# Patient Record
Sex: MALE | Race: WHITE | NOT HISPANIC OR LATINO | Employment: OTHER | URBAN - METROPOLITAN AREA
[De-identification: names, ages, dates, MRNs, and addresses within clinical notes are randomized per-mention and may not be internally consistent; named-entity substitution may affect disease eponyms.]

---

## 2017-01-03 RX ORDER — GEMFIBROZIL 600 MG/1
600 TABLET, FILM COATED ORAL
Qty: 60 TABLET | Refills: 11 | Status: SHIPPED | OUTPATIENT
Start: 2017-01-03 | End: 2017-01-25

## 2017-01-06 ENCOUNTER — OFFICE VISIT (OUTPATIENT)
Dept: FAMILY MEDICINE | Facility: CLINIC | Age: 62
End: 2017-01-06
Payer: COMMERCIAL

## 2017-01-06 VITALS
RESPIRATION RATE: 16 BRPM | HEIGHT: 74 IN | OXYGEN SATURATION: 96 % | BODY MASS INDEX: 32.77 KG/M2 | WEIGHT: 255.31 LBS | HEART RATE: 94 BPM | SYSTOLIC BLOOD PRESSURE: 150 MMHG | DIASTOLIC BLOOD PRESSURE: 90 MMHG

## 2017-01-06 DIAGNOSIS — E11.69 HYPERLIPIDEMIA ASSOCIATED WITH TYPE 2 DIABETES MELLITUS: ICD-10-CM

## 2017-01-06 DIAGNOSIS — Z86.010 HISTORY OF COLON POLYPS: ICD-10-CM

## 2017-01-06 DIAGNOSIS — F17.210 CIGARETTE NICOTINE DEPENDENCE WITHOUT COMPLICATION: ICD-10-CM

## 2017-01-06 DIAGNOSIS — I10 ESSENTIAL HYPERTENSION: ICD-10-CM

## 2017-01-06 DIAGNOSIS — E78.5 HYPERLIPIDEMIA ASSOCIATED WITH TYPE 2 DIABETES MELLITUS: ICD-10-CM

## 2017-01-06 PROCEDURE — 2022F DILAT RTA XM EVC RTNOPTHY: CPT | Mod: S$GLB,,, | Performed by: PHYSICIAN ASSISTANT

## 2017-01-06 PROCEDURE — 4010F ACE/ARB THERAPY RXD/TAKEN: CPT | Mod: S$GLB,,, | Performed by: PHYSICIAN ASSISTANT

## 2017-01-06 PROCEDURE — 99214 OFFICE O/P EST MOD 30 MIN: CPT | Mod: S$GLB,,, | Performed by: PHYSICIAN ASSISTANT

## 2017-01-06 PROCEDURE — 3077F SYST BP >= 140 MM HG: CPT | Mod: S$GLB,,, | Performed by: PHYSICIAN ASSISTANT

## 2017-01-06 PROCEDURE — 3080F DIAST BP >= 90 MM HG: CPT | Mod: S$GLB,,, | Performed by: PHYSICIAN ASSISTANT

## 2017-01-06 PROCEDURE — 99999 PR PBB SHADOW E&M-EST. PATIENT-LVL IV: CPT | Mod: PBBFAC,,, | Performed by: PHYSICIAN ASSISTANT

## 2017-01-06 PROCEDURE — 3046F HEMOGLOBIN A1C LEVEL >9.0%: CPT | Mod: S$GLB,,, | Performed by: PHYSICIAN ASSISTANT

## 2017-01-06 PROCEDURE — 1159F MED LIST DOCD IN RCRD: CPT | Mod: S$GLB,,, | Performed by: PHYSICIAN ASSISTANT

## 2017-01-06 RX ORDER — ROSUVASTATIN CALCIUM 20 MG/1
20 TABLET, COATED ORAL DAILY
Qty: 90 TABLET | Refills: 1 | Status: SHIPPED | OUTPATIENT
Start: 2017-01-06 | End: 2017-08-19 | Stop reason: SDUPTHER

## 2017-01-06 RX ORDER — ATENOLOL AND CHLORTHALIDONE TABLET 50; 25 MG/1; MG/1
1 TABLET ORAL DAILY
Qty: 90 TABLET | Refills: 1 | Status: SHIPPED | OUTPATIENT
Start: 2017-01-06 | End: 2017-01-25

## 2017-01-06 RX ORDER — METFORMIN HYDROCHLORIDE 500 MG/1
TABLET, EXTENDED RELEASE ORAL
Qty: 120 TABLET | Refills: 3 | Status: SHIPPED | OUTPATIENT
Start: 2017-01-06 | End: 2017-04-25 | Stop reason: SDUPTHER

## 2017-01-06 RX ORDER — AMLODIPINE AND BENAZEPRIL HYDROCHLORIDE 10; 40 MG/1; MG/1
1 CAPSULE ORAL DAILY
Qty: 90 CAPSULE | Refills: 1 | Status: ON HOLD | OUTPATIENT
Start: 2017-01-06 | End: 2017-01-17 | Stop reason: HOSPADM

## 2017-01-06 NOTE — PATIENT INSTRUCTIONS
Established High Blood Pressure    High blood pressure (hypertension) is a chronic disease. Often health care providers dont know what causes it. But it can be caused by certain health conditions and medicines.  If you have high blood pressure, you may not have any symptoms. If you do have symptoms, they may include headache, dizziness, changes in your vision, chest pain, and shortness of breath. But even without symptoms, high blood pressure thats not treated raises your risk for heart attack and stroke. High blood pressure is a serious health risk and shouldnt be ignored.  A blood pressure reading is made up of two numbers: a higher number over a lower number. The top number is the systolic pressure. The bottom number is the diastolic pressure. A normal blood pressure is less than 120 over less than 80.  High blood pressure is when either the top number is 140 or higher, or the bottom number is 90 or higher. This must be the result when taking your blood pressure a number of times. The blood pressures between normal and high are called prehypertension.  Home care  If you have high blood pressure, you should do what is listed below to lower your blood pressure. If you are taking medicines for high blood pressure, these methods may reduce or end your need for medicines in the future.  · Begin a weight-loss program if you are overweight.  · Cut back on how much salt you get in your diet. Heres how to do this:  ¨ Dont eat foods that have a lot of salt. These include olives, pickles, smoked meats, and salted potato chips.  ¨ Dont add salt to your food at the table.  ¨ Use only small amounts of salt when cooking.  · Begin an exercise program. Talk with your health care provider about the type of exercise program that would be best for you. It doesn't have to be hard. Even brisk walking for 20 minutes 3 times a week is a good form of exercise.  · Dont take medicines that have heart stimulants. This includes many  cold and sinus decongestant pills and sprays, as well as diet pills. Check the warnings about hypertension on the label. Stimulants such as amphetamine or cocaine could be lethal for someone with high blood pressure. Never take these.  · Limit how much caffeine you get in your diet. Switch to caffeine-free products.  · Stop smoking. If you are a long-time smoker, this can be hard. Enroll in a stop-smoking program to make it more likely that you will quit for good.  · Learn how to handle stress. This is an important part of any program to lower blood pressure. Learn about relaxation methods like meditation, yoga, or biofeedback.  · If your provider prescribed medicines, take them exactly as directed. Missing doses may cause your blood pressure get out of control.  · Consider buying an automatic blood pressure machine. You can get one of these at most pharmacies. Use this to watch your blood pressure at home. Give the results to your provider.  Follow-up care  You will need to make regular visits to your health care provider. This is to check your blood pressure and to make changes to your medicines. Make a follow-up appointment as directed.  When to seek medical advice  Call your health care provider right away if any of these occur:  · Chest pain or shortness of breath  · Severe headache  · Throbbing or rushing sound in the ears  · Nosebleed  · Sudden severe pain in your belly (abdomen)  · Extreme drowsiness, confusion, or fainting  · Dizziness or dizziness with a spinning sensation (vertigo)  · Weakness of an arm or leg or one side of the face  · You have problems speaking or seeing   © 3829-3922 [employer]. [address] [place] [address], [place] [address]. All rights reserved. This information is not intended as a substitute for professional medical care. Always follow your healthcare professional's instructions.            Diabetes (General Information)  Diabetes is a long-term health problem. It means  your body does not make enough insulin. Or it may mean that your body cannot use the insulin it makes. Insulin is a hormone in your body. It lets blood sugar (glucose) reach the cells in your body. All of your cells need glucose for fuel.  When you have diabetes, the glucose in your blood builds up because it cannot get into the cells. This buildup is called high blood sugar (hyperglycemia).  Your blood sugar level depends on several things. It depends on what kind of food you eat and how much of it you eat. It also depends on how much exercise you get, and how much insulin you have in your body. Eating too much of the wrong kinds of food or not taking diabetes medicine on time can cause high blood sugar. Infections can cause high blood sugar even if you are taking medicines correctly.  These things can also cause low blood sugar:  · Missing meals  · Not eating enough food  · Taking too much diabetes medicine  Diabetes can cause serious problems over time if you do not get treated. These problems include heart disease, stroke, kidney failure, and blindness. They also include nerve pain or loss of feeling in your legs and feet, and gangrene of the feet. By keeping your blood sugar under control you can prevent or delay these problems.  Normal blood sugar levels are 80 to 100 before a meal and less than 180 in the 1 to 2 hours after a meal.  Home care  Follow these guidelines when caring for yourself at home:  · Follow the diet your healthcare provider gives you. Take insulin or other diabetes medicine exactly as told to.  · Watch your blood sugar as you are told to. Keep a log of your results. This will help your provider change your medicines to keep your blood sugar under control.  · Try to reach your ideal weight. You may be able to cut back on or not have to take diabetes medicine if you eat the right foods and get exercise.  · Do not smoke. Smoking worsens the effects of diabetes on your circulation. You are  much more likely to have a heart attack if you have diabetes and you smoke.  · Take good care of your feet. If you have lost feeling in your feet, you may not see an injury or infection. Check your feet and between your toes at least once a week.  · Wear a medical alert bracelet or necklace, or carry a card in your wallet that says you have diabetes. This will help healthcare providers give you the right care if you get very ill and cannot tell them that you have diabetes.  Sick day plan  If you get a cold, the flu, or a bacterial or viral infection, take these steps:  · Look at your diabetes sick plan and call your healthcare provider as you were told to. You may need to call your provider right away if:  ¨ Your blood sugar is above 240 while taking your diabetes medicine  ¨ Your urine ketone levels are above normal or high  ¨ You have been vomiting more than 6 hours  ¨ You have trouble breathing or your breath ha s a fruity smell  ¨ You have a high fever  ¨ You have a fever for several days and you are not getting better  ¨ You get light-headed and are sleepier than usual  · Keep taking your diabetes pills (oral medicine) even if you have been vomiting and are feeling sick. Call your provider right away because you may need insulin to lower your blood sugar until you recover from your illness.  · Keep taking your insulin even if you have been vomiting and are feeling sick. Call your provider right away to ask if you need to change your insulin dose. This will depend on your blood sugar results.  · Check your blood sugar every 2 to 4 hours, or at least 4 times a day.  · Check your ketones often. If you are vomiting and having diarrhea, watch them more often.  · Do not skip meals. Try to eat small meals on a regular schedule. Do this even if you do not feel like eating.  · Drink water or other liquids that do not have caffeine or calories. This will keep you from getting dehydrated. If you are nauseated or vomiting,  takes small sips every 5 minutes. To prevent dehydration try to drink a cup (8 ounces) of fluids every hour while you are awake.  General care  Always bring a source of fast-acting sugar with you in case you have symptoms of low blood sugar (below 70). At the first sign of low blood sugar, eat or drink 15 to 20 grams of fast-acting sugar to raise your blood sugar. Examples are:  · 3 to 4 glucose tablets. You can buy these at most drugstores.  · 4 ounces (1/2 cup) of regular (not diet) soft drinks  · 4 ounces (1/2 cup) of any fruit juice  · 8 ounces (1 cup) of milk  · 5 to 6 pieces of hard candy  · 1 tablespoon of honey  Check your blood sugar 15 minutes after treating yourself. If it is still below 70, take 15 to 20 more grams of fast-acting sugar. Test again in 15 minutes. If it returns to normal (70 or above), eat a snack or meal to keep your blood sugar in a safe range. If it stays low, call your doctor or go to an emergency room.  Follow-up care  Follow-up with your healthcare provider, or as advised. For more information about diabetes, visit the American Diabetes Association website at www.diabetes.org or call 693-679-8386.  When to seek medical advice  Call your healthcare provider right away if you have any of these symptoms of high blood sugar:  · Frequent urination  · Dizziness  · Drowsiness  · Thirst  · Headache  · Nausea or vomiting  · Abdominal pain  · Eyesight changes  · Fast breathing  · Confusion or loss of consciousness  Also call your provider right away if you have any of these signs of low blood sugar:  · Fatigue  · Headache  · Shakes  · Excess sweating  · Hunger  · Feeling anxious or restless  · Eyesight changes  · Drowsiness  · Weakness  · Confusion or loss of consciousness  Call 911  Call for emergency help right away if any of these occur:  · Chest pain or shortness of breath  · Dizziness or fainting  · Weakness of an arm or leg or one side of the face  · Trouble speaking or seeing   ©  0365-5886 [employer]. [address] [place] [address], [place] [address]. All rights reserved. This information is not intended as a substitute for professional medical care. Always follow your healthcare professional's instructions.            Walking for Fitness  Fitness walking has something for everyone, even people who are already fit. Walking is one of the safest ways to condition your body aerobically. It can boost energy, help you lose weight, and reduce stress.    Physical benefits  · Walking strengthens your heart and lungs, and tones your muscles.  · When walking, your feet land with less impact than in other sports. This reduces chances of muscle, bone, and joint injury.  · Regular walking improves your cholesterol levels and lowers your risk of heart disease. And it helps you control your blood sugar if you have diabetes.  · Walking is a weight-bearing activity, which helps maintain bone density. This can help prevent osteoporosis.  Personal rewards  · Taking walks can help you relax and manage stress. And fitness walking may make you feel better about yourself.  · Walking can help you sleep better at night and make you less likely to be depressed.  · Regular walking may help maintain your memory as you get older.  · Walking is a great way to spend extra time with friends and family members. Be sure to invite your dog along!  Q&A about fitness walking  Q: Will walking keep me fit?  A: Yes. Regular walking at the right pace gives you all the benefits of other aerobic activities, such as jogging and swimming.  Q: Will walking help me lose weight and keep it off?  A: Yes. Per mile, walking can burn as many calories as jogging. Your health care provider can help work walking into your weight-loss plan.  Q: Is walking safe for my health?  A: Yes. Walking is safe if you have high blood pressure, diabetes, heart disease, or other conditions. Talk to your health care provider before you start.  ©  5263-2674 [employer]. [address] [place] [address], [place] [address]. All rights reserved. This information is not intended as a substitute for professional medical care. Always follow your healthcare professional's instructions.            Weight Management: Getting Started  Healthy bodies come in all shapes and sizes. Not all bodies are made to be thin. For some people, a healthy weight is higher than the average weight listed on weight charts. Your healthcare provider can help you decide on a healthy weight for you.    Reasons to lose weight  Losing weight can help with some health problems, such as high blood pressure, heart disease, diabetes, sleep apnea, and arthritis. You may also feel more energy.  Set your long-term goal  Your goal doesn't even have to be a specific weight. You may decide on a fitness goal (such as being able to walk 10 miles a week), or a health goal (such as lowering your blood pressure). Choose a goal that is measurable and reasonable, so you know when you've reached it. A goal of reaching a BMI of less than 25 is not always reasonable (or possible).   Make an action plan  Habits dont change overnight. Setting your goals too high can leave you feeling discouraged if you cant reach them. Be realistic. Choose one or two small changes you can make now. Set an action plan for how you are going to make these changes. When you can stick to this plan, keep making a few more small changes. Taking small steps will help you stay on the path to success.  Track your progress  Write down your goals. Then, keep a daily record of your progress. Write down what you eat and how active you are. This record lets you look back on how much youve done. It may also help when youre feeling frustrated. Reward yourself for success. Even if you dont reach every goal, give yourself credit for what you do get done.  Get support  Encouragement from others can help make losing weight easier. Ask your  family members and friends for support. They may even want to join you. Also look to your healthcare provider, registered dietitian, and  for help. Your local hospital can give you more information about nutrition, exercise, and weight loss.  © 9134-9119 [employer]. [address] [place] [address], [place] [address]. All rights reserved. This information is not intended as a substitute for professional medical care. Always follow your healthcare professional's instructions.

## 2017-01-06 NOTE — PROGRESS NOTES
Subjective:       Patient ID: Jhonny Almazan is a 61 y.o. male.    Chief Complaint: Follow-up (3mth f/u)    HPI Comments: Mr. Almazan comes to clinic today for 3 month follow up. He recently had blood work that revealed uncontrolled blood sugars and uncontrolled cholesterol. The patient reports he is not always compliant with his medications. He reports that he will often stop taking them when the medication runs out. 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 The patient is due for immunizations; he refuses pneumonia vaccine. He has had his flu shot through his employer. The patient is up to date on his eye exam. The patient is overdue for colonoscopy; he has not scheduled this yet due to difficulty scheduling around his work schedule. The patient does admit that he has not made his health and medication a priority. He reports he will be more committed to taking his medication as directed and following his diet. He is not monitoring his blood sugars at this time; he reports he will start doing this.     Review of Systems   Constitutional: Negative for activity change, appetite change and fever.   HENT: Negative for postnasal drip, rhinorrhea and sinus pressure.    Eyes: Negative for visual disturbance.   Respiratory: Negative for cough and shortness of breath.    Cardiovascular: Negative for chest pain.   Gastrointestinal: Negative for abdominal distention and abdominal pain.   Genitourinary: Negative for difficulty urinating and dysuria.   Musculoskeletal: Negative for arthralgias and myalgias.   Neurological: Negative for headaches.   Hematological: Negative for adenopathy.   Psychiatric/Behavioral: The patient is not nervous/anxious.        Objective:      Physical Exam   Constitutional: He is oriented to person, place, and time.   HENT:   Mouth/Throat: Oropharynx is clear and moist. No oropharyngeal exudate.    Eyes: Conjunctivae are normal. Pupils are equal, round, and reactive to light.   Cardiovascular: Normal rate and regular rhythm.    Pulmonary/Chest: Effort normal and breath sounds normal. He has no wheezes.   Abdominal: Soft. Bowel sounds are normal. There is no tenderness.   Musculoskeletal: He exhibits no edema.   Lymphadenopathy:     He has no cervical adenopathy.   Neurological: He is alert and oriented to person, place, and time.   Skin: No erythema.   Psychiatric: His behavior is normal.       Assessment:       1. Uncontrolled type 2 diabetes mellitus with complication, without long-term current use of insulin    2. Hyperlipidemia associated with type 2 diabetes mellitus    3. Essential hypertension    4. Cigarette nicotine dependence without complication    5. History of colon polyps        Plan:     Jhonny was seen today for follow-up.    Diagnoses and all orders for this visit:    Uncontrolled type 2 diabetes mellitus with complication, without long-term current use of insulin  -     metformin (GLUCOPHAGE-XR) 500 MG 24 hr tablet; Take 1 tab twice daily x 1 week then increase to 2 tabs twice dialy  Patient encouraged to be compliant with medication and diet  Increase exercise as able  Hyperlipidemia associated with type 2 diabetes mellitus  -     rosuvastatin (CRESTOR) 20 MG tablet; Take 1 tablet (20 mg total) by mouth once daily.  High fiber diet  Continue current medication  Essential hypertension  -     amlodipine-benazepril (LOTREL) 10-40 mg per capsule; Take 1 capsule by mouth once daily.  -     atenolol-chlorthalidone (TENORETIC) 50-25 mg Tab; Take 1 tablet by mouth once daily.  Uncontrolled  Patient to take lotrel before going to sleep and tenoretic before going to work  Low salt diet  Patient encouraged to stop smoking  Increase exercise as able  Cigarette nicotine dependence without complication  Patient continues to smoke cigarettes. He refuses to stop at this time  History of colon polyps  -      Ambulatory Referral to Gastroenterology    Follow up in 4 weeks. Patient to bring blood sugar and blood pressure log at that time to review readings  Patient encouraged to be compliant with medications. Again explained to patient that he is putting his health and life in danger by not complying with diet and prescribed medication. Patient verbalized understanding.

## 2017-01-06 NOTE — MR AVS SNAPSHOT
[facility]  [address]  [place] [address]  Phone: [phone]  Fax: [phone]                  Jhonny Almazan   2017 11:00 AM   Office Visit    Description:  Male : 1955   Provider:  Sita Zimmer PA-C   Department:  [facility]           Reason for Visit     Follow-up           Diagnoses this Visit        Comments    Uncontrolled type 2 diabetes mellitus with complication, without long-term current use of insulin    -  Primary     Hyperlipidemia associated with type 2 diabetes mellitus         Essential hypertension         Cigarette nicotine dependence without complication         History of colon polyps                To Do List           Future Appointments        Provider Department Dept Phone    2/10/2017 1:00 PM [facility] ENDOCRINE EDUCATOR [facility] [phone]    2/10/2017 2:40 PM Sita Zimmer PA-C [facility] [phone]    2017 1:30 PM Joey Whitehead MD [facility] [phone]    2017 1:00 PM Joey Whitehead MD [place] [phone]      Goals (5 Years of Data)     None       These Medications        Disp Refills Start End    metformin (GLUCOPHAGE-XR) 500 MG 24 hr tablet 120 tablet 3 2017     Take 1 tab twice daily x 1 week then increase to 2 tabs twice dialy    Pharmacy: [facility]/pharmacy #[id number] - [place] [address] #: [phone]       amlodipine-benazepril (LOTREL) 10-40 mg per capsule 90 capsule 1 2017     Take 1 capsule by mouth once daily. - Oral    Pharmacy: [facility]/pharmacy #[id number] - [place] [address] [facility] [address] Ph #: [phone]       rosuvastatin (CRESTOR) 20 MG tablet 90 tablet 1 2017     Take 1 tablet (20 mg total) by mouth once daily. - Oral    Pharmacy: [facility]/pharmacy #[id number] - [place] [address] [facility] [address] Ph #: [phone]       atenolol-chlorthalidone (TENORETIC) 50-25 mg Tab 90 tablet 1 2017    Take 1  tablet by mouth once daily. - Oral    Pharmacy: [facility]/pharmacy #[id number] - [place], [place] - [address] [facility] #: [phone]         [facility] On Call     [facility] On Call Nurse Care Line - 24/7 Assistance  Registered nurses in the [facility] On Call Center provide clinical advisement, health education, appointment booking, and other advisory services.  Call for this free service at [phone].             Medications           Message regarding Medications     Verify the changes and/or additions to your medication regime listed below are the same as discussed with your clinician today.  If any of these changes or additions are incorrect, please notify your healthcare provider.        START taking these NEW medications        Refills    metformin (GLUCOPHAGE-XR) 500 MG 24 hr tablet 3    Sig: Take 1 tab twice daily x 1 week then increase to 2 tabs twice dialy    Class: Normal    atenolol-chlorthalidone (TENORETIC) 50-25 mg Tab 1    Sig: Take 1 tablet by mouth once daily.    Class: Normal    Route: Oral      STOP taking these medications     nicotine polacrilex (NICORETTE) 4 MG Gum Take 1 each (4 mg total) by mouth as needed (use every 1 - 2 hours as needed for the first 6 weeks. 2-4 hours as needed for the next 3 weeks.).           Verify that the below list of medications is an accurate representation of the medications you are currently taking.  If none reported, the list may be blank. If incorrect, please contact your healthcare provider. Carry this list with you in case of emergency.           Current Medications     amlodipine-benazepril (LOTREL) 10-40 mg per capsule Take 1 capsule by mouth once daily.    aspirin (ECOTRIN) 81 MG EC tablet Take 81 mg by mouth once daily.    gemfibrozil (LOPID) 600 MG tablet Take 1 tablet (600 mg total) by mouth 2 (two) times daily before meals.    ibuprofen (ADVIL,MOTRIN) 200 MG tablet Take 200 mg by mouth every 6 (six) hours as needed for Pain.    rosuvastatin (CRESTOR) 20 MG tablet  "Take 1 tablet (20 mg total) by mouth once daily.    atenolol-chlorthalidone (TENORETIC) 50-25 mg Tab Take 1 tablet by mouth once daily.    metformin (GLUCOPHAGE-XR) 500 MG 24 hr tablet Take 1 tab twice daily x 1 week then increase to 2 tabs twice dialy           Clinical Reference Information           Vital Signs - Last Recorded  Most recent update: 1/6/2017 11:28 AM by Sita Zimmer PA-C    BP Pulse Resp Ht Wt SpO2    (!) 150/90 94 16 6' 2" (1.88 m) 115.8 kg (255 lb 4.7 oz) 96%    BMI                32.78 kg/m2          Blood Pressure          Most Recent Value    BP  (!)  150/90      Allergies as of 1/6/2017     No Known Allergies      Immunizations Administered on Date of Encounter - 1/6/2017     None      Orders Placed During Today's Visit      Normal Orders This Visit    Ambulatory Referral to Gastroenterology       Instructions      Established High Blood Pressure    High blood pressure (hypertension) is a chronic disease. Often health care providers dont know what causes it. But it can be caused by certain health conditions and medicines.  If you have high blood pressure, you may not have any symptoms. If you do have symptoms, they may include headache, dizziness, changes in your vision, chest pain, and shortness of breath. But even without symptoms, high blood pressure thats not treated raises your risk for heart attack and stroke. High blood pressure is a serious health risk and shouldnt be ignored.  A blood pressure reading is made up of two numbers: a higher number over a lower number. The top number is the systolic pressure. The bottom number is the diastolic pressure. A normal blood pressure is less than 120 over less than 80.  High blood pressure is when either the top number is 140 or higher, or the bottom number is 90 or higher. This must be the result when taking your blood pressure a number of times. The blood pressures between normal and high are called prehypertension.  Home care  If you " have high blood pressure, you should do what is listed below to lower your blood pressure. If you are taking medicines for high blood pressure, these methods may reduce or end your need for medicines in the future.  · Begin a weight-loss program if you are overweight.  · Cut back on how much salt you get in your diet. Heres how to do this:  ¨ Dont eat foods that have a lot of salt. These include olives, pickles, smoked meats, and salted potato chips.  ¨ Dont add salt to your food at the table.  ¨ Use only small amounts of salt when cooking.  · Begin an exercise program. Talk with your health care provider about the type of exercise program that would be best for you. It doesn't have to be hard. Even brisk walking for 20 minutes 3 times a week is a good form of exercise.  · Dont take medicines that have heart stimulants. This includes many cold and sinus decongestant pills and sprays, as well as diet pills. Check the warnings about hypertension on the label. Stimulants such as amphetamine or cocaine could be lethal for someone with high blood pressure. Never take these.  · Limit how much caffeine you get in your diet. Switch to caffeine-free products.  · Stop smoking. If you are a long-time smoker, this can be hard. Enroll in a stop-smoking program to make it more likely that you will quit for good.  · Learn how to handle stress. This is an important part of any program to lower blood pressure. Learn about relaxation methods like meditation, yoga, or biofeedback.  · If your provider prescribed medicines, take them exactly as directed. Missing doses may cause your blood pressure get out of control.  · Consider buying an automatic blood pressure machine. You can get one of these at most pharmacies. Use this to watch your blood pressure at home. Give the results to your provider.  Follow-up care  You will need to make regular visits to your health care provider. This is to check your blood pressure and to make  changes to your medicines. Make a follow-up appointment as directed.  When to seek medical advice  Call your health care provider right away if any of these occur:  · Chest pain or shortness of breath  · Severe headache  · Throbbing or rushing sound in the ears  · Nosebleed  · Sudden severe pain in your belly (abdomen)  · Extreme drowsiness, confusion, or fainting  · Dizziness or dizziness with a spinning sensation (vertigo)  · Weakness of an arm or leg or one side of the face  · You have problems speaking or seeing   © 2000-2016 [employer]. [address] [place] [address]. All rights reserved. This information is not intended as a substitute for professional medical care. Always follow your healthcare professional's instructions.            Diabetes (General Information)  Diabetes is a long-term health problem. It means your body does not make enough insulin. Or it may mean that your body cannot use the insulin it makes. Insulin is a hormone in your body. It lets blood sugar (glucose) reach the cells in your body. All of your cells need glucose for fuel.  When you have diabetes, the glucose in your blood builds up because it cannot get into the cells. This buildup is called high blood sugar (hyperglycemia).  Your blood sugar level depends on several things. It depends on what kind of food you eat and how much of it you eat. It also depends on how much exercise you get, and how much insulin you have in your body. Eating too much of the wrong kinds of food or not taking diabetes medicine on time can cause high blood sugar. Infections can cause high blood sugar even if you are taking medicines correctly.  These things can also cause low blood sugar:  · Missing meals  · Not eating enough food  · Taking too much diabetes medicine  Diabetes can cause serious problems over time if you do not get treated. These problems include heart disease, stroke, kidney failure, and blindness. They also include  nerve pain or loss of feeling in your legs and feet, and gangrene of the feet. By keeping your blood sugar under control you can prevent or delay these problems.  Normal blood sugar levels are 80 to 100 before a meal and less than 180 in the 1 to 2 hours after a meal.  Home care  Follow these guidelines when caring for yourself at home:  · Follow the diet your healthcare provider gives you. Take insulin or other diabetes medicine exactly as told to.  · Watch your blood sugar as you are told to. Keep a log of your results. This will help your provider change your medicines to keep your blood sugar under control.  · Try to reach your ideal weight. You may be able to cut back on or not have to take diabetes medicine if you eat the right foods and get exercise.  · Do not smoke. Smoking worsens the effects of diabetes on your circulation. You are much more likely to have a heart attack if you have diabetes and you smoke.  · Take good care of your feet. If you have lost feeling in your feet, you may not see an injury or infection. Check your feet and between your toes at least once a week.  · Wear a medical alert bracelet or necklace, or carry a card in your wallet that says you have diabetes. This will help healthcare providers give you the right care if you get very ill and cannot tell them that you have diabetes.  Sick day plan  If you get a cold, the flu, or a bacterial or viral infection, take these steps:  · Look at your diabetes sick plan and call your healthcare provider as you were told to. You may need to call your provider right away if:  ¨ Your blood sugar is above 240 while taking your diabetes medicine  ¨ Your urine ketone levels are above normal or high  ¨ You have been vomiting more than 6 hours  ¨ You have trouble breathing or your breath ha s a fruity smell  ¨ You have a high fever  ¨ You have a fever for several days and you are not getting better  ¨ You get light-headed and are sleepier than  usual  · Keep taking your diabetes pills (oral medicine) even if you have been vomiting and are feeling sick. Call your provider right away because you may need insulin to lower your blood sugar until you recover from your illness.  · Keep taking your insulin even if you have been vomiting and are feeling sick. Call your provider right away to ask if you need to change your insulin dose. This will depend on your blood sugar results.  · Check your blood sugar every 2 to 4 hours, or at least 4 times a day.  · Check your ketones often. If you are vomiting and having diarrhea, watch them more often.  · Do not skip meals. Try to eat small meals on a regular schedule. Do this even if you do not feel like eating.  · Drink water or other liquids that do not have caffeine or calories. This will keep you from getting dehydrated. If you are nauseated or vomiting, takes small sips every 5 minutes. To prevent dehydration try to drink a cup (8 ounces) of fluids every hour while you are awake.  General care  Always bring a source of fast-acting sugar with you in case you have symptoms of low blood sugar (below 70). At the first sign of low blood sugar, eat or drink 15 to 20 grams of fast-acting sugar to raise your blood sugar. Examples are:  · 3 to 4 glucose tablets. You can buy these at most drugstores.  · 4 ounces (1/2 cup) of regular (not diet) soft drinks  · 4 ounces (1/2 cup) of any fruit juice  · 8 ounces (1 cup) of milk  · 5 to 6 pieces of hard candy  · 1 tablespoon of honey  Check your blood sugar 15 minutes after treating yourself. If it is still below 70, take 15 to 20 more grams of fast-acting sugar. Test again in 15 minutes. If it returns to normal (70 or above), eat a snack or meal to keep your blood sugar in a safe range. If it stays low, call your doctor or go to an emergency room.  Follow-up care  Follow-up with your healthcare provider, or as advised. For more information about diabetes, visit the American  Diabetes Association website at www.diabetes.org or call 232-721-6294.  When to seek medical advice  Call your healthcare provider right away if you have any of these symptoms of high blood sugar:  · Frequent urination  · Dizziness  · Drowsiness  · Thirst  · Headache  · Nausea or vomiting  · Abdominal pain  · Eyesight changes  · Fast breathing  · Confusion or loss of consciousness  Also call your provider right away if you have any of these signs of low blood sugar:  · Fatigue  · Headache  · Shakes  · Excess sweating  · Hunger  · Feeling anxious or restless  · Eyesight changes  · Drowsiness  · Weakness  · Confusion or loss of consciousness  Call 911  Call for emergency help right away if any of these occur:  · Chest pain or shortness of breath  · Dizziness or fainting  · Weakness of an arm or leg or one side of the face  · Trouble speaking or seeing   © 4300-5002 [employer]. [address] [place] [address]. All rights reserved. This information is not intended as a substitute for professional medical care. Always follow your healthcare professional's instructions.            Walking for Fitness  Fitness walking has something for everyone, even people who are already fit. Walking is one of the safest ways to condition your body aerobically. It can boost energy, help you lose weight, and reduce stress.    Physical benefits  · Walking strengthens your heart and lungs, and tones your muscles.  · When walking, your feet land with less impact than in other sports. This reduces chances of muscle, bone, and joint injury.  · Regular walking improves your cholesterol levels and lowers your risk of heart disease. And it helps you control your blood sugar if you have diabetes.  · Walking is a weight-bearing activity, which helps maintain bone density. This can help prevent osteoporosis.  Personal rewards  · Taking walks can help you relax and manage stress. And fitness walking may make you feel better  about yourself.  · Walking can help you sleep better at night and make you less likely to be depressed.  · Regular walking may help maintain your memory as you get older.  · Walking is a great way to spend extra time with friends and family members. Be sure to invite your dog along!  Q&A about fitness walking  Q: Will walking keep me fit?  A: Yes. Regular walking at the right pace gives you all the benefits of other aerobic activities, such as jogging and swimming.  Q: Will walking help me lose weight and keep it off?  A: Yes. Per mile, walking can burn as many calories as jogging. Your health care provider can help work walking into your weight-loss plan.  Q: Is walking safe for my health?  A: Yes. Walking is safe if you have high blood pressure, diabetes, heart disease, or other conditions. Talk to your health care provider before you start.  © 5817-3454 [employer]. [address] [place] [address]. All rights reserved. This information is not intended as a substitute for professional medical care. Always follow your healthcare professional's instructions.            Weight Management: Getting Started  Healthy bodies come in all shapes and sizes. Not all bodies are made to be thin. For some people, a healthy weight is higher than the average weight listed on weight charts. Your healthcare provider can help you decide on a healthy weight for you.    Reasons to lose weight  Losing weight can help with some health problems, such as high blood pressure, heart disease, diabetes, sleep apnea, and arthritis. You may also feel more energy.  Set your long-term goal  Your goal doesn't even have to be a specific weight. You may decide on a fitness goal (such as being able to walk 10 miles a week), or a health goal (such as lowering your blood pressure). Choose a goal that is measurable and reasonable, so you know when you've reached it. A goal of reaching a BMI of less than 25 is not always reasonable  (or possible).   Make an action plan  Habits dont change overnight. Setting your goals too high can leave you feeling discouraged if you cant reach them. Be realistic. Choose one or two small changes you can make now. Set an action plan for how you are going to make these changes. When you can stick to this plan, keep making a few more small changes. Taking small steps will help you stay on the path to success.  Track your progress  Write down your goals. Then, keep a daily record of your progress. Write down what you eat and how active you are. This record lets you look back on how much youve done. It may also help when youre feeling frustrated. Reward yourself for success. Even if you dont reach every goal, give yourself credit for what you do get done.  Get support  Encouragement from others can help make losing weight easier. Ask your family members and friends for support. They may even want to join you. Also look to your healthcare provider, registered dietitian, and  for help. Your local hospital can give you more information about nutrition, exercise, and weight loss.  © 2163-1426 [employer]. [address] [place] [address], [place] [address]. All rights reserved. This information is not intended as a substitute for professional medical care. Always follow your healthcare professional's instructions.                 Smoking Cessation     If you would like to quit smoking:   You may be eligible for free services if you are a [place] resident and started smoking cigarettes before September 1, 1988.  Call the Smoking Cessation Trust (SCT) toll free at (867) 822-4929 or (053) 862-6695.   Call 1-800-QUIT-NOW if you do not meet the above criteria.

## 2017-01-16 ENCOUNTER — HOSPITAL ENCOUNTER (OUTPATIENT)
Facility: HOSPITAL | Age: 62
Discharge: HOME | DRG: 684 | End: 2017-01-17
Attending: EMERGENCY MEDICINE | Admitting: HOSPITALIST
Payer: COMMERCIAL

## 2017-01-16 DIAGNOSIS — R42 DIZZINESS: Primary | ICD-10-CM

## 2017-01-16 DIAGNOSIS — R73.9 HYPERGLYCEMIA: ICD-10-CM

## 2017-01-16 DIAGNOSIS — N39.0 URINARY TRACT INFECTION WITHOUT HEMATURIA, SITE UNSPECIFIED: ICD-10-CM

## 2017-01-16 DIAGNOSIS — I95.1 ORTHOSTATIC HYPOTENSION: ICD-10-CM

## 2017-01-16 DIAGNOSIS — I15.2 HYPERTENSION ASSOCIATED WITH DIABETES: ICD-10-CM

## 2017-01-16 DIAGNOSIS — N28.9 ACUTE RENAL INSUFFICIENCY: ICD-10-CM

## 2017-01-16 DIAGNOSIS — N17.9 AKI (ACUTE KIDNEY INJURY): ICD-10-CM

## 2017-01-16 DIAGNOSIS — E11.59 HYPERTENSION ASSOCIATED WITH DIABETES: ICD-10-CM

## 2017-01-16 LAB
ALBUMIN SERPL BCP-MCNC: 4.2 G/DL
ALP SERPL-CCNC: 72 U/L
ALT SERPL W/O P-5'-P-CCNC: 23 U/L
ANION GAP SERPL CALC-SCNC: 16 MMOL/L
AST SERPL-CCNC: 17 U/L
BASOPHILS # BLD AUTO: 0.1 K/UL
BASOPHILS NFR BLD: 0.8 %
BILIRUB SERPL-MCNC: 0.7 MG/DL
BNP SERPL-MCNC: 27 PG/ML
BUN SERPL-MCNC: 26 MG/DL
CALCIUM SERPL-MCNC: 10.3 MG/DL
CHLORIDE SERPL-SCNC: 100 MMOL/L
CO2 SERPL-SCNC: 21 MMOL/L
CREAT SERPL-MCNC: 1.9 MG/DL
DIFFERENTIAL METHOD: ABNORMAL
EOSINOPHIL # BLD AUTO: 0.1 K/UL
EOSINOPHIL NFR BLD: 1 %
ERYTHROCYTE [DISTWIDTH] IN BLOOD BY AUTOMATED COUNT: 12.8 %
EST. GFR  (AFRICAN AMERICAN): 42 ML/MIN/1.73 M^2
EST. GFR  (NON AFRICAN AMERICAN): 37 ML/MIN/1.73 M^2
GLUCOSE SERPL-MCNC: 275 MG/DL
HCT VFR BLD AUTO: 44.3 %
HGB BLD-MCNC: 15.3 G/DL
LIPASE SERPL-CCNC: 34 U/L
LYMPHOCYTES # BLD AUTO: 2.5 K/UL
LYMPHOCYTES NFR BLD: 20.4 %
MCH RBC QN AUTO: 31.5 PG
MCHC RBC AUTO-ENTMCNC: 34.5 %
MCV RBC AUTO: 91 FL
MONOCYTES # BLD AUTO: 0.7 K/UL
MONOCYTES NFR BLD: 5.8 %
NEUTROPHILS # BLD AUTO: 8.8 K/UL
NEUTROPHILS NFR BLD: 72 %
PLATELET # BLD AUTO: 228 K/UL
PMV BLD AUTO: 8 FL
POTASSIUM SERPL-SCNC: 3.9 MMOL/L
PROT SERPL-MCNC: 7.3 G/DL
RBC # BLD AUTO: 4.86 M/UL
SODIUM SERPL-SCNC: 137 MMOL/L
TROPONIN I SERPL DL<=0.01 NG/ML-MCNC: 0.01 NG/ML
WBC # BLD AUTO: 12.2 K/UL

## 2017-01-16 PROCEDURE — 12000002 HC ACUTE/MED SURGE SEMI-PRIVATE ROOM

## 2017-01-16 PROCEDURE — 83880 ASSAY OF NATRIURETIC PEPTIDE: CPT

## 2017-01-16 PROCEDURE — 96360 HYDRATION IV INFUSION INIT: CPT

## 2017-01-16 PROCEDURE — 93005 ELECTROCARDIOGRAM TRACING: CPT

## 2017-01-16 PROCEDURE — 84443 ASSAY THYROID STIM HORMONE: CPT

## 2017-01-16 PROCEDURE — 85025 COMPLETE CBC W/AUTO DIFF WBC: CPT

## 2017-01-16 PROCEDURE — G0378 HOSPITAL OBSERVATION PER HR: HCPCS

## 2017-01-16 PROCEDURE — 99285 EMERGENCY DEPT VISIT HI MDM: CPT | Mod: 25

## 2017-01-16 PROCEDURE — 81000 URINALYSIS NONAUTO W/SCOPE: CPT

## 2017-01-16 PROCEDURE — 25000003 PHARM REV CODE 250: Performed by: EMERGENCY MEDICINE

## 2017-01-16 PROCEDURE — 36415 COLL VENOUS BLD VENIPUNCTURE: CPT

## 2017-01-16 PROCEDURE — 83690 ASSAY OF LIPASE: CPT

## 2017-01-16 PROCEDURE — 80053 COMPREHEN METABOLIC PANEL: CPT

## 2017-01-16 PROCEDURE — 84484 ASSAY OF TROPONIN QUANT: CPT

## 2017-01-16 RX ADMIN — SODIUM CHLORIDE 1000 ML: 0.9 INJECTION, SOLUTION INTRAVENOUS at 11:01

## 2017-01-16 NOTE — IP AVS SNAPSHOT
[address] [place] [address]  Phone: [phone]           Patient Discharge Instructions     Our goal is to set you up for success. This packet includes information on your condition, medications, and your home care. It will help you to care for yourself so you don't get sicker and need to go back to the hospital.     Please ask your nurse if you have any questions.        There are many details to remember when preparing to leave the hospital. Here is what you will need to do:    1. Take your medicine. If you are prescribed medications, review your Medication List in the following pages. You may have new medications to  at the pharmacy and others that you'll need to stop taking. Review the instructions for how and when to take your medications. Talk with your doctor or nurses if you are unsure of what to do.     2. Go to your follow-up appointments. Specific follow-up information is listed in the following pages. Your may be contacted by a transition nurse or clinical provider about future appointments. Be sure we have all of the phone numbers to reach you, if needed. Please contact your provider's office if you are unable to make an appointment.     3. Watch for warning signs. Your doctor or nurse will give you detailed warning signs to watch for and when to call for assistance. These instructions may also include educational information about your condition. If you experience any of warning signs to your health, call your doctor.               [facility]  Unless otherwise directed by your provider, please contact [facility], our nurse care line that is available for 24/7 assistance.     [phone] (toll-free)    Registered nurses in the [facility] provide clinical advisement, health education, appointment booking, and other advisory services.                    ** Verify the list of medication(s) below is accurate and up to date.  Carry this with you in case of emergency. If your medications have changed, please notify your healthcare provider.             Medication List      START taking these medications        Additional Info                      lisinopril 5 MG tablet   Commonly known as:  PRINIVIL,ZESTRIL   Quantity:  90 tablet   Refills:  3   Dose:  5 mg    Instructions:  Take 1 tablet (5 mg total) by mouth once daily. Take in pm     Begin Date    AM    Noon    PM    Bedtime         CONTINUE taking these medications        Additional Info                      aspirin 81 MG EC tablet   Commonly known as:  ECOTRIN   Refills:  0   Dose:  81 mg    Last time this was given:  81 mg on 1/17/2017  8:27 AM   Instructions:  Take 81 mg by mouth once daily.     Begin Date    AM    Noon    PM    Bedtime       atenolol-chlorthalidone 50-25 mg Tab   Commonly known as:  TENORETIC   Quantity:  90 tablet   Refills:  1   Dose:  1 tablet    Instructions:  Take 1 tablet by mouth once daily.     Begin Date    AM    Noon    PM    Bedtime       gemfibrozil 600 MG tablet   Commonly known as:  LOPID   Quantity:  60 tablet   Refills:  11   Dose:  600 mg    Last time this was given:  600 mg on 1/17/2017  6:31 AM   Instructions:  Take 1 tablet (600 mg total) by mouth 2 (two) times daily before meals.     Begin Date    AM    Noon    PM    Bedtime       metformin 500 MG 24 hr tablet   Commonly known as:  GLUCOPHAGE-XR   Quantity:  120 tablet   Refills:  3    Instructions:  Take 1 tab twice daily x 1 week then increase to 2 tabs twice dialy     Begin Date    AM    Noon    PM    Bedtime       rosuvastatin 20 MG tablet   Commonly known as:  CRESTOR   Quantity:  90 tablet   Refills:  1   Dose:  20 mg    Last time this was given:  20 mg on 1/17/2017  8:27 AM   Instructions:  Take 1 tablet (20 mg total) by mouth once daily.     Begin Date    AM    Noon    PM    Bedtime         STOP taking these medications     amlodipine-benazepril 10-40 mg per capsule   Commonly known  as:  LOTREL       ibuprofen 200 MG tablet   Commonly known as:  ADVILMOTRIN            Where to Get Your Medications      These medications were sent to [id number] - [place] - [address]  [address] [place] [address]    Hours:  24-hours Phone:  [phone]     lisinopril 5 MG tablet                  Please bring to all follow up appointments:    1. A copy of your discharge instructions.  2. All medicines you are currently taking in their original bottles.  3. Identification and insurance card.    Please arrive 15 minutes ahead of scheduled appointment time.    Please call 24 hours in advance if you must reschedule your appointment and/or time.        Your Scheduled Appointments     Feb 10, 2017  1:00 PM CST   Diabetes Education with [place] [facility]   [place] [facility]    [address]  [place] [address]   [phone]            Feb 10, 2017  2:40 PM CST   Established Patient Visit with [facility] - [facility] ([place])    [address]  [place] [address]   [phone]            May 12, 2017  1:30 PM CDT   Established Patient Visit with MD [place] [facility] ([place])    [address]  [place] [address]   [phone]            Aug 18, 2017  1:00 PM CDT   Established Patient Visit with MD [place]  [facility] ([place])    [address]  [place] [address]   [phone]              Follow-up Information     Follow up with Joey Whitehead MD In 1 week.    Specialty:  Family Medicine    Contact information:    [address] [place] [address]  [phone]          Discharge Instructions     Future Orders    Activity as tolerated     Call MD for:  increased confusion or weakness     Call MD for:  persistent dizziness, light-headedness, or visual disturbances     Diet Cardiac     Diet Diabetic 1800 Calories       Discharge References/Attachments     ACE  "INHIBITORS, TAKING (ENGLISH)    BLOOD PRESSURE MEDICINES, DISCHARGE INSTRUCTIONS (ENGLISH)    DIABETES AND ALCOHOL CONSUMPTION (ENGLISH)        Primary Diagnosis     Your primary diagnosis was:  Acute Nontraumatic Kidney Injury      Admission Information     Date & Time Provider Department CSN    1/16/2017 10:11 PM Farzaneh Shankar MD [facility] [id number]      Care Providers     Provider Role Specialty Primary office phone    Farzaneh Shankar MD Attending Provider Hospitalist [phone]      Your Vitals Were     BP Pulse Temp Resp Height Weight    135/78 81 98.1 °F (36.7 °C) 20 6' 5" (1.956 m) 113.4 kg (250 lb)    SpO2 BMI             95% 29.65 kg/m2         Recent Lab Values        12/28/2004 6/10/2011 6/9/2014 9/12/2014 12/19/2014 12/29/2016            8:17 AM 10:18 AM  4:00 PM 10:48 AM  9:53 AM  9:25 AM      A1C 5.4 6.4 (H) 7.2 (H) 7.7 (H) 7.2 (H) 9.3 (H)      Comment for A1C at  9:25 AM on 12/29/2016:  According to ADA guidelines, hemoglobin A1C <7.0% represents  optimal control in non-pregnant diabetic patients.  Different  metrics may apply to specific populations.   Standards of Medical Care in Diabetes - 2016.  For the purpose of screening for the presence of diabetes:  <5.7%     Consistent with the absence of diabetes  5.7-6.4%  Consistent with increasing risk for diabetes   (prediabetes)  >or=6.5%  Consistent with diabetes  Currently no consensus exists for use of hemoglobin A1C  for diagnosis of diabetes for children.        Pending Labs     Order Current Status    Urine culture In process      Allergies as of 1/17/2017     No Known Allergies      Advance Directives     An advance directive is a document which, in the event you are no longer able to make decisions for yourself, tells your healthcare team what kind of treatment you do or do not want to receive, or who you would like to make those decisions for you.  If you do not currently have an advance directive, [facility] encourages " you to create one.  For more information call:  [phone]), [phone] ([phone]),  or log on to [email or web address].        Smoking Cessation     If you would like to quit smoking:   You may be eligible for free services if you are a [place] resident and started smoking cigarettes before September 1, 1988.  Call the Smoking Cessation Trust (SCT) toll free at (281) 255-4242 or (718) 115-1581.   Call 2-101-QUIT-NOW if you do not meet the above criteria.            Language Assistance Services     ATTENTION: Language assistance services are available, free of charge. Please call [phone].      ATENCIÓN: Si fidel bertin, tiene a elizondo disposición servicios gratuitos de asistencia lingüística. Llame al [phone].     CHÚ Ý: N?u b?n nói Ti?ng Vi?t, có các d?ch v? h? tr? ngôn ng? mi?n phí dành cho b?n. G?i s? [phone].        Diabetes Discharge Instructions                                    [facility] complies with applicable Federal civil rights laws and does not discriminate on the basis of race, color, national origin, age, disability, or sex.

## 2017-01-16 NOTE — IP AVS SNAPSHOT
[address] [place] [address]  Phone: [phone]           I have received a copy of my After Visit Summary and discharge instructions from [facility].    INSTRUCTIONS RECEIVED AND UNDERSTOOD BY:                     Patient/Patient Representative: ________________________________________________________________     Date/Time: ________________________________________________________________                     Instructions Given By: ________________________________________________________________     Date/Time: ________________________________________________________________

## 2017-01-17 VITALS
BODY MASS INDEX: 29.52 KG/M2 | RESPIRATION RATE: 20 BRPM | DIASTOLIC BLOOD PRESSURE: 86 MMHG | TEMPERATURE: 98 F | SYSTOLIC BLOOD PRESSURE: 133 MMHG | HEIGHT: 77 IN | HEART RATE: 76 BPM | WEIGHT: 250 LBS | OXYGEN SATURATION: 95 %

## 2017-01-17 PROBLEM — I95.1 ORTHOSTATIC HYPOTENSION: Status: ACTIVE | Noted: 2017-01-17

## 2017-01-17 LAB
ALBUMIN SERPL BCP-MCNC: 3.5 G/DL
ALP SERPL-CCNC: 62 U/L
ALT SERPL W/O P-5'-P-CCNC: 16 U/L
ANION GAP SERPL CALC-SCNC: 12 MMOL/L
AST SERPL-CCNC: 16 U/L
BACTERIA #/AREA URNS HPF: ABNORMAL /HPF
BASOPHILS # BLD AUTO: 0.1 K/UL
BASOPHILS NFR BLD: 0.6 %
BILIRUB SERPL-MCNC: 0.5 MG/DL
BILIRUB UR QL STRIP: ABNORMAL
BUN SERPL-MCNC: 24 MG/DL
CALCIUM SERPL-MCNC: 9.5 MG/DL
CHLORIDE SERPL-SCNC: 103 MMOL/L
CLARITY UR: ABNORMAL
CO2 SERPL-SCNC: 22 MMOL/L
COLOR UR: YELLOW
CREAT SERPL-MCNC: 1.2 MG/DL
CREAT UR-MCNC: 102.4 MG/DL
DIFFERENTIAL METHOD: ABNORMAL
EOSINOPHIL # BLD AUTO: 0.2 K/UL
EOSINOPHIL NFR BLD: 2.2 %
ERYTHROCYTE [DISTWIDTH] IN BLOOD BY AUTOMATED COUNT: 12.5 %
EST. GFR  (AFRICAN AMERICAN): >60 ML/MIN/1.73 M^2
EST. GFR  (NON AFRICAN AMERICAN): >60 ML/MIN/1.73 M^2
GLUCOSE SERPL-MCNC: 204 MG/DL
GLUCOSE UR QL STRIP: ABNORMAL
HCT VFR BLD AUTO: 40.1 %
HGB BLD-MCNC: 13.6 G/DL
HGB UR QL STRIP: NEGATIVE
HYALINE CASTS #/AREA URNS LPF: 2 /LPF
KETONES UR QL STRIP: ABNORMAL
LEUKOCYTE ESTERASE UR QL STRIP: NEGATIVE
LYMPHOCYTES # BLD AUTO: 2.8 K/UL
LYMPHOCYTES NFR BLD: 31.2 %
MAGNESIUM SERPL-MCNC: 1.7 MG/DL
MCH RBC QN AUTO: 31.2 PG
MCHC RBC AUTO-ENTMCNC: 34 %
MCV RBC AUTO: 92 FL
MICROSCOPIC COMMENT: ABNORMAL
MONOCYTES # BLD AUTO: 0.7 K/UL
MONOCYTES NFR BLD: 8.2 %
NEUTROPHILS # BLD AUTO: 5.2 K/UL
NEUTROPHILS NFR BLD: 57.8 %
NITRITE UR QL STRIP: NEGATIVE
PH UR STRIP: 6 [PH] (ref 5–8)
PHOSPHATE SERPL-MCNC: 3.3 MG/DL
PLATELET # BLD AUTO: 173 K/UL
PMV BLD AUTO: 8 FL
POCT GLUCOSE: 157 MG/DL (ref 70–110)
POCT GLUCOSE: 219 MG/DL (ref 70–110)
POCT GLUCOSE: 289 MG/DL (ref 70–110)
POTASSIUM SERPL-SCNC: 3.6 MMOL/L
PROT SERPL-MCNC: 6.2 G/DL
PROT UR QL STRIP: ABNORMAL
RBC # BLD AUTO: 4.38 M/UL
RBC #/AREA URNS HPF: 0 /HPF (ref 0–4)
SODIUM SERPL-SCNC: 137 MMOL/L
SODIUM UR-SCNC: 147 MMOL/L
SP GR UR STRIP: >=1.03 (ref 1–1.03)
TSH SERPL DL<=0.005 MIU/L-ACNC: 2.73 UIU/ML
URN SPEC COLLECT METH UR: ABNORMAL
UROBILINOGEN UR STRIP-ACNC: 1 EU/DL
WBC # BLD AUTO: 9.1 K/UL
WBC #/AREA URNS HPF: 30 /HPF (ref 0–5)

## 2017-01-17 PROCEDURE — 82570 ASSAY OF URINE CREATININE: CPT

## 2017-01-17 PROCEDURE — 80053 COMPREHEN METABOLIC PANEL: CPT

## 2017-01-17 PROCEDURE — 83735 ASSAY OF MAGNESIUM: CPT

## 2017-01-17 PROCEDURE — 84100 ASSAY OF PHOSPHORUS: CPT

## 2017-01-17 PROCEDURE — 63600175 PHARM REV CODE 636 W HCPCS: Performed by: NURSE PRACTITIONER

## 2017-01-17 PROCEDURE — 94761 N-INVAS EAR/PLS OXIMETRY MLT: CPT

## 2017-01-17 PROCEDURE — 36415 COLL VENOUS BLD VENIPUNCTURE: CPT

## 2017-01-17 PROCEDURE — 87086 URINE CULTURE/COLONY COUNT: CPT

## 2017-01-17 PROCEDURE — G0378 HOSPITAL OBSERVATION PER HR: HCPCS

## 2017-01-17 PROCEDURE — 25000003 PHARM REV CODE 250: Performed by: NURSE PRACTITIONER

## 2017-01-17 PROCEDURE — 85025 COMPLETE CBC W/AUTO DIFF WBC: CPT

## 2017-01-17 PROCEDURE — 84300 ASSAY OF URINE SODIUM: CPT

## 2017-01-17 RX ORDER — GEMFIBROZIL 600 MG/1
600 TABLET, FILM COATED ORAL
Status: DISCONTINUED | OUTPATIENT
Start: 2017-01-17 | End: 2017-01-17 | Stop reason: HOSPADM

## 2017-01-17 RX ORDER — GLUCAGON 1 MG
1 KIT INJECTION
Status: DISCONTINUED | OUTPATIENT
Start: 2017-01-18 | End: 2017-01-17 | Stop reason: HOSPADM

## 2017-01-17 RX ORDER — ROSUVASTATIN CALCIUM 20 MG/1
20 TABLET, COATED ORAL DAILY
Status: DISCONTINUED | OUTPATIENT
Start: 2017-01-17 | End: 2017-01-17 | Stop reason: HOSPADM

## 2017-01-17 RX ORDER — ENOXAPARIN SODIUM 100 MG/ML
30 INJECTION SUBCUTANEOUS EVERY 24 HOURS
Status: DISCONTINUED | OUTPATIENT
Start: 2017-01-17 | End: 2017-01-17

## 2017-01-17 RX ORDER — IBUPROFEN 200 MG
16 TABLET ORAL
Status: DISCONTINUED | OUTPATIENT
Start: 2017-01-18 | End: 2017-01-17 | Stop reason: HOSPADM

## 2017-01-17 RX ORDER — AMOXICILLIN 250 MG
1 CAPSULE ORAL 2 TIMES DAILY
Status: DISCONTINUED | OUTPATIENT
Start: 2017-01-17 | End: 2017-01-17 | Stop reason: HOSPADM

## 2017-01-17 RX ORDER — INSULIN ASPART 100 [IU]/ML
0-5 INJECTION, SOLUTION INTRAVENOUS; SUBCUTANEOUS
Status: DISCONTINUED | OUTPATIENT
Start: 2017-01-17 | End: 2017-01-17 | Stop reason: HOSPADM

## 2017-01-17 RX ORDER — ASPIRIN 81 MG/1
81 TABLET ORAL DAILY
Status: DISCONTINUED | OUTPATIENT
Start: 2017-01-17 | End: 2017-01-17 | Stop reason: HOSPADM

## 2017-01-17 RX ORDER — ACETAMINOPHEN 500 MG
1000 TABLET ORAL EVERY 6 HOURS PRN
Status: DISCONTINUED | OUTPATIENT
Start: 2017-01-18 | End: 2017-01-17 | Stop reason: HOSPADM

## 2017-01-17 RX ORDER — SODIUM CHLORIDE 9 MG/ML
INJECTION, SOLUTION INTRAVENOUS CONTINUOUS
Status: DISCONTINUED | OUTPATIENT
Start: 2017-01-17 | End: 2017-01-17

## 2017-01-17 RX ORDER — ONDANSETRON 2 MG/ML
4 INJECTION INTRAMUSCULAR; INTRAVENOUS EVERY 6 HOURS PRN
Status: DISCONTINUED | OUTPATIENT
Start: 2017-01-18 | End: 2017-01-17

## 2017-01-17 RX ORDER — IBUPROFEN 200 MG
24 TABLET ORAL
Status: DISCONTINUED | OUTPATIENT
Start: 2017-01-18 | End: 2017-01-17 | Stop reason: HOSPADM

## 2017-01-17 RX ORDER — LISINOPRIL 5 MG/1
5 TABLET ORAL DAILY
Qty: 90 TABLET | Refills: 3 | Status: SHIPPED | OUTPATIENT
Start: 2017-01-17 | End: 2017-12-15 | Stop reason: DRUGHIGH

## 2017-01-17 RX ADMIN — ENOXAPARIN SODIUM 30 MG: 100 INJECTION SUBCUTANEOUS at 12:01

## 2017-01-17 RX ADMIN — GEMFIBROZIL 600 MG: 600 TABLET, FILM COATED ORAL at 06:01

## 2017-01-17 RX ADMIN — STANDARDIZED SENNA CONCENTRATE AND DOCUSATE SODIUM 1 TABLET: 8.6; 5 TABLET, FILM COATED ORAL at 12:01

## 2017-01-17 RX ADMIN — INSULIN ASPART 3 UNITS: 100 INJECTION, SOLUTION INTRAVENOUS; SUBCUTANEOUS at 12:01

## 2017-01-17 RX ADMIN — ROSUVASTATIN CALCIUM 20 MG: 20 TABLET, FILM COATED ORAL at 08:01

## 2017-01-17 RX ADMIN — SODIUM CHLORIDE: 0.9 INJECTION, SOLUTION INTRAVENOUS at 10:01

## 2017-01-17 RX ADMIN — ASPIRIN 81 MG: 81 TABLET, COATED ORAL at 08:01

## 2017-01-17 RX ADMIN — SODIUM CHLORIDE: 0.9 INJECTION, SOLUTION INTRAVENOUS at 02:01

## 2017-01-17 RX ADMIN — STANDARDIZED SENNA CONCENTRATE AND DOCUSATE SODIUM 1 TABLET: 8.6; 5 TABLET, FILM COATED ORAL at 08:01

## 2017-01-17 NOTE — ASSESSMENT & PLAN NOTE
Chronic, historically uncontrolled though presently HYPOTENSIVE.  Will hold BP meds, monitor closely.

## 2017-01-17 NOTE — PROGRESS NOTES
01/17/17 0039   Vital Signs   SpO2 98 %   Pulse 87   Resp 16   O2 Device (Oxygen Therapy) room air

## 2017-01-17 NOTE — ED NOTES
"Patient identifiers for Jhonny Almazan checked and correct.  LOC: Patient is awake, alert, and aware of environment with an appropriate affect. Patient is oriented x 3 and speaking appropriately.  APPEARANCE: Patient resting comfortably and in no acute distress. Patient is clean and well groomed, patient's clothing is properly fastened.  SKIN: The skin is warm and dry. Patient has normal skin turgor and moist mucus membrances. Skin is intact; no bruising or breakdown noted.  MUSCULOSKELETAL: Patient is moving all extremities well, no obvious deformities noted. Pulses intact.   RESPIRATORY: Airway is open and patent. Respirations are spontaneous and non-labored with normal effort and rate.  CARDIAC: Patient has a normal rate and rhythm. No peripheral edema noted. Capillary refill < 3 seconds.  ABDOMEN: No distention noted. Bowel sounds active in all 4 quadrants. Soft and non-tender upon palpation.  NEUROLOGICAL: PERRL. Facial expression is symmetrical. Hand grasps are equal bilaterally. Normal sensation in all extremities when touched with finger.  Allergies reported: Review of patient's allergies indicates:  No Known Allergies  Reports "stoooping over" multiple times today and having sudden onset of dizziness. Checked BP at work and noted hypotension and also at home prior to arrival, both times using a blood pressure monitor that measured at the wrist.   "

## 2017-01-17 NOTE — PLAN OF CARE
PCP is Dr Whitehead.  Verified insurance as [employer].  Pharmacy is [facility] [address].       01/17/17 1500   Discharge Assessment   Assessment Type Discharge Planning Assessment   Confirmed/corrected address and phone number on facesheet? Yes   Assessment information obtained from? Patient   Prior to hospitilization cognitive status: Alert/Oriented   Prior to hospitalization functional status: Independent   Current cognitive status: Alert/Oriented   Current Functional Status: Independent   Arrived From home or self-care   Lives With spouse   Able to Return to Prior Arrangements yes   Is patient able to care for self after discharge? Yes   How many people do you have in your home that can help with your care after discharge? 1   Patient's perception of discharge disposition home or selfcare   Readmission Within The Last 30 Days no previous admission in last 30 days   Patient currently being followed by outpatient case management? No   Patient currently receives home health services? No   Does the patient currently use HME? Yes   Patient currently receives private duty nursing? No   Patient currently receives any other outside agency services? No   Equipment Currently Used at Home glucometer;other (see comments)  (BP monitor)   Do you have any problems affording any of your prescribed medications? No   Is the patient taking medications as prescribed? yes   Do you have any financial concerns preventing you from receiving the healthcare you need? No   Does the patient have transportation to healthcare appointments? Yes   Transportation Available car   On Dialysis? No   Does the patient receive services at the Coumadin Clinic? No   Discharge Plan A Home   Patient/Family In Agreement With Plan yes

## 2017-01-17 NOTE — PLAN OF CARE
Sees Dr Whitehead for diabetes. States has a glucometer, but ran out of strips. Will need prescription for strips and lancets.  Written information given on diabetes self management and resources available, including support group.  Requests dietician.

## 2017-01-17 NOTE — H&P
"[facility] Hospital Medicine  History & Physical    Patient Name: Jhonny Almzaan  MRN: [id number]  Admission Date: 1/16/2017  Attending Physician: Farzaneh Shankar MD   Primary Care Provider: Joey Whitehead MD         Patient information was obtained from patient, relative(s) and ER records.     Subjective:     Principal Problem:ELMA (acute kidney injury)    Chief Complaint:  dizziness     HPI: Jhonny Almazan is a 61 y.o.male with PMHx significant for DM, HTN, and hyperlipidemia.  He was admitted to the service of hospital medicine with ELMA.  He reported to the ED with complaint of dizziness and low BP.  He was at work and reports dizziness with bending and stacking boxes at work.  He rested several times but continued to feel as if he was "about to pass out".  He had his BP checked and was noted to be "77/66".  He came to the ED for evaluation at that point.  He reports recent issues with his BP remaining uncontrolled.  His PCP recently added  amlodipine-benazepril (LOTREL) to his medication regimen last week.  He also reports taking 2 ibuprofen daily for "aches and pains".  He reports non-compliance with glucose monitoring and diabetic diet.  He reports polyuria over the last several months stating he urinates nearly hourly.  He denies chest pain, SOB, diarrhea, nausea, vomiting, hematuria, or melena.  Other pertinent medical history as below:    Past Medical History   Diagnosis Date    Anticoagulant long-term use     Arthritis     Diabetes mellitus     Hypertension        Past Surgical History   Procedure Laterality Date    Back surgery         Review of patient's allergies indicates:  No Known Allergies    No current facility-administered medications on file prior to encounter.      Current Outpatient Prescriptions on File Prior to Encounter   Medication Sig    amlodipine-benazepril (LOTREL) 10-40 mg per capsule Take 1 capsule by mouth once daily.    aspirin (ECOTRIN) 81 MG EC tablet " Take 81 mg by mouth once daily.    atenolol-chlorthalidone (TENORETIC) 50-25 mg Tab Take 1 tablet by mouth once daily.    gemfibrozil (LOPID) 600 MG tablet Take 1 tablet (600 mg total) by mouth 2 (two) times daily before meals.    ibuprofen (ADVIL,MOTRIN) 200 MG tablet Take 200 mg by mouth every 6 (six) hours as needed for Pain.    metformin (GLUCOPHAGE-XR) 500 MG 24 hr tablet Take 1 tab twice daily x 1 week then increase to 2 tabs twice dialy    rosuvastatin (CRESTOR) 20 MG tablet Take 1 tablet (20 mg total) by mouth once daily.     Family History     Problem Relation (Age of Onset)    Brain cancer Brother    Diabetes Brother    Heart disease Mother, Father    Suicide Brother        Social History Main Topics    Smoking status: Current Every Day Smoker     Packs/day: 1.00     Years: 50.00     Types: Cigarettes    Smokeless tobacco: Not on file    Alcohol use Yes      Comment: 2-3 beers daily    Drug use: No    Sexual activity: Yes     Partners: Female     Review of Systems   Constitutional: Negative for activity change, appetite change, chills, diaphoresis, fatigue and fever.   HENT: Negative for congestion, postnasal drip, sinus pressure, sore throat and trouble swallowing.    Eyes: Negative for discharge, redness and visual disturbance.   Respiratory: Negative for cough, choking, chest tightness, shortness of breath and wheezing.    Cardiovascular: Negative for chest pain, palpitations and leg swelling.   Gastrointestinal: Positive for abdominal pain (mild epigastric pain). Negative for abdominal distention, blood in stool, constipation, diarrhea, nausea and vomiting.   Endocrine: Positive for polydipsia and polyuria. Negative for polyphagia.   Genitourinary: Positive for frequency. Negative for difficulty urinating, dysuria, flank pain and hematuria.   Musculoskeletal: Negative for arthralgias, back pain, joint swelling and myalgias.   Skin: Negative for color change.   Neurological: Positive for  weakness. Negative for dizziness, syncope, light-headedness, numbness and headaches.   Psychiatric/Behavioral: Negative for confusion. The patient is not nervous/anxious.      Objective:     Vital Signs (Most Recent):  Temp: 97 °F (36.1 °C) (01/17/17 0039)  Pulse: 87 (01/17/17 0043)  Resp: 16 (01/17/17 0039)  BP: 119/76 (01/17/17 0043)  SpO2: 98 % (01/17/17 0043) Vital Signs (24h Range):  Temp:  [96.6 °F (35.9 °C)-97 °F (36.1 °C)] 97 °F (36.1 °C)  Pulse:  [] 87  Resp:  [16-20] 16  SpO2:  [95 %-98 %] 98 %  BP: ()/(62-76) 119/76     Weight: 113.4 kg (250 lb)  Body mass index is 29.65 kg/(m^2).    Physical Exam   Constitutional: He is oriented to person, place, and time. He appears well-developed and well-nourished. No distress.   HENT:   Head: Normocephalic and atraumatic.   Eyes: Conjunctivae and EOM are normal. Pupils are equal, round, and reactive to light. Right eye exhibits no discharge. Left eye exhibits no discharge.   Neck: Normal range of motion. Neck supple. No thyromegaly present.   Cardiovascular: Normal rate, regular rhythm, normal heart sounds and intact distal pulses.    Pulmonary/Chest: Effort normal and breath sounds normal.   Abdominal: Soft. Bowel sounds are normal. He exhibits no distension. There is no tenderness. There is no guarding.   Musculoskeletal: Normal range of motion. He exhibits no edema.   Neurological: He is alert and oriented to person, place, and time. No cranial nerve deficit.   Skin: Skin is warm and dry. No rash noted. No erythema.   Psychiatric: He has a normal mood and affect. His behavior is normal. Judgment and thought content normal.        Significant Labs:   CBC:   Recent Labs  Lab 01/16/17  2255   WBC 12.20   HGB 15.3   HCT 44.3        CMP:   Recent Labs  Lab 01/16/17  2255      K 3.9      CO2 21*   *   BUN 26*   CREATININE 1.9*   CALCIUM 10.3   PROT 7.3   ALBUMIN 4.2   BILITOT 0.7   ALKPHOS 72   AST 17   ALT 23   ANIONGAP 16    EGFRNONAA 37*     Lactic Acid: No results for input(s): LACTATE in the last 48 hours.  Urine Studies:   Recent Labs  Lab 01/16/17  2347   COLORU Yellow   APPEARANCEUA Cloudy*   PHUR 6.0   SPECGRAV >=1.030*   PROTEINUA 2+*   GLUCUA 2+*   KETONESU Trace*   BILIRUBINUA 1+*   OCCULTUA Negative   NITRITE Negative   UROBILINOGEN 1.0   LEUKOCYTESUR Negative   RBCUA 0   WBCUA 30*   BACTERIA Occasional   HYALINECASTS 2*        Ref. Range 1/16/2017 22:55   Lipase Latest Ref Range: 4 - 60 U/L 34     Significant Imaging: CXR: I have reviewed all pertinent results/findings within the past 24 hours and my personal findings are:  No infiltrate. No effusion.    Assessment/Plan:     * ELMA (acute kidney injury)  Multi-factorial; associated with NSAID use, recent ACEi addition to regimen, as well as polyuria with uncontrolled diabetes on top of home regimen consisting of metformin and chlorthalidone.  Will hold antihypertensives, avoid nephrotoxins, and renal dose medications as required.  Check urine Na and Cr.  Check renal U/S.  IV hydration, follow renal function panel and monitor I&O very closely.       Orthostatic hypotension  +orthostatics in ED. Received 1L NS in ED, will continue IV hydration.  Fall precautions, monitor closely.       Hypertension associated with diabetes  Chronic, historically uncontrolled though presently HYPOTENSIVE.  Will hold BP meds, monitor closely.        Diabetes mellitus type 2, uncontrolled  Last month--HgbA1c 9.3!  Will hold metformin.  Monitor glucose closely and treat with sliding scale insulin as required.  Consult to diabetic educator. Resume diabetic diet following renal U/S.  Discussed the many health hazards of uncontrolled diabetes.      Hyperlipidemia associated with type 2 diabetes mellitus  Chronic, uncontrolled.  Reviewed recent lipid panel. Continue statin therapy.      Nicotine dependence  Health hazards associated with cigarette smoking were reviewed with patient and cessation was  encouraged. Nicotine replacement and counseling options were discussed--patient does not wish to have nicotine patch at this time.        VTE Risk Mitigation         Ordered     enoxaparin injection 30 mg  Daily     Route:  Subcutaneous        01/17/17 0036     Low Risk of VTE  Once      01/17/17 0135        hCantel Humphrey NP  Department of Hospital Medicine   [facility]

## 2017-01-17 NOTE — ASSESSMENT & PLAN NOTE
Health hazards associated with cigarette smoking were reviewed with patient and cessation was encouraged. Nicotine replacement and counseling options were discussed--patient does not wish to have nicotine patch at this time.

## 2017-01-17 NOTE — CONSULTS
Educated patient on CHO counting with diabetes. See education tab. Provided nutrition packet with email should questions arise.

## 2017-01-17 NOTE — ASSESSMENT & PLAN NOTE
+orthostatics in ED. Received 1L NS in ED, will continue IV hydration.  Fall precautions, monitor closely.

## 2017-01-17 NOTE — ED PROVIDER NOTES
Chief complaint:  Dizziness (c/o lightheadedness and low blood pressure started today )      HPI:  Jhonny Almazan is a 61 y.o. male presenting with acute onset of feeling lightheaded that started today at around 7:00 while he was at work.  He went to his office's  and they took his blood pressure and they noted he was hypotensive in the 70s.  He states he feels lightheaded and like he might pass out.  No chest pain or shortness of breath.  His symptoms is worse when he stands up.  No black or red stools.  He has very minimal epigastric abdominal discomfort.  No fevers or chills.  No dysuria or frequency.  No diarrhea.  No vomiting.  No history of ACS or GI bleed.    ROS: As per HPI and below:  Constitutional:  No fevers, no chills, low blood pressure  Eyes: no visual changes  Cardiac: no chest pain  Respiratory: no shortness of breath  Abdominal: epigastric abdominal pain, no nausea, no vomiting  Genitourinary: No dysuria, no frequency  Skin: no rash  Heme: no bleeding  Musculoskeletal: no joint pain  Neuro: no focal numbness, no focal , dizziness  Pyschological: no depression      Review of patient's allergies indicates:  No Known Allergies    No current facility-administered medications on file prior to encounter.      Current Outpatient Prescriptions on File Prior to Encounter   Medication Sig Dispense Refill    amlodipine-benazepril (LOTREL) 10-40 mg per capsule Take 1 capsule by mouth once daily. 90 capsule 1    aspirin (ECOTRIN) 81 MG EC tablet Take 81 mg by mouth once daily.      atenolol-chlorthalidone (TENORETIC) 50-25 mg Tab Take 1 tablet by mouth once daily. 90 tablet 1    gemfibrozil (LOPID) 600 MG tablet Take 1 tablet (600 mg total) by mouth 2 (two) times daily before meals. 60 tablet 11    ibuprofen (ADVIL,MOTRIN) 200 MG tablet Take 200 mg by mouth every 6 (six) hours as needed for Pain.      metformin (GLUCOPHAGE-XR) 500 MG 24 hr tablet Take 1 tab twice daily x 1 week then  increase to 2 tabs twice dialy 120 tablet 3    rosuvastatin (CRESTOR) 20 MG tablet Take 1 tablet (20 mg total) by mouth once daily. 90 tablet 1       PMH:  As per HPI and below:  Past Medical History   Diagnosis Date    Anticoagulant long-term use     Arthritis     Diabetes mellitus     Hypertension      Past Surgical History   Procedure Laterality Date    Back surgery         Social History     Social History    Marital status:      Spouse name: N/A    Number of children: N/A    Years of education: N/A     Social History Main Topics    Smoking status: Current Every Day Smoker     Packs/day: 1.00     Years: 50.00     Types: Cigarettes    Smokeless tobacco: None    Alcohol use Yes      Comment: 2-3 beers daily    Drug use: No    Sexual activity: Not Asked     Other Topics Concern    None     Social History Narrative       History reviewed. No pertinent family history.    Physical Exam:    Vitals:    01/17/17 0015   BP: 110/74   Pulse: 83   Resp: 16   Temp:      Constitutional: Well-nourished, well-developed, in no acute distress, not cachectic  Eyes: PERRLA, EOMI, normal conjunctiva, normal sclera  ENT: Moist Mucous membranes  Respiratory: Clear to auscultation bilaterally, no wheezes, no crackles, no rhonchi  Cardiovascular: Regular  rhythm, no murmurs, no rubs, no gallops, tachycardic  Abdominal: Soft, mild epigastric abd tender, nondistended, no guarding, no rebound  Musculoskeletal: Normal range of motion, no obvious deformity, normal capillary refill, head atraumatic, neck supple, no meningismus  Skin: no rash, no ecchymosis, no errythema, no discharge  Neurologic: Cranial nerves II through XII intact, no motor deficits, no sensory deficits, no cerebellar deficits  Psychological: Alert, oriented x3, normal affect, normal mood    Orders Placed This Encounter   Procedures    X-Ray Chest PA And Lateral    Brain natriuretic peptide    Complete Blood Count (CBC)    Comprehensive Metabolic  Panel (CMP)    Troponin I    Urinalysis    Lipase    Comprehensive Metabolic Panel (CMP)    Magnesium    Phosphorus    CBC with Automated Differential    TSH    Urinalysis Microscopic    Diet Adult Regular    Orthostatic blood pressure    Vital Signs, Per Unit Routine    Smoking Cessation Counseling    Notify Physician    Ambulate with assistance    Neuro checks    Recheck Blood Glucose:    Pulse Oximetry Q4H    POCT glucose    EKG 12-lead    Insert Saline lock IV    Insert saline lock    Admit to Inpatient       Medications   acetaminophen tablet 1,000 mg (not administered)   senna-docusate 8.6-50 mg per tablet 1 tablet (not administered)   ondansetron injection 4 mg (not administered)   enoxaparin injection 30 mg (not administered)   insulin aspart pen 0-5 Units (not administered)   glucose chewable tablet 16 g (not administered)   glucose chewable tablet 24 g (not administered)   dextrose 50% injection 12.5 g (not administered)   dextrose 50% injection 25 g (not administered)   glucagon (human recombinant) injection 1 mg (not administered)   sodium chloride 0.9% bolus 1,000 mL (0 mLs Intravenous Stopped 1/17/17 0020)         Labs Reviewed   CBC W/ AUTO DIFFERENTIAL - Abnormal; Notable for the following:        Result Value    MCH 31.5 (*)     MPV 8.0 (*)     Gran # 8.8 (*)     All other components within normal limits   COMPREHENSIVE METABOLIC PANEL - Abnormal; Notable for the following:     CO2 21 (*)     Glucose 275 (*)     BUN, Bld 26 (*)     Creatinine 1.9 (*)     eGFR if  42 (*)     eGFR if non  37 (*)     All other components within normal limits   URINALYSIS - Abnormal; Notable for the following:     Appearance, UA Cloudy (*)     Specific Gravity, UA >=1.030 (*)     Protein, UA 2+ (*)     Glucose, UA 2+ (*)     Ketones, UA Trace (*)     Bilirubin (UA) 1+ (*)     All other components within normal limits   URINALYSIS MICROSCOPIC - Abnormal; Notable for  the following:     WBC, UA 30 (*)     Hyaline Casts, UA 2 (*)     All other components within normal limits   B-TYPE NATRIURETIC PEPTIDE   TROPONIN I   LIPASE                   ASSESSMENT  1. Dizziness    2. Acute renal insufficiency    3. Hyperglycemia    4. ELMA (acute kidney injury)    5. Urinary tract infection without hematuria, site unspecified          Disposition:   Admit    Current Discharge Medication List        Current Discharge Medication List        Current Discharge Medication List          MDM  Number of Diagnoses or Management Options  Acute renal insufficiency:   ELMA (acute kidney injury):   Dizziness:   Hyperglycemia:   Urinary tract infection without hematuria, site unspecified:   Diagnosis management comments: Differential diagnosis includes dehydration, orthostasis, vertigo, GI bleed, ACS, infection    Patient presents with symptoms that are consistent with orthostasis.  Unclear etiology for his cause of the symptoms.  I am slightly concerned for possible GI bleed given the mild epigastric abdominal pain and tachycardia.  We'll perform cardiac and infectious workup and reevaluate.    Pt found to be in ARF and orthostatic.  Will admit for IVF and further management of DAVID.  Also after patient went upstairs, he was found to have uti       Amount and/or Complexity of Data Reviewed  Clinical lab tests: ordered and reviewed  Tests in the medicine section of CPT®: ordered  Decide to obtain previous medical records or to obtain history from someone other than the patient: yes  Discuss the patient with other providers: yes (IM)  Independent visualization of images, tracings, or specimens: yes (EKG: Normal sinus rhythm at 98, normal axis, normal intervals, no ST elevation or depression    Chest x-ray: nad)         Darrell Ayala III, MD  01/17/17 0041

## 2017-01-17 NOTE — ASSESSMENT & PLAN NOTE
Multi-factorial; associated with NSAID use, recent ACEi addition to regimen, as well as polyuria with uncontrolled diabetes on top of home regimen consisting of metformin and chlorthalidone.  Will hold antihypertensives, avoid nephrotoxins, and renal dose medications as required.  Check urine Na and Cr.  Check renal U/S.  IV hydration, follow renal function panel and monitor I&O very closely.

## 2017-01-17 NOTE — SUBJECTIVE & OBJECTIVE
Past Medical History   Diagnosis Date    Anticoagulant long-term use     Arthritis     Diabetes mellitus     Hypertension        Past Surgical History   Procedure Laterality Date    Back surgery         Review of patient's allergies indicates:  No Known Allergies    No current facility-administered medications on file prior to encounter.      Current Outpatient Prescriptions on File Prior to Encounter   Medication Sig    amlodipine-benazepril (LOTREL) 10-40 mg per capsule Take 1 capsule by mouth once daily.    aspirin (ECOTRIN) 81 MG EC tablet Take 81 mg by mouth once daily.    atenolol-chlorthalidone (TENORETIC) 50-25 mg Tab Take 1 tablet by mouth once daily.    gemfibrozil (LOPID) 600 MG tablet Take 1 tablet (600 mg total) by mouth 2 (two) times daily before meals.    ibuprofen (ADVIL,MOTRIN) 200 MG tablet Take 200 mg by mouth every 6 (six) hours as needed for Pain.    metformin (GLUCOPHAGE-XR) 500 MG 24 hr tablet Take 1 tab twice daily x 1 week then increase to 2 tabs twice dialy    rosuvastatin (CRESTOR) 20 MG tablet Take 1 tablet (20 mg total) by mouth once daily.     Family History     Problem Relation (Age of Onset)    Brain cancer Brother    Diabetes Brother    Heart disease Mother, Father    Suicide Brother        Social History Main Topics    Smoking status: Current Every Day Smoker     Packs/day: 1.00     Years: 50.00     Types: Cigarettes    Smokeless tobacco: Not on file    Alcohol use Yes      Comment: 2-3 beers daily    Drug use: No    Sexual activity: Yes     Partners: Female     Review of Systems   Constitutional: Negative for activity change, appetite change, chills, diaphoresis, fatigue and fever.   HENT: Negative for congestion, postnasal drip, sinus pressure, sore throat and trouble swallowing.    Eyes: Negative for discharge, redness and visual disturbance.   Respiratory: Negative for cough, choking, chest tightness, shortness of breath and wheezing.    Cardiovascular: Negative  for chest pain, palpitations and leg swelling.   Gastrointestinal: Positive for abdominal pain (mild epigastric pain). Negative for abdominal distention, blood in stool, constipation, diarrhea, nausea and vomiting.   Endocrine: Positive for polydipsia and polyuria. Negative for polyphagia.   Genitourinary: Positive for frequency. Negative for difficulty urinating, dysuria, flank pain and hematuria.   Musculoskeletal: Negative for arthralgias, back pain, joint swelling and myalgias.   Skin: Negative for color change.   Neurological: Positive for weakness. Negative for dizziness, syncope, light-headedness, numbness and headaches.   Psychiatric/Behavioral: Negative for confusion. The patient is not nervous/anxious.      Objective:     Vital Signs (Most Recent):  Temp: 97 °F (36.1 °C) (01/17/17 0039)  Pulse: 87 (01/17/17 0043)  Resp: 16 (01/17/17 0039)  BP: 119/76 (01/17/17 0043)  SpO2: 98 % (01/17/17 0043) Vital Signs (24h Range):  Temp:  [96.6 °F (35.9 °C)-97 °F (36.1 °C)] 97 °F (36.1 °C)  Pulse:  [] 87  Resp:  [16-20] 16  SpO2:  [95 %-98 %] 98 %  BP: ()/(62-76) 119/76     Weight: 113.4 kg (250 lb)  Body mass index is 29.65 kg/(m^2).    Physical Exam   Constitutional: He is oriented to person, place, and time. He appears well-developed and well-nourished. No distress.   HENT:   Head: Normocephalic and atraumatic.   Eyes: Conjunctivae and EOM are normal. Pupils are equal, round, and reactive to light. Right eye exhibits no discharge. Left eye exhibits no discharge.   Neck: Normal range of motion. Neck supple. No thyromegaly present.   Cardiovascular: Normal rate, regular rhythm, normal heart sounds and intact distal pulses.    Pulmonary/Chest: Effort normal and breath sounds normal.   Abdominal: Soft. Bowel sounds are normal. He exhibits no distension. There is no tenderness. There is no guarding.   Musculoskeletal: Normal range of motion. He exhibits no edema.   Neurological: He is alert and oriented to  person, place, and time. No cranial nerve deficit.   Skin: Skin is warm and dry. No rash noted. No erythema.   Psychiatric: He has a normal mood and affect. His behavior is normal. Judgment and thought content normal.        Significant Labs:   CBC:   Recent Labs  Lab 01/16/17  2255   WBC 12.20   HGB 15.3   HCT 44.3        CMP:   Recent Labs  Lab 01/16/17  2255      K 3.9      CO2 21*   *   BUN 26*   CREATININE 1.9*   CALCIUM 10.3   PROT 7.3   ALBUMIN 4.2   BILITOT 0.7   ALKPHOS 72   AST 17   ALT 23   ANIONGAP 16   EGFRNONAA 37*     Lactic Acid: No results for input(s): LACTATE in the last 48 hours.  Urine Studies:   Recent Labs  Lab 01/16/17  2347   COLORU Yellow   APPEARANCEUA Cloudy*   PHUR 6.0   SPECGRAV >=1.030*   PROTEINUA 2+*   GLUCUA 2+*   KETONESU Trace*   BILIRUBINUA 1+*   OCCULTUA Negative   NITRITE Negative   UROBILINOGEN 1.0   LEUKOCYTESUR Negative   RBCUA 0   WBCUA 30*   BACTERIA Occasional   HYALINECASTS 2*       Significant Imaging: CXR: I have reviewed all pertinent results/findings within the past 24 hours and my personal findings are:  No infiltrate. No effusion.

## 2017-01-17 NOTE — ASSESSMENT & PLAN NOTE
Last month--HgbA1c 9.3!  Will hold metformin.  Monitor glucose closely and treat with sliding scale insulin as required.  Consult to diabetic educator. Resume diabetic diet following renal U/S.  Discussed the many health hazards of uncontrolled diabetes.

## 2017-01-17 NOTE — PROGRESS NOTES
01/17/17 0000   Vital Signs   SpO2 (!) 94 %   Pulse 83   O2 Device (Oxygen Therapy) room air

## 2017-01-17 NOTE — PLAN OF CARE
01/17/17 0849   Oxygen Therapy   SpO2 (!) 94 %   Pulse Oximetry Type Intermittent   O2 Device (Oxygen Therapy) room air   Pt assessed, no distress noted.

## 2017-01-17 NOTE — PLAN OF CARE
Problem: Patient Care Overview  Goal: Plan of Care Review  Outcome: Ongoing (interventions implemented as appropriate)  Awake, alert and oriented. Denies any complaints of pain or discomfort.   Voiding without difficulty. Urine for sodium and creatinine sent to lab.  POC reviewed with patient. Understanding voiced. Fall and injury free this shift.  Bed in low position and locked. Side rails up x 2. Call bell in reach.  Telemetry

## 2017-01-18 LAB — BACTERIA UR CULT: NO GROWTH

## 2017-01-19 NOTE — PLAN OF CARE
01/19/17 0844   Final Note   Assessment Type Final Discharge Note   Discharge Disposition Home   Discharge planning education complete? Yes

## 2017-01-20 ENCOUNTER — TELEPHONE (OUTPATIENT)
Dept: FAMILY MEDICINE | Facility: CLINIC | Age: 62
End: 2017-01-20

## 2017-01-20 NOTE — TELEPHONE ENCOUNTER
Patient called office and left message he was admitted to Hospital on 1/16/17 and discharged the following day. Spoke with patient who stated he was Admitted/Discharged from [facility] and needed to schedule f/u appointment. Patient scheduled for 1/25/17 with Mrs Nicolette Perez. Patient verbalized understanding and agreed to appointment date and time.

## 2017-01-20 NOTE — TELEPHONE ENCOUNTER
----- Message from Erlinda Antonoi sent at 1/20/2017 12:45 PM CST -----  Pt was admitted Monday for dehydration / blood sugar was elevated / Blood pressure low ... Pt was released Tuesday ... Call pt at [phone] asking to discuss

## 2017-01-23 ENCOUNTER — DOCUMENTATION ONLY (OUTPATIENT)
Dept: FAMILY MEDICINE | Facility: CLINIC | Age: 62
End: 2017-01-23

## 2017-01-23 NOTE — PROGRESS NOTES
Pre-Visit Chart Review  For Appointment Scheduled on 1/25/17    Health Maintenance Due   Topic Date Due    TETANUS VACCINE  12/02/1971    Zoster Vaccine  12/02/2013    Colonoscopy  07/18/2015

## 2017-01-23 NOTE — DISCHARGE SUMMARY
"[facility] Medicine  Discharge Summary      Patient Name: Jhonny Almazan  MRN: [id number]  Admission Date: 1/16/2017  Hospital Length of Stay: 1 days  Discharge Date and Time: 1/17/17  Attending Physician: No att. providers found   Discharging Provider: Farzaneh Shankar MD  Primary Care Provider: Joey Whitehead MD      HPI:   Jhonny Almazan is a 61 y.o.male with PMHx significant for DM, HTN, and hyperlipidemia.  He was admitted to the service of hospital medicine with ELMA.  He reported to the ED with complaint of dizziness and low BP.  He was at work and reports dizziness with bending and stacking boxes at work.  He rested several times but continued to feel as if he was "about to pass out".  He had his BP checked and was noted to be "77/66".  He came to the ED for evaluation at that point.  He reports recent issues with his BP remaining uncontrolled.  His PCP recently added  amlodipine-benazepril (LOTREL) to his medication regimen last week.  He also reports taking 2 ibuprofen daily for "aches and pains".  He reports non-compliance with glucose monitoring and diabetic diet.  He reports polyuria over the last several months stating he urinates nearly hourly.  He denies chest pain, SOB, diarrhea, nausea, vomiting, hematuria, or melena.  Other pertinent medical history as below:    * No surgery found *      Indwelling Lines/Drains at time of discharge:   Lines/Drains/Airways          No matching active lines, drains, or airways        Hospital Course:   Dizzyness/elma-admitted and treated with ivf  Replacement, medication adjustment.   Bp remained stable and elma resolved so dc home.    pe-alert, nad. Ox3, sensation intact. No focal neuro deficits.   Lungs clear. Cor rrr. abd-soft. Ext no edema     lotrel was dc and changed to low dose lisinopril. Pt will monitor bp at home.  Consults:   Consults         Status Ordering Provider     Inpatient consult to Diabetes educator  Once   "   Provider:  (Not yet assigned)    Completed ALEJANDRO TILLEY consult to dietary  Once     Provider:  (Not yet assigned)    Completed MELONIE FRANKLIN Diagnostic Studies:   Results for JULIENNE TERRY (MRN [id number]) as of 1/23/2017 12:37   Ref. Range 1/16/2017 22:55 1/17/2017 05:51   WBC Latest Ref Range: 3.90 - 12.70 K/uL 12.20 9.10   RBC Latest Ref Range: 4.60 - 6.20 M/uL 4.86 4.38 (L)   Hemoglobin Latest Ref Range: 14.0 - 18.0 g/dL 15.3 13.6 (L)   Hematocrit Latest Ref Range: 40.0 - 54.0 % 44.3 40.1   MCV Latest Ref Range: 82 - 98 fL 91 92   MCH Latest Ref Range: 27.0 - 31.0 pg 31.5 (H) 31.2 (H)   MCHC Latest Ref Range: 32.0 - 36.0 % 34.5 34.0   RDW Latest Ref Range: 11.5 - 14.5 % 12.8 12.5   Platelets Latest Ref Range: 150 - 350 K/uL 228 173     Results for JULIENNE TERRY (MRN [id number]) as of 1/23/2017 12:37   Ref. Range 1/16/2017 22:55 1/17/2017 05:51   Sodium Latest Ref Range: 136 - 145 mmol/L 137 137   Potassium Latest Ref Range: 3.5 - 5.1 mmol/L 3.9 3.6   Chloride Latest Ref Range: 95 - 110 mmol/L 100 103   CO2 Latest Ref Range: 23 - 29 mmol/L 21 (L) 22 (L)   Anion Gap Latest Ref Range: 8 - 16 mmol/L 16 12   BUN, Bld Latest Ref Range: 8 - 23 mg/dL 26 (H) 24 (H)   Creatinine Latest Ref Range: 0.5 - 1.4 mg/dL 1.9 (H) 1.2   eGFR if non African American Latest Ref Range: >60 mL/min/1.73 m^2 37 (A) >60   eGFR if  Latest Ref Range: >60 mL/min/1.73 m^2 42 (A) >60   Glucose Latest Ref Range: 70 - 110 mg/dL 275 (H) 204 (H)   Calcium Latest Ref Range: 8.7 - 10.5 mg/dL 10.3 9.5   Phosphorus Latest Ref Range: 2.7 - 4.5 mg/dL  3.3   Magnesium Latest Ref Range: 1.6 - 2.6 mg/dL  1.7   Alkaline Phosphatase Latest Ref Range: 55 - 135 U/L 72 62   Total Protein Latest Ref Range: 6.0 - 8.4 g/dL 7.3 6.2   Albumin Latest Ref Range: 3.5 - 5.2 g/dL 4.2 3.5   Total Bilirubin Latest Ref Range: 0.1 - 1.0 mg/dL 0.7 0.5   AST Latest Ref Range: 10 - 40 U/L 17 16   ALT Latest Ref Range: 10 -  44 U/L 23 16   Lipase Latest Ref Range: 4 - 60 U/L 34          Pending Diagnostic Studies:     None        Final Active Diagnoses:    Diagnosis Date Noted POA    PRINCIPAL PROBLEM:  ELMA (acute kidney injury) [N17.9] 01/16/2017 Yes    Orthostatic hypotension [I95.1] 01/17/2017 Yes    Hyperlipidemia associated with type 2 diabetes mellitus [E11.69, E78.5] 09/28/2016 Yes    Diabetes mellitus type 2, uncontrolled [E11.65] 09/19/2014 Yes    Hypertension associated with diabetes [E11.59, I10] 09/19/2014 Yes    BMI 34.0-34.9,adult [Z68.34] 09/19/2014 Not Applicable    Nicotine dependence [F17.200] 07/10/2014 Yes      Problems Resolved During this Admission:    Diagnosis Date Noted Date Resolved POA      No new Assessment & Plan notes have been filed under this hospital service since the last note was generated.  Service: Hospital Medicine      Discharged Condition: good    Disposition: Home or Self Care    Follow Up:  Follow-up Information     Follow up with Joey Whitehead MD In 1 week.    Specialty:  Family Medicine    Contact information:    [address] [facility] [address]  [phone]          Patient Instructions:     Diet Diabetic 1800 Calories     Diet Cardiac     Activity as tolerated     Call MD for:  persistent dizziness, light-headedness, or visual disturbances     Call MD for:  increased confusion or weakness       Medications:  Reconciled Home Medications:   Discharge Medication List as of 1/17/2017  3:33 PM      START taking these medications    Details   lisinopril (PRINIVIL,ZESTRIL) 5 MG tablet Take 1 tablet (5 mg total) by mouth once daily. Take in pm, Starting 1/17/2017, Until Wed 1/17/18, Normal         CONTINUE these medications which have NOT CHANGED    Details   aspirin (ECOTRIN) 81 MG EC tablet Take 81 mg by mouth once daily., Until Discontinued, Historical Med      atenolol-chlorthalidone (TENORETIC) 50-25 mg Tab Take 1 tablet by mouth once daily., Starting 1/6/2017, Until Sat 1/6/18, Normal       gemfibrozil (LOPID) 600 MG tablet Take 1 tablet (600 mg total) by mouth 2 (two) times daily before meals., Starting 1/3/2017, Until Wed 1/3/18, Normal      metformin (GLUCOPHAGE-XR) 500 MG 24 hr tablet Take 1 tab twice daily x 1 week then increase to 2 tabs twice dialy, Normal      rosuvastatin (CRESTOR) 20 MG tablet Take 1 tablet (20 mg total) by mouth once daily., Starting 1/6/2017, Until Discontinued, Normal         STOP taking these medications       amlodipine-benazepril (LOTREL) 10-40 mg per capsule Comments:   Reason for Stopping:         ibuprofen (ADVIL,MOTRIN) 200 MG tablet Comments:   Reason for Stopping:             Time spent on the discharge of patient: 22 minutes    Farzaneh Shankar MD  Department of Hospital Medicine  [facility]

## 2017-01-25 ENCOUNTER — OFFICE VISIT (OUTPATIENT)
Dept: FAMILY MEDICINE | Facility: CLINIC | Age: 62
End: 2017-01-25
Payer: COMMERCIAL

## 2017-01-25 VITALS
SYSTOLIC BLOOD PRESSURE: 153 MMHG | HEART RATE: 75 BPM | DIASTOLIC BLOOD PRESSURE: 94 MMHG | TEMPERATURE: 98 F | WEIGHT: 251.13 LBS | HEIGHT: 77 IN | BODY MASS INDEX: 29.65 KG/M2

## 2017-01-25 DIAGNOSIS — E11.59 HYPERTENSION ASSOCIATED WITH DIABETES: ICD-10-CM

## 2017-01-25 DIAGNOSIS — I15.2 HYPERTENSION ASSOCIATED WITH DIABETES: ICD-10-CM

## 2017-01-25 DIAGNOSIS — Z09 HOSPITAL DISCHARGE FOLLOW-UP: Primary | ICD-10-CM

## 2017-01-25 DIAGNOSIS — Z23 NEED FOR TDAP VACCINATION: ICD-10-CM

## 2017-01-25 DIAGNOSIS — E86.0 DEHYDRATION: ICD-10-CM

## 2017-01-25 PROBLEM — R42 DIZZINESS: Status: RESOLVED | Noted: 2017-01-16 | Resolved: 2017-01-25

## 2017-01-25 PROBLEM — N17.9 AKI (ACUTE KIDNEY INJURY): Status: RESOLVED | Noted: 2017-01-16 | Resolved: 2017-01-25

## 2017-01-25 PROCEDURE — 99214 OFFICE O/P EST MOD 30 MIN: CPT | Mod: S$GLB,,, | Performed by: NURSE PRACTITIONER

## 2017-01-25 PROCEDURE — 99999 PR PBB SHADOW E&M-EST. PATIENT-LVL III: CPT | Mod: PBBFAC,,, | Performed by: NURSE PRACTITIONER

## 2017-01-25 RX ORDER — BLOOD SUGAR DIAGNOSTIC
STRIP MISCELLANEOUS
COMMUNITY
Start: 2017-01-17 | End: 2019-05-13 | Stop reason: CLARIF

## 2017-01-25 RX ORDER — LANCETS
EACH MISCELLANEOUS
COMMUNITY
Start: 2017-01-17 | End: 2019-05-13 | Stop reason: CLARIF

## 2017-01-25 NOTE — MR AVS SNAPSHOT
[facility]  [address]  [place] [address]  Phone: [phone]  Fax: [phone]                  Jhonny Almazan   2017 3:20 PM   Office Visit    Description:  Male : 1955   Provider:  KOURTNEY Montes   Department:  [facility]           Reason for Visit     Transitional Care           Diagnoses this Visit        Comments    Dehydration    -  Primary     Need for Tdap vaccination                To Do List           Future Appointments        Provider Department Dept Phone    2/10/2017 1:00 PM [facility] ENDOCRINE EDUCATOR [facility] [phone]    2/10/2017 2:40 PM iSta Zimmer PA-C [facility] [phone]    2017 1:30 PM Joey Whitehead MD [facility] [phone]    2017 1:00 PM Joey Whitehead MD [facility] [phone]      Goals (5 Years of Data)     None      Follow-Up and Disposition     Return if symptoms worsen or fail to improve, for labs fasting before next visit.       These Medications        Disp Refills Start End    diphth,pertus,acell,,tetanus (BOOSTRIX) 2.5-8-5 Lf-mcg-Lf/0.5mL Syrg injection 0.5 mL 0 2017    Inject 0.5 mLs into the muscle once. - Intramuscular    Pharmacy: [facility]/pharmacy #[id number] - [place] - [address]  #: [phone]         [facility]     [facility] On [facility] Nurse Care Line -  Assistance  Registered nurses in the [facility] On Call Center provide clinical advisement, health education, appointment booking, and other advisory services.  Call for this free service at [phone].             Medications           Message regarding Medications     Verify the changes and/or additions to your medication regime listed below are the same as discussed with your clinician today.  If any of these changes or additions are incorrect, please notify your healthcare provider.        START taking these NEW medications         "Refills    diphth,pertus,acell,,tetanus (BOOSTRIX) 2.5-8-5 Lf-mcg-Lf/0.5mL Syrg injection 0    Sig: Inject 0.5 mLs into the muscle once.    Class: Normal    Route: Intramuscular      STOP taking these medications     gemfibrozil (LOPID) 600 MG tablet Take 1 tablet (600 mg total) by mouth 2 (two) times daily before meals.    atenolol-chlorthalidone (TENORETIC) 50-25 mg Tab Take 1 tablet by mouth once daily.           Verify that the below list of medications is an accurate representation of the medications you are currently taking.  If none reported, the list may be blank. If incorrect, please contact your healthcare provider. Carry this list with you in case of emergency.           Current Medications     aspirin (ECOTRIN) 81 MG EC tablet Take 81 mg by mouth once daily.    lisinopril (PRINIVIL,ZESTRIL) 5 MG tablet Take 1 tablet (5 mg total) by mouth once daily. Take in pm    metformin (GLUCOPHAGE-XR) 500 MG 24 hr tablet Take 1 tab twice daily x 1 week then increase to 2 tabs twice dialy    ONETOUCH ULTRA TEST Strp     ONETOUCH ULTRASOFT LANCETS lancets     rosuvastatin (CRESTOR) 20 MG tablet Take 1 tablet (20 mg total) by mouth once daily.    diphth,pertus,acell,,tetanus (BOOSTRIX) 2.5-8-5 Lf-mcg-Lf/0.5mL Syrg injection Inject 0.5 mLs into the muscle once.           Clinical Reference Information           Vital Signs - Last Recorded  Most recent update: 1/25/2017  3:35 PM by Anne-Marie Valencia MA    BP Pulse Temp Ht Wt BMI    (!) 153/94 (BP Location: Left arm, Patient Position: Sitting, BP Method: Automatic) 75 98.1 °F (36.7 °C) (Oral) 6' 5" (1.956 m) 113.9 kg (251 lb 1.7 oz) 29.78 kg/m2      Blood Pressure          Most Recent Value    BP  (!)  153/94      Allergies as of 1/25/2017     No Known Allergies      Immunizations Administered on Date of Encounter - 1/25/2017     None      Orders Placed During Today's Visit     Future Labs/Procedures Expected by Expires    Comprehensive metabolic panel  1/25/2017 3/26/2018 "      Instructions      Low-Salt Diet  This diet removes foods that are high in salt. It also limits the amount of salt you use when cooking. It is most often used for people with high blood pressure, edema (fluid retention), and kidney, liver, or heart disease.  Table salt contains the mineral sodium. Your body needs sodium to work normally. But too much sodium can make your health problems worse. Your healthcare provider is recommending a low-salt (also called low-sodium) diet for you. Your total daily allowance of salt is 1,500 to 2,300 milligrams (mg). It is less than 1 teaspoon of table salt. This means you can have only about 500 to 700 mg of sodium at each meal. People with certain health problems should limit salt intake to the lower end of the recommended range.    When you cook, dont add much salt. If you can cook without using salt, even better. Dont add salt to your food at the table.  When shopping, read food labels. Salt is often called sodium on the label. Choose foods that are salt-free, low salt, or very low salt. Note that foods with reduced salt may not lower your salt intake enough.    Beans, potatoes, and pasta  Ok: Dry beans, split peas, lentils, potatoes, rice, macaroni, pasta, spaghetti without added salt  Avoid: Potato chips, tortilla chips, and similar products  Breads and cereals  Ok: Low-sodium breads, rolls, cereals, and cakes; low-salt crackers, matzo crackers  Avoid: Salted crackers, pretzels, popcorn, Serbian toast, pancakes, muffins  Dairy  Ok: Milk, chocolate milk, hot chocolate mix, low-salt cheeses, and yogurt  Avoid: Processed cheese and cheese spreads; Roquefort, Camembert, and cottage cheese; buttermilk, instant breakfast drink  Desserts  Ok: Ice cream, frozen yogurt, juice bars, gelatin, cookies and pies, sugar, honey, jelly, hard candy  Avoid: Most pies, cakes and cookies prepared or processed with salt; instant pudding  Drinks  Ok: Tea, coffee, fizzy (carbonated) drinks,  juices  Avoid: Flavored coffees, electrolyte replacement drinks, sports drinks  Meats  Ok: All fresh meat, fish, poultry, low-salt tuna, eggs, egg substitute  Avoid: Smoked, pickled, brine-cured, or salted meats and fish. This includes norris, chipped beef, corned beef, hot dogs, deli meats, ham, kosher meats, salt pork, sausage, canned tuna, salted codfish, smoked salmon, herring, sardines, or anchovies.  Seasonings and spices  Ok: Most seasonings are okay. Good substitutes for salt include: fresh herb blends, hot sauce, lemon, garlic, palomares, vinegar, dry mustard, parsley, cilantro, horseradish, tomato paste, regular margarine, mayonnaise, unsalted butter, cream cheese, vegetable oil, cream, low-salt salad dressing and gravy.  Avoid: Regular ketchup, relishes, pickles, soy sauce, teriyaki sauce, Worcestershire sauce, BBQ sauce, tartar sauce, meat tenderizer, chili sauce, regular gravy, regular salad dressing, salted butter  Soups  Ok: Low-salt soups and broths made with allowed foods  Avoid: Bouillon cubes, soups with smoked or salted meats, regular soup and broth  Vegetables  Ok: Most vegetables are okay; also low-salt tomato and vegetable juices  Avoid: Sauerkraut and other brine-soaked vegetables; pickles and other pickled vegetables; tomato juice, olives  © 1171-1833 [employer]. [address] [place] [address]. All rights reserved. This information is not intended as a substitute for professional medical care. Always follow your healthcare professional's instructions.             Smoking Cessation     If you would like to quit smoking:   You may be eligible for free services if you are a [place] resident and started smoking cigarettes before September 1, 1988.  Call the [employer]oking [employer]essation [employer] ([employer]) toll free at (957) 548-1882 or (348) 364-9117.   Call 1-800-QUIT-NOW if you do not meet the above criteria.

## 2017-01-25 NOTE — PATIENT INSTRUCTIONS

## 2017-01-25 NOTE — PROGRESS NOTES
"Subjective:       Patient ID: Jhonny Almazan is a 61 y.o. male.    Chief Complaint: Transitional Care (dizziness, acute renal insuff.)    HPI Comments: Mr. Almazan presents to the clinic today for transitional care for acute renal insufficiency, dizziness. He was hospitalized at [facility] from 1/16-1/17.  He has a history of diabetes and hypertension.  He was at work and felt very dizzy.  He states his blood pressure was "77/60" and he went to the ED.  He was given intravenous fluids and renal function improved so he was discharged.  He just got a glucometer and began checking home blood sugars.  These have been running in the low 200's.  He has not yet seen diabetic education, but he is trying to eat a low carbohydrate diet.  His blood pressure today, manually is 144/80 and he has not yet taken his lisinopril.  He has a blood pressure cuff at home but forgot his logs.  He denies dizziness, chest pain, shortness of breath.  He states he eats a ham sandwich every day for lunch and he was unaware this was high in salt.  He also drinks tomato juice daily.        Family and/or Caretaker present at visit?  No.  Diagnostic tests reviewed/disposition: No diagnosic tests pending after this hospitalization.  Disease/illness education: diabetes, hypertension  Home health/community services discussion/referrals: Patient does not have home health established from hospital visit.  They do not need home health.  If needed, we will set up home health for the patient.   Establishment or re-establishment of referral orders for community resources: No other necessary community resources.   Discussion with other health care providers: No discussion with other health care providers necessary.         Review of Systems   Constitutional: Negative for chills, fatigue and fever.   HENT: Negative for congestion, ear pain and sinus pressure.    Eyes: Negative for visual disturbance.   Respiratory: Negative for cough, shortness " of breath and wheezing.    Cardiovascular: Negative for chest pain, palpitations and leg swelling.   Gastrointestinal: Negative for abdominal pain, constipation and diarrhea.   Neurological: Negative for dizziness, light-headedness and headaches.       Objective:      Physical Exam   Constitutional: He is oriented to person, place, and time. He appears well-developed and well-nourished. No distress.   HENT:   Head: Normocephalic and atraumatic.   Right Ear: External ear normal.   Left Ear: External ear normal.   Mouth/Throat: Oropharynx is clear and moist. No oropharyngeal exudate.   Eyes: Pupils are equal, round, and reactive to light. Right eye exhibits no discharge. Left eye exhibits no discharge.   Neck: Neck supple. No thyromegaly present.   Cardiovascular: Normal rate and regular rhythm.  Exam reveals no gallop and no friction rub.    No murmur heard.  No lower extremity edema   Pulmonary/Chest: Effort normal and breath sounds normal. No respiratory distress. He has no wheezes. He has no rales.   Abdominal: Soft. He exhibits no distension. There is no tenderness.   Lymphadenopathy:     He has no cervical adenopathy.   Neurological: He is alert and oriented to person, place, and time. Coordination normal.   Skin: Skin is warm and dry.   Psychiatric: He has a normal mood and affect. His behavior is normal. Thought content normal.   Vitals reviewed.          Current Outpatient Prescriptions:     aspirin (ECOTRIN) 81 MG EC tablet, Take 81 mg by mouth once daily., Disp: , Rfl:     lisinopril (PRINIVIL,ZESTRIL) 5 MG tablet, Take 1 tablet (5 mg total) by mouth once daily. Take in pm, Disp: 90 tablet, Rfl: 3    metformin (GLUCOPHAGE-XR) 500 MG 24 hr tablet, Take 1 tab twice daily x 1 week then increase to 2 tabs twice dialy, Disp: 120 tablet, Rfl: 3    ONETOUCH ULTRA TEST Strp, , Disp: , Rfl:     ONETOUCH ULTRASOFT LANCETS lancets, , Disp: , Rfl:     rosuvastatin (CRESTOR) 20 MG tablet, Take 1 tablet (20 mg  total) by mouth once daily., Disp: 90 tablet, Rfl: 1    diphth,pertus,acell,,tetanus (BOOSTRIX) 2.5-8-5 Lf-mcg-Lf/0.5mL Syrg injection, Inject 0.5 mLs into the muscle once., Disp: 0.5 mL, Rfl: 0  Assessment:       1. Hospital discharge follow-up    2. Dehydration    3. Need for Tdap vaccination    4. Hypertension associated with diabetes    5. Uncontrolled type 2 diabetes mellitus with complication, without long-term current use of insulin        Plan:     Hospital discharge follow-up  Doing well post discharge.  Reiterated need for diabetic diet, weight loss to control diabetes and hypertension.  DASH diet handout given.  No processed meats or tomato juice.    Dehydration  -     Comprehensive metabolic panel; Future; Expected date: 1/25/17    Need for Tdap vaccination  -     diphth,pertus,acell,,tetanus (BOOSTRIX) 2.5-8-5 Lf-mcg-Lf/0.5mL Syrg injection; Inject 0.5 mLs into the muscle once.  Dispense: 0.5 mL; Refill: 0    Hypertension associated with diabetes  Patient is unwilling to make changes to blood pressure medication today since he has not taken his lisinopril for the evening.  He is to keep daily log, take all medication before next visit.  Strict low salt diet.  DASH diet handout given.    Uncontrolled type 2 diabetes mellitus with complication, without long-term current use of insulin  Patient readiness: acceptance and barriers:none    During the course of the visit the patient was educated and counseled about the following:     Diabetes:  Addressed ADA diet.  Encouraged aerobic exercise.  Hypertension:   Dietary sodium restriction.  Regular aerobic exercise.  Check blood pressures daily and record.    Goals: Diabetes: Maintain Hemoglobin A1C below 7 and Hypertension: Reduce Blood Pressure    Did patient meet goals/outcomes: No    The following self management tools provided: blood pressure log  blood glucose log    Patient Instructions (the written plan) was given to the patient/family.     Time spent  with patient: 45 minutes

## 2017-02-06 ENCOUNTER — LAB VISIT (OUTPATIENT)
Dept: LAB | Facility: HOSPITAL | Age: 62
End: 2017-02-06
Attending: NURSE PRACTITIONER
Payer: COMMERCIAL

## 2017-02-06 DIAGNOSIS — E86.0 DEHYDRATION: ICD-10-CM

## 2017-02-06 LAB
ALBUMIN SERPL BCP-MCNC: 3.9 G/DL
ALP SERPL-CCNC: 66 U/L
ALT SERPL W/O P-5'-P-CCNC: 25 U/L
ANION GAP SERPL CALC-SCNC: 11 MMOL/L
AST SERPL-CCNC: 27 U/L
BILIRUB SERPL-MCNC: 0.4 MG/DL
BUN SERPL-MCNC: 10 MG/DL
CALCIUM SERPL-MCNC: 9.7 MG/DL
CHLORIDE SERPL-SCNC: 106 MMOL/L
CO2 SERPL-SCNC: 24 MMOL/L
CREAT SERPL-MCNC: 0.8 MG/DL
EST. GFR  (AFRICAN AMERICAN): >60 ML/MIN/1.73 M^2
EST. GFR  (NON AFRICAN AMERICAN): >60 ML/MIN/1.73 M^2
GLUCOSE SERPL-MCNC: 154 MG/DL
POTASSIUM SERPL-SCNC: 4.1 MMOL/L
PROT SERPL-MCNC: 7.2 G/DL
SODIUM SERPL-SCNC: 141 MMOL/L

## 2017-02-06 PROCEDURE — 80053 COMPREHEN METABOLIC PANEL: CPT

## 2017-02-06 PROCEDURE — 36415 COLL VENOUS BLD VENIPUNCTURE: CPT | Mod: PO

## 2017-02-07 ENCOUNTER — DOCUMENTATION ONLY (OUTPATIENT)
Dept: FAMILY MEDICINE | Facility: CLINIC | Age: 62
End: 2017-02-07

## 2017-02-07 NOTE — PROGRESS NOTES
Pre-Visit Chart Review  For Appointment Scheduled on 2/10/17    Health Maintenance Due   Topic Date Due    TETANUS VACCINE  12/02/1971    Pneumococcal PPSV23 (Medium Risk) (1) 12/02/1971    Zoster Vaccine  12/02/2013    Colonoscopy  07/18/2015

## 2017-02-10 ENCOUNTER — CLINICAL SUPPORT (OUTPATIENT)
Dept: DIABETES | Facility: CLINIC | Age: 62
End: 2017-02-10
Payer: COMMERCIAL

## 2017-02-10 ENCOUNTER — OFFICE VISIT (OUTPATIENT)
Dept: FAMILY MEDICINE | Facility: CLINIC | Age: 62
End: 2017-02-10
Payer: COMMERCIAL

## 2017-02-10 VITALS — BODY MASS INDEX: 29.99 KG/M2 | HEIGHT: 77 IN | WEIGHT: 254 LBS

## 2017-02-10 VITALS
DIASTOLIC BLOOD PRESSURE: 80 MMHG | HEART RATE: 85 BPM | WEIGHT: 254.44 LBS | SYSTOLIC BLOOD PRESSURE: 150 MMHG | RESPIRATION RATE: 16 BRPM | BODY MASS INDEX: 30.04 KG/M2 | OXYGEN SATURATION: 96 % | HEIGHT: 77 IN

## 2017-02-10 DIAGNOSIS — E78.5 HYPERLIPIDEMIA ASSOCIATED WITH TYPE 2 DIABETES MELLITUS: ICD-10-CM

## 2017-02-10 DIAGNOSIS — E66.9 OBESITY (BMI 30.0-34.9): ICD-10-CM

## 2017-02-10 DIAGNOSIS — E11.69 HYPERLIPIDEMIA ASSOCIATED WITH TYPE 2 DIABETES MELLITUS: ICD-10-CM

## 2017-02-10 DIAGNOSIS — I15.2 HYPERTENSION ASSOCIATED WITH DIABETES: Primary | ICD-10-CM

## 2017-02-10 DIAGNOSIS — E11.59 HYPERTENSION ASSOCIATED WITH DIABETES: Primary | ICD-10-CM

## 2017-02-10 PROCEDURE — 99999 PR PBB SHADOW E&M-EST. PATIENT-LVL II: CPT | Mod: PBBFAC,,,

## 2017-02-10 PROCEDURE — G0108 DIAB MANAGE TRN  PER INDIV: HCPCS | Mod: S$GLB,,, | Performed by: DIETITIAN, REGISTERED

## 2017-02-10 PROCEDURE — 2022F DILAT RTA XM EVC RTNOPTHY: CPT | Mod: S$GLB,,, | Performed by: PHYSICIAN ASSISTANT

## 2017-02-10 PROCEDURE — 99999 PR PBB SHADOW E&M-EST. PATIENT-LVL IV: CPT | Mod: PBBFAC,,, | Performed by: PHYSICIAN ASSISTANT

## 2017-02-10 PROCEDURE — 4010F ACE/ARB THERAPY RXD/TAKEN: CPT | Mod: S$GLB,,, | Performed by: PHYSICIAN ASSISTANT

## 2017-02-10 PROCEDURE — 99214 OFFICE O/P EST MOD 30 MIN: CPT | Mod: S$GLB,,, | Performed by: PHYSICIAN ASSISTANT

## 2017-02-10 PROCEDURE — 3079F DIAST BP 80-89 MM HG: CPT | Mod: S$GLB,,, | Performed by: PHYSICIAN ASSISTANT

## 2017-02-10 PROCEDURE — 3046F HEMOGLOBIN A1C LEVEL >9.0%: CPT | Mod: S$GLB,,, | Performed by: PHYSICIAN ASSISTANT

## 2017-02-10 PROCEDURE — 3077F SYST BP >= 140 MM HG: CPT | Mod: S$GLB,,, | Performed by: PHYSICIAN ASSISTANT

## 2017-02-10 RX ORDER — GLIPIZIDE 10 MG/1
10 TABLET, FILM COATED, EXTENDED RELEASE ORAL
Qty: 90 TABLET | Refills: 2 | Status: SHIPPED | OUTPATIENT
Start: 2017-02-10 | End: 2017-11-20 | Stop reason: SDUPTHER

## 2017-02-10 NOTE — PATIENT INSTRUCTIONS
Established High Blood Pressure    High blood pressure (hypertension) is a chronic disease. Often health care providers dont know what causes it. But it can be caused by certain health conditions and medicines.  If you have high blood pressure, you may not have any symptoms. If you do have symptoms, they may include headache, dizziness, changes in your vision, chest pain, and shortness of breath. But even without symptoms, high blood pressure thats not treated raises your risk for heart attack and stroke. High blood pressure is a serious health risk and shouldnt be ignored.  A blood pressure reading is made up of two numbers: a higher number over a lower number. The top number is the systolic pressure. The bottom number is the diastolic pressure. A normal blood pressure is less than 120 over less than 80.  High blood pressure is when either the top number is 140 or higher, or the bottom number is 90 or higher. This must be the result when taking your blood pressure a number of times. The blood pressures between normal and high are called prehypertension.  Home care  If you have high blood pressure, you should do what is listed below to lower your blood pressure. If you are taking medicines for high blood pressure, these methods may reduce or end your need for medicines in the future.  · Begin a weight-loss program if you are overweight.  · Cut back on how much salt you get in your diet. Heres how to do this:  ¨ Dont eat foods that have a lot of salt. These include olives, pickles, smoked meats, and salted potato chips.  ¨ Dont add salt to your food at the table.  ¨ Use only small amounts of salt when cooking.  · Begin an exercise program. Talk with your health care provider about the type of exercise program that would be best for you. It doesn't have to be hard. Even brisk walking for 20 minutes 3 times a week is a good form of exercise.  · Dont take medicines that have heart stimulants. This includes many  cold and sinus decongestant pills and sprays, as well as diet pills. Check the warnings about hypertension on the label. Stimulants such as amphetamine or cocaine could be lethal for someone with high blood pressure. Never take these.  · Limit how much caffeine you get in your diet. Switch to caffeine-free products.  · Stop smoking. If you are a long-time smoker, this can be hard. Enroll in a stop-smoking program to make it more likely that you will quit for good.  · Learn how to handle stress. This is an important part of any program to lower blood pressure. Learn about relaxation methods like meditation, yoga, or biofeedback.  · If your provider prescribed medicines, take them exactly as directed. Missing doses may cause your blood pressure get out of control.  · Consider buying an automatic blood pressure machine. You can get one of these at most pharmacies. Use this to watch your blood pressure at home. Give the results to your provider.  Follow-up care  You will need to make regular visits to your health care provider. This is to check your blood pressure and to make changes to your medicines. Make a follow-up appointment as directed.  When to seek medical advice  Call your health care provider right away if any of these occur:  · Chest pain or shortness of breath  · Severe headache  · Throbbing or rushing sound in the ears  · Nosebleed  · Sudden severe pain in your belly (abdomen)  · Extreme drowsiness, confusion, or fainting  · Dizziness or dizziness with a spinning sensation (vertigo)  · Weakness of an arm or leg or one side of the face  · You have problems speaking or seeing   Date Last Reviewed: 11/25/2014  © 7677-5667 [employer]. [address] [place] [address], [place] [address]. All rights reserved. This information is not intended as a substitute for professional medical care. Always follow your healthcare professional's instructions.            Diabetes (General Information)  Diabetes is a  long-term health problem. It means your body does not make enough insulin. Or it may mean that your body cannot use the insulin it makes. Insulin is a hormone in your body. It lets blood sugar (glucose) reach the cells in your body. All of your cells need glucose for fuel.  When you have diabetes, the glucose in your blood builds up because it cannot get into the cells. This buildup is called high blood sugar (hyperglycemia).  Your blood sugar level depends on several things. It depends on what kind of food you eat and how much of it you eat. It also depends on how much exercise you get, and how much insulin you have in your body. Eating too much of the wrong kinds of food or not taking diabetes medicine on time can cause high blood sugar. Infections can cause high blood sugar even if you are taking medicines correctly.  These things can also cause low blood sugar:  · Missing meals  · Not eating enough food  · Taking too much diabetes medicine  Diabetes can cause serious problems over time if you do not get treated. These problems include heart disease, stroke, kidney failure, and blindness. They also include nerve pain or loss of feeling in your legs and feet, and gangrene of the feet. By keeping your blood sugar under control you can prevent or delay these problems.  Normal blood sugar levels are 80 to 100 before a meal and less than 180 in the 1 to 2 hours after a meal.  Home care  Follow these guidelines when caring for yourself at home:  · Follow the diet your healthcare provider gives you. Take insulin or other diabetes medicine exactly as told to.  · Watch your blood sugar as you are told to. Keep a log of your results. This will help your provider change your medicines to keep your blood sugar under control.  · Try to reach your ideal weight. You may be able to cut back on or not have to take diabetes medicine if you eat the right foods and get exercise.  · Do not smoke. Smoking worsens the effects of  diabetes on your circulation. You are much more likely to have a heart attack if you have diabetes and you smoke.  · Take good care of your feet. If you have lost feeling in your feet, you may not see an injury or infection. Check your feet and between your toes at least once a week.  · Wear a medical alert bracelet or necklace, or carry a card in your wallet that says you have diabetes. This will help healthcare providers give you the right care if you get very ill and cannot tell them that you have diabetes.  Sick day plan  If you get a cold, the flu, or a bacterial or viral infection, take these steps:  · Look at your diabetes sick plan and call your healthcare provider as you were told to. You may need to call your provider right away if:  ¨ Your blood sugar is above 240 while taking your diabetes medicine  ¨ Your urine ketone levels are above normal or high  ¨ You have been vomiting more than 6 hours  ¨ You have trouble breathing or your breath ha s a fruity smell  ¨ You have a high fever  ¨ You have a fever for several days and you are not getting better  ¨ You get light-headed and are sleepier than usual  · Keep taking your diabetes pills (oral medicine) even if you have been vomiting and are feeling sick. Call your provider right away because you may need insulin to lower your blood sugar until you recover from your illness.  · Keep taking your insulin even if you have been vomiting and are feeling sick. Call your provider right away to ask if you need to change your insulin dose. This will depend on your blood sugar results.  · Check your blood sugar every 2 to 4 hours, or at least 4 times a day.  · Check your ketones often. If you are vomiting and having diarrhea, watch them more often.  · Do not skip meals. Try to eat small meals on a regular schedule. Do this even if you do not feel like eating.  · Drink water or other liquids that do not have caffeine or calories. This will keep you from getting  dehydrated. If you are nauseated or vomiting, takes small sips every 5 minutes. To prevent dehydration try to drink a cup (8 ounces) of fluids every hour while you are awake.  General care  Always bring a source of fast-acting sugar with you in case you have symptoms of low blood sugar (below 70). At the first sign of low blood sugar, eat or drink 15 to 20 grams of fast-acting sugar to raise your blood sugar. Examples are:  · 3 to 4 glucose tablets. You can buy these at most [employer]  · 4 ounces (1/2 cup) of regular (not diet) soft drinks  · 4 ounces (1/2 cup) of any fruit juice  · 8 ounces (1 cup) of milk  · 5 to 6 pieces of hard candy  · 1 tablespoon of honey  Check your blood sugar 15 minutes after treating yourself. If it is still below 70, take 15 to 20 more grams of fast-acting sugar. Test again in 15 minutes. If it returns to normal (70 or above), eat a snack or meal to keep your blood sugar in a safe range. If it stays low, call your doctor or go to an emergency room.  Follow-up care  Follow-up with your healthcare provider, or as advised. For more information about diabetes, visit the American Diabetes Association website at www.diabetes.org or call 448-166-0804.  When to seek medical advice  Call your healthcare provider right away if you have any of these symptoms of high blood sugar:  · Frequent urination  · Dizziness  · Drowsiness  · Thirst  · Headache  · Nausea or vomiting  · Abdominal pain  · Eyesight changes  · Fast breathing  · Confusion or loss of consciousness  Also call your provider right away if you have any of these signs of low blood sugar:  · Fatigue  · Headache  · Shakes  · Excess sweating  · Hunger  · Feeling anxious or restless  · Eyesight changes  · Drowsiness  · Weakness  · Confusion or loss of consciousness  Call 911  Call for emergency help right away if any of these occur:  · Chest pain or shortness of breath  · Dizziness or fainting  · Weakness of an arm or leg or one side of the  face  · Trouble speaking or seeing   Date Last Reviewed: 6/1/2016  © 4728-7042 [employer]. [address] [place] [address], [place] [address]. All rights reserved. This information is not intended as a substitute for professional medical care. Always follow your healthcare professional's instructions.

## 2017-02-10 NOTE — MR AVS SNAPSHOT
[facility]  [address] [place] [address]  Phone: [phone]  Fax: [phone]                  Jhonny Almazan   2/10/2017 2:40 PM   Office Visit    Description:  Male : 1955   Provider:  Sita Zimmer PA-C   Department:  [facility]           Reason for Visit     Follow-up           Diagnoses this Visit        Comments    Hypertension associated with diabetes    -  Primary     Hyperlipidemia associated with type 2 diabetes mellitus         Uncontrolled type 2 diabetes mellitus with complication, without long-term current use of insulin                To Do List           Future Appointments        Provider Department Dept Phone    2/15/2017 3:00 PM Sita Zimmer PA-C [facility] [phone]    3/24/2017 8:45 AM [facility] [phone]    2017 1:30 PM Joey Whitehead MD [facility] [phone]    2017 1:00 PM Joey Whitehead MD [facility] [phone]      Goals (5 Years of Data)     None       These Medications        Disp Refills Start End    glipiZIDE (GLUCOTROL) 10 MG TR24 90 tablet 2 2/10/2017 2/10/2018    Take 1 tablet (10 mg total) by mouth daily with breakfast. - Oral    Pharmacy: [facility]/pharmacy #[id number] - [place] - [address] Ph #: [phone]         [facility]     [facility] Care [facility] -  Assistance  Registered nurses in the [facility] provide clinical advisement, health education, appointment booking, and other advisory services.  Call for this free service at [phone].             Medications           Message regarding Medications     Verify the changes and/or additions to your medication regime listed below are the same as discussed with your clinician today.  If any of these changes or additions are incorrect, please notify your healthcare provider.        START taking these NEW medications        Refills     "glipiZIDE (GLUCOTROL) 10 MG TR24 2    Sig: Take 1 tablet (10 mg total) by mouth daily with breakfast.    Class: Normal    Route: Oral           Verify that the below list of medications is an accurate representation of the medications you are currently taking.  If none reported, the list may be blank. If incorrect, please contact your healthcare provider. Carry this list with you in case of emergency.           Current Medications     aspirin (ECOTRIN) 81 MG EC tablet Take 81 mg by mouth once daily.    glipiZIDE (GLUCOTROL) 10 MG TR24 Take 1 tablet (10 mg total) by mouth daily with breakfast.    lisinopril (PRINIVIL,ZESTRIL) 5 MG tablet Take 1 tablet (5 mg total) by mouth once daily. Take in pm    metformin (GLUCOPHAGE-XR) 500 MG 24 hr tablet Take 1 tab twice daily x 1 week then increase to 2 tabs twice dialy    ONETOUCH ULTRA TEST Strp     ONETOUCH ULTRASOFT LANCETS lancets     rosuvastatin (CRESTOR) 20 MG tablet Take 1 tablet (20 mg total) by mouth once daily.           Clinical Reference Information           Your Vitals Were     BP Pulse Resp Height Weight SpO2    150/80 (BP Location: Right arm, Patient Position: Sitting, BP Method: Manual) 85 16 6' 5" (1.956 m) 115.4 kg (254 lb 6.6 oz) 96%    BMI                30.17 kg/m2          Blood Pressure          Most Recent Value    BP  (!)  150/80      Allergies as of 2/10/2017     No Known Allergies      Immunizations Administered on Date of Encounter - 2/10/2017     None      Orders Placed During Today's Visit     Future Labs/Procedures Expected by Expires    Basic metabolic panel  2/10/2017 2/11/2018    Hemoglobin A1c  2/10/2017 2/10/2018    Lipid panel  2/10/2017 4/11/2018      Instructions      Established High Blood Pressure    High blood pressure (hypertension) is a chronic disease. Often health care providers dont know what causes it. But it can be caused by certain health conditions and medicines.  If you have high blood pressure, you may not have any " symptoms. If you do have symptoms, they may include headache, dizziness, changes in your vision, chest pain, and shortness of breath. But even without symptoms, high blood pressure thats not treated raises your risk for heart attack and stroke. High blood pressure is a serious health risk and shouldnt be ignored.  A blood pressure reading is made up of two numbers: a higher number over a lower number. The top number is the systolic pressure. The bottom number is the diastolic pressure. A normal blood pressure is less than 120 over less than 80.  High blood pressure is when either the top number is 140 or higher, or the bottom number is 90 or higher. This must be the result when taking your blood pressure a number of times. The blood pressures between normal and high are called prehypertension.  Home care  If you have high blood pressure, you should do what is listed below to lower your blood pressure. If you are taking medicines for high blood pressure, these methods may reduce or end your need for medicines in the future.  · Begin a weight-loss program if you are overweight.  · Cut back on how much salt you get in your diet. Heres how to do this:  ¨ Dont eat foods that have a lot of salt. These include olives, pickles, smoked meats, and salted potato chips.  ¨ Dont add salt to your food at the table.  ¨ Use only small amounts of salt when cooking.  · Begin an exercise program. Talk with your health care provider about the type of exercise program that would be best for you. It doesn't have to be hard. Even brisk walking for 20 minutes 3 times a week is a good form of exercise.  · Dont take medicines that have heart stimulants. This includes many cold and sinus decongestant pills and sprays, as well as diet pills. Check the warnings about hypertension on the label. Stimulants such as amphetamine or cocaine could be lethal for someone with high blood pressure. Never take these.  · Limit how much caffeine you  get in your diet. Switch to caffeine-free products.  · Stop smoking. If you are a long-time smoker, this can be hard. Enroll in a stop-smoking program to make it more likely that you will quit for good.  · Learn how to handle stress. This is an important part of any program to lower blood pressure. Learn about relaxation methods like meditation, yoga, or biofeedback.  · If your provider prescribed medicines, take them exactly as directed. Missing doses may cause your blood pressure get out of control.  · Consider buying an automatic blood pressure machine. You can get one of these at most pharmacies. Use this to watch your blood pressure at home. Give the results to your provider.  Follow-up care  You will need to make regular visits to your health care provider. This is to check your blood pressure and to make changes to your medicines. Make a follow-up appointment as directed.  When to seek medical advice  Call your health care provider right away if any of these occur:  · Chest pain or shortness of breath  · Severe headache  · Throbbing or rushing sound in the ears  · Nosebleed  · Sudden severe pain in your belly (abdomen)  · Extreme drowsiness, confusion, or fainting  · Dizziness or dizziness with a spinning sensation (vertigo)  · Weakness of an arm or leg or one side of the face  · You have problems speaking or seeing   Date Last Reviewed: 11/25/2014 © 2000-2016 [employer]. [address] [place] [address]. All rights reserved. This information is not intended as a substitute for professional medical care. Always follow your healthcare professional's instructions.            Diabetes (General Information)  Diabetes is a long-term health problem. It means your body does not make enough insulin. Or it may mean that your body cannot use the insulin it makes. Insulin is a hormone in your body. It lets blood sugar (glucose) reach the cells in your body. All of your cells need glucose for  fuel.  When you have diabetes, the glucose in your blood builds up because it cannot get into the cells. This buildup is called high blood sugar (hyperglycemia).  Your blood sugar level depends on several things. It depends on what kind of food you eat and how much of it you eat. It also depends on how much exercise you get, and how much insulin you have in your body. Eating too much of the wrong kinds of food or not taking diabetes medicine on time can cause high blood sugar. Infections can cause high blood sugar even if you are taking medicines correctly.  These things can also cause low blood sugar:  · Missing meals  · Not eating enough food  · Taking too much diabetes medicine  Diabetes can cause serious problems over time if you do not get treated. These problems include heart disease, stroke, kidney failure, and blindness. They also include nerve pain or loss of feeling in your legs and feet, and gangrene of the feet. By keeping your blood sugar under control you can prevent or delay these problems.  Normal blood sugar levels are 80 to 100 before a meal and less than 180 in the 1 to 2 hours after a meal.  Home care  Follow these guidelines when caring for yourself at home:  · Follow the diet your healthcare provider gives you. Take insulin or other diabetes medicine exactly as told to.  · Watch your blood sugar as you are told to. Keep a log of your results. This will help your provider change your medicines to keep your blood sugar under control.  · Try to reach your ideal weight. You may be able to cut back on or not have to take diabetes medicine if you eat the right foods and get exercise.  · Do not smoke. Smoking worsens the effects of diabetes on your circulation. You are much more likely to have a heart attack if you have diabetes and you smoke.  · Take good care of your feet. If you have lost feeling in your feet, you may not see an injury or infection. Check your feet and between your toes at least  once a week.  · Wear a medical alert bracelet or necklace, or carry a card in your wallet that says you have diabetes. This will help healthcare providers give you the right care if you get very ill and cannot tell them that you have diabetes.  Sick day plan  If you get a cold, the flu, or a bacterial or viral infection, take these steps:  · Look at your diabetes sick plan and call your healthcare provider as you were told to. You may need to call your provider right away if:  ¨ Your blood sugar is above 240 while taking your diabetes medicine  ¨ Your urine ketone levels are above normal or high  ¨ You have been vomiting more than 6 hours  ¨ You have trouble breathing or your breath ha s a fruity smell  ¨ You have a high fever  ¨ You have a fever for several days and you are not getting better  ¨ You get light-headed and are sleepier than usual  · Keep taking your diabetes pills (oral medicine) even if you have been vomiting and are feeling sick. Call your provider right away because you may need insulin to lower your blood sugar until you recover from your illness.  · Keep taking your insulin even if you have been vomiting and are feeling sick. Call your provider right away to ask if you need to change your insulin dose. This will depend on your blood sugar results.  · Check your blood sugar every 2 to 4 hours, or at least 4 times a day.  · Check your ketones often. If you are vomiting and having diarrhea, watch them more often.  · Do not skip meals. Try to eat small meals on a regular schedule. Do this even if you do not feel like eating.  · Drink water or other liquids that do not have caffeine or calories. This will keep you from getting dehydrated. If you are nauseated or vomiting, takes small sips every 5 minutes. To prevent dehydration try to drink a cup (8 ounces) of fluids every hour while you are awake.  General care  Always bring a source of fast-acting sugar with you in case you have symptoms of low  blood sugar (below 70). At the first sign of low blood sugar, eat or drink 15 to 20 grams of fast-acting sugar to raise your blood sugar. Examples are:  · 3 to 4 glucose tablets. You can buy these at most drugstores.  · 4 ounces (1/2 cup) of regular (not diet) soft drinks  · 4 ounces (1/2 cup) of any fruit juice  · 8 ounces (1 cup) of milk  · 5 to 6 pieces of hard candy  · 1 tablespoon of honey  Check your blood sugar 15 minutes after treating yourself. If it is still below 70, take 15 to 20 more grams of fast-acting sugar. Test again in 15 minutes. If it returns to normal (70 or above), eat a snack or meal to keep your blood sugar in a safe range. If it stays low, call your doctor or go to an emergency room.  Follow-up care  Follow-up with your healthcare provider, or as advised. For more information about diabetes, visit the American Diabetes Association website at www.diabetes.org or call 459-336-8286.  When to seek medical advice  Call your healthcare provider right away if you have any of these symptoms of high blood sugar:  · Frequent urination  · Dizziness  · Drowsiness  · Thirst  · Headache  · Nausea or vomiting  · Abdominal pain  · Eyesight changes  · Fast breathing  · Confusion or loss of consciousness  Also call your provider right away if you have any of these signs of low blood sugar:  · Fatigue  · Headache  · Shakes  · Excess sweating  · Hunger  · Feeling anxious or restless  · Eyesight changes  · Drowsiness  · Weakness  · Confusion or loss of consciousness  Call [phone]  Call for emergency help right away if any of these occur:  · Chest pain or shortness of breath  · Dizziness or fainting  · Weakness of an arm or leg or one side of the face  · Trouble speaking or seeing   Date Last Reviewed: 6/1/2016  © 3643-5994 [employer]. [address] [place] [address], [place] [address]. All rights reserved. This information is not intended as a substitute for professional medical care. Always follow your  healthcare professional's instructions.                 Smoking Cessation     If you would like to quit smoking:   You may be eligible for free services if you are a [place] resident and started smoking cigarettes before September 1, 1988.  Call the Smoking Cessation Trust (SCT) toll free at (940) 914-0192 or (914) 683-6257.   Call 1-800-QUIT-NOW if you do not meet the above criteria.            Language Assistance Services     ATTENTION: Language assistance services are available, free of charge. Please call [phone].      ATENCIÓN: Si habla español, tiene a elizondo disposición servicios gratuitos de asistencia lingüística. Llame al [phone].     CHÚ Ý: N?u b?n nói Ti?ng Vi?t, có các d?ch v? h? tr? ngôn ng? mi?n phí dành cho b?n. G?i s? [phone].         [facility] complies with applicable Federal civil rights laws and does not discriminate on the basis of race, color, national origin, age, disability, or sex.

## 2017-02-10 NOTE — PROGRESS NOTES
Subjective:       Patient ID: Jhonny Almazan is a 61 y.o. male.    Chief Complaint: Follow-up (4wk f/u)    HPI Comments: Mr. Almazan comes to clinic today for one month follow up. The patient was admitted to the hospital due to orthostatic hypotension last month. The patient is currently taking lisinopril 5mg; the patient has been monitoring his blood pressure at home. He has had well controlled readings with systolic readings of 118-130 and diastolic readings of 70-88. The patient does report elevated blood sugars in the mid 200s. The patient has truly tried to make efforts to improve his det. He has met with the dietician.     Review of Systems   Constitutional: Negative for activity change, appetite change and fever.   HENT: Negative for postnasal drip, rhinorrhea and sinus pressure.    Eyes: Negative for visual disturbance.   Respiratory: Negative for cough and shortness of breath.    Cardiovascular: Negative for chest pain.   Gastrointestinal: Negative for abdominal distention and abdominal pain.   Genitourinary: Negative for difficulty urinating and dysuria.   Musculoskeletal: Negative for arthralgias and myalgias.   Neurological: Negative for headaches.   Hematological: Negative for adenopathy.   Psychiatric/Behavioral: The patient is not nervous/anxious.        Objective:      Physical Exam   Constitutional: He is oriented to person, place, and time.   HENT:   Mouth/Throat: Oropharynx is clear and moist. No oropharyngeal exudate.   Eyes: Conjunctivae are normal. Pupils are equal, round, and reactive to light.   Cardiovascular: Normal rate and regular rhythm.    Pulmonary/Chest: Effort normal and breath sounds normal. He has no wheezes.   Abdominal: Soft. Bowel sounds are normal. There is no tenderness.   Musculoskeletal: He exhibits no edema.   Lymphadenopathy:     He has no cervical adenopathy.   Neurological: He is alert and oriented to person, place, and time.   Skin: No erythema.   Psychiatric: His  behavior is normal.       Assessment:       1. Hypertension associated with diabetes    2. Hyperlipidemia associated with type 2 diabetes mellitus    3. Uncontrolled type 2 diabetes mellitus with complication, without long-term current use of insulin    4. Obesity (BMI 30.0-34.9)        Plan:   Jhonny was seen today for follow-up.    Diagnoses and all orders for this visit:    Hypertension associated with diabetes  -     Hemoglobin A1c; Future  -     Lipid panel; Future  -     Basic metabolic panel; Future  Although readings in clinic are elevated; these are compatible with his home monitor. His home readings have been well controlled  Low salt diet  Continue current medication  Follow up in 3-4 week for blood pressure check  Bring log when coming for blood pressure check  Hyperlipidemia associated with type 2 diabetes mellitus  -     Hemoglobin A1c; Future  -     Lipid panel; Future  -     Basic metabolic panel; Future  High fiber diet  Continue current medication  Uncontrolled type 2 diabetes mellitus with complication, without long-term current use of insulin  -     glipiZIDE (GLUCOTROL) 10 MG TR24; Take 1 tablet (10 mg total) by mouth daily with breakfast.  -     Hemoglobin A1c; Future  -     Lipid panel; Future  -     Basic metabolic panel; Future  Continue metformin  Diabetic diet   Patient to monitor blood sugar and record. Return log to clinic.   Jhonny was seen today for follow-up.    Obesity (BMI 30.0-34.9)  Low carbohydrate, high fiber diet  Increase exercise as able    Patient readiness: acceptance and barriers:none    During the course of the visit the patient was educated and counseled about the following:     Diabetes:  Discussed general issues about diabetes pathophysiology and management.  Addressed ADA diet.  Suggested low cholesterol diet.  Encouraged aerobic exercise.  Discussed foot care.  Labs: fasting blood sugar, fasting lipid panel and hemoglobin A1C.  Hypertension:   Medication: no  change.  Dietary sodium restriction.  Regular aerobic exercise.  Check blood pressures daily and record.  Follow up: 2 weeks and as needed.  Obesity:   General weight loss/lifestyle modification strategies discussed (elicit support from others; identify saboteurs; non-food rewards, etc).  Informal exercise measures discussed, e.g. taking stairs instead of elevator.  Regular aerobic exercise program discussed.    Goals: Diabetes: Maintain Hemoglobin A1C below 7, Hypertension: Reduce Blood Pressure and Obesity: Reduce calorie intake and BMI    Did patient meet goals/outcomes: No    The following self management tools provided: blood pressure log  blood glucose log    Patient Instructions (the written plan) was given to the patient/family.     Time spent with patient: 30 minutes

## 2017-02-10 NOTE — PROGRESS NOTES
02/10/17 0000   Diabetes Type   Diabetes Type  Type II   Diabetes History   Diabetes Diagnosis 1-3 years   Nutrition   Meal Planning 3 meals per day;water   Meal Plan 24 Hour Recall - Breakfast (Fresh fruit with or cheerios with coffe with 1 Tbsp sugar)   Meal Plan 24 Hour Recall - Lunch Cheese and crackers with fruit and tomato juice   Meal Plan 24 Hour Recall - Dinner (TV dinner with regular powerade)   Monitoring    Monitoring [id number]   Self Monitoring  (Reprots blood sugars range 150-230)   Blood Glucose Logs No   Exercise    Exercise Type (Very active at work)   Intensity Moderate   Frequency Daily   Duration > 1 hour   Current Diabetes Treatment    Current Treatment Oral Medicaiton  (1000mg Metoformign BID)   Social History   Preferred Learning Method Face to Face   Primary Support Self;Spouse   Educational Level [employer]   Smoking Status Current Smoker   Alcohol Use Rarely   Barriers to Change   Barriers to Change None   Learning Challenges  None   Readiness to Learn    Readiness to Learn  Eager   Diabetes Education Visit   Diabetes Education Record Assessment/Progress Initial/Comprehensive   Diabetes Education Assessment/Progress   Acute Complications (preventing, detecting, and treating acute complications) DC   Chronic Complications (preventing, detecting, and treating chronic complications) DC;W Educated patient on the importance of improving A1C to prevent complications.   Diabetes Disease Process (diabetes disease process and treatment options) DC;W   Nutrition (Incorporating nutritional management into one's lifestyle) DC;W Educated patient on plate method with carb limit set to 45 grams per meal and 15 grams or less per snack.  Educated patient on how to read nutrition labels for carb, protein, fiber and fat content.  Provided Carb counting and meal planning book for reference. Instructed patient on the importance of eating three times per day.   Physical Activity (incorporating physical  activity into one's lifestyle) DC   Medications (states correct name, dose, onset, peak, duration, side effects & timing of meds) DC would consider addition of sulfonylureas with breakfast to assit with blood sugar controll   Monitoring (monitoring blood glucose/other parameters &using results) DC;W  (Instructed patient to test at varying times of day for better assessment of glucose ranges)   Goal Setting and Problem Solving (verbalizes behavior change strategies & sets realistic goals) DC   Behavior Change (developing personal strategies to health & behavior change) DC   Psychosocial Issues (deveopling personal srategies to address psychosocial concerns) DC   Goals   Healthy Eating In Progress   Physical Activity In Progress   Monitoring In Progress   Medications In Progress   Problem Solving In Progress   Healthy Coping In Progress   Reducing Risks In Progress   Diabetes Self-Management Support Plan   Diabetes Learning DM magazines;DM websites   Exercise/Nutrition apps;websites;DM cooking magazine/recipes   Stress Management family   Review Status Patient reviewed and agreed to support plan.   Diabetes Care Plan/Intervention   Education Plan/Intervention In F/U DSMT   Diabetes Meal Plan   Restrictions Restricted Carbohydrate   Calories 1500   Carbohydrate Per Meal 30-45g;45-60g   Carbohydrate Per Snack  7-15g   Fat (Reduce intake of saturated fats)   Protein (Lean protein with meals)   Education Units of Time    Time Spent 60 min

## 2017-02-10 NOTE — PROGRESS NOTES
Patient's blood pressure was assessed in right arm while sitting straight up in chair, feet flat on the floor, arm resting on desk using patient's blood pressure monitor with result of 157/92. After 5 minutes blood pressure assessed again in right arm manually while patient was sitting up straight, feet flat on the floor, arm resting on desk with result of 154/100. Denies HA, dizziness, or pain. Above blood pressure verbally reported to Sita Zimmer PA-C.

## 2017-02-13 ENCOUNTER — DOCUMENTATION ONLY (OUTPATIENT)
Dept: FAMILY MEDICINE | Facility: CLINIC | Age: 62
End: 2017-02-13

## 2017-02-13 NOTE — PROGRESS NOTES
Pre-Visit Chart Review  For Appointment Scheduled on 2/15/17    Health Maintenance Due   Topic Date Due    TETANUS VACCINE  12/02/1971    Pneumococcal PPSV23 (Medium Risk) (1) 12/02/1971    Zoster Vaccine  12/02/2013    Colonoscopy  07/18/2015

## 2017-02-15 ENCOUNTER — TELEPHONE (OUTPATIENT)
Dept: FAMILY MEDICINE | Facility: CLINIC | Age: 62
End: 2017-02-15

## 2017-02-15 ENCOUNTER — CLINICAL SUPPORT (OUTPATIENT)
Dept: FAMILY MEDICINE | Facility: CLINIC | Age: 62
End: 2017-02-15
Payer: COMMERCIAL

## 2017-02-15 VITALS — DIASTOLIC BLOOD PRESSURE: 87 MMHG | SYSTOLIC BLOOD PRESSURE: 127 MMHG | HEART RATE: 96 BPM

## 2017-02-15 DIAGNOSIS — E11.59 HYPERTENSION ASSOCIATED WITH DIABETES: Primary | ICD-10-CM

## 2017-02-15 DIAGNOSIS — I15.2 HYPERTENSION ASSOCIATED WITH DIABETES: Primary | ICD-10-CM

## 2017-02-15 PROCEDURE — 99999 PR PBB SHADOW E&M-EST. PATIENT-LVL III: CPT | Mod: PBBFAC,,, | Performed by: PHYSICIAN ASSISTANT

## 2017-02-15 NOTE — TELEPHONE ENCOUNTER
Patient comes to clinic today for a blood pressure nurse visit. Patient's automatic BP is 173/99 with a pulse of 96. Patient's machine registers BP of 173/101. Manual BP taken is 170/98.   List of recorded Bp given to Sita Zimmer PA-C.   Patient declines turning this into an appointment with provider at this time as he has to get to work.   Reports no symptoms of high BP.

## 2017-02-15 NOTE — PROGRESS NOTES
Subjective:       Patient ID: Jhonny Almazan is a 61 y.o. male.    Chief Complaint: No chief complaint on file.    HPI  Review of Systems    Objective:      Physical Exam    Assessment:       1. Hypertension associated with diabetes        Plan:   Diagnoses and all orders for this visit:    Hypertension associated with diabetes    No change in medication at this time. Clinic reading is elevated (173/99). These readings are consistent with reading on home blood pressure meter while here in clinic (173/101). Manual blood pressure reading is 170/98. Home meter appears to be accurate. Most recent home blood pressure readings is 127/87. This was discussed with Dr. Jaimes. Home reading of 127/87 put as official reading in the chart.

## 2017-02-15 NOTE — PATIENT INSTRUCTIONS
Established High Blood Pressure    High blood pressure (hypertension) is a chronic disease. Often health care providers dont know what causes it. But it can be caused by certain health conditions and medicines.  If you have high blood pressure, you may not have any symptoms. If you do have symptoms, they may include headache, dizziness, changes in your vision, chest pain, and shortness of breath. But even without symptoms, high blood pressure thats not treated raises your risk for heart attack and stroke. High blood pressure is a serious health risk and shouldnt be ignored.  A blood pressure reading is made up of two numbers: a higher number over a lower number. The top number is the systolic pressure. The bottom number is the diastolic pressure. A normal blood pressure is less than 120 over less than 80.  High blood pressure is when either the top number is 140 or higher, or the bottom number is 90 or higher. This must be the result when taking your blood pressure a number of times. The blood pressures between normal and high are called prehypertension.  Home care  If you have high blood pressure, you should do what is listed below to lower your blood pressure. If you are taking medicines for high blood pressure, these methods may reduce or end your need for medicines in the future.  · Begin a weight-loss program if you are overweight.  · Cut back on how much salt you get in your diet. Heres how to do this:  ¨ Dont eat foods that have a lot of salt. These include olives, pickles, smoked meats, and salted potato chips.  ¨ Dont add salt to your food at the table.  ¨ Use only small amounts of salt when cooking.  · Begin an exercise program. Talk with your health care provider about the type of exercise program that would be best for you. It doesn't have to be hard. Even brisk walking for 20 minutes 3 times a week is a good form of exercise.  · Dont take medicines that have heart stimulants. This includes many  cold and sinus decongestant pills and sprays, as well as diet pills. Check the warnings about hypertension on the label. Stimulants such as amphetamine or cocaine could be lethal for someone with high blood pressure. Never take these.  · Limit how much caffeine you get in your diet. Switch to caffeine-free products.  · Stop smoking. If you are a long-time smoker, this can be hard. Enroll in a stop-smoking program to make it more likely that you will quit for good.  · Learn how to handle stress. This is an important part of any program to lower blood pressure. Learn about relaxation methods like meditation, yoga, or biofeedback.  · If your provider prescribed medicines, take them exactly as directed. Missing doses may cause your blood pressure get out of control.  · Consider buying an automatic blood pressure machine. You can get one of these at most pharmacies. Use this to watch your blood pressure at home. Give the results to your provider.  Follow-up care  You will need to make regular visits to your health care provider. This is to check your blood pressure and to make changes to your medicines. Make a follow-up appointment as directed.  When to seek medical advice  Call your health care provider right away if any of these occur:  · Chest pain or shortness of breath  · Severe headache  · Throbbing or rushing sound in the ears  · Nosebleed  · Sudden severe pain in your belly (abdomen)  · Extreme drowsiness, confusion, or fainting  · Dizziness or dizziness with a spinning sensation (vertigo)  · Weakness of an arm or leg or one side of the face  · You have problems speaking or seeing   Date Last Reviewed: 11/25/2014  © 1734-9846 [employer]. [address] [place] [address], [place] [address]. All rights reserved. This information is not intended as a substitute for professional medical care. Always follow your healthcare professional's instructions.          Diabetes (General Information)  Diabetes is a  long-term health problem. It means your body does not make enough insulin. Or it may mean that your body cannot use the insulin it makes. Insulin is a hormone in your body. It lets blood sugar (glucose) reach the cells in your body. All of your cells need glucose for fuel.  When you have diabetes, the glucose in your blood builds up because it cannot get into the cells. This buildup is called high blood sugar (hyperglycemia).  Your blood sugar level depends on several things. It depends on what kind of food you eat and how much of it you eat. It also depends on how much exercise you get, and how much insulin you have in your body. Eating too much of the wrong kinds of food or not taking diabetes medicine on time can cause high blood sugar. Infections can cause high blood sugar even if you are taking medicines correctly.  These things can also cause low blood sugar:  · Missing meals  · Not eating enough food  · Taking too much diabetes medicine  Diabetes can cause serious problems over time if you do not get treated. These problems include heart disease, stroke, kidney failure, and blindness. They also include nerve pain or loss of feeling in your legs and feet, and gangrene of the feet. By keeping your blood sugar under control you can prevent or delay these problems.  Normal blood sugar levels are 80 to 100 before a meal and less than 180 in the 1 to 2 hours after a meal.  Home care  Follow these guidelines when caring for yourself at home:  · Follow the diet your healthcare provider gives you. Take insulin or other diabetes medicine exactly as told to.  · Watch your blood sugar as you are told to. Keep a log of your results. This will help your provider change your medicines to keep your blood sugar under control.  · Try to reach your ideal weight. You may be able to cut back on or not have to take diabetes medicine if you eat the right foods and get exercise.  · Do not smoke. Smoking worsens the effects of  diabetes on your circulation. You are much more likely to have a heart attack if you have diabetes and you smoke.  · Take good care of your feet. If you have lost feeling in your feet, you may not see an injury or infection. Check your feet and between your toes at least once a week.  · Wear a medical alert bracelet or necklace, or carry a card in your wallet that says you have diabetes. This will help healthcare providers give you the right care if you get very ill and cannot tell them that you have diabetes.  Sick day plan  If you get a cold, the flu, or a bacterial or viral infection, take these steps:  · Look at your diabetes sick plan and call your healthcare provider as you were told to. You may need to call your provider right away if:  ¨ Your blood sugar is above 240 while taking your diabetes medicine  ¨ Your urine ketone levels are above normal or high  ¨ You have been vomiting more than 6 hours  ¨ You have trouble breathing or your breath ha s a fruity smell  ¨ You have a high fever  ¨ You have a fever for several days and you are not getting better  ¨ You get light-headed and are sleepier than usual  · Keep taking your diabetes pills (oral medicine) even if you have been vomiting and are feeling sick. Call your provider right away because you may need insulin to lower your blood sugar until you recover from your illness.  · Keep taking your insulin even if you have been vomiting and are feeling sick. Call your provider right away to ask if you need to change your insulin dose. This will depend on your blood sugar results.  · Check your blood sugar every 2 to 4 hours, or at least 4 times a day.  · Check your ketones often. If you are vomiting and having diarrhea, watch them more often.  · Do not skip meals. Try to eat small meals on a regular schedule. Do this even if you do not feel like eating.  · Drink water or other liquids that do not have caffeine or calories. This will keep you from getting  dehydrated. If you are nauseated or vomiting, takes small sips every 5 minutes. To prevent dehydration try to drink a cup (8 ounces) of fluids every hour while you are awake.  General care  Always bring a source of fast-acting sugar with you in case you have symptoms of low blood sugar (below 70). At the first sign of low blood sugar, eat or drink 15 to 20 grams of fast-acting sugar to raise your blood sugar. Examples are:  · 3 to 4 glucose tablets. You can buy these at most [employer].  · 4 ounces (1/2 cup) of regular (not diet) soft drinks  · 4 ounces (1/2 cup) of any fruit juice  · 8 ounces (1 cup) of milk  · 5 to 6 pieces of hard candy  · 1 tablespoon of honey  Check your blood sugar 15 minutes after treating yourself. If it is still below 70, take 15 to 20 more grams of fast-acting sugar. Test again in 15 minutes. If it returns to normal (70 or above), eat a snack or meal to keep your blood sugar in a safe range. If it stays low, call your doctor or go to an emergency room.  Follow-up care  Follow-up with your healthcare provider, or as advised. For more information about diabetes, visit the American Diabetes Association website at www.diabetes.org or call 384-376-8940.  When to seek medical advice  Call your healthcare provider right away if you have any of these symptoms of high blood sugar:  · Frequent urination  · Dizziness  · Drowsiness  · Thirst  · Headache  · Nausea or vomiting  · Abdominal pain  · Eyesight changes  · Fast breathing  · Confusion or loss of consciousness  Also call your provider right away if you have any of these signs of low blood sugar:  · Fatigue  · Headache  · Shakes  · Excess sweating  · Hunger  · Feeling anxious or restless  · Eyesight changes  · Drowsiness  · Weakness  · Confusion or loss of consciousness  Call 911  Call for emergency help right away if any of these occur:  · Chest pain or shortness of breath  · Dizziness or fainting  · Weakness of an arm or leg or one side of the  face  · Trouble speaking or seeing   Date Last Reviewed: 6/1/2016 © 2000-2016 [employer]. [address] [place] [address], [place] [address]. All rights reserved. This information is not intended as a substitute for professional medical care. Always follow your healthcare professional's instructions.          Walking for Fitness  Fitness walking has something for everyone, even people who are already fit. Walking is one of the safest ways to condition your body aerobically. It can boost energy, help you lose weight, and reduce stress.    Physical benefits  · Walking strengthens your heart and lungs, and tones your muscles.  · When walking, your feet land with less impact than in other sports. This reduces chances of muscle, bone, and joint injury.  · Regular walking improves your cholesterol levels and lowers your risk of heart disease. And it helps you control your blood sugar if you have diabetes.  · Walking is a weight-bearing activity, which helps maintain bone density. This can help prevent osteoporosis.  Personal rewards  · Taking walks can help you relax and manage stress. And fitness walking may make you feel better about yourself.  · Walking can help you sleep better at night and make you less likely to be depressed.  · Regular walking may help maintain your memory as you get older.  · Walking is a great way to spend extra time with friends and family members. Be sure to invite your dog along!  Q&A about fitness walking  Q: Will walking keep me fit?  A: Yes. Regular walking at the right pace gives you all the benefits of other aerobic activities, such as jogging and swimming.  Q: Will walking help me lose weight and keep it off?  A: Yes. Per mile, walking can burn as many calories as jogging. Your health care provider can help work walking into your weight-loss plan.  Q: Is walking safe for my health?  A: Yes. Walking is safe if you have high blood pressure, diabetes, heart disease, or other  conditions. Talk to your health care provider before you start.  Date Last Reviewed: 5/9/2015  © 8290-0056 [employer]. [address] [place] [address], [place] [address]. All rights reserved. This information is not intended as a substitute for professional medical care. Always follow your healthcare professional's instructions.          Weight Management: Getting Started  Healthy bodies come in all shapes and sizes. Not all bodies are made to be thin. For some people, a healthy weight is higher than the average weight listed on weight charts. Your healthcare provider can help you decide on a healthy weight for you.    Reasons to lose weight  Losing weight can help with some health problems, such as high blood pressure, heart disease, diabetes, sleep apnea, and arthritis. You may also feel more energy.  Set your long-term goal  Your goal doesn't even have to be a specific weight. You may decide on a fitness goal (such as being able to walk 10 miles a week), or a health goal (such as lowering your blood pressure). Choose a goal that is measurable and reasonable, so you know when you've reached it. A goal of reaching a BMI of less than 25 is not always reasonable (or possible).   Make an action plan  Habits dont change overnight. Setting your goals too high can leave you feeling discouraged if you cant reach them. Be realistic. Choose one or two small changes you can make now. Set an action plan for how you are going to make these changes. When you can stick to this plan, keep making a few more small changes. Taking small steps will help you stay on the path to success.  Track your progress  Write down your goals. Then, keep a daily record of your progress. Write down what you eat and how active you are. This record lets you look back on how much youve done. It may also help when youre feeling frustrated. Reward yourself for success. Even if you dont reach every goal, give yourself credit for what you do  get done.  Get support  Encouragement from others can help make losing weight easier. Ask your family members and friends for support. They may even want to join you. Also look to your healthcare provider, registered dietitian, and  for help. Your local hospital can give you more information about nutrition, exercise, and weight loss.  Date Last Reviewed: 1/31/2016  © 2841-1092 [employer]. [address] [place] [address], [place] [address]. All rights reserved. This information is not intended as a substitute for professional medical care. Always follow your healthcare professional's instructions.

## 2017-03-25 DIAGNOSIS — I10 ESSENTIAL HYPERTENSION: ICD-10-CM

## 2017-03-27 RX ORDER — AMLODIPINE AND BENAZEPRIL HYDROCHLORIDE 10; 40 MG/1; MG/1
CAPSULE ORAL
Qty: 90 CAPSULE | Refills: 1 | OUTPATIENT
Start: 2017-03-27

## 2017-03-27 NOTE — TELEPHONE ENCOUNTER
Patient states the doctor at the hospital told him to no longer take the amlodipine, he does not need this refill. He is only taking the lisinopril at this time.

## 2017-04-25 RX ORDER — METFORMIN HYDROCHLORIDE 500 MG/1
TABLET, EXTENDED RELEASE ORAL
Qty: 120 TABLET | Refills: 3 | Status: SHIPPED | OUTPATIENT
Start: 2017-04-25 | End: 2017-07-05 | Stop reason: SDUPTHER

## 2017-05-16 ENCOUNTER — DOCUMENTATION ONLY (OUTPATIENT)
Dept: FAMILY MEDICINE | Facility: CLINIC | Age: 62
End: 2017-05-16

## 2017-05-16 NOTE — PROGRESS NOTES
Pre-Visit Chart Review  For Appointment Scheduled on 05/19/2017    Health Maintenance Due   Topic Date Due    TETANUS VACCINE  [birth year]02/1973    Pneumococcal PPSV23 (Medium Risk) (1) 12/02/1971    Zoster Vaccine  12/02/2013    Colonoscopy  07/18/2015    Hemoglobin A1c  03/29/2017

## 2017-07-05 RX ORDER — METFORMIN HYDROCHLORIDE 500 MG/1
TABLET, EXTENDED RELEASE ORAL
Qty: 120 TABLET | Refills: 3 | Status: SHIPPED | OUTPATIENT
Start: 2017-07-05 | End: 2017-08-19 | Stop reason: SDUPTHER

## 2017-08-17 ENCOUNTER — DOCUMENTATION ONLY (OUTPATIENT)
Dept: FAMILY MEDICINE | Facility: CLINIC | Age: 62
End: 2017-08-17

## 2017-08-17 NOTE — PROGRESS NOTES
Pre-Visit Chart Review  For Appointment Scheduled on 08/18/2017    Health Maintenance Due   Topic Date Due    TETANUS VACCINE  [birth year]02/1971    Pneumococcal PPSV23 (Medium Risk) (1) 12/02/1971    Zoster Vaccine  12/02/2013    Colonoscopy  07/18/2015    Hemoglobin A1c  03/29/2017    Influenza Vaccine  08/01/2017    Foot Exam  09/28/2017

## 2017-08-18 ENCOUNTER — TELEPHONE (OUTPATIENT)
Dept: FAMILY MEDICINE | Facility: CLINIC | Age: 62
End: 2017-08-18

## 2017-08-18 NOTE — TELEPHONE ENCOUNTER
----- Message from Sweta Hernandez sent at 8/18/2017  8:30 AM CDT -----  Patient needs to cancel today's appointment due to has to work/needs to reschedule/no appointment showing available until December/needs to be sooner/please call back at [phone] to reschedule or advise.

## 2017-08-19 ENCOUNTER — OFFICE VISIT (OUTPATIENT)
Dept: FAMILY MEDICINE | Facility: CLINIC | Age: 62
End: 2017-08-19
Payer: COMMERCIAL

## 2017-08-19 ENCOUNTER — LAB VISIT (OUTPATIENT)
Dept: LAB | Facility: HOSPITAL | Age: 62
End: 2017-08-19
Attending: FAMILY MEDICINE
Payer: COMMERCIAL

## 2017-08-19 VITALS
BODY MASS INDEX: 29.67 KG/M2 | DIASTOLIC BLOOD PRESSURE: 80 MMHG | SYSTOLIC BLOOD PRESSURE: 170 MMHG | HEART RATE: 83 BPM | WEIGHT: 251.31 LBS | TEMPERATURE: 98 F | HEIGHT: 77 IN

## 2017-08-19 DIAGNOSIS — E11.40 TYPE 2 DIABETES MELLITUS WITH DIABETIC NEUROPATHY, WITHOUT LONG-TERM CURRENT USE OF INSULIN: ICD-10-CM

## 2017-08-19 DIAGNOSIS — E11.69 HYPERLIPIDEMIA ASSOCIATED WITH TYPE 2 DIABETES MELLITUS: ICD-10-CM

## 2017-08-19 DIAGNOSIS — E78.5 HYPERLIPIDEMIA ASSOCIATED WITH TYPE 2 DIABETES MELLITUS: ICD-10-CM

## 2017-08-19 DIAGNOSIS — E11.40 TYPE 2 DIABETES MELLITUS WITH DIABETIC NEUROPATHY, WITHOUT LONG-TERM CURRENT USE OF INSULIN: Primary | ICD-10-CM

## 2017-08-19 LAB
ALBUMIN SERPL BCP-MCNC: 3.9 G/DL
ALP SERPL-CCNC: 67 U/L
ALT SERPL W/O P-5'-P-CCNC: 21 U/L
ANION GAP SERPL CALC-SCNC: 10 MMOL/L
AST SERPL-CCNC: 18 U/L
BASOPHILS # BLD AUTO: 0.06 K/UL
BASOPHILS NFR BLD: 0.6 %
BILIRUB SERPL-MCNC: 0.8 MG/DL
BUN SERPL-MCNC: 14 MG/DL
CALCIUM SERPL-MCNC: 9.7 MG/DL
CHLORIDE SERPL-SCNC: 106 MMOL/L
CHOLEST/HDLC SERPL: 3.5 {RATIO}
CO2 SERPL-SCNC: 24 MMOL/L
CREAT SERPL-MCNC: 0.8 MG/DL
DIFFERENTIAL METHOD: ABNORMAL
EOSINOPHIL # BLD AUTO: 0.3 K/UL
EOSINOPHIL NFR BLD: 2.6 %
ERYTHROCYTE [DISTWIDTH] IN BLOOD BY AUTOMATED COUNT: 13.2 %
EST. GFR  (AFRICAN AMERICAN): >60 ML/MIN/1.73 M^2
EST. GFR  (NON AFRICAN AMERICAN): >60 ML/MIN/1.73 M^2
ESTIMATED AVG GLUCOSE: 126 MG/DL
GLUCOSE SERPL-MCNC: 123 MG/DL
HBA1C MFR BLD HPLC: 6 %
HCT VFR BLD AUTO: 46.2 %
HDL/CHOLESTEROL RATIO: 28.5 %
HDLC SERPL-MCNC: 172 MG/DL
HDLC SERPL-MCNC: 49 MG/DL
HGB BLD-MCNC: 15.2 G/DL
LDLC SERPL CALC-MCNC: 88.2 MG/DL
LYMPHOCYTES # BLD AUTO: 2.4 K/UL
LYMPHOCYTES NFR BLD: 24.8 %
MCH RBC QN AUTO: 31.7 PG
MCHC RBC AUTO-ENTMCNC: 32.9 G/DL
MCV RBC AUTO: 97 FL
MONOCYTES # BLD AUTO: 0.7 K/UL
MONOCYTES NFR BLD: 7 %
NEUTROPHILS # BLD AUTO: 6.3 K/UL
NEUTROPHILS NFR BLD: 64.8 %
NONHDLC SERPL-MCNC: 123 MG/DL
PLATELET # BLD AUTO: 173 K/UL
PMV BLD AUTO: 10.1 FL
POTASSIUM SERPL-SCNC: 3.9 MMOL/L
PROT SERPL-MCNC: 7.1 G/DL
RBC # BLD AUTO: 4.79 M/UL
SODIUM SERPL-SCNC: 140 MMOL/L
TRIGL SERPL-MCNC: 174 MG/DL
TSH SERPL DL<=0.005 MIU/L-ACNC: 1.64 UIU/ML
WBC # BLD AUTO: 9.78 K/UL

## 2017-08-19 PROCEDURE — 80061 LIPID PANEL: CPT

## 2017-08-19 PROCEDURE — 36415 COLL VENOUS BLD VENIPUNCTURE: CPT | Mod: PO

## 2017-08-19 PROCEDURE — 3077F SYST BP >= 140 MM HG: CPT | Mod: S$GLB,,, | Performed by: FAMILY MEDICINE

## 2017-08-19 PROCEDURE — 83036 HEMOGLOBIN GLYCOSYLATED A1C: CPT

## 2017-08-19 PROCEDURE — 84443 ASSAY THYROID STIM HORMONE: CPT

## 2017-08-19 PROCEDURE — 3008F BODY MASS INDEX DOCD: CPT | Mod: S$GLB,,, | Performed by: FAMILY MEDICINE

## 2017-08-19 PROCEDURE — 3044F HG A1C LEVEL LT 7.0%: CPT | Mod: S$GLB,,, | Performed by: FAMILY MEDICINE

## 2017-08-19 PROCEDURE — 85025 COMPLETE CBC W/AUTO DIFF WBC: CPT

## 2017-08-19 PROCEDURE — 80053 COMPREHEN METABOLIC PANEL: CPT

## 2017-08-19 PROCEDURE — 4010F ACE/ARB THERAPY RXD/TAKEN: CPT | Mod: S$GLB,,, | Performed by: FAMILY MEDICINE

## 2017-08-19 PROCEDURE — 99999 PR PBB SHADOW E&M-EST. PATIENT-LVL III: CPT | Mod: PBBFAC,,, | Performed by: FAMILY MEDICINE

## 2017-08-19 PROCEDURE — 3079F DIAST BP 80-89 MM HG: CPT | Mod: S$GLB,,, | Performed by: FAMILY MEDICINE

## 2017-08-19 PROCEDURE — 99214 OFFICE O/P EST MOD 30 MIN: CPT | Mod: S$GLB,,, | Performed by: FAMILY MEDICINE

## 2017-08-19 RX ORDER — GABAPENTIN 100 MG/1
100 CAPSULE ORAL 3 TIMES DAILY
Qty: 90 CAPSULE | Refills: 11 | Status: SHIPPED | OUTPATIENT
Start: 2017-08-19 | End: 2018-09-21 | Stop reason: SDUPTHER

## 2017-08-19 RX ORDER — BUPROPION HYDROCHLORIDE 100 MG/1
100 TABLET ORAL 2 TIMES DAILY
Qty: 60 TABLET | Refills: 11 | Status: SHIPPED | OUTPATIENT
Start: 2017-08-19 | End: 2018-04-27

## 2017-08-19 RX ORDER — LISINOPRIL AND HYDROCHLOROTHIAZIDE 12.5; 2 MG/1; MG/1
1 TABLET ORAL 2 TIMES DAILY
Qty: 180 TABLET | Refills: 3 | Status: SHIPPED | OUTPATIENT
Start: 2017-08-19 | End: 2017-12-15 | Stop reason: SDUPTHER

## 2017-08-19 RX ORDER — METFORMIN HYDROCHLORIDE 500 MG/1
TABLET, EXTENDED RELEASE ORAL
Qty: 120 TABLET | Refills: 3 | Status: SHIPPED | OUTPATIENT
Start: 2017-08-19 | End: 2017-08-22 | Stop reason: SDUPTHER

## 2017-08-19 RX ORDER — PIOGLITAZONEHYDROCHLORIDE 30 MG/1
30 TABLET ORAL DAILY
Qty: 90 TABLET | Refills: 3 | Status: SHIPPED | OUTPATIENT
Start: 2017-08-19 | End: 2018-09-21

## 2017-08-19 RX ORDER — ROSUVASTATIN CALCIUM 20 MG/1
20 TABLET, COATED ORAL DAILY
Qty: 90 TABLET | Refills: 1 | Status: SHIPPED | OUTPATIENT
Start: 2017-08-19 | End: 2017-09-25 | Stop reason: SDUPTHER

## 2017-08-19 NOTE — PATIENT INSTRUCTIONS
Diabetes (General Information)  Diabetes is a long-term health problem. It means your body does not make enough insulin. Or it may mean that your body cannot use the insulin it makes. Insulin is a hormone in your body. It lets blood sugar (glucose) reach the cells in your body. All of your cells need glucose for fuel.  When you have diabetes, the glucose in your blood builds up because it cannot get into the cells. This buildup is called high blood sugar (hyperglycemia).  Your blood sugar level depends on several things. It depends on what kind of food you eat and how much of it you eat. It also depends on how much exercise you get, and how much insulin you have in your body. Eating too much of the wrong kinds of food or not taking diabetes medicine on time can cause high blood sugar. Infections can cause high blood sugar even if you are taking medicines correctly.  These things can also cause low blood sugar:  · Missing meals  · Not eating enough food  · Taking too much diabetes medicine  Diabetes can cause serious problems over time if you do not get treated. These problems include heart disease, stroke, kidney failure, and blindness. They also include nerve pain or loss of feeling in your legs and feet, and gangrene of the feet. By keeping your blood sugar under control you can prevent or delay these problems.  Normal blood sugar levels are 80 to 100 before a meal and less than 180 in the 1 to 2 hours after a meal.  Home care  Follow these guidelines when caring for yourself at home:  · Follow the diet your healthcare provider gives you. Take insulin or other diabetes medicine exactly as told to.  · Watch your blood sugar as you are told to. Keep a log of your results. This will help your provider change your medicines to keep your blood sugar under control.  · Try to reach your ideal weight. You may be able to cut back on or not have to take diabetes medicine if you eat the right foods and get exercise.  · Do  not smoke. Smoking worsens the effects of diabetes on your circulation. You are much more likely to have a heart attack if you have diabetes and you smoke.  · Take good care of your feet. If you have lost feeling in your feet, you may not see an injury or infection. Check your feet and between your toes at least once a week.  · Wear a medical alert bracelet or necklace, or carry a card in your wallet that says you have diabetes. This will help healthcare providers give you the right care if you get very ill and cannot tell them that you have diabetes.  Sick day plan  If you get a cold, the flu, or a bacterial or viral infection, take these steps:  · Look at your diabetes sick plan and call your healthcare provider as you were told to. You may need to call your provider right away if:  ¨ Your blood sugar is above 240 while taking your diabetes medicine  ¨ Your urine ketone levels are above normal or high  ¨ You have been vomiting more than 6 hours  ¨ You have trouble breathing or your breath ha s a fruity smell  ¨ You have a high fever  ¨ You have a fever for several days and you are not getting better  ¨ You get light-headed and are sleepier than usual  · Keep taking your diabetes pills (oral medicine) even if you have been vomiting and are feeling sick. Call your provider right away because you may need insulin to lower your blood sugar until you recover from your illness.  · Keep taking your insulin even if you have been vomiting and are feeling sick. Call your provider right away to ask if you need to change your insulin dose. This will depend on your blood sugar results.  · Check your blood sugar every 2 to 4 hours, or at least 4 times a day.  · Check your ketones often. If you are vomiting and having diarrhea, watch them more often.  · Do not skip meals. Try to eat small meals on a regular schedule. Do this even if you do not feel like eating.  · Drink water or other liquids that do not have caffeine or  calories. This will keep you from getting dehydrated. If you are nauseated or vomiting, takes small sips every 5 minutes. To prevent dehydration try to drink a cup (8 ounces) of fluids every hour while you are awake.  General care  Always bring a source of fast-acting sugar with you in case you have symptoms of low blood sugar (below 70). At the first sign of low blood sugar, eat or drink 15 to 20 grams of fast-acting sugar to raise your blood sugar. Examples are:  · 3 to 4 glucose tablets. You can buy these at most [employer].  · 4 ounces (1/2 cup) of regular (not diet) soft drinks  · 4 ounces (1/2 cup) of any fruit juice  · 8 ounces (1 cup) of milk  · 5 to 6 pieces of hard candy  · 1 tablespoon of honey  Check your blood sugar 15 minutes after treating yourself. If it is still below 70, take 15 to 20 more grams of fast-acting sugar. Test again in 15 minutes. If it returns to normal (70 or above), eat a snack or meal to keep your blood sugar in a safe range. If it stays low, call your doctor or go to an emergency room.  Follow-up care  Follow-up with your healthcare provider, or as advised. For more information about diabetes, visit the American Diabetes Association website at www.diabetes.org or call 497-032-2058.  When to seek medical advice  Call your healthcare provider right away if you have any of these symptoms of high blood sugar:  · Frequent urination  · Dizziness  · Drowsiness  · Thirst  · Headache  · Nausea or vomiting  · Abdominal pain  · Eyesight changes  · Fast breathing  · Confusion or loss of consciousness  Also call your provider right away if you have any of these signs of low blood sugar:  · Fatigue  · Headache  · Shakes  · Excess sweating  · Hunger  · Feeling anxious or restless  · Eyesight changes  · Drowsiness  · Weakness  · Confusion or loss of consciousness  Call 911  Call for emergency help right away if any of these occur:  · Chest pain or shortness of breath  · Dizziness or  fainting  · Weakness of an arm or leg or one side of the face  · Trouble speaking or seeing   Date Last Reviewed: 6/1/2016  © 6518-1334 [employer]. [address] [place] [address], [place] [address]. All rights reserved. This information is not intended as a substitute for professional medical care. Always follow your healthcare professional's instructions.        Established High Blood Pressure    High blood pressure (hypertension) is a chronic disease. Often health care providers dont know what causes it. But it can be caused by certain health conditions and medicines.  If you have high blood pressure, you may not have any symptoms. If you do have symptoms, they may include headache, dizziness, changes in your vision, chest pain, and shortness of breath. But even without symptoms, high blood pressure thats not treated raises your risk for heart attack and stroke. High blood pressure is a serious health risk and shouldnt be ignored.  A blood pressure reading is made up of two numbers: a higher number over a lower number. The top number is the systolic pressure. The bottom number is the diastolic pressure. A normal blood pressure is less than 120 over less than 80.  High blood pressure is when either the top number is 140 or higher, or the bottom number is 90 or higher. This must be the result when taking your blood pressure a number of times. The blood pressures between normal and high are called prehypertension.  Home care  If you have high blood pressure, you should do what is listed below to lower your blood pressure. If you are taking medicines for high blood pressure, these methods may reduce or end your need for medicines in the future.  · Begin a weight-loss program if you are overweight.  · Cut back on how much salt you get in your diet. Heres how to do this:  ¨ Dont eat foods that have a lot of salt. These include olives, pickles, smoked meats, and salted potato chips.  ¨ Dont add salt to your  food at the table.  ¨ Use only small amounts of salt when cooking.  · Begin an exercise program. Talk with your health care provider about the type of exercise program that would be best for you. It doesn't have to be hard. Even brisk walking for 20 minutes 3 times a week is a good form of exercise.  · Dont take medicines that have heart stimulants. This includes many cold and sinus decongestant pills and sprays, as well as diet pills. Check the warnings about hypertension on the label. Stimulants such as amphetamine or cocaine could be lethal for someone with high blood pressure. Never take these.  · Limit how much caffeine you get in your diet. Switch to caffeine-free products.  · Stop smoking. If you are a long-time smoker, this can be hard. Enroll in a stop-smoking program to make it more likely that you will quit for good.  · Learn how to handle stress. This is an important part of any program to lower blood pressure. Learn about relaxation methods like meditation, yoga, or biofeedback.  · If your provider prescribed medicines, take them exactly as directed. Missing doses may cause your blood pressure get out of control.  · Consider buying an automatic blood pressure machine. You can get one of these at most pharmacies. Use this to watch your blood pressure at home. Give the results to your provider.  Follow-up care  You will need to make regular visits to your health care provider. This is to check your blood pressure and to make changes to your medicines. Make a follow-up appointment as directed.  When to seek medical advice  Call your health care provider right away if any of these occur:  · Chest pain or shortness of breath  · Severe headache  · Throbbing or rushing sound in the ears  · Nosebleed  · Sudden severe pain in your belly (abdomen)  · Extreme drowsiness, confusion, or fainting  · Dizziness or dizziness with a spinning sensation (vertigo)  · Weakness of an arm or leg or one side of the face  · You  have problems speaking or seeing   Date Last Reviewed: 11/25/2014  © 8816-9976 [employer]. [address] [place] [address], [place] [address]. All rights reserved. This information is not intended as a substitute for professional medical care. Always follow your healthcare professional's instructions.        Walking for Fitness  Fitness walking has something for everyone, even people who are already fit. Walking is one of the safest ways to condition your body aerobically. It can boost energy, help you lose weight, and reduce stress.    Physical benefits  · Walking strengthens your heart and lungs, and tones your muscles.  · When walking, your feet land with less impact than in other sports. This reduces chances of muscle, bone, and joint injury.  · Regular walking improves your cholesterol levels and lowers your risk of heart disease. And it helps you control your blood sugar if you have diabetes.  · Walking is a weight-bearing activity, which helps maintain bone density. This can help prevent osteoporosis.  Personal rewards  · Taking walks can help you relax and manage stress. And fitness walking may make you feel better about yourself.  · Walking can help you sleep better at night and make you less likely to be depressed.  · Regular walking may help maintain your memory as you get older.  · Walking is a great way to spend extra time with friends and family members. Be sure to invite your dog along!  Q&A about fitness walking  Q: Will walking keep me fit?  A: Yes. Regular walking at the right pace gives you all the benefits of other aerobic activities, such as jogging and swimming.  Q: Will walking help me lose weight and keep it off?  A: Yes. Per mile, walking can burn as many calories as jogging. Your health care provider can help work walking into your weight-loss plan.  Q: Is walking safe for my health?  A: Yes. Walking is safe if you have high blood pressure, diabetes, heart disease, or other  conditions. Talk to your health care provider before you start.  Date Last Reviewed: 5/9/2015  © 3387-2114 [employer], [employer]. [address] [place] [address], [place] [address]. All rights reserved. This information is not intended as a substitute for professional medical care. Always follow your healthcare professional's instructions.

## 2017-08-22 RX ORDER — METFORMIN HYDROCHLORIDE 500 MG/1
TABLET, EXTENDED RELEASE ORAL
Qty: 120 TABLET | Refills: 3 | Status: SHIPPED | OUTPATIENT
Start: 2017-08-22 | End: 2017-11-07 | Stop reason: SDUPTHER

## 2017-08-22 NOTE — PROGRESS NOTES
Subjective:       Patient ID: Jhonny Almazan is a 61 y.o. male.    Chief Complaint: Hip Pain; Hypertension; Fatigue; Diabetes; and Abdominal Pain    Hip Pain    The incident occurred more than 1 week ago. There was no injury mechanism. The pain is present in the left hip. The pain is at a severity of 5/10. The pain is mild. The pain has been fluctuating since onset. Associated symptoms include muscle weakness. Pertinent negatives include no inability to bear weight, loss of motion or loss of sensation. The symptoms are aggravated by movement. He has tried acetaminophen for the symptoms. The treatment provided mild relief.   Hypertension   Associated symptoms include shortness of breath.   Fatigue   Associated symptoms include abdominal pain, arthralgias, fatigue and joint swelling.   Diabetes   He presents for his follow-up diabetic visit. He has type 2 diabetes mellitus. His disease course has been fluctuating. There are no hypoglycemic associated symptoms. Associated symptoms include fatigue and foot paresthesias. Risk factors for coronary artery disease include diabetes mellitus, dyslipidemia, hypertension and male sex. Current diabetic treatment includes oral agent (monotherapy). His weight is fluctuating minimally. He is following a low fat/cholesterol diet. Meal planning includes avoidance of concentrated sweets. He never participates in exercise. His breakfast blood glucose range is generally 180-200 mg/dl. An ACE inhibitor/angiotensin II receptor blocker is being taken. Eye exam is not current.   Abdominal Pain   This is a recurrent problem. The onset quality is undetermined. The problem has been gradually improving. The pain is located in the generalized abdominal region. Associated symptoms include arthralgias.     Review of Systems   Constitutional: Positive for fatigue.   Respiratory: Positive for shortness of breath.         He has progressive dyspnea at exertion, for the last 3 weeks,  No more than  30- 40 steps.denies chest pain,nor cough, nor wheezing.   Cardiovascular: Positive for leg swelling.   Gastrointestinal: Positive for abdominal pain.        Colicky pain with fatty meals, np nausea, infrequent.   Musculoskeletal: Positive for arthralgias, back pain and joint swelling.       Patient Active Problem List   Diagnosis    Nicotine dependence    Diabetes mellitus type 2, uncontrolled    Hypertension associated with diabetes    Hyperlipidemia associated with type 2 diabetes mellitus    Orthostatic hypotension       Objective:      Physical Exam   Constitutional: He is oriented to person, place, and time. He appears well-developed and well-nourished.   Cardiovascular: Normal rate, regular rhythm and normal heart sounds.    Pulses:       Dorsalis pedis pulses are 3+ on the right side, and 3+ on the left side.        Posterior tibial pulses are 3+ on the right side, and 3+ on the left side.   Pulmonary/Chest: Effort normal and breath sounds normal.   Distant lung sounds   Musculoskeletal: He exhibits no edema.        Lumbar back: He exhibits decreased range of motion, tenderness, bony tenderness, deformity and pain.        Right foot: There is normal range of motion and no deformity.   Feet:   Right Foot:   Protective Sensation: 6 sites tested. 6 sites sensed.   Skin Integrity: Negative for ulcer.   Left Foot:   Protective Sensation: 6 sites tested. 6 sites sensed.   Skin Integrity: Negative for ulcer or blister.   Neurological: He is alert and oriented to person, place, and time.   Skin: Skin is warm and dry.   Psychiatric: He has a normal mood and affect.   Nursing note and vitals reviewed.      Lab Results   Component Value Date    WBC 9.78 08/19/2017    HGB 15.2 08/19/2017    HCT 46.2 08/19/2017     08/19/2017    CHOL 172 08/19/2017    TRIG 174 (H) 08/19/2017    HDL 49 08/19/2017    ALT 21 08/19/2017    AST 18 08/19/2017     08/19/2017    K 3.9 08/19/2017     08/19/2017     CREATININE 0.8 08/19/2017    BUN 14 08/19/2017    CO2 24 08/19/2017    TSH 1.641 08/19/2017    PSA 0.37 07/23/2010    HGBA1C 6.0 (H) 08/19/2017     The 10-year ASCVD risk score (Tamara MUÑIZ Jr., et al., 2013) is: 44.3%    Values used to calculate the score:      Age: 61 years      Sex: Male      Is Non- : No      Diabetic: Yes      Tobacco smoker: Yes      Systolic Blood Pressure: 170 mmHg      Is BP treated: Yes      HDL Cholesterol: 49 mg/dL      Total Cholesterol: 172 mg/dL    Assessment:       1. Type 2 diabetes mellitus with diabetic neuropathy, without long-term current use of insulin    2. Uncontrolled type 2 diabetes mellitus with complication, without long-term current use of insulin    3. Hyperlipidemia associated with type 2 diabetes mellitus        Plan:       Type 2 diabetes mellitus with diabetic neuropathy, without long-term current use of insulin  -     Lipid panel; Future; Expected date: 08/19/2017  -     Comprehensive metabolic panel; Future; Expected date: 08/19/2017  -     Hemoglobin A1c; Future; Expected date: 08/19/2017  -     gabapentin (NEURONTIN) 100 MG capsule; Take 1 capsule (100 mg total) by mouth 3 (three) times daily.  Dispense: 90 capsule; Refill: 11  -     folic acid-vit B6-vit B12 2.5-25-2 mg (FOLBIC OR EQUIV) 2.5-25-2 mg Tab; Take 1 tablet by mouth once daily.  Dispense: 90 tablet; Refill: 4  -     Exercise stress echo with color flow; Future  -     MICROALBUMIN / CREATININE RATIO URINE; Future; Expected date: 08/19/2017    Uncontrolled type 2 diabetes mellitus with complication, without long-term current use of insulin  -     lisinopril-hydrochlorothiazide (PRINZIDE,ZESTORETIC) 20-12.5 mg per tablet; Take 1 tablet by mouth 2 (two) times daily.  Dispense: 180 tablet; Refill: 3  -     Discontinue: metformin (GLUCOPHAGE-XR) 500 MG 24 hr tablet; TAKE 1 TABLET BY MOUTH TWICE DAILY FOR 1 WEEK THEN 2 TABLETS TWICE DAILY  Dispense: 120 tablet; Refill: 3  -     Lipid  panel; Future; Expected date: 08/19/2017  -     Comprehensive metabolic panel; Future; Expected date: 08/19/2017  -     Hemoglobin A1c; Future; Expected date: 08/19/2017  -     TSH; Future; Expected date: 08/19/2017  -     CBC auto differential; Future; Expected date: 08/19/2017  -     gabapentin (NEURONTIN) 100 MG capsule; Take 1 capsule (100 mg total) by mouth 3 (three) times daily.  Dispense: 90 capsule; Refill: 11  -     folic acid-vit B6-vit B12 2.5-25-2 mg (FOLBIC OR EQUIV) 2.5-25-2 mg Tab; Take 1 tablet by mouth once daily.  Dispense: 90 tablet; Refill: 4  -     Exercise stress echo with color flow; Future  -     MICROALBUMIN / CREATININE RATIO URINE; Future; Expected date: 08/19/2017    Hyperlipidemia associated with type 2 diabetes mellitus  -     rosuvastatin (CRESTOR) 20 MG tablet; Take 1 tablet (20 mg total) by mouth once daily.  Dispense: 90 tablet; Refill: 1    Other orders  -     pioglitazone (ACTOS) 30 MG tablet; Take 1 tablet (30 mg total) by mouth once daily.  Dispense: 90 tablet; Refill: 3  -     buPROPion (WELLBUTRIN) 100 MG tablet; Take 1 tablet (100 mg total) by mouth 2 (two) times daily.  Dispense: 60 tablet; Refill: 11      Patient readiness: acceptance and barriers:readiness and social stressors    During the course of the visit the patient was educated and counseled about the following:     Diabetes:  Discussed general issues about diabetes pathophysiology and management.  Addressed ADA diet.  Suggested low cholesterol diet.  Encouraged aerobic exercise.  Discussed foot care.  Reminded to get yearly retinal exam.  Discussed ways to avoid symptomatic hypoglycemia.  Continued metformin; see  medication orders.  Started Actos; see medication orders.  Hypertension:   Dietary sodium restriction.  Regular aerobic exercise.  Obesity:   General weight loss/lifestyle modification strategies discussed (elicit support from others; identify saboteurs; non-food rewards, etc).  Diet interventions: low  calorie (1000 kCal/d) deficit diet and qualitative changes (increase low-fat,  high-fiber foods).  Regular aerobic exercise program discussed.  Medication: bulk-forming agents.    Goals: Diabetes: Maintain Hemoglobin A1C below 7, Hypertension: Reduce Blood Pressure and Obesity: Reduce calorie intake and BMI    Did patient meet goals/outcomes: No    The following self management tools provided: blood pressure log  blood glucose log  excercise log    Patient Instructions (the written plan) was given to the patient/family.     Time spent with patient: 45 minutes          30-minute visit. 20 minutes spent counseling patient on diet, exercise, and weight loss.  This has been fully explained to the patient, who indicates understanding.

## 2017-08-29 ENCOUNTER — CLINICAL SUPPORT (OUTPATIENT)
Dept: FAMILY MEDICINE | Facility: CLINIC | Age: 62
End: 2017-08-29
Payer: COMMERCIAL

## 2017-08-29 VITALS — DIASTOLIC BLOOD PRESSURE: 100 MMHG | HEART RATE: 84 BPM | SYSTOLIC BLOOD PRESSURE: 182 MMHG

## 2017-08-29 DIAGNOSIS — I10 ESSENTIAL HYPERTENSION: Primary | ICD-10-CM

## 2017-08-29 PROCEDURE — 99999 PR PBB SHADOW E&M-EST. PATIENT-LVL II: CPT | Mod: PBBFAC,,, | Performed by: FAMILY MEDICINE

## 2017-08-29 RX ORDER — HYDRALAZINE HYDROCHLORIDE 25 MG/1
25 TABLET, FILM COATED ORAL EVERY 12 HOURS
Qty: 60 TABLET | Refills: 11 | Status: SHIPPED | OUTPATIENT
Start: 2017-08-29 | End: 2017-12-15

## 2017-08-29 NOTE — PROGRESS NOTES
Patient present for nurse blood pressure check. Denies dizziness, blurred vision, or pain of any kind. Verbalizes taking all medications as prescribed. Blood pressure assessed per robot with result of 181/101 , Pulse 84.  Blood pressure reassessed manually with result of 182/100. Dr. Whitehead notified while patient in office. New orders added for Hydralazine 25 mg twice daily. Patient advised to keep blood pressure log, and return in one week for follow up nurse blood pressure check. Appointment scheduled on 9-5-17. Patient agreed to appointment date and time.

## 2017-09-23 DIAGNOSIS — E78.5 HYPERLIPIDEMIA ASSOCIATED WITH TYPE 2 DIABETES MELLITUS: ICD-10-CM

## 2017-09-23 DIAGNOSIS — E11.69 HYPERLIPIDEMIA ASSOCIATED WITH TYPE 2 DIABETES MELLITUS: ICD-10-CM

## 2017-09-25 RX ORDER — ROSUVASTATIN CALCIUM 20 MG/1
TABLET, COATED ORAL
Qty: 90 TABLET | Refills: 1 | Status: SHIPPED | OUTPATIENT
Start: 2017-09-25 | End: 2018-01-15 | Stop reason: SDUPTHER

## 2017-09-29 ENCOUNTER — TELEPHONE (OUTPATIENT)
Dept: FAMILY MEDICINE | Facility: CLINIC | Age: 62
End: 2017-09-29

## 2017-09-29 NOTE — TELEPHONE ENCOUNTER
Appointment for stress echo rescheduled as requested to 10-13-17 at 2 pm. Call placed to patient. No answer received. Left message to call office.

## 2017-09-29 NOTE — TELEPHONE ENCOUNTER
----- Message from Dahlia Burrows sent at 2017 12:18 PM CDT -----  Contact: Self  Patient called to reschedule his Exercise Stress Echo that was scheduled for today for 1pm and wanted it rescheduled two weeks from today.  When I tried to move to 10/13 it said the orders  on 10/1.  Patient has had to reschedule a few times because he has been called into work on his days off.  Please call [phone] (home).  Thanks

## 2017-10-03 NOTE — TELEPHONE ENCOUNTER
Patient notified of appointment date and time. Verbalized understanding. States he has had to change this appointment several times due to being called in for work but will attempt to attend this appointment.

## 2017-10-13 ENCOUNTER — CLINICAL SUPPORT (OUTPATIENT)
Dept: CARDIOLOGY | Facility: CLINIC | Age: 62
End: 2017-10-13
Payer: COMMERCIAL

## 2017-10-13 DIAGNOSIS — E11.40 TYPE 2 DIABETES MELLITUS WITH DIABETIC NEUROPATHY, WITHOUT LONG-TERM CURRENT USE OF INSULIN: ICD-10-CM

## 2017-10-13 LAB
DIASTOLIC DYSFUNCTION: NO
ESTIMATED PA SYSTOLIC PRESSURE: 10.76
RETIRED EF AND QEF - SEE NOTES: 60 (ref 55–65)
TRICUSPID VALVE REGURGITATION: NORMAL

## 2017-10-13 PROCEDURE — 93351 STRESS TTE COMPLETE: CPT | Mod: S$GLB,,, | Performed by: INTERNAL MEDICINE

## 2017-10-13 PROCEDURE — 93325 DOPPLER ECHO COLOR FLOW MAPG: CPT | Mod: S$GLB,,, | Performed by: INTERNAL MEDICINE

## 2017-10-13 PROCEDURE — 93320 DOPPLER ECHO COMPLETE: CPT | Mod: S$GLB,,, | Performed by: INTERNAL MEDICINE

## 2017-10-16 NOTE — PROGRESS NOTES
Subjective:       Patient ID: Jhonny Almazan is a 61 y.o. male.    Chief Complaint: No chief complaint on file.    Diabetes   He presents for his follow-up diabetic visit. He has type 2 diabetes mellitus. Pertinent negatives for hypoglycemia include no dizziness or headaches. Pertinent negatives for diabetes include no blurred vision, no chest pain, no fatigue, no foot paresthesias, no foot ulcerations, no polydipsia, no polyphagia, no visual change and no weakness. Symptoms are improving.     Review of Systems   Constitutional: Negative for fatigue and unexpected weight change.   Eyes: Negative for blurred vision.   Respiratory: Negative for chest tightness and shortness of breath.    Cardiovascular: Negative for chest pain, palpitations and leg swelling.   Gastrointestinal: Negative for abdominal pain.   Endocrine: Negative for polydipsia and polyphagia.   Musculoskeletal: Negative for arthralgias.   Neurological: Negative for dizziness, syncope, weakness, light-headedness and headaches.       Patient Active Problem List   Diagnosis    Nicotine dependence    Diabetes mellitus type 2, uncontrolled    Hypertension associated with diabetes    Hyperlipidemia associated with type 2 diabetes mellitus    Orthostatic hypotension       Objective:      Physical Exam   Constitutional: He is oriented to person, place, and time. He appears well-developed and well-nourished. No distress.   HENT:   Head: Normocephalic and atraumatic.   Right Ear: External ear normal.   Left Ear: External ear normal.   Nose: Nose normal.   Mouth/Throat: Oropharynx is clear and moist. No oropharyngeal exudate.   Eyes: Conjunctivae and EOM are normal. Pupils are equal, round, and reactive to light. Right eye exhibits no discharge. Left eye exhibits no discharge. No scleral icterus.   Neck: Normal range of motion. Neck supple. No JVD present. No tracheal deviation present. No thyromegaly present.   Cardiovascular: Normal rate, normal heart  sounds and intact distal pulses.    No murmur heard.  Pulmonary/Chest: Effort normal and breath sounds normal. No respiratory distress. He has no wheezes. He has no rales.   Abdominal: Soft. Bowel sounds are normal. He exhibits no distension and no mass. There is no tenderness. There is no rebound and no guarding.   Musculoskeletal: He exhibits no edema or tenderness.          Lymphadenopathy:     He has no cervical adenopathy.   Neurological: He is alert and oriented to person, place, and time. No cranial nerve deficit. Coordination normal.   Skin: Skin is warm and dry. No rash noted. He is not diaphoretic. No erythema. No pallor.   Psychiatric: He has a normal mood and affect. His behavior is normal. Judgment and thought content normal.   Vitals reviewed.      Lab Results   Component Value Date    WBC 9.78 08/19/2017    HGB 15.2 08/19/2017    HCT 46.2 08/19/2017     08/19/2017    CHOL 172 08/19/2017    TRIG 174 (H) 08/19/2017    HDL 49 08/19/2017    ALT 21 08/19/2017    AST 18 08/19/2017     08/19/2017    K 3.9 08/19/2017     08/19/2017    CREATININE 0.8 08/19/2017    BUN 14 08/19/2017    CO2 24 08/19/2017    TSH 1.641 08/19/2017    PSA 0.37 07/23/2010    HGBA1C 6.0 (H) 08/19/2017     The 10-year ASCVD risk score (Tamaralouis MUÑIZ Jr., et al., 2013) is: 48.4%    Values used to calculate the score:      Age: 61 years      Sex: Male      Is Non- : No      Diabetic: Yes      Tobacco smoker: Yes      Systolic Blood Pressure: 182 mmHg      Is BP treated: Yes      HDL Cholesterol: 49 mg/dL      Total Cholesterol: 172 mg/dL    Assessment:       1. Uncontrolled type 2 diabetes mellitus with complication, without long-term current use of insulin    2. Type 2 diabetes mellitus with diabetic neuropathy, without long-term current use of insulin        Plan:       Uncontrolled type 2 diabetes mellitus with complication, without long-term current use of insulin  -     Exercise stress echo with  color flow    Type 2 diabetes mellitus with diabetic neuropathy, without long-term current use of insulin  -     Exercise stress echo with color flow      Patient readiness: acceptance and barriers:none    During the course of the visit the patient was educated and counseled about the following:     Diabetes:  Discussed general issues about diabetes pathophysiology and management.  Hypertension:   Dietary sodium restriction.  Regular aerobic exercise.  Check blood pressures daily and record.  Follow up: 4 months and as needed.    Goals: Diabetes: Maintain Hemoglobin A1C below 7, Hypertension: Reduce Blood Pressure and Obesity: Reduce calorie intake and BMI    Did patient meet goals/outcomes: Yes    The following self management tools provided: blood pressure log  blood glucose log  excercise log    Patient Instructions (the written plan) was given to the patient/family.     Time spent with patient: 30 minutes

## 2017-10-17 ENCOUNTER — TELEPHONE (OUTPATIENT)
Dept: FAMILY MEDICINE | Facility: CLINIC | Age: 62
End: 2017-10-17

## 2017-10-17 NOTE — TELEPHONE ENCOUNTER
Called pt regarding exercise stress echo results per Dr. Whitehead. Left voicemail with return number.     ----- Message from Joey Whitehead MD sent at 10/13/2017  5:42 PM CDT -----  No exercise induced wall motion abnormalities were identified.

## 2017-10-30 ENCOUNTER — TELEPHONE (OUTPATIENT)
Dept: FAMILY MEDICINE | Facility: CLINIC | Age: 62
End: 2017-10-30

## 2017-10-30 NOTE — TELEPHONE ENCOUNTER
----- Message from Claudia Green sent at 10/20/2017  2:51 PM CDT -----  Please call patient in regards to echo results [phone] (home)

## 2017-11-07 RX ORDER — METFORMIN HYDROCHLORIDE 500 MG/1
1000 TABLET, EXTENDED RELEASE ORAL 2 TIMES DAILY
Qty: 360 TABLET | Refills: 0 | Status: SHIPPED | OUTPATIENT
Start: 2017-11-07 | End: 2018-03-15 | Stop reason: SDUPTHER

## 2017-11-07 NOTE — TELEPHONE ENCOUNTER
----- Message from Verona Nickerson sent at 11/7/2017  2:23 PM CST -----  Contact: Chantel with [facility] is checking the status of a refill for patient metformin (GLUCOPHAGE-XR) 500 MG 24 hr tablet.  Patient would like a 90 day prescription.  Call Back#[phone]  Thanks     [facility]/pharmacy #[id number] - [place] - [address]  [address] [place] [address]  Phone: [phone] Fax: [phone]

## 2017-11-07 NOTE — TELEPHONE ENCOUNTER
Spoke to Chantel with [facility] who states patient is requesting Metformin be e-scribed as a 90 day supply because it would be easier for the patient to only have to come to pharmacy every 3 months. Please advise.   LR--8-22-17  LOV--8-19-17  FOV--11-28-17

## 2017-11-16 ENCOUNTER — TELEPHONE (OUTPATIENT)
Dept: SMOKING CESSATION | Facility: CLINIC | Age: 62
End: 2017-11-16

## 2017-11-20 RX ORDER — GLIPIZIDE 10 MG/1
TABLET, FILM COATED, EXTENDED RELEASE ORAL
Qty: 90 TABLET | Refills: 2 | Status: SHIPPED | OUTPATIENT
Start: 2017-11-20 | End: 2018-04-27 | Stop reason: SDUPTHER

## 2017-11-22 ENCOUNTER — DOCUMENTATION ONLY (OUTPATIENT)
Dept: FAMILY MEDICINE | Facility: CLINIC | Age: 62
End: 2017-11-22

## 2017-11-22 NOTE — PROGRESS NOTES
Pre-Visit Chart Review  For Appointment Scheduled on 11/28/17    Health Maintenance Due   Topic Date Due    TETANUS VACCINE  12/02/1971    Pneumococcal PPSV23 (Medium Risk) (1) 12/02/1971    Zoster Vaccine  12/02/2013    Colonoscopy  07/18/2015    Influenza Vaccine  08/01/2017

## 2017-11-28 ENCOUNTER — TELEPHONE (OUTPATIENT)
Dept: SMOKING CESSATION | Facility: CLINIC | Age: 62
End: 2017-11-28

## 2017-12-01 ENCOUNTER — TELEPHONE (OUTPATIENT)
Dept: SMOKING CESSATION | Facility: CLINIC | Age: 62
End: 2017-12-01

## 2017-12-15 ENCOUNTER — LAB VISIT (OUTPATIENT)
Dept: LAB | Facility: HOSPITAL | Age: 62
End: 2017-12-15
Attending: PHYSICIAN ASSISTANT
Payer: COMMERCIAL

## 2017-12-15 ENCOUNTER — OFFICE VISIT (OUTPATIENT)
Dept: FAMILY MEDICINE | Facility: CLINIC | Age: 62
End: 2017-12-15
Payer: COMMERCIAL

## 2017-12-15 VITALS
SYSTOLIC BLOOD PRESSURE: 168 MMHG | DIASTOLIC BLOOD PRESSURE: 100 MMHG | BODY MASS INDEX: 30.87 KG/M2 | HEART RATE: 91 BPM | TEMPERATURE: 98 F | RESPIRATION RATE: 16 BRPM | WEIGHT: 261.44 LBS | HEIGHT: 77 IN

## 2017-12-15 DIAGNOSIS — I15.2 HYPERTENSION ASSOCIATED WITH DIABETES: Primary | ICD-10-CM

## 2017-12-15 DIAGNOSIS — E66.9 OBESITY (BMI 30.0-34.9): ICD-10-CM

## 2017-12-15 DIAGNOSIS — E11.59 HYPERTENSION ASSOCIATED WITH DIABETES: Primary | ICD-10-CM

## 2017-12-15 DIAGNOSIS — R63.5 WEIGHT GAIN: ICD-10-CM

## 2017-12-15 DIAGNOSIS — E78.5 HYPERLIPIDEMIA ASSOCIATED WITH TYPE 2 DIABETES MELLITUS: ICD-10-CM

## 2017-12-15 DIAGNOSIS — I15.2 HYPERTENSION ASSOCIATED WITH DIABETES: ICD-10-CM

## 2017-12-15 DIAGNOSIS — F17.210 CIGARETTE NICOTINE DEPENDENCE WITHOUT COMPLICATION: ICD-10-CM

## 2017-12-15 DIAGNOSIS — E11.69 HYPERLIPIDEMIA ASSOCIATED WITH TYPE 2 DIABETES MELLITUS: ICD-10-CM

## 2017-12-15 DIAGNOSIS — E11.59 HYPERTENSION ASSOCIATED WITH DIABETES: ICD-10-CM

## 2017-12-15 DIAGNOSIS — G62.9 PERIPHERAL POLYNEUROPATHY: ICD-10-CM

## 2017-12-15 LAB
ALBUMIN SERPL BCP-MCNC: 3.7 G/DL
ALP SERPL-CCNC: 72 U/L
ALT SERPL W/O P-5'-P-CCNC: 32 U/L
ANION GAP SERPL CALC-SCNC: 8 MMOL/L
AST SERPL-CCNC: 23 U/L
BASOPHILS # BLD AUTO: 0.09 K/UL
BASOPHILS NFR BLD: 1.3 %
BILIRUB SERPL-MCNC: 0.6 MG/DL
BNP SERPL-MCNC: 28 PG/ML
BUN SERPL-MCNC: 11 MG/DL
CALCIUM SERPL-MCNC: 10 MG/DL
CHLORIDE SERPL-SCNC: 106 MMOL/L
CO2 SERPL-SCNC: 28 MMOL/L
CREAT SERPL-MCNC: 0.8 MG/DL
DIFFERENTIAL METHOD: ABNORMAL
EOSINOPHIL # BLD AUTO: 0.2 K/UL
EOSINOPHIL NFR BLD: 3.4 %
ERYTHROCYTE [DISTWIDTH] IN BLOOD BY AUTOMATED COUNT: 12.6 %
EST. GFR  (AFRICAN AMERICAN): >60 ML/MIN/1.73 M^2
EST. GFR  (NON AFRICAN AMERICAN): >60 ML/MIN/1.73 M^2
ESTIMATED AVG GLUCOSE: 146 MG/DL
GLUCOSE SERPL-MCNC: 194 MG/DL
HBA1C MFR BLD HPLC: 6.7 %
HCT VFR BLD AUTO: 42.8 %
HGB BLD-MCNC: 14.2 G/DL
IMM GRANULOCYTES # BLD AUTO: 0.02 K/UL
IMM GRANULOCYTES NFR BLD AUTO: 0.3 %
LYMPHOCYTES # BLD AUTO: 1.9 K/UL
LYMPHOCYTES NFR BLD: 28.3 %
MCH RBC QN AUTO: 31.8 PG
MCHC RBC AUTO-ENTMCNC: 33.2 G/DL
MCV RBC AUTO: 96 FL
MONOCYTES # BLD AUTO: 0.6 K/UL
MONOCYTES NFR BLD: 8.3 %
NEUTROPHILS # BLD AUTO: 4 K/UL
NEUTROPHILS NFR BLD: 58.4 %
NRBC BLD-RTO: 0 /100 WBC
PLATELET # BLD AUTO: 180 K/UL
PMV BLD AUTO: 10 FL
POTASSIUM SERPL-SCNC: 4.5 MMOL/L
PROT SERPL-MCNC: 7 G/DL
RBC # BLD AUTO: 4.47 M/UL
SODIUM SERPL-SCNC: 142 MMOL/L
TSH SERPL DL<=0.005 MIU/L-ACNC: 0.96 UIU/ML
WBC # BLD AUTO: 6.76 K/UL

## 2017-12-15 PROCEDURE — 99999 PR PBB SHADOW E&M-EST. PATIENT-LVL IV: CPT | Mod: PBBFAC,,, | Performed by: PHYSICIAN ASSISTANT

## 2017-12-15 PROCEDURE — 80053 COMPREHEN METABOLIC PANEL: CPT

## 2017-12-15 PROCEDURE — 84443 ASSAY THYROID STIM HORMONE: CPT

## 2017-12-15 PROCEDURE — 83880 ASSAY OF NATRIURETIC PEPTIDE: CPT

## 2017-12-15 PROCEDURE — 36415 COLL VENOUS BLD VENIPUNCTURE: CPT | Mod: PO

## 2017-12-15 PROCEDURE — 85025 COMPLETE CBC W/AUTO DIFF WBC: CPT

## 2017-12-15 PROCEDURE — 99214 OFFICE O/P EST MOD 30 MIN: CPT | Mod: S$GLB,,, | Performed by: PHYSICIAN ASSISTANT

## 2017-12-15 PROCEDURE — 83036 HEMOGLOBIN GLYCOSYLATED A1C: CPT

## 2017-12-15 RX ORDER — LISINOPRIL AND HYDROCHLOROTHIAZIDE 12.5; 2 MG/1; MG/1
1 TABLET ORAL 2 TIMES DAILY
Qty: 180 TABLET | Refills: 0 | Status: SHIPPED | OUTPATIENT
Start: 2017-12-15 | End: 2018-03-11 | Stop reason: SDUPTHER

## 2017-12-15 RX ORDER — HYDRALAZINE HYDROCHLORIDE 25 MG/1
25 TABLET, FILM COATED ORAL EVERY 12 HOURS
Qty: 180 TABLET | Refills: 0 | Status: SHIPPED | OUTPATIENT
Start: 2017-12-15 | End: 2017-12-22 | Stop reason: SDUPTHER

## 2017-12-15 NOTE — PROGRESS NOTES
Subjective:       Patient ID: Jhonny Almazan is a 62 y.o. male.    Chief Complaint: Follow-up (4mth f/u )    Hypertension   This is a chronic problem. The current episode started more than 1 month ago. The problem has been gradually worsening since onset. The problem is uncontrolled. Pertinent negatives include no anxiety, blurred vision, chest pain, headaches, orthopnea, palpitations, peripheral edema, shortness of breath or sweats. There are no associated agents to hypertension. Risk factors for coronary artery disease include diabetes mellitus, smoking/tobacco exposure, male gender and obesity. Past treatments include ACE inhibitors, alpha 1 blockers, diuretics and direct vasodilators. The current treatment provides mild improvement. There is no history of renovascular disease. There is no history of chronic renal disease, hyperparathyroidism, a hypertension causing med or pheochromocytoma.     Review of Systems   Constitutional: Negative for activity change, appetite change and fever.   HENT: Negative for postnasal drip, rhinorrhea and sinus pressure.    Eyes: Negative for blurred vision and visual disturbance.   Respiratory: Negative for cough and shortness of breath.    Cardiovascular: Negative for chest pain, palpitations and orthopnea.   Gastrointestinal: Negative for abdominal distention and abdominal pain.   Genitourinary: Negative for difficulty urinating and dysuria.   Musculoskeletal: Negative for arthralgias and myalgias.   Neurological: Negative for headaches.   Hematological: Negative for adenopathy.   Psychiatric/Behavioral: The patient is not nervous/anxious.        Objective:      Physical Exam   Constitutional: He is oriented to person, place, and time.   HENT:   Mouth/Throat: Oropharynx is clear and moist. No oropharyngeal exudate.   Eyes: Conjunctivae are normal. Pupils are equal, round, and reactive to light.   Cardiovascular: Normal rate and regular rhythm.    Pulmonary/Chest: Effort  normal and breath sounds normal. He has no wheezes.   Abdominal: Soft. Bowel sounds are normal. He exhibits no distension. There is no tenderness.   Musculoskeletal: He exhibits no edema.   Lymphadenopathy:     He has no cervical adenopathy.   Neurological: He is alert and oriented to person, place, and time.   Skin: No erythema.   Psychiatric: His behavior is normal.       Assessment:       1. Hypertension associated with diabetes    2. Hyperlipidemia associated with type 2 diabetes mellitus    3. Cigarette nicotine dependence without complication    4. Uncontrolled type 2 diabetes mellitus with complication, without long-term current use of insulin    5. Peripheral polyneuropathy    6. Weight gain    7. Essential hypertension        Plan:       Jhonny was seen today for follow-up.    Diagnoses and all orders for this visit:    Hypertension associated with diabetes  -     Brain natriuretic peptide; Future  -     Comprehensive metabolic panel; Future  -     CBC auto differential; Future  -     Hemoglobin A1c; Future  -     TSH; Future  -     hydrALAZINE (APRESOLINE) 25 MG tablet; Take 1 tablet (25 mg total) by mouth every 12 (twelve) hours.  -     lisinopril-hydrochlorothiazide (PRINZIDE,ZESTORETIC) 20-12.5 mg per tablet; Take 1 tablet by mouth 2 (two) times daily.  Patient had stopped his prinzide with the addition of hydralazine. Patient advised that he should be taking both medications  Low salt diet  Monitor and record blood pressure  Follow up in 1 week or sooner if needed.   Hyperlipidemia associated with type 2 diabetes mellitus  -     Comprehensive metabolic panel; Future  -     Hemoglobin A1c; Future  High fiber diet  Increase exercise  Cigarette nicotine dependence without complication  Patient encouraged to quit smoking  Uncontrolled type 2 diabetes mellitus with complication, without long-term current use of insulin  -     Comprehensive metabolic panel; Future  -     CBC auto differential; Future  -      Hemoglobin A1c; Future  -     lisinopril-hydrochlorothiazide (PRINZIDE,ZESTORETIC) 20-12.5 mg per tablet; Take 1 tablet by mouth 2 (two) times daily.  Blood work today  Continue current medication  Diabetic diet  Peripheral polyneuropathy  Continue gabapentin  Weight gain  -     Brain natriuretic peptide; Future  -     TSH; Future    Patient readiness: acceptance and barriers:none    During the course of the visit the patient was educated and counseled about the following:     Diabetes:  Discussed general issues about diabetes pathophysiology and management.  Addressed ADA diet.  Suggested low cholesterol diet.  Encouraged aerobic exercise.  Discussed foot care.  Reminded to get yearly retinal exam.  Hypertension:   Medication: see above.  Dietary sodium restriction.  Regular aerobic exercise.  follow up in 1 week  Obesity:   General weight loss/lifestyle modification strategies discussed (elicit support from others; identify saboteurs; non-food rewards, etc).  Informal exercise measures discussed, e.g. taking stairs instead of elevator.  Regular aerobic exercise program discussed.    Goals: Diabetes: Maintain Hemoglobin A1C below 7, Hypertension: Reduce Blood Pressure and Obesity: Reduce calorie intake and BMI    Did patient meet goals/outcomes: No    The following self management tools provided: blood pressure log    Patient Instructions (the written plan) was given to the patient/family.     Time spent with patient: 30 minutes    Barriers to medications present (no )    Adverse reactions to current medications (no)    Over the counter medications reviewed (Yes)

## 2017-12-17 ENCOUNTER — TELEPHONE (OUTPATIENT)
Dept: FAMILY MEDICINE | Facility: CLINIC | Age: 62
End: 2017-12-17

## 2017-12-17 DIAGNOSIS — I15.2 HYPERTENSION ASSOCIATED WITH DIABETES: ICD-10-CM

## 2017-12-17 DIAGNOSIS — E11.69 HYPERLIPIDEMIA ASSOCIATED WITH TYPE 2 DIABETES MELLITUS: Primary | ICD-10-CM

## 2017-12-17 DIAGNOSIS — E11.59 HYPERTENSION ASSOCIATED WITH DIABETES: ICD-10-CM

## 2017-12-17 DIAGNOSIS — E78.5 HYPERLIPIDEMIA ASSOCIATED WITH TYPE 2 DIABETES MELLITUS: Primary | ICD-10-CM

## 2017-12-19 ENCOUNTER — DOCUMENTATION ONLY (OUTPATIENT)
Dept: FAMILY MEDICINE | Facility: CLINIC | Age: 62
End: 2017-12-19

## 2017-12-19 NOTE — PROGRESS NOTES
Pre-Visit Chart Review  For Appointment Scheduled on 12/22/17    Health Maintenance Due   Topic Date Due    TETANUS VACCINE  12/02/1971    Pneumococcal PPSV23 (Medium Risk) (1) 12/02/1971    Zoster Vaccine  12/02/2013    Colonoscopy  07/18/2015    Influenza Vaccine  08/01/2017    Eye Exam  12/28/2017

## 2017-12-22 ENCOUNTER — OFFICE VISIT (OUTPATIENT)
Dept: FAMILY MEDICINE | Facility: CLINIC | Age: 62
End: 2017-12-22
Payer: COMMERCIAL

## 2017-12-22 VITALS
RESPIRATION RATE: 14 BRPM | HEIGHT: 77 IN | TEMPERATURE: 98 F | DIASTOLIC BLOOD PRESSURE: 90 MMHG | SYSTOLIC BLOOD PRESSURE: 146 MMHG | WEIGHT: 258.38 LBS | HEART RATE: 97 BPM | BODY MASS INDEX: 30.51 KG/M2

## 2017-12-22 DIAGNOSIS — Z23 NEED FOR PNEUMOCOCCAL VACCINATION: ICD-10-CM

## 2017-12-22 DIAGNOSIS — I15.2 HYPERTENSION ASSOCIATED WITH DIABETES: ICD-10-CM

## 2017-12-22 DIAGNOSIS — E11.59 HYPERTENSION ASSOCIATED WITH DIABETES: ICD-10-CM

## 2017-12-22 PROCEDURE — 99214 OFFICE O/P EST MOD 30 MIN: CPT | Mod: 25,S$GLB,, | Performed by: PHYSICIAN ASSISTANT

## 2017-12-22 PROCEDURE — 99999 PR PBB SHADOW E&M-EST. PATIENT-LVL IV: CPT | Mod: PBBFAC,,, | Performed by: PHYSICIAN ASSISTANT

## 2017-12-22 PROCEDURE — 90471 IMMUNIZATION ADMIN: CPT | Mod: S$GLB,,, | Performed by: FAMILY MEDICINE

## 2017-12-22 PROCEDURE — 90732 PPSV23 VACC 2 YRS+ SUBQ/IM: CPT | Mod: S$GLB,,, | Performed by: FAMILY MEDICINE

## 2017-12-22 RX ORDER — HYDRALAZINE HYDROCHLORIDE 25 MG/1
25 TABLET, FILM COATED ORAL EVERY 8 HOURS
Qty: 270 TABLET | Refills: 0 | Status: SHIPPED | OUTPATIENT
Start: 2017-12-22 | End: 2018-04-27 | Stop reason: SDUPTHER

## 2017-12-22 NOTE — PATIENT INSTRUCTIONS

## 2017-12-22 NOTE — PROGRESS NOTES
Subjective:       Patient ID: Jhonny Almazan is a 62 y.o. male.    Chief Complaint: Follow-up (1wk f/u hypertension)    Mr. Almazan comes to clinic today for 1 week follow up of HTN. The patient is taking his blood pressure medications as prescribed and tolerating this well. The patient had lab work after last office visit. This revealed an increase in his HGA1c to 6.7 from 6.0. The patient's labs were otherwise stable. The patient has no complaints today and reports that he is feeling well. He is due for pneumonia shot; he had his flu shot through his employer.       Review of Systems   Constitutional: Negative for activity change, appetite change and fever.   HENT: Negative for postnasal drip, rhinorrhea and sinus pressure.    Eyes: Negative for visual disturbance.   Respiratory: Negative for cough and shortness of breath.    Cardiovascular: Negative for chest pain.   Gastrointestinal: Negative for abdominal distention and abdominal pain.   Genitourinary: Negative for difficulty urinating and dysuria.   Musculoskeletal: Negative for arthralgias and myalgias.   Neurological: Negative for headaches.   Hematological: Negative for adenopathy.   Psychiatric/Behavioral: The patient is not nervous/anxious.        Objective:      Physical Exam   Constitutional: He is oriented to person, place, and time.   HENT:   Mouth/Throat: Oropharynx is clear and moist. No oropharyngeal exudate.   Eyes: Conjunctivae are normal. Pupils are equal, round, and reactive to light.   Cardiovascular: Normal rate and regular rhythm.    Pulmonary/Chest: Effort normal and breath sounds normal. He has no wheezes.   Abdominal: Soft. Bowel sounds are normal. He exhibits no distension. There is no tenderness.   Musculoskeletal: He exhibits no edema.   Lymphadenopathy:     He has no cervical adenopathy.   Neurological: He is alert and oriented to person, place, and time.   Skin: No erythema.   Psychiatric: His behavior is normal.        Assessment:       1. Uncontrolled type 2 diabetes mellitus with complication, without long-term current use of insulin    2. Hypertension associated with diabetes    3. Need for pneumococcal vaccination        Plan:       Jhonny was seen today for follow-up.    Diagnoses and all orders for this visit:    Uncontrolled type 2 diabetes mellitus with complication, without long-term current use of insulin  Diabetic diet  Continue current medication  Patient encouraged to be more compliant with diabetc diet.   Hypertension associated with diabetes  -     hydrALAZINE (APRESOLINE) 25 MG tablet; Take 1 tablet (25 mg total) by mouth every 8 (eight) hours.  Continue lisinopril/ HCTZ BID  Low salt diet  Follow up as scheduled with Dr. Whitehead  Need for pneumococcal vaccination  -     (In Office Administered) Pneumococcal Polysaccharide Vaccine (23 Valent) (SQ/IM)

## 2018-01-15 DIAGNOSIS — E11.69 HYPERLIPIDEMIA ASSOCIATED WITH TYPE 2 DIABETES MELLITUS: ICD-10-CM

## 2018-01-15 DIAGNOSIS — E78.5 HYPERLIPIDEMIA ASSOCIATED WITH TYPE 2 DIABETES MELLITUS: ICD-10-CM

## 2018-01-16 RX ORDER — ROSUVASTATIN CALCIUM 20 MG/1
TABLET, COATED ORAL
Qty: 90 TABLET | Refills: 1 | Status: SHIPPED | OUTPATIENT
Start: 2018-01-16 | End: 2019-09-30

## 2018-01-25 DIAGNOSIS — Z13.5 DIABETIC RETINOPATHY SCREENING: ICD-10-CM

## 2018-02-16 ENCOUNTER — LAB VISIT (OUTPATIENT)
Dept: LAB | Facility: HOSPITAL | Age: 63
End: 2018-02-16
Attending: FAMILY MEDICINE
Payer: COMMERCIAL

## 2018-02-16 DIAGNOSIS — E78.5 HYPERLIPIDEMIA ASSOCIATED WITH TYPE 2 DIABETES MELLITUS: ICD-10-CM

## 2018-02-16 DIAGNOSIS — I15.2 HYPERTENSION ASSOCIATED WITH DIABETES: ICD-10-CM

## 2018-02-16 DIAGNOSIS — E11.59 HYPERTENSION ASSOCIATED WITH DIABETES: ICD-10-CM

## 2018-02-16 DIAGNOSIS — E11.69 HYPERLIPIDEMIA ASSOCIATED WITH TYPE 2 DIABETES MELLITUS: ICD-10-CM

## 2018-02-16 LAB
ANION GAP SERPL CALC-SCNC: 9 MMOL/L
BUN SERPL-MCNC: 30 MG/DL
CALCIUM SERPL-MCNC: 10.3 MG/DL
CHLORIDE SERPL-SCNC: 101 MMOL/L
CO2 SERPL-SCNC: 26 MMOL/L
CREAT SERPL-MCNC: 1.2 MG/DL
EST. GFR  (AFRICAN AMERICAN): >60 ML/MIN/1.73 M^2
EST. GFR  (NON AFRICAN AMERICAN): >60 ML/MIN/1.73 M^2
ESTIMATED AVG GLUCOSE: 186 MG/DL
GLUCOSE SERPL-MCNC: 140 MG/DL
HBA1C MFR BLD HPLC: 8.1 %
POTASSIUM SERPL-SCNC: 4.4 MMOL/L
SODIUM SERPL-SCNC: 136 MMOL/L

## 2018-02-16 PROCEDURE — 83036 HEMOGLOBIN GLYCOSYLATED A1C: CPT

## 2018-02-16 PROCEDURE — 36415 COLL VENOUS BLD VENIPUNCTURE: CPT | Mod: PO

## 2018-02-16 PROCEDURE — 80048 BASIC METABOLIC PNL TOTAL CA: CPT

## 2018-02-20 DIAGNOSIS — E11.9 TYPE 2 DIABETES, HBA1C GOAL < 7%: ICD-10-CM

## 2018-02-20 DIAGNOSIS — Z12.11 COLON CANCER SCREENING: Primary | ICD-10-CM

## 2018-03-11 DIAGNOSIS — E11.59 HYPERTENSION ASSOCIATED WITH DIABETES: ICD-10-CM

## 2018-03-11 DIAGNOSIS — I15.2 HYPERTENSION ASSOCIATED WITH DIABETES: ICD-10-CM

## 2018-03-12 RX ORDER — LISINOPRIL AND HYDROCHLOROTHIAZIDE 12.5; 2 MG/1; MG/1
1 TABLET ORAL 2 TIMES DAILY
Qty: 180 TABLET | Refills: 0 | Status: SHIPPED | OUTPATIENT
Start: 2018-03-12 | End: 2018-06-09 | Stop reason: SDUPTHER

## 2018-03-15 RX ORDER — METFORMIN HYDROCHLORIDE 500 MG/1
1000 TABLET, EXTENDED RELEASE ORAL 2 TIMES DAILY
Qty: 360 TABLET | Refills: 0 | Status: SHIPPED | OUTPATIENT
Start: 2018-03-15 | End: 2019-09-30 | Stop reason: SDUPTHER

## 2018-04-27 ENCOUNTER — LAB VISIT (OUTPATIENT)
Dept: LAB | Facility: HOSPITAL | Age: 63
End: 2018-04-27
Attending: FAMILY MEDICINE
Payer: COMMERCIAL

## 2018-04-27 ENCOUNTER — OFFICE VISIT (OUTPATIENT)
Dept: FAMILY MEDICINE | Facility: CLINIC | Age: 63
End: 2018-04-27
Payer: COMMERCIAL

## 2018-04-27 VITALS
SYSTOLIC BLOOD PRESSURE: 138 MMHG | BODY MASS INDEX: 30.87 KG/M2 | DIASTOLIC BLOOD PRESSURE: 78 MMHG | HEART RATE: 95 BPM | TEMPERATURE: 98 F | HEIGHT: 76 IN | WEIGHT: 253.5 LBS

## 2018-04-27 DIAGNOSIS — Z72.0 TOBACCO ABUSE: ICD-10-CM

## 2018-04-27 DIAGNOSIS — E11.59 HYPERTENSION ASSOCIATED WITH DIABETES: ICD-10-CM

## 2018-04-27 DIAGNOSIS — Z12.5 PROSTATE CANCER SCREENING: ICD-10-CM

## 2018-04-27 DIAGNOSIS — E11.40 TYPE 2 DIABETES MELLITUS WITH DIABETIC NEUROPATHY, WITHOUT LONG-TERM CURRENT USE OF INSULIN: ICD-10-CM

## 2018-04-27 DIAGNOSIS — Z12.11 ENCOUNTER FOR SCREENING COLONOSCOPY: ICD-10-CM

## 2018-04-27 DIAGNOSIS — F17.210 CIGARETTE NICOTINE DEPENDENCE WITHOUT COMPLICATION: Primary | ICD-10-CM

## 2018-04-27 DIAGNOSIS — M60.9 MYOSITIS OF LOWER LEG, UNSPECIFIED LATERALITY, UNSPECIFIED MYOSITIS TYPE: ICD-10-CM

## 2018-04-27 DIAGNOSIS — I15.2 HYPERTENSION ASSOCIATED WITH DIABETES: ICD-10-CM

## 2018-04-27 LAB
ALBUMIN SERPL BCP-MCNC: 4 G/DL
ALP SERPL-CCNC: 68 U/L
ALT SERPL W/O P-5'-P-CCNC: 36 U/L
ANION GAP SERPL CALC-SCNC: 8 MMOL/L
AST SERPL-CCNC: 27 U/L
BASOPHILS # BLD AUTO: 0.09 K/UL
BASOPHILS NFR BLD: 1 %
BILIRUB SERPL-MCNC: 0.5 MG/DL
BUN SERPL-MCNC: 14 MG/DL
CALCIUM SERPL-MCNC: 10.9 MG/DL
CHLORIDE SERPL-SCNC: 102 MMOL/L
CHOLEST SERPL-MCNC: 197 MG/DL
CHOLEST/HDLC SERPL: 4.2 {RATIO}
CK SERPL-CCNC: 55 U/L
CO2 SERPL-SCNC: 28 MMOL/L
COMPLEXED PSA SERPL-MCNC: 0.48 NG/ML
CREAT SERPL-MCNC: 0.9 MG/DL
DIFFERENTIAL METHOD: ABNORMAL
EOSINOPHIL # BLD AUTO: 0.3 K/UL
EOSINOPHIL NFR BLD: 2.9 %
ERYTHROCYTE [DISTWIDTH] IN BLOOD BY AUTOMATED COUNT: 13.1 %
EST. GFR  (AFRICAN AMERICAN): >60 ML/MIN/1.73 M^2
EST. GFR  (NON AFRICAN AMERICAN): >60 ML/MIN/1.73 M^2
ESTIMATED AVG GLUCOSE: 154 MG/DL
GLUCOSE SERPL-MCNC: 177 MG/DL
HBA1C MFR BLD HPLC: 7 %
HCT VFR BLD AUTO: 42 %
HDLC SERPL-MCNC: 47 MG/DL
HDLC SERPL: 23.9 %
HGB BLD-MCNC: 14.1 G/DL
IMM GRANULOCYTES # BLD AUTO: 0.03 K/UL
IMM GRANULOCYTES NFR BLD AUTO: 0.3 %
LDLC SERPL CALC-MCNC: 94 MG/DL
LYMPHOCYTES # BLD AUTO: 2.4 K/UL
LYMPHOCYTES NFR BLD: 27.1 %
MCH RBC QN AUTO: 31.7 PG
MCHC RBC AUTO-ENTMCNC: 33.6 G/DL
MCV RBC AUTO: 94 FL
MONOCYTES # BLD AUTO: 0.7 K/UL
MONOCYTES NFR BLD: 7.4 %
NEUTROPHILS # BLD AUTO: 5.4 K/UL
NEUTROPHILS NFR BLD: 61.3 %
NONHDLC SERPL-MCNC: 150 MG/DL
NRBC BLD-RTO: 0 /100 WBC
PLATELET # BLD AUTO: 211 K/UL
PMV BLD AUTO: 10 FL
POTASSIUM SERPL-SCNC: 4.7 MMOL/L
PROT SERPL-MCNC: 7.4 G/DL
RBC # BLD AUTO: 4.45 M/UL
SODIUM SERPL-SCNC: 138 MMOL/L
TRIGL SERPL-MCNC: 280 MG/DL
WBC # BLD AUTO: 8.87 K/UL

## 2018-04-27 PROCEDURE — 3078F DIAST BP <80 MM HG: CPT | Mod: CPTII,S$GLB,, | Performed by: FAMILY MEDICINE

## 2018-04-27 PROCEDURE — 36415 COLL VENOUS BLD VENIPUNCTURE: CPT | Mod: PO

## 2018-04-27 PROCEDURE — 80061 LIPID PANEL: CPT

## 2018-04-27 PROCEDURE — 83036 HEMOGLOBIN GLYCOSYLATED A1C: CPT

## 2018-04-27 PROCEDURE — 3008F BODY MASS INDEX DOCD: CPT | Mod: CPTII,S$GLB,, | Performed by: FAMILY MEDICINE

## 2018-04-27 PROCEDURE — 99214 OFFICE O/P EST MOD 30 MIN: CPT | Mod: S$GLB,,, | Performed by: FAMILY MEDICINE

## 2018-04-27 PROCEDURE — 99999 PR PBB SHADOW E&M-EST. PATIENT-LVL IV: CPT | Mod: PBBFAC,,, | Performed by: FAMILY MEDICINE

## 2018-04-27 PROCEDURE — 85025 COMPLETE CBC W/AUTO DIFF WBC: CPT

## 2018-04-27 PROCEDURE — 3075F SYST BP GE 130 - 139MM HG: CPT | Mod: CPTII,S$GLB,, | Performed by: FAMILY MEDICINE

## 2018-04-27 PROCEDURE — 3045F PR MOST RECENT HEMOGLOBIN A1C LEVEL 7.0-9.0%: CPT | Mod: CPTII,S$GLB,, | Performed by: FAMILY MEDICINE

## 2018-04-27 PROCEDURE — 82550 ASSAY OF CK (CPK): CPT

## 2018-04-27 PROCEDURE — 84153 ASSAY OF PSA TOTAL: CPT

## 2018-04-27 PROCEDURE — 80053 COMPREHEN METABOLIC PANEL: CPT

## 2018-04-27 RX ORDER — HYDRALAZINE HYDROCHLORIDE 50 MG/1
50 TABLET, FILM COATED ORAL EVERY 12 HOURS
Qty: 180 TABLET | Refills: 4 | Status: SHIPPED | OUTPATIENT
Start: 2018-04-27 | End: 2018-09-21 | Stop reason: DRUGHIGH

## 2018-04-27 RX ORDER — GLIPIZIDE 10 MG/1
10 TABLET, FILM COATED, EXTENDED RELEASE ORAL
Qty: 90 TABLET | Refills: 2
Start: 2018-04-27 | End: 2018-08-13 | Stop reason: SDUPTHER

## 2018-04-27 RX ORDER — BUPROPION HYDROCHLORIDE 150 MG/1
150 TABLET ORAL DAILY
Qty: 30 TABLET | Refills: 11 | Status: SHIPPED | OUTPATIENT
Start: 2018-04-27 | End: 2019-05-15 | Stop reason: SDUPTHER

## 2018-04-27 RX ORDER — ASPIRIN 325 MG
200 TABLET, DELAYED RELEASE (ENTERIC COATED) ORAL DAILY
Qty: 120 CAPSULE | Refills: 11 | Status: SHIPPED | OUTPATIENT
Start: 2018-04-27 | End: 2019-06-14 | Stop reason: ALTCHOICE

## 2018-04-27 NOTE — PATIENT INSTRUCTIONS
Diabetes: Activity Tips    Being more active can help you manage your diabetes. The tips on this sheet can help you get the most from your exercise. They can also help you stay safe.  Staying Active  Its important for adults to spend less time sitting and being inactive. This is especially true if you have type 2 diabetes. When you are sitting for long periods of time, get up for short sessions of light activity every 30 minutes.  You should aim for at least 150 minutes a week of exercise or physical activity. Dont let more than 2 days go by without being active.  Benefit from briskness  Brisk activity gets your heart beating faster. This can help you increase your fitness, lose extra weight, and manage your blood sugar level. Try brisk walking. Or, if you have foot or leg problems, you can try swimming or bike riding. You can break up your exercise into chunks throughout the day. Work up to at least 30 minutes of steady, brisk exercise on most days.  Warm up and cool down  Warming up and cooling down reduce your risk of injury. They also help limit muscle soreness. Do a mild version of your activity for 5 minutes before and after your routine. You can also learn stretches that will help keep your muscles loose. Your healthcare provider may show you good ways to warm up and stretch.  Do the talk-sing test  The talk-sing test is a simple way to tell how hard youre exercising. If you can talk while exercising, youre in a safe range. If youre out of breath, slow down. If you can carry a tune, its time to  the pace. Walk up a hill. Increase the resistance on your stationary bike. Or swim faster.  What about eating?  You may be told to plan your exercise for 1 to 2 hours after a meal. In most cases, you dont need to eat while being active. If you take insulin or medicine that can cause low blood sugar, test your blood sugar before exercising. And carry a fast-acting sugar that will raise your blood sugar  level quickly. This includes glucose tablets or hard candy. Use it if you feel low blood sugar symptoms.  Safety tips  These tips can help you stay safe as you become fit:  · Exercise with a friend or carry a cell phone if you have one.  · Carry or wear identification, such as a necklace or bracelet, that says you have diabetes.  · Use the proper footwear and safety equipment for your activity.  · Drink water before, during, and after exercise.  · Dress properly for the weather.  · Dont exercise in very hot or very cold weather.  · Dont exercise if you are sick.  · If you are instructed to do so, test your blood sugar before and after you exercise. Have a small carbohydrate snack if your blood sugar is low before you start exercising.   When to stop exercising and call your healthcare provider  Stop exercising and call your healthcare provider right away if you notice any of the following:  · Pain, pressure, tightness, or heaviness in the chest  · Pain or heaviness in the neck, shoulders, back, arms, legs, or feet  · Unusual shortness of breath  · Dizziness or lightheadedness  · Unusually rapid or slow pulse  · Increased joint or muscle pain  · Nausea or vomiting  Date Last Reviewed: 5/1/2016  © 9145-8278 [employer]. [address] [place] [address], [place] [address]. All rights reserved. This information is not intended as a substitute for professional medical care. Always follow your healthcare professional's instructions.        Eating a Vegetarian Diet  A vegetarian diet is based on plant foods. It includes fruits, vegetables, beans, grains, seeds, and nuts. Some vegetarians also eat dairy foods and eggs. There are three common vegetarian diets:  · Lacto-ovo vegetarians eat eggs, yogurt, cheese, and other milk products, as well as plant foods.  · Lacto vegetarians eat dairy and plant foods but not eggs.  · Vegans eat only plant foods.  Why eat vegetarian?  People choose to be vegetarians for health,  cultural, social, and Worship reasons. A vegetarian diet is a healthy way to eat. You just have to plan your meals carefully so that you get all the nutrients you need. Most vegetarian diets are high in fiber and low in fat and cholesterol. That means eating vegetarian can:  · Lower your risk of heart disease.  · Lower your blood pressure and cholesterol levels.  · Help you maintain a healthy weight.  · Decrease digestive problems including:  ¨ Bowel diseases  ¨ Gallstones  ¨ Colon cancer  Vegetarian basics  A vegetarian diet can be a healthy way to eat for people of all ages. But meals and snacks must be planned to include non-meat sources of protein, vitamins, and other nutrients. (See the chart below.) Here are some guidelines for healthy meal planning:  · Eat a wide range of foods. This will help you get all the nutrients you need.  · Eat a number of plant proteins throughout the day.  · Plan for enough calories each day. Also make sure that your calories come from foods that are rich in protein, vitamins, and minerals.  · If you eat dairy foods, choose low-fat or fat-free milk, yogurt, or cheese.  Do you need supplements?  A vegetarian diet can easily supply all the calories, protein, vitamins, and minerals that a person needs. But some people have special needs. They may include children and teens, pregnant and lactating women, women past midlife, the elderly, and vegans. If you are in one of these groups, you may need extra calories, protein, calcium, iron, vitamin B12, or zinc. The lists below can help you choose foods that are good sources of these nutrients. And be sure to ask your health care provider about taking vitamin supplements.  Protein  · Dried beans, soybeans, and lentils  · Tofu (bean curd) and tempeh (cultured soybeans)  · Rice, barley, and other whole grains  · Nuts and nut butter  · Milk, yogurt, and cheese  · Eggs Vitamin B12  · Milk, yogurt, and cheese  · Eggs  · Fortified soy  burgers  · Fortified soy milk or other nondairy milk  · Fortified cereals  · Nutritional yeast   Zinc  · Milk, yogurt, and cheese  · Eggs  · Canned or dried beans  · Lentils and split peas  · Wheat germ  · Whole-grain breads and cereals  · Nuts and nut butters  · Pumpkin and sunflower seeds Calcium  · Milk, yogurt, and cheese  · Fortified soy milk or other nondairy milk  · Tofu processed with calcium sulfate  · Leafy, dark-green vegetables  · Dried figs  · Fortified orange juice and fortified cereals  · Sesame seeds  · Beans   Iron  · Wheat germ  · Dried fruits  · Nuts and seeds  · Whole grain and fortified breads and cereals  · Dried beans, lentils, and split peas  · Leafy, dark-green vegetables  · Eggs     Getting started  Change to a vegetarian diet slowly. Start by eating more grains, beans, vegetables, and fruits. Make fish, poultry, or meat a side dish. Then slowly cut them out of your diet. Here are some other tips:  · Eat three or more servings of vegetables a day. Eat them raw or lightly steamed.  · Eat two or more servings of fruit a day. Choose whole fruits with the skin on.  · Choose a wide range of grains and whole-grain breads and cereals. Eat six or more servings of these foods each day.  · Begin to replace meat by working up to two to three servings a day of beans, lentils, split peas, tofu, or tempeh.  · If you eat dairy foods, have two to three servings a day. Make low-fat or fat-free choices.  For vegans: Add sources of calcium and vitamin B12, such as fortified nondairy milks and breakfast cereals. Talk to your health care provider about vitamin supplements.  To learn more  A registered dietitian (RD) can help you plan a healthy vegetarian diet. For more information and to find an RD who knows about vegetarian diets, search for one through the Vegetarian Nutrition Dietetic Practice Group of the Academy of Nutrition and Dietetics (AND) at their website, www.vegetariannutrition.net. You can also  search AND's website, www.eatright.org. Other groups that can help include:  · Vegetarian Resource Group (www.vrg.org)  · American Cancer Society (www.cancer.org)  · American Heart Association (www.heart.org)  Date Last Reviewed: 6/1/2015  © 4731-1282 [employer]. [address] [place] [address], [place] [address]. All rights reserved. This information is not intended as a substitute for professional medical care. Always follow your healthcare professional's instructions.

## 2018-04-27 NOTE — PROGRESS NOTES
Subjective:       Patient ID: Jhonny Almazan is a 62 y.o. male.    Chief Complaint: Hypertension and Diabetes    HPI  Review of Systems   Constitutional: Negative for fatigue and unexpected weight change.   Respiratory: Negative for chest tightness and shortness of breath.    Cardiovascular: Negative for chest pain, palpitations and leg swelling.   Gastrointestinal: Negative for abdominal pain.   Musculoskeletal: Negative for arthralgias.   Neurological: Negative for dizziness, syncope, light-headedness and headaches.       Patient Active Problem List   Diagnosis    Nicotine dependence    Diabetes mellitus type 2, uncontrolled    Hypertension associated with diabetes    Hyperlipidemia associated with type 2 diabetes mellitus    Orthostatic hypotension       Objective:      Physical Exam   Constitutional: He is oriented to person, place, and time. He appears well-developed and well-nourished.   Cardiovascular: Normal rate, regular rhythm and normal heart sounds.    Pulmonary/Chest: Effort normal and breath sounds normal.   Musculoskeletal: He exhibits no edema.   Neurological: He is alert and oriented to person, place, and time.   Skin: Skin is warm and dry.   Psychiatric: He has a normal mood and affect.   Nursing note and vitals reviewed.      Lab Results   Component Value Date    WBC 6.76 12/15/2017    HGB 14.2 12/15/2017    HCT 42.8 12/15/2017     12/15/2017    CHOL 172 08/19/2017    TRIG 174 (H) 08/19/2017    HDL 49 08/19/2017    ALT 32 12/15/2017    AST 23 12/15/2017     02/16/2018    K 4.4 02/16/2018     02/16/2018    CREATININE 1.2 02/16/2018    BUN 30 (H) 02/16/2018    CO2 26 02/16/2018    TSH 0.965 12/15/2017    PSA 0.37 07/23/2010    HGBA1C 8.1 (H) 02/16/2018     The 10-year ASCVD risk score (Tamara ANTONINO Barbosa, et al., 2013) is: 34.7%    Values used to calculate the score:      Age: 62 years      Sex: Male      Is Non- : No      Diabetic: Yes      Tobacco  smoker: Yes      Systolic Blood Pressure: 138 mmHg      Is BP treated: Yes      HDL Cholesterol: 49 mg/dL      Total Cholesterol: 172 mg/dL    Assessment:       1. Hypertension associated with diabetes    2. Uncontrolled type 2 diabetes mellitus with complication, without long-term current use of insulin        Plan:       Hypertension associated with diabetes    Uncontrolled type 2 diabetes mellitus with complication, without long-term current use of insulin      Patient readiness: acceptance and barriers:readiness    During the course of the visit the patient was educated and counseled about the following:     Diabetes:  Discussed general issues about diabetes pathophysiology and management.  Hypertension:   Medication: no change.  Dietary sodium restriction.  Obesity:   General weight loss/lifestyle modification strategies discussed (elicit support from others; identify saboteurs; non-food rewards, etc).    Goals: Diabetes: Maintain Hemoglobin A1C below 7, Hypertension: Reduce Blood Pressure and Obesity: Reduce calorie intake and BMI    Did patient meet goals/outcomes: Yes    The following self management tools provided: blood pressure log  excercise log    Patient Instructions (the written plan) was given to the patient/family.     Time spent with patient: 30 minutes    Barriers to medications present (no )    Adverse reactions to current medications (no)    Over the counter medications reviewed (Yes)        34-minute visit. 20 minutes spent counseling patient on diet, exercise, and weight loss.  This has been fully explained to the patient, who indicates understanding.

## 2018-06-07 ENCOUNTER — NURSE TRIAGE (OUTPATIENT)
Dept: ADMINISTRATIVE | Facility: CLINIC | Age: 63
End: 2018-06-07

## 2018-06-07 ENCOUNTER — OFFICE VISIT (OUTPATIENT)
Dept: FAMILY MEDICINE | Facility: CLINIC | Age: 63
End: 2018-06-07
Payer: COMMERCIAL

## 2018-06-07 ENCOUNTER — LAB VISIT (OUTPATIENT)
Dept: LAB | Facility: HOSPITAL | Age: 63
End: 2018-06-07
Attending: FAMILY MEDICINE
Payer: COMMERCIAL

## 2018-06-07 VITALS
DIASTOLIC BLOOD PRESSURE: 75 MMHG | TEMPERATURE: 98 F | BODY MASS INDEX: 29.96 KG/M2 | WEIGHT: 246.06 LBS | HEIGHT: 76 IN | SYSTOLIC BLOOD PRESSURE: 115 MMHG | HEART RATE: 115 BPM

## 2018-06-07 DIAGNOSIS — I95.1 ORTHOSTATIC HYPOTENSION: Primary | ICD-10-CM

## 2018-06-07 DIAGNOSIS — I95.1 ORTHOSTATIC HYPOTENSION: ICD-10-CM

## 2018-06-07 LAB
ANION GAP SERPL CALC-SCNC: 8 MMOL/L
BASOPHILS # BLD AUTO: 0.06 K/UL
BASOPHILS NFR BLD: 0.8 %
BUN SERPL-MCNC: 15 MG/DL
CALCIUM SERPL-MCNC: 10.9 MG/DL
CHLORIDE SERPL-SCNC: 98 MMOL/L
CO2 SERPL-SCNC: 27 MMOL/L
CREAT SERPL-MCNC: 1.1 MG/DL
DIFFERENTIAL METHOD: ABNORMAL
EOSINOPHIL # BLD AUTO: 0.2 K/UL
EOSINOPHIL NFR BLD: 2.2 %
ERYTHROCYTE [DISTWIDTH] IN BLOOD BY AUTOMATED COUNT: 12.5 %
EST. GFR  (AFRICAN AMERICAN): >60 ML/MIN/1.73 M^2
EST. GFR  (NON AFRICAN AMERICAN): >60 ML/MIN/1.73 M^2
GLUCOSE SERPL-MCNC: 361 MG/DL
HCT VFR BLD AUTO: 43.5 %
HGB BLD-MCNC: 14.7 G/DL
IMM GRANULOCYTES # BLD AUTO: 0.01 K/UL
IMM GRANULOCYTES NFR BLD AUTO: 0.1 %
LYMPHOCYTES # BLD AUTO: 1.8 K/UL
LYMPHOCYTES NFR BLD: 24.4 %
MCH RBC QN AUTO: 32 PG
MCHC RBC AUTO-ENTMCNC: 33.8 G/DL
MCV RBC AUTO: 95 FL
MONOCYTES # BLD AUTO: 0.6 K/UL
MONOCYTES NFR BLD: 7.6 %
NEUTROPHILS # BLD AUTO: 4.7 K/UL
NEUTROPHILS NFR BLD: 64.9 %
NRBC BLD-RTO: 0 /100 WBC
PLATELET # BLD AUTO: 216 K/UL
PMV BLD AUTO: 10.2 FL
POTASSIUM SERPL-SCNC: 4.7 MMOL/L
RBC # BLD AUTO: 4.6 M/UL
SODIUM SERPL-SCNC: 133 MMOL/L
WBC # BLD AUTO: 7.21 K/UL

## 2018-06-07 PROCEDURE — 3078F DIAST BP <80 MM HG: CPT | Mod: CPTII,S$GLB,, | Performed by: FAMILY MEDICINE

## 2018-06-07 PROCEDURE — 3074F SYST BP LT 130 MM HG: CPT | Mod: CPTII,S$GLB,, | Performed by: FAMILY MEDICINE

## 2018-06-07 PROCEDURE — 80048 BASIC METABOLIC PNL TOTAL CA: CPT

## 2018-06-07 PROCEDURE — 85025 COMPLETE CBC W/AUTO DIFF WBC: CPT

## 2018-06-07 PROCEDURE — 99214 OFFICE O/P EST MOD 30 MIN: CPT | Mod: S$GLB,,, | Performed by: FAMILY MEDICINE

## 2018-06-07 PROCEDURE — 3008F BODY MASS INDEX DOCD: CPT | Mod: CPTII,S$GLB,, | Performed by: FAMILY MEDICINE

## 2018-06-07 PROCEDURE — 36415 COLL VENOUS BLD VENIPUNCTURE: CPT | Mod: PO

## 2018-06-07 PROCEDURE — 99999 PR PBB SHADOW E&M-EST. PATIENT-LVL III: CPT | Mod: PBBFAC,,, | Performed by: FAMILY MEDICINE

## 2018-06-07 RX ORDER — HYDROCODONE BITARTRATE AND ACETAMINOPHEN 7.5; 325 MG/1; MG/1
1 TABLET ORAL
Refills: 0 | COMMUNITY
Start: 2018-06-01 | End: 2019-03-22

## 2018-06-07 RX ORDER — PENICILLIN V POTASSIUM 250 MG/1
TABLET, FILM COATED ORAL
Refills: 0 | COMMUNITY
Start: 2018-06-01 | End: 2018-09-21 | Stop reason: ALTCHOICE

## 2018-06-07 NOTE — PROGRESS NOTES
Subjective:       Patient ID: Jhonny Almazan is a 62 y.o. male.    Chief Complaint: Dizziness    BP was low at onset - 99/77; HR has been running high as well - 90's-101.  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  No overt heat exposure.  Weight is down today compared to previous.  Hydralazine dose was increased in 4/18.      Dizziness:   Chronicity:  New  Onset: 2 days ago.  Progression since onset:  Waxing and waning  Dizziness characteristics:  Lightheaded/impending faint   Associated symptoms: nausea and light-headedness.no fever, no vomiting and no chest pain.  Aggravated by:  Getting up    Review of Systems   Constitutional: Negative for fever.   Respiratory: Negative for shortness of breath.    Cardiovascular: Negative for chest pain.   Gastrointestinal: Positive for nausea. Negative for abdominal pain and vomiting.   Skin: Negative for rash.   Neurological: Positive for dizziness and light-headedness. Negative for numbness.   All other systems reviewed and are negative.      Objective:      Physical Exam   Constitutional: He appears well-developed. No distress.   HENT:   Mouth/Throat: Oropharynx is clear and moist.   MM Moisture decreased.   Neck: Neck supple.   Cardiovascular: Normal rate and regular rhythm.    No murmur heard.  VS - laying 97/65 and   Standing 108/66 and    Pulmonary/Chest: Effort normal and breath sounds normal.   Lymphadenopathy:     He has no cervical adenopathy.   Skin: Skin is warm and dry.       Assessment:       1. Orthostatic hypotension        Plan:           Jhonny was seen today for dizziness.    Diagnoses and all orders for this visit:    Orthostatic hypotension  -     CBC auto differential; Future  -     Basic metabolic panel; Future    Pt declined IV fluid; will rest and orally rehydrate at home.

## 2018-06-07 NOTE — PATIENT INSTRUCTIONS
Hold Hydralazine until/if BP comes back to at/over 140/90 on average.  If so, resume at 25 mg twice a day.

## 2018-06-07 NOTE — TELEPHONE ENCOUNTER
Reason for Disposition   Lightheadedness (dizziness) present now, after 2 hours of rest and fluids    Protocols used: ST DIZZINESS-A-OH  Mr. Almazan states he has been lightheaded. He states his blood pressure have been fluctuating. The other day he states his blood pressure was 99/75. Today's blood pressure was 142/92. Patient states he has an appointment for today.

## 2018-06-09 DIAGNOSIS — I15.2 HYPERTENSION ASSOCIATED WITH DIABETES: ICD-10-CM

## 2018-06-09 DIAGNOSIS — E11.59 HYPERTENSION ASSOCIATED WITH DIABETES: ICD-10-CM

## 2018-06-11 RX ORDER — LISINOPRIL AND HYDROCHLOROTHIAZIDE 12.5; 2 MG/1; MG/1
1 TABLET ORAL 2 TIMES DAILY
Qty: 180 TABLET | Refills: 0 | Status: SHIPPED | OUTPATIENT
Start: 2018-06-11 | End: 2019-09-30 | Stop reason: SDUPTHER

## 2018-08-13 RX ORDER — GLIPIZIDE 10 MG/1
TABLET, FILM COATED, EXTENDED RELEASE ORAL
Qty: 90 TABLET | Refills: 2 | Status: SHIPPED | OUTPATIENT
Start: 2018-08-13 | End: 2019-05-11 | Stop reason: SDUPTHER

## 2018-09-04 RX ORDER — HYDRALAZINE HYDROCHLORIDE 25 MG/1
TABLET, FILM COATED ORAL
Qty: 60 TABLET | Refills: 8 | Status: SHIPPED | OUTPATIENT
Start: 2018-09-04 | End: 2019-03-22

## 2018-09-21 ENCOUNTER — OFFICE VISIT (OUTPATIENT)
Dept: FAMILY MEDICINE | Facility: CLINIC | Age: 63
End: 2018-09-21
Payer: COMMERCIAL

## 2018-09-21 VITALS
SYSTOLIC BLOOD PRESSURE: 148 MMHG | WEIGHT: 256.81 LBS | HEART RATE: 107 BPM | DIASTOLIC BLOOD PRESSURE: 70 MMHG | HEIGHT: 76 IN | BODY MASS INDEX: 31.27 KG/M2 | TEMPERATURE: 98 F

## 2018-09-21 DIAGNOSIS — Z23 NEEDS FLU SHOT: ICD-10-CM

## 2018-09-21 DIAGNOSIS — E11.40 TYPE 2 DIABETES MELLITUS WITH DIABETIC NEUROPATHY, WITHOUT LONG-TERM CURRENT USE OF INSULIN: Primary | ICD-10-CM

## 2018-09-21 DIAGNOSIS — Z86.010 HISTORY OF COLON POLYPS: ICD-10-CM

## 2018-09-21 DIAGNOSIS — I15.2 HYPERTENSION ASSOCIATED WITH DIABETES: ICD-10-CM

## 2018-09-21 DIAGNOSIS — E11.59 HYPERTENSION ASSOCIATED WITH DIABETES: ICD-10-CM

## 2018-09-21 DIAGNOSIS — R07.9 CHEST PAIN ON EXERTION: ICD-10-CM

## 2018-09-21 DIAGNOSIS — E66.9 OBESITY (BMI 30-39.9): ICD-10-CM

## 2018-09-21 PROCEDURE — 3078F DIAST BP <80 MM HG: CPT | Mod: CPTII,S$GLB,, | Performed by: NURSE PRACTITIONER

## 2018-09-21 PROCEDURE — 99214 OFFICE O/P EST MOD 30 MIN: CPT | Mod: 25,S$GLB,, | Performed by: NURSE PRACTITIONER

## 2018-09-21 PROCEDURE — 90471 IMMUNIZATION ADMIN: CPT | Mod: S$GLB,,, | Performed by: NURSE PRACTITIONER

## 2018-09-21 PROCEDURE — 90686 IIV4 VACC NO PRSV 0.5 ML IM: CPT | Mod: S$GLB,,, | Performed by: NURSE PRACTITIONER

## 2018-09-21 PROCEDURE — 99999 PR PBB SHADOW E&M-EST. PATIENT-LVL V: CPT | Mod: PBBFAC,,, | Performed by: NURSE PRACTITIONER

## 2018-09-21 PROCEDURE — 3008F BODY MASS INDEX DOCD: CPT | Mod: CPTII,S$GLB,, | Performed by: NURSE PRACTITIONER

## 2018-09-21 PROCEDURE — 3077F SYST BP >= 140 MM HG: CPT | Mod: CPTII,S$GLB,, | Performed by: NURSE PRACTITIONER

## 2018-09-21 PROCEDURE — 3045F PR MOST RECENT HEMOGLOBIN A1C LEVEL 7.0-9.0%: CPT | Mod: CPTII,S$GLB,, | Performed by: NURSE PRACTITIONER

## 2018-09-21 RX ORDER — GABAPENTIN 100 MG/1
100 CAPSULE ORAL 3 TIMES DAILY
Qty: 90 CAPSULE | Refills: 11 | Status: SHIPPED | OUTPATIENT
Start: 2018-09-21 | End: 2019-06-19

## 2018-09-21 NOTE — PROGRESS NOTES
Patient will call back to scheduled NM Myocardial Perfusion, Nuc Pharm Stress Test, GI, and Optometry appointment.

## 2018-09-21 NOTE — PROGRESS NOTES
Subjective:       Patient ID: Jhonny Almazan is a 62 y.o. male.    Chief Complaint: Annual Exam    Ms. Almazan presents to the clinic today for follow up for diabetes and hypertension.  He has had problems with orthostatic hypotension in the past when blood pressure medication was increased.  States blood pressures are 140-150 systolic at home.  Feels like BP goes up with stress. He does not eat a low sodium diet or diabetic diet.  He does eat a lot of carbohydrates as well.  He reports chest pain occasionally on exertion, associated with shortness of breath. Had a normal treadmill stress test but this was cut short due to poor exercise capacity.  He does not exercise regularly but walks a lot at work.  He is due for colonoscopy, eye exam, foot exam, flu shot.        Review of Systems   Constitutional: Negative for chills and fever.   HENT: Negative for congestion, ear pain and sinus pressure.    Eyes: Negative for visual disturbance.   Respiratory: Positive for shortness of breath (on exertion). Negative for cough and wheezing.    Cardiovascular: Positive for chest pain (occ on exertion). Negative for palpitations and leg swelling.   Gastrointestinal: Negative for abdominal pain, constipation and diarrhea.   Musculoskeletal: Negative for arthralgias and joint swelling.   Neurological: Negative for dizziness, light-headedness and headaches.       Objective:      Physical Exam   Constitutional: He is oriented to person, place, and time. He appears well-developed and well-nourished. No distress.   HENT:   Head: Normocephalic and atraumatic.   Right Ear: External ear normal.   Left Ear: External ear normal.   Mouth/Throat: Oropharynx is clear and moist. No oropharyngeal exudate.   Eyes: Pupils are equal, round, and reactive to light. Right eye exhibits no discharge. Left eye exhibits no discharge.   Neck: Neck supple. No thyromegaly present.   Cardiovascular: Normal rate and regular rhythm. Exam reveals no gallop  and no friction rub.   No murmur heard.  Pulses:       Dorsalis pedis pulses are 1+ on the right side, and 1+ on the left side.        Posterior tibial pulses are 1+ on the right side, and 1+ on the left side.   Pulmonary/Chest: Effort normal and breath sounds normal. No respiratory distress. He has no wheezes. He has no rales.   Abdominal: Soft. He exhibits no distension. There is no tenderness.   Musculoskeletal:        Right foot: There is normal range of motion and no deformity.        Left foot: There is normal range of motion and no deformity.   Feet:   Right Foot:   Protective Sensation: 10 sites tested. 0 sites sensed.   Skin Integrity: Negative for ulcer, blister, skin breakdown, erythema, warmth, callus or dry skin.   Left Foot:   Protective Sensation: 10 sites tested. 0 sites sensed.   Skin Integrity: Positive for callus (left great toe). Negative for ulcer, blister, skin breakdown, erythema, warmth or dry skin.   Lymphadenopathy:     He has no cervical adenopathy.   Neurological: He is alert and oriented to person, place, and time. Coordination normal.   Skin: Skin is warm and dry.   Psychiatric: He has a normal mood and affect. His behavior is normal. Thought content normal.   Vitals reviewed.          Current Outpatient Medications:     ACETAMINOPHEN (TYLENOL ARTHRITIS ORAL), Take by mouth., Disp: , Rfl:     aspirin (ECOTRIN) 81 MG EC tablet, Take 81 mg by mouth once daily., Disp: , Rfl:     buPROPion (WELLBUTRIN XL) 150 MG TB24 tablet, Take 1 tablet (150 mg total) by mouth once daily., Disp: 30 tablet, Rfl: 11    co-enzyme Q-10 50 mg capsule, Take 4 capsules (200 mg total) by mouth once daily., Disp: 120 capsule, Rfl: 11    folic acid-vit B6-vit B12 2.5-25-2 mg (FOLBIC OR EQUIV) 2.5-25-2 mg Tab, Take 1 tablet by mouth once daily., Disp: 90 tablet, Rfl: 4    glipiZIDE (GLUCOTROL) 10 MG TR24, TAKE 1 TABLET BY MOUTH DAILY WITH BREAKFAST, Disp: 90 tablet, Rfl: 2    hydrALAZINE (APRESOLINE) 25 MG  tablet, TAKE 1 TABLET (25 MG TOTAL) BY MOUTH EVERY 12 (TWELVE) HOURS., Disp: 60 tablet, Rfl: 8    HYDROcodone-acetaminophen (NORCO) 7.5-325 mg per tablet, Take 1 tablet by mouth every 4 to 6 hours as needed., Disp: , Rfl: 0    lisinopril-hydrochlorothiazide (PRINZIDE,ZESTORETIC) 20-12.5 mg per tablet, TAKE 1 TABLET BY MOUTH 2 (TWO) TIMES DAILY., Disp: 180 tablet, Rfl: 0    metFORMIN (GLUCOPHAGE-XR) 500 MG 24 hr tablet, TAKE 2 TABLETS (1,000 MG TOTAL) BY MOUTH 2 (TWO) TIMES DAILY., Disp: 360 tablet, Rfl: 0    ONETOUCH ULTRA TEST Strp, , Disp: , Rfl:     ONETOUCH ULTRASOFT LANCETS lancets, , Disp: , Rfl:     rosuvastatin (CRESTOR) 20 MG tablet, TAKE 1 TABLET (20 MG TOTAL) BY MOUTH ONCE DAILY., Disp: 90 tablet, Rfl: 1    gabapentin (NEURONTIN) 100 MG capsule, Take 1 capsule (100 mg total) by mouth 3 (three) times daily., Disp: 90 capsule, Rfl: 11  Assessment:       1. Type 2 diabetes mellitus with diabetic neuropathy, without long-term current use of insulin    2. Hypertension associated with diabetes    3. History of colon polyps    4. Chest pain on exertion    5. Needs flu shot    6. Obesity (BMI 30-39.9)        Plan:       Type 2 diabetes mellitus with diabetic neuropathy, without long-term current use of insulin  Reduce carbohydrate intake.  -     Comprehensive metabolic panel; Future  -     Hemoglobin A1c; Future  -     Microalbumin/creatinine urine ratio; Future  -     gabapentin (NEURONTIN) 100 MG capsule; Take 1 capsule (100 mg total) by mouth 3 (three) times daily.  -     Ambulatory referral to Optometry    Hypertension associated with diabetes  BP above goal today, however has had problems with orthostatic hypotension in the past due to BP medications.  Stress management--deep breaths, step away from stressful situations.  Will send message to PCP regarding BP.    History of colon polyps  -     Ambulatory referral to Gastroenterology    Chest pain on exertion  -     NM Myocardial Perfusion Spect Multi  Pharmacologic; Future  -     Nuc Pharm Stress test - Radiologist interpreted; Future    Needs flu shot  -     Influenza - Quadrivalent (3 years & older) (PF)    Obesity  Increase exercise to 150 min/week if tolerated  Lower carbohydrates in diet.    Patient readiness: acceptance and barriers:readiness and occupational issues    During the course of the visit the patient was educated and counseled about the following:     Diabetes:  Discussed general issues about diabetes pathophysiology and management.  Encouraged aerobic exercise.  Discussed foot care.  Reminded to get yearly retinal exam.  Hypertension:   Medication: no change.  Dietary sodium restriction.  Regular aerobic exercise.  Obesity:   Diet interventions: moderate (500 kCal/d) deficit diet and qualitative changes (increase low-fat,  high-fiber foods).  Regular aerobic exercise program discussed.    Goals: Diabetes: Maintain Hemoglobin A1C below 7, Hypertension: Reduce Blood Pressure and Obesity: Reduce calorie intake and BMI    Did patient meet goals/outcomes: No    The following self management tools provided: declined    Patient Instructions (the written plan) was given to the patient/family.     Time spent with patient: 30 minutes    Barriers to medications present (no )    Adverse reactions to current medications (no)    Over the counter medications reviewed (No)    RTC 6 mos with PCP.

## 2018-11-11 RX ORDER — PIOGLITAZONEHYDROCHLORIDE 30 MG/1
TABLET ORAL
Qty: 90 TABLET | Refills: 2 | Status: SHIPPED | OUTPATIENT
Start: 2018-11-11 | End: 2019-06-19 | Stop reason: SDUPTHER

## 2018-12-28 ENCOUNTER — LAB VISIT (OUTPATIENT)
Dept: LAB | Facility: HOSPITAL | Age: 63
End: 2018-12-28
Attending: NURSE PRACTITIONER
Payer: COMMERCIAL

## 2018-12-28 DIAGNOSIS — E11.40 TYPE 2 DIABETES MELLITUS WITH DIABETIC NEUROPATHY, WITHOUT LONG-TERM CURRENT USE OF INSULIN: ICD-10-CM

## 2018-12-28 LAB
ALBUMIN SERPL BCP-MCNC: 4 G/DL
ALP SERPL-CCNC: 58 U/L
ALT SERPL W/O P-5'-P-CCNC: 27 U/L
ANION GAP SERPL CALC-SCNC: 9 MMOL/L
AST SERPL-CCNC: 22 U/L
BILIRUB SERPL-MCNC: 0.7 MG/DL
BUN SERPL-MCNC: 14 MG/DL
CALCIUM SERPL-MCNC: 10.6 MG/DL
CHLORIDE SERPL-SCNC: 99 MMOL/L
CO2 SERPL-SCNC: 28 MMOL/L
CREAT SERPL-MCNC: 0.9 MG/DL
EST. GFR  (AFRICAN AMERICAN): >60 ML/MIN/1.73 M^2
EST. GFR  (NON AFRICAN AMERICAN): >60 ML/MIN/1.73 M^2
ESTIMATED AVG GLUCOSE: 126 MG/DL
GLUCOSE SERPL-MCNC: 174 MG/DL
HBA1C MFR BLD HPLC: 6 %
POTASSIUM SERPL-SCNC: 4.2 MMOL/L
PROT SERPL-MCNC: 7.6 G/DL
SODIUM SERPL-SCNC: 136 MMOL/L

## 2018-12-28 PROCEDURE — 80053 COMPREHEN METABOLIC PANEL: CPT

## 2018-12-28 PROCEDURE — 36415 COLL VENOUS BLD VENIPUNCTURE: CPT | Mod: PO

## 2018-12-28 PROCEDURE — 83036 HEMOGLOBIN GLYCOSYLATED A1C: CPT

## 2019-02-21 DIAGNOSIS — E11.9 TYPE 2 DIABETES MELLITUS WITHOUT COMPLICATION, UNSPECIFIED WHETHER LONG TERM INSULIN USE: ICD-10-CM

## 2019-03-22 ENCOUNTER — OFFICE VISIT (OUTPATIENT)
Dept: FAMILY MEDICINE | Facility: CLINIC | Age: 64
End: 2019-03-22
Payer: COMMERCIAL

## 2019-03-22 VITALS
WEIGHT: 263.44 LBS | HEART RATE: 78 BPM | TEMPERATURE: 98 F | DIASTOLIC BLOOD PRESSURE: 84 MMHG | BODY MASS INDEX: 32.08 KG/M2 | SYSTOLIC BLOOD PRESSURE: 160 MMHG | HEIGHT: 76 IN

## 2019-03-22 DIAGNOSIS — M79.642 BILATERAL HAND PAIN: ICD-10-CM

## 2019-03-22 DIAGNOSIS — E66.9 OBESITY (BMI 30-39.9): ICD-10-CM

## 2019-03-22 DIAGNOSIS — E11.65 UNCONTROLLED TYPE 2 DIABETES MELLITUS WITH HYPERGLYCEMIA: Primary | ICD-10-CM

## 2019-03-22 DIAGNOSIS — K59.00 CONSTIPATION, UNSPECIFIED CONSTIPATION TYPE: ICD-10-CM

## 2019-03-22 DIAGNOSIS — I15.2 HYPERTENSION ASSOCIATED WITH DIABETES: ICD-10-CM

## 2019-03-22 DIAGNOSIS — M79.641 BILATERAL HAND PAIN: ICD-10-CM

## 2019-03-22 DIAGNOSIS — R10.11 RUQ PAIN: ICD-10-CM

## 2019-03-22 DIAGNOSIS — E11.59 HYPERTENSION ASSOCIATED WITH DIABETES: ICD-10-CM

## 2019-03-22 DIAGNOSIS — Z28.39 IMMUNIZATION DEFICIENCY: ICD-10-CM

## 2019-03-22 DIAGNOSIS — Z86.010 HISTORY OF COLON POLYPS: ICD-10-CM

## 2019-03-22 DIAGNOSIS — E83.52 HYPERCALCEMIA: ICD-10-CM

## 2019-03-22 PROCEDURE — 90670 PCV13 VACCINE IM: CPT | Mod: S$GLB,,, | Performed by: NURSE PRACTITIONER

## 2019-03-22 PROCEDURE — 99999 PR PBB SHADOW E&M-EST. PATIENT-LVL V: ICD-10-PCS | Mod: PBBFAC,,, | Performed by: NURSE PRACTITIONER

## 2019-03-22 PROCEDURE — 3079F PR MOST RECENT DIASTOLIC BLOOD PRESSURE 80-89 MM HG: ICD-10-PCS | Mod: CPTII,S$GLB,, | Performed by: NURSE PRACTITIONER

## 2019-03-22 PROCEDURE — 3077F SYST BP >= 140 MM HG: CPT | Mod: CPTII,S$GLB,, | Performed by: NURSE PRACTITIONER

## 2019-03-22 PROCEDURE — 3077F PR MOST RECENT SYSTOLIC BLOOD PRESSURE >= 140 MM HG: ICD-10-PCS | Mod: CPTII,S$GLB,, | Performed by: NURSE PRACTITIONER

## 2019-03-22 PROCEDURE — 3044F PR MOST RECENT HEMOGLOBIN A1C LEVEL <7.0%: ICD-10-PCS | Mod: CPTII,S$GLB,, | Performed by: NURSE PRACTITIONER

## 2019-03-22 PROCEDURE — 90471 IMMUNIZATION ADMIN: CPT | Mod: S$GLB,,, | Performed by: NURSE PRACTITIONER

## 2019-03-22 PROCEDURE — 3008F PR BODY MASS INDEX (BMI) DOCUMENTED: ICD-10-PCS | Mod: CPTII,S$GLB,, | Performed by: NURSE PRACTITIONER

## 2019-03-22 PROCEDURE — 99999 PR PBB SHADOW E&M-EST. PATIENT-LVL V: CPT | Mod: PBBFAC,,, | Performed by: NURSE PRACTITIONER

## 2019-03-22 PROCEDURE — 99214 PR OFFICE/OUTPT VISIT, EST, LEVL IV, 30-39 MIN: ICD-10-PCS | Mod: 25,S$GLB,, | Performed by: NURSE PRACTITIONER

## 2019-03-22 PROCEDURE — 99214 OFFICE O/P EST MOD 30 MIN: CPT | Mod: 25,S$GLB,, | Performed by: NURSE PRACTITIONER

## 2019-03-22 PROCEDURE — 3008F BODY MASS INDEX DOCD: CPT | Mod: CPTII,S$GLB,, | Performed by: NURSE PRACTITIONER

## 2019-03-22 PROCEDURE — 1101F PR PT FALLS ASSESS DOC 0-1 FALLS W/OUT INJ PAST YR: ICD-10-PCS | Mod: CPTII,S$GLB,, | Performed by: NURSE PRACTITIONER

## 2019-03-22 PROCEDURE — 1101F PT FALLS ASSESS-DOCD LE1/YR: CPT | Mod: CPTII,S$GLB,, | Performed by: NURSE PRACTITIONER

## 2019-03-22 PROCEDURE — 3079F DIAST BP 80-89 MM HG: CPT | Mod: CPTII,S$GLB,, | Performed by: NURSE PRACTITIONER

## 2019-03-22 PROCEDURE — 90670 PNEUMOCOCCAL CONJUGATE VACCINE 13-VALENT LESS THAN 5YO & GREATER THAN: ICD-10-PCS | Mod: S$GLB,,, | Performed by: NURSE PRACTITIONER

## 2019-03-22 PROCEDURE — 90471 PNEUMOCOCCAL CONJUGATE VACCINE 13-VALENT LESS THAN 5YO & GREATER THAN: ICD-10-PCS | Mod: S$GLB,,, | Performed by: NURSE PRACTITIONER

## 2019-03-22 PROCEDURE — 3044F HG A1C LEVEL LT 7.0%: CPT | Mod: CPTII,S$GLB,, | Performed by: NURSE PRACTITIONER

## 2019-03-22 RX ORDER — IBUPROFEN 200 MG
200 TABLET ORAL EVERY 6 HOURS PRN
COMMUNITY
End: 2019-05-22 | Stop reason: ALTCHOICE

## 2019-03-22 RX ORDER — DICLOFENAC SODIUM 10 MG/G
2 GEL TOPICAL 3 TIMES DAILY PRN
Qty: 100 G | Refills: 11 | Status: ON HOLD | OUTPATIENT
Start: 2019-03-22 | End: 2021-10-04 | Stop reason: HOSPADM

## 2019-03-22 RX ORDER — HYDRALAZINE HYDROCHLORIDE 25 MG/1
25 TABLET, FILM COATED ORAL EVERY 8 HOURS
Qty: 90 TABLET | Refills: 8 | Status: SHIPPED | OUTPATIENT
Start: 2019-03-22 | End: 2019-05-08

## 2019-03-22 NOTE — PROGRESS NOTES
"Subjective:       Patient ID: Jhonny Almazan is a 63 y.o. male.    Chief Complaint: Diabetes    Mr. Almazan presents to the clinic today for three month follow up diabetes and hypertension.  He is obese, gained 7 lb since last visit.  He does not eat a healthy diet or exercise. He is still smoking.  He will consider a low dose lung CT and call his insurance company to see if they cover this.  He is due for colonoscopy, he was due to have this done 2 years ago but put it off.  He has uncontrolled hypertension; his BP low too low with hydralazine 50 mg so this was cut in half and BP at home is "up and down".  He is due for eye exam.  He complains of RUQ pain x 1 week, worse in the morning when he wakes up so he takes ibuprofen and this helps. No N/V/D.  No hematuria, dysuria, fever, chills.  He has bilateral thumb pain.  This is chronic and he thinks he has arthritis.  Sometimes the thumbs get "tingly" but not numb.  No wrist pain.      Review of Systems   Constitutional: Negative for chills and fever.   HENT: Negative for congestion, ear pain and sinus pressure.    Eyes: Negative for visual disturbance.   Respiratory: Negative for cough, shortness of breath and wheezing.    Cardiovascular: Negative for chest pain, palpitations and leg swelling.   Gastrointestinal: Positive for abdominal pain and constipation (occasional hard stools). Negative for diarrhea, nausea and vomiting.   Skin: Negative for rash and wound.   Neurological: Negative for dizziness, weakness, numbness and headaches.       Objective:      Physical Exam   Constitutional: He is oriented to person, place, and time. He appears well-developed and well-nourished. No distress.   HENT:   Head: Normocephalic and atraumatic.   Right Ear: External ear normal.   Left Ear: External ear normal.   Mouth/Throat: Oropharynx is clear and moist. No oropharyngeal exudate.   Eyes: Pupils are equal, round, and reactive to light. Right eye exhibits no discharge. Left " eye exhibits no discharge.   Neck: Neck supple. No thyromegaly present.   Cardiovascular: Normal rate and regular rhythm. Exam reveals no gallop and no friction rub.   No murmur heard.  Pulmonary/Chest: Effort normal and breath sounds normal. No respiratory distress. He has no wheezes. He has no rales.   Abdominal: Soft. Bowel sounds are normal. He exhibits no distension. There is tenderness (mild) in the right upper quadrant. There is no rigidity and negative Tomlin's sign.   Lymphadenopathy:     He has no cervical adenopathy.   Neurological: He is alert and oriented to person, place, and time. Coordination normal.   Skin: Skin is warm and dry.   Psychiatric: He has a normal mood and affect. His behavior is normal. Thought content normal.   Vitals reviewed.          Current Outpatient Medications:     ACETAMINOPHEN (TYLENOL ARTHRITIS ORAL), Take by mouth., Disp: , Rfl:     aspirin (ECOTRIN) 81 MG EC tablet, Take 81 mg by mouth once daily., Disp: , Rfl:     buPROPion (WELLBUTRIN XL) 150 MG TB24 tablet, Take 1 tablet (150 mg total) by mouth once daily., Disp: 30 tablet, Rfl: 11    co-enzyme Q-10 50 mg capsule, Take 4 capsules (200 mg total) by mouth once daily., Disp: 120 capsule, Rfl: 11    folic acid-vit B6-vit B12 2.5-25-2 mg (FOLBIC OR EQUIV) 2.5-25-2 mg Tab, Take 1 tablet by mouth once daily., Disp: 90 tablet, Rfl: 4    gabapentin (NEURONTIN) 100 MG capsule, Take 1 capsule (100 mg total) by mouth 3 (three) times daily., Disp: 90 capsule, Rfl: 11    glipiZIDE (GLUCOTROL) 10 MG TR24, TAKE 1 TABLET BY MOUTH DAILY WITH BREAKFAST, Disp: 90 tablet, Rfl: 2    hydrALAZINE (APRESOLINE) 25 MG tablet, Take 1 tablet (25 mg total) by mouth every 8 (eight) hours., Disp: 90 tablet, Rfl: 8    ibuprofen (ADVIL,MOTRIN) 200 MG tablet, Take 200 mg by mouth every 6 (six) hours as needed for Pain., Disp: , Rfl:     lisinopril-hydrochlorothiazide (PRINZIDE,ZESTORETIC) 20-12.5 mg per tablet, TAKE 1 TABLET BY MOUTH 2 (TWO)  TIMES DAILY., Disp: 180 tablet, Rfl: 0    metFORMIN (GLUCOPHAGE-XR) 500 MG 24 hr tablet, TAKE 2 TABLETS (1,000 MG TOTAL) BY MOUTH 2 (TWO) TIMES DAILY., Disp: 360 tablet, Rfl: 0    ONETOUCH ULTRA TEST Strp, , Disp: , Rfl:     ONETOUCH ULTRASOFT LANCETS lancets, , Disp: , Rfl:     pioglitazone (ACTOS) 30 MG tablet, TAKE 1 TABLET (30 MG TOTAL) BY MOUTH ONCE DAILY., Disp: 90 tablet, Rfl: 2    rosuvastatin (CRESTOR) 20 MG tablet, TAKE 1 TABLET (20 MG TOTAL) BY MOUTH ONCE DAILY., Disp: 90 tablet, Rfl: 1    diclofenac sodium (VOLTAREN) 1 % Gel, Apply 2 g topically 3 (three) times daily as needed. For hand pain, Disp: 100 g, Rfl: 11  Assessment:       1. Uncontrolled type 2 diabetes mellitus with hyperglycemia    2. Hypertension associated with diabetes    3. Bilateral hand pain    4. Hypercalcemia    5. Immunization deficiency    6. History of colon polyps    7. RUQ pain    8. Constipation, unspecified constipation type    9. Obesity (BMI 30-39.9)        Plan:       Uncontrolled type 2 diabetes mellitus with hyperglycemia  -     Ambulatory referral to Optometry  -     Hemoglobin A1c; Future; Expected date: 03/22/2019  -     Lipid panel; Future; Expected date: 03/22/2019  -     Comprehensive metabolic panel; Future; Expected date: 03/22/2019    Hypertension associated with diabetes  Taking hydralazine TID may decrease fluctuations.  RTC 4 wk nurse visit BP check.  -     hydrALAZINE (APRESOLINE) 25 MG tablet; Take 1 tablet (25 mg total) by mouth every 8 (eight) hours.  Dispense: 90 tablet; Refill: 8    Bilateral hand pain  -     diclofenac sodium (VOLTAREN) 1 % Gel; Apply 2 g topically 3 (three) times daily as needed. For hand pain  Dispense: 100 g; Refill: 11    Hypercalcemia  -     PTH, intact; Future; Expected date: 03/22/2019  -     Vitamin D; Future; Expected date: 03/22/2019    Immunization deficiency  -     (In Office Administered) Pneumococcal Conjugate Vaccine (13 Valent) (IM)    History of colon polyps  -      Ambulatory referral to Gastroenterology    RUQ pain  -     US Abdomen Complete; Future; Expected date: 03/22/2019    Constipation, unspecified constipation type  Increase fiber.  Increase water intake and exercise.  OK to take Colace OTC if needed.    Obesity  Patient readiness: acceptance and barriers:readiness    During the course of the visit the patient was educated and counseled about the following:     Diabetes:  Discussed general issues about diabetes pathophysiology and management.  Addressed ADA diet.  Encouraged aerobic exercise.  Hypertension:   Medication: see above.  Dietary sodium restriction.  Regular aerobic exercise.  Obesity:   Diet interventions: qualitative changes (increase low-fat,  high-fiber foods).  Regular aerobic exercise program discussed.    Goals: Diabetes: Maintain Hemoglobin A1C below 7, Hypertension: Reduce Blood Pressure and Obesity: Reduce calorie intake and BMI    Did patient meet goals/outcomes: No    The following self management tools provided: blood pressure log    Patient Instructions (the written plan) was given to the patient/family.     Time spent with patient: 30 minutes    Barriers to medications present (no )    Adverse reactions to current medications (no)    Over the counter medications reviewed (Yes)

## 2019-03-29 ENCOUNTER — HOSPITAL ENCOUNTER (OUTPATIENT)
Dept: RADIOLOGY | Facility: CLINIC | Age: 64
Discharge: HOME | End: 2019-03-29
Attending: NURSE PRACTITIONER
Payer: COMMERCIAL

## 2019-03-29 DIAGNOSIS — R10.11 RUQ PAIN: ICD-10-CM

## 2019-03-29 PROCEDURE — 76700 US ABDOMEN COMPLETE: ICD-10-PCS | Mod: 26,,, | Performed by: RADIOLOGY

## 2019-03-29 PROCEDURE — 76700 US EXAM ABDOM COMPLETE: CPT | Mod: 26,,, | Performed by: RADIOLOGY

## 2019-03-29 PROCEDURE — 76700 US EXAM ABDOM COMPLETE: CPT | Mod: TC,PO

## 2019-04-02 DIAGNOSIS — Z12.11 SCREENING FOR COLON CANCER: Primary | ICD-10-CM

## 2019-04-02 DIAGNOSIS — Z86.010 HISTORY OF COLON POLYPS: ICD-10-CM

## 2019-04-07 ENCOUNTER — HOSPITAL ENCOUNTER (EMERGENCY)
Facility: HOSPITAL | Age: 64
Discharge: HOME | End: 2019-04-07
Attending: EMERGENCY MEDICINE
Payer: COMMERCIAL

## 2019-04-07 VITALS
BODY MASS INDEX: 30.7 KG/M2 | SYSTOLIC BLOOD PRESSURE: 156 MMHG | RESPIRATION RATE: 16 BRPM | OXYGEN SATURATION: 98 % | HEIGHT: 77 IN | DIASTOLIC BLOOD PRESSURE: 81 MMHG | TEMPERATURE: 99 F | HEART RATE: 86 BPM | WEIGHT: 260 LBS

## 2019-04-07 DIAGNOSIS — R10.9 RIGHT FLANK PAIN: Primary | ICD-10-CM

## 2019-04-07 DIAGNOSIS — R10.9 ABDOMINAL PAIN: ICD-10-CM

## 2019-04-07 LAB
ALBUMIN SERPL BCP-MCNC: 3.8 G/DL (ref 3.5–5.2)
ALP SERPL-CCNC: 61 U/L (ref 55–135)
ALT SERPL W/O P-5'-P-CCNC: 24 U/L (ref 10–44)
ANION GAP SERPL CALC-SCNC: 9 MMOL/L (ref 8–16)
AST SERPL-CCNC: 15 U/L (ref 10–40)
BASOPHILS # BLD AUTO: 0 K/UL (ref 0–0.2)
BASOPHILS NFR BLD: 0.5 % (ref 0–1.9)
BILIRUB SERPL-MCNC: 0.5 MG/DL (ref 0.1–1)
BILIRUB UR QL STRIP: ABNORMAL
BUN SERPL-MCNC: 10 MG/DL (ref 8–23)
CALCIUM SERPL-MCNC: 9.8 MG/DL (ref 8.7–10.5)
CHLORIDE SERPL-SCNC: 103 MMOL/L (ref 95–110)
CLARITY UR: CLEAR
CO2 SERPL-SCNC: 25 MMOL/L (ref 23–29)
COLOR UR: YELLOW
CREAT SERPL-MCNC: 0.8 MG/DL (ref 0.5–1.4)
D DIMER PPP IA.FEU-MCNC: 0.41 MG/L FEU
DIFFERENTIAL METHOD: ABNORMAL
EOSINOPHIL # BLD AUTO: 0.2 K/UL (ref 0–0.5)
EOSINOPHIL NFR BLD: 3.2 % (ref 0–8)
ERYTHROCYTE [DISTWIDTH] IN BLOOD BY AUTOMATED COUNT: 13.4 % (ref 11.5–14.5)
EST. GFR  (AFRICAN AMERICAN): >60 ML/MIN/1.73 M^2
EST. GFR  (NON AFRICAN AMERICAN): >60 ML/MIN/1.73 M^2
GLUCOSE SERPL-MCNC: 181 MG/DL (ref 70–110)
GLUCOSE UR QL STRIP: NEGATIVE
HCT VFR BLD AUTO: 42.9 % (ref 40–54)
HGB BLD-MCNC: 14.5 G/DL (ref 14–18)
HGB UR QL STRIP: NEGATIVE
KETONES UR QL STRIP: NEGATIVE
LEUKOCYTE ESTERASE UR QL STRIP: NEGATIVE
LIPASE SERPL-CCNC: 22 U/L (ref 4–60)
LYMPHOCYTES # BLD AUTO: 1.6 K/UL (ref 1–4.8)
LYMPHOCYTES NFR BLD: 22.8 % (ref 18–48)
MCH RBC QN AUTO: 31.5 PG (ref 27–31)
MCHC RBC AUTO-ENTMCNC: 33.8 G/DL (ref 32–36)
MCV RBC AUTO: 93 FL (ref 82–98)
MONOCYTES # BLD AUTO: 0.5 K/UL (ref 0.3–1)
MONOCYTES NFR BLD: 7.5 % (ref 4–15)
NEUTROPHILS # BLD AUTO: 4.5 K/UL (ref 1.8–7.7)
NEUTROPHILS NFR BLD: 66 % (ref 38–73)
NITRITE UR QL STRIP: NEGATIVE
PH UR STRIP: 6 [PH] (ref 5–8)
PLATELET # BLD AUTO: 174 K/UL (ref 150–350)
PMV BLD AUTO: 7.9 FL (ref 9.2–12.9)
POTASSIUM SERPL-SCNC: 3.8 MMOL/L (ref 3.5–5.1)
PROT SERPL-MCNC: 6.6 G/DL (ref 6–8.4)
PROT UR QL STRIP: NEGATIVE
RBC # BLD AUTO: 4.61 M/UL (ref 4.6–6.2)
SODIUM SERPL-SCNC: 137 MMOL/L (ref 136–145)
SP GR UR STRIP: 1.02 (ref 1–1.03)
TROPONIN I SERPL DL<=0.01 NG/ML-MCNC: 0.01 NG/ML (ref 0–0.03)
URN SPEC COLLECT METH UR: ABNORMAL
UROBILINOGEN UR STRIP-ACNC: 1 EU/DL
WBC # BLD AUTO: 6.9 K/UL (ref 3.9–12.7)

## 2019-04-07 PROCEDURE — 81003 URINALYSIS AUTO W/O SCOPE: CPT

## 2019-04-07 PROCEDURE — 85379 FIBRIN DEGRADATION QUANT: CPT

## 2019-04-07 PROCEDURE — 83690 ASSAY OF LIPASE: CPT

## 2019-04-07 PROCEDURE — 99285 EMERGENCY DEPT VISIT HI MDM: CPT | Mod: 25

## 2019-04-07 PROCEDURE — 63600175 PHARM REV CODE 636 W HCPCS

## 2019-04-07 PROCEDURE — 80053 COMPREHEN METABOLIC PANEL: CPT

## 2019-04-07 PROCEDURE — 85025 COMPLETE CBC W/AUTO DIFF WBC: CPT

## 2019-04-07 PROCEDURE — 96374 THER/PROPH/DIAG INJ IV PUSH: CPT

## 2019-04-07 PROCEDURE — 36415 COLL VENOUS BLD VENIPUNCTURE: CPT

## 2019-04-07 PROCEDURE — 84484 ASSAY OF TROPONIN QUANT: CPT

## 2019-04-07 PROCEDURE — 93005 ELECTROCARDIOGRAM TRACING: CPT

## 2019-04-07 RX ORDER — MORPHINE SULFATE 2 MG/ML
INJECTION, SOLUTION INTRAMUSCULAR; INTRAVENOUS
Status: COMPLETED
Start: 2019-04-07 | End: 2019-04-07

## 2019-04-07 RX ORDER — MORPHINE SULFATE 4 MG/ML
4 INJECTION, SOLUTION INTRAMUSCULAR; INTRAVENOUS
Status: DISCONTINUED | OUTPATIENT
Start: 2019-04-07 | End: 2019-04-07 | Stop reason: HOSPADM

## 2019-04-07 RX ORDER — HYDROCODONE BITARTRATE AND ACETAMINOPHEN 5; 325 MG/1; MG/1
1 TABLET ORAL EVERY 4 HOURS PRN
Qty: 10 TABLET | Refills: 0 | Status: SHIPPED | OUTPATIENT
Start: 2019-04-07 | End: 2019-04-16 | Stop reason: SDUPTHER

## 2019-04-07 RX ADMIN — MORPHINE SULFATE 4 MG: 2 INJECTION, SOLUTION INTRAMUSCULAR; INTRAVENOUS at 09:04

## 2019-04-07 NOTE — ED PROVIDER NOTES
Encounter Date: 4/7/2019    SCRIBE #1 NOTE: I, Tobin Ga, am scribing for, and in the presence of, Dr. Cazares.       History     Chief Complaint   Patient presents with    Abdominal Pain     for past month     04/07/2019 8:51 AM    The pt is a 63 y.o. male with a past medical history anticoagulant long-term use, DM, and HTN presenting to the ED with a gradual onset of acute right flank pain radiating to the right upper quadrant beginning 1 month ago.  He stated abdominal pain is alleviated after eating and alleviated with taking deep breaths. Associated symptom of sleep disturbances. The pt denied chest pain, SOB, vomiting, diarrhea, and blood in stool. He stated he is compliant with blood pressure and DM medications.  No hematuria.  No lower abdominal pain  He stated he took ibuprofen with mild improvement of symptoms. The pt has a history of cigarette smoking. He has a past surgical history of back surgery.     The history is provided by the patient.     Review of patient's allergies indicates:  No Known Allergies  Past Medical History:   Diagnosis Date    Anticoagulant long-term use     Arthritis     Diabetes mellitus     Hypertension      Past Surgical History:   Procedure Laterality Date    BACK SURGERY      COLONOSCOPY N/A 7/18/2014    Performed by Guero Crane MD at [facility]     Family History   Problem Relation Age of Onset    Heart disease Mother     Heart disease Father     Diabetes Brother     Suicide Brother     Brain cancer Brother      Social History     Tobacco Use    Smoking status: Current Every Day Smoker     Packs/day: 1.00     Years: 50.00     Pack years: 50.00     Types: Cigarettes   Substance Use Topics    Alcohol use: Yes     Comment: 2-3 beers daily    Drug use: No     Review of Systems   Constitutional: Negative for fever.   HENT: Negative for congestion.    Respiratory: Negative for cough and shortness of breath.    Cardiovascular: Negative for chest pain.    Gastrointestinal: Positive for abdominal pain. Negative for diarrhea, nausea and vomiting.   Genitourinary: Negative for flank pain.   Musculoskeletal: Negative for back pain.   Skin: Negative for rash.   Neurological: Negative for headaches.   Psychiatric/Behavioral: Positive for sleep disturbance.   All other systems reviewed and are negative.      Physical Exam     Initial Vitals [04/07/19 0835]   BP Pulse Resp Temp SpO2   (!) 196/100 94 20 98.8 °F (37.1 °C) 96 %      MAP       --         Physical Exam    Nursing note and vitals reviewed.  Constitutional: He appears well-developed and well-nourished. He is not diaphoretic.  Non-toxic appearance. He does not have a sickly appearance. He does not appear ill. No distress.   HENT:   Head: Normocephalic and atraumatic.   Eyes: EOM are normal.   Neck: Normal range of motion. Neck supple. Normal range of motion present. No neck rigidity.   Cardiovascular: Normal rate, regular rhythm and normal heart sounds. Exam reveals no gallop and no friction rub.    No murmur heard.  Pulmonary/Chest: Breath sounds normal. No respiratory distress. He has no wheezes. He has no rhonchi. He has no rales.   Abdominal: He exhibits no distension. There is tenderness in the right upper quadrant. There is no rebound and no guarding.   Musculoskeletal: Normal range of motion.   No midline thoracic or lumbar spinous process tenderness   Neurological: He is alert and oriented to person, place, and time.   Skin: Skin is warm and dry. No rash noted.   No skin rash to suggest zoster   Psychiatric: He has a normal mood and affect. His behavior is normal. Judgment and thought content normal.         ED Course   Procedures  Labs Reviewed   CBC W/ AUTO DIFFERENTIAL - Abnormal; Notable for the following components:       Result Value    MCH 31.5 (*)     MPV 7.9 (*)     All other components within normal limits   COMPREHENSIVE METABOLIC PANEL - Abnormal; Notable for the following components:     Glucose 181 (*)     All other components within normal limits   URINALYSIS, REFLEX TO URINE CULTURE - Abnormal; Notable for the following components:    Bilirubin (UA) 1+ (*)     All other components within normal limits    Narrative:     Preferred Collection Type->Urine, Clean Catch   LIPASE   D DIMER, QUANTITATIVE   TROPONIN I     EKG Readings: (Independently Interpreted)   Initial Reading: No STEMI. Rhythm: Normal Sinus Rhythm. Heart Rate: 82. ST Segments: Normal ST Segments. T Waves: Normal. Axis: Normal.   Normal interval  No ST elevation or depression  No T wave inversion       Imaging Results          US Abdomen Limited (Final result)  Result time 04/07/19 10:18:23    Final result by Сергей Romo MD (04/07/19 10:18:23)                 Impression:      The gallbladder is unremarkable.    Fatty liver infiltration      Electronically signed by: Сергей Romo MD  Date:    04/07/2019  Time:    10:18             Narrative:    EXAMINATION:  US ABDOMEN LIMITED    CLINICAL HISTORY:  Right upper quadrant pain;    TECHNIQUE:  Limited ultrasound of the right upper quadrant of the abdomen (including pancreas, liver, gallbladder, common bile duct was performed.    COMPARISON:  March 29th 2019    FINDINGS:  There is fatty liver infiltration.  There is focal fatty sparing adjacent to the gallbladder.  The common bile duct measures 5 mm.  The right lobe of the liver measures 15 cm.  The right kidney measures 12.2 cm. the common bile duct measures 5 mm.  The visualized portions of the pancreas are unremarkable.)                               X-Ray Chest PA And Lateral (Final result)  Result time 04/07/19 09:49:51    Final result by Сергей Romo MD (04/07/19 09:49:51)                 Impression:      Enlargement of the cardiac silhouette (increased relative to 2017)    No definite acute cardiopulmonary disease      Electronically signed by: Сергей Romo MD  Date:    04/07/2019  Time:    09:49             Narrative:     EXAMINATION:  XR CHEST PA AND LATERAL    CLINICAL HISTORY:  Right chest pain;    TECHNIQUE:  PA and lateral views of the chest were performed.    COMPARISON:  January 16, 2017    FINDINGS:  There is moderate enlargement of cardiac silhouette.  This could relate to cardiomegaly or pericardial effusion this has increased relative the previous exam.  The aorta is tortuous.  No confluent infiltrates are seen.  Mild degenerative changes are seen in the midthoracic region.                                 Medical Decision Making:   History:   Old Medical Records: I decided to obtain old medical records.  Clinical Tests:   Lab Tests: Reviewed and Ordered  Radiological Study: Ordered and Reviewed  Medical Tests: Reviewed and Ordered            Scribe Attestation:   Scribe #1: I performed the above scribed service and the documentation accurately describes the services I performed. I attest to the accuracy of the note.    I, Dr. Leach, personally performed the services described in this documentation. All medical record entries made by the scribe were at my direction and in my presence.  I have reviewed the chart and agree that the record reflects my personal performance and is accurate and complete.1:57 PM 04/07/2019            ED Course as of Apr 07 1141   Sun Apr 07, 2019   0843 BP(!): 196/100 [EF]   0843 Temp: 98.8 °F (37.1 °C) [EF]   0843 Temp src: Oral [EF]   0843 Pulse: 94 [EF]   0843 Resp: 20 [EF]   0843 SpO2: 96 % [EF]   0957 X-Ray Chest PA And Lateral [EF]   1036 US Abdomen Limited [EF]   1036 X-Ray Chest PA And Lateral [EF]   1136 Urinalysis normal. Patient can be discharged home    [EF]   1137 63-year-old presents to the emergency room with a month of right flank pain radiating around to the right upper abdomen.  Tender in the right upper quadrant on exam.  No overlying rash to suggest zoster.  Entire emergency room workup for any acute finding has been unremarkable. Ultrasound demonstrates only fatty liver.   Cardiac enzymes urinalysis D-dimer all normal. Differential includes thoracic radiculopathy or peptic ulcer or duodenal ulcer or undiagnosed gallbladder dysfunction.  Patient does not need to be admitted to the hospital.  His discomfort is well controlled at this time.  No indication for admission.  No evidence at this time of myocardial infarction PE acute cholecystitis.    [EF]      ED Course User Index  [EF] Gerber Leach MD     Clinical Impression:       ICD-10-CM ICD-9-CM   1. Right flank pain R10.9 789.09   2. Abdominal pain R10.9 789.00         Disposition:   Disposition: Discharged  Condition: Stable                        Gerber Leach MD  04/07/19 5735

## 2019-04-08 ENCOUNTER — TELEPHONE (OUTPATIENT)
Dept: FAMILY MEDICINE | Facility: CLINIC | Age: 64
End: 2019-04-08

## 2019-04-08 NOTE — TELEPHONE ENCOUNTER
----- Message from Alivia Quinones sent at 4/8/2019  1:12 PM CDT -----  Contact: patient  Type: Needs Medical Advice    Who Called:  patient  Symptoms (please be specific): side is hurting  How long has patient had these symptoms:  na  Pharmacy name and phone #:  [facility]  Best Call Back Number:   Additional Information: Patient states he would like to speak with the office. Pleas call to advise. Thanks!

## 2019-04-10 ENCOUNTER — TELEPHONE (OUTPATIENT)
Dept: GASTROENTEROLOGY | Facility: CLINIC | Age: 64
End: 2019-04-10

## 2019-04-10 ENCOUNTER — OFFICE VISIT (OUTPATIENT)
Dept: FAMILY MEDICINE | Facility: CLINIC | Age: 64
End: 2019-04-10
Payer: COMMERCIAL

## 2019-04-10 VITALS
BODY MASS INDEX: 31.42 KG/M2 | DIASTOLIC BLOOD PRESSURE: 70 MMHG | SYSTOLIC BLOOD PRESSURE: 134 MMHG | HEART RATE: 94 BPM | WEIGHT: 266.13 LBS | HEIGHT: 77 IN | TEMPERATURE: 99 F

## 2019-04-10 DIAGNOSIS — E11.59 HYPERTENSION ASSOCIATED WITH DIABETES: ICD-10-CM

## 2019-04-10 DIAGNOSIS — R10.11 RUQ PAIN: Primary | ICD-10-CM

## 2019-04-10 DIAGNOSIS — I15.2 HYPERTENSION ASSOCIATED WITH DIABETES: ICD-10-CM

## 2019-04-10 DIAGNOSIS — E66.9 OBESITY (BMI 30-39.9): ICD-10-CM

## 2019-04-10 DIAGNOSIS — E11.65 UNCONTROLLED TYPE 2 DIABETES MELLITUS WITH HYPERGLYCEMIA: ICD-10-CM

## 2019-04-10 PROCEDURE — 3078F PR MOST RECENT DIASTOLIC BLOOD PRESSURE < 80 MM HG: ICD-10-PCS | Mod: CPTII,S$GLB,, | Performed by: NURSE PRACTITIONER

## 2019-04-10 PROCEDURE — 3044F PR MOST RECENT HEMOGLOBIN A1C LEVEL <7.0%: ICD-10-PCS | Mod: CPTII,S$GLB,, | Performed by: NURSE PRACTITIONER

## 2019-04-10 PROCEDURE — 3044F HG A1C LEVEL LT 7.0%: CPT | Mod: CPTII,S$GLB,, | Performed by: NURSE PRACTITIONER

## 2019-04-10 PROCEDURE — 3078F DIAST BP <80 MM HG: CPT | Mod: CPTII,S$GLB,, | Performed by: NURSE PRACTITIONER

## 2019-04-10 PROCEDURE — 99213 PR OFFICE/OUTPT VISIT, EST, LEVL III, 20-29 MIN: ICD-10-PCS | Mod: S$GLB,,, | Performed by: NURSE PRACTITIONER

## 2019-04-10 PROCEDURE — 99213 OFFICE O/P EST LOW 20 MIN: CPT | Mod: S$GLB,,, | Performed by: NURSE PRACTITIONER

## 2019-04-10 PROCEDURE — 1101F PT FALLS ASSESS-DOCD LE1/YR: CPT | Mod: CPTII,S$GLB,, | Performed by: NURSE PRACTITIONER

## 2019-04-10 PROCEDURE — 3075F SYST BP GE 130 - 139MM HG: CPT | Mod: CPTII,S$GLB,, | Performed by: NURSE PRACTITIONER

## 2019-04-10 PROCEDURE — 3008F PR BODY MASS INDEX (BMI) DOCUMENTED: ICD-10-PCS | Mod: CPTII,S$GLB,, | Performed by: NURSE PRACTITIONER

## 2019-04-10 PROCEDURE — 1101F PR PT FALLS ASSESS DOC 0-1 FALLS W/OUT INJ PAST YR: ICD-10-PCS | Mod: CPTII,S$GLB,, | Performed by: NURSE PRACTITIONER

## 2019-04-10 PROCEDURE — 3075F PR MOST RECENT SYSTOLIC BLOOD PRESS GE 130-139MM HG: ICD-10-PCS | Mod: CPTII,S$GLB,, | Performed by: NURSE PRACTITIONER

## 2019-04-10 PROCEDURE — 3008F BODY MASS INDEX DOCD: CPT | Mod: CPTII,S$GLB,, | Performed by: NURSE PRACTITIONER

## 2019-04-10 PROCEDURE — 99999 PR PBB SHADOW E&M-EST. PATIENT-LVL V: ICD-10-PCS | Mod: PBBFAC,,, | Performed by: NURSE PRACTITIONER

## 2019-04-10 PROCEDURE — 99999 PR PBB SHADOW E&M-EST. PATIENT-LVL V: CPT | Mod: PBBFAC,,, | Performed by: NURSE PRACTITIONER

## 2019-04-10 NOTE — TELEPHONE ENCOUNTER
----- Message from Sandhya Orlando sent at 4/10/2019  1:39 PM CDT -----  [phone]- pt had a colonoscopy scheduled for 04/26/19, he was seen today in FP with Nicolette Rudd NP for upper right sided pain so she would like him to be seen in office with Dr Waite  Before procedure due to these problems.  Please call pt Thanks

## 2019-04-10 NOTE — PROGRESS NOTES
Subjective:       Patient ID: Jhonny Almazan is a 63 y.o. male.    Chief Complaint: No chief complaint on file.    Mr. Almazan presents to the clinic today for ED follow up for RUQ abdominal pain. He was seen in ED on 4/7 for RUQ pain radiating to right upper back. He has had this pain for about one month.  This does not have any association with food or eating.  This is worse in the morning.  He denies nausea, vomiting.  He admits to a lot of belching.  He has constipation relieved with docusate.  He denies blood in stool.  He was given prescription for Norco which he is taking for the pain.  Ultrasound abdomen showed fatty liver but nothing acute.      Review of Systems   Constitutional: Negative for chills and fever.   Respiratory: Negative for cough, shortness of breath and wheezing.    Cardiovascular: Negative for chest pain, palpitations and leg swelling.   Gastrointestinal: Positive for abdominal pain. Negative for blood in stool, constipation, diarrhea, nausea and vomiting.        + belching   Genitourinary: Positive for flank pain. Negative for hematuria.   Musculoskeletal: Positive for back pain (right upper).       Objective:      Physical Exam   Constitutional: He is oriented to person, place, and time. He appears well-developed and well-nourished. No distress.   HENT:   Head: Normocephalic and atraumatic.   Right Ear: External ear normal.   Left Ear: External ear normal.   Mouth/Throat: Oropharynx is clear and moist. No oropharyngeal exudate.   Eyes: Right eye exhibits no discharge. Left eye exhibits no discharge.   Cardiovascular: Normal rate and regular rhythm. Exam reveals no gallop and no friction rub.   No murmur heard.  Pulmonary/Chest: Effort normal and breath sounds normal. No respiratory distress. He has no wheezes. He has no rales.   Abdominal: Soft. He exhibits no distension. There is no tenderness.   Musculoskeletal:        Arms:  Neurological: He is alert and oriented to person, place,  and time. Coordination normal.   Skin: Skin is warm and dry.   Psychiatric: He has a normal mood and affect. His behavior is normal. Thought content normal.   Vitals reviewed.          Current Outpatient Medications:     ACETAMINOPHEN (TYLENOL ARTHRITIS ORAL), Take by mouth., Disp: , Rfl:     aspirin (ECOTRIN) 81 MG EC tablet, Take 81 mg by mouth once daily., Disp: , Rfl:     buPROPion (WELLBUTRIN XL) 150 MG TB24 tablet, Take 1 tablet (150 mg total) by mouth once daily., Disp: 30 tablet, Rfl: 11    co-enzyme Q-10 50 mg capsule, Take 4 capsules (200 mg total) by mouth once daily., Disp: 120 capsule, Rfl: 11    diclofenac sodium (VOLTAREN) 1 % Gel, Apply 2 g topically 3 (three) times daily as needed. For hand pain, Disp: 100 g, Rfl: 11    folic acid-vit B6-vit B12 2.5-25-2 mg (FOLBIC OR EQUIV) 2.5-25-2 mg Tab, Take 1 tablet by mouth once daily., Disp: 90 tablet, Rfl: 4    gabapentin (NEURONTIN) 100 MG capsule, Take 1 capsule (100 mg total) by mouth 3 (three) times daily., Disp: 90 capsule, Rfl: 11    glipiZIDE (GLUCOTROL) 10 MG TR24, TAKE 1 TABLET BY MOUTH DAILY WITH BREAKFAST, Disp: 90 tablet, Rfl: 2    hydrALAZINE (APRESOLINE) 25 MG tablet, Take 1 tablet (25 mg total) by mouth every 8 (eight) hours., Disp: 90 tablet, Rfl: 8    HYDROcodone-acetaminophen (NORCO) 5-325 mg per tablet, Take 1 tablet by mouth every 4 (four) hours as needed., Disp: 10 tablet, Rfl: 0    ibuprofen (ADVIL,MOTRIN) 200 MG tablet, Take 200 mg by mouth every 6 (six) hours as needed for Pain., Disp: , Rfl:     lisinopril-hydrochlorothiazide (PRINZIDE,ZESTORETIC) 20-12.5 mg per tablet, TAKE 1 TABLET BY MOUTH 2 (TWO) TIMES DAILY., Disp: 180 tablet, Rfl: 0    metFORMIN (GLUCOPHAGE-XR) 500 MG 24 hr tablet, TAKE 2 TABLETS (1,000 MG TOTAL) BY MOUTH 2 (TWO) TIMES DAILY., Disp: 360 tablet, Rfl: 0    ONETOUCH ULTRA TEST Strp, , Disp: , Rfl:     ONETOUCH ULTRASOFT LANCETS lancets, , Disp: , Rfl:     pioglitazone (ACTOS) 30 MG tablet, TAKE 1  TABLET (30 MG TOTAL) BY MOUTH ONCE DAILY., Disp: 90 tablet, Rfl: 2    rosuvastatin (CRESTOR) 20 MG tablet, TAKE 1 TABLET (20 MG TOTAL) BY MOUTH ONCE DAILY., Disp: 90 tablet, Rfl: 1  Assessment:       1. RUQ pain    2. Hypertension associated with diabetes    3. Uncontrolled type 2 diabetes mellitus with hyperglycemia    4. Obesity (BMI 30-39.9)        Plan:       RUQ pain  Patient has colonoscopy scheduled 4/26 so I will try to have him see GI provider before colonoscopy in case he needs EGD or further workup.  If Gastroenterology feels no GI pathology, could be musculoskeletal etiology.  Advised patient Norco is not a chronic medication.  He can take Tylenol when he runs out of [place].  -     Ambulatory referral to Gastroenterology    Hypertension associated with diabetes  Stable, continue current medication.    Uncontrolled type 2 diabetes mellitus with hyperglycemia  Stable, continue current medication.    Obesity (BMI 30-39.9)  Patient readiness: acceptance and barriers:none    During the course of the visit the patient was educated and counseled about the following:     Diabetes:  Discussed general issues about diabetes pathophysiology and management.  Hypertension:   Medication: no change.  Obesity:   Diet interventions: qualitative changes (increase low-fat,  high-fiber foods).  Regular aerobic exercise program discussed.    Goals: Diabetes: Maintain Hemoglobin A1C below 7, Hypertension: Reduce Blood Pressure and Obesity: Reduce calorie intake and BMI    Did patient meet goals/outcomes: Yes    The following self management tools provided: declined    Patient Instructions (the written plan) was given to the patient/family.     Time spent with patient: 15 minutes    Barriers to medications present (no )    Adverse reactions to current medications (no)    Over the counter medications reviewed (Yes)

## 2019-04-11 ENCOUNTER — TELEPHONE (OUTPATIENT)
Dept: GASTROENTEROLOGY | Facility: CLINIC | Age: 64
End: 2019-04-11

## 2019-04-11 NOTE — TELEPHONE ENCOUNTER
----- Message from Jhonny Romo sent at 4/11/2019  8:06 AM CDT -----  Contact: Patient  Type:  Patient Returning Call    Who Called:  Patient  Who Left Message for Patient:  José Miguel Dobbins  Does the patient know what this is regarding?:  scheduling  Best Call Back Number:  [phone]  Additional Information:  NA

## 2019-04-11 NOTE — TELEPHONE ENCOUNTER
Patient having abd pain, given first available with JAIRO Conde and placed on cancel list , will leave colonoscopy as schedule until appointment on 4/23

## 2019-04-15 ENCOUNTER — HOSPITAL ENCOUNTER (EMERGENCY)
Facility: HOSPITAL | Age: 64
Discharge: HOME | End: 2019-04-15
Attending: EMERGENCY MEDICINE
Payer: COMMERCIAL

## 2019-04-15 VITALS
SYSTOLIC BLOOD PRESSURE: 187 MMHG | BODY MASS INDEX: 30.7 KG/M2 | WEIGHT: 260 LBS | RESPIRATION RATE: 18 BRPM | TEMPERATURE: 99 F | HEART RATE: 92 BPM | HEIGHT: 77 IN | OXYGEN SATURATION: 100 % | DIASTOLIC BLOOD PRESSURE: 86 MMHG

## 2019-04-15 DIAGNOSIS — R10.9 RIGHT FLANK PAIN: Primary | ICD-10-CM

## 2019-04-15 PROCEDURE — 99283 EMERGENCY DEPT VISIT LOW MDM: CPT

## 2019-04-15 RX ORDER — METHOCARBAMOL 500 MG/1
1000 TABLET, FILM COATED ORAL 3 TIMES DAILY PRN
Qty: 30 TABLET | Refills: 0 | Status: SHIPPED | OUTPATIENT
Start: 2019-04-15 | End: 2019-04-20

## 2019-04-15 NOTE — ED PROVIDER NOTES
Encounter Date: 4/15/2019    SCRIBE #1 NOTE: I, Chris Zeng, am scribing for, and in the presence of, Dr. Tremayne Burnett.       History     Chief Complaint   Patient presents with    Flank Pain     to right side for a month       Time seen by provider: 1:18 AM on 04/15/2019    Jhonny Almazan is a 63 y.o. male with PMHx of HTN, and DM who presents to the ED with an onset of right sided flank pain for the past month, that sometimes radiates to his back. He states this flare up started Saturday morning, x2 days ago. Pt states he was here 1 week ago for the same complaint, but says the pain has gotten worse. He states he finished his pain pills that were prescribed to him and has taken Tylenol and Ibuprofen with no relief. Pt says he's had 2 beer in the past 3 weeks and ate fried shrimp on Saturday.The patient denies diarrhea, nausea, vomiting, rash, blood in urine or stool or any other symptoms at this time. SHx of back surgery. No known drug allergies noted.    The history is provided by the patient.     Review of patient's allergies indicates:  No Known Allergies  Past Medical History:   Diagnosis Date    Anticoagulant long-term use     Arthritis     Diabetes mellitus     Hypertension      Past Surgical History:   Procedure Laterality Date    BACK SURGERY      COLONOSCOPY N/A 7/18/2014    Performed by Guero Crane MD at [facility]     Family History   Problem Relation Age of Onset    Heart disease Mother     Heart disease Father     Diabetes Brother     Suicide Brother     Brain cancer Brother      Social History     Tobacco Use    Smoking status: Current Every Day Smoker     Packs/day: 1.00     Years: 50.00     Pack years: 50.00     Types: Cigarettes    Smokeless tobacco: Never Used   Substance Use Topics    Alcohol use: Yes     Comment: 2-3 beers daily    Drug use: No     Review of Systems   Constitutional: Negative for activity change.   Gastrointestinal: Negative for blood in stool,  diarrhea, nausea and vomiting.   Genitourinary: Positive for flank pain. Negative for hematuria.   Musculoskeletal: Positive for back pain.   Skin: Negative for rash.   Neurological: Negative for speech difficulty.   Hematological: Bruises/bleeds easily (Aspirin).   Psychiatric/Behavioral: Negative for agitation and confusion.       Physical Exam     Initial Vitals [04/15/19 0042]   BP Pulse Resp Temp SpO2   (!) 217/100 92 18 98.8 °F (37.1 °C) 100 %      MAP       --         Physical Exam    Nursing note and vitals reviewed.  Constitutional: He appears well-developed and well-nourished. He is not diaphoretic. No distress.   HENT:   Head: Normocephalic and atraumatic.   Eyes: EOM are normal. Pupils are equal, round, and reactive to light.   Neck: Normal range of motion. Neck supple.   Cardiovascular: Normal rate, regular rhythm, normal heart sounds and intact distal pulses. Exam reveals no gallop and no friction rub.    No murmur heard.  Pulmonary/Chest: Breath sounds normal. No respiratory distress. He has no wheezes. He has no rhonchi. He has no rales.   Abdominal: Soft. Bowel sounds are normal. There is no tenderness. There is no rebound and no guarding.   Musculoskeletal: Normal range of motion. He exhibits tenderness.   Right flank tenderness.   Neurological: He is alert and oriented to person, place, and time.   Skin: Skin is warm.   Psychiatric: He has a normal mood and affect. His behavior is normal. Judgment and thought content normal.         ED Course   Procedures  Labs Reviewed - No data to display       Imaging Results    None          Medical Decision Making:   History:   Old Medical Records: I decided to obtain old medical records.  Initial Assessment:   63-year-old male presented with a chief complaint of right flank pain.  Differential Diagnosis:   Initial differential diagnosis included but not limited to nephrolithiasis, pyelonephritis, and musculoskeletal pain.  ED Management:  The patient was  emergently evaluated in the emergency department, his evaluation was significant for an elderly male with right flank tenderness. The patient had a completely negative workup done for this same pain approximately 1 week ago, including negative labs and of his gallbladder.  The patient's ultrasound did show a fatty liver at that time.  I do not believe the patient needs any further workup at this time and is stable for discharge home.  The etiology of his pain could be musculoskeletal in origin.  He will be discharged home with p.o. Robaxin and he to follow with his PCP for further care and workup.            Scribe Attestation:   Scribe #1: I performed the above scribed service and the documentation accurately describes the services I performed. I attest to the accuracy of the note.        I, Dr. Tremayne Burnett, personally performed the services described in this documentation. All medical record entries made by the scribe were at my direction and in my presence.  I have reviewed the chart and agree that the record reflects my personal performance and is accurate and complete. Tremayne Burnett MD.  3:53 AM 04/15/2019          Clinical Impression:       ICD-10-CM ICD-9-CM   1. Right flank pain R10.9 789.09         Disposition:   Disposition: Discharged  Condition: Stable                        Tremayne Burnett MD  04/15/19 0355

## 2019-04-15 NOTE — ED TRIAGE NOTES
"Here with right side pain for a month; seen here a week ago for same with "fatty liver" diagnosis; not gotten any better  "

## 2019-04-16 ENCOUNTER — HOSPITAL ENCOUNTER (EMERGENCY)
Facility: HOSPITAL | Age: 64
Discharge: HOME | End: 2019-04-16
Attending: EMERGENCY MEDICINE
Payer: COMMERCIAL

## 2019-04-16 VITALS
BODY MASS INDEX: 30.7 KG/M2 | SYSTOLIC BLOOD PRESSURE: 140 MMHG | DIASTOLIC BLOOD PRESSURE: 109 MMHG | HEIGHT: 77 IN | OXYGEN SATURATION: 98 % | WEIGHT: 260 LBS | RESPIRATION RATE: 18 BRPM | HEART RATE: 84 BPM | TEMPERATURE: 98 F

## 2019-04-16 DIAGNOSIS — R10.11 RUQ ABDOMINAL PAIN: Primary | ICD-10-CM

## 2019-04-16 LAB
ALBUMIN SERPL BCP-MCNC: 4.1 G/DL (ref 3.5–5.2)
ALP SERPL-CCNC: 61 U/L (ref 55–135)
ALT SERPL W/O P-5'-P-CCNC: 36 U/L (ref 10–44)
ANION GAP SERPL CALC-SCNC: 12 MMOL/L (ref 8–16)
AST SERPL-CCNC: 27 U/L (ref 10–40)
BASOPHILS # BLD AUTO: 0.1 K/UL (ref 0–0.2)
BASOPHILS NFR BLD: 1.3 % (ref 0–1.9)
BILIRUB SERPL-MCNC: 0.7 MG/DL (ref 0.1–1)
BILIRUB UR QL STRIP: ABNORMAL
BUN SERPL-MCNC: 10 MG/DL (ref 8–23)
CALCIUM SERPL-MCNC: 10.3 MG/DL (ref 8.7–10.5)
CHLORIDE SERPL-SCNC: 100 MMOL/L (ref 95–110)
CLARITY UR: CLEAR
CO2 SERPL-SCNC: 24 MMOL/L (ref 23–29)
COLOR UR: YELLOW
CREAT SERPL-MCNC: 0.8 MG/DL (ref 0.5–1.4)
DIFFERENTIAL METHOD: ABNORMAL
EOSINOPHIL # BLD AUTO: 0.2 K/UL (ref 0–0.5)
EOSINOPHIL NFR BLD: 1.9 % (ref 0–8)
ERYTHROCYTE [DISTWIDTH] IN BLOOD BY AUTOMATED COUNT: 13.2 % (ref 11.5–14.5)
EST. GFR  (AFRICAN AMERICAN): >60 ML/MIN/1.73 M^2
EST. GFR  (NON AFRICAN AMERICAN): >60 ML/MIN/1.73 M^2
GLUCOSE SERPL-MCNC: 155 MG/DL (ref 70–110)
GLUCOSE UR QL STRIP: NEGATIVE
HCT VFR BLD AUTO: 45.8 % (ref 40–54)
HGB BLD-MCNC: 15.4 G/DL (ref 14–18)
HGB UR QL STRIP: NEGATIVE
KETONES UR QL STRIP: ABNORMAL
LEUKOCYTE ESTERASE UR QL STRIP: NEGATIVE
LYMPHOCYTES # BLD AUTO: 1.9 K/UL (ref 1–4.8)
LYMPHOCYTES NFR BLD: 20.9 % (ref 18–48)
MCH RBC QN AUTO: 31 PG (ref 27–31)
MCHC RBC AUTO-ENTMCNC: 33.7 G/DL (ref 32–36)
MCV RBC AUTO: 92 FL (ref 82–98)
MONOCYTES # BLD AUTO: 0.6 K/UL (ref 0.3–1)
MONOCYTES NFR BLD: 7.1 % (ref 4–15)
NEUTROPHILS # BLD AUTO: 6.1 K/UL (ref 1.8–7.7)
NEUTROPHILS NFR BLD: 68.8 % (ref 38–73)
NITRITE UR QL STRIP: NEGATIVE
PH UR STRIP: 6 [PH] (ref 5–8)
PLATELET # BLD AUTO: 202 K/UL (ref 150–350)
PMV BLD AUTO: 7.5 FL (ref 9.2–12.9)
POTASSIUM SERPL-SCNC: 3.8 MMOL/L (ref 3.5–5.1)
PROT SERPL-MCNC: 7.4 G/DL (ref 6–8.4)
PROT UR QL STRIP: NEGATIVE
RBC # BLD AUTO: 4.98 M/UL (ref 4.6–6.2)
SODIUM SERPL-SCNC: 136 MMOL/L (ref 136–145)
SP GR UR STRIP: 1.02 (ref 1–1.03)
URN SPEC COLLECT METH UR: ABNORMAL
UROBILINOGEN UR STRIP-ACNC: 1 EU/DL
WBC # BLD AUTO: 8.9 K/UL (ref 3.9–12.7)

## 2019-04-16 PROCEDURE — 81003 URINALYSIS AUTO W/O SCOPE: CPT

## 2019-04-16 PROCEDURE — 25000003 PHARM REV CODE 250: Performed by: EMERGENCY MEDICINE

## 2019-04-16 PROCEDURE — 63600175 PHARM REV CODE 636 W HCPCS: Performed by: EMERGENCY MEDICINE

## 2019-04-16 PROCEDURE — 85025 COMPLETE CBC W/AUTO DIFF WBC: CPT

## 2019-04-16 PROCEDURE — 36415 COLL VENOUS BLD VENIPUNCTURE: CPT

## 2019-04-16 PROCEDURE — 96374 THER/PROPH/DIAG INJ IV PUSH: CPT

## 2019-04-16 PROCEDURE — 99285 EMERGENCY DEPT VISIT HI MDM: CPT | Mod: 25

## 2019-04-16 PROCEDURE — 80053 COMPREHEN METABOLIC PANEL: CPT

## 2019-04-16 PROCEDURE — 96361 HYDRATE IV INFUSION ADD-ON: CPT

## 2019-04-16 RX ORDER — HYDROCODONE BITARTRATE AND ACETAMINOPHEN 5; 325 MG/1; MG/1
1 TABLET ORAL EVERY 6 HOURS PRN
Qty: 16 TABLET | Refills: 0 | Status: SHIPPED | OUTPATIENT
Start: 2019-04-16 | End: 2019-04-21

## 2019-04-16 RX ORDER — MORPHINE SULFATE 8 MG/ML
8 INJECTION INTRAMUSCULAR; INTRAVENOUS; SUBCUTANEOUS
Status: COMPLETED | OUTPATIENT
Start: 2019-04-16 | End: 2019-04-16

## 2019-04-16 RX ORDER — SYRING-NEEDL,DISP,INSUL,0.3 ML 29 G X1/2"
296 SYRINGE, EMPTY DISPOSABLE MISCELLANEOUS ONCE
Qty: 295 ML | Refills: 0 | Status: SHIPPED | OUTPATIENT
Start: 2019-04-16 | End: 2019-04-16

## 2019-04-16 RX ORDER — POLYETHYLENE GLYCOL 3350, SODIUM SULFATE ANHYDROUS, SODIUM BICARBONATE, SODIUM CHLORIDE, POTASSIUM CHLORIDE 236; 22.74; 6.74; 5.86; 2.97 G/4L; G/4L; G/4L; G/4L; G/4L
4 POWDER, FOR SOLUTION ORAL ONCE
Qty: 4000 ML | Refills: 0 | Status: SHIPPED | OUTPATIENT
Start: 2019-04-16 | End: 2019-04-16

## 2019-04-16 RX ADMIN — SODIUM CHLORIDE 1000 ML: 0.9 INJECTION, SOLUTION INTRAVENOUS at 04:04

## 2019-04-16 RX ADMIN — MORPHINE SULFATE 8 MG: 8 INJECTION INTRAVENOUS at 04:04

## 2019-04-16 NOTE — ED NOTES
Patient identifiers for Jhonny Almazan checked and correct.  LOC:  Patient is awake, alert, and aware of environment with an appropriate affect. Patient is oriented x 3 and speaking appropriately.  APPEARANCE:  Patient resting comfortably and in no acute distress. Patient is clean and well groomed, patient's clothing is properly fastened.  SKIN:  The skin is warm and dry. Patient has normal skin turgor and moist mucus membranes. Skin is intact; no bruising or breakdown noted.  MUSCULOSKELETAL:  Patient is moving all extremities well, no obvious deformities noted.  RESPIRATORY:  Airway is open and patent. Respirations are spontaneous and non-labored with normal effort and rate.  CARDIAC:  Patient has a normal rate and rhythm.   ABDOMEN:  No distention noted.    NEUROLOGICAL:  Facial expression is symmetrical.   Allergies reported:  Review of patient's allergies indicates:  No Known Allergies  OTHER NOTES:  Patient here with right side to back pain, has had this for some time and no relief from medications at home, seen here last night for same thing.

## 2019-04-16 NOTE — ED TRIAGE NOTES
Patient here with pain to right side, seen here last night for same thing and no better with meds given.

## 2019-04-17 ENCOUNTER — TELEPHONE (OUTPATIENT)
Dept: GASTROENTEROLOGY | Facility: CLINIC | Age: 64
End: 2019-04-17

## 2019-04-17 NOTE — TELEPHONE ENCOUNTER
----- Message from Geo Bowles sent at 4/17/2019  1:48 PM CDT -----  Contact: same  Patient called in and stated he received a message to call the office to reschedule his colonoscopy that is now scheduled for 4/26/19.  Patient call back number is [phone]

## 2019-04-17 NOTE — ED PROVIDER NOTES
Encounter Date: 4/16/2019       History     Chief Complaint   Patient presents with    Flank Pain     to right side, seen here last night for same thing and no better with meds given     This patient is a 63-year-old male presenting to the emergency department complaints right upper flank pain radiating to the back.  He denies it is associated with notable food ingestion, nausea, vomiting, fever, frontal chest pain or difficulty breathing.  He denies a previous history of kidney stones urinary tract infection.  He denies significant urinary symptoms. He reports being provided Norco approximately 2 weeks ago which improved the pain but states that the muscle relaxers that were provided for suspected back spasms last night are not improving the pain. He denies he has followed up a specialist in the interim.        Review of patient's allergies indicates:  No Known Allergies  Past Medical History:   Diagnosis Date    Anticoagulant long-term use     Arthritis     Diabetes mellitus     Hypertension      Past Surgical History:   Procedure Laterality Date    BACK SURGERY      COLONOSCOPY N/A 7/18/2014    Performed by Guero Crane MD at [facility]     Family History   Problem Relation Age of Onset    Heart disease Mother     Heart disease Father     Diabetes Brother     Suicide Brother     Brain cancer Brother      Social History     Tobacco Use    Smoking status: Current Every Day Smoker     Packs/day: 1.00     Years: 50.00     Pack years: 50.00     Types: Cigarettes    Smokeless tobacco: Never Used   Substance Use Topics    Alcohol use: Yes     Comment: 2-3 beers daily    Drug use: No     Review of Systems   Constitutional: Negative for fever.   HENT: Negative for congestion.    Respiratory: Negative for shortness of breath.    Cardiovascular: Negative for chest pain.   Gastrointestinal: Negative for nausea.   Endocrine: Negative for polyuria.   Genitourinary: Positive for flank pain.    Musculoskeletal: Positive for back pain.   Skin: Negative for rash.   Allergic/Immunologic: Negative for immunocompromised state.   Psychiatric/Behavioral: Negative for confusion.       Physical Exam     Initial Vitals [04/16/19 0356]   BP Pulse Resp Temp SpO2   (!) 140/109 84 18 98.1 °F (36.7 °C) 98 %      MAP       --         Physical Exam    Nursing note and vitals reviewed.  Constitutional: He appears well-nourished. No distress.   HENT:   Head: Normocephalic and atraumatic.   Eyes: Conjunctivae and EOM are normal.   Neck: Normal range of motion. Neck supple.   Cardiovascular: Normal rate and regular rhythm.   Pulmonary/Chest: No respiratory distress. He has no wheezes.   Abdominal: He exhibits no distension. There is tenderness.   Pain localized to the right upper quadrant radiating to the right upper flank, no overlying skin changes, negative Tomlin sign   Musculoskeletal: Normal range of motion. He exhibits no edema.   Neurological: He is alert and oriented to person, place, and time.   Skin: Skin is warm and dry. Capillary refill takes less than 2 seconds. No rash noted. No erythema.   Psychiatric: He has a normal mood and affect. Thought content normal.         ED Course   Procedures  Labs Reviewed   CBC W/ AUTO DIFFERENTIAL - Abnormal; Notable for the following components:       Result Value    MPV 7.5 (*)     All other components within normal limits   COMPREHENSIVE METABOLIC PANEL - Abnormal; Notable for the following components:    Glucose 155 (*)     All other components within normal limits   URINALYSIS, REFLEX TO URINE CULTURE - Abnormal; Notable for the following components:    Ketones, UA 1+ (*)     Bilirubin (UA) 1+ (*)     All other components within normal limits    Narrative:     Preferred Collection Type->Urine, Clean Catch          Imaging Results          CT Renal Stone Study ABD Pelvis WO (Final result)  Result time 04/16/19 10:55:53    Final result by Josse Brunner Jr., MD (04/16/19  10:55:53)                 Impression:      No CT evidence of renal or ureteral stone or hydronephrosis.  Mild fusiform dilation of the infrarenal abdominal aorta reaching 3.2 cm in diameter with atherosclerotic calcification noted of the aorta.      Electronically signed by: Josse Brunner MD  Date:    04/16/2019  Time:    10:55             Narrative:    EXAMINATION:  CT RENAL STONE STUDY ABD PELVIS WO    CLINICAL HISTORY:  Flank pain, stone disease suspected;    TECHNIQUE:  Low dose axial images, sagittal and coronal reformations were obtained from the lung bases to the pubic symphysis.  Contrast was not administered.    COMPARISON:  Abdomen ultrasound of August 6, 2010.    FINDINGS:  The liver is of normal size contour and CT density without focal defect.  The gallbladder is of normal size without CT evidence of stone.  The pancreas is of normal contour and CT density without edema or mass.  The spleen is of normal size and CT density.  Few granulomas are noted.    The adrenal glands are not enlarged.  The kidneys are of normal size contour and CT density without mass stone or hydronephrosis.  The ureters are of normal caliber and follow a normal course down to the bladder.  The abdominal aorta contains atherosclerotic calcification and there is mild fusiform dilation in the infrarenal aorta up to 3.2 cm.  This tapers before the bifurcation.  No retroperitoneal adenopathy is noted.    The gastrointestinal organs appear normal without dilatation air-fluid levels or soft tissue masses.  The stomach is of normal configuration.  A normal appendix is seen.  No free fluid or free air is noted.    Within the pelvis the bladder is of normal contour without mass or asymmetry.  The prostate is not enlarged.                                 Medical Decision Making:   ED Management:  This patient was interviewed and examined emergently.  Initial vital signs are stable.  He is in no acute distress.  Within the past 10 days,  he has received rule out for thoracic pathology, including atypical ACS, pulmonary embolism, PNA, pneumothorax.  He received an ultrasound of the right upper quadrant which was unremarkable in his last visit.  Labs were once again obtained here and found to be negative for acute change.  CT scan of the abdomen and pelvis without contrast was ordered to rule out evidence of kidney stone disease or occult structural abnormalities.  Note is made of mild fusiform dilation of infrarenal abdominal aorta up to 3.2 cm.  I do not suspect this as the cause of the patient's pain. Patient educated, at this time, there are overt causes which can be noted but I suspect gallbladder dysfunction may be contributing and outpt HIDA scan maybe warranted.  He is asked to follow up with primary care doctor and gastroenterologist as soon as possible.  He is asked to return to the ER for any new, concerning, or worsening symptoms. Patient was agreeable with this plan for follow-up and was discharged in stable condition.                      Clinical Impression:       ICD-10-CM ICD-9-CM   1. RUQ abdominal pain R10.11 789.01         Disposition:   Disposition: Discharged  Condition: Stable                        Andre Conde MD  04/17/19 0603

## 2019-04-23 ENCOUNTER — OFFICE VISIT (OUTPATIENT)
Dept: FAMILY MEDICINE | Facility: CLINIC | Age: 64
End: 2019-04-23
Payer: COMMERCIAL

## 2019-04-23 ENCOUNTER — OFFICE VISIT (OUTPATIENT)
Dept: GASTROENTEROLOGY | Facility: CLINIC | Age: 64
End: 2019-04-23
Payer: COMMERCIAL

## 2019-04-23 VITALS
RESPIRATION RATE: 18 BRPM | SYSTOLIC BLOOD PRESSURE: 135 MMHG | DIASTOLIC BLOOD PRESSURE: 85 MMHG | BODY MASS INDEX: 30.25 KG/M2 | HEIGHT: 77 IN | HEART RATE: 101 BPM | WEIGHT: 256.19 LBS

## 2019-04-23 VITALS
HEIGHT: 77 IN | OXYGEN SATURATION: 97 % | HEART RATE: 55 BPM | TEMPERATURE: 98 F | SYSTOLIC BLOOD PRESSURE: 147 MMHG | DIASTOLIC BLOOD PRESSURE: 95 MMHG | WEIGHT: 256.63 LBS | BODY MASS INDEX: 30.3 KG/M2

## 2019-04-23 DIAGNOSIS — R10.11 RUQ ABDOMINAL PAIN: Primary | ICD-10-CM

## 2019-04-23 DIAGNOSIS — E11.59 HYPERTENSION ASSOCIATED WITH DIABETES: ICD-10-CM

## 2019-04-23 DIAGNOSIS — R10.11 RUQ PAIN: Primary | ICD-10-CM

## 2019-04-23 DIAGNOSIS — Z79.01 ANTICOAGULANT LONG-TERM USE: ICD-10-CM

## 2019-04-23 DIAGNOSIS — E78.5 HYPERLIPIDEMIA ASSOCIATED WITH TYPE 2 DIABETES MELLITUS: ICD-10-CM

## 2019-04-23 DIAGNOSIS — E11.65 TYPE 2 DIABETES MELLITUS WITH HYPERGLYCEMIA, WITHOUT LONG-TERM CURRENT USE OF INSULIN: ICD-10-CM

## 2019-04-23 DIAGNOSIS — F19.90 EXCESSIVE USE OF NONSTEROIDAL ANTI-INFLAMMATORY DRUG (NSAID): ICD-10-CM

## 2019-04-23 DIAGNOSIS — Z86.010 HISTORY OF COLON POLYPS: ICD-10-CM

## 2019-04-23 DIAGNOSIS — E11.69 HYPERLIPIDEMIA ASSOCIATED WITH TYPE 2 DIABETES MELLITUS: ICD-10-CM

## 2019-04-23 DIAGNOSIS — I15.2 HYPERTENSION ASSOCIATED WITH DIABETES: ICD-10-CM

## 2019-04-23 DIAGNOSIS — E66.9 OBESITY (BMI 30-39.9): ICD-10-CM

## 2019-04-23 DIAGNOSIS — K59.00 CONSTIPATION, UNSPECIFIED CONSTIPATION TYPE: ICD-10-CM

## 2019-04-23 PROCEDURE — 3075F SYST BP GE 130 - 139MM HG: CPT | Mod: CPTII,S$GLB,, | Performed by: NURSE PRACTITIONER

## 2019-04-23 PROCEDURE — 3078F DIAST BP <80 MM HG: CPT | Mod: CPTII,S$GLB,, | Performed by: NURSE PRACTITIONER

## 2019-04-23 PROCEDURE — 3074F SYST BP LT 130 MM HG: CPT | Mod: CPTII,S$GLB,, | Performed by: NURSE PRACTITIONER

## 2019-04-23 PROCEDURE — 3079F PR MOST RECENT DIASTOLIC BLOOD PRESSURE 80-89 MM HG: ICD-10-PCS | Mod: CPTII,S$GLB,, | Performed by: NURSE PRACTITIONER

## 2019-04-23 PROCEDURE — 99203 PR OFFICE/OUTPT VISIT, NEW, LEVL III, 30-44 MIN: ICD-10-PCS | Mod: S$GLB,,, | Performed by: NURSE PRACTITIONER

## 2019-04-23 PROCEDURE — 3044F PR MOST RECENT HEMOGLOBIN A1C LEVEL <7.0%: ICD-10-PCS | Mod: CPTII,S$GLB,, | Performed by: NURSE PRACTITIONER

## 2019-04-23 PROCEDURE — 3008F BODY MASS INDEX DOCD: CPT | Mod: CPTII,S$GLB,, | Performed by: NURSE PRACTITIONER

## 2019-04-23 PROCEDURE — 1101F PR PT FALLS ASSESS DOC 0-1 FALLS W/OUT INJ PAST YR: ICD-10-PCS | Mod: CPTII,S$GLB,, | Performed by: NURSE PRACTITIONER

## 2019-04-23 PROCEDURE — 99999 PR PBB SHADOW E&M-EST. PATIENT-LVL V: ICD-10-PCS | Mod: PBBFAC,,, | Performed by: NURSE PRACTITIONER

## 2019-04-23 PROCEDURE — 3008F PR BODY MASS INDEX (BMI) DOCUMENTED: ICD-10-PCS | Mod: CPTII,S$GLB,, | Performed by: NURSE PRACTITIONER

## 2019-04-23 PROCEDURE — 3078F PR MOST RECENT DIASTOLIC BLOOD PRESSURE < 80 MM HG: ICD-10-PCS | Mod: CPTII,S$GLB,, | Performed by: NURSE PRACTITIONER

## 2019-04-23 PROCEDURE — 99999 PR PBB SHADOW E&M-EST. PATIENT-LVL IV: CPT | Mod: PBBFAC,,, | Performed by: NURSE PRACTITIONER

## 2019-04-23 PROCEDURE — 99999 PR PBB SHADOW E&M-EST. PATIENT-LVL V: CPT | Mod: PBBFAC,,, | Performed by: NURSE PRACTITIONER

## 2019-04-23 PROCEDURE — 99214 OFFICE O/P EST MOD 30 MIN: CPT | Mod: S$GLB,,, | Performed by: NURSE PRACTITIONER

## 2019-04-23 PROCEDURE — 3044F HG A1C LEVEL LT 7.0%: CPT | Mod: CPTII,S$GLB,, | Performed by: NURSE PRACTITIONER

## 2019-04-23 PROCEDURE — 3074F PR MOST RECENT SYSTOLIC BLOOD PRESSURE < 130 MM HG: ICD-10-PCS | Mod: CPTII,S$GLB,, | Performed by: NURSE PRACTITIONER

## 2019-04-23 PROCEDURE — 99203 OFFICE O/P NEW LOW 30 MIN: CPT | Mod: S$GLB,,, | Performed by: NURSE PRACTITIONER

## 2019-04-23 PROCEDURE — 99214 PR OFFICE/OUTPT VISIT, EST, LEVL IV, 30-39 MIN: ICD-10-PCS | Mod: S$GLB,,, | Performed by: NURSE PRACTITIONER

## 2019-04-23 PROCEDURE — 99999 PR PBB SHADOW E&M-EST. PATIENT-LVL IV: ICD-10-PCS | Mod: PBBFAC,,, | Performed by: NURSE PRACTITIONER

## 2019-04-23 PROCEDURE — 1101F PT FALLS ASSESS-DOCD LE1/YR: CPT | Mod: CPTII,S$GLB,, | Performed by: NURSE PRACTITIONER

## 2019-04-23 PROCEDURE — 3075F PR MOST RECENT SYSTOLIC BLOOD PRESS GE 130-139MM HG: ICD-10-PCS | Mod: CPTII,S$GLB,, | Performed by: NURSE PRACTITIONER

## 2019-04-23 PROCEDURE — 3079F DIAST BP 80-89 MM HG: CPT | Mod: CPTII,S$GLB,, | Performed by: NURSE PRACTITIONER

## 2019-04-23 RX ORDER — OMEPRAZOLE 40 MG/1
40 CAPSULE, DELAYED RELEASE ORAL
Qty: 30 CAPSULE | Refills: 3 | Status: SHIPPED | OUTPATIENT
Start: 2019-04-23 | End: 2019-08-08 | Stop reason: SDUPTHER

## 2019-04-23 RX ORDER — POLYETHYLENE GLYCOL-3350 AND ELECTROLYTES 236; 6.74; 5.86; 2.97; 22.74 G/274.31G; G/274.31G; G/274.31G; G/274.31G; G/274.31G
POWDER, FOR SOLUTION ORAL
Refills: 0 | COMMUNITY
Start: 2019-04-16 | End: 2019-04-23

## 2019-04-23 RX ORDER — SYRING-NEEDL,DISP,INSUL,0.3 ML 29 G X1/2"
296 SYRINGE, EMPTY DISPOSABLE MISCELLANEOUS DAILY PRN
Status: ON HOLD | COMMUNITY
End: 2020-11-24 | Stop reason: HOSPADM

## 2019-04-23 RX ORDER — HYDROCODONE BITARTRATE AND ACETAMINOPHEN 5; 325 MG/1; MG/1
1 TABLET ORAL EVERY 6 HOURS PRN
COMMUNITY
End: 2019-05-13 | Stop reason: CLARIF

## 2019-04-23 RX ORDER — LACTULOSE 10 G/15ML
10 SOLUTION ORAL 2 TIMES DAILY
Qty: 900 ML | Refills: 0 | Status: SHIPPED | OUTPATIENT
Start: 2019-04-23 | End: 2019-05-22

## 2019-04-23 RX ORDER — DICYCLOMINE HYDROCHLORIDE 10 MG/1
10 CAPSULE ORAL EVERY 6 HOURS PRN
Qty: 120 CAPSULE | Refills: 0 | Status: SHIPPED | OUTPATIENT
Start: 2019-04-23 | End: 2019-05-08

## 2019-04-23 NOTE — PATIENT INSTRUCTIONS
Abdominal Pain    Abdominal pain is pain in the stomach or belly area. Everyone has this pain from time to time. In many cases it goes away on its own. But abdominal pain can sometimes be due to a serious problem, such as appendicitis. So its important to know when to seek help.  Causes of abdominal pain  There are many possible causes of abdominal pain. Common causes in adults include:  · Constipation, diarrhea, or gas  · Stomach acid flowing back up into the esophagus (acid reflux or heartburn)  · Severe acid reflux, called GERD (gastroesophageal reflux disease)  · A sore in the lining of the stomach or small intestine (peptic ulcer)  · Inflammation of the gallbladder, liver, or pancreas  · Gallstones or kidney stones  · Appendicitis   · Intestinal blockage   · An internal organ pushing through a muscle or other tissue (hernia)  · Urinary tract infections  · In women, menstrual cramps, fibroids, or endometriosis  · Inflammation or infection of the intestines  Diagnosing the cause of abdominal pain  Your healthcare provider will do a physical exam help find the cause of your pain. If needed, tests will be ordered. Belly pain has many possible causes. So it can be hard to find the reason for your pain. Giving details about your pain can help. Tell your provider where and when you feel the pain, and what makes it better or worse. Also let your provider know if you have other symptoms such as:  · Fever  · Tiredness  · Upset stomach (nausea)  · Vomiting  · Changes in bathroom habits  Treating abdominal pain  Some causes of pain need emergency medical treatment right away. These include appendicitis or a bowel blockage. Other problems can be treated with rest, fluids, or medicines. Your healthcare provider can give you specific instructions for treatment or self-care based on what is causing your pain.  If you have vomiting or diarrhea, sip water or other clear fluids. When you are ready to eat solid foods again,  start with small amounts of easy-to-digest, low-fat foods. These include apple sauce, toast, or crackers.   When to seek medical care  Call 911 or go to the hospital right away if you:  · Cant pass stool and are vomiting  · Are vomiting blood or have bloody diarrhea or black, tarry diarrhea  · Have chest, neck, or shoulder pain  · Feel like you might pass out  · Have pain in your shoulder blades with nausea  · Have sudden, severe belly pain  · Have new, severe pain unlike any you have felt before  · Have a belly that is rigid, hard, and tender to touch  Call your healthcare provider if you have:  · Pain for more than 5 days  · Bloating for more than 2 days  · Diarrhea for more than 5 days  · A fever of 100.4°F (38.0°C) or higher, or as directed by your provider  · Pain that gets worse  · Weight loss for no reason  · Continued lack of appetite  · Blood in your stool  How to prevent abdominal pain  Here are some tips to help prevent abdominal pain:  · Eat smaller amounts of food at one time.  · Avoid greasy, fried, or other high-fat foods.  · Avoid foods that give you gas.  · Exercise regularly.  · Drink plenty of fluids.  To help prevent GERD symptoms:  · Quit smoking.  · Reduce alcohol and certain foods that increase stomach acid.  · Avoid aspirin and over-the-counter pain and fever medicines (NSAIDS or nonsteroidal anti-inflammatory drugs), if possible  · Lose extra weight.  · Finish eating at least 2 hours before you go to bed or lie down.  · Raise the head of your bed.  Date Last Reviewed: 7/1/2016  © 0984-3467 [employer]. [address] [place] [address], [place] [address]. All rights reserved. This information is not intended as a substitute for professional medical care. Always follow your healthcare professional's instructions.

## 2019-04-23 NOTE — H&P (VIEW-ONLY)
Subjective:       Patient ID: Jhonny Almazan is a 63 y.o. male Body mass index is 30.38 kg/m².    Chief Complaint: Abdominal Pain    This patient is new to me.  Referring Provider: JONES Rudd NP for RUQ pain & colon polyps.  Established patient of Dr. Miles (last in 2014).    Abdominal Pain   This is a new problem. Episode onset: started ~3/2019. The problem occurs constantly. The problem has been gradually worsening. The pain is located in the RUQ. The pain is at a severity of 5/10 (intensity varies throughout the day). The quality of the pain is sharp (stabbing). The abdominal pain radiates to the right flank, back and epigastric region. Associated symptoms include belching, constipation (started since he has been taking hydrocodone (currently out of it); given golytely at the last ER visit which he hasn't had to take yet; last bowel movement was 4/20/19, magnesium citrate PRN helping) and flatus. Pertinent negatives include no anorexia, diarrhea, dysuria, fever, frequency, hematochezia, melena, nausea, vomiting or weight loss. He has tried oral narcotic analgesics (ibuprofen taking daily which is helping; PAST: hydrocodone helped) for the symptoms. Prior diagnostic workup includes ultrasound and CT scan (patient currently scheduled for colonoscopy on 4/26/19). There is no history of Crohn's disease, GERD or ulcerative colitis.     Review of Systems   Constitutional: Negative for appetite change, chills, fatigue, fever and weight loss.   HENT: Negative for sore throat and trouble swallowing.    Respiratory: Positive for shortness of breath (occasional when abdominal pain is severe; denies currently). Negative for cough and choking.    Cardiovascular: Negative for chest pain.   Gastrointestinal: Positive for abdominal pain, constipation (started since he has been taking hydrocodone (currently out of it); given golytely at the last ER visit which he hasn't had to take yet; last bowel movement was 4/20/19,  magnesium citrate PRN helping) and flatus. Negative for anal bleeding, anorexia, blood in stool, diarrhea, hematochezia, melena, nausea, rectal pain and vomiting.   Genitourinary: Negative for difficulty urinating, dysuria, flank pain and frequency.   Neurological: Negative for weakness.       Past Medical History:   Diagnosis Date    Anticoagulant long-term use     Arthritis     Colon polyp     Diabetes mellitus     Fatty liver     Hypertension      Past Surgical History:   Procedure Laterality Date    BACK SURGERY      COLONOSCOPY  07/18/2014    Dr. Crane: 22 colon polyps removed, hemorrhoids, erythema to sigmoid, repeat in 1 year for surveillance; biopsy: sigmoid erythema- showed unremarkable colonic mucosa, tubular adenomas and hyperplastic polyps    COLONOSCOPY N/A 7/18/2014    Performed by Guero Crane MD at [facility] END[facility]     Family History   Problem Relation Age of Onset    Heart disease Mother     Heart disease Father     Diabetes Brother     Suicide Brother     Brain cancer Brother     Colon cancer Neg Hx     Crohn's disease Neg Hx     Ulcerative colitis Neg Hx     Stomach cancer Neg Hx     Esophageal cancer Neg Hx      Wt Readings from Last 10 Encounters:   04/23/19 116.2 kg (256 lb 2.8 oz)   04/16/19 117.9 kg (260 lb)   04/15/19 117.9 kg (260 lb)   04/10/19 120.7 kg (266 lb 1.5 oz)   04/07/19 117.9 kg (260 lb)   03/22/19 119.5 kg (263 lb 7.2 oz)   09/21/18 116.5 kg (256 lb 13.4 oz)   06/07/18 111.6 kg (246 lb 0.5 oz)   04/27/18 115 kg (253 lb 8.5 oz)   12/22/17 117.2 kg (258 lb 6.1 oz)     Lab Results   Component Value Date    WBC 8.90 04/16/2019    HGB 15.4 04/16/2019    HCT 45.8 04/16/2019    MCV 92 04/16/2019     04/16/2019     CMP  Sodium   Date Value Ref Range Status   04/16/2019 136 136 - 145 mmol/L Final     Potassium   Date Value Ref Range Status   04/16/2019 3.8 3.5 - 5.1 mmol/L Final     Chloride   Date Value Ref Range Status   04/16/2019 100 95 - 110 mmol/L Final      CO2   Date Value Ref Range Status   04/16/2019 24 23 - 29 mmol/L Final     Glucose   Date Value Ref Range Status   04/16/2019 155 (H) 70 - 110 mg/dL Final     BUN, Bld   Date Value Ref Range Status   04/16/2019 10 8 - 23 mg/dL Final     Creatinine   Date Value Ref Range Status   04/16/2019 0.8 0.5 - 1.4 mg/dL Final     Calcium   Date Value Ref Range Status   04/16/2019 10.3 8.7 - 10.5 mg/dL Final     Total Protein   Date Value Ref Range Status   04/16/2019 7.4 6.0 - 8.4 g/dL Final     Albumin   Date Value Ref Range Status   04/16/2019 4.1 3.5 - 5.2 g/dL Final     Total Bilirubin   Date Value Ref Range Status   04/16/2019 0.7 0.1 - 1.0 mg/dL Final     Comment:     For infants and newborns, interpretation of results should be based  on gestational age, weight and in agreement with clinical  observations.  Premature Infant recommended reference ranges:  Up to 24 hours.............<8.0 mg/dL  Up to 48 hours............<12.0 mg/dL  3-5 days..................<15.0 mg/dL  6-29 days.................<15.0 mg/dL       Alkaline Phosphatase   Date Value Ref Range Status   04/16/2019 61 55 - 135 U/L Final     AST   Date Value Ref Range Status   04/16/2019 27 10 - 40 U/L Final     ALT   Date Value Ref Range Status   04/16/2019 36 10 - 44 U/L Final     Anion Gap   Date Value Ref Range Status   04/16/2019 12 8 - 16 mmol/L Final     eGFR if    Date Value Ref Range Status   04/16/2019 >60 >60 mL/min/1.73 m^2 Final     eGFR if non    Date Value Ref Range Status   04/16/2019 >60 >60 mL/min/1.73 m^2 Final     Comment:     Calculation used to obtain the estimated glomerular filtration  rate (eGFR) is the CKD-EPI equation.        Lab Results   Component Value Date    LIPASE 22 04/07/2019     Lab Results   Component Value Date    TSH 0.965 12/15/2017     Reviewed prior medical records including radiology report of 4/16/19 ct renal stone abdomen pelvis and ER visit note; 4/7/19 limited abdominal  ultrasound and ER visit note; 3/29/19 abdominal ultrasound; 4/15/19 ER visit note; & endoscopy history (see surgical history).    Objective:      Physical Exam   Constitutional: He is oriented to person, place, and time. He appears well-developed and well-nourished. No distress.   HENT:   Mouth/Throat: Oropharynx is clear and moist and mucous membranes are normal. No oral lesions. No oropharyngeal exudate.   Eyes: Pupils are equal, round, and reactive to light. Conjunctivae are normal. No scleral icterus.   Pulmonary/Chest: Effort normal and breath sounds normal. No respiratory distress. He has no wheezes.   Abdominal: Bowel sounds are normal. He exhibits distension (more prominent on right side). He exhibits no abdominal bruit and no mass. There is no tenderness. There is no rigidity, no rebound, no guarding, no tenderness at McBurney's point and negative Tomlin's sign.   Neurological: He is alert and oriented to person, place, and time.   Skin: Skin is warm and dry. No rash noted. He is not diaphoretic. No erythema. No pallor.   Non-jaundiced   Psychiatric: He has a normal mood and affect. His behavior is normal. Judgment and thought content normal.   Nursing note and vitals reviewed.      Assessment:       1. RUQ abdominal pain    2. History of colon polyps    3. Constipation, unspecified constipation type    4. Excessive use of nonsteroidal anti-inflammatory drug (NSAID)    5. Anticoagulant long-term use        Plan:       RUQ abdominal pain & Excessive use of nonsteroidal anti-inflammatory drug (NSAID)  -  START   dicyclomine (BENTYL) 10 MG capsule; Take 1 capsule (10 mg total) by mouth every 6 (six) hours as needed.  Dispense: 120 capsule; Refill: 0  -  START   omeprazole (PRILOSEC) 40 MG capsule; Take 1 capsule (40 mg total) by mouth before breakfast.  Dispense: 30 capsule; Refill: 3; take in the morning 30-60 minutes before breakfast, discussed about possible long term use of medication (prefer to use lowest  effective dose or discontinuing if possible), pt verbalized understanding  -Educated patient on lifestyle modifications to help control/reduce reflux/abdominal pain including: avoid large meals, avoid eating within 2-3 hours of bedtime (avoid late night eating & lying down soon after eating), elevate head of bed if nocturnal symptoms are present, smoking cessation (if current smoker), & weight loss (if overweight).   -Educated to avoid known foods which trigger reflux symptoms & to minimize/avoid high-fat foods, chocolate, caffeine, citrus, alcohol, & tomato products.  -Advised to avoid/limit use of NSAID's, since they can cause GI upset, bleeding, and/or ulcers. If needed, take with food  - schedule EGD (recommend having EGD prior to colonoscopy), discussed procedure with patient, patient verbalized understanding  Recommend follow-up with Primary Care Provider for continued evaluation and management to rule out non-GI etiologies.  - Possible HIDA SCAN pending results of testing and if symptoms persist    History of colon polyps  - reschedule Colonoscopy, discussed procedure with the patient, patient verbalized understanding    Constipation, unspecified constipation type  - START    lactulose (CHRONULAC) 20 gram/30 mL Soln; Take 15 mLs (10 g total) by mouth 2 (two) times daily.  Dispense: 900 mL; Refill: 0  Recommend daily exercise as tolerated, adequate water intake (six 8-oz glasses of water daily), and high fiber diet. OTC fiber supplements are recommended if diet does not reach daily fiber goal (20-30 grams daily), such as Metamucil, Citrucel, or FiberCon (take as directed, separate from other oral medications by >2 hours).  -Recommend taking an OTC stool softener such as Colace as directed to avoid hard stools and straining with bowel movements PRN  -Recommend trying OTC MiraLax once daily (17g PO) as directed  - If no improvement with above recommendations, try intermittently dosed Dulcolax OTC as directed  (every 3-4  days) PRN to facilitate bowel movements  -If still no improvement with these measures, call/follow-up  - reschedule Colonoscopy, discussed procedure with the patient, patient verbalized understanding  - discussed with patient that a side effect of narcotic pain medications is constipation, advised patient to talk to provider who manages pain medication, patient verbalized understanding    Anticoagulant long-term use  - informed patient that the anticoagulant(s) will likely need to be held for endoscopy, nurse will confirm with cardiologist/PCP.    Follow up in about 1 month (around 5/23/2019), or if symptoms worsen or fail to improve.      If no improvement in symptoms or symptoms worsen, call/follow-up at clinic or go to ER.

## 2019-04-23 NOTE — PROGRESS NOTES
Subjective:       Patient ID: Jhonny Almazan is a 63 y.o. male Body mass index is 30.38 kg/m².    Chief Complaint: Abdominal Pain    This patient is new to me.  Referring Provider: JONES Rudd NP for RUQ pain & colon polyps.  Established patient of Dr. Miles (last in 2014).    Abdominal Pain   This is a new problem. Episode onset: started ~3/2019. The problem occurs constantly. The problem has been gradually worsening. The pain is located in the RUQ. The pain is at a severity of 5/10 (intensity varies throughout the day). The quality of the pain is sharp (stabbing). The abdominal pain radiates to the right flank, back and epigastric region. Associated symptoms include belching, constipation (started since he has been taking hydrocodone (currently out of it); given golytely at the last ER visit which he hasn't had to take yet; last bowel movement was 4/20/19, magnesium citrate PRN helping) and flatus. Pertinent negatives include no anorexia, diarrhea, dysuria, fever, frequency, hematochezia, melena, nausea, vomiting or weight loss. He has tried oral narcotic analgesics (ibuprofen taking daily which is helping; PAST: hydrocodone helped) for the symptoms. Prior diagnostic workup includes ultrasound and CT scan (patient currently scheduled for colonoscopy on 4/26/19). There is no history of Crohn's disease, GERD or ulcerative colitis.     Review of Systems   Constitutional: Negative for appetite change, chills, fatigue, fever and weight loss.   HENT: Negative for sore throat and trouble swallowing.    Respiratory: Positive for shortness of breath (occasional when abdominal pain is severe; denies currently). Negative for cough and choking.    Cardiovascular: Negative for chest pain.   Gastrointestinal: Positive for abdominal pain, constipation (started since he has been taking hydrocodone (currently out of it); given golytely at the last ER visit which he hasn't had to take yet; last bowel movement was 4/20/19,  magnesium citrate PRN helping) and flatus. Negative for anal bleeding, anorexia, blood in stool, diarrhea, hematochezia, melena, nausea, rectal pain and vomiting.   Genitourinary: Negative for difficulty urinating, dysuria, flank pain and frequency.   Neurological: Negative for weakness.       Past Medical History:   Diagnosis Date    Anticoagulant long-term use     Arthritis     Colon polyp     Diabetes mellitus     Fatty liver     Hypertension      Past Surgical History:   Procedure Laterality Date    BACK SURGERY      COLONOSCOPY  07/18/2014    Dr. Crane: 22 colon polyps removed, hemorrhoids, erythema to sigmoid, repeat in 1 year for surveillance; biopsy: sigmoid erythema- showed unremarkable colonic mucosa, tubular adenomas and hyperplastic polyps    COLONOSCOPY N/A 7/18/2014    Performed by Guero Crane MD at [facility] END[facility]     Family History   Problem Relation Age of Onset    Heart disease Mother     Heart disease Father     Diabetes Brother     Suicide Brother     Brain cancer Brother     Colon cancer Neg Hx     Crohn's disease Neg Hx     Ulcerative colitis Neg Hx     Stomach cancer Neg Hx     Esophageal cancer Neg Hx      Wt Readings from Last 10 Encounters:   04/23/19 116.2 kg (256 lb 2.8 oz)   04/16/19 117.9 kg (260 lb)   04/15/19 117.9 kg (260 lb)   04/10/19 120.7 kg (266 lb 1.5 oz)   04/07/19 117.9 kg (260 lb)   03/22/19 119.5 kg (263 lb 7.2 oz)   09/21/18 116.5 kg (256 lb 13.4 oz)   06/07/18 111.6 kg (246 lb 0.5 oz)   04/27/18 115 kg (253 lb 8.5 oz)   12/22/17 117.2 kg (258 lb 6.1 oz)     Lab Results   Component Value Date    WBC 8.90 04/16/2019    HGB 15.4 04/16/2019    HCT 45.8 04/16/2019    MCV 92 04/16/2019     04/16/2019     CMP  Sodium   Date Value Ref Range Status   04/16/2019 136 136 - 145 mmol/L Final     Potassium   Date Value Ref Range Status   04/16/2019 3.8 3.5 - 5.1 mmol/L Final     Chloride   Date Value Ref Range Status   04/16/2019 100 95 - 110 mmol/L Final      CO2   Date Value Ref Range Status   04/16/2019 24 23 - 29 mmol/L Final     Glucose   Date Value Ref Range Status   04/16/2019 155 (H) 70 - 110 mg/dL Final     BUN, Bld   Date Value Ref Range Status   04/16/2019 10 8 - 23 mg/dL Final     Creatinine   Date Value Ref Range Status   04/16/2019 0.8 0.5 - 1.4 mg/dL Final     Calcium   Date Value Ref Range Status   04/16/2019 10.3 8.7 - 10.5 mg/dL Final     Total Protein   Date Value Ref Range Status   04/16/2019 7.4 6.0 - 8.4 g/dL Final     Albumin   Date Value Ref Range Status   04/16/2019 4.1 3.5 - 5.2 g/dL Final     Total Bilirubin   Date Value Ref Range Status   04/16/2019 0.7 0.1 - 1.0 mg/dL Final     Comment:     For infants and newborns, interpretation of results should be based  on gestational age, weight and in agreement with clinical  observations.  Premature Infant recommended reference ranges:  Up to 24 hours.............<8.0 mg/dL  Up to 48 hours............<12.0 mg/dL  3-5 days..................<15.0 mg/dL  6-29 days.................<15.0 mg/dL       Alkaline Phosphatase   Date Value Ref Range Status   04/16/2019 61 55 - 135 U/L Final     AST   Date Value Ref Range Status   04/16/2019 27 10 - 40 U/L Final     ALT   Date Value Ref Range Status   04/16/2019 36 10 - 44 U/L Final     Anion Gap   Date Value Ref Range Status   04/16/2019 12 8 - 16 mmol/L Final     eGFR if    Date Value Ref Range Status   04/16/2019 >60 >60 mL/min/1.73 m^2 Final     eGFR if non    Date Value Ref Range Status   04/16/2019 >60 >60 mL/min/1.73 m^2 Final     Comment:     Calculation used to obtain the estimated glomerular filtration  rate (eGFR) is the CKD-EPI equation.        Lab Results   Component Value Date    LIPASE 22 04/07/2019     Lab Results   Component Value Date    TSH 0.965 12/15/2017     Reviewed prior medical records including radiology report of 4/16/19 ct renal stone abdomen pelvis and ER visit note; 4/7/19 limited abdominal  ultrasound and ER visit note; 3/29/19 abdominal ultrasound; 4/15/19 ER visit note; & endoscopy history (see surgical history).    Objective:      Physical Exam   Constitutional: He is oriented to person, place, and time. He appears well-developed and well-nourished. No distress.   HENT:   Mouth/Throat: Oropharynx is clear and moist and mucous membranes are normal. No oral lesions. No oropharyngeal exudate.   Eyes: Pupils are equal, round, and reactive to light. Conjunctivae are normal. No scleral icterus.   Pulmonary/Chest: Effort normal and breath sounds normal. No respiratory distress. He has no wheezes.   Abdominal: Bowel sounds are normal. He exhibits distension (more prominent on right side). He exhibits no abdominal bruit and no mass. There is no tenderness. There is no rigidity, no rebound, no guarding, no tenderness at McBurney's point and negative Tomlin's sign.   Neurological: He is alert and oriented to person, place, and time.   Skin: Skin is warm and dry. No rash noted. He is not diaphoretic. No erythema. No pallor.   Non-jaundiced   Psychiatric: He has a normal mood and affect. His behavior is normal. Judgment and thought content normal.   Nursing note and vitals reviewed.      Assessment:       1. RUQ abdominal pain    2. History of colon polyps    3. Constipation, unspecified constipation type    4. Excessive use of nonsteroidal anti-inflammatory drug (NSAID)    5. Anticoagulant long-term use        Plan:       RUQ abdominal pain & Excessive use of nonsteroidal anti-inflammatory drug (NSAID)  -  START   dicyclomine (BENTYL) 10 MG capsule; Take 1 capsule (10 mg total) by mouth every 6 (six) hours as needed.  Dispense: 120 capsule; Refill: 0  -  START   omeprazole (PRILOSEC) 40 MG capsule; Take 1 capsule (40 mg total) by mouth before breakfast.  Dispense: 30 capsule; Refill: 3; take in the morning 30-60 minutes before breakfast, discussed about possible long term use of medication (prefer to use lowest  effective dose or discontinuing if possible), pt verbalized understanding  -Educated patient on lifestyle modifications to help control/reduce reflux/abdominal pain including: avoid large meals, avoid eating within 2-3 hours of bedtime (avoid late night eating & lying down soon after eating), elevate head of bed if nocturnal symptoms are present, smoking cessation (if current smoker), & weight loss (if overweight).   -Educated to avoid known foods which trigger reflux symptoms & to minimize/avoid high-fat foods, chocolate, caffeine, citrus, alcohol, & tomato products.  -Advised to avoid/limit use of NSAID's, since they can cause GI upset, bleeding, and/or ulcers. If needed, take with food  - schedule EGD (recommend having EGD prior to colonoscopy), discussed procedure with patient, patient verbalized understanding  Recommend follow-up with Primary Care Provider for continued evaluation and management to rule out non-GI etiologies.  - Possible HIDA SCAN pending results of testing and if symptoms persist    History of colon polyps  - reschedule Colonoscopy, discussed procedure with the patient, patient verbalized understanding    Constipation, unspecified constipation type  - START    lactulose (CHRONULAC) 20 gram/30 mL Soln; Take 15 mLs (10 g total) by mouth 2 (two) times daily.  Dispense: 900 mL; Refill: 0  Recommend daily exercise as tolerated, adequate water intake (six 8-oz glasses of water daily), and high fiber diet. OTC fiber supplements are recommended if diet does not reach daily fiber goal (20-30 grams daily), such as Metamucil, Citrucel, or FiberCon (take as directed, separate from other oral medications by >2 hours).  -Recommend taking an OTC stool softener such as Colace as directed to avoid hard stools and straining with bowel movements PRN  -Recommend trying OTC MiraLax once daily (17g PO) as directed  - If no improvement with above recommendations, try intermittently dosed Dulcolax OTC as directed  (every 3-4  days) PRN to facilitate bowel movements  -If still no improvement with these measures, call/follow-up  - reschedule Colonoscopy, discussed procedure with the patient, patient verbalized understanding  - discussed with patient that a side effect of narcotic pain medications is constipation, advised patient to talk to provider who manages pain medication, patient verbalized understanding    Anticoagulant long-term use  - informed patient that the anticoagulant(s) will likely need to be held for endoscopy, nurse will confirm with cardiologist/PCP.    Follow up in about 1 month (around 5/23/2019), or if symptoms worsen or fail to improve.      If no improvement in symptoms or symptoms worsen, call/follow-up at clinic or go to ER.

## 2019-04-23 NOTE — LETTER
April 23, 2019      Nicolette Rudd, EMILY-C  [address]  [place] [address]           [facility] [facility]  [address]  [place] [address]  Phone: [phone]          Patient: Jhonny Almazan   MR Number: [id number]   YOB: 1955   Date of Visit: 4/23/2019       Dear Nicolette Rudd:    Thank you for referring Jhonny Almazan to me for evaluation. Attached you will find relevant portions of my assessment and plan of care.    If you have questions, please do not hesitate to call me. I look forward to following Jhonny Almazan along with you.    Sincerely,    Nelsy Conde, [place]    Enclosure  CC:  No Recipients    If you would like to receive this communication electronically, please contact [email or web address] or [phone] to request more information on [facility] Link access.    For providers and/or their staff who would like to refer a patient to [facility], please contact us through our one-stop-shop provider referral line, [facility] , at [phone].    If you feel you have received this communication in error or would no longer like to receive these types of communications, please e-mail [email or web address]

## 2019-04-23 NOTE — PROGRESS NOTES
Subjective:       Patient ID: Jhonny Almazan is a 63 y.o. male.    Chief Complaint: Abdominal Pain    Patient was seen in the ER three times this month for complaints of acute right sided flank pain that sometimes radiates to his back. He denies it is associated with notable food ingestion, nausea, vomiting, fever, frontal chest pain or difficulty breathing.  He denies a previous history of kidney stones urinary tract infection.  He denies significant urinary symptoms.  ER CT work up found an mild fusiform dilation of infrarenal abdominal aorta up to 3.2 cm, which ER doctor did not think it was causing the right flank pain. Scheduled for Endoscopy on 4/26/19 and C-scopy ib 5/3/19    Abdominal Pain   This is a new problem. The current episode started 1 to 4 weeks ago. The onset quality is gradual. The problem has been waxing and waning. The pain is located in the RUQ. The pain is at a severity of 4/10. The abdominal pain radiates to the right shoulder. Associated symptoms include belching and constipation. Pertinent negatives include no diarrhea, fever, headaches, hematochezia, hematuria or nausea. The pain is relieved by belching. He has tried oral narcotic analgesics for the symptoms. The treatment provided moderate relief. Prior diagnostic workup includes CT scan.       Past Medical History:   Diagnosis Date    Anticoagulant long-term use     Arthritis     Colon polyp     Diabetes mellitus     Fatty liver     Hypertension        Review of patient's allergies indicates:  No Known Allergies      Current Outpatient Medications:     ACETAMINOPHEN (TYLENOL ARTHRITIS ORAL), Take by mouth., Disp: , Rfl:     buPROPion (WELLBUTRIN XL) 150 MG TB24 tablet, Take 1 tablet (150 mg total) by mouth once daily., Disp: 30 tablet, Rfl: 11    co-enzyme Q-10 50 mg capsule, Take 4 capsules (200 mg total) by mouth once daily., Disp: 120 capsule, Rfl: 11    diclofenac sodium (VOLTAREN) 1 % Gel, Apply 2 g topically 3 (three)  times daily as needed. For hand pain, Disp: 100 g, Rfl: 11    dicyclomine (BENTYL) 10 MG capsule, Take 1 capsule (10 mg total) by mouth every 6 (six) hours as needed., Disp: 120 capsule, Rfl: 0    folic acid-vit B6-vit B12 2.5-25-2 mg (FOLBIC OR EQUIV) 2.5-25-2 mg Tab, Take 1 tablet by mouth once daily., Disp: 90 tablet, Rfl: 4    gabapentin (NEURONTIN) 100 MG capsule, Take 1 capsule (100 mg total) by mouth 3 (three) times daily., Disp: 90 capsule, Rfl: 11    glipiZIDE (GLUCOTROL) 10 MG TR24, TAKE 1 TABLET BY MOUTH DAILY WITH BREAKFAST, Disp: 90 tablet, Rfl: 2    hydrALAZINE (APRESOLINE) 25 MG tablet, Take 1 tablet (25 mg total) by mouth every 8 (eight) hours., Disp: 90 tablet, Rfl: 8    HYDROcodone-acetaminophen (NORCO) 5-325 mg per tablet, Take 1 tablet by mouth every 6 (six) hours as needed for Pain., Disp: , Rfl:     lactulose (CHRONULAC) 20 gram/30 mL Soln, Take 15 mLs (10 g total) by mouth 2 (two) times daily., Disp: 900 mL, Rfl: 0    lisinopril-hydrochlorothiazide (PRINZIDE,ZESTORETIC) 20-12.5 mg per tablet, TAKE 1 TABLET BY MOUTH 2 (TWO) TIMES DAILY., Disp: 180 tablet, Rfl: 0    magnesium citrate solution, Take 296 mLs by mouth daily as needed., Disp: , Rfl:     metFORMIN (GLUCOPHAGE-XR) 500 MG 24 hr tablet, TAKE 2 TABLETS (1,000 MG TOTAL) BY MOUTH 2 (TWO) TIMES DAILY., Disp: 360 tablet, Rfl: 0    omeprazole (PRILOSEC) 40 MG capsule, Take 1 capsule (40 mg total) by mouth before breakfast., Disp: 30 capsule, Rfl: 3    ONETOUCH ULTRA TEST Strp, , Disp: , Rfl:     ONETOUCH ULTRASOFT LANCETS lancets, , Disp: , Rfl:     pioglitazone (ACTOS) 30 MG tablet, TAKE 1 TABLET (30 MG TOTAL) BY MOUTH ONCE DAILY., Disp: 90 tablet, Rfl: 2    rosuvastatin (CRESTOR) 20 MG tablet, TAKE 1 TABLET (20 MG TOTAL) BY MOUTH ONCE DAILY., Disp: 90 tablet, Rfl: 1    aspirin (ECOTRIN) 81 MG EC tablet, Take 81 mg by mouth once daily., Disp: , Rfl:     ibuprofen (ADVIL,MOTRIN) 200 MG tablet, Take 200 mg by mouth every 6  "(six) hours as needed for Pain., Disp: , Rfl:     Review of Systems   Constitutional: Positive for fatigue. Negative for fever.   HENT: Negative for trouble swallowing.    Respiratory: Negative for shortness of breath.    Cardiovascular: Negative for chest pain.   Gastrointestinal: Positive for abdominal pain and constipation. Negative for diarrhea, hematochezia and nausea.   Genitourinary: Negative for hematuria.   Musculoskeletal: Positive for back pain (right flank).   Skin: Negative for rash.   Neurological: Negative for headaches.   Psychiatric/Behavioral: Negative for agitation. The patient is not nervous/anxious.        Objective:      BP (!) 147/95 (BP Location: Right arm, Patient Position: Sitting, BP Method: Medium (Automatic))   Pulse (!) 55   Temp 97.6 °F (36.4 °C) (Oral)   Ht 6' 5" (1.956 m)   Wt 116.4 kg (256 lb 9.9 oz)   SpO2 97%   BMI 30.43 kg/m²   Physical Exam   Constitutional: He is oriented to person, place, and time. He appears well-developed and well-nourished.   Eyes: Pupils are equal, round, and reactive to light. Conjunctivae and EOM are normal.   Neck: Normal range of motion. Neck supple.   Cardiovascular: Normal rate, regular rhythm, normal heart sounds and intact distal pulses.   Pulmonary/Chest: Effort normal and breath sounds normal.   Abdominal: Soft. Bowel sounds are normal. There is hepatosplenomegaly. There is tenderness in the right upper quadrant.   Musculoskeletal: Normal range of motion.   Neurological: He is alert and oriented to person, place, and time.   Skin: Skin is warm and dry.   Psychiatric: He has a normal mood and affect. His behavior is normal. Judgment and thought content normal.       Assessment:       1. RUQ pain    2. Hyperlipidemia associated with type 2 diabetes mellitus    3. Hypertension associated with diabetes    4. Type 2 diabetes mellitus with hyperglycemia, without long-term current use of insulin    5. Obesity (BMI 30-39.9)        Plan:       RUQ " pain  -     NM Hepatobiliary Imaging HIDA; Future; Expected date: 04/23/2019    Hyperlipidemia associated with type 2 diabetes mellitus   Stable, continue medication  Hypertension associated with diabetes   Continue medication   Low sodium diet  BP Readings from Last 3 Encounters:   04/23/19 (!) 147/95   04/23/19 135/85   04/16/19 (!) 140/109     Type 2 diabetes mellitus with hyperglycemia, without long-term current use of insulin   Controlled, continue medicaiton  Hemoglobin A1C   Date Value Ref Range Status   12/28/2018 6.0 (H) 4.0 - 5.6 % Final     Comment:     ADA Screening Guidelines:  5.7-6.4%  Consistent with prediabetes  >or=6.5%  Consistent with diabetes  High levels of fetal hemoglobin interfere with the HbA1C  assay. Heterozygous hemoglobin variants (HbS, HgC, etc)do  not significantly interfere with this assay.   However, presence of multiple variants may affect accuracy.     04/27/2018 7.0 (H) 4.0 - 5.6 % Final     Comment:     According to ADA guidelines, hemoglobin A1c <7.0% represents  optimal control in non-pregnant diabetic patients. Different  metrics may apply to specific patient populations.   Standards of Medical Care in Diabetes-2016.  For the purpose of screening for the presence of diabetes:  <5.7%     Consistent with the absence of diabetes  5.7-6.4%  Consistent with increasing risk for diabetes   (prediabetes)  >or=6.5%  Consistent with diabetes  Currently, no consensus exists for use of hemoglobin A1c  for diagnosis of diabetes for children.  This Hemoglobin A1c assay has significant interference with fetal   hemoglobin   (HbF). The results are invalid for patients with abnormal amounts of   HbF,   including those with known Hereditary Persistence   of Fetal Hemoglobin. Heterozygous hemoglobin variants (HbAS, HbAC,   HbAD, HbAE, HbA2) do not significantly interfere with this assay;   however, presence of multiple variants in a sample may impact the %   interference.     02/16/2018 8.1 (H)  "4.0 - 5.6 % Final     Comment:     According to ADA guidelines, hemoglobin A1c <7.0% represents  optimal control in non-pregnant diabetic patients. Different  metrics may apply to specific patient populations.   Standards of Medical Care in Diabetes-2016.  For the purpose of screening for the presence of diabetes:  <5.7%     Consistent with the absence of diabetes  5.7-6.4%  Consistent with increasing risk for diabetes   (prediabetes)  >or=6.5%  Consistent with diabetes  Currently, no consensus exists for use of hemoglobin A1c  for diagnosis of diabetes for children.  This Hemoglobin A1c assay has significant interference with fetal   hemoglobin   (HbF). The results are invalid for patients with abnormal amounts of   HbF,   including those with known Hereditary Persistence   of Fetal Hemoglobin. Heterozygous hemoglobin variants (HbAS, HbAC,   HbAD, HbAE, HbA2) do not significantly interfere with this assay;   however, presence of multiple variants in a sample may impact the %   interference.       Obesity (BMI 30-39.9)  Counseled patient on his ideal body weight, health consequences of being obese and current recommendations including weekly exercise and a heart healthy diet.  Current BMI is:Estimated body mass index is 30.43 kg/m² as calculated from the following:    Height as of this encounter: 6' 5" (1.956 m).    Weight as of this encounter: 116.4 kg (256 lb 9.9 oz)..  Patient is aware that ideal BMI < 25 or Weight in (lb) to have BMI = 25: 210.4.            Patient readiness: acceptance and barriers:none    During the course of the visit the patient was educated and counseled about the following:     Diabetes:  Discussed general issues about diabetes pathophysiology and management.  Addressed ADA diet.  Suggested low cholesterol diet.  Encouraged aerobic exercise.  Hypertension:   Dietary sodium restriction.  Regular aerobic exercise.  Obesity:   General weight loss/lifestyle modification strategies discussed " (elicit support from others; identify saboteurs; non-food rewards, etc).  Regular aerobic exercise program discussed.    Goals: Diabetes: Maintain Hemoglobin A1C below 7, Hypertension: Reduce Blood Pressure and Obesity: Reduce calorie intake and BMI    Did patient meet goals/outcomes: No    The following self management tools provided: declined    Patient Instructions (the written plan) was given to the patient/family.     Time spent with patient: 30 minutes    Barriers to medications present (no )    Adverse reactions to current medications (no)    Over the counter medications reviewed (Yes)

## 2019-04-23 NOTE — PATIENT INSTRUCTIONS
Abdominal Pain    Abdominal pain is pain in the stomach or belly area. Everyone has this pain from time to time. In many cases it goes away on its own. But abdominal pain can sometimes be due to a serious problem, such as appendicitis. So its important to know when to seek help.  Causes of abdominal pain  There are many possible causes of abdominal pain. Common causes in adults include:  · Constipation, diarrhea, or gas  · Stomach acid flowing back up into the esophagus (acid reflux or heartburn)  · Severe acid reflux, called GERD (gastroesophageal reflux disease)  · A sore in the lining of the stomach or small intestine (peptic ulcer)  · Inflammation of the gallbladder, liver, or pancreas  · Gallstones or kidney stones  · Appendicitis   · Intestinal blockage   · An internal organ pushing through a muscle or other tissue (hernia)  · Urinary tract infections  · In women, menstrual cramps, fibroids, or endometriosis  · Inflammation or infection of the intestines  Diagnosing the cause of abdominal pain  Your healthcare provider will do a physical exam help find the cause of your pain. If needed, tests will be ordered. Belly pain has many possible causes. So it can be hard to find the reason for your pain. Giving details about your pain can help. Tell your provider where and when you feel the pain, and what makes it better or worse. Also let your provider know if you have other symptoms such as:  · Fever  · Tiredness  · Upset stomach (nausea)  · Vomiting  · Changes in bathroom habits  Treating abdominal pain  Some causes of pain need emergency medical treatment right away. These include appendicitis or a bowel blockage. Other problems can be treated with rest, fluids, or medicines. Your healthcare provider can give you specific instructions for treatment or self-care based on what is causing your pain.  If you have vomiting or diarrhea, sip water or other clear fluids. When you are ready to eat solid foods again,  start with small amounts of easy-to-digest, low-fat foods. These include apple sauce, toast, or crackers.   When to seek medical care  Call 911 or go to the hospital right away if you:  · Cant pass stool and are vomiting  · Are vomiting blood or have bloody diarrhea or black, tarry diarrhea  · Have chest, neck, or shoulder pain  · Feel like you might pass out  · Have pain in your shoulder blades with nausea  · Have sudden, severe belly pain  · Have new, severe pain unlike any you have felt before  · Have a belly that is rigid, hard, and tender to touch  Call your healthcare provider if you have:  · Pain for more than 5 days  · Bloating for more than 2 days  · Diarrhea for more than 5 days  · A fever of 100.4°F (38.0°C) or higher, or as directed by your provider  · Pain that gets worse  · Weight loss for no reason  · Continued lack of appetite  · Blood in your stool  How to prevent abdominal pain  Here are some tips to help prevent abdominal pain:  · Eat smaller amounts of food at one time.  · Avoid greasy, fried, or other high-fat foods.  · Avoid foods that give you gas.  · Exercise regularly.  · Drink plenty of fluids.  To help prevent GERD symptoms:  · Quit smoking.  · Reduce alcohol and certain foods that increase stomach acid.  · Avoid aspirin and over-the-counter pain and fever medicines (NSAIDS or nonsteroidal anti-inflammatory drugs), if possible  · Lose extra weight.  · Finish eating at least 2 hours before you go to bed or lie down.  · Raise the head of your bed.  Date Last Reviewed: 7/1/2016  © 5569-6990 [employer]. [address] [place] [address], [place] [address]. All rights reserved. This information is not intended as a substitute for professional medical care. Always follow your healthcare professional's instructions.

## 2019-04-24 ENCOUNTER — TELEPHONE (OUTPATIENT)
Dept: FAMILY MEDICINE | Facility: CLINIC | Age: 64
End: 2019-04-24

## 2019-04-24 NOTE — TELEPHONE ENCOUNTER
Nothing has been received for short term disability.   Patient will need an appointment to have disability paperwork filled out.

## 2019-04-24 NOTE — TELEPHONE ENCOUNTER
----- Message from Misa Wen sent at 4/24/2019 11:33 AM CDT -----  Contact: patient  Type: Needs Medical Advice    Who Called:  patient  Best Call Back Number: [phone]  Additional Information: calling to inform staff that [facility] will be calling for information regarding short term disability

## 2019-04-25 ENCOUNTER — TELEPHONE (OUTPATIENT)
Dept: FAMILY MEDICINE | Facility: CLINIC | Age: 64
End: 2019-04-25

## 2019-04-25 PROBLEM — E11.65 TYPE 2 DIABETES MELLITUS WITH HYPERGLYCEMIA: Status: ACTIVE | Noted: 2019-04-25

## 2019-04-25 NOTE — TELEPHONE ENCOUNTER
----- Message from Mirlande Cardona sent at 4/25/2019 12:07 PM CDT -----  Contact: [employer]  Type: Needs Medical Advice    Who Called:  Nara Mckeon Call Back Number: [phone] Ext [phone]  Additional Information: need clinical notes From the Dr asap please

## 2019-04-25 NOTE — TELEPHONE ENCOUNTER
Tried to call to get a fax number to fax clinical notes.   Was put on hold for over 5 min.  Had to hang up.

## 2019-04-26 ENCOUNTER — ANESTHESIA EVENT (OUTPATIENT)
Dept: ENDOSCOPY | Facility: HOSPITAL | Age: 64
End: 2019-04-26
Payer: COMMERCIAL

## 2019-04-26 ENCOUNTER — HOSPITAL ENCOUNTER (OUTPATIENT)
Facility: HOSPITAL | Age: 64
Discharge: HOME | End: 2019-04-26
Attending: INTERNAL MEDICINE | Admitting: INTERNAL MEDICINE
Payer: COMMERCIAL

## 2019-04-26 ENCOUNTER — ANESTHESIA (OUTPATIENT)
Dept: ENDOSCOPY | Facility: HOSPITAL | Age: 64
End: 2019-04-26
Payer: COMMERCIAL

## 2019-04-26 DIAGNOSIS — K44.9 HIATAL HERNIA: ICD-10-CM

## 2019-04-26 DIAGNOSIS — K29.70 GASTRITIS, PRESENCE OF BLEEDING UNSPECIFIED, UNSPECIFIED CHRONICITY, UNSPECIFIED GASTRITIS TYPE: Primary | ICD-10-CM

## 2019-04-26 DIAGNOSIS — R10.9 ABDOMINAL PAIN: ICD-10-CM

## 2019-04-26 DIAGNOSIS — E11.9 TYPE 2 DIABETES MELLITUS WITHOUT COMPLICATION: ICD-10-CM

## 2019-04-26 PROCEDURE — D9220A PRA ANESTHESIA: ICD-10-PCS | Mod: CRNA,,, | Performed by: NURSE ANESTHETIST, CERTIFIED REGISTERED

## 2019-04-26 PROCEDURE — 43239 EGD BIOPSY SINGLE/MULTIPLE: CPT | Performed by: INTERNAL MEDICINE

## 2019-04-26 PROCEDURE — 43239 PR EGD, FLEX, W/BIOPSY, SGL/MULTI: ICD-10-PCS | Mod: ,,, | Performed by: INTERNAL MEDICINE

## 2019-04-26 PROCEDURE — 88305 TISSUE EXAM BY PATHOLOGIST: CPT | Mod: 26,,, | Performed by: PATHOLOGY

## 2019-04-26 PROCEDURE — 37000008 HC ANESTHESIA 1ST 15 MINUTES: Performed by: INTERNAL MEDICINE

## 2019-04-26 PROCEDURE — 63600175 PHARM REV CODE 636 W HCPCS: Performed by: NURSE ANESTHETIST, CERTIFIED REGISTERED

## 2019-04-26 PROCEDURE — D9220A PRA ANESTHESIA: Mod: CRNA,,, | Performed by: NURSE ANESTHETIST, CERTIFIED REGISTERED

## 2019-04-26 PROCEDURE — 25000003 PHARM REV CODE 250: Performed by: INTERNAL MEDICINE

## 2019-04-26 PROCEDURE — 88342 IMHCHEM/IMCYTCHM 1ST ANTB: CPT | Mod: 26,,, | Performed by: PATHOLOGY

## 2019-04-26 PROCEDURE — 88305 TISSUE EXAM BY PATHOLOGIST: CPT | Performed by: PATHOLOGY

## 2019-04-26 PROCEDURE — 88305 TISSUE SPECIMEN TO PATHOLOGY - SURGERY: ICD-10-PCS | Mod: 26,,, | Performed by: PATHOLOGY

## 2019-04-26 PROCEDURE — D9220A PRA ANESTHESIA: Mod: ANES,,, | Performed by: ANESTHESIOLOGY

## 2019-04-26 PROCEDURE — 43239 EGD BIOPSY SINGLE/MULTIPLE: CPT | Mod: ,,, | Performed by: INTERNAL MEDICINE

## 2019-04-26 PROCEDURE — D9220A PRA ANESTHESIA: ICD-10-PCS | Mod: ANES,,, | Performed by: ANESTHESIOLOGY

## 2019-04-26 PROCEDURE — 88342 TISSUE SPECIMEN TO PATHOLOGY - SURGERY: ICD-10-PCS | Mod: 26,,, | Performed by: PATHOLOGY

## 2019-04-26 PROCEDURE — 27201012 HC FORCEPS, HOT/COLD, DISP: Performed by: INTERNAL MEDICINE

## 2019-04-26 RX ORDER — SODIUM CHLORIDE 9 MG/ML
INJECTION, SOLUTION INTRAVENOUS CONTINUOUS
Status: DISCONTINUED | OUTPATIENT
Start: 2019-04-26 | End: 2019-04-26 | Stop reason: HOSPADM

## 2019-04-26 RX ORDER — PROPOFOL 10 MG/ML
INJECTION, EMULSION INTRAVENOUS
Status: DISCONTINUED | OUTPATIENT
Start: 2019-04-26 | End: 2019-04-26

## 2019-04-26 RX ORDER — LIDOCAINE HCL/PF 100 MG/5ML
SYRINGE (ML) INTRAVENOUS
Status: DISCONTINUED | OUTPATIENT
Start: 2019-04-26 | End: 2019-04-26

## 2019-04-26 RX ADMIN — PROPOFOL 50 MG: 10 INJECTION, EMULSION INTRAVENOUS at 01:04

## 2019-04-26 RX ADMIN — PROPOFOL 100 MG: 10 INJECTION, EMULSION INTRAVENOUS at 01:04

## 2019-04-26 RX ADMIN — LIDOCAINE HYDROCHLORIDE 100 MG: 20 INJECTION, SOLUTION INTRAVENOUS at 01:04

## 2019-04-26 RX ADMIN — SODIUM CHLORIDE: 0.9 INJECTION, SOLUTION INTRAVENOUS at 12:04

## 2019-04-26 NOTE — ANESTHESIA PREPROCEDURE EVALUATION
04/26/2019  Jhonny Almazan is a 63 y.o., male.    Pre-op Assessment    I have reviewed the Patient Summary Reports.     I have reviewed the Nursing Notes.      Review of Systems  Anesthesia Hx:  No problems with previous Anesthesia    Cardiovascular:   Hypertension, well controlled    Hepatic/GI:   Liver Disease,    Endocrine:   Diabetes, well controlled, type 2        Physical Exam  General:  Obesity    Airway/Jaw/Neck:  Airway Findings: Mouth Opening: Normal Tongue: Normal  Mallampati: II  TM Distance: Normal, at least 6 cm  Jaw/Neck Findings:  Neck ROM: Normal ROM     Eyes/Ears/Nose:  Eyes/Ears/Nose Findings:    Dental:  Dental Findings:   Chest/Lungs:  Chest/Lungs Findings: Normal Respiratory Rate     Heart/Vascular:  Heart Findings: Rate: Normal  Rhythm: Regular Rhythm        Mental Status:  Mental Status Findings:  Cooperative, Alert and Oriented         Anesthesia Plan  Type of Anesthesia, risks & benefits discussed:  Anesthesia Type:  general  Patient's Preference: General  Intra-op Monitoring Plan: standard ASA monitors  Intra-op Monitoring Plan Comments:   Post Op Pain Control Plan:   Post Op Pain Control Plan Comments:   Induction:   IV  Beta Blocker:  Patient is not currently on a Beta-Blocker (No further documentation required).       Informed Consent: Patient understands risks and agrees with Anesthesia plan.  Questions answered. Anesthesia consent signed with patient.  ASA Score: 3     Day of Surgery Review of History & Physical:    H&P update referred to the surgeon.         Ready For Surgery From Anesthesia Perspective.

## 2019-04-26 NOTE — PLAN OF CARE
Pt dressed at approx 1400 but ride not available until approx 1500.  VS WDL  Ambulated to bathroom in NAD, ate ryan crackers and peanut butter., drank apple juice and water-tolerated well.    States feels better and passed a lot of air in recovery area.   pt request wife's presence when he discusses post op findings.  Pt aware bx's taken results ready in approx 2 weeks.  Discharge instructions given and aware to cont. PPI.  D/C to car via w/c in [facility]

## 2019-04-26 NOTE — TRANSFER OF CARE
"Anesthesia Transfer of Care Note    Patient: Jhonny Almazan    Procedure(s) Performed: Procedure(s) (LRB):  EGD (ESOPHAGOGASTRODUODENOSCOPY) (N/A)    Patient location: PACU    Anesthesia Type: general    Transport from OR: Transported from OR on 2-3 L/min O2 by NC with adequate spontaneous ventilation    Post pain: adequate analgesia    Post assessment: no apparent anesthetic complications and tolerated procedure well    Post vital signs: stable    Level of consciousness: sedated and responds to stimulation    Nausea/Vomiting: no nausea/vomiting    Complications: none    Transfer of care protocol was followed      Last vitals:   Visit Vitals  BP (!) 180/92   Pulse 72   Temp 36.8 °C (98.2 °F) (Skin)   Resp 14   Ht 6' 5" (1.956 m)   Wt 117.9 kg (260 lb)   SpO2 97%   BMI 30.83 kg/m²     "

## 2019-04-26 NOTE — ANESTHESIA POSTPROCEDURE EVALUATION
Anesthesia Post Evaluation    Patient: Jhonny Almazan    Procedure(s) Performed: Procedure(s) (LRB):  EGD (ESOPHAGOGASTRODUODENOSCOPY) (N/A)    Final Anesthesia Type: general  Patient location during evaluation: PACU  Patient participation: Yes- Able to Participate  Level of consciousness: awake and alert, oriented and awake  Post-procedure vital signs: reviewed and stable  Pain management: adequate  Airway patency: patent  PONV status at discharge: No PONV  Anesthetic complications: no      Cardiovascular status: blood pressure returned to baseline and hemodynamically stable  Respiratory status: unassisted, spontaneous ventilation and room air  Hydration status: euvolemic  Follow-up not needed.          Vitals Value Taken Time   /90 4/26/2019  2:00 PM   Temp 36.7 °C (98.1 °F) 4/26/2019  2:00 PM   Pulse 71 4/26/2019  2:00 PM   Resp 18 4/26/2019  2:00 PM   SpO2 97 % 4/26/2019  2:00 PM         No case tracking events are documented in the log.      Pain/Jonny Score: Jonny Score: 10 (4/26/2019  1:55 PM)

## 2019-04-26 NOTE — DISCHARGE INSTRUCTIONS
Medicines for Acid Reflux  Your healthcare provider has told you that you have acid reflux. This condition causes stomach acid to wash up into your throat. For most people, acid reflux is troubling but not dangerous. But left untreated, acid reflux sometimes damages the esophagus. Medicines can help control acid reflux and limit your risk of future problems.  Medicines for acid reflux  Your healthcare provider may prescribe medicine to help treat your acid reflux. Medicine will be based on your symptoms and any test results. Your provider will explain how to take your medicine. You will also be told about possible side effects.  Reducing stomach acid  Your provider may suggest antacids that you can buy over the counter. Antacids can give fast relief. Or you may be told to take a type of medicine called H2 blockers. These are available over the counter and by prescription (for higher doses).  Blocking stomach acid  In more severe cases, your healthcare provider may suggest stronger medicines such as proton pump inhibitors (PPIs). These keep the stomach from making acid. They are often prescribed for long-term use.  Other medicines  In some cases medicines to reduce or block stomach acid may not work. Then you may be switched to another type of medicine that helps your stomach empty better.     Date Last Reviewed: 10/1/2016  © 2883-7926 [employer]. [address] [place] [address], [place] [address]. All rights reserved. This information is not intended as a substitute for professional medical care. Always follow your healthcare professional's instructions.        Gastritis (Adult)    Gastritis is inflammation and irritation of the stomach lining. It can be present for a short time (acute) or be long lasting (chronic). Gastritis is often caused by infection with bacteria called H pylori. More than a third of people in the [place] have this bacteria in their bodies. In many cases, H pylori causes no problems or  symptoms. In some people, though, the infection irritates the stomach lining and causes gastritis. Other causes of stomach irritation include drinking alcohol or taking pain-relieving medicines called NSAIDs (such as aspirin or ibuprofen).   Symptoms of gastritis can include:  · Abdominal pain or bloating  · Loss of appetite  · Nausea or vomiting  · Vomiting blood or having black stools  · Feeling more tired than usual  An inflamed and irritated stomach lining is more likely to develop a sore called an ulcer. To help prevent this, gastritis should be treated.  Home care  If needed, medicines may be prescribed. If you have H pylori infection, treating it will likely relieve your symptoms. Other changes can help reduce stomach irritation and help it heal.  · If you have been prescribed medicines for H pylori infection, take them as directed. Take all of the medicine until it is finished or your healthcare provider tells you to stop, even if you feel better.  · Your healthcare provider may recommend avoiding NSAIDs. If you take daily aspirin for your heart or other medical reasons, do not stop without talking to your healthcare provider first.  · Avoid drinking alcohol.  · Stop smoking. Smoking can irritate the stomach and delay healing. As much as possible, stay away from second hand smoke.  Follow-up care  Follow up with your healthcare provider, or as advised by our staff. Testing may be needed to check for inflammation or an ulcer.  When to seek medical advice  Call your healthcare provider for any of the following:  · Stomach pain that gets worse or moves to the lower right abdomen (appendix area)  · Chest pain that appears or gets worse, or spreads to the back, neck, shoulder, or arm  · Frequent vomiting (cant keep down liquids)  · Blood in the stool or vomit (red or black in color)  · Feeling weak or dizzy  · Fever of 100.4ºF (38ºC) or higher, or as directed by your healthcare provider  Date Last Reviewed:  6/22/2015  © 1977-5276 [employer]. [address] [place] [address], [place] [address]. All rights reserved. This information is not intended as a substitute for professional medical care. Always follow your healthcare professional's instructions.

## 2019-04-26 NOTE — [employer] PATIENT INSTRUCTIONS
Discharge Summary/Instructions after an Endoscopic Procedure  Patient Name: Jhonny Almazan  Patient MRN: [id number]  Patient YOB: 1955 Friday, April 26, 2019  Guero Miles MD  RESTRICTIONS:  During your procedure today, you received medications for sedation.  These   medications may affect your judgment, balance and coordination.  Therefore,   for 24 hours, you have the following restrictions:   - DO NOT drive a car, operate machinery, make legal/financial decisions,   sign important papers or drink alcohol.    ACTIVITY:  Today: no heavy lifting, straining or running due to procedural   sedation/anesthesia.  The following day: return to full activity including work.  DIET:  Eat and drink normally unless instructed otherwise.     TREATMENT FOR COMMON SIDE EFFECTS:  - Mild abdominal pain, nausea, belching, bloating or excessive gas:  rest,   eat lightly and use a heating pad.  - Sore Throat: treat with throat lozenges and/or gargle with warm salt   water.  - Because air was used during the procedure, expelling large amounts of air   from your rectum or belching is normal.  - If a bowel prep was taken, you may not have a bowel movement for 1-3 days.    This is normal.  SYMPTOMS TO WATCH FOR AND REPORT TO YOUR PHYSICIAN:  1. Abdominal pain or bloating, other than gas cramps.  2. Chest pain.  3. Back pain.  4. Signs of infection such as: chills or fever occurring within 24 hours   after the procedure.  5. Rectal bleeding, which would show as bright red, maroon, or black stools.   (A tablespoon of blood from the rectum is not serious, especially if   hemorrhoids are present.)  6. Vomiting.  7. Weakness or dizziness.  GO DIRECTLY TO THE NEAREST EMERGENCY ROOM IF YOU HAVE ANY OF THE FOLLOWING:      Difficulty breathing              Chills and/or fever over 101 F   Persistent vomiting and/or vomiting blood   Severe abdominal pain   Severe chest pain   Black, tarry stools   Bleeding- more than one  tablespoon   Any other symptom or condition that you feel may need urgent attention  Your doctor recommends these additional instructions:  If any biopsies were taken, your doctors clinic will contact you in 1 to 2   weeks with any results.  - Patient has a contact number available for emergencies.  The signs and   symptoms of potential delayed complications were discussed with the   patient.  Return to normal activities tomorrow.  Written discharge   instructions were provided to the patient.   - Resume previous diet.   - Continue present medications.   - No aspirin, ibuprofen, naproxen, or other non-steroidal anti-inflammatory   drugs.   - Await pathology results.   - Discharge patient to home (ambulatory).   - Follow an antireflux regimen.   - Use Prilosec (omeprazole) 40 mg PO daily.   - Return to nurse practitioner in 6 weeks.  For questions, problems or results please call your physician - Guero Miles MD at Work:  [phone].  [facility], EMERGENCY ROOM PHONE NUMBER: [phone]  IF A COMPLICATION OR EMERGENCY SITUATION ARISES AND YOU ARE UNABLE TO REACH   YOUR PHYSICIAN - GO DIRECTLY TO THE EMERGENCY ROOM.  Guero Miles MD  4/26/2019 1:30:51 PM  This report has been verified and signed electronically.  [employer]

## 2019-04-29 ENCOUNTER — TELEPHONE (OUTPATIENT)
Dept: FAMILY MEDICINE | Facility: CLINIC | Age: 64
End: 2019-04-29

## 2019-04-29 VITALS
HEIGHT: 77 IN | SYSTOLIC BLOOD PRESSURE: 160 MMHG | HEART RATE: 71 BPM | RESPIRATION RATE: 18 BRPM | TEMPERATURE: 98 F | WEIGHT: 260 LBS | OXYGEN SATURATION: 97 % | BODY MASS INDEX: 30.7 KG/M2 | DIASTOLIC BLOOD PRESSURE: 90 MMHG

## 2019-04-29 NOTE — TELEPHONE ENCOUNTER
Clinical notes for 2019 faxed to [facility] at [phone].          ----- Message from Alivia Quinones sent at 4/29/2019  9:38 AM CDT -----  Contact: Nara hernandez/Effie  Type: Needs Medical Advice    Who Called:  Ofe  Symptoms (please be specific):  na  How long has patient had these symptoms:  juan  Pharmacy name and phone #:  juan  Best Call Back Number: [phone] ext [phone]  Additional Information: Nara states she is trying to get clinical information of the patient for a disability claim. Please call to advise. Thanks!

## 2019-05-01 ENCOUNTER — TELEPHONE (OUTPATIENT)
Dept: FAMILY MEDICINE | Facility: CLINIC | Age: 64
End: 2019-05-01

## 2019-05-01 NOTE — TELEPHONE ENCOUNTER
Dear Ms Sanchez,  The employer insurance is asking regarding possible day to RTW. Also his diagnosis and treatments. Does he needs a FML form.

## 2019-05-01 NOTE — TELEPHONE ENCOUNTER
----- Message from Radha Antonio sent at 5/1/2019  1:52 PM CDT -----  Contact: beena with [employer]  Calling in regards to have questions about date of disability and return to work date and whats keeping pt out of work and can he return yo work and take off work to have tet done and please advised [phone] ext [phone] and fax # [phone] claim # [id number]

## 2019-05-02 NOTE — TELEPHONE ENCOUNTER
When I saw the patient, I never told the patient to take off from work. He came to me for an ER follow-up of RUQ pain. I ordered an HIDA scan. I have not seen the patient after that one visit or received the results of the HIDA scan.

## 2019-05-02 NOTE — TELEPHONE ENCOUNTER
Send a letter to RTW now with out any restrictions.Send a message to patient that he was released to work .

## 2019-05-03 ENCOUNTER — ANESTHESIA (OUTPATIENT)
Dept: ENDOSCOPY | Facility: HOSPITAL | Age: 64
End: 2019-05-03
Payer: COMMERCIAL

## 2019-05-03 ENCOUNTER — HOSPITAL ENCOUNTER (OUTPATIENT)
Facility: HOSPITAL | Age: 64
Discharge: HOME | End: 2019-05-03
Attending: INTERNAL MEDICINE | Admitting: INTERNAL MEDICINE
Payer: COMMERCIAL

## 2019-05-03 ENCOUNTER — ANESTHESIA EVENT (OUTPATIENT)
Dept: ENDOSCOPY | Facility: HOSPITAL | Age: 64
End: 2019-05-03
Payer: COMMERCIAL

## 2019-05-03 VITALS
RESPIRATION RATE: 20 BRPM | TEMPERATURE: 99 F | BODY MASS INDEX: 30.7 KG/M2 | DIASTOLIC BLOOD PRESSURE: 95 MMHG | HEART RATE: 80 BPM | HEIGHT: 77 IN | SYSTOLIC BLOOD PRESSURE: 200 MMHG | OXYGEN SATURATION: 96 % | WEIGHT: 260 LBS

## 2019-05-03 DIAGNOSIS — Z86.010 HISTORY OF COLON POLYPS: ICD-10-CM

## 2019-05-03 DIAGNOSIS — Z86.010 HISTORY OF COLON POLYPS: Primary | ICD-10-CM

## 2019-05-03 PROBLEM — Z86.0100 HISTORY OF COLON POLYPS: Status: ACTIVE | Noted: 2019-05-03

## 2019-05-03 PROCEDURE — D9220A PRA ANESTHESIA: Mod: 53,CRNA,, | Performed by: NURSE ANESTHETIST, CERTIFIED REGISTERED

## 2019-05-03 PROCEDURE — G0105 COLORECTAL SCRN; HI RISK IND: ICD-10-PCS | Mod: 53,,, | Performed by: INTERNAL MEDICINE

## 2019-05-03 PROCEDURE — 63600175 PHARM REV CODE 636 W HCPCS: Performed by: NURSE ANESTHETIST, CERTIFIED REGISTERED

## 2019-05-03 PROCEDURE — G0105 COLORECTAL SCRN; HI RISK IND: HCPCS | Performed by: INTERNAL MEDICINE

## 2019-05-03 PROCEDURE — D9220A PRA ANESTHESIA: Mod: 53,ANES,, | Performed by: ANESTHESIOLOGY

## 2019-05-03 PROCEDURE — 37000009 HC ANESTHESIA EA ADD 15 MINS: Performed by: INTERNAL MEDICINE

## 2019-05-03 PROCEDURE — D9220A PRA ANESTHESIA: ICD-10-PCS | Mod: 53,ANES,, | Performed by: ANESTHESIOLOGY

## 2019-05-03 PROCEDURE — 37000008 HC ANESTHESIA 1ST 15 MINUTES: Performed by: INTERNAL MEDICINE

## 2019-05-03 PROCEDURE — D9220A PRA ANESTHESIA: ICD-10-PCS | Mod: 53,CRNA,, | Performed by: NURSE ANESTHETIST, CERTIFIED REGISTERED

## 2019-05-03 PROCEDURE — G0105 COLORECTAL SCRN; HI RISK IND: HCPCS | Mod: 53,,, | Performed by: INTERNAL MEDICINE

## 2019-05-03 PROCEDURE — 25000003 PHARM REV CODE 250: Performed by: INTERNAL MEDICINE

## 2019-05-03 PROCEDURE — 45378 DIAGNOSTIC COLONOSCOPY: CPT | Performed by: INTERNAL MEDICINE

## 2019-05-03 RX ORDER — LIDOCAINE HCL/PF 100 MG/5ML
SYRINGE (ML) INTRAVENOUS
Status: DISCONTINUED | OUTPATIENT
Start: 2019-05-03 | End: 2019-05-03

## 2019-05-03 RX ORDER — PROPOFOL 10 MG/ML
VIAL (ML) INTRAVENOUS
Status: DISCONTINUED | OUTPATIENT
Start: 2019-05-03 | End: 2019-05-03

## 2019-05-03 RX ORDER — SODIUM CHLORIDE 9 MG/ML
INJECTION, SOLUTION INTRAVENOUS CONTINUOUS
Status: DISCONTINUED | OUTPATIENT
Start: 2019-05-03 | End: 2019-05-03 | Stop reason: HOSPADM

## 2019-05-03 RX ADMIN — LIDOCAINE HYDROCHLORIDE 50 MG: 20 INJECTION, SOLUTION INTRAVENOUS at 01:05

## 2019-05-03 RX ADMIN — SODIUM CHLORIDE 1000 ML: 0.9 INJECTION, SOLUTION INTRAVENOUS at 01:05

## 2019-05-03 RX ADMIN — PROPOFOL 100 MG: 10 INJECTION, EMULSION INTRAVENOUS at 01:05

## 2019-05-03 NOTE — ANESTHESIA POSTPROCEDURE EVALUATION
Anesthesia Post Evaluation    Patient: Jhonny Almazan    Procedure(s) Performed: Procedure(s) (LRB):  COLONOSCOPY (N/A)    Final Anesthesia Type: general  Patient location during evaluation: PACU  Patient participation: Yes- Able to Participate  Level of consciousness: awake and alert and oriented  Post-procedure vital signs: reviewed and stable  Pain management: adequate  Airway patency: patent  PONV status at discharge: No PONV  Anesthetic complications: no      Cardiovascular status: blood pressure returned to baseline and stable  Respiratory status: unassisted and spontaneous ventilation  Hydration status: euvolemic  Follow-up not needed.          Vitals Value Taken Time   /95 5/3/2019  2:31 PM   Temp 37 °C (98.6 °F) 5/3/2019  2:25 PM   Pulse 80 5/3/2019  2:31 PM   Resp 20 5/3/2019  2:31 PM   SpO2 96 % 5/3/2019  2:31 PM         No case tracking events are documented in the log.      Pain/Jonny Score: Jnony Score: 10 (5/3/2019  2:31 PM)

## 2019-05-03 NOTE — DISCHARGE INSTRUCTIONS
"Discharge Instructions: After Your Surgery/Procedure  Youve just had surgery. During surgery you were given medicine called anesthesia to keep you relaxed and free of pain. After surgery you may have some pain or nausea. This is common. Here are some tips for feeling better and getting well after surgery.     Stay on schedule with your medication.   Going home  Your doctor or nurse will show you how to take care of yourself when you go home. He or she will also answer your questions. Have an adult family member or friend drive you home.      For your safety we recommend these precaution for the first 24 hours after your procedure:  · Do not drive or use heavy equipment.  · Do not make important decisions or sign legal papers.  · Do not drink alcohol.  · Have someone stay with you, if needed. He or she can watch for problems and help keep you safe.  · Your concentration, balance, coordination, and judgement may be impaired for many hours after anesthesia.  Use caution when ambulating or standing up.     · You may feel weak and "washed out" after anesthesia and surgery.      Subtle residual effects of general anesthesia or sedation with regional / local anesthesia can last more than 24 hours.  Rest for the remainder of the day or longer if your Doctor/Surgeon has advised you to do so.  Although you may feel normal within the first 24 hours, your reflexes and mental ability may be impaired without you realizing it.  You may feel dizzy, lightheaded or sleepy for 24 hours or longer.      Be sure to go to all follow-up visits with your doctor. And rest after your surgery for as long as your doctor tells you to.  Coping with pain  If you have pain after surgery, pain medicine will help you feel better. Take it as told, before pain becomes severe. Also, ask your doctor or pharmacist about other ways to control pain. This might be with heat, ice, or relaxation. And follow any other instructions your surgeon or nurse gives " you.  Tips for taking pain medicine  To get the best relief possible, remember these points:  · Pain medicines can upset your stomach. Taking them with a little food may help.  · Most pain relievers taken by mouth need at least 20 to 30 minutes to start to work.  · Taking medicine on a schedule can help you remember to take it. Try to time your medicine so that you can take it before starting an activity. This might be before you get dressed, go for a walk, or sit down for dinner.  · Constipation is a common side effect of pain medicines. Call your doctor before taking any medicines such as laxatives or stool softeners to help ease constipation. Also ask if you should skip any foods. Drinking lots of fluids and eating foods such as fruits and vegetables that are high in fiber can also help. Remember, do not take laxatives unless your surgeon has prescribed them.  · Drinking alcohol and taking pain medicine can cause dizziness and slow your breathing. It can even be deadly. Do not drink alcohol while taking pain medicine.  · Pain medicine can make you react more slowly to things. Do not drive or run machinery while taking pain medicine.  Your health care provider may tell you to take acetaminophen to help ease your pain. Ask him or her how much you are supposed to take each day. Acetaminophen or other pain relievers may interact with your prescription medicines or other over-the-counter (OTC) drugs. Some prescription medicines have acetaminophen and other ingredients. Using both prescription and OTC acetaminophen for pain can cause you to overdose. Read the labels on your OTC medicines with care. This will help you to clearly know the list of ingredients, how much to take, and any warnings. It may also help you not take too much acetaminophen. If you have questions or do not understand the information, ask your pharmacist or health care provider to explain it to you before you take the OTC medicine.  Managing  nausea  Some people have an upset stomach after surgery. This is often because of anesthesia, pain, or pain medicine, or the stress of surgery. These tips will help you handle nausea and eat healthy foods as you get better. If you were on a special food plan before surgery, ask your doctor if you should follow it while you get better. These tips may help:  · Do not push yourself to eat. Your body will tell you when to eat and how much.  · Start off with clear liquids and soup. They are easier to digest.  · Next try semi-solid foods, such as mashed potatoes, applesauce, and gelatin, as you feel ready.  · Slowly move to solid foods. Dont eat fatty, rich, or spicy foods at first.  · Do not force yourself to have 3 large meals a day. Instead eat smaller amounts more often.  · Take pain medicines with a small amount of solid food, such as crackers or toast, to avoid nausea.     Call your surgeon if  · You still have pain an hour after taking medicine. The medicine may not be strong enough.  · You feel too sleepy, dizzy, or groggy. The medicine may be too strong.  · You have side effects like nausea, vomiting, or skin changes, such as rash, itching, or hives.       If you have obstructive sleep apnea  You were given anesthesia medicine during surgery to keep you comfortable and free of pain. After surgery, you may have more apnea spells because of this medicine and other medicines you were given. The spells may last longer than usual.   At home:  · Keep using the continuous positive airway pressure (CPAP) device when you sleep. Unless your health care provider tells you not to, use it when you sleep, day or night. CPAP is a common device used to treat obstructive sleep apnea.  · Talk with your provider before taking any pain medicine, muscle relaxants, or sedatives. Your provider will tell you about the possible dangers of taking these medicines.  © 2780-7196 [employer]. [address] [place] [address]  [place] [address]. All rights reserved. This information is not intended as a substitute for professional medical care. Always follow your healthcare professional's instructions.    Colonoscopy     A camera attached to a flexible tube with a viewing lens is used to take video pictures.     Colonoscopy is a test to view the inside of your lower digestive tract (colon and rectum). Sometimes it can show the last part of the small intestine (ileum). During the test, small pieces of tissue may be removed for testing. This is called a biopsy. Small growths, such as polyps, may also be removed.   Why is colonoscopy done?  The test is done to help look for colon cancer. And it can help find the source of abdominal pain, bleeding, and changes in bowel habits. It may be needed once a year, depending on factors such as your:  · Age  · Health history  · Family health history  · Symptoms  · Results from any prior colonoscopy  Risks and possible complications  These include:  · Bleeding               · A puncture or tear in the colon   · Risks of anesthesia  · A cancer lesion not being seen  Getting ready   To prepare for the test:  · Talk with your healthcare provider about the risks of the test (see below). Also ask your healthcare provider about alternatives to the test.  · Tell your healthcare provider about any medicines you take. Also tell him or her about any health conditions you may have.  · Make sure your rectum and colon are empty for the test. Follow the diet and bowel prep instructions exactly. If you dont, the test may need to be rescheduled.  · Plan for a friend or family member to drive you home after the test.     Colonoscopy provides an inside view of the entire colon.     You may discuss the results with your doctor right away or at a future visit.  During the test   The test is usually done in the hospital on an outpatient basis. This means you go home the same day. The procedure takes about 30 minutes. During that  time:  · You are given relaxing (sedating) medicine through an IV line. You may be drowsy, or fully asleep.  · The healthcare provider will first give you a physical exam to check for anal and rectal problems.  · Then the anus is lubricated and the scope inserted.  · If you are awake, you may have a feeling similar to needing to have a bowel movement. You may also feel pressure as air is pumped into the colon. Its OK to pass gas during the procedure.  · Biopsy, polyp removal, or other treatments may be done during the test.  After the test   You may have gas right after the test. It can help to try to pass it to help prevent later bloating. Your healthcare provider may discuss the results with you right away. Or you may need to schedule a follow-up visit to talk about the results. After the test, you can go back to your normal eating and other activities. You may be tired from the sedation and need to rest for a few hours.  Date Last Reviewed: 11/1/2016 © 2000-2017 [employer]. [address] [place] [address], [place] [address]. All rights reserved. This information is not intended as a substitute for professional medical care. Always follow your healthcare professional's instructions.

## 2019-05-03 NOTE — H&P
CC: History of colon polyps - last scope in 2014    63 year old male with above. States that symptoms are absent, no alleviating/exacerbating factors. No family history of CA. Positive personal history of polyps. No bleeding or weight loss.     ROS:  No headache, no fever/chills, no chest pain/SOB, no nausea/vomiting/diarrhea/constipation/GI bleeding/abdominal pain, no dysuria/hematuria.    VSSAF   Exam:   Alert and oriented x 3; no apparent distress   PERRLA, sclera anicteric  CV: Regular rate/rhythm, normal PMI   Lungs: Clear bilaterally with no wheeze/rales   Abdomen: Soft, NT/ND, normal bowel sounds   Ext: No cyanosis, clubbing     Impression:   As above    Plan:   Proceed with endoscopy. Further recs to follow.

## 2019-05-03 NOTE — TRANSFER OF CARE
"Anesthesia Transfer of Care Note    Patient: Jhonny Almazan    Procedure(s) Performed: Procedure(s) (LRB):  COLONOSCOPY (N/A)    Patient location: GI    Anesthesia Type: general    Transport from OR: Transported from OR on room air with adequate spontaneous ventilation    Post pain: adequate analgesia    Post assessment: no apparent anesthetic complications and tolerated procedure well    Post vital signs: stable    Level of consciousness: awake, alert and oriented    Nausea/Vomiting: no nausea/vomiting    Complications: none    Transfer of care protocol was followed      Last vitals:   Visit Vitals  BP (!) 182/93   Pulse 84   Temp 36.8 °C (98.2 °F) (Skin)   Resp 20   Ht 6' 5" (1.956 m)   Wt 117.9 kg (260 lb)   BMI 30.83 kg/m²     "

## 2019-05-03 NOTE — [employer]OVATION PATIENT INSTRUCTIONS
Discharge Summary/Instructions after an Endoscopic Procedure  Patient Name: Jhonny Almazan  Patient MRN: [id number]  Patient YOB: 1955  Friday, May 03, 2019  Guero Miles MD  RESTRICTIONS:  During your procedure today, you received medications for sedation.  These   medications may affect your judgment, balance and coordination.  Therefore,   for 24 hours, you have the following restrictions:   - DO NOT drive a car, operate machinery, make legal/financial decisions,   sign important papers or drink alcohol.    ACTIVITY:  Today: no heavy lifting, straining or running due to procedural   sedation/anesthesia.  The following day: return to full activity including work.  DIET:  Eat and drink normally unless instructed otherwise.     TREATMENT FOR COMMON SIDE EFFECTS:  - Mild abdominal pain, nausea, belching, bloating or excessive gas:  rest,   eat lightly and use a heating pad.  - Sore Throat: treat with throat lozenges and/or gargle with warm salt   water.  - Because air was used during the procedure, expelling large amounts of air   from your rectum or belching is normal.  - If a bowel prep was taken, you may not have a bowel movement for 1-3 days.    This is normal.  SYMPTOMS TO WATCH FOR AND REPORT TO YOUR PHYSICIAN:  1. Abdominal pain or bloating, other than gas cramps.  2. Chest pain.  3. Back pain.  4. Signs of infection such as: chills or fever occurring within 24 hours   after the procedure.  5. Rectal bleeding, which would show as bright red, maroon, or black stools.   (A tablespoon of blood from the rectum is not serious, especially if   hemorrhoids are present.)  6. Vomiting.  7. Weakness or dizziness.  GO DIRECTLY TO THE NEAREST EMERGENCY ROOM IF YOU HAVE ANY OF THE FOLLOWING:      Difficulty breathing              Chills and/or fever over 101 F   Persistent vomiting and/or vomiting blood   Severe abdominal pain   Severe chest pain   Black, tarry stools   Bleeding- more than one  tablespoon   Any other symptom or condition that you feel may need urgent attention  Your doctor recommends these additional instructions:  If any biopsies were taken, your doctors clinic will contact you in 1 to 2   weeks with any results.  - Patient has a contact number available for emergencies.  The signs and   symptoms of potential delayed complications were discussed with the   patient.  Return to normal activities tomorrow.  Written discharge   instructions were provided to the patient.   - Resume previous diet.   - Continue present medications.   - Repeat colonoscopy at appointment to be scheduled because the bowel   preparation was poor.   - Discharge patient to home (ambulatory).   - Return to my office after studies are complete.  For questions, problems or results please call your physician - Guero Miles MD at Work:  [phone].  [facility], EMERGENCY ROOM PHONE NUMBER: [phone]  IF A COMPLICATION OR EMERGENCY SITUATION ARISES AND YOU ARE UNABLE TO REACH   YOUR PHYSICIAN - GO DIRECTLY TO THE EMERGENCY ROOM.  Guero Miles MD  5/3/2019 1:58:09 PM  This report has been verified and signed electronically.  [employer]

## 2019-05-03 NOTE — ANESTHESIA PREPROCEDURE EVALUATION
05/03/2019  Jhonny Almazan is a 63 y.o., male.    Pre-op Assessment    I have reviewed the Patient Summary Reports.     I have reviewed the Nursing Notes.   I have reviewed the Medications.     Review of Systems  Anesthesia Hx:  No problems with previous Anesthesia    Social:  Smoker    Hematology/Oncology:  Hematology Normal   Oncology Normal     EENT/Dental:EENT/Dental Normal   Cardiovascular:   Hypertension, well controlled    Pulmonary:  Pulmonary Normal    Hepatic/GI:   Liver Disease,    Musculoskeletal:   Arthritis     Endocrine:   Diabetes, well controlled, type 2        Physical Exam  General:  Obesity    Airway/Jaw/Neck:  Airway Findings: Mouth Opening: Normal Tongue: Normal  Mallampati: II  TM Distance: Normal, at least 6 cm  Jaw/Neck Findings:  Neck ROM: Normal ROM     Eyes/Ears/Nose:  Eyes/Ears/Nose Findings:    Dental:  Dental Findings:   Chest/Lungs:  Chest/Lungs Findings: Normal Respiratory Rate     Heart/Vascular:  Heart Findings: Rate: Normal  Rhythm: Regular Rhythm        Mental Status:  Mental Status Findings:  Cooperative, Alert and Oriented         Anesthesia Plan  Type of Anesthesia, risks & benefits discussed:  Anesthesia Type:  general  Patient's Preference:   Intra-op Monitoring Plan: standard ASA monitors  Intra-op Monitoring Plan Comments:   Post Op Pain Control Plan:   Post Op Pain Control Plan Comments:   Induction:   IV  Beta Blocker:  Patient is not currently on a Beta-Blocker (No further documentation required).       Informed Consent: Patient understands risks and agrees with Anesthesia plan.  Questions answered. Anesthesia consent signed with patient.  ASA Score: 3     Day of Surgery Review of History & Physical:    H&P update referred to the provider.         Ready For Surgery From Anesthesia Perspective.

## 2019-05-05 ENCOUNTER — NURSE TRIAGE (OUTPATIENT)
Dept: ADMINISTRATIVE | Facility: CLINIC | Age: 64
End: 2019-05-05

## 2019-05-05 NOTE — TELEPHONE ENCOUNTER
Reason for Disposition   Message left on identified voice mail    Protocols used: NO CONTACT OR DUPLICATE CONTACT CALL-NICHO-  BEATRIZ x2 at 1112am at  [phone]

## 2019-05-06 ENCOUNTER — HOSPITAL ENCOUNTER (OUTPATIENT)
Facility: HOSPITAL | Age: 64
Discharge: HOME | End: 2019-05-06
Attending: INTERNAL MEDICINE | Admitting: INTERNAL MEDICINE
Payer: COMMERCIAL

## 2019-05-06 ENCOUNTER — ANESTHESIA EVENT (OUTPATIENT)
Dept: ENDOSCOPY | Facility: HOSPITAL | Age: 64
End: 2019-05-06
Payer: COMMERCIAL

## 2019-05-06 ENCOUNTER — ANESTHESIA (OUTPATIENT)
Dept: ENDOSCOPY | Facility: HOSPITAL | Age: 64
End: 2019-05-06
Payer: COMMERCIAL

## 2019-05-06 DIAGNOSIS — K57.30 DIVERTICULOSIS OF LARGE INTESTINE WITHOUT HEMORRHAGE: ICD-10-CM

## 2019-05-06 DIAGNOSIS — K63.5 POLYP OF COLON, UNSPECIFIED PART OF COLON, UNSPECIFIED TYPE: Primary | ICD-10-CM

## 2019-05-06 DIAGNOSIS — K64.8 INTERNAL HEMORRHOIDS: ICD-10-CM

## 2019-05-06 DIAGNOSIS — Z86.010 HISTORY OF COLON POLYPS: ICD-10-CM

## 2019-05-06 PROCEDURE — 45380 COLONOSCOPY AND BIOPSY: CPT | Performed by: INTERNAL MEDICINE

## 2019-05-06 PROCEDURE — 88305 TISSUE EXAM BY PATHOLOGIST: CPT | Mod: 26,,, | Performed by: PATHOLOGY

## 2019-05-06 PROCEDURE — 27201089 HC SNARE, DISP (ANY): Performed by: INTERNAL MEDICINE

## 2019-05-06 PROCEDURE — 25000003 PHARM REV CODE 250: Performed by: INTERNAL MEDICINE

## 2019-05-06 PROCEDURE — 45385 COLONOSCOPY W/LESION REMOVAL: CPT | Performed by: INTERNAL MEDICINE

## 2019-05-06 PROCEDURE — D9220A PRA ANESTHESIA: Mod: 33,CRNA,, | Performed by: NURSE ANESTHETIST, CERTIFIED REGISTERED

## 2019-05-06 PROCEDURE — 25000003 PHARM REV CODE 250: Performed by: NURSE ANESTHETIST, CERTIFIED REGISTERED

## 2019-05-06 PROCEDURE — 45385 COLONOSCOPY W/LESION REMOVAL: CPT | Mod: 22,33,, | Performed by: INTERNAL MEDICINE

## 2019-05-06 PROCEDURE — 45385 PR COLONOSCOPY,REMV LESN,SNARE: ICD-10-PCS | Mod: 22,33,, | Performed by: INTERNAL MEDICINE

## 2019-05-06 PROCEDURE — 27201012 HC FORCEPS, HOT/COLD, DISP: Performed by: INTERNAL MEDICINE

## 2019-05-06 PROCEDURE — 45380 PR COLONOSCOPY,BIOPSY: ICD-10-PCS | Mod: 59,,, | Performed by: INTERNAL MEDICINE

## 2019-05-06 PROCEDURE — D9220A PRA ANESTHESIA: Mod: 33,ANES,, | Performed by: ANESTHESIOLOGY

## 2019-05-06 PROCEDURE — 88305 TISSUE EXAM BY PATHOLOGIST: CPT | Performed by: PATHOLOGY

## 2019-05-06 PROCEDURE — 37000008 HC ANESTHESIA 1ST 15 MINUTES: Performed by: INTERNAL MEDICINE

## 2019-05-06 PROCEDURE — 37000009 HC ANESTHESIA EA ADD 15 MINS: Performed by: INTERNAL MEDICINE

## 2019-05-06 PROCEDURE — 45380 COLONOSCOPY AND BIOPSY: CPT | Mod: 59,,, | Performed by: INTERNAL MEDICINE

## 2019-05-06 PROCEDURE — D9220A PRA ANESTHESIA: ICD-10-PCS | Mod: 33,CRNA,, | Performed by: NURSE ANESTHETIST, CERTIFIED REGISTERED

## 2019-05-06 PROCEDURE — 88305 TISSUE SPECIMEN TO PATHOLOGY - SURGERY: ICD-10-PCS | Mod: 26,,, | Performed by: PATHOLOGY

## 2019-05-06 PROCEDURE — 63600175 PHARM REV CODE 636 W HCPCS: Performed by: NURSE ANESTHETIST, CERTIFIED REGISTERED

## 2019-05-06 PROCEDURE — D9220A PRA ANESTHESIA: ICD-10-PCS | Mod: 33,ANES,, | Performed by: ANESTHESIOLOGY

## 2019-05-06 RX ORDER — LIDOCAINE HCL/PF 100 MG/5ML
SYRINGE (ML) INTRAVENOUS
Status: DISCONTINUED | OUTPATIENT
Start: 2019-05-06 | End: 2019-05-06

## 2019-05-06 RX ORDER — PROPOFOL 10 MG/ML
INJECTION, EMULSION INTRAVENOUS
Status: DISCONTINUED | OUTPATIENT
Start: 2019-05-06 | End: 2019-05-06

## 2019-05-06 RX ORDER — SODIUM CHLORIDE 9 MG/ML
INJECTION, SOLUTION INTRAVENOUS CONTINUOUS
Status: DISCONTINUED | OUTPATIENT
Start: 2019-05-06 | End: 2019-05-06 | Stop reason: HOSPADM

## 2019-05-06 RX ORDER — GLYCOPYRROLATE 0.2 MG/ML
INJECTION INTRAMUSCULAR; INTRAVENOUS
Status: DISCONTINUED | OUTPATIENT
Start: 2019-05-06 | End: 2019-05-06

## 2019-05-06 RX ADMIN — PROPOFOL 50 MG: 10 INJECTION, EMULSION INTRAVENOUS at 02:05

## 2019-05-06 RX ADMIN — PROPOFOL 50 MG: 10 INJECTION, EMULSION INTRAVENOUS at 01:05

## 2019-05-06 RX ADMIN — GLYCOPYRROLATE 0.2 MG: 0.2 INJECTION, SOLUTION INTRAMUSCULAR; INTRAVENOUS at 01:05

## 2019-05-06 RX ADMIN — PROPOFOL 100 MG: 10 INJECTION, EMULSION INTRAVENOUS at 01:05

## 2019-05-06 RX ADMIN — LIDOCAINE HYDROCHLORIDE 100 MG: 20 INJECTION, SOLUTION INTRAVENOUS at 01:05

## 2019-05-06 RX ADMIN — SODIUM CHLORIDE: 0.9 INJECTION, SOLUTION INTRAVENOUS at 12:05

## 2019-05-06 NOTE — PROVATION PATIENT INSTRUCTIONS
Discharge Summary/Instructions after an Endoscopic Procedure  Patient Name: Jhonny Almazan  Patient MRN: [id number]  Patient YOB: 1955  Monday, May 06, 2019  Guero Miles MD  RESTRICTIONS:  During your procedure today, you received medications for sedation.  These   medications may affect your judgment, balance and coordination.  Therefore,   for 24 hours, you have the following restrictions:   - DO NOT drive a car, operate machinery, make legal/financial decisions,   sign important papers or drink alcohol.    ACTIVITY:  Today: no heavy lifting, straining or running due to procedural   sedation/anesthesia.  The following day: return to full activity including work.  DIET:  Eat and drink normally unless instructed otherwise.     TREATMENT FOR COMMON SIDE EFFECTS:  - Mild abdominal pain, nausea, belching, bloating or excessive gas:  rest,   eat lightly and use a heating pad.  - Sore Throat: treat with throat lozenges and/or gargle with warm salt   water.  - Because air was used during the procedure, expelling large amounts of air   from your rectum or belching is normal.  - If a bowel prep was taken, you may not have a bowel movement for 1-3 days.    This is normal.  SYMPTOMS TO WATCH FOR AND REPORT TO YOUR PHYSICIAN:  1. Abdominal pain or bloating, other than gas cramps.  2. Chest pain.  3. Back pain.  4. Signs of infection such as: chills or fever occurring within 24 hours   after the procedure.  5. Rectal bleeding, which would show as bright red, maroon, or black stools.   (A tablespoon of blood from the rectum is not serious, especially if   hemorrhoids are present.)  6. Vomiting.  7. Weakness or dizziness.  GO DIRECTLY TO THE NEAREST EMERGENCY ROOM IF YOU HAVE ANY OF THE FOLLOWING:      Difficulty breathing              Chills and/or fever over 101 F   Persistent vomiting and/or vomiting blood   Severe abdominal pain   Severe chest pain   Black, tarry stools   Bleeding- more than one  tablespoon   Any other symptom or condition that you feel may need urgent attention  Your doctor recommends these additional instructions:  If any biopsies were taken, your doctors clinic will contact you in 1 to 2   weeks with any results.  - Patient has a contact number available for emergencies.  The signs and   symptoms of potential delayed complications were discussed with the   patient.  Return to normal activities tomorrow.  Written discharge   instructions were provided to the patient.   - High fiber diet.   - Continue present medications.   - Await pathology results.   - Repeat colonoscopy in 3 years for surveillance.   - Discharge patient to home (ambulatory).   - Return to my office PRN.  For questions, problems or results please call your physician - Guero Miles MD at Work:  [phone].  [facility], EMERGENCY ROOM PHONE NUMBER: [phone]  IF A COMPLICATION OR EMERGENCY SITUATION ARISES AND YOU ARE UNABLE TO REACH   YOUR PHYSICIAN - GO DIRECTLY TO THE EMERGENCY ROOM.  Guero Miles MD  5/6/2019 2:27:50 PM  This report has been verified and signed electronically.  [employer]

## 2019-05-06 NOTE — DISCHARGE INSTRUCTIONS
"Discharge Instructions: After Your Surgery/Procedure  Youve just had surgery. During surgery you were given medicine called anesthesia to keep you relaxed and free of pain. After surgery you may have some pain or nausea. This is common. Here are some tips for feeling better and getting well after surgery.     Stay on schedule with your medication.   Going home  Your doctor or nurse will show you how to take care of yourself when you go home. He or she will also answer your questions. Have an adult family member or friend drive you home.      For your safety we recommend these precaution for the first 24 hours after your procedure:  · Do not drive or use heavy equipment.  · Do not make important decisions or sign legal papers.  · Do not drink alcohol.  · Have someone stay with you, if needed. He or she can watch for problems and help keep you safe.  · Your concentration, balance, coordination, and judgement may be impaired for many hours after anesthesia.  Use caution when ambulating or standing up.     · You may feel weak and "washed out" after anesthesia and surgery.      Subtle residual effects of general anesthesia or sedation with regional / local anesthesia can last more than 24 hours.  Rest for the remainder of the day or longer if your Doctor/Surgeon has advised you to do so.  Although you may feel normal within the first 24 hours, your reflexes and mental ability may be impaired without you realizing it.  You may feel dizzy, lightheaded or sleepy for 24 hours or longer.      Be sure to go to all follow-up visits with your doctor. And rest after your surgery for as long as your doctor tells you to.  Coping with pain  If you have pain after surgery, pain medicine will help you feel better. Take it as told, before pain becomes severe. Also, ask your doctor or pharmacist about other ways to control pain. This might be with heat, ice, or relaxation. And follow any other instructions your surgeon or nurse gives " you.  Tips for taking pain medicine  To get the best relief possible, remember these points:  · Pain medicines can upset your stomach. Taking them with a little food may help.  · Most pain relievers taken by mouth need at least 20 to 30 minutes to start to work.  · Taking medicine on a schedule can help you remember to take it. Try to time your medicine so that you can take it before starting an activity. This might be before you get dressed, go for a walk, or sit down for dinner.  · Constipation is a common side effect of pain medicines. Call your doctor before taking any medicines such as laxatives or stool softeners to help ease constipation. Also ask if you should skip any foods. Drinking lots of fluids and eating foods such as fruits and vegetables that are high in fiber can also help. Remember, do not take laxatives unless your surgeon has prescribed them.  · Drinking alcohol and taking pain medicine can cause dizziness and slow your breathing. It can even be deadly. Do not drink alcohol while taking pain medicine.  · Pain medicine can make you react more slowly to things. Do not drive or run machinery while taking pain medicine.  Your health care provider may tell you to take acetaminophen to help ease your pain. Ask him or her how much you are supposed to take each day. Acetaminophen or other pain relievers may interact with your prescription medicines or other over-the-counter (OTC) drugs. Some prescription medicines have acetaminophen and other ingredients. Using both prescription and OTC acetaminophen for pain can cause you to overdose. Read the labels on your OTC medicines with care. This will help you to clearly know the list of ingredients, how much to take, and any warnings. It may also help you not take too much acetaminophen. If you have questions or do not understand the information, ask your pharmacist or health care provider to explain it to you before you take the OTC medicine.  Managing  nausea  Some people have an upset stomach after surgery. This is often because of anesthesia, pain, or pain medicine, or the stress of surgery. These tips will help you handle nausea and eat healthy foods as you get better. If you were on a special food plan before surgery, ask your doctor if you should follow it while you get better. These tips may help:  · Do not push yourself to eat. Your body will tell you when to eat and how much.  · Start off with clear liquids and soup. They are easier to digest.  · Next try semi-solid foods, such as mashed potatoes, applesauce, and gelatin, as you feel ready.  · Slowly move to solid foods. Dont eat fatty, rich, or spicy foods at first.  · Do not force yourself to have 3 large meals a day. Instead eat smaller amounts more often.  · Take pain medicines with a small amount of solid food, such as crackers or toast, to avoid nausea.     Call your surgeon if  · You still have pain an hour after taking medicine. The medicine may not be strong enough.  · You feel too sleepy, dizzy, or groggy. The medicine may be too strong.  · You have side effects like nausea, vomiting, or skin changes, such as rash, itching, or hives.       If you have obstructive sleep apnea  You were given anesthesia medicine during surgery to keep you comfortable and free of pain. After surgery, you may have more apnea spells because of this medicine and other medicines you were given. The spells may last longer than usual.   At home:  · Keep using the continuous positive airway pressure (CPAP) device when you sleep. Unless your health care provider tells you not to, use it when you sleep, day or night. CPAP is a common device used to treat obstructive sleep apnea.  · Talk with your provider before taking any pain medicine, muscle relaxants, or sedatives. Your provider will tell you about the possible dangers of taking these medicines.  © 5223-9679 [employer]. [address] [place] [address]  [place] [address]. All rights reserved. This information is not intended as a substitute for professional medical care. Always follow your healthcare professional's instructions.    Hemorrhoids    Hemorrhoids are swollen and inflamed veins inside the rectum and near the anus. The rectum is the last several inches of the colon. The anus is the passage between the rectum and the outside of the body.  Causes  The veins can become swollen due to increased pressure in them. This is most often caused by:  · Chronic constipation or diarrhea  · Straining when having a bowel movement  · Sitting too long on the toilet  · A low-fiber diet  · Pregnancy  Symptoms  · Bleeding from the rectum (this may be noticeable after bowel movements)  · Lump near the anus  · Itching around the anus  · Pain around the anus  There are different types of hemorrhoids. Depending on the type you have and the severity, you may be able to treat yourself at home. In some cases, a procedure may be the best treatment option. Your healthcare provider can tell you more about this, if needed.  Home care  General care  · To get relief from pain or itching, try:  ¨ Topical products. Your healthcare provider may prescribe or recommend creams, ointments, or pads that can be applied to the hemorrhoid. Use these exactly as directed.  ¨ Medicines. Your healthcare provider may recommend stool softeners, suppositories, or laxatives to help manage constipation. Use these exactly as directed.  ¨ Sitz baths. A sitz bath involves sitting in a few inches of warm bath water. Be careful not to make the water so hot that you burn yourself--test it before sitting in it. Soak for about 10 to 15 minutes a few times a day. This may help relieve pain.  Tips to help prevent hemorrhoids  · Eat more fiber. Fiber adds bulk to stool and absorbs water as it moves through your colon. This makes stool softer and easier to pass.  ¨ Increase the fiber in your diet with more fiber-rich foods.  These include fresh fruit, vegetables, and whole grains.  ¨ Take a fiber supplement or bulking agent, if advised to by your provider. These include products such as psyllium or methylcellulose.  · Drink plenty of water, if directed to by your provider. This can help keep stool soft.  · Be more active. Frequent exercise aids digestion and helps prevent constipation. It may also help make bowel movements more regular.  · Dont strain during bowel movements. This can make hemorrhoids more likely. Also, dont sit on the toilet for long periods of time.  Follow-up care  Follow up with your healthcare provider, or as advised. If a culture or imaging tests were done, you will be notified of the results when they are ready. This may take a few days or longer.  When to seek medical advice  Call your healthcare provider right away if any of these occur:  · Increased bleeding from the rectum  · Increased pain around the rectum or anus  · Weakness or dizziness  Call 911  Call 911 or return to the emergency department right away if any of these occur:  · Trouble breathing or swallowing  · Fainting or loss of consciousness  · Unusually fast heart rate  · Vomiting blood  · Large amounts of blood in stool  Date Last Reviewed: 6/22/2015  © 4608-5789 [employer]. [address] [place] [address], [place] [address]. All rights reserved. This information is not intended as a substitute for professional medical care. Always follow your healthcare professional's instructions.        Discharge Instructions: Eating a High-Fiber Diet  Your health care provider has prescribed a high-fiber diet for you. Fiber is what gives strength and structure to plants. Most grains, beans, vegetables, and fruits contain fiber. Foods rich in fiber are often low in calories and fat, but they fill you up more. These foods may also reduce the risk of certain health problems.  There are two types of fiber:  · Insoluble fiber. This is found in whole  grains, cereals, and certain fruits and vegetables (such as apple skins, corn, and beans). Insoluble fiber is made up mainly of plant cell walls. It may prevent constipation and reduce the risk of certain types of cancer.  · Soluble fiber. This type of fiber is found in oats, beans, nuts, and certain fruits and vegetables (such as strawberries and peas). Soluble fiber turns to gel in the digestive system, slowing the movement of the digestive tract. It helps control blood sugar levels and can reduce cholesterol, which may help lower the risk of heart disease. Soluble fiber can also help control appetite.     Home care  · Know how much fiber you need a day. The recommended daily amount of fiber is 25 grams for women and 38 grams for men. After age 50, daily fiber needs drop to 21 grams for women and 30 grams for men.  · Ask your doctor about a fiber supplement. (Always take fiber supplements with a large glass of water.)  · Keep track of how much fiber you eat.  · Eat a variety of foods high in fiber.  · Learn to read and understand food labels.  · Ask your healthcare provider how much water you should be drinking.  · Look for these high-fiber foods:  ¨ Whole-grain breads and cereals  § 6 ounces a day give you about 18 grams of fiber (1 ounce is equal to 1 slice of bread, 1 cup of dry cereal, or 1/2 cup of cooked rice).  § Include wheat and oat bran cereals, whole-wheat muffins or toast, and corn tortillas in your meals.  ¨ Fruits   § 2 cups a day give you about 8 grams of fiber.  § Apples, oranges, strawberries, pears, and bananas are good sources.  § Fruit juice does not contain as much fiber as the fruit it was made from.  ¨ Vegetables  § 2½ cups a day give you about 11 grams of fiber. Add asparagus, carrots, broccoli, peas, and corn to your meals.  ¨ Legumes  § 1/4 cup a day (in place of meat) gives you about 4 grams of fiber. Try navy beans, lentils, chickpeas, and soybeans.  ¨ Seeds   § A small handful of seeds  gives you about 3 grams of fiber. Try sunflower seeds.  Follow-up  Make a follow-up appointment with a nutritionist as directed by our staff.  Date Last Reviewed: 6/1/2015 © 2000-2017 [employer]. [address] [place] [address], [place] [address]. All rights reserved. This information is not intended as a substitute for professional medical care. Always follow your healthcare professional's instructions.        Understanding Colon and Rectal Polyps    The colon (also called the large intestine) is a muscular tube that forms the last part of the digestive tract. It absorbs water and stores food waste. The colon is about 4 to 6 feet long. The rectum is the last 6 inches of the colon. The colon and rectum have a smooth lining composed of millions of cells. Changes in these cells can lead to growths in the colon that can become cancerous and should be removed. Multiple tests are available to screen for colon cancer, but the colonoscopy is the most recommended test. During colonoscopy, these polyps can be removed. How often you need this test depends on many things including your condition, your family history, symptoms, and what the findings were at the previous colonoscopy.   When the colon lining changes  Changes that happen in the cells that line the colon or rectum can lead to growths called polyps. Over a period of years, polyps can turn cancerous. Removing polyps early may prevent cancer from ever forming.  Polyps  Polyps are fleshy clumps of tissue that form on the lining of the colon or rectum. Small polyps are usually benign (not cancerous). However, over time, cells in a polyp can change and become cancerous. Certain types of polyps known as adenomatous polyps are premalignant. The risk for invasive cancer increases with the size of the polyp and certain cell and gene features. This means that they can become cancerous if they're not removed. Hyperplastic polyps are benign. They can grow quite large and  not turn cancerous.   Cancer  Almost all colorectal cancers start when polyp cells begin growing abnormally. As a cancerous tumor grows, it may involve more and more of the colon or rectum. In time, cancer can also grow beyond the colon or rectum and spread to nearby organs or to glands called lymph nodes. The cells can also travel to other parts of the body. This is known as metastasis. The earlier a cancerous tumor is removed, the better the chance of preventing its spread.    Date Last Reviewed: 8/1/2016  © 5205-7692 [employer]. [address] [place] [address], [place] [address]. All rights reserved. This information is not intended as a substitute for professional medical care. Always follow your healthcare professional's instructions.

## 2019-05-06 NOTE — TRANSFER OF CARE
"Anesthesia Transfer of Care Note    Patient: Jhonny Almazan    Procedure(s) Performed: Procedure(s) (LRB):  COLONOSCOPY (N/A)    Patient location: GI    Anesthesia Type: general    Transport from OR: Transported from OR on room air with adequate spontaneous ventilation    Post pain: adequate analgesia    Post assessment: no apparent anesthetic complications and tolerated procedure well    Post vital signs: stable    Level of consciousness: sedated and responds to stimulation    Nausea/Vomiting: no nausea/vomiting    Complications: none    Transfer of care protocol was followed      Last vitals:   Visit Vitals  BP (!) 182/86 (BP Location: Left arm, Patient Position: Sitting)   Pulse 78   Temp 36.6 °C (97.9 °F) (Skin)   Resp 18   Ht 6' 5" (1.956 m)   Wt 117.9 kg (260 lb)   SpO2 98%   BMI 30.83 kg/m²     "

## 2019-05-06 NOTE — PLAN OF CARE
Vss, donald po fluids, denies pain, ambulates easily. IV removed, catheter intact. Discharge instructions provided and states understanding. States ready to go home.  Discharged from facility with family per wheelchair.

## 2019-05-06 NOTE — ANESTHESIA PREPROCEDURE EVALUATION
05/06/2019  Jhonny Almazan is a 63 y.o., male.    Pre-op Assessment    I have reviewed the Patient Summary Reports.     I have reviewed the Nursing Notes.   I have reviewed the Medications.     Review of Systems  Anesthesia Hx:  No problems with previous Anesthesia    Social:  Smoker    Hematology/Oncology:  Hematology Normal   Oncology Normal     EENT/Dental:EENT/Dental Normal   Cardiovascular:   Hypertension, well controlled    Pulmonary:  Pulmonary Normal    Hepatic/GI:   Bowel Prep. Liver Disease,    Musculoskeletal:   Arthritis     Endocrine:   Diabetes, well controlled, type 2        Physical Exam  General:  Obesity    Airway/Jaw/Neck:  Airway Findings: Mouth Opening: Normal Tongue: Normal  Mallampati: II  TM Distance: Normal, at least 6 cm  Jaw/Neck Findings:  Neck ROM: Normal ROM     Eyes/Ears/Nose:  Eyes/Ears/Nose Findings:    Dental:  Dental Findings:   Chest/Lungs:  Chest/Lungs Findings: Normal Respiratory Rate     Heart/Vascular:  Heart Findings: Rate: Normal  Rhythm: Regular Rhythm        Mental Status:  Mental Status Findings:  Cooperative, Alert and Oriented         Anesthesia Plan  Type of Anesthesia, risks & benefits discussed:  Anesthesia Type:  general  Patient's Preference:   Intra-op Monitoring Plan: standard ASA monitors  Intra-op Monitoring Plan Comments:   Post Op Pain Control Plan:   Post Op Pain Control Plan Comments:   Induction:   IV  Beta Blocker:  Patient is not currently on a Beta-Blocker (No further documentation required).       Informed Consent: Patient understands risks and agrees with Anesthesia plan.  Questions answered. Anesthesia consent signed with patient.  ASA Score: 3     Day of Surgery Review of History & Physical:    H&P update referred to the provider.         Ready For Surgery From Anesthesia Perspective.

## 2019-05-07 VITALS
TEMPERATURE: 98 F | BODY MASS INDEX: 30.7 KG/M2 | RESPIRATION RATE: 16 BRPM | HEIGHT: 77 IN | HEART RATE: 74 BPM | OXYGEN SATURATION: 96 % | DIASTOLIC BLOOD PRESSURE: 88 MMHG | SYSTOLIC BLOOD PRESSURE: 177 MMHG | WEIGHT: 260 LBS

## 2019-05-07 NOTE — ANESTHESIA POSTPROCEDURE EVALUATION
Anesthesia Post Evaluation    Patient: Jhonny Almazan    Procedure(s) Performed: Procedure(s) (LRB):  COLONOSCOPY (N/A)    Final Anesthesia Type: general  Patient location during evaluation: PACU  Patient participation: Yes- Able to Participate  Level of consciousness: awake and alert  Post-procedure vital signs: reviewed and stable  Pain management: adequate  Airway patency: patent  PONV status at discharge: No PONV  Anesthetic complications: no      Cardiovascular status: hemodynamically stable  Respiratory status: unassisted and room air  Hydration status: euvolemic  Follow-up not needed.          Vitals Value Taken Time   /88 5/6/2019  2:46 PM   Temp 36.6 °C (97.9 °F) 5/6/2019  2:46 PM   Pulse 74 5/6/2019  2:46 PM   Resp 16 5/6/2019  2:46 PM   SpO2 96 % 5/6/2019  2:46 PM         Event Time     Out of Recovery 15:04:41          Pain/Jonny Score: Jonny Score: 10 (5/6/2019  2:47 PM)

## 2019-05-08 ENCOUNTER — OFFICE VISIT (OUTPATIENT)
Dept: FAMILY MEDICINE | Facility: CLINIC | Age: 64
End: 2019-05-08
Payer: COMMERCIAL

## 2019-05-08 VITALS
DIASTOLIC BLOOD PRESSURE: 90 MMHG | HEART RATE: 79 BPM | BODY MASS INDEX: 30.22 KG/M2 | WEIGHT: 255.94 LBS | HEIGHT: 77 IN | TEMPERATURE: 99 F | SYSTOLIC BLOOD PRESSURE: 160 MMHG

## 2019-05-08 DIAGNOSIS — E11.59 HYPERTENSION ASSOCIATED WITH DIABETES: ICD-10-CM

## 2019-05-08 DIAGNOSIS — R10.11 RUQ ABDOMINAL PAIN: Primary | ICD-10-CM

## 2019-05-08 DIAGNOSIS — E11.65 UNCONTROLLED TYPE 2 DIABETES MELLITUS WITH HYPERGLYCEMIA: ICD-10-CM

## 2019-05-08 DIAGNOSIS — E66.9 OBESITY (BMI 30-39.9): ICD-10-CM

## 2019-05-08 DIAGNOSIS — I15.2 HYPERTENSION ASSOCIATED WITH DIABETES: ICD-10-CM

## 2019-05-08 PROCEDURE — 3080F PR MOST RECENT DIASTOLIC BLOOD PRESSURE >= 90 MM HG: ICD-10-PCS | Mod: CPTII,S$GLB,, | Performed by: NURSE PRACTITIONER

## 2019-05-08 PROCEDURE — 3044F HG A1C LEVEL LT 7.0%: CPT | Mod: CPTII,S$GLB,, | Performed by: NURSE PRACTITIONER

## 2019-05-08 PROCEDURE — 99999 PR PBB SHADOW E&M-EST. PATIENT-LVL V: ICD-10-PCS | Mod: PBBFAC,,, | Performed by: NURSE PRACTITIONER

## 2019-05-08 PROCEDURE — 3044F PR MOST RECENT HEMOGLOBIN A1C LEVEL <7.0%: ICD-10-PCS | Mod: CPTII,S$GLB,, | Performed by: NURSE PRACTITIONER

## 2019-05-08 PROCEDURE — 1101F PR PT FALLS ASSESS DOC 0-1 FALLS W/OUT INJ PAST YR: ICD-10-PCS | Mod: CPTII,S$GLB,, | Performed by: NURSE PRACTITIONER

## 2019-05-08 PROCEDURE — 3080F DIAST BP >= 90 MM HG: CPT | Mod: CPTII,S$GLB,, | Performed by: NURSE PRACTITIONER

## 2019-05-08 PROCEDURE — 99214 PR OFFICE/OUTPT VISIT, EST, LEVL IV, 30-39 MIN: ICD-10-PCS | Mod: S$GLB,,, | Performed by: NURSE PRACTITIONER

## 2019-05-08 PROCEDURE — 1101F PT FALLS ASSESS-DOCD LE1/YR: CPT | Mod: CPTII,S$GLB,, | Performed by: NURSE PRACTITIONER

## 2019-05-08 PROCEDURE — 3008F BODY MASS INDEX DOCD: CPT | Mod: CPTII,S$GLB,, | Performed by: NURSE PRACTITIONER

## 2019-05-08 PROCEDURE — 99214 OFFICE O/P EST MOD 30 MIN: CPT | Mod: S$GLB,,, | Performed by: NURSE PRACTITIONER

## 2019-05-08 PROCEDURE — 3008F PR BODY MASS INDEX (BMI) DOCUMENTED: ICD-10-PCS | Mod: CPTII,S$GLB,, | Performed by: NURSE PRACTITIONER

## 2019-05-08 PROCEDURE — 99999 PR PBB SHADOW E&M-EST. PATIENT-LVL V: CPT | Mod: PBBFAC,,, | Performed by: NURSE PRACTITIONER

## 2019-05-08 PROCEDURE — 3077F PR MOST RECENT SYSTOLIC BLOOD PRESSURE >= 140 MM HG: ICD-10-PCS | Mod: CPTII,S$GLB,, | Performed by: NURSE PRACTITIONER

## 2019-05-08 PROCEDURE — 3077F SYST BP >= 140 MM HG: CPT | Mod: CPTII,S$GLB,, | Performed by: NURSE PRACTITIONER

## 2019-05-08 RX ORDER — DICYCLOMINE HYDROCHLORIDE 10 MG/1
20 CAPSULE ORAL EVERY 6 HOURS PRN
Qty: 120 CAPSULE | Refills: 0 | Status: SHIPPED | OUTPATIENT
Start: 2019-05-08 | End: 2019-05-22

## 2019-05-08 RX ORDER — SUCRALFATE 1 G/10ML
1 SUSPENSION ORAL
Qty: 414 ML | Refills: 2 | Status: SHIPPED | OUTPATIENT
Start: 2019-05-08 | End: 2019-05-22

## 2019-05-08 RX ORDER — HYDRALAZINE HYDROCHLORIDE 25 MG/1
50 TABLET, FILM COATED ORAL EVERY 8 HOURS
Qty: 90 TABLET | Refills: 8 | Status: SHIPPED | OUTPATIENT
Start: 2019-05-08 | End: 2019-09-30

## 2019-05-08 NOTE — PROGRESS NOTES
Subjective:       Patient ID: Jhonny Almazan is a 63 y.o. male.    Chief Complaint: disability paperwork    Mr. Almazan presents to the clinic today to have [facility] paperwork completed. He has been missing work due to abdominal pain. He has had multiple ED visits and clinic visits for this pain.  The pain has continued and he has a HIDA scan scheduled in 2 days.  The pain is not associated with food or eating. He had EGD and colonoscopy--he had multiple polyps removed from colon and gastritis on EGD. He feels the omeprazole is helping the most of any treatment he has tried.  Still taking ibuprofen twice per day. He requests more Norco because that helped the pain.  Pain usually occurs 9 pm-4 am.  Denies nausea or vomiting. He is still having constipation although the lactulose and magnesium citrate is helping.  Does not have f/u with GI scheduled.    Review of Systems   Constitutional: Negative for chills and fever.   Respiratory: Negative for cough, shortness of breath and wheezing.    Cardiovascular: Negative for chest pain, palpitations and leg swelling.   Gastrointestinal: Positive for abdominal pain and constipation. Negative for diarrhea, nausea and vomiting.       Objective:      Physical Exam   Constitutional: He is oriented to person, place, and time. He appears well-developed and well-nourished. No distress.   HENT:   Head: Normocephalic and atraumatic.   Right Ear: External ear normal.   Left Ear: External ear normal.   Mouth/Throat: Oropharynx is clear and moist. No oropharyngeal exudate.   Eyes: Pupils are equal, round, and reactive to light. Right eye exhibits no discharge. Left eye exhibits no discharge.   Neck: Neck supple. No thyromegaly present.   Cardiovascular: Normal rate and regular rhythm. Exam reveals no gallop and no friction rub.   No murmur heard.  Pulmonary/Chest: Effort normal and breath sounds normal. No respiratory distress. He has no wheezes. He has no rales.   Abdominal: Soft. Bowel  sounds are normal. He exhibits distension (right sided, only noted when sitting up). There is no tenderness. There is no rigidity, no guarding and negative Tomlin's sign.   Lymphadenopathy:     He has no cervical adenopathy.   Neurological: He is alert and oriented to person, place, and time. Coordination normal.   Skin: Skin is warm and dry.   Psychiatric: He has a normal mood and affect. His behavior is normal. Thought content normal.   Vitals reviewed.          Current Outpatient Medications:     ACETAMINOPHEN (TYLENOL ARTHRITIS ORAL), Take by mouth., Disp: , Rfl:     aspirin (ECOTRIN) 81 MG EC tablet, Take 81 mg by mouth once daily., Disp: , Rfl:     buPROPion (WELLBUTRIN XL) 150 MG TB24 tablet, Take 1 tablet (150 mg total) by mouth once daily., Disp: 30 tablet, Rfl: 11    co-enzyme Q-10 50 mg capsule, Take 4 capsules (200 mg total) by mouth once daily., Disp: 120 capsule, Rfl: 11    diclofenac sodium (VOLTAREN) 1 % Gel, Apply 2 g topically 3 (three) times daily as needed. For hand pain, Disp: 100 g, Rfl: 11    dicyclomine (BENTYL) 10 MG capsule, Take 2 capsules (20 mg total) by mouth every 6 (six) hours as needed., Disp: 120 capsule, Rfl: 0    folic acid-vit B6-vit B12 2.5-25-2 mg (FOLBIC OR EQUIV) 2.5-25-2 mg Tab, Take 1 tablet by mouth once daily., Disp: 90 tablet, Rfl: 4    gabapentin (NEURONTIN) 100 MG capsule, Take 1 capsule (100 mg total) by mouth 3 (three) times daily., Disp: 90 capsule, Rfl: 11    glipiZIDE (GLUCOTROL) 10 MG TR24, TAKE 1 TABLET BY MOUTH DAILY WITH BREAKFAST, Disp: 90 tablet, Rfl: 2    hydrALAZINE (APRESOLINE) 25 MG tablet, Take 2 tablets (50 mg total) by mouth every 8 (eight) hours., Disp: 90 tablet, Rfl: 8    HYDROcodone-acetaminophen (NORCO) 5-325 mg per tablet, Take 1 tablet by mouth every 6 (six) hours as needed for Pain., Disp: , Rfl:     ibuprofen (ADVIL,MOTRIN) 200 MG tablet, Take 200 mg by mouth every 6 (six) hours as needed for Pain., Disp: , Rfl:     lactulose  (CHRONULAC) 20 gram/30 mL Soln, Take 15 mLs (10 g total) by mouth 2 (two) times daily., Disp: 900 mL, Rfl: 0    lisinopril-hydrochlorothiazide (PRINZIDE,ZESTORETIC) 20-12.5 mg per tablet, TAKE 1 TABLET BY MOUTH 2 (TWO) TIMES DAILY., Disp: 180 tablet, Rfl: 0    magnesium citrate solution, Take 296 mLs by mouth daily as needed., Disp: , Rfl:     metFORMIN (GLUCOPHAGE-XR) 500 MG 24 hr tablet, TAKE 2 TABLETS (1,000 MG TOTAL) BY MOUTH 2 (TWO) TIMES DAILY., Disp: 360 tablet, Rfl: 0    omeprazole (PRILOSEC) 40 MG capsule, Take 1 capsule (40 mg total) by mouth before breakfast., Disp: 30 capsule, Rfl: 3    ONETOUCH ULTRA TEST Strp, , Disp: , Rfl:     ONETOUCH ULTRASOFT LANCETS lancets, , Disp: , Rfl:     pioglitazone (ACTOS) 30 MG tablet, TAKE 1 TABLET (30 MG TOTAL) BY MOUTH ONCE DAILY., Disp: 90 tablet, Rfl: 2    rosuvastatin (CRESTOR) 20 MG tablet, TAKE 1 TABLET (20 MG TOTAL) BY MOUTH ONCE DAILY., Disp: 90 tablet, Rfl: 1    sucralfate (CARAFATE) 100 mg/mL suspension, Take 10 mLs (1 g total) by mouth 4 (four) times daily with meals and nightly., Disp: 414 mL, Rfl: 2  Assessment:       1. RUQ abdominal pain    2. Hypertension associated with diabetes    3. Uncontrolled type 2 diabetes mellitus with hyperglycemia    4. Obesity (BMI 30-39.9)        Plan:       RUQ abdominal pain  I discussed Norco is not a long term medication and is not recommended with his constipation.  Increase Bentyl, add Carafate and f/u GI.  [place] paperwork completed and faxed; copy given to patient.  -     dicyclomine (BENTYL) 10 MG capsule; Take 2 capsules (20 mg total) by mouth every 6 (six) hours as needed.  Dispense: 120 capsule; Refill: 0  -     sucralfate (CARAFATE) 100 mg/mL suspension; Take 10 mLs (1 g total) by mouth 4 (four) times daily with meals and nightly.  Dispense: 414 mL; Refill: 2  -     Ambulatory referral to Gastroenterology    Hypertension associated with diabetes  Uncontrolled,  Increase:  -     hydrALAZINE (APRESOLINE)  25 MG tablet; Take 2 tablets (50 mg total) by mouth every 8 (eight) hours.  Dispense: 90 tablet; Refill: 8    Uncontrolled type 2 diabetes mellitus with hyperglycemia  Stable, continue current medication.    Obesity (BMI 30-39.9)  Patient readiness: acceptance and barriers:none    During the course of the visit the patient was educated and counseled about the following:     Diabetes:  Discussed general issues about diabetes pathophysiology and management.  Hypertension:   Medication: no change.    Goals: Diabetes: Maintain Hemoglobin A1C below 7, Hypertension: Reduce Blood Pressure and Obesity: Reduce calorie intake and BMI    Did patient meet goals/outcomes: Yes    The following self management tools provided: declined    Patient Instructions (the written plan) was given to the patient/family.     Time spent with patient: 30 minutes    Barriers to medications present (no )    Adverse reactions to current medications (no)    Over the counter medications reviewed (Yes)

## 2019-05-09 ENCOUNTER — PATIENT MESSAGE (OUTPATIENT)
Dept: GASTROENTEROLOGY | Facility: CLINIC | Age: 64
End: 2019-05-09

## 2019-05-10 ENCOUNTER — HOSPITAL ENCOUNTER (OUTPATIENT)
Dept: RADIOLOGY | Facility: HOSPITAL | Age: 64
Discharge: HOME | End: 2019-05-10
Attending: NURSE PRACTITIONER
Payer: COMMERCIAL

## 2019-05-10 DIAGNOSIS — R10.11 RUQ PAIN: ICD-10-CM

## 2019-05-10 PROCEDURE — A9537 TC99M MEBROFENIN: HCPCS

## 2019-05-10 PROCEDURE — 78226 HEPATOBILIARY SYSTEM IMAGING: CPT | Mod: 26,,, | Performed by: RADIOLOGY

## 2019-05-10 PROCEDURE — 78226 NM HEPATOBILIARY IMAGING INC GB (HIDA): ICD-10-PCS | Mod: 26,,, | Performed by: RADIOLOGY

## 2019-05-13 ENCOUNTER — HOSPITAL ENCOUNTER (EMERGENCY)
Facility: HOSPITAL | Age: 64
Discharge: HOME | End: 2019-05-14
Attending: EMERGENCY MEDICINE
Payer: COMMERCIAL

## 2019-05-13 VITALS
OXYGEN SATURATION: 96 % | BODY MASS INDEX: 30.17 KG/M2 | WEIGHT: 255.5 LBS | HEART RATE: 72 BPM | RESPIRATION RATE: 16 BRPM | TEMPERATURE: 98 F | SYSTOLIC BLOOD PRESSURE: 188 MMHG | HEIGHT: 77 IN | DIASTOLIC BLOOD PRESSURE: 78 MMHG

## 2019-05-13 DIAGNOSIS — R10.13 EPIGASTRIC PAIN: ICD-10-CM

## 2019-05-13 DIAGNOSIS — K59.00 CONSTIPATION, UNSPECIFIED CONSTIPATION TYPE: Primary | ICD-10-CM

## 2019-05-13 LAB
ALBUMIN SERPL BCP-MCNC: 3.8 G/DL (ref 3.5–5.2)
ALP SERPL-CCNC: 60 U/L (ref 55–135)
ALT SERPL W/O P-5'-P-CCNC: 35 U/L (ref 10–44)
ANION GAP SERPL CALC-SCNC: 8 MMOL/L (ref 8–16)
AST SERPL-CCNC: 18 U/L (ref 10–40)
BASOPHILS # BLD AUTO: 0.1 K/UL (ref 0–0.2)
BASOPHILS NFR BLD: 0.8 % (ref 0–1.9)
BILIRUB SERPL-MCNC: 0.4 MG/DL (ref 0.1–1)
BILIRUB UR QL STRIP: NEGATIVE
BUN SERPL-MCNC: 13 MG/DL (ref 8–23)
CALCIUM SERPL-MCNC: 10 MG/DL (ref 8.7–10.5)
CHLORIDE SERPL-SCNC: 99 MMOL/L (ref 95–110)
CLARITY UR: CLEAR
CO2 SERPL-SCNC: 30 MMOL/L (ref 23–29)
COLOR UR: YELLOW
CREAT SERPL-MCNC: 0.9 MG/DL (ref 0.5–1.4)
DIFFERENTIAL METHOD: ABNORMAL
EOSINOPHIL # BLD AUTO: 0.1 K/UL (ref 0–0.5)
EOSINOPHIL NFR BLD: 1.7 % (ref 0–8)
ERYTHROCYTE [DISTWIDTH] IN BLOOD BY AUTOMATED COUNT: 12.8 % (ref 11.5–14.5)
EST. GFR  (AFRICAN AMERICAN): >60 ML/MIN/1.73 M^2
EST. GFR  (NON AFRICAN AMERICAN): >60 ML/MIN/1.73 M^2
GLUCOSE SERPL-MCNC: 281 MG/DL (ref 70–110)
GLUCOSE UR QL STRIP: ABNORMAL
HCT VFR BLD AUTO: 43.7 % (ref 40–54)
HGB BLD-MCNC: 14.4 G/DL (ref 14–18)
HGB UR QL STRIP: NEGATIVE
KETONES UR QL STRIP: NEGATIVE
LEUKOCYTE ESTERASE UR QL STRIP: NEGATIVE
LIPASE SERPL-CCNC: 21 U/L (ref 4–60)
LYMPHOCYTES # BLD AUTO: 2 K/UL (ref 1–4.8)
LYMPHOCYTES NFR BLD: 24.5 % (ref 18–48)
MCH RBC QN AUTO: 30.2 PG (ref 27–31)
MCHC RBC AUTO-ENTMCNC: 32.9 G/DL (ref 32–36)
MCV RBC AUTO: 92 FL (ref 82–98)
MONOCYTES # BLD AUTO: 0.5 K/UL (ref 0.3–1)
MONOCYTES NFR BLD: 6.6 % (ref 4–15)
NEUTROPHILS # BLD AUTO: 5.3 K/UL (ref 1.8–7.7)
NEUTROPHILS NFR BLD: 66.4 % (ref 38–73)
NITRITE UR QL STRIP: NEGATIVE
PH UR STRIP: >8 [PH] (ref 5–8)
PLATELET # BLD AUTO: 215 K/UL (ref 150–350)
PMV BLD AUTO: 7.5 FL (ref 9.2–12.9)
POTASSIUM SERPL-SCNC: 4 MMOL/L (ref 3.5–5.1)
PROT SERPL-MCNC: 6.8 G/DL (ref 6–8.4)
PROT UR QL STRIP: NEGATIVE
RBC # BLD AUTO: 4.76 M/UL (ref 4.6–6.2)
SODIUM SERPL-SCNC: 137 MMOL/L (ref 136–145)
SP GR UR STRIP: 1.01 (ref 1–1.03)
URN SPEC COLLECT METH UR: ABNORMAL
UROBILINOGEN UR STRIP-ACNC: NEGATIVE EU/DL
WBC # BLD AUTO: 8 K/UL (ref 3.9–12.7)

## 2019-05-13 PROCEDURE — 25000003 PHARM REV CODE 250: Performed by: EMERGENCY MEDICINE

## 2019-05-13 PROCEDURE — 80053 COMPREHEN METABOLIC PANEL: CPT

## 2019-05-13 PROCEDURE — 63600175 PHARM REV CODE 636 W HCPCS: Performed by: EMERGENCY MEDICINE

## 2019-05-13 PROCEDURE — 99285 EMERGENCY DEPT VISIT HI MDM: CPT | Mod: 25

## 2019-05-13 PROCEDURE — 85025 COMPLETE CBC W/AUTO DIFF WBC: CPT

## 2019-05-13 PROCEDURE — 81003 URINALYSIS AUTO W/O SCOPE: CPT

## 2019-05-13 PROCEDURE — 96374 THER/PROPH/DIAG INJ IV PUSH: CPT

## 2019-05-13 PROCEDURE — 93005 ELECTROCARDIOGRAM TRACING: CPT

## 2019-05-13 PROCEDURE — 36415 COLL VENOUS BLD VENIPUNCTURE: CPT

## 2019-05-13 PROCEDURE — 96361 HYDRATE IV INFUSION ADD-ON: CPT

## 2019-05-13 PROCEDURE — 83690 ASSAY OF LIPASE: CPT

## 2019-05-13 RX ORDER — GLIPIZIDE 10 MG/1
TABLET, FILM COATED, EXTENDED RELEASE ORAL
Qty: 90 TABLET | Refills: 2 | Status: SHIPPED | OUTPATIENT
Start: 2019-05-13 | End: 2019-06-19 | Stop reason: SDUPTHER

## 2019-05-13 RX ORDER — MORPHINE SULFATE 4 MG/ML
4 INJECTION, SOLUTION INTRAMUSCULAR; INTRAVENOUS
Status: COMPLETED | OUTPATIENT
Start: 2019-05-13 | End: 2019-05-13

## 2019-05-13 RX ADMIN — SODIUM CHLORIDE 1000 ML: 0.9 INJECTION, SOLUTION INTRAVENOUS at 09:05

## 2019-05-13 RX ADMIN — IOHEXOL 100 ML: 350 INJECTION, SOLUTION INTRAVENOUS at 11:05

## 2019-05-13 RX ADMIN — MORPHINE SULFATE 4 MG: 4 INJECTION, SOLUTION INTRAMUSCULAR; INTRAVENOUS at 09:05

## 2019-05-14 PROCEDURE — 25500020 PHARM REV CODE 255: Performed by: EMERGENCY MEDICINE

## 2019-05-14 RX ORDER — POLYETHYLENE GLYCOL 3350 17 G/17G
17 POWDER, FOR SOLUTION ORAL DAILY
Qty: 235 G | Refills: 0 | Status: SHIPPED | OUTPATIENT
Start: 2019-05-14 | End: 2019-05-22

## 2019-05-14 RX ORDER — DOCUSATE SODIUM 100 MG/1
100 CAPSULE, LIQUID FILLED ORAL 2 TIMES DAILY PRN
Qty: 20 CAPSULE | Refills: 0 | Status: SHIPPED | OUTPATIENT
Start: 2019-05-14 | End: 2019-05-24

## 2019-05-14 NOTE — ED NOTES
Upon discharge, patient is AAOx4, no cardiac or respiratory complications. Follow up care and  Medications have been reviewed with patient and has been instructed to return to the ER if needed. Patient verbalized understanding and ambulated to the lobby without difficulty. CAITLYN MACIEL.

## 2019-05-14 NOTE — ED PROVIDER NOTES
Encounter Date: 5/13/2019    SCRIBE #1 NOTE: IArleen, am scribing for, and in the presence of, Dr. Stroud.       History     Chief Complaint   Patient presents with    Abdominal Pain     right sided, reoccuring       Time seen by provider: 9:25 PM on 05/13/2019    Jhonny Almazan is a 63 y.o. male who presents to the ED complaining of constant right-sided abdominal pain x 1 month. Pt adds that the pain radiates to his back. He reports associated constipation and increased urinary frequency. His last bowel movement was 3 days ago. He admits that he does not control and monitor his sugar levels. The patient denies fever, nausea, vomiting, or any other symptoms at this time. PMHx includes HTN, DM, and colon polyps. Recent SHx includes an Esophagogastroduodenoscopy (4/26/2019) and a colonoscopy (5/3/2019 and 5/6/2019). No known drug allergies noted. Pt is a current smoker.    The history is provided by the patient.     Review of patient's allergies indicates:  No Known Allergies  Past Medical History:   Diagnosis Date    Anticoagulant long-term use     Arthritis     Colon polyp     Diabetes mellitus     Fatty liver     Hypertension      Past Surgical History:   Procedure Laterality Date    BACK SURGERY      COLONOSCOPY  07/18/2014    Dr. Crane: 22 colon polyps removed, hemorrhoids, erythema to sigmoid, repeat in 1 year for surveillance; biopsy: sigmoid erythema- showed unremarkable colonic mucosa, tubular adenomas and hyperplastic polyps    COLONOSCOPY N/A 5/6/2019    [facility]formed [facility]    COLONOSCOPY N/A 5/3/2019    [facility]    COLONOSCOPY N/A 7/18/2014    [facility]    EGD (ESOPHAGOGASTRODUODENOSCOPY) N/A 4/26/2019    [facility]    HERNIA REPAIR       Family History   Problem Relation Age of Onset    Heart disease Mother     Heart disease  Father     Diabetes Brother     Suicide Brother     Brain cancer Brother     Colon cancer Neg Hx     Crohn's disease Neg Hx     Ulcerative colitis Neg Hx     Stomach cancer Neg Hx     Esophageal cancer Neg Hx      Social History     Tobacco Use    Smoking status: Current Every Day Smoker     Packs/day: 1.00     Years: 50.00     Pack years: 50.00     Types: Cigarettes    Smokeless tobacco: Never Used   Substance Use Topics    Alcohol use: Yes     Alcohol/week: 8.4 oz     Types: 14 Cans of beer per week     Comment: 2-3 beers daily    Drug use: No     Review of Systems   Constitutional: Negative for activity change, diaphoresis and fever.   HENT: Negative for drooling, rhinorrhea, sore throat and trouble swallowing.    Eyes: Negative for pain and visual disturbance.   Respiratory: Negative for cough, shortness of breath and stridor.    Cardiovascular: Negative for chest pain and leg swelling.   Gastrointestinal: Positive for abdominal pain and constipation. Negative for abdominal distention, nausea and vomiting.   Genitourinary: Positive for frequency. Negative for discharge, dysuria and hematuria.   Musculoskeletal: Negative for gait problem.   Skin: Negative for rash.   Neurological: Negative for seizures, facial asymmetry and headaches.   Psychiatric/Behavioral: Negative for hallucinations and suicidal ideas.       Physical Exam     Initial Vitals [05/13/19 2109]   BP Pulse Resp Temp SpO2   (!) 185/91 85 16 98.2 °F (36.8 °C) 97 %      MAP       --         Physical Exam    Nursing note and vitals reviewed.  Constitutional: He appears well-developed. He is Obese . No distress.   HENT:   Head: Normocephalic and atraumatic.   Nose: Nose normal.   Eyes: EOM are normal.   Neck: Neck supple. No tracheal deviation present. No JVD present.   Cardiovascular: Normal rate, regular rhythm, normal heart sounds and intact distal pulses. Exam reveals no gallop and no friction rub.    No murmur heard.  Pulmonary/Chest:  Breath sounds normal. No respiratory distress. He has no wheezes. He has no rhonchi. He has no rales.   Abdominal: Soft. Bowel sounds are normal. He exhibits distension. There is no tenderness. There is no rebound, no guarding, no CVA tenderness, no tenderness at McBurney's point and negative Tomlin's sign.   Musculoskeletal: Normal range of motion.   Neurological: He is alert and oriented to person, place, and time. No cranial nerve deficit.   Skin: Skin is warm and dry. No rash noted.   Psychiatric: He has a normal mood and affect.         ED Course   Procedures  Labs Reviewed   CBC W/ AUTO DIFFERENTIAL - Abnormal; Notable for the following components:       Result Value    MPV 7.5 (*)     All other components within normal limits   COMPREHENSIVE METABOLIC PANEL - Abnormal; Notable for the following components:    CO2 30 (*)     Glucose 281 (*)     All other components within normal limits   URINALYSIS, REFLEX TO URINE CULTURE - Abnormal; Notable for the following components:    pH, UA >8.0 (*)     Glucose, UA 2+ (*)     All other components within normal limits    Narrative:     Preferred Collection Type->Urine, Clean Catch   LIPASE     EKG Readings: (Independently Interpreted)   Initial Reading: No STEMI. Rhythm: Normal Sinus Rhythm. Heart Rate: 73.   Normal intervals. Non-specific ST changes.       Imaging Results          CT Abdomen Pelvis With Contrast (In process)                  Medical Decision Making:   History:   Old Medical Records: I decided to obtain old medical records.  Clinical Tests:   Lab Tests: Ordered and Reviewed  Medical Tests: Ordered and Reviewed  ED Management:  Afebrile. Non toxic. Hemodynamically stable. No active nausea/vomiting. Non emergent abdominal exam. No abdominal bruits, no relation to fatty meals, negative Tomlins, no radiation to back, no CVA tenderness, no h/o alcohol abuse, no h/o diverticula or bloody stool. Pt having flatus and nml bowel movements.  Patients symptoms  not typical for other emergent causes of abdominal pain such as, but not limited to, appendicitis, abdominal aortic aneurysm, surgical biliary disease, pancreatitis, SBO, mesenteric ischemia, serious intra-abdominal bacterial illness, atypical ACS.Patient tolerated PO in ER. Pain well controlled. No emergent laboratory nor imaging findings. I doubt an acute intraabdominal/intrapelvic issue requiring admission.  CT scan showed constipation.  Therefore counseled on narcotics causing constipation.  Given Rx for constipation.  Stable for discharge home.    Patient will be discharged with strict return precautions and follow up with primary MD within 12-24 hours for further evaluation. Pt also has appointment with GI scheduled.  Patient counseled regarding pain and understands strict return precautions and discharge instructions.              Scribe Attestation:   Scribe #1: I performed the above scribed service and the documentation accurately describes the services I performed. I attest to the accuracy of the note.      Attending Attestation:     Physician Attestation for Scribe:    I, Dr. Roger Stroud, personally performed the services described in this documentation.   All medical record entries made by the scribe were at my direction and in my presence.   I have reviewed the chart and agree that the record is accurate and complete.   Roger Stroud MD  5:10 AM 05/14/2019     DISCLAIMER: This note was prepared with [employer] voice recognition transcription software. Garbled syntax, mangled pronouns, and other bizarre constructions may be attributed to that software system.          ED Course as of May 14 0141   Mon May 13, 2019   2151    [BD]   2151 63-year-old male past medical history of diabetes, hypertension, arthritis and fatty liver has been seen multiple times in the ED by his PCP and GI physician in the past month presents today with right upper quadrant abdominal pain. Afebrile.   Hypertensive otherwise stable vital signs. No acute distress. Patient has had multiple studies including unremarkable right upper quadrant ultrasound, noncontrast CT scan which had mild fusiform dilatation of 3.2 cm abdominal aorta,negative HIDA scan, negative thoracic workup and colonoscopy and EGD which showed multiple polyps and gastritis.    [BD]      ED Course User Index  [BD] Roger Stroud MD     Clinical Impression:       ICD-10-CM ICD-9-CM   1. Constipation, unspecified constipation type K59.00 564.00   2. Epigastric pain R10.13 789.06         Disposition:   Disposition: Discharged  Condition: Stable                        Roger Stroud MD  05/14/19 0515

## 2019-05-14 NOTE — ED NOTES
Presents to the ER with c/o intermittent right sided abd pain that radiates to patient's back and started one month ago. Associated complaints are constipation with his LBM being 3 days ago and urinary frequency. Mucous membranes are pink and moist. Skin is warm, dry and intact. Lungs are clear bilaterally, respirations are regular and unlabored. Denies cough, congestion, rhinorrhea or SOB. BS active x4, tenderness to RUQ and RLQ with palpation, abd is soft and + for  distended. Denies any appetite or activity change. S1S2, capillary refill is < 2 seconds. Denies dysuria, difficulty urinating, numbness, tingling or weakness. RAHEEM BRADY

## 2019-05-15 DIAGNOSIS — F17.210 CIGARETTE NICOTINE DEPENDENCE WITHOUT COMPLICATION: ICD-10-CM

## 2019-05-15 RX ORDER — BUPROPION HYDROCHLORIDE 150 MG/1
TABLET ORAL
Qty: 30 TABLET | Refills: 11 | Status: SHIPPED | OUTPATIENT
Start: 2019-05-15 | End: 2019-09-30

## 2019-05-21 ENCOUNTER — HOSPITAL ENCOUNTER (EMERGENCY)
Facility: HOSPITAL | Age: 64
Discharge: HOME | End: 2019-05-21
Attending: EMERGENCY MEDICINE
Payer: COMMERCIAL

## 2019-05-21 VITALS
TEMPERATURE: 98 F | HEART RATE: 92 BPM | OXYGEN SATURATION: 99 % | WEIGHT: 260 LBS | RESPIRATION RATE: 18 BRPM | DIASTOLIC BLOOD PRESSURE: 88 MMHG | HEIGHT: 77 IN | SYSTOLIC BLOOD PRESSURE: 156 MMHG | BODY MASS INDEX: 30.7 KG/M2

## 2019-05-21 DIAGNOSIS — R10.11 CHRONIC RIGHT UPPER QUADRANT PAIN: Primary | ICD-10-CM

## 2019-05-21 DIAGNOSIS — G89.29 CHRONIC RIGHT UPPER QUADRANT PAIN: Primary | ICD-10-CM

## 2019-05-21 PROCEDURE — 96372 THER/PROPH/DIAG INJ SC/IM: CPT

## 2019-05-21 PROCEDURE — 99284 EMERGENCY DEPT VISIT MOD MDM: CPT | Mod: 25

## 2019-05-21 PROCEDURE — 63600175 PHARM REV CODE 636 W HCPCS: Performed by: NURSE PRACTITIONER

## 2019-05-21 RX ORDER — MORPHINE SULFATE 10 MG/ML
10 INJECTION INTRAMUSCULAR; INTRAVENOUS; SUBCUTANEOUS
Status: COMPLETED | OUTPATIENT
Start: 2019-05-21 | End: 2019-05-21

## 2019-05-21 RX ADMIN — MORPHINE SULFATE 10 MG: 10 INJECTION, SOLUTION INTRAMUSCULAR; INTRAVENOUS at 10:05

## 2019-05-22 ENCOUNTER — OFFICE VISIT (OUTPATIENT)
Dept: FAMILY MEDICINE | Facility: CLINIC | Age: 64
End: 2019-05-22
Payer: COMMERCIAL

## 2019-05-22 ENCOUNTER — PES CALL (OUTPATIENT)
Dept: ADMINISTRATIVE | Facility: CLINIC | Age: 64
End: 2019-05-22

## 2019-05-22 VITALS
TEMPERATURE: 99 F | SYSTOLIC BLOOD PRESSURE: 142 MMHG | BODY MASS INDEX: 29.57 KG/M2 | HEART RATE: 96 BPM | HEIGHT: 77 IN | DIASTOLIC BLOOD PRESSURE: 83 MMHG | WEIGHT: 250.44 LBS

## 2019-05-22 DIAGNOSIS — K59.00 CONSTIPATION, UNSPECIFIED CONSTIPATION TYPE: ICD-10-CM

## 2019-05-22 DIAGNOSIS — M54.6 CHRONIC RIGHT-SIDED THORACIC BACK PAIN: Primary | ICD-10-CM

## 2019-05-22 DIAGNOSIS — G89.29 CHRONIC RIGHT-SIDED THORACIC BACK PAIN: Primary | ICD-10-CM

## 2019-05-22 PROCEDURE — 99214 PR OFFICE/OUTPT VISIT, EST, LEVL IV, 30-39 MIN: ICD-10-PCS | Mod: S$GLB,,, | Performed by: NURSE PRACTITIONER

## 2019-05-22 PROCEDURE — 3079F PR MOST RECENT DIASTOLIC BLOOD PRESSURE 80-89 MM HG: ICD-10-PCS | Mod: CPTII,S$GLB,, | Performed by: NURSE PRACTITIONER

## 2019-05-22 PROCEDURE — 3077F PR MOST RECENT SYSTOLIC BLOOD PRESSURE >= 140 MM HG: ICD-10-PCS | Mod: CPTII,S$GLB,, | Performed by: NURSE PRACTITIONER

## 2019-05-22 PROCEDURE — 99214 OFFICE O/P EST MOD 30 MIN: CPT | Mod: S$GLB,,, | Performed by: NURSE PRACTITIONER

## 2019-05-22 PROCEDURE — 1101F PT FALLS ASSESS-DOCD LE1/YR: CPT | Mod: CPTII,S$GLB,, | Performed by: NURSE PRACTITIONER

## 2019-05-22 PROCEDURE — 99999 PR PBB SHADOW E&M-EST. PATIENT-LVL V: CPT | Mod: PBBFAC,,, | Performed by: NURSE PRACTITIONER

## 2019-05-22 PROCEDURE — 3008F BODY MASS INDEX DOCD: CPT | Mod: CPTII,S$GLB,, | Performed by: NURSE PRACTITIONER

## 2019-05-22 PROCEDURE — 99999 PR PBB SHADOW E&M-EST. PATIENT-LVL V: ICD-10-PCS | Mod: PBBFAC,,, | Performed by: NURSE PRACTITIONER

## 2019-05-22 PROCEDURE — 1101F PR PT FALLS ASSESS DOC 0-1 FALLS W/OUT INJ PAST YR: ICD-10-PCS | Mod: CPTII,S$GLB,, | Performed by: NURSE PRACTITIONER

## 2019-05-22 PROCEDURE — 3077F SYST BP >= 140 MM HG: CPT | Mod: CPTII,S$GLB,, | Performed by: NURSE PRACTITIONER

## 2019-05-22 PROCEDURE — 3079F DIAST BP 80-89 MM HG: CPT | Mod: CPTII,S$GLB,, | Performed by: NURSE PRACTITIONER

## 2019-05-22 PROCEDURE — 3008F PR BODY MASS INDEX (BMI) DOCUMENTED: ICD-10-PCS | Mod: CPTII,S$GLB,, | Performed by: NURSE PRACTITIONER

## 2019-05-22 RX ORDER — LACTULOSE 10 G/15ML
20 SOLUTION ORAL 2 TIMES DAILY
Qty: 1800 ML | Refills: 0 | Status: SHIPPED | OUTPATIENT
Start: 2019-05-22 | End: 2019-06-20 | Stop reason: SDUPTHER

## 2019-05-22 RX ORDER — MELOXICAM 7.5 MG/1
7.5 TABLET ORAL DAILY PRN
Qty: 30 TABLET | Refills: 2 | Status: SHIPPED | OUTPATIENT
Start: 2019-05-22 | End: 2019-06-14 | Stop reason: ALTCHOICE

## 2019-05-22 NOTE — DISCHARGE INSTRUCTIONS
Keep your appointments with primary care and GI as scheduled. Return to ED for new or worsening symptoms.

## 2019-05-22 NOTE — PATIENT INSTRUCTIONS
Back Pain (Acute or Chronic)    Back pain is one of the most common problems. The good news is that most people feel better in 1 to 2 weeks, and most of the rest in 1 to 2 months. Most people can remain active.  People experience and describe pain differently; not everyone is the same.  · The pain can be sharp, stabbing, shooting, aching, cramping or burning.  · Movement, standing, bending, lifting, sitting, or walking may worsen pain.  · It can be localized to one spot or area, or it can be more generalized.  · It can spread or radiate upwards, to the front, or go down your arms or legs (sciatica).  · It can cause muscle spasm.  Most of the time, mechanical problems with the muscles or spine cause the pain. Mechanical problems are usually caused by an injury to the muscles or ligaments. While illness can cause back pain, it is usually not caused by a serious illness. Mechanical problems include:   · Physical activity such as sports, exercise, work, or normal activity  · Overexertion, lifting, pushing, pulling incorrectly or too aggressively  · Sudden twisting, bending, or stretching from an accident, or accidental movement  · Poor posture  · Stretching or moving wrong, without noticing pain at the time  · Poor coordination, lack of regular exercise (check with your doctor about this)  · Spinal disc disease or arthritis  · Stress  Pain can also be related to pregnancy, or illness like appendicitis, bladder or kidney infections, pelvic infections, and many other things.  Acute back pain usually gets better in 1 to 2 weeks. Back pain related to disk disease, arthritis in the spinal joints or spinal stenosis (narrowing of the spinal canal) can become chronic and last for months or years.  Unless you had a physical injury (for example, a car accident or fall) X-rays are usually not needed for the initial evaluation of back pain. If pain continues and does not respond to medical treatment, X-rays and other tests may be  needed.  Home care  Try these home care recommendations:  · When in bed, try to find a position of comfort. A firm mattress is best. Try lying flat on your back with pillows under your knees. You can also try lying on your side with your knees bent up towards your chest and a pillow between your knees.  · At first, do not try to stretch out the sore spots. If there is a strain, it is not like the good soreness you get after exercising without an injury. In this case, stretching may make it worse.  · Avoid prolong sitting, long car rides, or travel. This puts more stress on the lower back than standing or walking.  · During the first 24 to 72 hours after an acute injury or flare up of chronic back pain, apply an ice pack to the painful area for 20 minutes and then remove it for 20 minutes. Do this over a period of 60 to 90 minutes or several times a day. This will reduce swelling and pain. Wrap the ice pack in a thin towel or plastic to protect your skin.  · You can start with ice, then switch to heat. Heat (hot shower, hot bath, or heating pad) reduces pain and works well for muscle spasms. Heat can be applied to the painful area for 20 minutes then remove it for 20 minutes. Do this over a period of 60 to 90 minutes or several times a day. Do not sleep on a heating pad. It can lead to skin burns or tissue damage.  · You can alternate ice and heat therapy. Talk with your doctor about the best treatment for your back pain.  · Therapeutic massage can help relax the back muscles without stretching them.  · Be aware of safe lifting methods and do not lift anything without stretching first.  Medicines  Talk to your doctor before using medicine, especially if you have other medical problems or are taking other medicines.  · You may use over-the-counter medicine as directed on the bottle to control pain, unless another pain medicine was prescribed. If you have chronic conditions like diabetes, liver or kidney disease,  stomach ulcers, or gastrointestinal bleeding, or are taking blood thinners, talk to your doctor before taking any medicine.  · Be careful if you are given a prescription medicines, narcotics, or medicine for muscle spasms. They can cause drowsiness, affect your coordination, reflexes, and judgement. Do not drive or operate heavy machinery.  Follow-up care  Follow up with your healthcare provider, or as advised.   A radiologist will review any X-rays that were taken. Your provide will notify you of any new findings that may affect your care.  Call 911  Call emergency services if any of the following occur:  · Trouble breathing  · Confusion  · Very drowsy or trouble awakening  · Fainting or loss of consciousness  · Rapid or very slow heart rate  · Loss of bowel or bladder control  When to seek medical advice  Call your healthcare provider right away if any of these occur:   · Pain becomes worse or spreads to your legs  · Weakness or numbness in one or both legs  · Numbness in the groin or genital area  Date Last Reviewed: 7/1/2016  © 4696-0167 [employer]. [address] [place] [address], [place] [address]. All rights reserved. This information is not intended as a substitute for professional medical care. Always follow your healthcare professional's instructions.

## 2019-05-22 NOTE — ED PROVIDER NOTES
"Encounter Date: 5/21/2019    SCRIBE #1 NOTE: I, Percy Corey, am scribing for, and in the presence of, Nicole Hall NP.       History     Chief Complaint   Patient presents with    Abdominal Pain     Pt c/o right lower abdominal pain x 1 month. States, "I've had Ct-scans, x-rays and EKGs and no one knows whats wrong with me." + nausea; denies vomiting, diarrhea, + constipation. +urinary frequency       Time seen by provider: 9:36 PM on 05/21/2019    Jhonny Almazan is a 63 y.o. male with a PMHx of DM and a fatty liver who presents to the ED with c/o right sided abd pain that radiates to his back that has been present for the past month and has been worsening in severity over the past few days. The pt states that he has had CT scans, a scope, and X-rays which all came back as normal. He admits to taking OTC medication for the sx, but denies having any relief. Associated sx include nausea and constipation. He states that his last BM was a few days ago. The pt denies any vomiting or any other sx at this time. Past surgical hx includes a colonoscopy and a back surgery. No known drug allergies noted.    The history is provided by the patient.     Review of patient's allergies indicates:  No Known Allergies  Past Medical History:   Diagnosis Date    Anticoagulant long-term use     Arthritis     Colon polyp     Diabetes mellitus     Fatty liver     Hypertension      Past Surgical History:   Procedure Laterality Date    BACK SURGERY      COLONOSCOPY  07/18/2014    Dr. Crane: 22 colon polyps removed, hemorrhoids, erythema to sigmoid, repeat in 1 year for surveillance; biopsy: sigmoid erythema- showed unremarkable colonic mucosa, tubular adenomas and hyperplastic polyps    COLONOSCOPY N/A 5/6/2019    [facility]formed by [facility]    COLONOSCOPY N/A 5/3/2019    [facility] at [facility]    COLONOSCOPY N/A 7/18/2014    [facility]formed by [facility] at [facility] "    EGD (ESOPHAGOGASTRODUODENOSCOPY) N/A 4/26/2019    [facility]    HERNIA REPAIR       Family History   Problem Relation Age of Onset    Heart disease Mother     Heart disease Father     Diabetes Brother     Suicide Brother     Brain cancer Brother     Colon cancer Neg Hx     Crohn's disease Neg Hx     Ulcerative colitis Neg Hx     Stomach cancer Neg Hx     Esophageal cancer Neg Hx      Social History     Tobacco Use    Smoking status: Current Every Day Smoker     Packs/day: 1.00     Years: 50.00     Pack years: 50.00     Types: Cigarettes    Smokeless tobacco: Never Used   Substance Use Topics    Alcohol use: Yes     Alcohol/week: 8.4 oz     Types: 14 Cans of beer per week     Comment: 2-3 beers daily    Drug use: No     Review of Systems   Constitutional: Negative for activity change, appetite change, chills and fever.   HENT: Negative for congestion, ear pain, rhinorrhea, sinus pressure, sinus pain, sneezing, sore throat and voice change.    Eyes: Negative for pain, discharge and redness.   Respiratory: Negative for cough, shortness of breath, wheezing and stridor.    Cardiovascular: Negative for chest pain, palpitations and leg swelling.   Gastrointestinal: Positive for abdominal pain (Right side.), constipation and nausea. Negative for abdominal distention, blood in stool, diarrhea and vomiting.   Genitourinary: Negative for difficulty urinating, dysuria, flank pain, frequency, hematuria and urgency.   Musculoskeletal: Negative for arthralgias, gait problem, joint swelling and myalgias.   Skin: Negative for rash and wound.   Neurological: Negative for dizziness, syncope, facial asymmetry, speech difficulty, weakness, light-headedness, numbness and headaches.   Hematological: Negative for adenopathy.   Psychiatric/Behavioral: Negative.        Physical Exam     Initial Vitals   BP Pulse Resp Temp SpO2   05/21/19 2107 05/21/19 2106 05/21/19 2106 05/21/19 2106  05/21/19 2106   (!) 156/88 92 18 98.1 °F (36.7 °C) 99 %      MAP       --                Physical Exam    Nursing note and vitals reviewed.  Constitutional: He appears well-developed and well-nourished. He is active.   HENT:   Head: Normocephalic and atraumatic.   Right Ear: External ear normal.   Left Ear: External ear normal.   Nose: Nose normal.   Mouth/Throat: Oropharynx is clear and moist. No oropharyngeal exudate.   Eyes: Conjunctivae, EOM and lids are normal. Pupils are equal, round, and reactive to light. Right eye exhibits no chemosis and no discharge. Left eye exhibits no chemosis and no discharge. Right conjunctiva is not injected. Left conjunctiva is not injected.   Neck: Trachea normal and normal range of motion. Neck supple. No stridor present. No tracheal deviation present. No neck rigidity.   Cardiovascular: Normal rate, regular rhythm, normal heart sounds and normal pulses. Exam reveals no distant heart sounds and no friction rub.    No murmur heard.  Pulmonary/Chest: Effort normal and breath sounds normal. No stridor. He has no wheezes. He has no rhonchi. He has no rales.   Abdominal: Soft. Normal appearance and bowel sounds are normal. There is tenderness in the right upper quadrant.   RUQ tender to palpation.    Musculoskeletal: Normal range of motion.   Neurological: He is alert and oriented to person, place, and time. He has normal strength. GCS eye subscore is 4. GCS verbal subscore is 5. GCS motor subscore is 6.   Skin: Skin is warm, dry and intact. Capillary refill takes less than 2 seconds. No rash noted.   Psychiatric: He has a normal mood and affect. His speech is normal and behavior is normal. Thought content normal.         ED Course   Procedures  Labs Reviewed - No data to display       Imaging Results    None          Medical Decision Making:   History:   Old Medical Records: I decided to obtain old medical records.  Differential Diagnosis:    Cholecystis  Pancreatitis  Appendicitis  Obstruction        APC / Resident Notes:   63 year old male presents with c/o chronic RUQ pain for over a month. Denies new symptoms He has had numerous negative tests including CTs, Hida, endoscope, colonoscopy and lab work. Pt reports that he has appointment with pcp tomorrow but just needs pain relief tonight. He reports that morphine has helped in the past. Pt given morphine IM with relief of pain. He is instructed to keep appointment tomorrow and return to ED for new or worsening symptoms. I do not feel further emergent evaluation or treatment is needed and pt is stable for discharge. I have discussed pt with Dr Richard who agrees with POC. Pt voices understanding and is agreeable to the plan.  He is given specific return precautions.          Scribe Attestation:   Scribe #1: I performed the above scribed service and the documentation accurately describes the services I performed. I attest to the accuracy of the note.    I, STACIE uMrray, personally performed the services described in this documentation. All medical record entries made by the scribe were at my direction and in my presence.  I have reviewed the chart and agree that the record reflects my personal performance and is accurate and complete. STACIE Murray.  12:59 AM 05/22/2019             Clinical Impression:       ICD-10-CM ICD-9-CM   1. Chronic right upper quadrant pain R10.11 789.01    G89.29 338.29         Disposition:   Disposition: Discharged  Condition: Stable                        Nicole Hall NP  05/22/19 0059

## 2019-05-23 NOTE — PROGRESS NOTES
Subjective:       Patient ID: Jhonny Almazan is a 63 y.o. male.    Chief Complaint: [facility] (RUQ ab pain)    Follow up for right sided abdominal pain. Patient was seen in ED since last visit. He was given morphine with relief of pain.  The pain wraps around the right flank and back.  He states getting up and walking helps the pain.  Sitting down for long periods worsens the pain.       Review of Systems   Constitutional: Negative for chills and fever.   HENT: Negative for ear pain.    Respiratory: Negative for cough, shortness of breath and wheezing.    Cardiovascular: Negative for chest pain, palpitations and leg swelling.   Gastrointestinal: Positive for abdominal pain and constipation. Negative for diarrhea, nausea and vomiting.   Genitourinary: Positive for flank pain. Negative for difficulty urinating and dysuria.   Musculoskeletal: Positive for back pain.       Objective:      Physical Exam   Constitutional: He is oriented to person, place, and time. He appears well-developed and well-nourished. No distress.   HENT:   Head: Normocephalic and atraumatic.   Right Ear: External ear normal.   Left Ear: External ear normal.   Mouth/Throat: Oropharynx is clear and moist. No oropharyngeal exudate.   Eyes: Pupils are equal, round, and reactive to light. Right eye exhibits no discharge. Left eye exhibits no discharge.   Neck: Neck supple. No thyromegaly present.   Cardiovascular: Normal rate and regular rhythm. Exam reveals no gallop and no friction rub.   No murmur heard.  Pulmonary/Chest: Effort normal and breath sounds normal. No respiratory distress. He has no wheezes. He has no rales.   Abdominal: Soft. He exhibits no distension. There is tenderness in the right upper quadrant.   Musculoskeletal:        Thoracic back: He exhibits tenderness. He exhibits no bony tenderness.        Back:    Lymphadenopathy:     He has no cervical adenopathy.   Neurological: He is alert and oriented to person, place, and  time. Coordination normal.   Skin: Skin is warm and dry.   Psychiatric: He has a normal mood and affect. His behavior is normal. Thought content normal.   Vitals reviewed.          Current Outpatient Medications:     ACETAMINOPHEN (TYLENOL ARTHRITIS ORAL), Take 1 tablet by mouth daily as needed (pain). , Disp: , Rfl:     buPROPion (WELLBUTRIN XL) 150 MG TB24 tablet, TAKE ONE TABLET BY MOUTH ONCE DAILY, Disp: 30 tablet, Rfl: 11    co-enzyme Q-10 50 mg capsule, Take 4 capsules (200 mg total) by mouth once daily., Disp: 120 capsule, Rfl: 11    diclofenac sodium (VOLTAREN) 1 % Gel, Apply 2 g topically 3 (three) times daily as needed. For hand pain, Disp: 100 g, Rfl: 11    docusate sodium (COLACE) 100 MG capsule, Take 1 capsule (100 mg total) by mouth 2 (two) times daily as needed for Constipation., Disp: 20 capsule, Rfl: 0    folic acid-vit B6-vit B12 2.5-25-2 mg (FOLBIC OR EQUIV) 2.5-25-2 mg Tab, Take 1 tablet by mouth once daily., Disp: 90 tablet, Rfl: 4    gabapentin (NEURONTIN) 100 MG capsule, Take 1 capsule (100 mg total) by mouth 3 (three) times daily., Disp: 90 capsule, Rfl: 11    glipiZIDE (GLUCOTROL) 10 MG TR24, TAKE 1 TABLET BY MOUTH DAILY WITH BREAKFAST, Disp: 90 tablet, Rfl: 2    hydrALAZINE (APRESOLINE) 25 MG tablet, Take 2 tablets (50 mg total) by mouth every 8 (eight) hours., Disp: 90 tablet, Rfl: 8    lactulose (CHRONULAC) 20 gram/30 mL Soln, Take 30 mLs (20 g total) by mouth 2 (two) times daily., Disp: 1800 mL, Rfl: 0    lisinopril-hydrochlorothiazide (PRINZIDE,ZESTORETIC) 20-12.5 mg per tablet, TAKE 1 TABLET BY MOUTH 2 (TWO) TIMES DAILY., Disp: 180 tablet, Rfl: 0    magnesium citrate solution, Take 296 mLs by mouth daily as needed (constipation). , Disp: , Rfl:     meloxicam (MOBIC) 7.5 MG tablet, Take 1 tablet (7.5 mg total) by mouth daily as needed for Pain., Disp: 30 tablet, Rfl: 2    metFORMIN (GLUCOPHAGE-XR) 500 MG 24 hr tablet, TAKE 2 TABLETS (1,000 MG TOTAL) BY MOUTH 2 (TWO) TIMES  DAILY., Disp: 360 tablet, Rfl: 0    omeprazole (PRILOSEC) 40 MG capsule, Take 1 capsule (40 mg total) by mouth before breakfast., Disp: 30 capsule, Rfl: 3    pioglitazone (ACTOS) 30 MG tablet, TAKE 1 TABLET (30 MG TOTAL) BY MOUTH ONCE DAILY., Disp: 90 tablet, Rfl: 2    rosuvastatin (CRESTOR) 20 MG tablet, TAKE 1 TABLET (20 MG TOTAL) BY MOUTH ONCE DAILY., Disp: 90 tablet, Rfl: 1  Assessment:       1. Chronic right-sided thoracic back pain    2. Constipation, unspecified constipation type        Plan:       Chronic right-sided thoracic back pain  Patient was examined by myself and his PCP, Dr. Whitehead.    GI etiology unlikely given recent testing  Will treat for musculoskeletal pain with NSAID and PT.  RTC 6 wk  -     meloxicam (MOBIC) 7.5 MG tablet; Take 1 tablet (7.5 mg total) by mouth daily as needed for Pain.  Dispense: 30 tablet; Refill: 2  -     Ambulatory Referral to Physical/Occupational Therapy    Constipation, unspecified constipation type  -     lactulose (CHRONULAC) 20 gram/30 mL Soln; Take 30 mLs (20 g total) by mouth 2 (two) times daily.  Dispense: 1800 mL; Refill: 0

## 2019-06-05 ENCOUNTER — TELEPHONE (OUTPATIENT)
Dept: FAMILY MEDICINE | Facility: CLINIC | Age: 64
End: 2019-06-05

## 2019-06-05 NOTE — TELEPHONE ENCOUNTER
Patient came to clinic states that [facility] needs clinical notes from provider faxed. Patient requesting this nurse to fax OV note. Notified patient to make sure he does not need forms filled out from [facility] pt states understanding.  Is it ok for me to fax ov note?

## 2019-06-06 ENCOUNTER — PATIENT MESSAGE (OUTPATIENT)
Dept: FAMILY MEDICINE | Facility: CLINIC | Age: 64
End: 2019-06-06

## 2019-06-06 ENCOUNTER — OFFICE VISIT (OUTPATIENT)
Dept: GASTROENTEROLOGY | Facility: CLINIC | Age: 64
End: 2019-06-06
Payer: COMMERCIAL

## 2019-06-06 VITALS
BODY MASS INDEX: 28.77 KG/M2 | DIASTOLIC BLOOD PRESSURE: 74 MMHG | WEIGHT: 243.63 LBS | SYSTOLIC BLOOD PRESSURE: 119 MMHG | HEART RATE: 94 BPM | HEIGHT: 77 IN

## 2019-06-06 DIAGNOSIS — E11.59 HYPERTENSION ASSOCIATED WITH DIABETES: ICD-10-CM

## 2019-06-06 DIAGNOSIS — M94.0 COSTOCHONDRITIS: ICD-10-CM

## 2019-06-06 DIAGNOSIS — G89.29 CHRONIC RIGHT-SIDED THORACIC BACK PAIN: Primary | ICD-10-CM

## 2019-06-06 DIAGNOSIS — M54.5 LOW BACK PAIN, UNSPECIFIED BACK PAIN LATERALITY, UNSPECIFIED CHRONICITY, WITH SCIATICA PRESENCE UNSPECIFIED: Primary | ICD-10-CM

## 2019-06-06 DIAGNOSIS — M54.6 CHRONIC RIGHT-SIDED THORACIC BACK PAIN: Primary | ICD-10-CM

## 2019-06-06 DIAGNOSIS — M54.9 BACK PAIN, UNSPECIFIED BACK LOCATION, UNSPECIFIED BACK PAIN LATERALITY, UNSPECIFIED CHRONICITY: ICD-10-CM

## 2019-06-06 DIAGNOSIS — E11.69 HYPERLIPIDEMIA ASSOCIATED WITH TYPE 2 DIABETES MELLITUS: Primary | ICD-10-CM

## 2019-06-06 DIAGNOSIS — I15.2 HYPERTENSION ASSOCIATED WITH DIABETES: ICD-10-CM

## 2019-06-06 DIAGNOSIS — R10.9 ABDOMINAL PAIN, UNSPECIFIED ABDOMINAL LOCATION: ICD-10-CM

## 2019-06-06 DIAGNOSIS — E78.5 HYPERLIPIDEMIA ASSOCIATED WITH TYPE 2 DIABETES MELLITUS: Primary | ICD-10-CM

## 2019-06-06 DIAGNOSIS — Z86.010 HISTORY OF COLON POLYPS: ICD-10-CM

## 2019-06-06 PROCEDURE — 99999 PR PBB SHADOW E&M-EST. PATIENT-LVL III: ICD-10-PCS | Mod: PBBFAC,,, | Performed by: INTERNAL MEDICINE

## 2019-06-06 PROCEDURE — 3044F PR MOST RECENT HEMOGLOBIN A1C LEVEL <7.0%: ICD-10-PCS | Mod: CPTII,S$GLB,, | Performed by: INTERNAL MEDICINE

## 2019-06-06 PROCEDURE — 1101F PT FALLS ASSESS-DOCD LE1/YR: CPT | Mod: CPTII,S$GLB,, | Performed by: INTERNAL MEDICINE

## 2019-06-06 PROCEDURE — 99214 OFFICE O/P EST MOD 30 MIN: CPT | Mod: S$GLB,,, | Performed by: INTERNAL MEDICINE

## 2019-06-06 PROCEDURE — 3008F PR BODY MASS INDEX (BMI) DOCUMENTED: ICD-10-PCS | Mod: CPTII,S$GLB,, | Performed by: INTERNAL MEDICINE

## 2019-06-06 PROCEDURE — 3044F HG A1C LEVEL LT 7.0%: CPT | Mod: CPTII,S$GLB,, | Performed by: INTERNAL MEDICINE

## 2019-06-06 PROCEDURE — 3078F DIAST BP <80 MM HG: CPT | Mod: CPTII,S$GLB,, | Performed by: INTERNAL MEDICINE

## 2019-06-06 PROCEDURE — 3074F SYST BP LT 130 MM HG: CPT | Mod: CPTII,S$GLB,, | Performed by: INTERNAL MEDICINE

## 2019-06-06 PROCEDURE — 3078F PR MOST RECENT DIASTOLIC BLOOD PRESSURE < 80 MM HG: ICD-10-PCS | Mod: CPTII,S$GLB,, | Performed by: INTERNAL MEDICINE

## 2019-06-06 PROCEDURE — 99214 PR OFFICE/OUTPT VISIT, EST, LEVL IV, 30-39 MIN: ICD-10-PCS | Mod: S$GLB,,, | Performed by: INTERNAL MEDICINE

## 2019-06-06 PROCEDURE — 1101F PR PT FALLS ASSESS DOC 0-1 FALLS W/OUT INJ PAST YR: ICD-10-PCS | Mod: CPTII,S$GLB,, | Performed by: INTERNAL MEDICINE

## 2019-06-06 PROCEDURE — 3074F PR MOST RECENT SYSTOLIC BLOOD PRESSURE < 130 MM HG: ICD-10-PCS | Mod: CPTII,S$GLB,, | Performed by: INTERNAL MEDICINE

## 2019-06-06 PROCEDURE — 99999 PR PBB SHADOW E&M-EST. PATIENT-LVL III: CPT | Mod: PBBFAC,,, | Performed by: INTERNAL MEDICINE

## 2019-06-06 PROCEDURE — 3008F BODY MASS INDEX DOCD: CPT | Mod: CPTII,S$GLB,, | Performed by: INTERNAL MEDICINE

## 2019-06-06 NOTE — LETTER
June 6, 2019      Nicolette Rudd, FNP-C  [address]  [place] [address]           [facility] [facility]  [address]  [place] [address]  Phone: [phone]          Patient: Jhonny Almazan   MR Number: [id number]   YOB: 1955   Date of Visit: 6/6/2019       Dear Nicolette Rudd:    Thank you for referring Jhonny Almazan to me for evaluation. Attached you will find relevant portions of my assessment and plan of care.    If you have questions, please do not hesitate to call me. I look forward to following Jhonny Almazan along with you.    Sincerely,    Guero Crane MD    Enclosure  CC:  No Recipients    If you would like to receive this communication electronically, please contact [email or web address] or [phone] to request more information on [facility] Link access.    For providers and/or their staff who would like to refer a patient to [facility], please contact us through our one-stop-shop provider referral line, [facility], at [phone].    If you feel you have received this communication in error or would no longer like to receive these types of communications, please e-mail [email or web address]

## 2019-06-06 NOTE — PROGRESS NOTES
"Subjective:       Patient ID: Jhonny Almazan is a 63 y.o. male.    This is an established patient.      Chief Complaint: Abdominal Pain    Abdominal Pain   This is a chronic problem. The current episode started more than 1 month ago. The onset quality is undetermined. The problem occurs daily. Duration: variable. The pain is located in the RUQ (right costal margin, right flank). The pain is at a severity of 6/10. The pain is moderate. The quality of the pain is aching. Pain radiation: from back around costal margin to the front. Associated symptoms include constipation (chronic but improved with miralax). Pertinent negatives include no arthralgias, diarrhea, fever, myalgias, nausea or vomiting. The pain is aggravated by certain positions. The pain is relieved by standing. He has tried proton pump inhibitors (stool softener) for the symptoms. The treatment provided mild relief. Prior diagnostic workup includes CT scan, lower endoscopy, upper endoscopy and GI consult (Has had extensive workup including endoscopies, imaging, and labs, all of which were unrevealing). His past medical history is significant for GERD.     Review of Systems   Constitutional: Negative for chills, fatigue and fever.   HENT: Negative for trouble swallowing.    Respiratory: Negative for cough, shortness of breath and wheezing.    Cardiovascular: Negative for chest pain and palpitations.   Gastrointestinal: Positive for abdominal pain and constipation (chronic but improved with miralax). Negative for diarrhea, nausea and vomiting.   Musculoskeletal: Negative for arthralgias and myalgias.   All other systems reviewed and are negative.      Objective:       Vitals:    06/06/19 1307   BP: 119/74   Pulse: 94   Weight: 110.5 kg (243 lb 9.7 oz)   Height: 6' 5" (1.956 m)       Physical Exam   Constitutional: He is oriented to person, place, and time. He appears well-developed and well-nourished.   HENT:   Head: Normocephalic and atraumatic. "   Mouth/Throat: Oropharynx is clear and moist.   Eyes: Pupils are equal, round, and reactive to light. Conjunctivae are normal. No scleral icterus.   Cardiovascular: Normal rate and regular rhythm.   No murmur heard.  Pulmonary/Chest: Effort normal and breath sounds normal. He has no wheezes.   Abdominal: Soft. Bowel sounds are normal. He exhibits no distension. There is no tenderness.   Musculoskeletal: He exhibits tenderness (right costal margin and right flank/paraspinal area).   Lymphadenopathy:     He has no cervical adenopathy.   Neurological: He is alert and oriented to person, place, and time.   Vitals reviewed.        Lab Results   Component Value Date    WBC 8.00 05/13/2019    HGB 14.4 05/13/2019    HCT 43.7 05/13/2019    MCV 92 05/13/2019     05/13/2019       CMP  Sodium   Date Value Ref Range Status   05/13/2019 137 136 - 145 mmol/L Final     Potassium   Date Value Ref Range Status   05/13/2019 4.0 3.5 - 5.1 mmol/L Final     Chloride   Date Value Ref Range Status   05/13/2019 99 95 - 110 mmol/L Final     CO2   Date Value Ref Range Status   05/13/2019 30 (H) 23 - 29 mmol/L Final     Glucose   Date Value Ref Range Status   05/13/2019 281 (H) 70 - 110 mg/dL Final     BUN, Bld   Date Value Ref Range Status   05/13/2019 13 8 - 23 mg/dL Final     Creatinine   Date Value Ref Range Status   05/13/2019 0.9 0.5 - 1.4 mg/dL Final     Calcium   Date Value Ref Range Status   05/13/2019 10.0 8.7 - 10.5 mg/dL Final     Total Protein   Date Value Ref Range Status   05/13/2019 6.8 6.0 - 8.4 g/dL Final     Albumin   Date Value Ref Range Status   05/13/2019 3.8 3.5 - 5.2 g/dL Final     Total Bilirubin   Date Value Ref Range Status   05/13/2019 0.4 0.1 - 1.0 mg/dL Final     Comment:     For infants and newborns, interpretation of results should be based  on gestational age, weight and in agreement with clinical  observations.  Premature Infant recommended reference ranges:  Up to 24 hours.............<8.0 mg/dL  Up  to 48 hours............<12.0 mg/dL  3-5 days..................<15.0 mg/dL  6-29 days.................<15.0 mg/dL       Alkaline Phosphatase   Date Value Ref Range Status   05/13/2019 60 55 - 135 U/L Final     AST   Date Value Ref Range Status   05/13/2019 18 10 - 40 U/L Final     ALT   Date Value Ref Range Status   05/13/2019 35 10 - 44 U/L Final     Anion Gap   Date Value Ref Range Status   05/13/2019 8 8 - 16 mmol/L Final     eGFR if    Date Value Ref Range Status   05/13/2019 >60 >60 mL/min/1.73 m^2 Final     eGFR if non    Date Value Ref Range Status   05/13/2019 >60 >60 mL/min/1.73 m^2 Final     Comment:     Calculation used to obtain the estimated glomerular filtration  rate (eGFR) is the CKD-EPI equation.          CT scan and HIDA were independently visualized and reviewed by me and showed no acute findings and no findings demonstrating any GI etiology for symptoms.    Assessment:       1. Hyperlipidemia associated with type 2 diabetes mellitus    2. Hypertension associated with diabetes    3. History of colon polyps    4. Abdominal pain, unspecified abdominal location        Plan:       1.  Continue PPI  2.  Continue miralax  3.  Agree with trial of mobic but would continue PPI while on this for PUD prophylaxis  4.  Etiology sounds more musculoskeletal vs neurologic given negative GI workup and presence of back pain radiating around costal margin to the front.  Follow up with PCP and consider referral to orthopaedics  5.  Colonoscopy due in 2022 for surveillance  6.  Follow up with GI PRN.

## 2019-06-07 ENCOUNTER — PATIENT MESSAGE (OUTPATIENT)
Dept: FAMILY MEDICINE | Facility: CLINIC | Age: 64
End: 2019-06-07

## 2019-06-14 ENCOUNTER — HOSPITAL ENCOUNTER (EMERGENCY)
Facility: HOSPITAL | Age: 64
Discharge: HOME | End: 2019-06-14
Attending: EMERGENCY MEDICINE
Payer: COMMERCIAL

## 2019-06-14 ENCOUNTER — LAB VISIT (OUTPATIENT)
Dept: LAB | Facility: HOSPITAL | Age: 64
End: 2019-06-14
Attending: NURSE PRACTITIONER
Payer: COMMERCIAL

## 2019-06-14 VITALS
OXYGEN SATURATION: 96 % | TEMPERATURE: 97 F | SYSTOLIC BLOOD PRESSURE: 128 MMHG | DIASTOLIC BLOOD PRESSURE: 67 MMHG | BODY MASS INDEX: 29.52 KG/M2 | RESPIRATION RATE: 18 BRPM | HEART RATE: 90 BPM | WEIGHT: 250 LBS | HEIGHT: 77 IN

## 2019-06-14 DIAGNOSIS — R10.11 RIGHT UPPER QUADRANT PAIN: Primary | ICD-10-CM

## 2019-06-14 DIAGNOSIS — E11.65 UNCONTROLLED TYPE 2 DIABETES MELLITUS WITH HYPERGLYCEMIA: ICD-10-CM

## 2019-06-14 DIAGNOSIS — E83.52 HYPERCALCEMIA: ICD-10-CM

## 2019-06-14 DIAGNOSIS — R07.9 CHEST PAIN: ICD-10-CM

## 2019-06-14 LAB
25(OH)D3+25(OH)D2 SERPL-MCNC: 31 NG/ML (ref 30–96)
ALBUMIN SERPL BCP-MCNC: 3.6 G/DL (ref 3.5–5.2)
ALBUMIN SERPL BCP-MCNC: 3.9 G/DL (ref 3.5–5.2)
ALP SERPL-CCNC: 67 U/L (ref 55–135)
ALP SERPL-CCNC: 71 U/L (ref 55–135)
ALT SERPL W/O P-5'-P-CCNC: 22 U/L (ref 10–44)
ALT SERPL W/O P-5'-P-CCNC: 25 U/L (ref 10–44)
ANION GAP SERPL CALC-SCNC: 8 MMOL/L (ref 8–16)
ANION GAP SERPL CALC-SCNC: 8 MMOL/L (ref 8–16)
AST SERPL-CCNC: 14 U/L (ref 10–40)
AST SERPL-CCNC: 15 U/L (ref 10–40)
BASOPHILS # BLD AUTO: 0 K/UL (ref 0–0.2)
BASOPHILS NFR BLD: 0.4 % (ref 0–1.9)
BILIRUB SERPL-MCNC: 0.5 MG/DL (ref 0.1–1)
BILIRUB SERPL-MCNC: 0.6 MG/DL (ref 0.1–1)
BUN SERPL-MCNC: 13 MG/DL (ref 8–23)
BUN SERPL-MCNC: 15 MG/DL (ref 8–23)
CALCIUM SERPL-MCNC: 10 MG/DL (ref 8.7–10.5)
CALCIUM SERPL-MCNC: 10.3 MG/DL (ref 8.7–10.5)
CHLORIDE SERPL-SCNC: 101 MMOL/L (ref 95–110)
CHLORIDE SERPL-SCNC: 102 MMOL/L (ref 95–110)
CHOLEST SERPL-MCNC: 246 MG/DL (ref 120–199)
CHOLEST/HDLC SERPL: 7.2 {RATIO} (ref 2–5)
CO2 SERPL-SCNC: 27 MMOL/L (ref 23–29)
CO2 SERPL-SCNC: 29 MMOL/L (ref 23–29)
CREAT SERPL-MCNC: 0.8 MG/DL (ref 0.5–1.4)
CREAT SERPL-MCNC: 0.8 MG/DL (ref 0.5–1.4)
DIFFERENTIAL METHOD: ABNORMAL
EOSINOPHIL # BLD AUTO: 0.2 K/UL (ref 0–0.5)
EOSINOPHIL NFR BLD: 2.6 % (ref 0–8)
ERYTHROCYTE [DISTWIDTH] IN BLOOD BY AUTOMATED COUNT: 12.5 % (ref 11.5–14.5)
EST. GFR  (AFRICAN AMERICAN): >60 ML/MIN/1.73 M^2
EST. GFR  (AFRICAN AMERICAN): >60 ML/MIN/1.73 M^2
EST. GFR  (NON AFRICAN AMERICAN): >60 ML/MIN/1.73 M^2
EST. GFR  (NON AFRICAN AMERICAN): >60 ML/MIN/1.73 M^2
ESTIMATED AVG GLUCOSE: 192 MG/DL (ref 68–131)
GLUCOSE SERPL-MCNC: 151 MG/DL (ref 70–110)
GLUCOSE SERPL-MCNC: 211 MG/DL (ref 70–110)
HBA1C MFR BLD HPLC: 8.3 % (ref 4–5.6)
HCT VFR BLD AUTO: 42.4 % (ref 40–54)
HDLC SERPL-MCNC: 34 MG/DL (ref 40–75)
HDLC SERPL: 13.8 % (ref 20–50)
HGB BLD-MCNC: 14.4 G/DL (ref 14–18)
LDLC SERPL CALC-MCNC: 156.8 MG/DL (ref 63–159)
LIPASE SERPL-CCNC: 19 U/L (ref 4–60)
LYMPHOCYTES # BLD AUTO: 1.8 K/UL (ref 1–4.8)
LYMPHOCYTES NFR BLD: 23.4 % (ref 18–48)
MCH RBC QN AUTO: 30.4 PG (ref 27–31)
MCHC RBC AUTO-ENTMCNC: 33.8 G/DL (ref 32–36)
MCV RBC AUTO: 90 FL (ref 82–98)
MONOCYTES # BLD AUTO: 0.5 K/UL (ref 0.3–1)
MONOCYTES NFR BLD: 7.1 % (ref 4–15)
NEUTROPHILS # BLD AUTO: 5.1 K/UL (ref 1.8–7.7)
NEUTROPHILS NFR BLD: 66.5 % (ref 38–73)
NONHDLC SERPL-MCNC: 212 MG/DL
PLATELET # BLD AUTO: 219 K/UL (ref 150–350)
PMV BLD AUTO: 7.7 FL (ref 9.2–12.9)
POTASSIUM SERPL-SCNC: 3.9 MMOL/L (ref 3.5–5.1)
POTASSIUM SERPL-SCNC: 4.7 MMOL/L (ref 3.5–5.1)
PROT SERPL-MCNC: 6.7 G/DL (ref 6–8.4)
PROT SERPL-MCNC: 7.2 G/DL (ref 6–8.4)
PTH-INTACT SERPL-MCNC: 65 PG/ML (ref 9–77)
RBC # BLD AUTO: 4.73 M/UL (ref 4.6–6.2)
SODIUM SERPL-SCNC: 137 MMOL/L (ref 136–145)
SODIUM SERPL-SCNC: 138 MMOL/L (ref 136–145)
TRIGL SERPL-MCNC: 276 MG/DL (ref 30–150)
TROPONIN I SERPL DL<=0.01 NG/ML-MCNC: <0.006 NG/ML (ref 0–0.03)
WBC # BLD AUTO: 7.6 K/UL (ref 3.9–12.7)

## 2019-06-14 PROCEDURE — 96374 THER/PROPH/DIAG INJ IV PUSH: CPT

## 2019-06-14 PROCEDURE — 82306 VITAMIN D 25 HYDROXY: CPT

## 2019-06-14 PROCEDURE — 83690 ASSAY OF LIPASE: CPT

## 2019-06-14 PROCEDURE — 80053 COMPREHEN METABOLIC PANEL: CPT | Mod: 91

## 2019-06-14 PROCEDURE — 63600175 PHARM REV CODE 636 W HCPCS: Performed by: EMERGENCY MEDICINE

## 2019-06-14 PROCEDURE — 83036 HEMOGLOBIN GLYCOSYLATED A1C: CPT

## 2019-06-14 PROCEDURE — 85025 COMPLETE CBC W/AUTO DIFF WBC: CPT

## 2019-06-14 PROCEDURE — 99285 EMERGENCY DEPT VISIT HI MDM: CPT | Mod: 25

## 2019-06-14 PROCEDURE — 83970 ASSAY OF PARATHORMONE: CPT

## 2019-06-14 PROCEDURE — 80061 LIPID PANEL: CPT

## 2019-06-14 PROCEDURE — 36415 COLL VENOUS BLD VENIPUNCTURE: CPT

## 2019-06-14 PROCEDURE — 80053 COMPREHEN METABOLIC PANEL: CPT

## 2019-06-14 PROCEDURE — 93005 ELECTROCARDIOGRAM TRACING: CPT

## 2019-06-14 PROCEDURE — 36415 COLL VENOUS BLD VENIPUNCTURE: CPT | Mod: PO

## 2019-06-14 PROCEDURE — 84484 ASSAY OF TROPONIN QUANT: CPT

## 2019-06-14 RX ORDER — ASPIRIN 325 MG
50 TABLET, DELAYED RELEASE (ENTERIC COATED) ORAL DAILY
COMMUNITY
End: 2019-12-16 | Stop reason: SDUPTHER

## 2019-06-14 RX ORDER — MORPHINE SULFATE 2 MG/ML
6 INJECTION, SOLUTION INTRAMUSCULAR; INTRAVENOUS
Status: COMPLETED | OUTPATIENT
Start: 2019-06-14 | End: 2019-06-14

## 2019-06-14 RX ADMIN — MORPHINE SULFATE 6 MG: 2 INJECTION, SOLUTION INTRAMUSCULAR; INTRAVENOUS at 11:06

## 2019-06-14 NOTE — ED PROVIDER NOTES
"Encounter Date: 6/14/2019    SCRIBE #1 NOTE: I, Korin Powers, am scribing for, and in the presence of, Gerber Leach MD.       History     Chief Complaint   Patient presents with    Abdominal Pain     right side     Flank Pain     right side    Chest Pain       Time seen by provider: 11:37 AM on 06/14/2019    Jhonny Almazan is a 63 y.o. male with DM, HTN, and HLD who presents to the ED with an onset of right upper abdominal pain. He began to endorse constant mid upper abdominal pain since last night and states his pain has worsened since this morning, "like someone is pushing against it." His pain is worsened with eating. The patient thought his pain was similar to indigestion and feels as if he has to belch but is unable to do so. He reports no prior similar symptoms before last night and states his pain is different compared to when he was seen in the ER ~3.5 weeks ago on 5/21 for flank pain.  He reports he still has the chronic flank pain and it is unchanged..  He is a daily cigarette smoker. The patient denies chest pain or any other symptoms at this time. He has a PSHx including an EDG (4/26/2019) and a colonoscopy (5/6/2019) performed by Dr. Guero Miles. No known drug allergies noted.    Triage note shows a chief complaint of chest pain but the patient reports he never had any chest pain in indicates his right upper quadrant.    The history is provided by the patient.     Review of patient's allergies indicates:  No Known Allergies  Past Medical History:   Diagnosis Date    Anticoagulant long-term use     Arthritis     Colon polyp     Diabetes mellitus     Fatty liver     Hypertension      Past Surgical History:   Procedure Laterality Date    BACK SURGERY      COLONOSCOPY  07/18/2014    Dr. Miles: 22 colon polyps removed, hemorrhoids, erythema to sigmoid, repeat in 1 year for surveillance; biopsy: sigmoid erythema- showed unremarkable colonic mucosa, tubular adenomas and hyperplastic polyps "    COLONOSCOPY N/A 5/6/2019    [facility]    COLONOSCOPY N/A 5/3/2019    [facility] at [facility]    COLONOSCOPY N/A 7/18/2014    [facility] at [facility]    EGD (ESOPHAGOGASTRODUODENOSCOPY) N/A 4/26/2019    [facility]    HERNIA REPAIR       Family History   Problem Relation Age of Onset    Heart disease Mother     Heart disease Father     Diabetes Brother     Suicide Brother     Brain cancer Brother     Colon cancer Neg Hx     Crohn's disease Neg Hx     Ulcerative colitis Neg Hx     Stomach cancer Neg Hx     Esophageal cancer Neg Hx      Social History     Tobacco Use    Smoking status: Current Every Day Smoker     Packs/day: 1.00     Years: 50.00     Pack years: 50.00     Types: Cigarettes    Smokeless tobacco: Never Used   Substance Use Topics    Alcohol use: Yes     Alcohol/week: 8.4 oz     Types: 14 Cans of beer per week     Comment: 2-3 beers daily    Drug use: No     Review of Systems   Constitutional: Negative for fever.   Respiratory: Negative for cough and shortness of breath.    Cardiovascular: Negative for chest pain.   Gastrointestinal: Positive for abdominal pain (RUQ). Negative for diarrhea, nausea and vomiting.   Genitourinary: Positive for flank pain (chronis right-sided, unchanged).   Skin: Negative for rash.   Neurological: Negative for headaches.   All other systems reviewed and are negative.    Physical Exam     Initial Vitals [06/14/19 1125]   BP Pulse Resp Temp SpO2   128/67 90 18 97.4 °F (36.3 °C) 96 %      MAP       --         Physical Exam    Nursing note and vitals reviewed.  Constitutional: He appears well-developed and well-nourished. He is not diaphoretic. He is Obese .  Non-toxic appearance. He does not have a sickly appearance. He does not appear ill. No distress.   HENT:   Head: Normocephalic and atraumatic.   Eyes: EOM are normal.   Neck: Normal  range of motion. Neck supple. Normal range of motion present. No neck rigidity.   Cardiovascular: Normal rate, regular rhythm and normal heart sounds. Exam reveals no gallop and no friction rub.    No murmur heard.  Pulmonary/Chest: Breath sounds normal. No respiratory distress. He has no wheezes. He has no rhonchi. He has no rales.   Abdominal: He exhibits no distension. There is tenderness in the right upper quadrant. There is no rebound and no guarding.   Minimal RUQ tenderness.    Musculoskeletal: Normal range of motion. He exhibits no tenderness.   No reproducible flank tenderness.    Neurological: He is alert and oriented to person, place, and time.   Skin: Skin is warm and dry. No rash noted.   Psychiatric: He has a normal mood and affect. His behavior is normal. Judgment and thought content normal.       ED Course   Procedures  Labs Reviewed   CBC W/ AUTO DIFFERENTIAL - Abnormal; Notable for the following components:       Result Value    MPV 7.7 (*)     All other components within normal limits   COMPREHENSIVE METABOLIC PANEL - Abnormal; Notable for the following components:    Glucose 211 (*)     All other components within normal limits   TROPONIN I   LIPASE        Imaging Results          X-Ray Chest PA And Lateral (Final result)  Result time 06/14/19 13:02:33    Final result by Josse Brunner Jr., MD (06/14/19 13:02:33)                 Impression:      No acute abnormality.      Electronically signed by: Josse Brunner MD  Date:    06/14/2019  Time:    13:02             Narrative:    EXAMINATION:  XR CHEST PA AND LATERAL    CLINICAL HISTORY:  Chest Pain;    TECHNIQUE:  PA and lateral views of the chest were performed.    COMPARISON:  Chest of April 7, 2019    FINDINGS:  The lungs are clear, with normal appearance of pulmonary vasculature and no pleural effusion or pneumothorax.    The cardiac silhouette is normal in size. The hilar and mediastinal contours are unremarkable.    Bones are intact.                                US Abdomen Limited (Final result)  Result time 06/14/19 13:04:38    Final result by Josse Brunner Jr., MD (06/14/19 13:04:38)                 Impression:      Fatty infiltration of the liver parenchyma otherwise negative gallbladder/right upper quadrant ultrasound      Electronically signed by: Josse Brunner MD  Date:    06/14/2019  Time:    13:04             Narrative:    EXAMINATION:  US ABDOMEN LIMITED    CLINICAL HISTORY:  RUQ Pain;    TECHNIQUE:  Limited ultrasound of the right upper quadrant of the abdomen (including pancreas, liver, gallbladder, common bile duct, and spleen) was performed.    COMPARISON:  None.    FINDINGS:  Liver: Normal in size, measuring 14.8 cm. Homogeneous increased echotexture consistent with fatty infiltration..  No focal hepatic lesions.    Gallbladder: No calculi, wall thickening, or pericholecystic fluid.  No sonographic Tomlin's sign.    Biliary system: The common duct is not dilated, measuring 3.6 mm.  No intrahepatic ductal dilatation.    The right kidney measures 13.7 cm without mass or hydronephrosis    Miscellaneous: No upper abdominal ascites.                                 Medical Decision Making:   History:   Old Medical Records: I decided to obtain old medical records.  Clinical Tests:   Lab Tests: Reviewed and Ordered  Radiological Study: Ordered and Reviewed  Medical Tests: Reviewed and Ordered            Scribe Attestation:   Scribe #1: I performed the above scribed service and the documentation accurately describes the services I performed. I attest to the accuracy of the note.    I, Dr. Leach, personally performed the services described in this documentation. All medical record entries made by the scribe were at my direction and in my presence.  I have reviewed the chart and agree that the record reflects my personal performance and is accurate and complete.1:58 PM 06/14/2019            ED Course as of Jun 14 1347   Fri Jun 14,  2019   1131 BP: 128/67 [EF]   1131 Temp: 97.4 °F (36.3 °C) [EF]   1131 Temp src: Oral [EF]   1131 Pulse: 90 [EF]   1131 Resp: 18 [EF]   1131 SpO2: 96 % [EF]   1219 Sinus rhythm 76 beats per minute left axis no ST segment elevation or depression or T-wave inversion independently interpreted    [EF]   1307 US Abdomen Limited [EF]   1307 X-Ray Chest PA And Lateral [EF]   1328 Troponin I: <0.006 [EF]   1328 Lipase: 19 [EF]   1337 63-year-old male known to me from previous ER visit presents for right flank pain and some mild right upper quadrant abdominal pain.  Flank pain consistent with prior several months of pain.  Extensive ED and outpatient evaluation for this.  He will see orthopedic surgery next week for possible thoracic musculoskeletal cause of this pain.  Today he is complaining of a very mild right upper quadrant discomfort.  No radiation.  No chest pain no shortness of breath. Constant symptoms since last night.  Troponin is negative which rules out myocardial infarction.  Ultrasound lipase for remainder of labs unremarkable except for a slightly elevated blood glucose.  Patient is well-appearing vital signs are normal he can be discharged home.  No sign at this time of any serious or life-threatening etiology for his right upper quadrant pain.  No shortness of breath no pleuritic aspect make pulmonary embolism unlikely.  Pain is very very mild on exam, very minimal tenderness to palpation in the right upper quadrant.    [EF]      ED Course User Index  [EF] Gerber Leach MD     Clinical Impression:       ICD-10-CM ICD-9-CM   1. Right upper quadrant pain R10.11 789.01   2. Chest pain R07.9 786.50         Disposition:   Disposition: Discharged  Condition: Stable                        Gerber Leach MD  06/14/19 [id number]

## 2019-06-17 ENCOUNTER — TELEPHONE (OUTPATIENT)
Dept: FAMILY MEDICINE | Facility: CLINIC | Age: 64
End: 2019-06-17

## 2019-06-17 NOTE — TELEPHONE ENCOUNTER
----- Message from Geo TORRE Frisard sent at 6/17/2019  8:42 AM CDT -----  Contact: same  Patient called in and stated he really needs to be seen today Monday 6/17/19 for his back pain & right side pain.  Patient stated Nicolette has seen him several times for this.  Patient was offered an appt on Wednesday but stated he really needed to be seen today.    Patient call back number is [phone]

## 2019-06-17 NOTE — TELEPHONE ENCOUNTER
Spoke to patient, patient states that his insurance would not approve PT. Patient requesting appointment to be seen for ER f/u for not to return to work. No appointments available today. Appointment scheduled for tomorrow patient confirmed appointment.

## 2019-06-18 ENCOUNTER — TELEPHONE (OUTPATIENT)
Dept: FAMILY MEDICINE | Facility: CLINIC | Age: 64
End: 2019-06-18

## 2019-06-18 ENCOUNTER — OFFICE VISIT (OUTPATIENT)
Dept: OPTOMETRY | Facility: CLINIC | Age: 64
End: 2019-06-18
Payer: COMMERCIAL

## 2019-06-18 DIAGNOSIS — E11.9 DIABETES MELLITUS WITHOUT OPHTHALMIC MANIFESTATIONS: Primary | ICD-10-CM

## 2019-06-18 DIAGNOSIS — H32 PRESUMED OCULAR HISTOPLASMOSIS SYNDROME OF BOTH EYES: ICD-10-CM

## 2019-06-18 DIAGNOSIS — H25.13 NUCLEAR SCLEROSIS, BILATERAL: ICD-10-CM

## 2019-06-18 DIAGNOSIS — B39.9 PRESUMED OCULAR HISTOPLASMOSIS SYNDROME OF BOTH EYES: ICD-10-CM

## 2019-06-18 DIAGNOSIS — H52.7 REFRACTIVE ERROR: ICD-10-CM

## 2019-06-18 DIAGNOSIS — H31.003 CHORIORETINAL SCAR OF BOTH EYES: ICD-10-CM

## 2019-06-18 PROCEDURE — 92250 FUNDUS PHOTOGRAPHY W/I&R: CPT | Mod: S$GLB,,, | Performed by: OPTOMETRIST

## 2019-06-18 PROCEDURE — 99999 PR PBB SHADOW E&M-EST. PATIENT-LVL II: ICD-10-PCS | Mod: PBBFAC,,, | Performed by: OPTOMETRIST

## 2019-06-18 PROCEDURE — 92250 COLOR FUNDUS PHOTOGRAPHY - OU - BOTH EYES: ICD-10-PCS | Mod: S$GLB,,, | Performed by: OPTOMETRIST

## 2019-06-18 PROCEDURE — 99999 PR PBB SHADOW E&M-EST. PATIENT-LVL II: CPT | Mod: PBBFAC,,, | Performed by: OPTOMETRIST

## 2019-06-18 PROCEDURE — 92004 PR EYE EXAM, NEW PATIENT,COMPREHESV: ICD-10-PCS | Mod: S$GLB,,, | Performed by: OPTOMETRIST

## 2019-06-18 PROCEDURE — 92004 COMPRE OPH EXAM NEW PT 1/>: CPT | Mod: S$GLB,,, | Performed by: OPTOMETRIST

## 2019-06-18 NOTE — LETTER
June 18, 2019      Nicolette Rudd, FNP-C  [address]  [place] [address]           [facility] - Optometry  [address] [place] [address] [address]  [facility] [address]  Phone: [phone]          Patient: Jhonny Almazan   MR Number: [id number]   YOB: 1955   Date of Visit: 6/18/2019       Dear Nicolette Rudd:    Thank you for referring Jhonny Almazan to me for evaluation. Attached you will find relevant portions of my assessment and plan of care.    If you have questions, please do not hesitate to call me. I look forward to following Jhonny Almazan along with you.    Sincerely,    Luis Holt, OD    Enclosure  CC:  No Recipients    If you would like to receive this communication electronically, please contact [email or web address] or [phone] to request more information on [employer] Link access.    For providers and/or their staff who would like to refer a patient to [facility], please contact us through our one-stop-shop provider referral line, [facility], at [phone].    If you feel you have received this communication in error or would no longer like to receive these types of communications, please e-mail [email or web address]

## 2019-06-18 NOTE — PROGRESS NOTES
HPI     HPI    (+) Pt here today for yearly diabetic eye exam.       Would patient like a refraction today? No. Pt uses OTC reader for small   print.      (-)drops  (-)flashes  (-)floaters  (-)diplopia     (+)Diabetes  Lab Results       Component                Value               Date                       HGBA1C                   8.3 (H)             06/14/2019                 HGBA1C                   6.0 (H)             12/28/2018                 HGBA1C                   7.0 (H)             04/27/2018                 OCULAR HISTORY  Last Eye Exam: 3 years  (-)eye surgery                 Last edited by Luis Holt, OD on 6/18/2019 11:06 AM. (History)            Assessment /Plan     For exam results, see Encounter Report.    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 Discussed possible ocular affects of uncontrolled blood sugar with patient. Recommended continued strong blood sugar control and continued care with PCP. Monitor yearly.     Mild NS cataracts of both eyes, not yet significant. Discussed possible ocular affects of cataracts. Acceptable BCVA OU. Discussed treatment options. Surgery not recommended at this time. Monitor yearly.     Patient doing well with otc readers, denies refraction at this time. Return as desired for spec Rx.    Chorioretinal scarring of both eyes, presumed ocular histoplasmosis, pt has heard about scarring from previous doctor in past. Discussed findings. Photos in office today. Monitor yearly, sooner if any symptoms.      RTC in 1 year for comprehensive eye exam, or sooner prn.

## 2019-06-19 ENCOUNTER — OFFICE VISIT (OUTPATIENT)
Dept: FAMILY MEDICINE | Facility: CLINIC | Age: 64
End: 2019-06-19
Payer: COMMERCIAL

## 2019-06-19 ENCOUNTER — OFFICE VISIT (OUTPATIENT)
Dept: ORTHOPEDICS | Facility: CLINIC | Age: 64
End: 2019-06-19
Payer: COMMERCIAL

## 2019-06-19 VITALS
WEIGHT: 239.44 LBS | HEART RATE: 65 BPM | BODY MASS INDEX: 28.27 KG/M2 | TEMPERATURE: 98 F | DIASTOLIC BLOOD PRESSURE: 76 MMHG | HEIGHT: 77 IN | SYSTOLIC BLOOD PRESSURE: 132 MMHG

## 2019-06-19 VITALS
WEIGHT: 239 LBS | HEIGHT: 77 IN | SYSTOLIC BLOOD PRESSURE: 138 MMHG | HEART RATE: 65 BPM | BODY MASS INDEX: 28.22 KG/M2 | DIASTOLIC BLOOD PRESSURE: 80 MMHG

## 2019-06-19 DIAGNOSIS — R10.9 FLANK PAIN: Primary | ICD-10-CM

## 2019-06-19 DIAGNOSIS — F40.240 CLAUSTROPHOBIA: ICD-10-CM

## 2019-06-19 DIAGNOSIS — E11.59 HYPERTENSION ASSOCIATED WITH DIABETES: ICD-10-CM

## 2019-06-19 DIAGNOSIS — M54.14 THORACIC RADICULITIS: Primary | ICD-10-CM

## 2019-06-19 DIAGNOSIS — E11.40 TYPE 2 DIABETES MELLITUS WITH DIABETIC NEUROPATHY, WITHOUT LONG-TERM CURRENT USE OF INSULIN: ICD-10-CM

## 2019-06-19 DIAGNOSIS — I15.2 HYPERTENSION ASSOCIATED WITH DIABETES: ICD-10-CM

## 2019-06-19 PROCEDURE — 3008F BODY MASS INDEX DOCD: CPT | Mod: ,,, | Performed by: ORTHOPAEDIC SURGERY

## 2019-06-19 PROCEDURE — 72070 PR  X-RAY THORACIC SPINE 2 VW: ICD-10-PCS | Mod: ,,, | Performed by: ORTHOPAEDIC SURGERY

## 2019-06-19 PROCEDURE — 3045F PR MOST RECENT HEMOGLOBIN A1C LEVEL 7.0-9.0%: CPT | Mod: CPTII,S$GLB,, | Performed by: NURSE PRACTITIONER

## 2019-06-19 PROCEDURE — 1101F PR PT FALLS ASSESS DOC 0-1 FALLS W/OUT INJ PAST YR: ICD-10-PCS | Mod: CPTII,S$GLB,, | Performed by: NURSE PRACTITIONER

## 2019-06-19 PROCEDURE — 1101F PT FALLS ASSESS-DOCD LE1/YR: CPT | Mod: ,,, | Performed by: ORTHOPAEDIC SURGERY

## 2019-06-19 PROCEDURE — 3075F SYST BP GE 130 - 139MM HG: CPT | Mod: CPTII,S$GLB,, | Performed by: NURSE PRACTITIONER

## 2019-06-19 PROCEDURE — 3078F DIAST BP <80 MM HG: CPT | Mod: CPTII,S$GLB,, | Performed by: NURSE PRACTITIONER

## 2019-06-19 PROCEDURE — 3008F PR BODY MASS INDEX (BMI) DOCUMENTED: ICD-10-PCS | Mod: ,,, | Performed by: ORTHOPAEDIC SURGERY

## 2019-06-19 PROCEDURE — 99214 OFFICE O/P EST MOD 30 MIN: CPT | Mod: S$GLB,,, | Performed by: NURSE PRACTITIONER

## 2019-06-19 PROCEDURE — 99999 PR PBB SHADOW E&M-EST. PATIENT-LVL V: CPT | Mod: PBBFAC,,, | Performed by: NURSE PRACTITIONER

## 2019-06-19 PROCEDURE — 1101F PR PT FALLS ASSESS DOC 0-1 FALLS W/OUT INJ PAST YR: ICD-10-PCS | Mod: ,,, | Performed by: ORTHOPAEDIC SURGERY

## 2019-06-19 PROCEDURE — 72070 X-RAY EXAM THORAC SPINE 2VWS: CPT | Mod: ,,, | Performed by: ORTHOPAEDIC SURGERY

## 2019-06-19 PROCEDURE — 3008F PR BODY MASS INDEX (BMI) DOCUMENTED: ICD-10-PCS | Mod: CPTII,S$GLB,, | Performed by: NURSE PRACTITIONER

## 2019-06-19 PROCEDURE — 99999 PR PBB SHADOW E&M-EST. PATIENT-LVL V: ICD-10-PCS | Mod: PBBFAC,,, | Performed by: NURSE PRACTITIONER

## 2019-06-19 PROCEDURE — 3008F BODY MASS INDEX DOCD: CPT | Mod: CPTII,S$GLB,, | Performed by: NURSE PRACTITIONER

## 2019-06-19 PROCEDURE — 99203 PR OFFICE/OUTPT VISIT, NEW, LEVL III, 30-44 MIN: ICD-10-PCS | Mod: 25,,, | Performed by: ORTHOPAEDIC SURGERY

## 2019-06-19 PROCEDURE — 3075F PR MOST RECENT SYSTOLIC BLOOD PRESS GE 130-139MM HG: ICD-10-PCS | Mod: ,,, | Performed by: ORTHOPAEDIC SURGERY

## 2019-06-19 PROCEDURE — 3079F PR MOST RECENT DIASTOLIC BLOOD PRESSURE 80-89 MM HG: ICD-10-PCS | Mod: ,,, | Performed by: ORTHOPAEDIC SURGERY

## 2019-06-19 PROCEDURE — 3075F PR MOST RECENT SYSTOLIC BLOOD PRESS GE 130-139MM HG: ICD-10-PCS | Mod: CPTII,S$GLB,, | Performed by: NURSE PRACTITIONER

## 2019-06-19 PROCEDURE — 3075F SYST BP GE 130 - 139MM HG: CPT | Mod: ,,, | Performed by: ORTHOPAEDIC SURGERY

## 2019-06-19 PROCEDURE — 99203 OFFICE O/P NEW LOW 30 MIN: CPT | Mod: 25,,, | Performed by: ORTHOPAEDIC SURGERY

## 2019-06-19 PROCEDURE — 3078F PR MOST RECENT DIASTOLIC BLOOD PRESSURE < 80 MM HG: ICD-10-PCS | Mod: CPTII,S$GLB,, | Performed by: NURSE PRACTITIONER

## 2019-06-19 PROCEDURE — 1101F PT FALLS ASSESS-DOCD LE1/YR: CPT | Mod: CPTII,S$GLB,, | Performed by: NURSE PRACTITIONER

## 2019-06-19 PROCEDURE — 99214 PR OFFICE/OUTPT VISIT, EST, LEVL IV, 30-39 MIN: ICD-10-PCS | Mod: S$GLB,,, | Performed by: NURSE PRACTITIONER

## 2019-06-19 PROCEDURE — 3045F PR MOST RECENT HEMOGLOBIN A1C LEVEL 7.0-9.0%: ICD-10-PCS | Mod: CPTII,S$GLB,, | Performed by: NURSE PRACTITIONER

## 2019-06-19 PROCEDURE — 3079F DIAST BP 80-89 MM HG: CPT | Mod: ,,, | Performed by: ORTHOPAEDIC SURGERY

## 2019-06-19 RX ORDER — PIOGLITAZONEHYDROCHLORIDE 30 MG/1
30 TABLET ORAL DAILY
Qty: 90 TABLET | Refills: 2 | Status: SHIPPED | OUTPATIENT
Start: 2019-06-19 | End: 2021-01-14 | Stop reason: SDUPTHER

## 2019-06-19 RX ORDER — GABAPENTIN 300 MG/1
300 CAPSULE ORAL 3 TIMES DAILY
Qty: 90 CAPSULE | Refills: 11 | Status: SHIPPED | OUTPATIENT
Start: 2019-06-19 | End: 2019-09-23

## 2019-06-19 RX ORDER — DIAZEPAM 5 MG/1
5 TABLET ORAL
Qty: 2 TABLET | Refills: 0 | Status: SHIPPED | OUTPATIENT
Start: 2019-06-19 | End: 2019-07-05

## 2019-06-19 RX ORDER — GLIPIZIDE 10 MG/1
10 TABLET ORAL 2 TIMES DAILY WITH MEALS
Qty: 180 TABLET | Refills: 3 | Status: SHIPPED | OUTPATIENT
Start: 2019-06-19 | End: 2019-09-30

## 2019-06-19 NOTE — PROGRESS NOTES
"Subjective:       Patient ID: Jhonny Almazan is a 63 y.o. male.    Chief Complaint: Abdominal Pain (RUQ)    Mr. Almazan presents to the clinic today for ED follow up as well as abnormal labs. He was seen at ED again on 6/14 for right flank pain.  He saw Dr. Emmanuel this morning and is going to have a MRI thoracic spine.  He feels like this is a nerve pain.  His diabetes is uncontrolled and he reports he is following diabetic diet. He has not had any recent diabetic ed. He denies low blood sugars but "I don't check it like I should".  Cholesterol also increased but he reports he is taking rosuvastatin as prescribed.  He has been missing a lot of work due to his flank pain.    Review of Systems   Constitutional: Negative for chills and fever.   Respiratory: Negative for cough, shortness of breath and wheezing.    Cardiovascular: Negative for chest pain, palpitations and leg swelling.   Gastrointestinal: Negative for abdominal pain, constipation and diarrhea.   Genitourinary: Positive for flank pain. Negative for frequency.   Musculoskeletal: Positive for back pain (thoracic radiates to RUQ).       Objective:      Physical Exam   Constitutional: He is oriented to person, place, and time. He appears well-developed and well-nourished. No distress.   HENT:   Head: Normocephalic and atraumatic.   Right Ear: External ear normal.   Left Ear: External ear normal.   Mouth/Throat: Oropharynx is clear and moist. No oropharyngeal exudate.   Eyes: Pupils are equal, round, and reactive to light. Right eye exhibits no discharge. Left eye exhibits no discharge.   Neck: Neck supple. No thyromegaly present.   Cardiovascular: Normal rate and regular rhythm. Exam reveals no gallop and no friction rub.   No murmur heard.  Pulmonary/Chest: Effort normal and breath sounds normal. No respiratory distress. He has no wheezes. He has no rales.   Abdominal: Soft. He exhibits no distension. There is no tenderness.   Lymphadenopathy:     " He has no cervical adenopathy.   Neurological: He is alert and oriented to person, place, and time. Coordination normal.   Skin: Skin is warm and dry.   No rash to thoracic spine or flank   Psychiatric: He has a normal mood and affect. His behavior is normal. Thought content normal.   Vitals reviewed.          Current Outpatient Medications:     ACETAMINOPHEN (TYLENOL ARTHRITIS ORAL), Take 1 tablet by mouth daily as needed (pain). , Disp: , Rfl:     buPROPion (WELLBUTRIN XL) 150 MG TB24 tablet, TAKE ONE TABLET BY MOUTH ONCE DAILY, Disp: 30 tablet, Rfl: 11    co-enzyme Q-10 50 mg capsule, Take 50 mg by mouth once daily., Disp: , Rfl:     diazePAM (VALIUM) 5 MG tablet, Take 1 tablet (5 mg total) by mouth as needed for Anxiety. Take one ta blet 30 minutes prior to MRI., Disp: 2 tablet, Rfl: 0    diclofenac sodium (VOLTAREN) 1 % Gel, Apply 2 g topically 3 (three) times daily as needed. For hand pain, Disp: 100 g, Rfl: 11    folic acid-vit B6-vit B12 2.5-25-2 mg (FOLBIC OR EQUIV) 2.5-25-2 mg Tab, Take 1 tablet by mouth once daily., Disp: 90 tablet, Rfl: 4    gabapentin (NEURONTIN) 300 MG capsule, Take 1 capsule (300 mg total) by mouth 3 (three) times daily., Disp: 90 capsule, Rfl: 11    glipiZIDE (GLUCOTROL) 10 MG tablet, Take 1 tablet (10 mg total) by mouth 2 (two) times daily with meals., Disp: 180 tablet, Rfl: 3    hydrALAZINE (APRESOLINE) 25 MG tablet, Take 2 tablets (50 mg total) by mouth every 8 (eight) hours., Disp: 90 tablet, Rfl: 8    lactulose (CHRONULAC) 20 gram/30 mL Soln, Take 30 mLs (20 g total) by mouth 2 (two) times daily., Disp: 1800 mL, Rfl: 0    lisinopril-hydrochlorothiazide (PRINZIDE,ZESTORETIC) 20-12.5 mg per tablet, TAKE 1 TABLET BY MOUTH 2 (TWO) TIMES DAILY., Disp: 180 tablet, Rfl: 0    magnesium citrate solution, Take 296 mLs by mouth daily as needed (constipation). , Disp: , Rfl:     metFORMIN (GLUCOPHAGE-XR) 500 MG 24 hr tablet, TAKE 2 TABLETS (1,000 MG TOTAL) BY MOUTH 2 (TWO)  TIMES DAILY., Disp: 360 tablet, Rfl: 0    omeprazole (PRILOSEC) 40 MG capsule, Take 1 capsule (40 mg total) by mouth before breakfast., Disp: 30 capsule, Rfl: 3    pioglitazone (ACTOS) 30 MG tablet, Take 1 tablet (30 mg total) by mouth once daily., Disp: 90 tablet, Rfl: 2    rosuvastatin (CRESTOR) 20 MG tablet, TAKE 1 TABLET (20 MG TOTAL) BY MOUTH ONCE DAILY., Disp: 90 tablet, Rfl: 1  Assessment:       1. Flank pain    2. Type 2 diabetes mellitus with diabetic neuropathy, without long-term current use of insulin    3. Hypertension associated with diabetes        Plan:       Flank pain  Pt saw Dr. Emmanuel this morning and will have MRI thoracic spine  Increased gabapentin, see below    Type 2 diabetes mellitus with diabetic neuropathy, without long-term current use of insulin  -     gabapentin (NEURONTIN) 300 MG capsule; Take 1 capsule (300 mg total) by mouth 3 (three) times daily.  Dispense: 90 capsule; Refill: 11  -     pioglitazone (ACTOS) 30 MG tablet; Take 1 tablet (30 mg total) by mouth once daily.  Dispense: 90 tablet; Refill: 2  -     glipiZIDE (GLUCOTROL) 10 MG tablet; Take 1 tablet (10 mg total) by mouth 2 (two) times daily with meals.  Dispense: 180 tablet; Refill: 3  -     Lipid panel; Future; Expected date: 06/19/2019  -     Hemoglobin A1c; Future; Expected date: 06/19/2019  -     Comprehensive metabolic panel; Future; Expected date: 06/19/2019  -     Ambulatory consult to Diabetic Education    Hypertension associated with diabetes  Stable, continue current medication.    Patient readiness: acceptance and barriers:none    During the course of the visit the patient was educated and counseled about the following:     Diabetes:  Discussed general issues about diabetes pathophysiology and management.  Addressed ADA diet.  Hypertension:   Medication: no change.    Goals: Diabetes: Maintain Hemoglobin A1C below 7 and Hypertension: Reduce Blood Pressure    Did patient meet goals/outcomes: No    The following  self management tools provided: declined    Patient Instructions (the written plan) was given to the patient/family.     Time spent with patient: 15 minutes    Barriers to medications present (no )    Adverse reactions to current medications (no)    Over the counter medications reviewed (Yes)

## 2019-06-19 NOTE — LETTER
June 19, 2019      Nicolette Rudd, P-C  [address] [facility] [address]           [facility]  [address] [facility] [address]  [facility] [address]  Phone: [phone]  Fax: [phone]          Patient: Jhonny Almazan   MR Number: [id number]   YOB: 1955   Date of Visit: 6/19/2019       Dear Nicolette Rudd:    Thank you for referring Jhonny Almazan to me for evaluation. Attached you will find relevant portions of my assessment and plan of care.    If you have questions, please do not hesitate to call me. I look forward to following Jhonny Almazan along with you.    Sincerely,    Mil Emmanuel MD    Enclosure  CC:  No Recipients    If you would like to receive this communication electronically, please contact [email or web address] or [phone] to request more information on [employer] Link access.    For providers and/or their staff who would like to refer a patient to [facility], please contact us through our one-stop-shop provider referral line, [facility], at [phone].    If you feel you have received this communication in error or would no longer like to receive these types of communications, please e-mail [email or web address]

## 2019-06-19 NOTE — PROGRESS NOTES
Subjective:       Patient ID: Jhonny Almazan is a 63 y.o. male.    Chief Complaint: Pain of the Thoracic Spine (Thoracic pain that radiates between the shoulder blades and above and below x 2 months off and on. Worse at night and he has been to [facility] several times)      History of Present Illness  Insidious onset of right-sided thoracic pain since April 2019 pain is constant he is been unable to work he denies any low back or neck pain. His symptoms are right-sided only. He's not had any treatment. Symptoms are severe enough to awaken from up from sleep. He doesn't have any bowel or bladder incontinence but sometimes claims he feels unsteady when walking.    Current Medications  Current Outpatient Medications   Medication Sig Dispense Refill    ACETAMINOPHEN (TYLENOL ARTHRITIS ORAL) Take 1 tablet by mouth daily as needed (pain).       buPROPion (WELLBUTRIN XL) 150 MG TB24 tablet TAKE ONE TABLET BY MOUTH ONCE DAILY 30 tablet 11    co-enzyme Q-10 50 mg capsule Take 50 mg by mouth once daily.      diclofenac sodium (VOLTAREN) 1 % Gel Apply 2 g topically 3 (three) times daily as needed. For hand pain 100 g 11    folic acid-vit B6-vit B12 2.5-25-2 mg (FOLBIC OR EQUIV) 2.5-25-2 mg Tab Take 1 tablet by mouth once daily. 90 tablet 4    gabapentin (NEURONTIN) 100 MG capsule Take 1 capsule (100 mg total) by mouth 3 (three) times daily. 90 capsule 11    glipiZIDE (GLUCOTROL) 10 MG TR24 TAKE 1 TABLET BY MOUTH DAILY WITH BREAKFAST 90 tablet 2    hydrALAZINE (APRESOLINE) 25 MG tablet Take 2 tablets (50 mg total) by mouth every 8 (eight) hours. 90 tablet 8    lactulose (CHRONULAC) 20 gram/30 mL Soln Take 30 mLs (20 g total) by mouth 2 (two) times daily. 1800 mL 0    magnesium citrate solution Take 296 mLs by mouth daily as needed (constipation).       metFORMIN (GLUCOPHAGE-XR) 500 MG 24 hr tablet TAKE 2 TABLETS (1,000 MG TOTAL) BY MOUTH 2 (TWO) TIMES DAILY. 360 tablet 0    omeprazole (PRILOSEC) 40 MG  capsule Take 1 capsule (40 mg total) by mouth before breakfast. 30 capsule 3    pioglitazone (ACTOS) 30 MG tablet TAKE 1 TABLET (30 MG TOTAL) BY MOUTH ONCE DAILY. 90 tablet 2    rosuvastatin (CRESTOR) 20 MG tablet TAKE 1 TABLET (20 MG TOTAL) BY MOUTH ONCE DAILY. 90 tablet 1    diazePAM (VALIUM) 5 MG tablet Take 1 tablet (5 mg total) by mouth as needed for Anxiety. Take one ta blet 30 minutes prior to MRI. 2 tablet 0    lisinopril-hydrochlorothiazide (PRINZIDE,ZESTORETIC) 20-12.5 mg per tablet TAKE 1 TABLET BY MOUTH 2 (TWO) TIMES DAILY. 180 tablet 0     No current facility-administered medications for this visit.        Allergies  Review of patient's allergies indicates:  No Known Allergies    Past Medical History  Past Medical History:   Diagnosis Date    Anticoagulant long-term use     Arthritis     Colon polyp     Diabetes mellitus     Diabetes mellitus, type 2     Fatty liver     Hypertension        Surgical History  Past Surgical History:   Procedure Laterality Date    BACK SURGERY      COLONOSCOPY  07/18/2014    Dr. Crane: 22 colon polyps removed, hemorrhoids, erythema to sigmoid, repeat in 1 year for surveillance; biopsy: sigmoid erythema- showed unremarkable colonic mucosa, tubular adenomas and hyperplastic polyps    COLONOSCOPY N/A 5/6/2019    [facility]    COLONOSCOPY N/A 5/3/2019    [facility]    COLONOSCOPY N/A 7/18/2014    [facility]    EGD (ESOPHAGOGASTRODUODENOSCOPY) N/A 4/26/2019    [facility]    HERNIA REPAIR         Family History:   Family History   Problem Relation Age of Onset    Heart disease Mother     Heart disease Father     Diabetes Brother     Suicide Brother     Brain cancer Brother     Colon cancer Neg Hx     Crohn's disease Neg Hx     Ulcerative colitis Neg Hx     Stomach cancer Neg Hx     Esophageal cancer Neg Hx      Glaucoma Neg Hx     Retinal detachment Neg Hx     Macular degeneration Neg Hx        Social History:   Social History     Socioeconomic History    Marital status:      Spouse name: Not on file    Number of children: Not on file    Years of education: Not on file    Highest education level: Not on file   Occupational History    Not on file   Social Needs    Financial resource strain: Not on file    Food insecurity:     Worry: Not on file     Inability: Not on file    Transportation needs:     Medical: Not on file     Non-medical: Not on file   Tobacco Use    Smoking status: Current Every Day Smoker     Packs/day: 1.00     Years: 50.00     Pack years: 50.00     Types: Cigarettes    Smokeless tobacco: Never Used   Substance and Sexual Activity    Alcohol use: Yes     Alcohol/week: 8.4 oz     Types: 14 Cans of beer per week     Comment: 2-3 beers daily    Drug use: No    Sexual activity: Yes     Partners: Female   Lifestyle    Physical activity:     Days per week: Not on file     Minutes per session: Not on file    Stress: Not on file   Relationships    Social connections:     Talks on phone: Not on file     Gets together: Not on file     Attends Voodoo service: Not on file     Active member of club or organization: Not on file     Attends meetings of clubs or organizations: Not on file     Relationship status: Not on file   Other Topics Concern    Not on file   Social History Narrative    Not on file       Hospitalization/Major Diagnostic Procedure:     Review of Systems     General/Constitutional:  Chills denies. Fatigue denies. Fever denies. Weight gain denies. Weight loss denies.    Respiratory:  Shortness of breath denies.    Cardiovascular:  Chest pain denies.    Gastrointestinal:  Constipation denies. Diarrhea denies. Nausea denies. Vomiting denies.     Hematology:  Easy bruising denies. Prolonged bleeding denies.     Genitourinary:  Frequent urination denies. Pain in lower back  denies. Painful urination denies.     Musculoskeletal:  See HPI for details    Skin:  Rash denies.    Neurologic:  Dizziness denies. Gait abnormalities denies. Seizures denies. Tingling/Numbess denies.    Psychiatric:  Anxiety denies. Depressed mood denies.     Objective:   Vital Signs:   Vitals:    06/19/19 0847   BP: 138/80   Pulse: 65        Physical Exam      General Examination:     Constitutional: The patient is alert and oriented to lace person and time. Mood is pleasant.     Head/Face: Normal facial features normal eyebrows    Eyes: Normal extraocular motion bilaterally    Lungs: Respirations are equal and unlabored    Gait is coordinated.    Cardiovascular: There are no swelling or varicosities present.    Lymphatic: Negative for adenopathy    Skin: Normal    Neurological: Level of consciousness normal. Oriented to place person and time and situation    Psychiatric: Oriented to time place person and situation    Patient is stand erect he has moderate pain with bending of trunk either side is right-sided thoracic tenderness in the midthoracic area without spasm. Lumbar spine shows well-healed incision nontender. Cervical range of motion normal Spurling's maneuver negative. Reflexes upper and lower extremities symmetrical but hypoactive. No clonus or Babinski.    XRAY Report/ Interpretation : AP and lateral x-rays thoracic spine were personally reviewed. There is some slight degenerates 2 disc disease seen in the midthoracic region with slight osteophyte formation but otherwise negative      Assessment:       1. Thoracic radiculitis    2. Claustrophobia        Plan:       Jhonny was seen today for pain.    Diagnoses and all orders for this visit:    Thoracic radiculitis  -     X-Ray Thoracic Spine AP Lateral  -     MRI Thoracic Spine Without Contrast  -     diazePAM (VALIUM) 5 MG tablet; Take 1 tablet (5 mg total) by mouth as needed for Anxiety. Take one ta blet 30 minutes prior to MRI.    Claustrophobia  -      diazePAM (VALIUM) 5 MG tablet; Take 1 tablet (5 mg total) by mouth as needed for Anxiety. Take one ta blet 30 minutes prior to MRI.    Other orders  -     Cancel: X-Ray Lumbar Spine Ap And Lateral  -     Cancel: X-Ray Pelvis Routine AP         Follow up for MRI Results Thoracic.    To the nature patient's symptoms and duration advise a thoracic MRI. He is not capable of working until we reassess in with a thoracic MRI study.    This note was created using Dragon voice recognition software that occasionally misinterpreted phrases or words.

## 2019-06-20 ENCOUNTER — TELEPHONE (OUTPATIENT)
Dept: FAMILY MEDICINE | Facility: CLINIC | Age: 64
End: 2019-06-20

## 2019-06-20 DIAGNOSIS — K59.00 CONSTIPATION, UNSPECIFIED CONSTIPATION TYPE: ICD-10-CM

## 2019-06-20 RX ORDER — LACTULOSE 10 G/15ML
SOLUTION ORAL; RECTAL
Qty: 1892 ML | Refills: 0 | Status: SHIPPED | OUTPATIENT
Start: 2019-06-20 | End: 2019-08-05 | Stop reason: SDUPTHER

## 2019-06-20 NOTE — TELEPHONE ENCOUNTER
Spoke to patient notified that the letter was faxed and return date is 7/1. Patient states understanding. Notified will leave copy at  to

## 2019-06-20 NOTE — TELEPHONE ENCOUNTER
----- Message from Shaynelydia Hernández sent at 6/20/2019 10:30 AM CDT -----  Contact: SELF  PT CAME IN AND WOULD LIKE TO SPEAK WITH NURSE ABOUT     1. PAPERWORK THAT WAS SUPPOSED TO BE SENT TO [facility]  2. AN EXTENSION EXCUSE FOR WORK.    (PT STATED HE TRIED TO RETURN MONDAY BUT COULDN'T)  3. PT IS ASKING FOR A DATE OFF OF THE PAPERWORK ALSO.  4. PT WANTS TO ADVISE YOU THAT HE HAD AN APPOINTMENT FOR HIS BACK WITH DR EVANS ON 06/19/2019 ALSO.      PLEASE CALL PT [phone] TO ADVISE.      THANK YOU

## 2019-06-21 ENCOUNTER — TELEPHONE (OUTPATIENT)
Dept: FAMILY MEDICINE | Facility: CLINIC | Age: 64
End: 2019-06-21

## 2019-06-21 NOTE — TELEPHONE ENCOUNTER
----- Message from Micheal South sent at 6/21/2019  9:09 AM CDT -----  Contact: Sultana/ Effie Weinberg/ Effie called she need a copy of patient office note from 6/19 please fax to [phone] claim number [id number] or Call [phone] ext [phone] if any questions. Thanks

## 2019-07-05 ENCOUNTER — OFFICE VISIT (OUTPATIENT)
Dept: ORTHOPEDICS | Facility: CLINIC | Age: 64
End: 2019-07-05
Payer: COMMERCIAL

## 2019-07-05 VITALS
WEIGHT: 240 LBS | HEIGHT: 77 IN | HEART RATE: 88 BPM | BODY MASS INDEX: 28.34 KG/M2 | SYSTOLIC BLOOD PRESSURE: 124 MMHG | DIASTOLIC BLOOD PRESSURE: 64 MMHG

## 2019-07-05 DIAGNOSIS — M47.24 OSTEOARTHRITIS OF SPINE WITH RADICULOPATHY, THORACIC REGION: ICD-10-CM

## 2019-07-05 DIAGNOSIS — M54.14 THORACIC RADICULITIS: Primary | ICD-10-CM

## 2019-07-05 DIAGNOSIS — M46.04 SPINAL ENTHESOPATHY, THORACIC REGION: ICD-10-CM

## 2019-07-05 DIAGNOSIS — M51.9 THORACIC DISC DISEASE: ICD-10-CM

## 2019-07-05 PROCEDURE — 3074F PR MOST RECENT SYSTOLIC BLOOD PRESSURE < 130 MM HG: ICD-10-PCS | Mod: ,,, | Performed by: PHYSICIAN ASSISTANT

## 2019-07-05 PROCEDURE — 3074F SYST BP LT 130 MM HG: CPT | Mod: ,,, | Performed by: PHYSICIAN ASSISTANT

## 2019-07-05 PROCEDURE — 1101F PT FALLS ASSESS-DOCD LE1/YR: CPT | Mod: ,,, | Performed by: PHYSICIAN ASSISTANT

## 2019-07-05 PROCEDURE — 20552 TENDON ORIGIN: ICD-10-PCS | Mod: ,,, | Performed by: PHYSICIAN ASSISTANT

## 2019-07-05 PROCEDURE — 3078F PR MOST RECENT DIASTOLIC BLOOD PRESSURE < 80 MM HG: ICD-10-PCS | Mod: ,,, | Performed by: PHYSICIAN ASSISTANT

## 2019-07-05 PROCEDURE — 3078F DIAST BP <80 MM HG: CPT | Mod: ,,, | Performed by: PHYSICIAN ASSISTANT

## 2019-07-05 PROCEDURE — 20552 NJX 1/MLT TRIGGER POINT 1/2: CPT | Mod: ,,, | Performed by: PHYSICIAN ASSISTANT

## 2019-07-05 PROCEDURE — 3008F BODY MASS INDEX DOCD: CPT | Mod: ,,, | Performed by: PHYSICIAN ASSISTANT

## 2019-07-05 PROCEDURE — 3008F PR BODY MASS INDEX (BMI) DOCUMENTED: ICD-10-PCS | Mod: ,,, | Performed by: PHYSICIAN ASSISTANT

## 2019-07-05 PROCEDURE — 99214 OFFICE O/P EST MOD 30 MIN: CPT | Mod: 25,,, | Performed by: PHYSICIAN ASSISTANT

## 2019-07-05 PROCEDURE — 99214 PR OFFICE/OUTPT VISIT, EST, LEVL IV, 30-39 MIN: ICD-10-PCS | Mod: 25,,, | Performed by: PHYSICIAN ASSISTANT

## 2019-07-05 PROCEDURE — 1101F PR PT FALLS ASSESS DOC 0-1 FALLS W/OUT INJ PAST YR: ICD-10-PCS | Mod: ,,, | Performed by: PHYSICIAN ASSISTANT

## 2019-07-05 RX ORDER — METHYLPREDNISOLONE ACETATE 40 MG/ML
40 INJECTION, SUSPENSION INTRA-ARTICULAR; INTRALESIONAL; INTRAMUSCULAR; SOFT TISSUE
Status: DISCONTINUED | OUTPATIENT
Start: 2019-07-05 | End: 2019-07-05 | Stop reason: HOSPADM

## 2019-07-05 RX ORDER — TRAMADOL HYDROCHLORIDE 50 MG/1
50 TABLET ORAL EVERY 6 HOURS PRN
Qty: 28 TABLET | Refills: 2 | Status: SHIPPED | OUTPATIENT
Start: 2019-07-05 | End: 2019-07-12

## 2019-07-05 RX ORDER — ORPHENADRINE CITRATE 100 MG/1
100 TABLET, EXTENDED RELEASE ORAL 2 TIMES DAILY
Qty: 60 TABLET | Refills: 2 | Status: SHIPPED | OUTPATIENT
Start: 2019-07-05 | End: 2019-08-04

## 2019-07-05 RX ADMIN — METHYLPREDNISOLONE ACETATE 40 MG: 40 INJECTION, SUSPENSION INTRA-ARTICULAR; INTRALESIONAL; INTRAMUSCULAR; SOFT TISSUE at 10:07

## 2019-07-05 NOTE — LETTER
July 2, 2019      [facility]  [address]  [place] [address]  Phone: [phone]  Fax: [phone]       Patient: Jhonny Almazan   YOB: 1955  Date of Visit: 07/05/2019    To Whom It May Concern:    Isiah Almazan  was at our office on 07/05/2019.  He needs to remain off work for 8 additional weeks. We are referring him to physical therapy.  If you have any questions or concerns, or if I can be of further assistance, please do not hesitate to contact me.    Sincerely,      Marty Cross PA-C

## 2019-07-05 NOTE — PROGRESS NOTES
Subjective:       Patient ID: Jhonny Almazan is a 63 y.o. male.    Chief Complaint: Pain of the Thoracic Spine (MRI results)      History of Present Illness  Patient is here to follow-up for Insidious onset of right-sided thoracic pain since April 2019. He has updated thoracic MRI to review.    He describes his pain as constant; he is been unable to work. He denies any low back or neck pain. His symptoms are right-sided only. He's not had any treatment. Symptoms are severe enough to awaken from up from sleep. He doesn't have any bowel or bladder incontinence but sometimes claims he feels unsteady when walking.    Current Medications  Current Outpatient Medications   Medication Sig Dispense Refill    ACETAMINOPHEN (TYLENOL ARTHRITIS ORAL) Take 1 tablet by mouth daily as needed (pain).       buPROPion (WELLBUTRIN XL) 150 MG TB24 tablet TAKE ONE TABLET BY MOUTH ONCE DAILY 30 tablet 11    co-enzyme Q-10 50 mg capsule Take 50 mg by mouth once daily.      diclofenac sodium (VOLTAREN) 1 % Gel Apply 2 g topically 3 (three) times daily as needed. For hand pain 100 g 11    folic acid-vit B6-vit B12 2.5-25-2 mg (FOLBIC OR EQUIV) 2.5-25-2 mg Tab Take 1 tablet by mouth once daily. 90 tablet 4    gabapentin (NEURONTIN) 300 MG capsule Take 1 capsule (300 mg total) by mouth 3 (three) times daily. 90 capsule 11    glipiZIDE (GLUCOTROL) 10 MG tablet Take 1 tablet (10 mg total) by mouth 2 (two) times daily with meals. 180 tablet 3    hydrALAZINE (APRESOLINE) 25 MG tablet Take 2 tablets (50 mg total) by mouth every 8 (eight) hours. 90 tablet 8    lactulose (CHRONULAC) 10 gram/15 mL solution TAKE 30 MLS (20 G TOTAL) BY MOUTH 2 (TWO) TIMES DAILY. 1892 mL 0    magnesium citrate solution Take 296 mLs by mouth daily as needed (constipation).       metFORMIN (GLUCOPHAGE-XR) 500 MG 24 hr tablet TAKE 2 TABLETS (1,000 MG TOTAL) BY MOUTH 2 (TWO) TIMES DAILY. 360 tablet 0    omeprazole (PRILOSEC) 40 MG capsule Take 1 capsule  (40 mg total) by mouth before breakfast. 30 capsule 3    pioglitazone (ACTOS) 30 MG tablet Take 1 tablet (30 mg total) by mouth once daily. 90 tablet 2    rosuvastatin (CRESTOR) 20 MG tablet TAKE 1 TABLET (20 MG TOTAL) BY MOUTH ONCE DAILY. 90 tablet 1    lisinopril-hydrochlorothiazide (PRINZIDE,ZESTORETIC) 20-12.5 mg per tablet TAKE 1 TABLET BY MOUTH 2 (TWO) TIMES DAILY. 180 tablet 0     No current facility-administered medications for this visit.        Allergies  Review of patient's allergies indicates:  No Known Allergies    Past Medical History  Past Medical History:   Diagnosis Date    Anticoagulant long-term use     Arthritis     Colon polyp     Diabetes mellitus     Diabetes mellitus, type 2     Fatty liver     Hypertension        Surgical History  Past Surgical History:   Procedure Laterality Date    BACK SURGERY      COLONOSCOPY  07/18/2014    Dr. Crane: 22 colon polyps removed, hemorrhoids, erythema to sigmoid, repeat in 1 year for surveillance; biopsy: sigmoid erythema- showed unremarkable colonic mucosa, tubular adenomas and hyperplastic polyps    COLONOSCOPY N/A 5/6/2019    [facility]    COLONOSCOPY N/A 5/3/2019    [facility]    COLONOSCOPY N/A 7/18/2014    [facility]    EGD (ESOPHAGOGASTRODUODENOSCOPY) N/A 4/26/2019    [facility]    HERNIA REPAIR         Family History:   Family History   Problem Relation Age of Onset    Heart disease Mother     Heart disease Father     Diabetes Brother     Suicide Brother     Brain cancer Brother     Colon cancer Neg Hx     Crohn's disease Neg Hx     Ulcerative colitis Neg Hx     Stomach cancer Neg Hx     Esophageal cancer Neg Hx     Glaucoma Neg Hx     Retinal detachment Neg Hx     Macular degeneration Neg Hx        Social History:   Social History     Socioeconomic History    Marital status:       Spouse name: Not on file    Number of children: Not on file    Years of education: Not on file    Highest education level: Not on file   Occupational History    Not on file   Social Needs    Financial resource strain: Not on file    Food insecurity:     Worry: Not on file     Inability: Not on file    Transportation needs:     Medical: Not on file     Non-medical: Not on file   Tobacco Use    Smoking status: Current Every Day Smoker     Packs/day: 1.00     Years: 50.00     Pack years: 50.00     Types: Cigarettes    Smokeless tobacco: Never Used   Substance and Sexual Activity    Alcohol use: Yes     Alcohol/week: 8.4 oz     Types: 14 Cans of beer per week     Comment: 2-3 beers daily    Drug use: No    Sexual activity: Yes     Partners: Female   Lifestyle    Physical activity:     Days per week: Not on file     Minutes per session: Not on file    Stress: Not on file   Relationships    Social connections:     Talks on phone: Not on file     Gets together: Not on file     Attends Church service: Not on file     Active member of club or organization: Not on file     Attends meetings of clubs or organizations: Not on file     Relationship status: Not on file   Other Topics Concern    Not on file   Social History Narrative    Not on file       Hospitalization/Major Diagnostic Procedure:     Review of Systems     General/Constitutional:  Chills denies. Fatigue denies. Fever denies. Weight gain denies. Weight loss denies.    Respiratory:  Shortness of breath denies.    Cardiovascular:  Chest pain denies.    Gastrointestinal:  Constipation denies. Diarrhea denies. Nausea denies. Vomiting denies.     Hematology:  Easy bruising denies. Prolonged bleeding denies.     Genitourinary:  Frequent urination denies. Pain in lower back denies. Painful urination denies.     Musculoskeletal:  See HPI for details    Skin:  Rash denies.    Neurologic:  Dizziness denies. Gait abnormalities denies. Seizures  denies. Tingling/Numbess denies.    Psychiatric:  Anxiety denies. Depressed mood denies.     Objective:   Vital Signs:   Vitals:    07/05/19 0951   BP: 124/64   Pulse: 88        Physical Exam      General Examination:     Constitutional: The patient is alert and oriented to lace person and time. Mood is pleasant.     Head/Face: Normal facial features normal eyebrows    Eyes: Normal extraocular motion bilaterally    Lungs: Respirations are equal and unlabored    Gait is coordinated.    Cardiovascular: There are no swelling or varicosities present.    Lymphatic: Negative for adenopathy    Skin: Normal    Neurological: Level of consciousness normal. Oriented to place person and time and situation    Psychiatric: Oriented to time place person and situation    Thoracic exam demonstrates tenderness palpation bilateral but worsen right from T4 all the way down the T11. Upper and lower extremities additional neurovascular intact. Nonantalgic gait.    XRAY Report/ Interpretation: Thoracic MRI was reviewed with the patient in the office today and demonstrates some multilevel mild degenerative change with predominantly right-sided mild foraminal stenosis.      Assessment:       1. Thoracic radiculitis    2. Spinal enthesopathy, thoracic region    3. Osteoarthritis of spine with radiculopathy, thoracic region    4. Thoracic disc disease        Plan:       Jhonny was seen today for pain.    Diagnoses and all orders for this visit:    Thoracic radiculitis    Spinal enthesopathy, thoracic region    Osteoarthritis of spine with radiculopathy, thoracic region    Thoracic disc disease         Follow up in about 6 weeks (around 8/16/2019).    Today he was given a right lower thoracic trigger point injection with 1 cc lidocaine and 40 mg Depo-Medrol. prescriptions given for Norflex and tramadol. Formal physical therapy 2-3 times a week for 4-6 weeks eval and treat. Follow-up in 6 weeks to reassess. Patient is currently unable to return  to work for at least the next 8 weeks.    This note was created using Dragon voice recognition software that occasionally misinterpreted phrases or words.

## 2019-07-05 NOTE — PROCEDURES
Tendon Origin  Date/Time: 7/5/2019 10:24 AM  Performed by: INDIO Weiss  Authorized by: INDIO Weiss     Consent Done?:  Yes (Verbal)  Timeout: prior to procedure the correct patient, procedure, and site was verified    Indications:  Pain  Site marked: the procedure site was marked    Timeout: prior to procedure the correct patient, procedure, and site was verified    Location: Thoracic trigger point inj.  Prep: patient was prepped and draped in usual sterile fashion    Needle size:  25 G  Medications:  40 mg methylPREDNISolone acetate 40 mg/mL  Patient tolerance:  Patient tolerated the procedure well with no immediate complications

## 2019-07-17 ENCOUNTER — OFFICE VISIT (OUTPATIENT)
Dept: FAMILY MEDICINE | Facility: CLINIC | Age: 64
End: 2019-07-17
Payer: COMMERCIAL

## 2019-07-17 VITALS
WEIGHT: 232.13 LBS | TEMPERATURE: 97 F | BODY MASS INDEX: 27.41 KG/M2 | HEART RATE: 106 BPM | SYSTOLIC BLOOD PRESSURE: 110 MMHG | HEIGHT: 77 IN | DIASTOLIC BLOOD PRESSURE: 65 MMHG

## 2019-07-17 DIAGNOSIS — M46.04 SPINAL ENTHESOPATHY OF THORACIC REGION: Primary | ICD-10-CM

## 2019-07-17 DIAGNOSIS — I15.2 HYPERTENSION ASSOCIATED WITH DIABETES: ICD-10-CM

## 2019-07-17 DIAGNOSIS — E11.59 HYPERTENSION ASSOCIATED WITH DIABETES: ICD-10-CM

## 2019-07-17 DIAGNOSIS — E11.65 UNCONTROLLED TYPE 2 DIABETES MELLITUS WITH HYPERGLYCEMIA: ICD-10-CM

## 2019-07-17 PROCEDURE — 3078F DIAST BP <80 MM HG: CPT | Mod: CPTII,S$GLB,, | Performed by: NURSE PRACTITIONER

## 2019-07-17 PROCEDURE — 3008F PR BODY MASS INDEX (BMI) DOCUMENTED: ICD-10-PCS | Mod: CPTII,S$GLB,, | Performed by: NURSE PRACTITIONER

## 2019-07-17 PROCEDURE — 99999 PR PBB SHADOW E&M-EST. PATIENT-LVL V: CPT | Mod: PBBFAC,,, | Performed by: NURSE PRACTITIONER

## 2019-07-17 PROCEDURE — 1101F PT FALLS ASSESS-DOCD LE1/YR: CPT | Mod: CPTII,S$GLB,, | Performed by: NURSE PRACTITIONER

## 2019-07-17 PROCEDURE — 99214 OFFICE O/P EST MOD 30 MIN: CPT | Mod: S$GLB,,, | Performed by: NURSE PRACTITIONER

## 2019-07-17 PROCEDURE — 3045F PR MOST RECENT HEMOGLOBIN A1C LEVEL 7.0-9.0%: CPT | Mod: CPTII,S$GLB,, | Performed by: NURSE PRACTITIONER

## 2019-07-17 PROCEDURE — 99214 PR OFFICE/OUTPT VISIT, EST, LEVL IV, 30-39 MIN: ICD-10-PCS | Mod: S$GLB,,, | Performed by: NURSE PRACTITIONER

## 2019-07-17 PROCEDURE — 3045F PR MOST RECENT HEMOGLOBIN A1C LEVEL 7.0-9.0%: ICD-10-PCS | Mod: CPTII,S$GLB,, | Performed by: NURSE PRACTITIONER

## 2019-07-17 PROCEDURE — 3078F PR MOST RECENT DIASTOLIC BLOOD PRESSURE < 80 MM HG: ICD-10-PCS | Mod: CPTII,S$GLB,, | Performed by: NURSE PRACTITIONER

## 2019-07-17 PROCEDURE — 99999 PR PBB SHADOW E&M-EST. PATIENT-LVL V: ICD-10-PCS | Mod: PBBFAC,,, | Performed by: NURSE PRACTITIONER

## 2019-07-17 PROCEDURE — 3074F SYST BP LT 130 MM HG: CPT | Mod: CPTII,S$GLB,, | Performed by: NURSE PRACTITIONER

## 2019-07-17 PROCEDURE — 3074F PR MOST RECENT SYSTOLIC BLOOD PRESSURE < 130 MM HG: ICD-10-PCS | Mod: CPTII,S$GLB,, | Performed by: NURSE PRACTITIONER

## 2019-07-17 PROCEDURE — 3008F BODY MASS INDEX DOCD: CPT | Mod: CPTII,S$GLB,, | Performed by: NURSE PRACTITIONER

## 2019-07-17 PROCEDURE — 1101F PR PT FALLS ASSESS DOC 0-1 FALLS W/OUT INJ PAST YR: ICD-10-PCS | Mod: CPTII,S$GLB,, | Performed by: NURSE PRACTITIONER

## 2019-07-17 NOTE — PROGRESS NOTES
Subjective:       Patient ID: Jhonny Almazan is a 63 y.o. male.    Chief Complaint: Follow-up    Mr. Almazan presents to the clinic today for six week follow up chronic RUQ pain. He has seen Dr. Emmanuel and Dr. Cross for thoracic spinal enthesopathy causing pain radiating to RUQ.  He had negative GI workup.  He started PT for thoracic pain and had a trigger point injection for thoracic spine pain. He reports the injection helped temporarily. Also, the PT helps for a few days at a time and the pain returns the day prior to PT. He has been doing some home exercises as well.  He has uncontrolled diabetes and has been working on low carb diet which has helped with some weight loss so far but did not schedule with Diabetic Ed.  He does not check blood sugars consistently, last time he checked was one week ago but this was 1/2 hour after he ate and glucose was in the 300's.     Review of Systems   Constitutional: Negative for chills and fever.   HENT: Negative for congestion, ear pain and sinus pressure.    Respiratory: Negative for cough, shortness of breath and wheezing.    Cardiovascular: Negative for chest pain, palpitations and leg swelling.   Gastrointestinal: Positive for abdominal pain (RUQ). Negative for constipation and diarrhea.   Musculoskeletal: Positive for back pain. Negative for joint swelling.       Objective:      Physical Exam   Constitutional: He is oriented to person, place, and time. He appears well-developed and well-nourished. No distress.   HENT:   Head: Normocephalic and atraumatic.   Right Ear: External ear normal.   Left Ear: External ear normal.   Mouth/Throat: Oropharynx is clear and moist. No oropharyngeal exudate.   Eyes: Pupils are equal, round, and reactive to light. Right eye exhibits no discharge. Left eye exhibits no discharge.   Neck: Neck supple. No thyromegaly present.   Cardiovascular: Normal rate and regular rhythm. Exam reveals no gallop and no friction rub.   No  murmur heard.  Pulmonary/Chest: Effort normal and breath sounds normal. No respiratory distress. He has no wheezes. He has no rales.   Musculoskeletal:        Thoracic back: He exhibits tenderness (right side of thoracic spine).   Lymphadenopathy:     He has no cervical adenopathy.   Neurological: He is alert and oriented to person, place, and time. Coordination normal.   Skin: Skin is warm and dry.   Psychiatric: He has a normal mood and affect. His behavior is normal. Thought content normal.   Vitals reviewed.          Current Outpatient Medications:     ACETAMINOPHEN (TYLENOL ARTHRITIS ORAL), Take 1 tablet by mouth daily as needed (pain). , Disp: , Rfl:     buPROPion (WELLBUTRIN XL) 150 MG TB24 tablet, TAKE ONE TABLET BY MOUTH ONCE DAILY, Disp: 30 tablet, Rfl: 11    co-enzyme Q-10 50 mg capsule, Take 50 mg by mouth once daily., Disp: , Rfl:     diclofenac sodium (VOLTAREN) 1 % Gel, Apply 2 g topically 3 (three) times daily as needed. For hand pain, Disp: 100 g, Rfl: 11    folic acid-vit B6-vit B12 2.5-25-2 mg (FOLBIC OR EQUIV) 2.5-25-2 mg Tab, Take 1 tablet by mouth once daily., Disp: 90 tablet, Rfl: 4    glipiZIDE (GLUCOTROL) 10 MG tablet, Take 1 tablet (10 mg total) by mouth 2 (two) times daily with meals., Disp: 180 tablet, Rfl: 3    hydrALAZINE (APRESOLINE) 25 MG tablet, Take 2 tablets (50 mg total) by mouth every 8 (eight) hours., Disp: 90 tablet, Rfl: 8    lactulose (CHRONULAC) 10 gram/15 mL solution, TAKE 30 MLS (20 G TOTAL) BY MOUTH 2 (TWO) TIMES DAILY., Disp: 1892 mL, Rfl: 0    lisinopril-hydrochlorothiazide (PRINZIDE,ZESTORETIC) 20-12.5 mg per tablet, TAKE 1 TABLET BY MOUTH 2 (TWO) TIMES DAILY., Disp: 180 tablet, Rfl: 0    metFORMIN (GLUCOPHAGE-XR) 500 MG 24 hr tablet, TAKE 2 TABLETS (1,000 MG TOTAL) BY MOUTH 2 (TWO) TIMES DAILY., Disp: 360 tablet, Rfl: 0    omeprazole (PRILOSEC) 40 MG capsule, Take 1 capsule (40 mg total) by mouth before breakfast., Disp: 30 capsule, Rfl: 3    orphenadrine  (NORFLEX) 100 mg tablet, Take 1 tablet (100 mg total) by mouth 2 (two) times daily., Disp: 60 tablet, Rfl: 2    pioglitazone (ACTOS) 30 MG tablet, Take 1 tablet (30 mg total) by mouth once daily., Disp: 90 tablet, Rfl: 2    rosuvastatin (CRESTOR) 20 MG tablet, TAKE 1 TABLET (20 MG TOTAL) BY MOUTH ONCE DAILY., Disp: 90 tablet, Rfl: 1    gabapentin (NEURONTIN) 300 MG capsule, Take 1 capsule (300 mg total) by mouth 3 (three) times daily., Disp: 90 capsule, Rfl: 11    magnesium citrate solution, Take 296 mLs by mouth daily as needed (constipation). , Disp: , Rfl:   Assessment:       1. Spinal enthesopathy of thoracic region    2. Uncontrolled type 2 diabetes mellitus with hyperglycemia    3. Hypertension associated with diabetes        Plan:       Spinal enthesopathy of thoracic region  Continue current treatment, follow up with Orthopedics as planned    Uncontrolled type 2 diabetes mellitus with hyperglycemia  Glucose log daily x 2 weeks--check before breakfast, record, and drop off log at  or email.  -     Ambulatory consult to Diabetic Education      Hypertension associated with diabetes  Well controlled.    Patient readiness: acceptance and barriers:none    During the course of the visit the patient was educated and counseled about the following:     Diabetes:  Discussed general issues about diabetes pathophysiology and management.  Hypertension:   Medication: no change.    Goals: Diabetes: Maintain Hemoglobin A1C below 7 and Hypertension: Reduce Blood Pressure    Did patient meet goals/outcomes: No    The following self management tools provided: declined    Patient Instructions (the written plan) was given to the patient/family.     Time spent with patient: 30 minutes    Barriers to medications present (no )    Adverse reactions to current medications (no)    Over the counter medications reviewed (Yes)

## 2019-07-18 ENCOUNTER — TELEPHONE (OUTPATIENT)
Dept: FAMILY MEDICINE | Facility: CLINIC | Age: 64
End: 2019-07-18

## 2019-07-29 ENCOUNTER — TELEPHONE (OUTPATIENT)
Dept: FAMILY MEDICINE | Facility: CLINIC | Age: 64
End: 2019-07-29

## 2019-07-29 ENCOUNTER — CLINICAL SUPPORT (OUTPATIENT)
Dept: DIABETES | Facility: CLINIC | Age: 64
End: 2019-07-29
Payer: COMMERCIAL

## 2019-07-29 VITALS — HEIGHT: 77 IN | BODY MASS INDEX: 27.41 KG/M2 | WEIGHT: 232.13 LBS

## 2019-07-29 DIAGNOSIS — E11.65 UNCONTROLLED TYPE 2 DIABETES MELLITUS WITH HYPERGLYCEMIA: ICD-10-CM

## 2019-07-29 DIAGNOSIS — E11.65 UNCONTROLLED TYPE 2 DIABETES MELLITUS WITH HYPERGLYCEMIA: Primary | ICD-10-CM

## 2019-07-29 PROCEDURE — 99999 PR PBB SHADOW E&M-EST. PATIENT-LVL III: ICD-10-PCS | Mod: PBBFAC,,,

## 2019-07-29 PROCEDURE — G0108 DIAB MANAGE TRN  PER INDIV: HCPCS | Mod: S$GLB,,, | Performed by: DIETITIAN, REGISTERED

## 2019-07-29 PROCEDURE — 99999 PR PBB SHADOW E&M-EST. PATIENT-LVL III: CPT | Mod: PBBFAC,,,

## 2019-07-29 PROCEDURE — G0108 PR DIAB MANAGE TRN  PER INDIV: ICD-10-PCS | Mod: S$GLB,,, | Performed by: DIETITIAN, REGISTERED

## 2019-07-29 NOTE — PROGRESS NOTES
Diabetes Education  Author: Asha Belle RD, CDE  Date: 7/29/2019    Diabetes Care Management Summary  Diabetes Education Record Assessment/Progress: Initial  Current Diabetes Risk Level: Moderate     Last A1c:   Lab Results   Component Value Date    HGBA1C 8.3 (H) 06/14/2019     Last visit with Diabetes Educator: : 07/29/2019      Diabetes Type  Diabetes Type : Type II    Diabetes History  Diabetes Diagnosis: 3-5 years  Current Treatment: Oral Medication(1000mg Metformin BID with 10 mg Glipizide and 30 mg Actos)  Reviewed Problem List with Patient: Yes    Health Maintenance was reviewed today with patient. Discussed with patient importance of routine eye exams, foot exams/foot care, blood work (i.e.: A1c, microalbumin, and lipid), dental visits, yearly flu vaccine, and pneumonia vaccine as indicated by PCP. Patient verbalized understanding.     Health Maintenance Topics with due status: Not Due       Topic Last Completion Date    Influenza Vaccine 09/21/2018    Pneumococcal Vaccine (65+ Low/Medium Risk) 03/22/2019    Colonoscopy 05/06/2019    Lipid Panel 06/14/2019    Hemoglobin A1c 06/14/2019    Eye Exam 06/18/2019    Low Dose Statin 07/17/2019     Health Maintenance Due   Topic Date Due    TETANUS VACCINE  12/02/1971    LDCT Lung Screen  12/02/2008    Urine Microalbumin  08/19/2018    Foot Exam  09/21/2019       Nutrition  Meal Planning: skipping meals, drinks regular soda, artificial sweeteners, eats out often  What type of sweetener do you use?: Sweet N Low, sugar  What type of beverages do you drink?: regular soda/tea  Meal Plan 24 Hour Recall - Breakfast: (For breakfast patient usually have a granola bar with a banana and coffee sweetened with sweet n low, sometimes he uses sugar)  Meal Plan 24 Hour Recall - Lunch: (Often will skip but sometimes in late afternoon will have Ramen noobles or a ham sandwich or a Diet meal made by his wife with a regular sprite or water)  Meal Plan 24 Hour Recall -  Dinner: (Last night had cheeseburger, no bun with a plum and regular sprite)    Monitoring   Monitoring: Other  Self Monitoring : (Reports fasting has ranged 200-280)  Blood Glucose Logs: No  Do you use a personal continuous glucose monitor?: No  In the last month, how often have you had a low blood sugar reaction?: never  Can you tell when your blood sugar is too high?: no    Exercise   Exercise Type: none(Has back pain so is not able to exercise at present)    Current Diabetes Treatment   Current Treatment: Oral Medication(1000mg Metformin BID with 10 mg Glipizide and 30 mg Actos)    Social History  Preferred Learning Method: Face to Face  Primary Support: Self  Educational Level: Some College  Smoking Status: Current Smoker  Alcohol Use: Never    Barriers to Change  Barriers to Change: None  Learning Challenges : None    Readiness to Learn   Readiness to Learn : Eager    Cultural Influences  Cultural Influences: None    Diabetes Education Assessment/Progress  Diabetes Disease Process (diabetes disease process and treatment options): Discussion  Nutrition (Incorporating nutritional management into one's lifestyle): Discussion, Instructed, Demonstrates Understanding/Competency (verbalizes/demonstrates), Comprehends Key Points, Written Materials Provided(Focused on timing of meals and no longer drinking regular sprite)  Physical Activity (incorporating physical activity into one's lifestyle): Discussion  Medications (states correct name, dose, onset, peak, duration, side effects & timing of meds): Discussion, Instructed, Demonstrates Understanding/Competency(verbalizes/demonstrates), Comprehends Key Points, Written Materials Provided(Discussed addition of medications if blood sugar do not improve with diet changes)  Monitoring (monitoring blood glucose/other parameters & using results): Discussion, Instructed, Demonstrates Understanding/Competency (verbalizes/demonstrates), Comprehends Key Points, Written Materials  Provided(Log sheet provided to record results for one week.  Patient agreed to bring log sheet back on 8/6)  Acute Complications (preventing, detecting, and treating acute complications): Discussion  Chronic Complications (preventing, detecting, and treating chronic complications): Discussion  Clinical (diabetes, other pertinent medical history, and relevant comorbidities reviewed during visit): Discussion  Cognitive (knowledge of self-management skills, functional health literacy): Discussion  Psychosocial (emotional response to diabetes): Discussion  Diabetes Distress and Support Systems: Discussion  Behavioral (readiness for change, lifestyle practices, self-care behaviors): Discussion    Goals  Patient has selected/evaluated goals during today's session: Yes, selected  Healthy Eating: Set  Start Date: 07/29/19  Target Date: 10/29/19  Physical Activity: In Progress  Monitoring: Set  Start Date: 07/29/19  Target Date: 10/29/19  Medications: In Progress  Problem Solving: In Progress  Healthy Coping: In Progress  Reducing Risks: In Progress    Diabetes Care Plan/Intervention  Education Plan/Intervention: Individual Follow-Up DSMT    Diabetes Meal Plan  Restrictions: Restricted Carbohydrate, Low Fat  Calories: 1800  Carbohydrate Per Meal: 45-60g  Carbohydrate Per Snack : 7-15g    Today's Self-Management Care Plan was developed with the patient's input and is based on barriers identified during today's assessment.    The long and short-term goals in the care plan were written with the patient/caregiver's input. The patient has agreed to work toward these goals to improve his overall diabetes control.      The patient received a copy of today's self-management plan and verbalized understanding of the care plan, goals, and all of today's instructions.      The patient was encouraged to communicate with his physician and care team regarding his condition(s) and treatment.  I provided the patient with my contact  information today and encouraged him to contact me via phone or patient portal as needed.     Education Units of Time   Time Spent: 60 min

## 2019-08-05 DIAGNOSIS — K59.00 CONSTIPATION, UNSPECIFIED CONSTIPATION TYPE: ICD-10-CM

## 2019-08-05 RX ORDER — LACTULOSE 10 G/15ML
SOLUTION ORAL; RECTAL
Qty: 1892 ML | Refills: 0 | Status: SHIPPED | OUTPATIENT
Start: 2019-08-05 | End: 2019-09-01 | Stop reason: SDUPTHER

## 2019-08-08 DIAGNOSIS — R10.11 RUQ ABDOMINAL PAIN: ICD-10-CM

## 2019-08-09 RX ORDER — OMEPRAZOLE 40 MG/1
40 CAPSULE, DELAYED RELEASE ORAL
Qty: 30 CAPSULE | Refills: 3 | Status: ON HOLD | OUTPATIENT
Start: 2019-08-09 | End: 2020-11-24 | Stop reason: HOSPADM

## 2019-08-12 ENCOUNTER — TELEPHONE (OUTPATIENT)
Dept: ORTHOPEDICS | Facility: CLINIC | Age: 64
End: 2019-08-12

## 2019-08-12 ENCOUNTER — OFFICE VISIT (OUTPATIENT)
Dept: ORTHOPEDICS | Facility: CLINIC | Age: 64
End: 2019-08-12
Payer: COMMERCIAL

## 2019-08-12 VITALS
WEIGHT: 229 LBS | HEIGHT: 77 IN | SYSTOLIC BLOOD PRESSURE: 118 MMHG | BODY MASS INDEX: 27.04 KG/M2 | HEART RATE: 99 BPM | DIASTOLIC BLOOD PRESSURE: 68 MMHG

## 2019-08-12 DIAGNOSIS — M46.04 SPINAL ENTHESOPATHY, THORACIC REGION: ICD-10-CM

## 2019-08-12 DIAGNOSIS — M51.9 THORACIC DISC DISEASE: ICD-10-CM

## 2019-08-12 DIAGNOSIS — M54.14 THORACIC RADICULITIS: Primary | ICD-10-CM

## 2019-08-12 DIAGNOSIS — M47.24 OSTEOARTHRITIS OF SPINE WITH RADICULOPATHY, THORACIC REGION: ICD-10-CM

## 2019-08-12 PROCEDURE — 3078F DIAST BP <80 MM HG: CPT | Mod: S$GLB,,, | Performed by: ORTHOPAEDIC SURGERY

## 2019-08-12 PROCEDURE — 3008F PR BODY MASS INDEX (BMI) DOCUMENTED: ICD-10-PCS | Mod: S$GLB,,, | Performed by: ORTHOPAEDIC SURGERY

## 2019-08-12 PROCEDURE — 1101F PR PT FALLS ASSESS DOC 0-1 FALLS W/OUT INJ PAST YR: ICD-10-PCS | Mod: S$GLB,,, | Performed by: ORTHOPAEDIC SURGERY

## 2019-08-12 PROCEDURE — 3078F PR MOST RECENT DIASTOLIC BLOOD PRESSURE < 80 MM HG: ICD-10-PCS | Mod: S$GLB,,, | Performed by: ORTHOPAEDIC SURGERY

## 2019-08-12 PROCEDURE — 3008F BODY MASS INDEX DOCD: CPT | Mod: S$GLB,,, | Performed by: ORTHOPAEDIC SURGERY

## 2019-08-12 PROCEDURE — 99213 PR OFFICE/OUTPT VISIT, EST, LEVL III, 20-29 MIN: ICD-10-PCS | Mod: S$GLB,,, | Performed by: ORTHOPAEDIC SURGERY

## 2019-08-12 PROCEDURE — 1101F PT FALLS ASSESS-DOCD LE1/YR: CPT | Mod: S$GLB,,, | Performed by: ORTHOPAEDIC SURGERY

## 2019-08-12 PROCEDURE — 3074F SYST BP LT 130 MM HG: CPT | Mod: S$GLB,,, | Performed by: ORTHOPAEDIC SURGERY

## 2019-08-12 PROCEDURE — 3074F PR MOST RECENT SYSTOLIC BLOOD PRESSURE < 130 MM HG: ICD-10-PCS | Mod: S$GLB,,, | Performed by: ORTHOPAEDIC SURGERY

## 2019-08-12 PROCEDURE — 99213 OFFICE O/P EST LOW 20 MIN: CPT | Mod: S$GLB,,, | Performed by: ORTHOPAEDIC SURGERY

## 2019-08-12 RX ORDER — TRAMADOL HYDROCHLORIDE 50 MG/1
50 TABLET ORAL EVERY 6 HOURS PRN
COMMUNITY
End: 2019-09-23

## 2019-08-12 RX ORDER — TRAMADOL HYDROCHLORIDE 50 MG/1
50 TABLET ORAL EVERY 6 HOURS PRN
Qty: 28 TABLET | Refills: 3 | Status: SHIPPED | OUTPATIENT
Start: 2019-08-12 | End: 2019-08-19

## 2019-08-12 NOTE — LETTER
August 12, 2019      [facility]  [address]  [place] [address]  Phone: [phone]  Fax: [phone]       Patient: Jhonny Almazan   YOB: 1955  Date of Visit: 08/12/2019    To Whom It May Concern:    Isiah Almazan  was at our office on 08/12/2019.  He is unable to return to work at this time due to orthopedic illness with thoracic spine. He will need extended time to return to work on September 16, 2019  He is currently in physical therapy for his thoracic pain.  Dr Emmanuel is recommending a Thoracic injection to be done by a pain management physician.  We will re-assess him in 3 months.  If you have any questions or concerns, or if I can be of further assistance, please do not hesitate to contact me.    Sincerely,    Mil Emmanuel MD

## 2019-08-12 NOTE — PROGRESS NOTES
Subjective:       Patient ID: Jhonny Almazan is a 63 y.o. male.    Chief Complaint: Pain of the Thoracic Spine (7-5 thoracic injection. Injection helped 4-5 days. The Tramadol helps some but he still has pain)      History of Present Illness     Insidious onset of right-sided thoracic pain since April 2019 pain is constant he is been unable to work he denies any low back or neck pain. His symptoms are right-sided only. He's not had any treatment. Symptoms are severe enough to awaken from up from sleep. He doesn't have any bowel or bladder incontinence but sometimes claims he feels unsteady when walking. He is undergone physical therapy but pain still persists trigger point injection on the right side did afford him some temporary relief of his symptoms     Current Medications  Current Outpatient Medications   Medication Sig Dispense Refill    ACETAMINOPHEN (TYLENOL ARTHRITIS ORAL) Take 1 tablet by mouth daily as needed (pain).       buPROPion (WELLBUTRIN XL) 150 MG TB24 tablet TAKE ONE TABLET BY MOUTH ONCE DAILY 30 tablet 11    co-enzyme Q-10 50 mg capsule Take 50 mg by mouth once daily.      diclofenac sodium (VOLTAREN) 1 % Gel Apply 2 g topically 3 (three) times daily as needed. For hand pain 100 g 11    empagliflozin (JARDIANCE) 10 mg Tab Take 10 mg by mouth once daily. 30 tablet 11    folic acid-vit B6-vit B12 2.5-25-2 mg (FOLBIC OR EQUIV) 2.5-25-2 mg Tab Take 1 tablet by mouth once daily. 90 tablet 4    gabapentin (NEURONTIN) 300 MG capsule Take 1 capsule (300 mg total) by mouth 3 (three) times daily. 90 capsule 11    glipiZIDE (GLUCOTROL) 10 MG tablet Take 1 tablet (10 mg total) by mouth 2 (two) times daily with meals. 180 tablet 3    hydrALAZINE (APRESOLINE) 25 MG tablet Take 2 tablets (50 mg total) by mouth every 8 (eight) hours. 90 tablet 8    lactulose (CHRONULAC) 10 gram/15 mL solution TAKE 30 MLS (20 G TOTAL) BY MOUTH 2 (TWO) TIMES DAILY. 1892 mL 0    magnesium citrate solution Take  296 mLs by mouth daily as needed (constipation).       metFORMIN (GLUCOPHAGE-XR) 500 MG 24 hr tablet TAKE 2 TABLETS (1,000 MG TOTAL) BY MOUTH 2 (TWO) TIMES DAILY. 360 tablet 0    omeprazole (PRILOSEC) 40 MG capsule TAKE 1 CAPSULE (40 MG TOTAL) BY MOUTH BEFORE BREAKFAST. 30 capsule 3    pioglitazone (ACTOS) 30 MG tablet Take 1 tablet (30 mg total) by mouth once daily. 90 tablet 2    rosuvastatin (CRESTOR) 20 MG tablet TAKE 1 TABLET (20 MG TOTAL) BY MOUTH ONCE DAILY. 90 tablet 1    traMADol (ULTRAM) 50 mg tablet Take 50 mg by mouth every 6 (six) hours as needed for Pain.      lisinopril-hydrochlorothiazide (PRINZIDE,ZESTORETIC) 20-12.5 mg per tablet TAKE 1 TABLET BY MOUTH 2 (TWO) TIMES DAILY. 180 tablet 0    traMADol (ULTRAM) 50 mg tablet Take 1 tablet (50 mg total) by mouth every 6 (six) hours as needed for Pain. 28 tablet 3     No current facility-administered medications for this visit.        Allergies  Review of patient's allergies indicates:  No Known Allergies    Past Medical History  Past Medical History:   Diagnosis Date    Anticoagulant long-term use     Arthritis     Colon polyp     Diabetes mellitus     Diabetes mellitus, type 2     Fatty liver     Hypertension        Surgical History  Past Surgical History:   Procedure Laterality Date    BACK SURGERY      COLONOSCOPY  07/18/2014    Dr. Crane: 22 colon polyps removed, hemorrhoids, erythema to sigmoid, repeat in 1 year for surveillance; biopsy: sigmoid erythema- showed unremarkable colonic mucosa, tubular adenomas and hyperplastic polyps    COLONOSCOPY N/A 5/6/2019    [facility]    COLONOSCOPY N/A 5/3/2019    [facility]    COLONOSCOPY N/A 7/18/2014    [facility]    EGD (ESOPHAGOGASTRODUODENOSCOPY) N/A 4/26/2019    [facility]    HERNIA REPAIR         Family History:   Family History   Problem  Relation Age of Onset    Heart disease Mother     Heart disease Father     Diabetes Brother     Suicide Brother     Brain cancer Brother     Colon cancer Neg Hx     Crohn's disease Neg Hx     Ulcerative colitis Neg Hx     Stomach cancer Neg Hx     Esophageal cancer Neg Hx     Glaucoma Neg Hx     Retinal detachment Neg Hx     Macular degeneration Neg Hx        Social History:   Social History     Socioeconomic History    Marital status:      Spouse name: Not on file    Number of children: Not on file    Years of education: Not on file    Highest education level: Not on file   Occupational History    Not on file   Social Needs    Financial resource strain: Not on file    Food insecurity:     Worry: Not on file     Inability: Not on file    Transportation needs:     Medical: Not on file     Non-medical: Not on file   Tobacco Use    Smoking status: Current Every Day Smoker     Packs/day: 1.00     Years: 50.00     Pack years: 50.00     Types: Cigarettes    Smokeless tobacco: Never Used   Substance and Sexual Activity    Alcohol use: Yes     Alcohol/week: 8.4 oz     Types: 14 Cans of beer per week     Comment: 2-3 beers daily    Drug use: No    Sexual activity: Yes     Partners: Female   Lifestyle    Physical activity:     Days per week: Not on file     Minutes per session: Not on file    Stress: Not on file   Relationships    Social connections:     Talks on phone: Not on file     Gets together: Not on file     Attends Hoahaoism service: Not on file     Active member of club or organization: Not on file     Attends meetings of clubs or organizations: Not on file     Relationship status: Not on file   Other Topics Concern    Not on file   Social History Narrative    Not on file       Hospitalization/Major Diagnostic Procedure:     Review of Systems     General/Constitutional:  Chills denies. Fatigue denies. Fever denies. Weight gain denies. Weight loss denies.    Respiratory:   Shortness of breath denies.    Cardiovascular:  Chest pain denies.    Gastrointestinal:  Constipation denies. Diarrhea denies. Nausea denies. Vomiting denies.     Hematology:  Easy bruising denies. Prolonged bleeding denies.     Genitourinary:  Frequent urination denies. Pain in lower back denies. Painful urination denies.     Musculoskeletal:  See HPI for details    Skin:  Rash denies.    Neurologic:  Dizziness denies. Gait abnormalities denies. Seizures denies. Tingling/Numbess denies.    Psychiatric:  Anxiety denies. Depressed mood denies.     Objective:   Vital Signs:   Vitals:    08/12/19 1320   BP: 118/68   Pulse: 99        Physical Exam      General Examination:     Constitutional: The patient is alert and oriented to lace person and time. Mood is pleasant.     Head/Face: Normal facial features normal eyebrows    Eyes: Normal extraocular motion bilaterally    Lungs: Respirations are equal and unlabored    Gait is coordinated.    Cardiovascular: There are no swelling or varicosities present.    Lymphatic: Negative for adenopathy    Skin: Normal    Neurological: Level of consciousness normal. Oriented to place person and time and situation    Psychiatric: Oriented to time place person and situation    Tenderness paraspinous muscles right side midthoracic region pain with bending and extension. No spasm.  XRAY Report/ Interpretation: Results of thoracic MRI reviewed      Assessment:       1. Thoracic radiculitis    2. Spinal enthesopathy, thoracic region    3. Osteoarthritis of spine with radiculopathy, thoracic region    4. Thoracic disc disease        Plan:       Jhonny was seen today for pain.    Diagnoses and all orders for this visit:    Thoracic radiculitis  -     Ambulatory referral to Pain Clinic    Spinal enthesopathy, thoracic region  -     Ambulatory referral to Pain Clinic    Osteoarthritis of spine with radiculopathy, thoracic region  -     Ambulatory referral to Pain Clinic    Thoracic disc  disease  -     Ambulatory referral to Pain Clinic    Other orders  -     traMADol (ULTRAM) 50 mg tablet; Take 1 tablet (50 mg total) by mouth every 6 (six) hours as needed for Pain.         Follow up in about 3 months (around 11/12/2019) for T/sp MBB, +/- RFA f/u (Vu). He is not a surgical candidate.    Pain to be on the basis of thoracic spondylosis. He is not a surgical candidate. He is referred to pain management for evaluation for possible right-sided thoracic medial branch blocks.    This note was created using Dragon voice recognition software that occasionally misinterpreted phrases or words.

## 2019-08-12 NOTE — PATIENT INSTRUCTIONS
[facility]  [facility]      STEP 1: Diagnostic Medial Branch Blocks (Facet Nerve Blocks)    What are medial branch nerves? Why are medial branch blocks helpful?     Medial branch nerves are very small nerve branches that communicate pain caused by the facet joints in the spine. These nerves do not control any muscles or sensation in the arms or legs. They are located along a bony groove in the spine.     If this procedure has been scheduled, there is strong evidence to suspect that the facet joints are the source of your pain. Benefit may be obtained from having these medial branch nerves temporarily blocked with an anesthetic to see if a more permanent way of blocking these nerves would provide pain relief for a longer term. Blocking these medial branch nerves temporarily stops the transmission of pain signals from the facet joints to the brain. If you receive temporary relief of a portion of your pain after these injections you will likely benefit from radiofrequency ablation of the same nerves. Radiofrequency ablation provides the same amount of relief as the diagnostic blocks, but lasts approximately 6-12 months.     What happens during medial branch blocks?    An IV will be started, so that sedation can be given. You will be placed on the x-ray table and positioned in such a way that the physician can best visualize the bony areas where the medial branch nerves pass. The skin is cleansed using sterile scrub (soap). Next the physician numbs a small area of skin with numbing medicine. The medicine stings for several seconds. Using x-ray guidance, your physician directs a small needle, near the specific nerve or nerves being tested. A small mixture of numbing medicine (anesthetic) and anti-inflammatory (cortisone/steroid) is then injected.    What happens after the procedure?  Immediately after the procedure, you will go back to the recovery area where you will be monitored for  30-60 minutes. You will be asked to report the immediate percentage of pain relief and record the relief experienced during that day on a post injection evaluation sheet (pain diary). You must call and set up a follow up appointment for two weeks after the procedure to discuss the results from this block and determine if you will proceed to step 2 of the treatment of your facet nerves.               [facility]  [facility]    STEP 2: Radiofrequency Ablation Medial Branches/Facet Nerves    What is radiofrequency (RF) ablation of the facet nerves? Why is it helpful?    Radiofrequency ablation is accomplished by sending a radiofrequency signal to the medial branch that provides sensation to the facet joint. Radiofrequency signal interrupts the pain signal going from the facet nerve to the brain.  The relief from the radiofrequency ablation should mimic that of the diagnostic medial branch blocks, but last approximately 6-12 months. The purpose of RF ablation of the facet nerves is to decrease pain from the facet joint and improve function. This is done only if pain is temporarily relieved by prior diagnostic facet nerve (medial branch nerve) blocks.     How is it done?   An IV will be started so that short acting sedation can be given during placement of needles near the facet nerves using x-ray guidance. The number of needles that will be placed depends on the number of levels that are being treated. After the needles are placed, you will then be awakened and a controlled radiofrequency signal will be sent to a facet nerve. The needles proper location in proximity of the nerve is confirmed by your identification of a change in sensation as a tightness, squeezing, pain, pressure, tingling, burning, etc. By successfully identifying this change in sensation the doctor confirms that the needle is close enough to the nerve so the best outcome of this procedure can be obtained. Your ability to  identify the change in sensation determines the quality of the block. A radiofrequency signal is then used to decrease the sensation of the facet joints to improve your pain. The procedure will take approximately 40-60 minutes. You will be monitored for an additional hour after the procedure. All measures will be taken to ensure your comfort and safety. After you return home, you may use ice packs to relieve any discomfort. You will not be able to drive the day of your procedure because you received sedation. You must call and set up a follow up appointment for two weeks after the procedure.     General Pre/Post Instructions:    You should not eat or drink anything after 12 oclock the night before the procedure. If you are diabetic, do not take your insulin or oral medication the morning of the procedure because you have had nothing to eat.     If you are taking routine heart or blood pressure medicine, you should take it with a sip of water the morning of the procedure.  You should not take medications that may give you pain relief or lessen you usual pain. These medicines can be restarted after the procedure if they are needed.     If you are on Coumadin, Heparin, Plavix or other blood thinners including aspirin and all medications that contain aspirin, you must notify the office well in advance so the timing of these medications can be coordinated with your primary care physician.     You will be at the hospital for a few hours for your procedure. A  must accompany you and be responsible for getting you home. No driving is allowed the day of the procedure. You may return to your normal activities the day after the procedure, including returning to work.     If you are unable to keep this appointment, please give notice as soon as possible and at least 24 hours in advance during regular office hours.    Thank you.

## 2019-08-15 ENCOUNTER — OFFICE VISIT (OUTPATIENT)
Dept: PAIN MEDICINE | Facility: CLINIC | Age: 64
End: 2019-08-15
Payer: COMMERCIAL

## 2019-08-15 VITALS
DIASTOLIC BLOOD PRESSURE: 87 MMHG | SYSTOLIC BLOOD PRESSURE: 138 MMHG | HEART RATE: 87 BPM | HEIGHT: 77 IN | WEIGHT: 230 LBS | BODY MASS INDEX: 27.16 KG/M2

## 2019-08-15 DIAGNOSIS — B02.29 POST HERPETIC NEURALGIA: ICD-10-CM

## 2019-08-15 DIAGNOSIS — M54.14 THORACIC RADICULITIS: Primary | ICD-10-CM

## 2019-08-15 PROCEDURE — 99204 OFFICE O/P NEW MOD 45 MIN: CPT | Mod: S$GLB,,, | Performed by: ANESTHESIOLOGY

## 2019-08-15 PROCEDURE — 99999 PR PBB SHADOW E&M-EST. PATIENT-LVL III: ICD-10-PCS | Mod: PBBFAC,,, | Performed by: ANESTHESIOLOGY

## 2019-08-15 PROCEDURE — 99999 PR PBB SHADOW E&M-EST. PATIENT-LVL III: CPT | Mod: PBBFAC,,, | Performed by: ANESTHESIOLOGY

## 2019-08-15 PROCEDURE — 99204 PR OFFICE/OUTPT VISIT, NEW, LEVL IV, 45-59 MIN: ICD-10-PCS | Mod: S$GLB,,, | Performed by: ANESTHESIOLOGY

## 2019-08-15 NOTE — PROGRESS NOTES
This note was completed with dictation software and grammatical errors may exist.    Referring Physician: Mil Emmanuel MD    PCP: Joey Whitehead MD      CC:  Mid back and rib pain    HPI:   Jhonny Almazan is a 63 y.o. male referred to us for mid back and rib pain.  Pain started in April 2018 without traumatic incident.  He reports having constant sharp, shooting pain in his mid back.  Pain travels to his right side ribs below his right chest. He states not developing a rash at that time. He feels pins and needles in those areas.  Pain worsens sitting, standing, touching, coughing and lifting.  Pain improves with rest.  He has tried physical therapy with minimal benefit.  He has been evaluated by spine surgery.  MRI of the thoracic spine was ordered.  Currently takes gabapentin 300 mg t.i.d. prior to his incident with mild benefits.  He has tried topical ointments with mild benefits.  He denies any worsening weakness.  No bowel bladder changes.    ROS:  CONSTITUTIONAL: No fevers, chills, night sweats, wt. loss, appetite changes  SKIN: no rashes or itching  ENT: No headaches, head trauma, vision changes, or eye pain  LYMPH NODES: None noticed   CV: No chest pain, palpitations.   RESP: No shortness of breath, dyspnea on exertion, cough, wheezing, or hemoptysis  GI: No nausea, emesis, diarrhea, constipation, melena, hematochezia, pain.    : No dysuria, hematuria, urgency, or frequency   HEME: No easy bruising, bleeding problems  PSYCHIATRIC: No depression, anxiety, psychosis, hallucinations.  NEURO: No seizures, memory loss, dizziness or difficulty sleeping  MSK:  Positive HPI      Past Medical History:   Diagnosis Date    Anticoagulant long-term use     Arthritis     Colon polyp     Diabetes mellitus     Diabetes mellitus, type 2     Fatty liver     Hypertension      Past Surgical History:   Procedure Laterality Date    BACK SURGERY      COLONOSCOPY  07/18/2014    Dr. Miles: 22 colon polyps  removed, hemorrhoids, erythema to sigmoid, repeat in 1 year for surveillance; biopsy: sigmoid erythema- showed unremarkable colonic mucosa, tubular adenomas and hyperplastic polyps    COLONOSCOPY N/A 5/6/2019    [facility]    COLONOSCOPY N/A 5/3/2019    [facility]    COLONOSCOPY N/A 7/18/2014    [facility] at [facility]    EGD (ESOPHAGOGASTRODUODENOSCOPY) N/A 4/26/2019    [facility] at [facility] END[facility]    HERNIA REPAIR       Family History   Problem Relation Age of Onset    Heart disease Mother     Heart disease Father     Diabetes Brother     Suicide Brother     Brain cancer Brother     Colon cancer Neg Hx     Crohn's disease Neg Hx     Ulcerative colitis Neg Hx     Stomach cancer Neg Hx     Esophageal cancer Neg Hx     Glaucoma Neg Hx     Retinal detachment Neg Hx     Macular degeneration Neg Hx      Social History     Socioeconomic History    Marital status:      Spouse name: Not on file    Number of children: Not on file    Years of education: Not on file    Highest education level: Not on file   Occupational History    Not on file   Social Needs    Financial resource strain: Not on file    Food insecurity:     Worry: Not on file     Inability: Not on file    Transportation needs:     Medical: Not on file     Non-medical: Not on file   Tobacco Use    Smoking status: Current Every Day Smoker     Packs/day: 1.00     Years: 50.00     Pack years: 50.00     Types: Cigarettes    Smokeless tobacco: Never Used   Substance and Sexual Activity    Alcohol use: Yes     Alcohol/week: 8.4 oz     Types: 14 Cans of beer per week     Comment: 2-3 beers daily    Drug use: No    Sexual activity: Yes     Partners: Female   Lifestyle    Physical activity:     Days per week: Not on file     Minutes per session: Not on file    Stress: Not on file   Relationships    Social connections:  "    Talks on phone: Not on file     Gets together: Not on file     Attends [employer] service: Not on file     Active member of club or organization: Not on file     Attends meetings of clubs or organizations: Not on file     Relationship status: Not on file   Other Topics Concern    Not on file   Social History Narrative    Not on file         Medications/Allergies: See med card    Vitals:    08/15/19 0809   BP: 138/87   Pulse: 87   Weight: 104.3 kg (230 lb)   Height: 6' 5" (1.956 m)   PainSc:   4   PainLoc: Back         Physical exam:    GENERAL: A and O x3, the patient appears well groomed and is in no acute distress.  Skin: No rashes or obvious lesions  HEENT: normocephalic, atraumatic  CARDIOVASCULAR:  Palpable peripheral pulses  LUNGS: easy work of breathing  ABDOMEN: soft, nontender   UPPER EXTREMITIES: Normal alignment, normal range of motion, no atrophy, no skin changes,  hair growth and nail growth normal and equal bilaterally. No swelling, no tenderness.    LOWER EXTREMITIES:  Normal alignment, normal range of motion, no atrophy, no skin changes,  hair growth and nail growth normal and equal bilaterally. No swelling, no tenderness.  Thoracolumbar SPINE  Lumbar spine: ROM is full with flexion extension and oblique extension with no increased pain.    George's test causes no increased pain on either side.    Supine straight leg raise is negative bilaterally.    Internal and external rotation of the hip causes no increased pain on either side.  Myofascial exam:  Moderate tenderness over right thoracic paraspinal.  There is some hyperalgesia over his T8 dermatome on the right       MENTAL STATUS: normal orientation, speech, language, and fund of knowledge for social situation.  Emotional state appropriate.    CRANIAL NERVES:  II:  PERRL bilaterally,   III,IV,VI: EOMI.    V:  Facial sensation equal bilaterally  VII:  Facial motor function normal.  VIII:  Hearing equal to finger rub bilaterally  IX/X: Gag " normal, palate symmetric  XI:  Shoulder shrug equal, head turn equal  XII:  Tongue midline without fasciculations      MOTOR: Tone and bulk: normal bilateral upper and lower Strength: normal   Delt Bi Tri WE WF     R 5 5 5 5 5 5   L 5 5 5 5 5 5     IP ADD ABD Quad TA Gas HAM  R 5 5 5 5 5 5 5  L 5 5 5 5 5 5 5    SENSATION: Light touch and pinprick intact bilaterally  REFLEXES: normal, symmetric, nonbrisk.  Toes down, no clonus. No hoffmans.  GAIT: normal rise, base, steps, and arm swing.        Imaging:  MRI T-spine 6/2019  There is multilevel degenerative disc disease most notable at the T3-T4 through  the T8-T9 levels.. There is mild diffuse disc bulging with slight central disc  protrusion at T7-T8 and very slight right paracentral focal disc protrusion at  T8-T9. There is no significant encroachment of the central canal and no cord or  nerve root impingement.    Assessment:  Patient referred for mid back pain  1. Thoracic radiculitis    2. Post herpetic neuralgia          Plan:  1. I have stressed the importance of physical activity and exercise to improve overall health  2. Reviewed pertinent imaging and records with patient  3.  Patient with mid back pain with equally bothersome radiating right rib pain following a specific dermatome.  He described a prodrome of symptoms prior to his mid back pain. I suspect he he has symptoms consistent with post herpetic neuralgia without appearance of rash.  There is a small percentage of patients that will have shingles without rash.  Recommend increasing gabapentin to 600 mg t.i.d..  Use topical ointment as tolerated.  4.  We discussed performing a thoracic epidural steroid injection to help with his neuropathic radicular pain. Patient wishes to proceed.  5.  Follow-up after the procedure  Thank you for referring this interesting patient, and I look forward to continuing to collaborate in his care.

## 2019-08-15 NOTE — H&P (VIEW-ONLY)
This note was completed with dictation software and grammatical errors may exist.    Referring Physician: Mil Emmanuel MD    PCP: Joey Whitehead MD      CC:  Mid back and rib pain    HPI:   Jhonny Almazan is a 63 y.o. male referred to us for mid back and rib pain.  Pain started in April 2018 without traumatic incident.  He reports having constant sharp, shooting pain in his mid back.  Pain travels to his right side ribs below his right chest. He states not developing a rash at that time. He feels pins and needles in those areas.  Pain worsens sitting, standing, touching, coughing and lifting.  Pain improves with rest.  He has tried physical therapy with minimal benefit.  He has been evaluated by spine surgery.  MRI of the thoracic spine was ordered.  Currently takes gabapentin 300 mg t.i.d. prior to his incident with mild benefits.  He has tried topical ointments with mild benefits.  He denies any worsening weakness.  No bowel bladder changes.    ROS:  CONSTITUTIONAL: No fevers, chills, night sweats, wt. loss, appetite changes  SKIN: no rashes or itching  ENT: No headaches, head trauma, vision changes, or eye pain  LYMPH NODES: None noticed   CV: No chest pain, palpitations.   RESP: No shortness of breath, dyspnea on exertion, cough, wheezing, or hemoptysis  GI: No nausea, emesis, diarrhea, constipation, melena, hematochezia, pain.    : No dysuria, hematuria, urgency, or frequency   HEME: No easy bruising, bleeding problems  PSYCHIATRIC: No depression, anxiety, psychosis, hallucinations.  NEURO: No seizures, memory loss, dizziness or difficulty sleeping  MSK:  Positive HPI      Past Medical History:   Diagnosis Date    Anticoagulant long-term use     Arthritis     Colon polyp     Diabetes mellitus     Diabetes mellitus, type 2     Fatty liver     Hypertension      Past Surgical History:   Procedure Laterality Date    BACK SURGERY      COLONOSCOPY  07/18/2014    Dr. Miles: 22 colon polyps  removed, hemorrhoids, erythema to sigmoid, repeat in 1 year for surveillance; biopsy: sigmoid erythema- showed unremarkable colonic mucosa, tubular adenomas and hyperplastic polyps    COLONOSCOPY N/A 5/6/2019    [facility]    COLONOSCOPY N/A 5/3/2019    [facility]    COLONOSCOPY N/A 7/18/2014    [facility]    EGD (ESOPHAGOGASTRODUODENOSCOPY) N/A 4/26/2019    [facility]    HERNIA REPAIR       Family History   Problem Relation Age of Onset    Heart disease Mother     Heart disease Father     Diabetes Brother     Suicide Brother     Brain cancer Brother     Colon cancer Neg Hx     Crohn's disease Neg Hx     Ulcerative colitis Neg Hx     Stomach cancer Neg Hx     Esophageal cancer Neg Hx     Glaucoma Neg Hx     Retinal detachment Neg Hx     Macular degeneration Neg Hx      Social History     Socioeconomic History    Marital status:      Spouse name: Not on file    Number of children: Not on file    Years of education: Not on file    Highest education level: Not on file   Occupational History    Not on file   Social Needs    Financial resource strain: Not on file    Food insecurity:     Worry: Not on file     Inability: Not on file    Transportation needs:     Medical: Not on file     Non-medical: Not on file   Tobacco Use    Smoking status: Current Every Day Smoker     Packs/day: 1.00     Years: 50.00     Pack years: 50.00     Types: Cigarettes    Smokeless tobacco: Never Used   Substance and Sexual Activity    Alcohol use: Yes     Alcohol/week: 8.4 oz     Types: 14 Cans of beer per week     Comment: 2-3 beers daily    Drug use: No    Sexual activity: Yes     Partners: Female   Lifestyle    Physical activity:     Days per week: Not on file     Minutes per session: Not on file    Stress: Not on file   Relationships    Social connections:  "    Talks on phone: Not on file     Gets together: Not on file     Attends [employer] service: Not on file     Active member of club or organization: Not on file     Attends meetings of clubs or organizations: Not on file     Relationship status: Not on file   Other Topics Concern    Not on file   Social History Narrative    Not on file         Medications/Allergies: See med card    Vitals:    08/15/19 0809   BP: 138/87   Pulse: 87   Weight: 104.3 kg (230 lb)   Height: 6' 5" (1.956 m)   PainSc:   4   PainLoc: Back         Physical exam:    GENERAL: A and O x3, the patient appears well groomed and is in no acute distress.  Skin: No rashes or obvious lesions  HEENT: normocephalic, atraumatic  CARDIOVASCULAR:  Palpable peripheral pulses  LUNGS: easy work of breathing  ABDOMEN: soft, nontender   UPPER EXTREMITIES: Normal alignment, normal range of motion, no atrophy, no skin changes,  hair growth and nail growth normal and equal bilaterally. No swelling, no tenderness.    LOWER EXTREMITIES:  Normal alignment, normal range of motion, no atrophy, no skin changes,  hair growth and nail growth normal and equal bilaterally. No swelling, no tenderness.  Thoracolumbar SPINE  Lumbar spine: ROM is full with flexion extension and oblique extension with no increased pain.    George's test causes no increased pain on either side.    Supine straight leg raise is negative bilaterally.    Internal and external rotation of the hip causes no increased pain on either side.  Myofascial exam:  Moderate tenderness over right thoracic paraspinal.  There is some hyperalgesia over his T8 dermatome on the right       MENTAL STATUS: normal orientation, speech, language, and fund of knowledge for social situation.  Emotional state appropriate.    CRANIAL NERVES:  II:  PERRL bilaterally,   III,IV,VI: EOMI.    V:  Facial sensation equal bilaterally  VII:  Facial motor function normal.  VIII:  Hearing equal to finger rub bilaterally  IX/X: Gag " normal, palate symmetric  XI:  Shoulder shrug equal, head turn equal  XII:  Tongue midline without fasciculations      MOTOR: Tone and bulk: normal bilateral upper and lower Strength: normal   Delt Bi Tri WE WF     R 5 5 5 5 5 5   L 5 5 5 5 5 5     IP ADD ABD Quad TA Gas HAM  R 5 5 5 5 5 5 5  L 5 5 5 5 5 5 5    SENSATION: Light touch and pinprick intact bilaterally  REFLEXES: normal, symmetric, nonbrisk.  Toes down, no clonus. No hoffmans.  GAIT: normal rise, base, steps, and arm swing.        Imaging:  MRI T-spine 6/2019  There is multilevel degenerative disc disease most notable at the T3-T4 through  the T8-T9 levels.. There is mild diffuse disc bulging with slight central disc  protrusion at T7-T8 and very slight right paracentral focal disc protrusion at  T8-T9. There is no significant encroachment of the central canal and no cord or  nerve root impingement.    Assessment:  Patient referred for mid back pain  1. Thoracic radiculitis    2. Post herpetic neuralgia          Plan:  1. I have stressed the importance of physical activity and exercise to improve overall health  2. Reviewed pertinent imaging and records with patient  3.  Patient with mid back pain with equally bothersome radiating right rib pain following a specific dermatome.  He described a prodrome of symptoms prior to his mid back pain. I suspect he he has symptoms consistent with post herpetic neuralgia without appearance of rash.  There is a small percentage of patients that will have shingles without rash.  Recommend increasing gabapentin to 600 mg t.i.d..  Use topical ointment as tolerated.  4.  We discussed performing a thoracic epidural steroid injection to help with his neuropathic radicular pain. Patient wishes to proceed.  5.  Follow-up after the procedure  Thank you for referring this interesting patient, and I look forward to continuing to collaborate in his care.

## 2019-08-30 ENCOUNTER — HOSPITAL ENCOUNTER (OUTPATIENT)
Facility: AMBULARY SURGERY CENTER | Age: 64
Discharge: HOME | End: 2019-08-30
Attending: ANESTHESIOLOGY | Admitting: ANESTHESIOLOGY
Payer: COMMERCIAL

## 2019-08-30 DIAGNOSIS — M54.14 THORACIC RADICULITIS: Primary | ICD-10-CM

## 2019-08-30 LAB — POCT GLUCOSE: 190 MG/DL (ref 70–110)

## 2019-08-30 PROCEDURE — 62321 NJX INTERLAMINAR CRV/THRC: CPT | Mod: ,,, | Performed by: ANESTHESIOLOGY

## 2019-08-30 PROCEDURE — 62321 PR INJ CERV/THORAC, W/GUIDANCE: ICD-10-PCS | Mod: ,,, | Performed by: ANESTHESIOLOGY

## 2019-08-30 PROCEDURE — 62321 NJX INTERLAMINAR CRV/THRC: CPT | Performed by: ANESTHESIOLOGY

## 2019-08-30 PROCEDURE — 99152 MOD SED SAME PHYS/QHP 5/>YRS: CPT | Mod: ,,, | Performed by: ANESTHESIOLOGY

## 2019-08-30 PROCEDURE — 99152 PR MOD CONSCIOUS SEDATION, SAME PHYS, 5+ YRS, FIRST 15 MIN: ICD-10-PCS | Mod: ,,, | Performed by: ANESTHESIOLOGY

## 2019-08-30 RX ORDER — SODIUM CHLORIDE, SODIUM LACTATE, POTASSIUM CHLORIDE, CALCIUM CHLORIDE 600; 310; 30; 20 MG/100ML; MG/100ML; MG/100ML; MG/100ML
INJECTION, SOLUTION INTRAVENOUS ONCE AS NEEDED
Status: DISCONTINUED | OUTPATIENT
Start: 2019-08-30 | End: 2019-08-30 | Stop reason: HOSPADM

## 2019-08-30 RX ORDER — LIDOCAINE HYDROCHLORIDE 10 MG/ML
INJECTION, SOLUTION EPIDURAL; INFILTRATION; INTRACAUDAL; PERINEURAL
Status: DISCONTINUED | OUTPATIENT
Start: 2019-08-30 | End: 2019-08-30 | Stop reason: HOSPADM

## 2019-08-30 RX ORDER — DEXAMETHASONE SODIUM PHOSPHATE 10 MG/ML
INJECTION INTRAMUSCULAR; INTRAVENOUS
Status: DISCONTINUED | OUTPATIENT
Start: 2019-08-30 | End: 2019-08-30 | Stop reason: HOSPADM

## 2019-08-30 RX ORDER — SODIUM CHLORIDE 9 MG/ML
INJECTION, SOLUTION INTRAMUSCULAR; INTRAVENOUS; SUBCUTANEOUS
Status: DISCONTINUED | OUTPATIENT
Start: 2019-08-30 | End: 2019-08-30 | Stop reason: HOSPADM

## 2019-08-30 NOTE — DISCHARGE INSTRUCTIONS
Before leaving, please make sure you have all your personal belongings such as glasses, purses, wallets, keys, cell phones, jewelry, jackets etc    Anesthesia information    Anesthesia Safety      You have been given medicine  to sedate you during your procedure today. This may have included both a pain medicine and sleeping medicine. Most of the effects have worn off; however, you may continue to have some drowsiness for the next  24 hours. Anesthesia and pain medicines can cause nausea, sleepiness, dizziness and  constipation.    HOME CARE:  1) For the next EIGHT HOURS, you should be watched by a responsible adult to look for any worsening of your condition.  2) DO NOT DRINK any ALCOHOL for the next 24 HOURS.  3) DO NOT DRIVE or operate dangerous machinery during the next 24 HOURS.  FOLLOW UP with your doctor or this facility if you are not alert and back to your usual level of activity within 24 hrs.  GET PROMPT MEDICAL ATTENTION if any of the following occur:  -- Increased drowsiness  -- Increased weakness or dizziness  -- Repeated vomiting  -- If you cannot be awakened    Pain injection instructions:     This procedure may take a couple weeks to relieve pain  You may get some pain relief from the local anesthetic initally.    No driving for 24 hrs.   Activity as tolerated- gradually increase activities.  Dont lift over 10 lbs for 24 hrs   No heat at injection sites x 2 days. No heating pads, hot tubs, saunas, or swimming in any body of water or pool for 2 days.  Use ice pack for mild swelling and for comfort , apply for 20 minutes, remove for 20 minute intervals. No direct contact of ice itself  to skin.  May shower today. No tub baths for two days.      Resume Aspirin, Plavix, or Coumadin the day after the procedure unless otherwise instructed.   If diabetic,monitor your glucose carefully as steroids can increase your glucose level    Seek immediate medical help for:   Severe increase in your usual pain or  appearance of new pain.  Prolonged (mor than 8 hours) or increasing weakness or numbness in the legs or arms. Numbing medicine was injected and can affect the messages to and from the brain and legs or arms.  .    Fever above 101 ,Drainage,redness,active bleeding, or increased swelling at the injection site.  Headache, shortness of breath, chest pain, or breathing problems.

## 2019-08-30 NOTE — DISCHARGE SUMMARY
[facility]  Discharge Note  Short Stay    Admit Date: 8/30/2019    Discharge Date and Time: 8/30/2019    Attending Physician: Frantz Green MD     Discharge Provider: Frantz Green    Diagnoses:  Active Hospital Problems    Diagnosis  POA    *Thoracic radiculitis [M54.14]  Yes      Resolved Hospital Problems   No resolved problems to display.       Hospital Course: Thoracic TRINI  Discharged Condition: Good    Final Diagnoses:   Active Hospital Problems    Diagnosis  POA    *Thoracic radiculitis [M54.14]  Yes      Resolved Hospital Problems   No resolved problems to display.       Disposition: Home or Self Care    Follow up/Patient Instructions:    Medications:  Reconciled Home Medications:      Medication List      CONTINUE taking these medications    buPROPion 150 MG TB24 tablet  Commonly known as:  WELLBUTRIN XL  TAKE ONE TABLET BY MOUTH ONCE DAILY     co-enzyme Q-10 50 mg capsule  Take 50 mg by mouth once daily.     diclofenac sodium 1 % Gel  Commonly known as:  VOLTAREN  Apply 2 g topically 3 (three) times daily as needed. For hand pain     empagliflozin 10 mg Tab  Commonly known as:  JARDIANCE  Take 10 mg by mouth once daily.     folic acid-vit B6-vit B12 2.5-25-2 mg 2.5-25-2 mg Tab  Commonly known as:  FOLBIC or Equiv  Take 1 tablet by mouth once daily.     gabapentin 300 MG capsule  Commonly known as:  NEURONTIN  Take 1 capsule (300 mg total) by mouth 3 (three) times daily.     glipiZIDE 10 MG tablet  Commonly known as:  GLUCOTROL  Take 1 tablet (10 mg total) by mouth 2 (two) times daily with meals.     hydrALAZINE 25 MG tablet  Commonly known as:  APRESOLINE  Take 2 tablets (50 mg total) by mouth every 8 (eight) hours.     lactulose 10 gram/15 mL solution  Commonly known as:  CHRONULAC  TAKE 30 MLS (20 G TOTAL) BY MOUTH 2 (TWO) TIMES DAILY.     lisinopril-hydrochlorothiazide 20-12.5 mg per tablet  Commonly known as:  PRINZIDE,ZESTORETIC  TAKE 1 TABLET BY MOUTH 2 (TWO) TIMES DAILY.     magnesium citrate  solution  Take 296 mLs by mouth daily as needed (constipation).     metFORMIN 500 MG 24 hr tablet  Commonly known as:  GLUCOPHAGE-XR  TAKE 2 TABLETS (1,000 MG TOTAL) BY MOUTH 2 (TWO) TIMES DAILY.     omeprazole 40 MG capsule  Commonly known as:  PRILOSEC  TAKE 1 CAPSULE (40 MG TOTAL) BY MOUTH BEFORE BREAKFAST.     pioglitazone 30 MG tablet  Commonly known as:  ACTOS  Take 1 tablet (30 mg total) by mouth once daily.     rosuvastatin 20 MG tablet  Commonly known as:  CRESTOR  TAKE 1 TABLET (20 MG TOTAL) BY MOUTH ONCE DAILY.     traMADol 50 mg tablet  Commonly known as:  ULTRAM  Take 50 mg by mouth every 6 (six) hours as needed for Pain.     TYLENOL ARTHRITIS ORAL  Take 1 tablet by mouth daily as needed (pain).          Discharge Procedure Orders   Call MD for:  temperature >100.4     Call MD for:  persistent nausea and vomiting or diarrhea     Call MD for:  severe uncontrolled pain     Call MD for:  redness, tenderness, or signs of infection (pain, swelling, redness, odor or green/yellow discharge around incision site)     Call MD for:  difficulty breathing or increased cough     Call MD for:  severe persistent headache        Follow up with MD in 2-3 weeks    Discharge Procedure Orders (must include Diet, Follow-up, Activity):   Discharge Procedure Orders (must include Diet, Follow-up, Activity)   Call MD for:  temperature >100.4     Call MD for:  persistent nausea and vomiting or diarrhea     Call MD for:  severe uncontrolled pain     Call MD for:  redness, tenderness, or signs of infection (pain, swelling, redness, odor or green/yellow discharge around incision site)     Call MD for:  difficulty breathing or increased cough     Call MD for:  severe persistent headache

## 2019-08-30 NOTE — PLAN OF CARE
Stable, States ready to go home, donald po fluids, pain minimal, no weakness standing, ambulated to car with RN to self care

## 2019-08-30 NOTE — OP NOTE
PROCEDURE DATE: 8/30/2019    Procedure:   Interlaminar epidural steroid injection at T6-7 under fluoroscopic guidance.    Diagnosis: Thoracic Radiculitis  pOSTOP DIAGNOSIS: sAME    Physician: Frantz Green M.D.    Medications injected:10 mg dexamethasone with 4 ml of preservative free NaCl    Local anesthetic injected:    Lidocaine 1% 2 ml total    Sedation Medications: RN IV sedation    Estimated blood loss:  None    Complications:  None    Technique:  Time-out taken to identify patient and procedure prior to starting the procedure.  With the patient laying in a prone position, the area was prepped and draped in the usual sterile fashion using ChloraPrep and a fenestrated drape.  After determining the target level with an AP fluoroscopic view, local anesthetic was given using a 25-gauge 1.5 inch needle by raising a wheal and then infiltrating toward the interlaminar entry space.  A 3.5inch 20-gauge Touhy needle was introduced under AP fluoroscopic guidance to the interlaminar space of T6-7. Once the trajectory was established, the needle was visualized in the lateral view and advanced using loss of resistance technique. Once in the desired position, 1ml contrast was injected to confirm placement and there was no vascular uptake nor intrathecal spread.  The medication was then injected slowly. The patient tolerated the procedure well.      The patient was monitored after the procedure.   They were given post-procedure and discharge instructions to follow at home.  The patient was discharged in a stable condition.

## 2019-09-01 DIAGNOSIS — K59.00 CONSTIPATION, UNSPECIFIED CONSTIPATION TYPE: ICD-10-CM

## 2019-09-03 VITALS
RESPIRATION RATE: 20 BRPM | HEART RATE: 76 BPM | OXYGEN SATURATION: 96 % | DIASTOLIC BLOOD PRESSURE: 88 MMHG | SYSTOLIC BLOOD PRESSURE: 153 MMHG | BODY MASS INDEX: 27.15 KG/M2 | WEIGHT: 229.94 LBS | TEMPERATURE: 98 F | HEIGHT: 77 IN

## 2019-09-05 RX ORDER — LACTULOSE 10 G/15ML
SOLUTION ORAL; RECTAL
Qty: 1892 ML | Refills: 0 | Status: SHIPPED | OUTPATIENT
Start: 2019-09-05 | End: 2019-10-29 | Stop reason: SDUPTHER

## 2019-09-07 ENCOUNTER — PATIENT OUTREACH (OUTPATIENT)
Dept: ADMINISTRATIVE | Facility: HOSPITAL | Age: 64
End: 2019-09-07

## 2019-09-13 ENCOUNTER — LAB VISIT (OUTPATIENT)
Dept: LAB | Facility: HOSPITAL | Age: 64
End: 2019-09-13
Attending: NURSE PRACTITIONER
Payer: COMMERCIAL

## 2019-09-13 DIAGNOSIS — E11.40 TYPE 2 DIABETES MELLITUS WITH DIABETIC NEUROPATHY, WITHOUT LONG-TERM CURRENT USE OF INSULIN: ICD-10-CM

## 2019-09-13 LAB
ALBUMIN SERPL BCP-MCNC: 3.8 G/DL (ref 3.5–5.2)
ALP SERPL-CCNC: 70 U/L (ref 55–135)
ALT SERPL W/O P-5'-P-CCNC: 19 U/L (ref 10–44)
ANION GAP SERPL CALC-SCNC: 8 MMOL/L (ref 8–16)
AST SERPL-CCNC: 15 U/L (ref 10–40)
BILIRUB SERPL-MCNC: 0.3 MG/DL (ref 0.1–1)
BUN SERPL-MCNC: 13 MG/DL (ref 8–23)
CALCIUM SERPL-MCNC: 10.1 MG/DL (ref 8.7–10.5)
CHLORIDE SERPL-SCNC: 104 MMOL/L (ref 95–110)
CHOLEST SERPL-MCNC: 261 MG/DL (ref 120–199)
CHOLEST/HDLC SERPL: 5.9 {RATIO} (ref 2–5)
CO2 SERPL-SCNC: 27 MMOL/L (ref 23–29)
CREAT SERPL-MCNC: 0.8 MG/DL (ref 0.5–1.4)
EST. GFR  (AFRICAN AMERICAN): >60 ML/MIN/1.73 M^2
EST. GFR  (NON AFRICAN AMERICAN): >60 ML/MIN/1.73 M^2
ESTIMATED AVG GLUCOSE: 140 MG/DL (ref 68–131)
GLUCOSE SERPL-MCNC: 157 MG/DL (ref 70–110)
HBA1C MFR BLD HPLC: 6.5 % (ref 4–5.6)
HDLC SERPL-MCNC: 44 MG/DL (ref 40–75)
HDLC SERPL: 16.9 % (ref 20–50)
LDLC SERPL CALC-MCNC: 177.6 MG/DL (ref 63–159)
NONHDLC SERPL-MCNC: 217 MG/DL
POTASSIUM SERPL-SCNC: 4.2 MMOL/L (ref 3.5–5.1)
PROT SERPL-MCNC: 7.1 G/DL (ref 6–8.4)
SODIUM SERPL-SCNC: 139 MMOL/L (ref 136–145)
TRIGL SERPL-MCNC: 197 MG/DL (ref 30–150)

## 2019-09-13 PROCEDURE — 36415 COLL VENOUS BLD VENIPUNCTURE: CPT | Mod: PO

## 2019-09-13 PROCEDURE — 80061 LIPID PANEL: CPT

## 2019-09-13 PROCEDURE — 80053 COMPREHEN METABOLIC PANEL: CPT

## 2019-09-13 PROCEDURE — 83036 HEMOGLOBIN GLYCOSYLATED A1C: CPT

## 2019-09-23 ENCOUNTER — OFFICE VISIT (OUTPATIENT)
Dept: PAIN MEDICINE | Facility: CLINIC | Age: 64
End: 2019-09-23
Payer: COMMERCIAL

## 2019-09-23 VITALS
BODY MASS INDEX: 27.04 KG/M2 | SYSTOLIC BLOOD PRESSURE: 164 MMHG | WEIGHT: 229 LBS | HEART RATE: 85 BPM | HEIGHT: 77 IN | DIASTOLIC BLOOD PRESSURE: 91 MMHG

## 2019-09-23 DIAGNOSIS — M54.14 THORACIC RADICULITIS: Primary | ICD-10-CM

## 2019-09-23 DIAGNOSIS — B02.29 POST HERPETIC NEURALGIA: ICD-10-CM

## 2019-09-23 PROCEDURE — 3080F PR MOST RECENT DIASTOLIC BLOOD PRESSURE >= 90 MM HG: ICD-10-PCS | Mod: CPTII,S$GLB,, | Performed by: ANESTHESIOLOGY

## 2019-09-23 PROCEDURE — 1101F PR PT FALLS ASSESS DOC 0-1 FALLS W/OUT INJ PAST YR: ICD-10-PCS | Mod: CPTII,S$GLB,, | Performed by: ANESTHESIOLOGY

## 2019-09-23 PROCEDURE — 99214 PR OFFICE/OUTPT VISIT, EST, LEVL IV, 30-39 MIN: ICD-10-PCS | Mod: S$GLB,,, | Performed by: ANESTHESIOLOGY

## 2019-09-23 PROCEDURE — 3008F PR BODY MASS INDEX (BMI) DOCUMENTED: ICD-10-PCS | Mod: CPTII,S$GLB,, | Performed by: ANESTHESIOLOGY

## 2019-09-23 PROCEDURE — 99999 PR PBB SHADOW E&M-EST. PATIENT-LVL IV: ICD-10-PCS | Mod: PBBFAC,,, | Performed by: ANESTHESIOLOGY

## 2019-09-23 PROCEDURE — 3008F BODY MASS INDEX DOCD: CPT | Mod: CPTII,S$GLB,, | Performed by: ANESTHESIOLOGY

## 2019-09-23 PROCEDURE — 1101F PT FALLS ASSESS-DOCD LE1/YR: CPT | Mod: CPTII,S$GLB,, | Performed by: ANESTHESIOLOGY

## 2019-09-23 PROCEDURE — 99999 PR PBB SHADOW E&M-EST. PATIENT-LVL IV: CPT | Mod: PBBFAC,,, | Performed by: ANESTHESIOLOGY

## 2019-09-23 PROCEDURE — 99214 OFFICE O/P EST MOD 30 MIN: CPT | Mod: S$GLB,,, | Performed by: ANESTHESIOLOGY

## 2019-09-23 PROCEDURE — 3077F PR MOST RECENT SYSTOLIC BLOOD PRESSURE >= 140 MM HG: ICD-10-PCS | Mod: CPTII,S$GLB,, | Performed by: ANESTHESIOLOGY

## 2019-09-23 PROCEDURE — 3077F SYST BP >= 140 MM HG: CPT | Mod: CPTII,S$GLB,, | Performed by: ANESTHESIOLOGY

## 2019-09-23 PROCEDURE — 3080F DIAST BP >= 90 MM HG: CPT | Mod: CPTII,S$GLB,, | Performed by: ANESTHESIOLOGY

## 2019-09-23 RX ORDER — TRAMADOL HYDROCHLORIDE 50 MG/1
50 TABLET ORAL EVERY 8 HOURS PRN
Qty: 90 TABLET | Refills: 0 | Status: SHIPPED | OUTPATIENT
Start: 2019-09-23 | End: 2019-11-13 | Stop reason: SDUPTHER

## 2019-09-23 RX ORDER — GABAPENTIN 300 MG/1
600 CAPSULE ORAL 3 TIMES DAILY
Qty: 180 CAPSULE | Refills: 2 | Status: SHIPPED | OUTPATIENT
Start: 2019-09-23 | End: 2019-11-13 | Stop reason: SDUPTHER

## 2019-09-23 NOTE — PROGRESS NOTES
This note was completed with dictation software and grammatical errors may exist.    Referring Physician: No ref. provider found    PCP: Joey Whitehead MD      CC:  Mid back and rib pain    Interval history:  Patient returns to our clinic.  He is status post thoracic TRINI on a 15 2019.  He reports 50% relief of his mid back and radiating right-sided rib pain.  Overall he feels like it is slowly getting better.  Continues to take gabapentin 300 mg b.i.d. with mild benefits.  He also takes tramadol very sparingly with moderate benefit.  He is pleased with his progress so far.  He denies any weakness.  No bowel bladder changes.  Prior HPI:   Jhonny Almazan is a 63 y.o. male referred to us for mid back and rib pain.  Pain started in April 2018 without traumatic incident.  He reports having constant sharp, shooting pain in his mid back.  Pain travels to his right side ribs below his right chest. He states not developing a rash at that time. He feels pins and needles in those areas.  Pain worsens sitting, standing, touching, coughing and lifting.  Pain improves with rest.  He has tried physical therapy with minimal benefit.  He has been evaluated by spine surgery.  MRI of the thoracic spine was ordered.  Currently takes gabapentin 300 mg t.i.d. prior to his incident with mild benefits.  He has tried topical ointments with mild benefits.  He denies any worsening weakness.  No bowel bladder changes.    ROS:  CONSTITUTIONAL: No fevers, chills, night sweats, wt. loss, appetite changes  SKIN: no rashes or itching  ENT: No headaches, head trauma, vision changes, or eye pain  LYMPH NODES: None noticed   CV: No chest pain, palpitations.   RESP: No shortness of breath, dyspnea on exertion, cough, wheezing, or hemoptysis  GI: No nausea, emesis, diarrhea, constipation, melena, hematochezia, pain.    : No dysuria, hematuria, urgency, or frequency   HEME: No easy bruising, bleeding problems  PSYCHIATRIC: No depression,  anxiety, psychosis, hallucinations.  NEURO: No seizures, memory loss, dizziness or difficulty sleeping  MSK:  Positive HPI      Past Medical History:   Diagnosis Date    Anticoagulant long-term use     Arthritis     Colon polyp     Diabetes mellitus     Diabetes mellitus, type 2     Fatty liver     Hypertension      Past Surgical History:   Procedure Laterality Date    BACK SURGERY      COLONOSCOPY  07/18/2014    Dr. Crane: 22 colon polyps removed, hemorrhoids, erythema to sigmoid, repeat in 1 year for surveillance; biopsy: sigmoid erythema- showed unremarkable colonic mucosa, tubular adenomas and hyperplastic polyps    COLONOSCOPY N/A 5/6/2019    Performed by [facility]ero Crane MD at [facility]    COLONOSCOPY N/A 5/3/2019    [facility] at [facility]    COLONOSCOPY N/A 7/18/2014    [facility] at [facility]    EGD (ESOPHAGOGASTRODUODENOSCOPY) N/A 4/26/2019    Performed by [facility]ane MD at [facility]    HERNIA REPAIR      Injection-steroid-epidural-thoracic N/A 8/30/2019    [facility]     Family History   Problem Relation Age of Onset    Heart disease Mother     Heart disease Father     Diabetes Brother     Suicide Brother     Brain cancer Brother     Colon cancer Neg Hx     Crohn's disease Neg Hx     Ulcerative colitis Neg Hx     Stomach cancer Neg Hx     Esophageal cancer Neg Hx     Glaucoma Neg Hx     Retinal detachment Neg Hx     Macular degeneration Neg Hx      Social History     Socioeconomic History    Marital status:      Spouse name: Not on file    Number of children: Not on file    Years of education: Not on file    Highest education level: Not on file   Occupational History    Not on file   Social Needs    Financial resource strain: Not on file    Food insecurity:     Worry: Not on file     Inability: Not on file    Transportation needs:     Medical: Not on file     Non-medical: Not on  "file   Tobacco Use    Smoking status: Current Every Day Smoker     Packs/day: 1.00     Years: 50.00     Pack years: 50.00     Types: Cigarettes    Smokeless tobacco: Never Used   Substance and Sexual Activity    Alcohol use: Yes     Alcohol/week: 14.0 standard drinks     Types: 14 Cans of beer per week     Comment: 2-3 beers daily    Drug use: No    Sexual activity: Yes     Partners: Female   Lifestyle    Physical activity:     Days per week: Not on file     Minutes per session: Not on file    Stress: Not on file   Relationships    Social connections:     Talks on phone: Not on file     Gets together: Not on file     Attends Yazdanism service: Not on file     Active member of club or organization: Not on file     Attends meetings of clubs or organizations: Not on file     Relationship status: Not on file   Other Topics Concern    Not on file   Social History Narrative    Not on file         Medications/Allergies: See med card    Vitals:    09/23/19 0922   BP: (!) 164/91   Pulse: 85   Weight: 103.9 kg (229 lb)   Height: 6' 5" (1.956 m)   PainSc:   3   PainLoc: Back         Physical exam:    GENERAL: A and O x3, the patient appears well groomed and is in no acute distress.  Skin: No rashes or obvious lesions  HEENT: normocephalic, atraumatic  CARDIOVASCULAR:  Palpable peripheral pulses  LUNGS: easy work of breathing  ABDOMEN: soft, nontender   UPPER EXTREMITIES: Normal alignment, normal range of motion, no atrophy, no skin changes,  hair growth and nail growth normal and equal bilaterally. No swelling, no tenderness.    LOWER EXTREMITIES:  Normal alignment, normal range of motion, no atrophy, no skin changes,  hair growth and nail growth normal and equal bilaterally. No swelling, no tenderness.  Thoracolumbar SPINE  Lumbar spine: ROM is full with flexion extension and oblique extension with no increased pain.    George's test causes no increased pain on either side.    Supine straight leg raise is negative " bilaterally.    Internal and external rotation of the hip causes no increased pain on either side.  Myofascial exam:  Moderate tenderness over right thoracic paraspinal.  There is some hyperalgesia over his T8 dermatome on the right       MENTAL STATUS: normal orientation, speech, language, and fund of knowledge for social situation.  Emotional state appropriate.    CRANIAL NERVES:  II:  PERRL bilaterally,   III,IV,VI: EOMI.    V:  Facial sensation equal bilaterally  VII:  Facial motor function normal.  VIII:  Hearing equal to finger rub bilaterally  IX/X: Gag normal, palate symmetric  XI:  Shoulder shrug equal, head turn equal  XII:  Tongue midline without fasciculations      MOTOR: Tone and bulk: normal bilateral upper and lower Strength: normal   Delt Bi Tri WE WF     R 5 5 5 5 5 5   L 5 5 5 5 5 5     IP ADD ABD Quad TA Gas HAM  R 5 5 5 5 5 5 5  L 5 5 5 5 5 5 5    SENSATION: Light touch and pinprick intact bilaterally  REFLEXES: normal, symmetric, nonbrisk.  Toes down, no clonus. No hoffmans.  GAIT: normal rise, base, steps, and arm swing.        Imaging:  MRI T-spine 6/2019  There is multilevel degenerative disc disease most notable at the T3-T4 through  the T8-T9 levels.. There is mild diffuse disc bulging with slight central disc  protrusion at T7-T8 and very slight right paracentral focal disc protrusion at  T8-T9. There is no significant encroachment of the central canal and no cord or  nerve root impingement.    Assessment:  Patient referred for mid back pain  1. Thoracic radiculitis    2. Post herpetic neuralgia          Plan:  1. I have stressed the importance of physical activity and exercise to improve overall health  2. Reviewed pertinent imaging and records with patient  3.  Patient with mid back pain with equally bothersome radiating right rib pain following a specific dermatome.  He described a prodrome of symptoms prior to his mid back pain. I suspect he he has symptoms consistent with post  herpetic neuralgia without appearance of rash.  There is a small percentage of patients that will have shingles without rash.  Recommend increasing gabapentin to 600 mg t.i.d..  Use topical ointment as tolerated.  4.  May consider repeat thoracic epidural steroid injection for further compounding benefit in the future.  5.  Refill given for Tramadol for his post herpetic neuralgia pain.  6.  Follow-up in 3 months or sooner if needed

## 2019-09-30 ENCOUNTER — OFFICE VISIT (OUTPATIENT)
Dept: FAMILY MEDICINE | Facility: CLINIC | Age: 64
End: 2019-09-30
Payer: COMMERCIAL

## 2019-09-30 ENCOUNTER — TELEPHONE (OUTPATIENT)
Dept: VASCULAR SURGERY | Facility: CLINIC | Age: 64
End: 2019-09-30

## 2019-09-30 VITALS
TEMPERATURE: 99 F | OXYGEN SATURATION: 96 % | WEIGHT: 231.5 LBS | BODY MASS INDEX: 27.33 KG/M2 | SYSTOLIC BLOOD PRESSURE: 126 MMHG | HEIGHT: 77 IN | HEART RATE: 90 BPM | DIASTOLIC BLOOD PRESSURE: 70 MMHG

## 2019-09-30 DIAGNOSIS — E11.69 HYPERLIPIDEMIA ASSOCIATED WITH TYPE 2 DIABETES MELLITUS: ICD-10-CM

## 2019-09-30 DIAGNOSIS — Z13.6 SCREENING FOR AAA (ABDOMINAL AORTIC ANEURYSM): Primary | ICD-10-CM

## 2019-09-30 DIAGNOSIS — E11.40 TYPE 2 DIABETES MELLITUS WITH DIABETIC NEUROPATHY, WITHOUT LONG-TERM CURRENT USE OF INSULIN: ICD-10-CM

## 2019-09-30 DIAGNOSIS — F17.210 CIGARETTE NICOTINE DEPENDENCE WITHOUT COMPLICATION: ICD-10-CM

## 2019-09-30 DIAGNOSIS — E11.59 HYPERTENSION ASSOCIATED WITH DIABETES: ICD-10-CM

## 2019-09-30 DIAGNOSIS — E78.5 HYPERLIPIDEMIA ASSOCIATED WITH TYPE 2 DIABETES MELLITUS: ICD-10-CM

## 2019-09-30 DIAGNOSIS — Z12.5 PROSTATE CANCER SCREENING: ICD-10-CM

## 2019-09-30 DIAGNOSIS — I15.2 HYPERTENSION ASSOCIATED WITH DIABETES: ICD-10-CM

## 2019-09-30 PROCEDURE — 3044F PR MOST RECENT HEMOGLOBIN A1C LEVEL <7.0%: ICD-10-PCS | Mod: CPTII,S$GLB,, | Performed by: FAMILY MEDICINE

## 2019-09-30 PROCEDURE — 3078F PR MOST RECENT DIASTOLIC BLOOD PRESSURE < 80 MM HG: ICD-10-PCS | Mod: CPTII,S$GLB,, | Performed by: FAMILY MEDICINE

## 2019-09-30 PROCEDURE — 99213 OFFICE O/P EST LOW 20 MIN: CPT | Mod: 25,S$GLB,, | Performed by: FAMILY MEDICINE

## 2019-09-30 PROCEDURE — 1100F PTFALLS ASSESS-DOCD GE2>/YR: CPT | Mod: CPTII,S$GLB,, | Performed by: FAMILY MEDICINE

## 2019-09-30 PROCEDURE — 3078F DIAST BP <80 MM HG: CPT | Mod: CPTII,S$GLB,, | Performed by: FAMILY MEDICINE

## 2019-09-30 PROCEDURE — 3044F HG A1C LEVEL LT 7.0%: CPT | Mod: CPTII,S$GLB,, | Performed by: FAMILY MEDICINE

## 2019-09-30 PROCEDURE — 3288F FALL RISK ASSESSMENT DOCD: CPT | Mod: CPTII,S$GLB,, | Performed by: FAMILY MEDICINE

## 2019-09-30 PROCEDURE — 3008F PR BODY MASS INDEX (BMI) DOCUMENTED: ICD-10-PCS | Mod: CPTII,S$GLB,, | Performed by: FAMILY MEDICINE

## 2019-09-30 PROCEDURE — 99213 PR OFFICE/OUTPT VISIT, EST, LEVL III, 20-29 MIN: ICD-10-PCS | Mod: 25,S$GLB,, | Performed by: FAMILY MEDICINE

## 2019-09-30 PROCEDURE — 3074F PR MOST RECENT SYSTOLIC BLOOD PRESSURE < 130 MM HG: ICD-10-PCS | Mod: CPTII,S$GLB,, | Performed by: FAMILY MEDICINE

## 2019-09-30 PROCEDURE — 1100F PR PT FALLS ASSESS DOC 2+ FALLS/FALL W/INJURY/YR: ICD-10-PCS | Mod: CPTII,S$GLB,, | Performed by: FAMILY MEDICINE

## 2019-09-30 PROCEDURE — 90471 FLU VACCINE - HIGH DOSE (65+) PRESERVATIVE FREE IM: ICD-10-PCS | Mod: S$GLB,,, | Performed by: FAMILY MEDICINE

## 2019-09-30 PROCEDURE — 90662 FLU VACCINE - HIGH DOSE (65+) PRESERVATIVE FREE IM: ICD-10-PCS | Mod: S$GLB,,, | Performed by: FAMILY MEDICINE

## 2019-09-30 PROCEDURE — 3074F SYST BP LT 130 MM HG: CPT | Mod: CPTII,S$GLB,, | Performed by: FAMILY MEDICINE

## 2019-09-30 PROCEDURE — 99999 PR PBB SHADOW E&M-EST. PATIENT-LVL IV: ICD-10-PCS | Mod: PBBFAC,,, | Performed by: FAMILY MEDICINE

## 2019-09-30 PROCEDURE — 90471 IMMUNIZATION ADMIN: CPT | Mod: S$GLB,,, | Performed by: FAMILY MEDICINE

## 2019-09-30 PROCEDURE — 3008F BODY MASS INDEX DOCD: CPT | Mod: CPTII,S$GLB,, | Performed by: FAMILY MEDICINE

## 2019-09-30 PROCEDURE — 99999 PR PBB SHADOW E&M-EST. PATIENT-LVL IV: CPT | Mod: PBBFAC,,, | Performed by: FAMILY MEDICINE

## 2019-09-30 PROCEDURE — 90662 IIV NO PRSV INCREASED AG IM: CPT | Mod: S$GLB,,, | Performed by: FAMILY MEDICINE

## 2019-09-30 PROCEDURE — 3288F PR FALLS RISK ASSESSMENT DOCUMENTED: ICD-10-PCS | Mod: CPTII,S$GLB,, | Performed by: FAMILY MEDICINE

## 2019-09-30 RX ORDER — ROSUVASTATIN CALCIUM 20 MG/1
20 TABLET, COATED ORAL DAILY
Qty: 90 TABLET | Refills: 3 | Status: ON HOLD | OUTPATIENT
Start: 2019-09-30 | End: 2020-11-24 | Stop reason: HOSPADM

## 2019-09-30 RX ORDER — HYDRALAZINE HYDROCHLORIDE 25 MG/1
25 TABLET, FILM COATED ORAL EVERY 12 HOURS
Qty: 180 TABLET | Refills: 8 | Status: SHIPPED | OUTPATIENT
Start: 2019-09-30 | End: 2021-01-14 | Stop reason: SDUPTHER

## 2019-09-30 RX ORDER — GLIPIZIDE 5 MG/1
5 TABLET ORAL 2 TIMES DAILY WITH MEALS
Qty: 180 TABLET | Refills: 3 | Status: SHIPPED | OUTPATIENT
Start: 2019-09-30 | End: 2020-03-24 | Stop reason: ALTCHOICE

## 2019-09-30 RX ORDER — LISINOPRIL AND HYDROCHLOROTHIAZIDE 12.5; 2 MG/1; MG/1
1 TABLET ORAL 2 TIMES DAILY
Qty: 180 TABLET | Refills: 0 | Status: SHIPPED | OUTPATIENT
Start: 2019-09-30 | End: 2020-01-02

## 2019-09-30 RX ORDER — METFORMIN HYDROCHLORIDE 500 MG/1
1000 TABLET, EXTENDED RELEASE ORAL 2 TIMES DAILY
Qty: 360 TABLET | Refills: 0 | Status: SHIPPED | OUTPATIENT
Start: 2019-09-30 | End: 2021-01-14 | Stop reason: SDUPTHER

## 2019-09-30 RX ORDER — BUPROPION HYDROCHLORIDE 300 MG/1
300 TABLET ORAL DAILY
Qty: 90 TABLET | Refills: 3 | Status: SHIPPED | OUTPATIENT
Start: 2019-09-30 | End: 2021-01-14 | Stop reason: SDUPTHER

## 2019-09-30 NOTE — PROGRESS NOTES
SUBJECTIVE:  63 y.o. male for follow up of diabetes. Diabetic Review of Systems - medication compliance: compliant most of the time, diabetic diet compliance: noncompliant some of the time, home glucose monitoring: is performed sporadically, fasting values range 130- 150, further diabetic ROS: no polyuria or polydipsia, no chest pain, dyspnea or TIA's, no numbness, tingling or pain in extremities, no hypoglycemia, no medication side effects noted, weight has decreased, has dysesthesias in the feet, blurry vision, last eye exam approximately 1 ago.  Other symptoms and concerns: 30 pounds weight loss due to diet changes, smaller portions, and less starches.    Current Outpatient Medications   Medication Sig Dispense Refill    ACETAMINOPHEN (TYLENOL ARTHRITIS ORAL) Take 1 tablet by mouth daily as needed (pain).       buPROPion (WELLBUTRIN XL) 300 MG 24 hr tablet Take 1 tablet (300 mg total) by mouth once daily. 90 tablet 3    co-enzyme Q-10 50 mg capsule Take 50 mg by mouth once daily.      diclofenac sodium (VOLTAREN) 1 % Gel Apply 2 g topically 3 (three) times daily as needed. For hand pain 100 g 11    empagliflozin (JARDIANCE) 10 mg Tab Take 10 mg by mouth once daily. 30 tablet 11    folic acid-vit B6-vit B12 2.5-25-2 mg (FOLBIC OR EQUIV) 2.5-25-2 mg Tab Take 1 tablet by mouth once daily. 90 tablet 4    gabapentin (NEURONTIN) 300 MG capsule Take 2 capsules (600 mg total) by mouth 3 (three) times daily. 180 capsule 2    glipiZIDE (GLUCOTROL) 5 MG tablet Take 1 tablet (5 mg total) by mouth 2 (two) times daily with meals. 180 tablet 3    hydrALAZINE (APRESOLINE) 25 MG tablet Take 1 tablet (25 mg total) by mouth every 12 (twelve) hours. 180 tablet 8    lactulose (CHRONULAC) 10 gram/15 mL solution TAKE 30 MLS (20 G TOTAL) BY MOUTH 2 (TWO) TIMES DAILY. 1892 mL 0    lisinopril-hydrochlorothiazide (PRINZIDE,ZESTORETIC) 20-12.5 mg per tablet Take 1 tablet by mouth 2 (two) times daily. 180 tablet 0    magnesium  "citrate solution Take 296 mLs by mouth daily as needed (constipation).       metFORMIN (GLUCOPHAGE-XR) 500 MG 24 hr tablet Take 2 tablets (1,000 mg total) by mouth 2 (two) times daily. 360 tablet 0    omeprazole (PRILOSEC) 40 MG capsule TAKE 1 CAPSULE (40 MG TOTAL) BY MOUTH BEFORE BREAKFAST. 30 capsule 3    pioglitazone (ACTOS) 30 MG tablet Take 1 tablet (30 mg total) by mouth once daily. 90 tablet 2    rosuvastatin (CRESTOR) 20 MG tablet Take 1 tablet (20 mg total) by mouth once daily. 90 tablet 3    traMADol (ULTRAM) 50 mg tablet Take 1 tablet (50 mg total) by mouth every 8 (eight) hours as needed for Pain. Medically necessary for greater than 7 days for chronic pain 90 tablet 0     No current facility-administered medications for this visit.        OBJECTIVE:  Appearance: alert, well appearing, and in no distress, oriented to person, place, and time, obese, acyanotic, in no respiratory distress, playful, active and well hydrated.  /70 (BP Location: Right arm, Patient Position: Sitting, BP Method: Medium (Manual))   Pulse 90   Temp 98.8 °F (37.1 °C) (Oral)   Ht 6' 5" (1.956 m)   Wt 105 kg (231 lb 7.7 oz)   SpO2 96%   BMI 27.45 kg/m²     Exam: fundi discs sharp, no papilledema, no hemorrhages or exudates and no hypertensive retinopathy, heart sounds normal rate, regular rhythm, normal S1, S2, no murmurs, rubs, clicks or gallops, normal rate and regular rhythm, S1 and S2 normal, no murmurs noted, no gallops noted, no JVD, chest clear, no hepatosplenomegaly, no carotid bruits, feet: warm, good capillary refill and blanching with elevation noted  Protective Sensation (w/ 10 gram monofilament):  Right: Intact  Left: Intact    Visual Inspection:  Normal -  Bilateral, Nails Intact - without Evidence of Foot Deformity- Bilateral and None -  Neither    Pedal Pulses:   Right: Diminshed  Left: Diminshed    Posterior tibialis:   Right:Diminshed  Left: Diminshed      ASSESSMENT:  Diabetes Mellitus: improved, " control uncertain, needs further observation, needs improvement, needs to quit smoking and needs to follow diet more regularly  Screening for AAA (abdominal aortic aneurysm)  -     Ambulatory referral to Vascular Surgery    Cigarette nicotine dependence without complication  -     buPROPion (WELLBUTRIN XL) 300 MG 24 hr tablet; Take 1 tablet (300 mg total) by mouth once daily.  Dispense: 90 tablet; Refill: 3    Hyperlipidemia associated with type 2 diabetes mellitus  -     rosuvastatin (CRESTOR) 20 MG tablet; Take 1 tablet (20 mg total) by mouth once daily.  Dispense: 90 tablet; Refill: 3    Hypertension associated with diabetes  -     hydrALAZINE (APRESOLINE) 25 MG tablet; Take 1 tablet (25 mg total) by mouth every 12 (twelve) hours.  Dispense: 180 tablet; Refill: 8  -     lisinopril-hydrochlorothiazide (PRINZIDE,ZESTORETIC) 20-12.5 mg per tablet; Take 1 tablet by mouth 2 (two) times daily.  Dispense: 180 tablet; Refill: 0    Uncontrolled type 2 diabetes mellitus with complication, without long-term current use of insulin  -     hydrALAZINE (APRESOLINE) 25 MG tablet; Take 1 tablet (25 mg total) by mouth every 12 (twelve) hours.  Dispense: 180 tablet; Refill: 8  -     lisinopril-hydrochlorothiazide (PRINZIDE,ZESTORETIC) 20-12.5 mg per tablet; Take 1 tablet by mouth 2 (two) times daily.  Dispense: 180 tablet; Refill: 0  -     glipiZIDE (GLUCOTROL) 5 MG tablet; Take 1 tablet (5 mg total) by mouth 2 (two) times daily with meals.  Dispense: 180 tablet; Refill: 3  -     metFORMIN (GLUCOPHAGE-XR) 500 MG 24 hr tablet; Take 2 tablets (1,000 mg total) by mouth 2 (two) times daily.  Dispense: 360 tablet; Refill: 0  -     Lipid panel; Future; Expected date: 09/30/2019  -     Comprehensive metabolic panel; Future; Expected date: 09/30/2019  -     Hemoglobin A1c; Future; Expected date: 09/30/2019  -     CBC auto differential; Future; Expected date: 09/30/2019    Type 2 diabetes mellitus with diabetic neuropathy, without long-term  current use of insulin  -     glipiZIDE (GLUCOTROL) 5 MG tablet; Take 1 tablet (5 mg total) by mouth 2 (two) times daily with meals.  Dispense: 180 tablet; Refill: 3    Prostate cancer screening  -     PROSTATE SPECIFIC ANTIGEN, DIAGNOSTIC; Future; Expected date: 09/30/2019    Other orders  -     Influenza - High Dose (65+) (PF) (IM)      [facility] goal documentation:  Patient readiness: acceptance and barriers:readiness and social stressors  During the course of the visit the patient was educated and counseled about the following: Diabetes:  Discussed general issues about diabetes pathophysiology and management.  Hypertension:   Check blood pressures 3 times weekly and record.  Obesity:   General weight loss/lifestyle modification strategies discussed (elicit support from others; identify saboteurs; non-food rewards, etc).  Behavioral treatment: Slim Fast.  Diet interventions: low calorie (1000 kCal/d) deficit diet and qualitative changes (increase low-fat,  high-fiber foods).  Informal exercise measures discussed, e.g. taking stairs instead of elevator.  Regular aerobic exercise program discussed.  Goals: Hypertension: Reduce Blood Pressure and Obesity: Reduce calorie intake and BMI  Goal/Outcomes met:Hypertension and obesity  The following self management tools provided:blood pressure log  Patient Instructions (the written plan) was given to the patient/family: Yes  Time spent with patient: 40 minutes    Patient with be reevaluated in 4 months or sooner prn    Greater than 50% of this visit was spent counseling as described in above documentation:Yes      PLAN:  See orders for this visit as documented in the electronic medical record.  Issues reviewed with him: diabetic diet discussed in detail, written exchange diet given, low cholesterol diet, weight control and daily exercise discussed, home glucose monitoring emphasized, all medications, side effects and compliance discussed carefully, diabetic Sick Day rules  reviewed, handout given, annual eye examinations at Ophthalmology discussed, glycohemoglobin and other lab monitoring discussed, patient urged in the strongest terms to quit smoking and labs immediately prior to next visit.

## 2019-09-30 NOTE — TELEPHONE ENCOUNTER
----- Message from Sherry Diaz sent at 9/30/2019  4:17 PM CDT -----  Pt was seen today and needs an appt for  Screening for AAA (abdominal aortic aneurysm)  Had u/s done in may or June 2019 @ [facility] er  Please call him @ [phone]  Thanks !

## 2019-09-30 NOTE — PATIENT INSTRUCTIONS
Low-Salt Diet  This diet removes foods that are high in salt. It also limits the amount of salt you use when cooking. It is most often used for people with high blood pressure, edema (fluid retention), and kidney, liver, or heart disease.  Table salt contains the mineral sodium. Your body needs sodium to work normally. But too much sodium can make your health problems worse. Your healthcare provider is recommending a low-salt (also called low-sodium) diet for you. Your total daily allowance of salt is 1,500 to 2,300 milligrams (mg). It is less than 1 teaspoon of table salt. This means you can have only about 500 to 700 mg of sodium at each meal. People with certain health problems should limit salt intake to the lower end of the recommended range.    When you cook, dont add much salt. If you can cook without using salt, even better. Dont add salt to your food at the table.  When shopping, read food labels. Salt is often called sodium on the label. Choose foods that are salt-free, low salt, or very low salt. Note that foods with reduced salt may not lower your salt intake enough.    Beans, potatoes, and pasta  Ok: Dry beans, split peas, lentils, potatoes, rice, macaroni, pasta, spaghetti without added salt  Avoid: Potato chips, tortilla chips, and similar products  Breads and cereals  Ok: Low-sodium breads, rolls, cereals, and cakes; low-salt crackers, matzo crackers  Avoid: Salted crackers, pretzels, popcorn, Wolof toast, pancakes, muffins  Dairy  Ok: Milk, chocolate milk, hot chocolate mix, low-salt cheeses, and yogurt  Avoid: Processed cheese and cheese spreads; Roquefort, Camembert, and cottage cheese; buttermilk, instant breakfast drink  Desserts  Ok: Ice cream, frozen yogurt, juice bars, gelatin, cookies and pies, sugar, honey, jelly, hard candy  Avoid: Most pies, cakes and cookies prepared or processed with salt; instant pudding  Drinks  Ok: Tea, coffee, fizzy (carbonated) drinks, juices  Avoid: Flavored  coffees, electrolyte replacement drinks, sports drinks  Meats  Ok: All fresh meat, fish, poultry, low-salt tuna, eggs, egg substitute  Avoid: Smoked, pickled, brine-cured, or salted meats and fish. This includes norris, chipped beef, corned beef, hot dogs, deli meats, ham, kosher meats, salt pork, sausage, canned tuna, salted codfish, smoked salmon, herring, sardines, or anchovies.  Seasonings and spices  Ok: Most seasonings are okay. Good substitutes for salt include: fresh herb blends, hot sauce, lemon, garlic, palomares, vinegar, dry mustard, parsley, cilantro, horseradish, tomato paste, regular margarine, mayonnaise, unsalted butter, cream cheese, vegetable oil, cream, low-salt salad dressing and gravy.  Avoid: Regular ketchup, relishes, pickles, soy sauce, teriyaki sauce, Worcestershire sauce, BBQ sauce, tartar sauce, meat tenderizer, chili sauce, regular gravy, regular salad dressing, salted butter  Soups  Ok: Low-salt soups and broths made with allowed foods  Avoid: Bouillon cubes, soups with smoked or salted meats, regular soup and broth  Vegetables  Ok: Most vegetables are okay; also low-salt tomato and vegetable juices  Avoid: Sauerkraut and other brine-soaked vegetables; pickles and other pickled vegetables; tomato juice, olives  Date Last Reviewed: 8/1/2016  © 2671-4688 [employer]. [address] [place] [address]. All rights reserved. This information is not intended as a substitute for professional medical care. Always follow your healthcare professional's instructions.        Step-by-Step  Checking Your Blood Pressure    Date Last Reviewed: 4/27/2016  © 0725-7820 [employer]. [address] [place] [address]. All rights reserved. This information is not intended as a substitute for professional medical care. Always follow your healthcare professional's instructions.        Abdominal Aortic Aneurysm (Stable)    The aorta is the bodys main artery that carries  oxygen-rich blood from the heart. It travels from the heart down to the lower abdomen, where it divides into smaller blood vessels. An aneurysm is a bulge or dilatation in the wall of an artery, in this example, the aorta. This happens because there is a weakened spot in the artery wall causing that area to begin to deteriorate. This allows the artery to bulge or balloon out creating the aneurysm. It may remain stable and cause no problems, or it can expand and lengthen. If this happens, it can affect the blood flow to different organs. It may also leak or rupture (break open) and cause internal bleeding and even death.    A number of things can cause aortic aneurysms including:  · Atherosclerosis  · Hypertension  · Injury  · Infection  · Marfan syndrome (An inherited condition that most commonly affects the heart, eyes, blood vessels, and skeleton.)  Risk factors that have been associated with aortic aneurysms include:  · Atherosclerosis  · Hypertension  · Older age  · Family history  · Elevated cholesterol  · Obesity  · Tobacco use  · Men more than women  Most aortic aneurysms do not cause any symptoms until they begin to expand rapidly or rupture. Therefore, most aneurysms are discovered on exams or tests done for other reasons (like an X-ray, ultrasound or CT scan). When there are symptoms, they may be vague, or they can include:  · Deep, steady pain in the abdomen and back  · Pulsating feeling in your abdomen  · Weakness  · Dizziness, fainting  · Low blood pressure  Once there are symptoms, it is important that it be taken care of. An expanding aneurysm causes symptoms of abdomen, back, flank or groin pain, which may come and go at first, or become constant. When an aneurysm ruptures, there can be sudden abdominal, back or groin pain, followed by weakness, dizziness and loss of consciousness as blood pressure drops and a shock state occurs. This is a fatal condition unless immediate surgery is performed.  Small  aneurysms rarely rupture and can often be treated with medicines to lower blood pressure and reduce stress on the aortic wall. Routine ultrasound or CT scans can determine if the aneurysm is growing. Larger or expanding aneurysms will require surgery. Surgical treatment involves removing the section of aorta where the aneurysm is and replacing it with an aortic graft (artificial blood vessel). A newer alternative to surgery, which can be used in certain cases, involves the placement of a stent (tubular wire mesh) inside the aorta to support the wall and reduce stress on the aneurysm. Rarely, a blood clot can form inside of an aortic aneurysm with no symptoms. A piece of the clot can break off and pass to smaller blood vessels in the intestines or legs and cause pain and loss of blood flow to that part.  If a small aneurysm has been identified, which does not require surgery, you should still change any lifestyle factors that may improve your overall cardiovascular health. This includes such things as following a healthy diet, losing weight, stopping smoking, and lowering your cholesterol.  Home care  · Your aneurysm is small and does not require surgery; you will be followed along as an outpatient with routine ultrasound screening exams to measure the size of the aneurysm every 6 months.  · You may return to your usual level of activity.  · Follow these guidelines to improve your cardiac health:  ¨ If you are overweight, begin a weight loss program.  ¨ If you have hypertension, reduce your salt intake. Avoid high salt foods and dont add salt when cooking.  ¨ Begin an exercise program. Discuss with your doctor what type of exercise program would be best for you. It doesn't have to be difficult. Even brisk walking for 20 minutes three times a week is a good form of exercise.  ¨ Avoid medicines containing stimulants. This includes many cold and sinus decongestant pills and sprays as well as diet pills. Check the  warnings about hypertension on the label. Stimulants such as amphetamine or cocaine could be lethal for someone with hypertension. Never take these.  ¨ Limit your caffeine intake or switch to caffeine-free products.  ¨ Stop smoking. No matter how long you've smoked, quitting can be hard. Enroll in a stop-smoking program to improve your chance of success.  · Learning how to handle stress better is an important part of any program to lower blood pressure. Learn about relaxation methods such as meditation, yoga, or biofeedback.  · If medicines were prescribed for hypertension, take them exactly as directed. Missing doses may cause your blood pressure get out of control.  · Consider buying an automatic blood pressure machine (available at most pharmacies). Use this to monitor your blood pressure at home and report the results to your doctor.  Follow up care  Regular visits to your healthcare provider for blood pressure checks and periodic ultrasounds of the aorta are an important part of your care. Make a follow-up appointment with your doctor, or as directed by our staff.  When to seek medical advice   Call your healthcare provider if any of the following occur:  · Sudden severe abdominal, back, flank or groin pain  · Blood in your stools  · Weakness or dizziness  · Weakness, numbness, pain or coolness of one leg  Call 911  The symptoms of a heart attack or stroke can be life threatening. If you see or have any of the following symptoms, call 911 right away:   · Trouble breathing  · Confused or difficulty arousing  · Fainting or loss of consciousness  · Rapid heart rate  · New chest, arm, shoulder, neck or upper back pain  · Difficulty with speech or vision, weakness of an arm or leg  · Difficulty walking or talking, loss of balance, numbness or weakness in one side of you body, facial droop  Date Last Reviewed: 9/25/2015  © 8598-6615 [employer]. [address] [place] [address], [place] [address]. All rights  reserved. This information is not intended as a substitute for professional medical care. Always follow your healthcare professional's instructions.        After Open Abdominal Aortic Aneurysm Surgery  You have had surgery to repair an abdominal aortic aneurysm (AAA). This happens when the main blood vessel in your abdominal area weakens and expands like a balloon. Your healthcare provider placed a graft to replace the part of your aorta that was weak. Here's what you need to know following surgery.  Home care  Recommendations for taking care of yourself at home include the following:   · Avoid strenuous activity for 4 to 6 weeks after your surgery.  · Ask your healthcare provider how long it will be before you can return to work.  · Gradually increase your activity. It may take some time for you to return to your normal activity level.  · Dont drive for 2 weeks after surgery or while you are taking opioid pain medicine. Ask someone to take you to any appointments.  · Check your incision every day for signs of infection (swelling, redness, drainage, or warmth).  · Keep your incision clean. Wash it gently with soap and water when you shower.  · Dont lift anything heavier than 5 pounds for 2 weeks after surgery.  · Avoid sitting or standing for long periods without moving your legs and feet.  · Keep your feet up when you sit in a chair.  · Take your medicines exactly as directed.     When to call your healthcare provider  Call your healthcare provider right away if you have any of the following:  · Redness, pain, swelling, or drainage from your incision  · Fever of 100.4°F (38°C) or higher, or as directed by your healthcare provider  · Sudden coldness, pain, or paleness in your leg  · Loss of feeling in your legs  · Severe or sudden pain in your stomach  · Fail to pass gas  · Bloody bowel movements  · Prolonged constipation  · Nausea or vomiting  · Trouble breathing  · Pain or heaviness in your chest or arms   Date  Last Reviewed: 5/1/2016  © 1210-3356 [employer]. [address] [place] [address]. All rights reserved. This information is not intended as a substitute for professional medical care. Always follow your healthcare professional's instructions.        Understanding Abdominal Aortic Aneurysm  You may have been told that you have an aneurysm. This is when a weakened part of a blood vessel expands like a balloon. An aneurysm in the main blood vessel in your stomach area is called an abdominal aortic aneurysm (AAA).  What is AAA?     An aneurysm happens when a weakened part of the aorta wall stretches and expands.     The aorta is the large artery that carries blood from the heart to the rest of the body. With AAA, part of the aorta weakens and expands. If an aneurysm gets large enough, it may burst. This is very serious, and usually fatal.  How is an aneurysm found?  AAA usually causes no symptoms. It is often found when tests (such as an X-ray, MRI, or CT scan) are done for an unrelated problem. Or your healthcare provider may find it while feeling your stomach during a routine exam.  Who develops AAA?  These things increase your chances of having AAA:  · AAA runs in your family  · Your age--AAA is more likely as you get older  · Men are more likely than women to have AAA  · Smoking  · High blood pressure  · High cholesterol level (a buildup of fat and other materials in the blood)  · Injury (such as a car accident  What can be done?  Surgery can be done to remove an aneurysm. Your healthcare provider will weigh the chances that the aneurysm will burst against the risks of treatment. Because a small aneurysm is not likely to burst, it may be watched for a while. When it reaches a certain size, you may have surgery to replace that section of your aorta.  Date Last Reviewed: 4/26/2016  © 5345-5130 [employer]. [address] [place] [address]. All rights reserved. This  information is not intended as a substitute for professional medical care. Always follow your healthcare professional's instructions.

## 2019-10-01 NOTE — TELEPHONE ENCOUNTER
Spoke to patient, consultation scheduled with Dr Beckford on Thursday, 11/14, 1:30 pm @ the [facility].  Voices understanding and is agreeable, home address confirmed, appointment confirmation mailed.

## 2019-10-01 NOTE — TELEPHONE ENCOUNTER
----- Message from Tsering Segura sent at 10/1/2019  3:27 PM CDT -----  Contact: Patient  Type:  Patient Returning Call    Who Called:  Patient  Who Left Message for Patient:  Agatha  Does the patient know what this is regarding?:  appt  Best Call Back Number: [phone]

## 2019-10-18 ENCOUNTER — TELEPHONE (OUTPATIENT)
Dept: FAMILY MEDICINE | Facility: CLINIC | Age: 64
End: 2019-10-18

## 2019-10-18 ENCOUNTER — CLINICAL SUPPORT (OUTPATIENT)
Dept: FAMILY MEDICINE | Facility: CLINIC | Age: 64
End: 2019-10-18
Payer: MEDICARE

## 2019-10-18 VITALS — SYSTOLIC BLOOD PRESSURE: 138 MMHG | HEART RATE: 83 BPM | DIASTOLIC BLOOD PRESSURE: 82 MMHG

## 2019-10-18 PROCEDURE — 99999 PR PBB SHADOW E&M-EST. PATIENT-LVL I: CPT | Mod: PBBFAC,,,

## 2019-10-18 PROCEDURE — 99999 PR PBB SHADOW E&M-EST. PATIENT-LVL I: ICD-10-PCS | Mod: PBBFAC,,,

## 2019-10-18 PROCEDURE — 99211 OFF/OP EST MAY X REQ PHY/QHP: CPT | Mod: PBBFAC,PO

## 2019-10-18 NOTE — PROGRESS NOTES
Patient present for nurse blood pressure check. Denies chest pain, headache, dizziness, dyspnea, or pain. No noted edema.  Patient is currently prescribed the followin. Hydralazine 25 mg twice daily.  2. Lisinopril Hydrochlorothiazide 20-12.5 mg twice daily.      Patient verbalizes taking all medications as prescribed. Blood pressure assessed manually with result of 138/82, Pulse 83. Patient advised to continue medication as currently prescribed, follow low sodium diet, and exercise daily as tolerated. Message will be forwarded to Dr. Whitehead for notification.

## 2019-10-29 DIAGNOSIS — K59.00 CONSTIPATION, UNSPECIFIED CONSTIPATION TYPE: ICD-10-CM

## 2019-10-29 RX ORDER — LACTULOSE 10 G/15ML
SOLUTION ORAL; RECTAL
Qty: 1892 ML | Refills: 3 | Status: SHIPPED | OUTPATIENT
Start: 2019-10-29 | End: 2021-01-14 | Stop reason: SDUPTHER

## 2019-11-13 ENCOUNTER — OFFICE VISIT (OUTPATIENT)
Dept: ORTHOPEDICS | Facility: CLINIC | Age: 64
End: 2019-11-13
Payer: MEDICARE

## 2019-11-13 VITALS
WEIGHT: 230 LBS | BODY MASS INDEX: 27.16 KG/M2 | DIASTOLIC BLOOD PRESSURE: 82 MMHG | HEIGHT: 77 IN | HEART RATE: 92 BPM | SYSTOLIC BLOOD PRESSURE: 136 MMHG

## 2019-11-13 DIAGNOSIS — E11.40 TYPE 2 DIABETES MELLITUS WITH DIABETIC NEUROPATHY, WITHOUT LONG-TERM CURRENT USE OF INSULIN: ICD-10-CM

## 2019-11-13 DIAGNOSIS — M51.9 THORACIC DISC DISEASE: ICD-10-CM

## 2019-11-13 DIAGNOSIS — M46.04 SPINAL ENTHESOPATHY, THORACIC REGION: ICD-10-CM

## 2019-11-13 DIAGNOSIS — M54.14 THORACIC RADICULITIS: Primary | ICD-10-CM

## 2019-11-13 DIAGNOSIS — B02.29 POST HERPETIC NEURALGIA: ICD-10-CM

## 2019-11-13 PROCEDURE — 99213 PR OFFICE/OUTPT VISIT, EST, LEVL III, 20-29 MIN: ICD-10-PCS | Mod: S$GLB,,, | Performed by: ORTHOPAEDIC SURGERY

## 2019-11-13 PROCEDURE — 99213 OFFICE O/P EST LOW 20 MIN: CPT | Mod: S$GLB,,, | Performed by: ORTHOPAEDIC SURGERY

## 2019-11-13 RX ORDER — GABAPENTIN 300 MG/1
600 CAPSULE ORAL 3 TIMES DAILY
Qty: 180 CAPSULE | Refills: 2 | Status: SHIPPED | OUTPATIENT
Start: 2019-11-13 | End: 2020-08-10 | Stop reason: SDUPTHER

## 2019-11-13 RX ORDER — TIZANIDINE HYDROCHLORIDE 4 MG/1
4 CAPSULE, GELATIN COATED ORAL 3 TIMES DAILY PRN
Qty: 90 CAPSULE | Refills: 2 | Status: SHIPPED | OUTPATIENT
Start: 2019-11-13 | End: 2019-11-23

## 2019-11-13 RX ORDER — TRAMADOL HYDROCHLORIDE 50 MG/1
50 TABLET ORAL EVERY 4 HOURS PRN
Qty: 42 TABLET | Refills: 3 | Status: SHIPPED | OUTPATIENT
Start: 2019-11-13 | End: 2019-11-20

## 2019-11-13 NOTE — PROGRESS NOTES
Subjective:       Patient ID: Jhonny Almazan is a 63 y.o. male.    Chief Complaint: Pain of the Thoracic Spine (3 month f/u [facility] 8.12.19. State that his back is a little better, States that the injection did help some. )      History of Present Illness  Patient is here to follow-up for right-sided thoracic pain.  When he was last year the majority of his pain was radiating into the area of his belly button.  He did have a thoracic medial branch block on the right which helped resolve some of those symptoms but he now has some upper thoracic pain that is radiating anteriorly to about the lower chest.    Current Medications  Current Outpatient Medications   Medication Sig Dispense Refill    ACETAMINOPHEN (TYLENOL ARTHRITIS ORAL) Take 1 tablet by mouth daily as needed (pain).       buPROPion (WELLBUTRIN XL) 300 MG 24 hr tablet Take 1 tablet (300 mg total) by mouth once daily. 90 tablet 3    diclofenac sodium (VOLTAREN) 1 % Gel Apply 2 g topically 3 (three) times daily as needed. For hand pain 100 g 11    empagliflozin (JARDIANCE) 10 mg Tab Take 10 mg by mouth once daily. 30 tablet 11    folic acid-vit B6-vit B12 2.5-25-2 mg (FOLBIC OR EQUIV) 2.5-25-2 mg Tab Take 1 tablet by mouth once daily. 90 tablet 4    gabapentin (NEURONTIN) 300 MG capsule Take 2 capsules (600 mg total) by mouth 3 (three) times daily. 180 capsule 2    glipiZIDE (GLUCOTROL) 5 MG tablet Take 1 tablet (5 mg total) by mouth 2 (two) times daily with meals. 180 tablet 3    hydrALAZINE (APRESOLINE) 25 MG tablet Take 1 tablet (25 mg total) by mouth every 12 (twelve) hours. 180 tablet 8    lactulose (CHRONULAC) 10 gram/15 mL solution TAKE 30 MLS BY MOUTH 2 TIMES DAILY. 1892 mL 3    lisinopril-hydrochlorothiazide (PRINZIDE,ZESTORETIC) 20-12.5 mg per tablet Take 1 tablet by mouth 2 (two) times daily. 180 tablet 0    magnesium citrate solution Take 296 mLs by mouth daily as needed (constipation).       metFORMIN (GLUCOPHAGE-XR) 500 MG  24 hr tablet Take 2 tablets (1,000 mg total) by mouth 2 (two) times daily. 360 tablet 0    omeprazole (PRILOSEC) 40 MG capsule TAKE 1 CAPSULE (40 MG TOTAL) BY MOUTH BEFORE BREAKFAST. 30 capsule 3    pioglitazone (ACTOS) 30 MG tablet Take 1 tablet (30 mg total) by mouth once daily. 90 tablet 2    rosuvastatin (CRESTOR) 20 MG tablet Take 1 tablet (20 mg total) by mouth once daily. 90 tablet 3    traMADol (ULTRAM) 50 mg tablet Take 1 tablet (50 mg total) by mouth every 8 (eight) hours as needed for Pain. Medically necessary for greater than 7 days for chronic pain 90 tablet 0    co-enzyme Q-10 50 mg capsule Take 50 mg by mouth once daily.       No current facility-administered medications for this visit.        Allergies  Review of patient's allergies indicates:  No Known Allergies    Past Medical History  Past Medical History:   Diagnosis Date    Anticoagulant long-term use     Arthritis     Colon polyp     Diabetes mellitus     Diabetes mellitus, type 2     Fatty liver     Hypertension        Surgical History  Past Surgical History:   Procedure Laterality Date    BACK SURGERY      COLONOSCOPY  07/18/2014    Dr. Crane: 22 colon polyps removed, hemorrhoids, erythema to sigmoid, repeat in 1 year for surveillance; biopsy: sigmoid erythema- showed unremarkable colonic mucosa, tubular adenomas and hyperplastic polyps    COLONOSCOPY N/A 5/3/2019    Procedure: COLONOSCOPY;  Surgeon: Guero Crane MD;  Location: [facility];  Service: Endoscopy;  Laterality: N/A;    COLONOSCOPY N/A 5/6/2019    Procedure: COLONOSCOPY;  Surgeon: Guero Crane MD;  Location: [facility];  Service: Endoscopy;  Laterality: N/A;    EPIDURAL STEROID INJECTION INTO THORACIC SPINE N/A 8/30/2019    Procedure: Injection-steroid-epidural-thoracic;  Surgeon: Frantz Green MD;  [facility];  Service: Pain Management;  Laterality: N/A;  T6-7    ESOPHAGOGASTRODUODENOSCOPY N/A 4/26/2019    Procedure: EGD  (ESOPHAGOGASTRODUODENOSCOPY);  Surgeon: Guero Crane MD;  Location: [facility];  Service: Endoscopy;  Laterality: N/A;    HERNIA REPAIR         Family History:   Family History   Problem Relation Age of Onset    Heart disease Mother     Heart disease Father     Diabetes Brother     Suicide Brother     Brain cancer Brother     Colon cancer Neg Hx     Crohn's disease Neg Hx     Ulcerative colitis Neg Hx     Stomach cancer Neg Hx     Esophageal cancer Neg Hx     Glaucoma Neg Hx     Retinal detachment Neg Hx     Macular degeneration Neg Hx        Social History:   Social History     Socioeconomic History    Marital status:      Spouse name: Not on file    Number of children: Not on file    Years of education: Not on file    Highest education level: Not on file   Occupational History    Not on file   Social Needs    Financial resource strain: Not on file    Food insecurity:     Worry: Not on file     Inability: Not on file    Transportation needs:     Medical: Not on file     Non-medical: Not on file   Tobacco Use    Smoking status: Current Every Day Smoker     Packs/day: 1.00     Years: 50.00     Pack years: 50.00     Types: Cigarettes    Smokeless tobacco: Never Used   Substance and Sexual Activity    Alcohol use: Yes     Alcohol/week: 14.0 standard drinks     Types: 14 Cans of beer per week     Comment: 2-3 beers daily    Drug use: No    Sexual activity: Yes     Partners: Female   Lifestyle    Physical activity:     Days per week: Not on file     Minutes per session: Not on file    Stress: Not on file   Relationships    Social connections:     Talks on phone: Not on file     Gets together: Not on file     Attends Anabaptism service: Not on file     Active member of club or organization: Not on file     Attends meetings of clubs or organizations: Not on file     Relationship status: Not on file   Other Topics Concern    Not on file   Social History Narrative    Not on file        Hospitalization/Major Diagnostic Procedure:     Review of Systems     General/Constitutional:  Chills denies. Fatigue denies. Fever denies. Weight gain denies. Weight loss denies.    Respiratory:  Shortness of breath denies.    Cardiovascular:  Chest pain denies.    Gastrointestinal:  Constipation denies. Diarrhea denies. Nausea denies. Vomiting denies.     Hematology:  Easy bruising denies. Prolonged bleeding denies.     Genitourinary:  Frequent urination denies. Pain in lower back denies. Painful urination denies.     Musculoskeletal:  See HPI for details    Skin:  Rash denies.    Neurologic:  Dizziness denies. Gait abnormalities denies. Seizures denies. Tingling/Numbess denies.    Psychiatric:  Anxiety denies. Depressed mood denies.     Objective:   Vital Signs:   Vitals:    11/13/19 1325   BP: 136/82   Pulse: 92        Physical Exam      General Examination:     Constitutional: The patient is alert and oriented to lace person and time. Mood is pleasant.     Head/Face: Normal facial features normal eyebrows    Eyes: Normal extraocular motion bilaterally    Lungs: Respirations are equal and unlabored    Gait is coordinated.    Cardiovascular: There are no swelling or varicosities present.    Lymphatic: Negative for adenopathy    Skin: Normal    Neurological: Level of consciousness normal. Oriented to place person and time and situation    Psychiatric: Oriented to time place person and situation      XRAY Report/ Interpretation:  No new studies today      Assessment:       1. Thoracic radiculitis    2. Spinal enthesopathy, thoracic region    3. Thoracic disc disease        Plan:       Jhonny was seen today for pain.    Diagnoses and all orders for this visit:    Thoracic radiculitis    Spinal enthesopathy, thoracic region    Thoracic disc disease         No follow-ups on file.    Follow up with Dr. Green scheduled to discuss the possibility of right T3-4 facet rhizotomy.  Continue with medications as prescribed.   Follow up in 2-3 months or sooner if needed    This note was created using Dragon voice recognition software that occasionally misinterpreted phrases or words.

## 2019-12-16 ENCOUNTER — OFFICE VISIT (OUTPATIENT)
Dept: PAIN MEDICINE | Facility: CLINIC | Age: 64
End: 2019-12-16
Payer: MEDICARE

## 2019-12-16 VITALS
WEIGHT: 230 LBS | BODY MASS INDEX: 27.16 KG/M2 | HEART RATE: 77 BPM | HEIGHT: 77 IN | DIASTOLIC BLOOD PRESSURE: 95 MMHG | SYSTOLIC BLOOD PRESSURE: 169 MMHG

## 2019-12-16 DIAGNOSIS — B02.29 POST HERPETIC NEURALGIA: Primary | ICD-10-CM

## 2019-12-16 DIAGNOSIS — M54.14 THORACIC RADICULITIS: ICD-10-CM

## 2019-12-16 PROCEDURE — 1125F PR PAIN SEVERITY QUANTIFIED, PAIN PRESENT: ICD-10-PCS | Mod: ,,, | Performed by: ANESTHESIOLOGY

## 2019-12-16 PROCEDURE — 99214 OFFICE O/P EST MOD 30 MIN: CPT | Mod: PBBFAC,PN | Performed by: ANESTHESIOLOGY

## 2019-12-16 PROCEDURE — 1159F MED LIST DOCD IN RCRD: CPT | Mod: ,,, | Performed by: ANESTHESIOLOGY

## 2019-12-16 PROCEDURE — 99999 PR PBB SHADOW E&M-EST. PATIENT-LVL IV: CPT | Mod: PBBFAC,,, | Performed by: ANESTHESIOLOGY

## 2019-12-16 PROCEDURE — 99999 PR PBB SHADOW E&M-EST. PATIENT-LVL IV: ICD-10-PCS | Mod: PBBFAC,,, | Performed by: ANESTHESIOLOGY

## 2019-12-16 PROCEDURE — 1159F PR MEDICATION LIST DOCUMENTED IN MEDICAL RECORD: ICD-10-PCS | Mod: ,,, | Performed by: ANESTHESIOLOGY

## 2019-12-16 PROCEDURE — 99214 OFFICE O/P EST MOD 30 MIN: CPT | Mod: S$PBB,,, | Performed by: ANESTHESIOLOGY

## 2019-12-16 PROCEDURE — 1125F AMNT PAIN NOTED PAIN PRSNT: CPT | Mod: ,,, | Performed by: ANESTHESIOLOGY

## 2019-12-16 PROCEDURE — 99214 PR OFFICE/OUTPT VISIT, EST, LEVL IV, 30-39 MIN: ICD-10-PCS | Mod: S$PBB,,, | Performed by: ANESTHESIOLOGY

## 2019-12-16 RX ORDER — TRAMADOL HYDROCHLORIDE 50 MG/1
50 TABLET ORAL EVERY 8 HOURS PRN
Qty: 90 TABLET | Refills: 1 | Status: SHIPPED | OUTPATIENT
Start: 2019-12-16 | End: 2020-02-12 | Stop reason: SDUPTHER

## 2019-12-16 RX ORDER — TRAMADOL HYDROCHLORIDE 50 MG/1
50 TABLET ORAL EVERY 8 HOURS PRN
COMMUNITY
End: 2019-12-16

## 2019-12-16 NOTE — PROGRESS NOTES
This note was completed with dictation software and grammatical errors may exist.    Referring Physician: No ref. provider found    PCP: Joey Whitehead MD      CC:  Mid back and rib pain    Interval history:  Patient returns to our clinic.  He is status post thoracic TRINI on a 15 2019.  He reports 50% relief of his mid back and radiating right-sided rib pain.  Overall he feels like it is slowly getting better.  Continues to take gabapentin 600 mg b.i.d. with mild benefits.  He also takes tramadol very sparingly with moderate benefit.  He is pleased with his progress so far.  He denies any weakness.  No bowel bladder changes.  Prior HPI:   Jhonny Almazan is a 64 y.o. male referred to us for mid back and rib pain.  Pain started in April 2018 without traumatic incident.  He reports having constant sharp, shooting pain in his mid back.  Pain travels to his right side ribs below his right chest. He states not developing a rash at that time. He feels pins and needles in those areas.  Pain worsens sitting, standing, touching, coughing and lifting.  Pain improves with rest.  He has tried physical therapy with minimal benefit.  He has been evaluated by spine surgery.  MRI of the thoracic spine was ordered.  Currently takes gabapentin 300 mg t.i.d. prior to his incident with mild benefits.  He has tried topical ointments with mild benefits.  He denies any worsening weakness.  No bowel bladder changes.    ROS:  CONSTITUTIONAL: No fevers, chills, night sweats, wt. loss, appetite changes  SKIN: no rashes or itching  ENT: No headaches, head trauma, vision changes, or eye pain  LYMPH NODES: None noticed   CV: No chest pain, palpitations.   RESP: No shortness of breath, dyspnea on exertion, cough, wheezing, or hemoptysis  GI: No nausea, emesis, diarrhea, constipation, melena, hematochezia, pain.    : No dysuria, hematuria, urgency, or frequency   HEME: No easy bruising, bleeding problems  PSYCHIATRIC: No depression,  anxiety, psychosis, hallucinations.  NEURO: No seizures, memory loss, dizziness or difficulty sleeping  MSK:  Positive HPI      Past Medical History:   Diagnosis Date    Anticoagulant long-term use     Arthritis     Colon polyp     Diabetes mellitus     Diabetes mellitus, type 2     Fatty liver     Hypertension      Past Surgical History:   Procedure Laterality Date    BACK SURGERY      COLONOSCOPY  07/18/2014    Dr. Crane: 22 colon polyps removed, hemorrhoids, erythema to sigmoid, repeat in 1 year for surveillance; biopsy: sigmoid erythema- showed unremarkable colonic mucosa, tubular adenomas and hyperplastic polyps    COLONOSCOPY N/A 5/3/2019    Procedure: COLONOSCOPY;  Surgeon: Guero Crane MD;  Location: [facility];  Service: Endoscopy;  Laterality: N/A;    COLONOSCOPY N/A 5/6/2019    Procedure: COLONOSCOPY;  Surgeon: Guero Crane MD;  Location: [facility];  Service: Endoscopy;  Laterality: N/A;    EPIDURAL STEROID INJECTION INTO THORACIC SPINE N/A 8/30/2019    Procedure: Injection-steroid-epidural-thoracic;  Surgeon: Frantz Green MD;  Location: [facility];  Service: Pain Management;  Laterality: N/A;  T6-7    ESOPHAGOGASTRODUODENOSCOPY N/A 4/26/2019    Procedure: EGD (ESOPHAGOGASTRODUODENOSCOPY);  Surgeon: Guero Crane MD;  Location: [facility];  Service: Endoscopy;  Laterality: N/A;    HERNIA REPAIR       Family History   Problem Relation Age of Onset    Heart disease Mother     Heart disease Father     Diabetes Brother     Suicide Brother     Brain cancer Brother     Colon cancer Neg Hx     Crohn's disease Neg Hx     Ulcerative colitis Neg Hx     Stomach cancer Neg Hx     Esophageal cancer Neg Hx     Glaucoma Neg Hx     Retinal detachment Neg Hx     Macular degeneration Neg Hx      Social History     Socioeconomic History    Marital status:      Spouse name: Not on file    Number of children: Not on file    Years of education: Not on file    Highest education  "level: Not on file   Occupational History    Not on file   Social Needs    Financial resource strain: Not on file    Food insecurity:     Worry: Not on file     Inability: Not on file    Transportation needs:     Medical: Not on file     Non-medical: Not on file   Tobacco Use    Smoking status: Current Every Day Smoker     Packs/day: 1.00     Years: 50.00     Pack years: 50.00     Types: Cigarettes    Smokeless tobacco: Never Used   Substance and Sexual Activity    Alcohol use: Yes     Alcohol/week: 14.0 standard drinks     Types: 14 Cans of beer per week     Comment: 2-3 beers daily    Drug use: No    Sexual activity: Yes     Partners: Female   Lifestyle    Physical activity:     Days per week: Not on file     Minutes per session: Not on file    Stress: Not on file   Relationships    Social connections:     Talks on phone: Not on file     Gets together: Not on file     Attends Jainism service: Not on file     Active member of club or organization: Not on file     Attends meetings of clubs or organizations: Not on file     Relationship status: Not on file   Other Topics Concern    Not on file   Social History Narrative    Not on file         Medications/Allergies: See med card    Vitals:    12/16/19 0850   BP: (!) 169/95   Pulse: 77   Weight: 104.3 kg (230 lb)   Height: 6' 5" (1.956 m)   PainSc:   2   PainLoc: Back         Physical exam:    GENERAL: A and O x3, the patient appears well groomed and is in no acute distress.  Skin: No rashes or obvious lesions  HEENT: normocephalic, atraumatic  CARDIOVASCULAR:  Palpable peripheral pulses  LUNGS: easy work of breathing  ABDOMEN: soft, nontender   UPPER EXTREMITIES: Normal alignment, normal range of motion, no atrophy, no skin changes,  hair growth and nail growth normal and equal bilaterally. No swelling, no tenderness.    LOWER EXTREMITIES:  Normal alignment, normal range of motion, no atrophy, no skin changes,  hair growth and nail growth normal and " equal bilaterally. No swelling, no tenderness.  Thoracolumbar SPINE  Lumbar spine: ROM is full with flexion extension and oblique extension with no increased pain.    George's test causes no increased pain on either side.    Supine straight leg raise is negative bilaterally.    Internal and external rotation of the hip causes no increased pain on either side.  Myofascial exam:  Moderate tenderness over right thoracic paraspinal.  There is some hyperalgesia over his T8 dermatome on the right       MENTAL STATUS: normal orientation, speech, language, and fund of knowledge for social situation.  Emotional state appropriate.    CRANIAL NERVES:  II:  PERRL bilaterally,   III,IV,VI: EOMI.    V:  Facial sensation equal bilaterally  VII:  Facial motor function normal.  VIII:  Hearing equal to finger rub bilaterally  IX/X: Gag normal, palate symmetric  XI:  Shoulder shrug equal, head turn equal  XII:  Tongue midline without fasciculations      MOTOR: Tone and bulk: normal bilateral upper and lower Strength: normal   Delt Bi Tri WE WF     R 5 5 5 5 5 5   L 5 5 5 5 5 5     IP ADD ABD Quad TA Gas HAM  R 5 5 5 5 5 5 5  L 5 5 5 5 5 5 5    SENSATION: Light touch and pinprick intact bilaterally  REFLEXES: normal, symmetric, nonbrisk.  Toes down, no clonus. No hoffmans.  GAIT: normal rise, base, steps, and arm swing.        Imaging:  MRI T-spine 6/2019  There is multilevel degenerative disc disease most notable at the T3-T4 through  the T8-T9 levels.. There is mild diffuse disc bulging with slight central disc  protrusion at T7-T8 and very slight right paracentral focal disc protrusion at  T8-T9. There is no significant encroachment of the central canal and no cord or  nerve root impingement.    Assessment:  Patient referred for mid back pain  1. Post herpetic neuralgia    2. Thoracic radiculitis          Plan:  1. I have stressed the importance of physical activity and exercise to improve overall health  2. Reviewed pertinent  imaging and records with patient  3.  Patient with mid back pain with equally bothersome radiating right rib pain following a specific dermatome.  He described a prodrome of symptoms prior to his mid back pain. I suspect he he has symptoms consistent with post herpetic neuralgia without appearance of rash.  There is a small percentage of patients that will have shingles without rash.  Continue gabapentin to 600 mg t.i.d..  Use topical ointment as tolerated.  4.  May consider repeat thoracic epidural steroid injection for further compounding benefit in the future.  5.  Refill given for Tramadol for his post herpetic neuralgia pain.  6.  Follow-up in 3 months or sooner if needed

## 2020-01-01 DIAGNOSIS — E11.59 HYPERTENSION ASSOCIATED WITH DIABETES: ICD-10-CM

## 2020-01-01 DIAGNOSIS — I15.2 HYPERTENSION ASSOCIATED WITH DIABETES: ICD-10-CM

## 2020-01-02 RX ORDER — LISINOPRIL AND HYDROCHLOROTHIAZIDE 12.5; 2 MG/1; MG/1
TABLET ORAL
Qty: 180 TABLET | Refills: 3 | Status: SHIPPED | OUTPATIENT
Start: 2020-01-02 | End: 2021-01-14 | Stop reason: SDUPTHER

## 2020-02-12 ENCOUNTER — OFFICE VISIT (OUTPATIENT)
Dept: ORTHOPEDICS | Facility: CLINIC | Age: 65
End: 2020-02-12
Payer: MEDICARE

## 2020-02-12 VITALS
BODY MASS INDEX: 27.16 KG/M2 | HEIGHT: 77 IN | DIASTOLIC BLOOD PRESSURE: 89 MMHG | SYSTOLIC BLOOD PRESSURE: 141 MMHG | HEART RATE: 82 BPM | WEIGHT: 230 LBS

## 2020-02-12 DIAGNOSIS — B02.29 POST HERPETIC NEURALGIA: ICD-10-CM

## 2020-02-12 DIAGNOSIS — M54.14 THORACIC RADICULITIS: Primary | ICD-10-CM

## 2020-02-12 DIAGNOSIS — M46.04 SPINAL ENTHESOPATHY, THORACIC REGION: ICD-10-CM

## 2020-02-12 PROCEDURE — 99213 PR OFFICE/OUTPT VISIT, EST, LEVL III, 20-29 MIN: ICD-10-PCS | Mod: S$GLB,,, | Performed by: ORTHOPAEDIC SURGERY

## 2020-02-12 PROCEDURE — 99213 OFFICE O/P EST LOW 20 MIN: CPT | Mod: S$GLB,,, | Performed by: ORTHOPAEDIC SURGERY

## 2020-02-12 RX ORDER — TRAMADOL HYDROCHLORIDE 50 MG/1
50 TABLET ORAL EVERY 8 HOURS PRN
Qty: 90 TABLET | Refills: 3 | Status: SHIPPED | OUTPATIENT
Start: 2020-02-12 | End: 2020-03-09

## 2020-02-12 RX ORDER — LIDOCAINE 50 MG/G
1 PATCH TOPICAL DAILY
Qty: 30 PATCH | Refills: 2 | Status: ON HOLD | OUTPATIENT
Start: 2020-02-12 | End: 2021-10-04 | Stop reason: HOSPADM

## 2020-02-12 NOTE — PROGRESS NOTES
Subjective:       Patient ID: Jhonny Almazan is a 64 y.o. male.    Chief Complaint: Pain of the Thoracic Spine (T spine pain f/u Had TRINI done 8 15 2019. States that he gets pins and needles in his back and is worse at night. )      History of Present Illness  Patient is here to follow-up for right-sided uppeer thoracic pain.   overall he has minimal pain during the day which unfortunately, bothers him significantly at night.  The previous injection that he had done by Dr. Green did help and he is scheduled to see Dr. Green sometime within the next month.    Current Medications  Current Outpatient Medications   Medication Sig Dispense Refill    buPROPion (WELLBUTRIN XL) 300 MG 24 hr tablet Take 1 tablet (300 mg total) by mouth once daily. 90 tablet 3    diclofenac sodium (VOLTAREN) 1 % Gel Apply 2 g topically 3 (three) times daily as needed. For hand pain 100 g 11    empagliflozin (JARDIANCE) 10 mg Tab Take 10 mg by mouth once daily. 30 tablet 11    folic acid-vit B6-vit B12 2.5-25-2 mg (FOLBIC OR EQUIV) 2.5-25-2 mg Tab Take 1 tablet by mouth once daily. 90 tablet 4    gabapentin (NEURONTIN) 300 MG capsule Take 2 capsules (600 mg total) by mouth 3 (three) times daily. 180 capsule 2    glipiZIDE (GLUCOTROL) 5 MG tablet Take 1 tablet (5 mg total) by mouth 2 (two) times daily with meals. 180 tablet 3    hydrALAZINE (APRESOLINE) 25 MG tablet Take 1 tablet (25 mg total) by mouth every 12 (twelve) hours. 180 tablet 8    lactulose (CHRONULAC) 10 gram/15 mL solution TAKE 30 MLS BY MOUTH 2 TIMES DAILY. 1892 mL 3    lisinopril-hydrochlorothiazide (PRINZIDE,ZESTORETIC) 20-12.5 mg per tablet TAKE 1 TABLET BY MOUTH TWICE A  tablet 3    magnesium citrate solution Take 296 mLs by mouth daily as needed (constipation).       metFORMIN (GLUCOPHAGE-XR) 500 MG 24 hr tablet Take 2 tablets (1,000 mg total) by mouth 2 (two) times daily. 360 tablet 0    omeprazole (PRILOSEC) 40 MG capsule TAKE 1 CAPSULE (40 MG TOTAL)  BY MOUTH BEFORE BREAKFAST. 30 capsule 3    pioglitazone (ACTOS) 30 MG tablet Take 1 tablet (30 mg total) by mouth once daily. 90 tablet 2    rosuvastatin (CRESTOR) 20 MG tablet Take 1 tablet (20 mg total) by mouth once daily. 90 tablet 3    traMADol (ULTRAM) 50 mg tablet Take 1 tablet (50 mg total) by mouth every 8 (eight) hours as needed for Pain. 90 tablet 3    lidocaine (LIDODERM) 5 % Place 1 patch onto the skin once daily. Remove & Discard patch within 12 hours or as directed by MD 30 patch 2     No current facility-administered medications for this visit.        Allergies  Review of patient's allergies indicates:  No Known Allergies    Past Medical History  Past Medical History:   Diagnosis Date    Anticoagulant long-term use     Arthritis     Colon polyp     Diabetes mellitus     Diabetes mellitus, type 2     Fatty liver     Hypertension        Surgical History  Past Surgical History:   Procedure Laterality Date    BACK SURGERY      COLONOSCOPY  07/18/2014    Dr. Crane: 22 colon polyps removed, hemorrhoids, erythema to sigmoid, repeat in 1 year for surveillance; biopsy: sigmoid erythema- showed unremarkable colonic mucosa, tubular adenomas and hyperplastic polyps    COLONOSCOPY N/A 5/3/2019    Procedure: COLONOSCOPY;  Surgeon: Guero Crane MD;  Location: [facility];  Service: Endoscopy;  Laterality: N/A;    COLONOSCOPY N/A 5/6/2019    Procedure: COLONOSCOPY;  Surgeon: Guero Crane MD;  Location: [facility];  Service: Endoscopy;  Laterality: N/A;    EPIDURAL STEROID INJECTION INTO THORACIC SPINE N/A 8/30/2019    Procedure: Injection-steroid-epidural-thoracic;  Surgeon: Frantz Green MD;  [facility];  Service: Pain Management;  Laterality: N/A;  T6-7    ESOPHAGOGASTRODUODENOSCOPY N/A 4/26/2019    Procedure: EGD (ESOPHAGOGASTRODUODENOSCOPY);  Surgeon: Guero Crane MD;  Location: [facility];  Service: Endoscopy;  Laterality: N/A;    HERNIA REPAIR         Family History:    Family History   Problem Relation Age of Onset    Heart disease Mother     Heart disease Father     Diabetes Brother     Suicide Brother     Brain cancer Brother     Colon cancer Neg Hx     Crohn's disease Neg Hx     Ulcerative colitis Neg Hx     Stomach cancer Neg Hx     Esophageal cancer Neg Hx     Glaucoma Neg Hx     Retinal detachment Neg Hx     Macular degeneration Neg Hx        Social History:   Social History     Socioeconomic History    Marital status:      Spouse name: Not on file    Number of children: Not on file    Years of education: Not on file    Highest education level: Not on file   Occupational History    Not on file   Social Needs    Financial resource strain: Not on file    Food insecurity:     Worry: Not on file     Inability: Not on file    Transportation needs:     Medical: Not on file     Non-medical: Not on file   Tobacco Use    Smoking status: Current Every Day Smoker     Packs/day: 1.00     Years: 50.00     Pack years: 50.00     Types: Cigarettes    Smokeless tobacco: Never Used   Substance and Sexual Activity    Alcohol use: Yes     Alcohol/week: 14.0 standard drinks     Types: 14 Cans of beer per week     Comment: 2-3 beers daily    Drug use: No    Sexual activity: Yes     Partners: Female   Lifestyle    Physical activity:     Days per week: Not on file     Minutes per session: Not on file    Stress: Not on file   Relationships    Social connections:     Talks on phone: Not on file     Gets together: Not on file     Attends Baptism service: Not on file     Active member of club or organization: Not on file     Attends meetings of clubs or organizations: Not on file     Relationship status: Not on file   Other Topics Concern    Not on file   Social History Narrative    Not on file       Hospitalization/Major Diagnostic Procedure:     Review of Systems     General/Constitutional:  Chills denies. Fatigue denies. Fever denies. Weight gain denies.  Weight loss denies.    Respiratory:  Shortness of breath denies.    Cardiovascular:  Chest pain denies.    Gastrointestinal:  Constipation denies. Diarrhea denies. Nausea denies. Vomiting denies.     Hematology:  Easy bruising denies. Prolonged bleeding denies.     Genitourinary:  Frequent urination denies. Pain in lower back denies. Painful urination denies.     Musculoskeletal:  See HPI for details    Skin:  Rash denies.    Neurologic:  Dizziness denies. Gait abnormalities denies. Seizures denies. Tingling/Numbess denies.    Psychiatric:  Anxiety denies. Depressed mood denies.     Objective:   Vital Signs:   Vitals:    02/12/20 1313   BP: (!) 141/89   Pulse: 82        Physical Exam      General Examination:     Constitutional: The patient is alert and oriented to lace person and time. Mood is pleasant.     Head/Face: Normal facial features normal eyebrows    Eyes: Normal extraocular motion bilaterally    Lungs: Respirations are equal and unlabored    Gait is coordinated.    Cardiovascular: There are no swelling or varicosities present.    Lymphatic: Negative for adenopathy    Skin: Normal    Neurological: Level of consciousness normal. Oriented to place person and time and situation    Psychiatric: Oriented to time place person and situation    Thoracic exam:  Mild tenderness palpation and hypersensitivity of the right upper thoracic dermatome.  Bilateral upper and lower extremities are distal neurovascular intact. Negative clonus.    XRAY Report/ Interpretation:  No new studies today      Assessment:       1. Thoracic radiculitis    2. Spinal enthesopathy, thoracic region    3. Post herpetic neuralgia        Plan:       Jhonny was seen today for pain.    Diagnoses and all orders for this visit:    Thoracic radiculitis  -     traMADol (ULTRAM) 50 mg tablet; Take 1 tablet (50 mg total) by mouth every 8 (eight) hours as needed for Pain.    Spinal enthesopathy, thoracic region    Post herpetic neuralgia  -      "traMADol (ULTRAM) 50 mg tablet; Take 1 tablet (50 mg total) by mouth every 8 (eight) hours as needed for Pain.  -     lidocaine (LIDODERM) 5 %; Place 1 patch onto the skin once daily. Remove & Discard patch within 12 hours or as directed by MD         No follow-ups on file.  Marty Cross, physician's assistant served in the capacity as a "scribe" for this patient encounter  A "face to face" encounter occurred with Dr. Emmanuel on this date  The treatment plan and medical decision making is outlined below:  Continue with tramadol occasionally and gabapentin.  Trial of Lidoderm patches.  Follow-up 6 months or sooner if needed    This note was created using Dragon voice recognition software that occasionally misinterpreted phrases or words.  "

## 2020-03-08 ENCOUNTER — PATIENT OUTREACH (OUTPATIENT)
Dept: ADMINISTRATIVE | Facility: OTHER | Age: 65
End: 2020-03-08

## 2020-03-09 ENCOUNTER — OFFICE VISIT (OUTPATIENT)
Dept: PAIN MEDICINE | Facility: CLINIC | Age: 65
End: 2020-03-09
Payer: MEDICARE

## 2020-03-09 VITALS
HEIGHT: 77 IN | BODY MASS INDEX: 27.16 KG/M2 | DIASTOLIC BLOOD PRESSURE: 86 MMHG | WEIGHT: 230 LBS | SYSTOLIC BLOOD PRESSURE: 132 MMHG | HEART RATE: 109 BPM

## 2020-03-09 DIAGNOSIS — M54.14 THORACIC RADICULITIS: ICD-10-CM

## 2020-03-09 DIAGNOSIS — M54.14 THORACIC RADICULITIS: Primary | ICD-10-CM

## 2020-03-09 DIAGNOSIS — M25.511 ACUTE PAIN OF RIGHT SHOULDER: ICD-10-CM

## 2020-03-09 DIAGNOSIS — B02.29 POST HERPETIC NEURALGIA: Primary | ICD-10-CM

## 2020-03-09 PROCEDURE — 99999 PR PBB SHADOW E&M-EST. PATIENT-LVL IV: ICD-10-PCS | Mod: PBBFAC,,, | Performed by: ANESTHESIOLOGY

## 2020-03-09 PROCEDURE — 99999 PR PBB SHADOW E&M-EST. PATIENT-LVL IV: CPT | Mod: PBBFAC,,, | Performed by: ANESTHESIOLOGY

## 2020-03-09 PROCEDURE — 99214 PR OFFICE/OUTPT VISIT, EST, LEVL IV, 30-39 MIN: ICD-10-PCS | Mod: S$PBB,,, | Performed by: ANESTHESIOLOGY

## 2020-03-09 PROCEDURE — 99214 OFFICE O/P EST MOD 30 MIN: CPT | Mod: S$PBB,,, | Performed by: ANESTHESIOLOGY

## 2020-03-09 PROCEDURE — 99214 OFFICE O/P EST MOD 30 MIN: CPT | Mod: PBBFAC,PN | Performed by: ANESTHESIOLOGY

## 2020-03-09 RX ORDER — TRAMADOL HYDROCHLORIDE 50 MG/1
50 TABLET ORAL EVERY 8 HOURS PRN
Qty: 90 TABLET | Refills: 2 | Status: SHIPPED | OUTPATIENT
Start: 2020-03-09 | End: 2020-06-08 | Stop reason: SDUPTHER

## 2020-03-09 NOTE — PROGRESS NOTES
This note was completed with dictation software and grammatical errors may exist.    Referring Physician: No ref. provider found    PCP: Joey Whitehead MD      CC:  Mid back and rib pain    Interval history:  Patient returns to our clinic.  He has history of mid back and rib pain with suspected post herpetic neuralgia.  He underwent a thoracic TRINI 2019 with provided by 50% relief.  Overall pain is improved.  He is consider repeat procedure.   Continues to take gabapentin 600 mg b.i.d. with mild benefits.  He also takes tramadol very sparingly with moderate benefit.  He is pleased with his progress so far.  He denies any weakness.  No bowel bladder changes.  He did have a fall on his right shoulder has had some tenderness over his right shoulder past week.  No imaging.  Pain is slowly improving.  Prior HPI:   Jhonny Almazan is a 64 y.o. male referred to us for mid back and rib pain.  Pain started in April 2018 without traumatic incident.  He reports having constant sharp, shooting pain in his mid back.  Pain travels to his right side ribs below his right chest. He states not developing a rash at that time. He feels pins and needles in those areas.  Pain worsens sitting, standing, touching, coughing and lifting.  Pain improves with rest.  He has tried physical therapy with minimal benefit.  He has been evaluated by spine surgery.  MRI of the thoracic spine was ordered.  Currently takes gabapentin 300 mg t.i.d. prior to his incident with mild benefits.  He has tried topical ointments with mild benefits.  He denies any worsening weakness.  No bowel bladder changes.    ROS:  CONSTITUTIONAL: No fevers, chills, night sweats, wt. loss, appetite changes  SKIN: no rashes or itching  ENT: No headaches, head trauma, vision changes, or eye pain  LYMPH NODES: None noticed   CV: No chest pain, palpitations.   RESP: No shortness of breath, dyspnea on exertion, cough, wheezing, or hemoptysis  GI: No nausea, emesis, diarrhea,  constipation, melena, hematochezia, pain.    : No dysuria, hematuria, urgency, or frequency   HEME: No easy bruising, bleeding problems  PSYCHIATRIC: No depression, anxiety, psychosis, hallucinations.  NEURO: No seizures, memory loss, dizziness or difficulty sleeping  MSK:  Positive HPI      Past Medical History:   Diagnosis Date    Anticoagulant long-term use     Arthritis     Colon polyp     Diabetes mellitus     Diabetes mellitus, type 2     Fatty liver     Hypertension      Past Surgical History:   Procedure Laterality Date    BACK SURGERY      COLONOSCOPY  07/18/2014    Dr. Crane: 22 colon polyps removed, hemorrhoids, erythema to sigmoid, repeat in 1 year for surveillance; biopsy: sigmoid erythema- showed unremarkable colonic mucosa, tubular adenomas and hyperplastic polyps    COLONOSCOPY N/A 5/3/2019    Procedure: COLONOSCOPY;  Surgeon: Guero Crane MD;  Location: [facility];  Service: Endoscopy;  Laterality: N/A;    COLONOSCOPY N/A 5/6/2019    Procedure: COLONOSCOPY;  Surgeon: Guero Crane MD;  Location: [facility];  Service: Endoscopy;  Laterality: N/A;    EPIDURAL STEROID INJECTION INTO THORACIC SPINE N/A 8/30/2019    Procedure: Injection-steroid-epidural-thoracic;  Surgeon: Frantz Green MD;  Location: [facility];  Service: Pain Management;  Laterality: N/A;  T6-7    ESOPHAGOGASTRODUODENOSCOPY N/A 4/26/2019    Procedure: EGD (ESOPHAGOGASTRODUODENOSCOPY);  Surgeon: Guero Crane MD;  Location: [facility];  Service: Endoscopy;  Laterality: N/A;    HERNIA REPAIR       Family History   Problem Relation Age of Onset    Heart disease Mother     Heart disease Father     Diabetes Brother     Suicide Brother     Brain cancer Brother     Colon cancer Neg Hx     Crohn's disease Neg Hx     Ulcerative colitis Neg Hx     Stomach cancer Neg Hx     Esophageal cancer Neg Hx     Glaucoma Neg Hx     Retinal detachment Neg Hx     Macular degeneration Neg Hx      Social History  "    Socioeconomic History    Marital status:      Spouse name: Not on file    Number of children: Not on file    Years of education: Not on file    Highest education level: Not on file   Occupational History    Not on file   Social Needs    Financial resource strain: Not on file    Food insecurity:     Worry: Not on file     Inability: Not on file    Transportation needs:     Medical: Not on file     Non-medical: Not on file   Tobacco Use    Smoking status: Current Every Day Smoker     Packs/day: 1.00     Years: 50.00     Pack years: 50.00     Types: Cigarettes    Smokeless tobacco: Never Used   Substance and Sexual Activity    Alcohol use: Yes     Alcohol/week: 14.0 standard drinks     Types: 14 Cans of beer per week     Comment: 2-3 beers daily    Drug use: No    Sexual activity: Yes     Partners: Female   Lifestyle    Physical activity:     Days per week: Not on file     Minutes per session: Not on file    Stress: Not on file   Relationships    Social connections:     Talks on phone: Not on file     Gets together: Not on file     Attends Voodoo service: Not on file     Active member of club or organization: Not on file     Attends meetings of clubs or organizations: Not on file     Relationship status: Not on file   Other Topics Concern    Not on file   Social History Narrative    Not on file         Medications/Allergies: See med card    Vitals:    03/09/20 1015   BP: 132/86   Pulse: 109   Weight: 104.3 kg (230 lb)   Height: 6' 5" (1.956 m)   PainSc:   3   PainLoc: Back         Physical exam:    GENERAL: A and O x3, the patient appears well groomed and is in no acute distress.  Skin: No rashes or obvious lesions  HEENT: normocephalic, atraumatic  CARDIOVASCULAR:  Palpable peripheral pulses  LUNGS: easy work of breathing  ABDOMEN: soft, nontender   UPPER EXTREMITIES: Normal alignment, normal range of motion, no atrophy, no skin changes,  hair growth and nail growth normal and equal " bilaterally. No swelling, no tenderness.    LOWER EXTREMITIES:  Normal alignment, normal range of motion, no atrophy, no skin changes,  hair growth and nail growth normal and equal bilaterally. No swelling, no tenderness.  Thoracolumbar SPINE  Lumbar spine: ROM is full with flexion extension and oblique extension with no increased pain.    George's test causes no increased pain on either side.    Supine straight leg raise is negative bilaterally.    Internal and external rotation of the hip causes no increased pain on either side.  Myofascial exam:  Moderate tenderness over right thoracic paraspinal.  There is some hyperalgesia over his T8 dermatome on the right       MENTAL STATUS: normal orientation, speech, language, and fund of knowledge for social situation.  Emotional state appropriate.    CRANIAL NERVES:  II:  PERRL bilaterally,   III,IV,VI: EOMI.    V:  Facial sensation equal bilaterally  VII:  Facial motor function normal.  VIII:  Hearing equal to finger rub bilaterally  IX/X: Gag normal, palate symmetric  XI:  Shoulder shrug equal, head turn equal  XII:  Tongue midline without fasciculations      MOTOR: Tone and bulk: normal bilateral upper and lower Strength: normal   Delt Bi Tri WE WF     R 5 5 5 5 5 5   L 5 5 5 5 5 5     IP ADD ABD Quad TA Gas HAM  R 5 5 5 5 5 5 5  L 5 5 5 5 5 5 5    SENSATION: Light touch and pinprick intact bilaterally  REFLEXES: normal, symmetric, nonbrisk.  Toes down, no clonus. No hoffmans.  GAIT: normal rise, base, steps, and arm swing.        Imaging:  MRI T-spine 6/2019  There is multilevel degenerative disc disease most notable at the T3-T4 through  the T8-T9 levels.. There is mild diffuse disc bulging with slight central disc  protrusion at T7-T8 and very slight right paracentral focal disc protrusion at  T8-T9. There is no significant encroachment of the central canal and no cord or  nerve root impingement.    Assessment:  Patient referred for mid back pain  1. Post  herpetic neuralgia    2. Thoracic radiculitis    3. Acute pain of right shoulder          Plan:  1. I have stressed the importance of physical activity and exercise to improve overall health  2. Reviewed pertinent imaging and records with patient  3.  Patient with mid back pain with equally bothersome radiating right rib pain following a specific dermatome.  He described a prodrome of symptoms prior to his mid back pain. I suspect he he has symptoms consistent with post herpetic neuralgia without appearance of rash.  There is a small percentage of patients that will have shingles without rash.  Continue gabapentin to 600 mg t.i.d..  Use topical ointment as tolerated.  4.  May consider repeat thoracic epidural steroid injection for further compounding benefit in the future.  We will schedule repeat procedure as patient wishes to see out-of-pocket cost.  5.  Refill given for Tramadol for his post herpetic neuralgia pain.  6.  May consider right shoulder injection right shoulder pain worsens.  7.  Follow-up in 3 months.

## 2020-03-23 ENCOUNTER — LAB VISIT (OUTPATIENT)
Dept: LAB | Facility: HOSPITAL | Age: 65
End: 2020-03-23
Attending: FAMILY MEDICINE
Payer: MEDICARE

## 2020-03-23 DIAGNOSIS — Z12.5 PROSTATE CANCER SCREENING: ICD-10-CM

## 2020-03-23 LAB
ALBUMIN SERPL BCP-MCNC: 3.9 G/DL (ref 3.5–5.2)
ALP SERPL-CCNC: 93 U/L (ref 55–135)
ALT SERPL W/O P-5'-P-CCNC: 21 U/L (ref 10–44)
ANION GAP SERPL CALC-SCNC: 11 MMOL/L (ref 8–16)
AST SERPL-CCNC: 12 U/L (ref 10–40)
BASOPHILS # BLD AUTO: 0.12 K/UL (ref 0–0.2)
BASOPHILS NFR BLD: 1.2 % (ref 0–1.9)
BILIRUB SERPL-MCNC: 0.4 MG/DL (ref 0.1–1)
BUN SERPL-MCNC: 14 MG/DL (ref 8–23)
CALCIUM SERPL-MCNC: 10.3 MG/DL (ref 8.7–10.5)
CHLORIDE SERPL-SCNC: 100 MMOL/L (ref 95–110)
CHOLEST SERPL-MCNC: 254 MG/DL (ref 120–199)
CHOLEST/HDLC SERPL: 5.6 {RATIO} (ref 2–5)
CO2 SERPL-SCNC: 26 MMOL/L (ref 23–29)
COMPLEXED PSA SERPL-MCNC: 0.44 NG/ML (ref 0–4)
CREAT SERPL-MCNC: 0.9 MG/DL (ref 0.5–1.4)
DIFFERENTIAL METHOD: ABNORMAL
EOSINOPHIL # BLD AUTO: 0.4 K/UL (ref 0–0.5)
EOSINOPHIL NFR BLD: 4 % (ref 0–8)
ERYTHROCYTE [DISTWIDTH] IN BLOOD BY AUTOMATED COUNT: 12.2 % (ref 11.5–14.5)
EST. GFR  (AFRICAN AMERICAN): >60 ML/MIN/1.73 M^2
EST. GFR  (NON AFRICAN AMERICAN): >60 ML/MIN/1.73 M^2
ESTIMATED AVG GLUCOSE: 183 MG/DL (ref 68–131)
GLUCOSE SERPL-MCNC: 178 MG/DL (ref 70–110)
HBA1C MFR BLD HPLC: 8 % (ref 4–5.6)
HCT VFR BLD AUTO: 49.7 % (ref 40–54)
HDLC SERPL-MCNC: 45 MG/DL (ref 40–75)
HDLC SERPL: 17.7 % (ref 20–50)
HGB BLD-MCNC: 15.6 G/DL (ref 14–18)
IMM GRANULOCYTES # BLD AUTO: 0.04 K/UL (ref 0–0.04)
IMM GRANULOCYTES NFR BLD AUTO: 0.4 % (ref 0–0.5)
LDLC SERPL CALC-MCNC: 167.2 MG/DL (ref 63–159)
LYMPHOCYTES # BLD AUTO: 3.1 K/UL (ref 1–4.8)
LYMPHOCYTES NFR BLD: 30.3 % (ref 18–48)
MCH RBC QN AUTO: 29.8 PG (ref 27–31)
MCHC RBC AUTO-ENTMCNC: 31.4 G/DL (ref 32–36)
MCV RBC AUTO: 95 FL (ref 82–98)
MONOCYTES # BLD AUTO: 0.7 K/UL (ref 0.3–1)
MONOCYTES NFR BLD: 6.8 % (ref 4–15)
NEUTROPHILS # BLD AUTO: 5.9 K/UL (ref 1.8–7.7)
NEUTROPHILS NFR BLD: 57.3 % (ref 38–73)
NONHDLC SERPL-MCNC: 209 MG/DL
NRBC BLD-RTO: 0 /100 WBC
PLATELET # BLD AUTO: 231 K/UL (ref 150–350)
PMV BLD AUTO: 9.8 FL (ref 9.2–12.9)
POTASSIUM SERPL-SCNC: 4.7 MMOL/L (ref 3.5–5.1)
PROT SERPL-MCNC: 7.5 G/DL (ref 6–8.4)
RBC # BLD AUTO: 5.23 M/UL (ref 4.6–6.2)
SODIUM SERPL-SCNC: 137 MMOL/L (ref 136–145)
TRIGL SERPL-MCNC: 209 MG/DL (ref 30–150)
WBC # BLD AUTO: 10.32 K/UL (ref 3.9–12.7)

## 2020-03-23 PROCEDURE — 80053 COMPREHEN METABOLIC PANEL: CPT

## 2020-03-23 PROCEDURE — 80061 LIPID PANEL: CPT

## 2020-03-23 PROCEDURE — 83036 HEMOGLOBIN GLYCOSYLATED A1C: CPT

## 2020-03-23 PROCEDURE — 36415 COLL VENOUS BLD VENIPUNCTURE: CPT | Mod: PO

## 2020-03-23 PROCEDURE — 85025 COMPLETE CBC W/AUTO DIFF WBC: CPT

## 2020-03-23 PROCEDURE — 84153 ASSAY OF PSA TOTAL: CPT

## 2020-03-24 RX ORDER — INSULIN GLARGINE 300 [IU]/ML
20 INJECTION, SOLUTION SUBCUTANEOUS NIGHTLY
Qty: 5 SYRINGE | Refills: 1 | Status: SHIPPED | OUTPATIENT
Start: 2020-03-24 | End: 2020-04-28

## 2020-03-24 RX ORDER — PEN NEEDLE, DIABETIC 30 GX3/16"
1 NEEDLE, DISPOSABLE MISCELLANEOUS NIGHTLY
Qty: 50 EACH | Refills: 11 | Status: SHIPPED | OUTPATIENT
Start: 2020-03-24 | End: 2020-04-28 | Stop reason: SDUPTHER

## 2020-03-24 NOTE — PROGRESS NOTES
Diabetes not at [employer].The HGa1c goal is less than 6.5 or less. The patient is asked to make an attempt to improve diet and exercise patterns to aid in medical management of this problem.  Please consider taking an insulin dose at night .  We will start at 20 units of long-acting insulin.  This injections will help you with the morning blood sugars to be less than 140.  The cholesterol is still elevated with moderate elevated triglycerides over 200.  Therefore this results will increase heart attack rate The next labs in 4 months.  Crestor will be increased to 40 mg daily.  Please include FiberCon 4 tablets daily.  Please schedule appointment with , she will help you to understand the use of long-acting insulin, reviewed diet.

## 2020-03-25 ENCOUNTER — TELEPHONE (OUTPATIENT)
Dept: FAMILY MEDICINE | Facility: CLINIC | Age: 65
End: 2020-03-25

## 2020-03-25 NOTE — TELEPHONE ENCOUNTER
----- Message from Alisia Brady sent at 3/25/2020  1:21 PM CDT -----  Contact: pt  Type:  Test Results    Who Called:  pt  Name of Test (Lab/Mammo/Etc): lab  Date of Test:  03/23  Ordering Provider: Dr Whitehead  Where the test was performed:  [facility]  Best Call Back Number:  [phone] (home)     Additional Information:  na

## 2020-04-24 ENCOUNTER — CLINICAL SUPPORT (OUTPATIENT)
Dept: DIABETES | Facility: CLINIC | Age: 65
End: 2020-04-24
Payer: MEDICARE

## 2020-04-24 PROCEDURE — 99999 PR PBB SHADOW E&M-EST. PATIENT-LVL III: CPT | Mod: PBBFAC,,, | Performed by: DIETITIAN, REGISTERED

## 2020-04-24 PROCEDURE — 99999 PR PBB SHADOW E&M-EST. PATIENT-LVL III: ICD-10-PCS | Mod: PBBFAC,,, | Performed by: DIETITIAN, REGISTERED

## 2020-04-24 PROCEDURE — G0108 DIAB MANAGE TRN  PER INDIV: HCPCS | Mod: PBBFAC,PO | Performed by: DIETITIAN, REGISTERED

## 2020-04-24 PROCEDURE — 99213 OFFICE O/P EST LOW 20 MIN: CPT | Mod: PBBFAC,PO | Performed by: DIETITIAN, REGISTERED

## 2020-04-27 ENCOUNTER — TELEPHONE (OUTPATIENT)
Dept: PAIN MEDICINE | Facility: CLINIC | Age: 65
End: 2020-04-27

## 2020-04-27 NOTE — TELEPHONE ENCOUNTER
----- Message from Eleno Caballero sent at 4/27/2020  3:07 PM CDT -----  Contact: pt   Pt called and thinks he missed a call from your office     Pt stated he could not make out what the voice message stated     Pt can be reached at [phone]

## 2020-04-28 ENCOUNTER — CLINICAL SUPPORT (OUTPATIENT)
Dept: DIABETES | Facility: CLINIC | Age: 65
End: 2020-04-28
Payer: MEDICARE

## 2020-04-28 DIAGNOSIS — E11.59 HYPERTENSION ASSOCIATED WITH DIABETES: ICD-10-CM

## 2020-04-28 DIAGNOSIS — E11.69 HYPERLIPIDEMIA ASSOCIATED WITH TYPE 2 DIABETES MELLITUS: ICD-10-CM

## 2020-04-28 DIAGNOSIS — E78.5 HYPERLIPIDEMIA ASSOCIATED WITH TYPE 2 DIABETES MELLITUS: ICD-10-CM

## 2020-04-28 DIAGNOSIS — E11.65 UNCONTROLLED TYPE 2 DIABETES MELLITUS WITH HYPERGLYCEMIA: ICD-10-CM

## 2020-04-28 DIAGNOSIS — I15.2 HYPERTENSION ASSOCIATED WITH DIABETES: ICD-10-CM

## 2020-04-28 PROCEDURE — G0108 DIAB MANAGE TRN  PER INDIV: HCPCS | Mod: PBBFAC,PO | Performed by: DIETITIAN, REGISTERED

## 2020-04-28 PROCEDURE — 99213 OFFICE O/P EST LOW 20 MIN: CPT | Mod: PBBFAC,PO | Performed by: DIETITIAN, REGISTERED

## 2020-04-28 PROCEDURE — 99999 PR PBB SHADOW E&M-EST. PATIENT-LVL III: ICD-10-PCS | Mod: PBBFAC,,, | Performed by: DIETITIAN, REGISTERED

## 2020-04-28 PROCEDURE — 99999 PR PBB SHADOW E&M-EST. PATIENT-LVL III: CPT | Mod: PBBFAC,,, | Performed by: DIETITIAN, REGISTERED

## 2020-04-28 RX ORDER — INSULIN GLARGINE 100 [IU]/ML
20 INJECTION, SOLUTION SUBCUTANEOUS NIGHTLY
Qty: 1 BOX | Refills: 4 | Status: SHIPPED | OUTPATIENT
Start: 2020-04-28 | End: 2020-12-30 | Stop reason: ALTCHOICE

## 2020-04-28 RX ORDER — PEN NEEDLE, DIABETIC 30 GX3/16"
1 NEEDLE, DISPOSABLE MISCELLANEOUS NIGHTLY
Qty: 50 EACH | Refills: 11 | Status: ON HOLD | OUTPATIENT
Start: 2020-04-28 | End: 2021-09-10 | Stop reason: HOSPADM

## 2020-04-29 VITALS — WEIGHT: 229.94 LBS | BODY MASS INDEX: 27.15 KG/M2 | HEIGHT: 77 IN

## 2020-04-29 NOTE — PROGRESS NOTES
Diabetes Education  Author: Asha Belle RD, CDE  Date: 4/29/2020    Diabetes Care Management Summary  Diabetes Education Record Assessment/Progress: Comprehensive/Group  Current Diabetes Risk Level: Moderate     Last A1c:   Lab Results   Component Value Date    HGBA1C 8.0 (H) 03/23/2020     Last visit with Diabetes Educator: : 04/28/2020      Diabetes Type  Diabetes Type : Type II    Diabetes History  Diabetes Diagnosis: 5-10 years  Current Treatment: Oral Medication(1000mg Metformin BID)  Reviewed Problem List with Patient: Yes    Health Maintenance was reviewed today with patient. Discussed with patient importance of routine eye exams, foot exams/foot care, blood work (i.e.: A1c, microalbumin, and lipid), dental visits, yearly flu vaccine, and pneumonia vaccine as indicated by PCP. Patient verbalized understanding.     Health Maintenance Topics with due status: Not Due       Topic Last Completion Date    TETANUS VACCINE 02/10/2017    Pneumococcal Vaccine (65+ Low/Medium Risk) 03/22/2019    Colonoscopy 05/06/2019    Eye Exam 06/18/2019    Foot Exam 09/30/2019    Low Dose Statin 03/09/2020    Lipid Panel 03/23/2020    Hemoglobin A1c 03/23/2020     Health Maintenance Due   Topic Date Due    LDCT Lung Screen  12/02/2008       Nutrition  Meal Planning: 3 meals per day, snacks between meal  What type of sweetener do you use?: Sweet N Low  Meal Plan 24 Hour Recall - Breakfast: (Eggs with norris and one slice of toast and glass of milk)  Meal Plan 24 Hour Recall - Lunch: (Small apple)  Meal Plan 24 Hour Recall - Dinner: (Soup or [place])  Meal Plan 24 Hour Recall - Snack: (Small bowl of ice cream)    Monitoring   Monitoring: Other  Self Monitoring : (Reports blood sugars averaging 300-400)  Blood Glucose Logs: No  Do you use a personal continuous glucose monitor?: No  In the last month, how often have you had a low blood sugar reaction?: never  Can you tell when your blood sugar is too high?: sometimes    Exercise    Exercise Type: none    Current Diabetes Treatment   Current Treatment: Oral Medication(1000mg Metformin BID)    Social History  Preferred Learning Method: Face to Face, Video  Primary Support: Self, Spouse  Educational Level: Some College  Smoking Status: Current Smoker  Alcohol Use: Weekly    Barriers to Change  Barriers to Change: None  Learning Challenges : None    Readiness to Learn   Readiness to Learn : Acceptance    Cultural Influences  Cultural Influences: None    Diabetes Education Assessment/Progress  Diabetes Disease Process (diabetes disease process and treatment options): Discussion  Nutrition (Incorporating nutritional management into one's lifestyle): Discussion, Instructed, Comprehends Key Points, Individual Session, Demonstrates Understanding/Competency (verbalizes/demonstrates)(Patient familiar with nutrition from prior visits and he stopped consuming beverages with sugar)  Physical Activity (incorporating physical activity into one's lifestyle): Discussion  Medications (states correct name, dose, onset, peak, duration, side effects & timing of meds): Discussion, Instructed, Demonstrates Understanding/Competency(verbalizes/demonstrates), Comprehends Key Points, Requires Assistance Family/SO(Patient reports Actos and Januvia and Toujeo insulin too expensive he has some glimepride 10mg he will start to take to see if this helps over next week)  Monitoring (monitoring blood glucose/other parameters & using results): Discussion, Instructed, Demonstrates Understanding/Competency (verbalizes/demonstrates), Individual Session, Written Materials Provided, Comprehends Key Points(Instructed patient to test blood sugar and reports back within a week )  Acute Complications (preventing, detecting, and treating acute complications): Discussion  Chronic Complications (preventing, detecting, and treating chronic complications): Discussion  Clinical (diabetes, other pertinent medical history, and relevant  comorbidities reviewed during visit): Discussion  Cognitive (knowledge of self-management skills, functional health literacy): Discussion  Psychosocial (emotional response to diabetes): Discussion  Diabetes Distress and Support Systems: Discussion  Behavioral (readiness for change, lifestyle practices, self-care behaviors): Discussion    Goals  Patient has selected/evaluated goals during today's session: Yes, selected  Healthy Eating: In Progress  Physical Activity: In Progress  Monitoring: Set  Start Date: 04/24/20  Target Date: 07/24/20  Medications: Set  Start Date: 04/24/20  Target Date: 07/24/20  Problem Solving: In Progress  Healthy Coping: In Progress  Reducing Risks: In Progress    Diabetes Care Plan/Intervention  Education Plan/Intervention: Individual Follow-Up DSMT    Diabetes Meal Plan  Restrictions: Restricted Carbohydrate  Calories: 1600  Carbohydrate Per Meal: 30-45g  Carbohydrate Per Snack : 7-15g    Today's Self-Management Care Plan was developed with the patient's input and is based on barriers identified during today's assessment.    The long and short-term goals in the care plan were written with the patient/caregiver's input. The patient has agreed to work toward these goals to improve his overall diabetes control.      The patient received a copy of today's self-management plan and verbalized understanding of the care plan, goals, and all of today's instructions.      The patient was encouraged to communicate with his physician and care team regarding his condition(s) and treatment.  I provided the patient with my contact information today and encouraged him to contact me via phone or patient portal as needed.     Diabetes Care Specialist Telehealth Visit Note     It was in the patient's best interest to receive diabetes self-management education and support services in this format to prevent the exposure to COVID-19.        Established Patient - Audio Only Telehealth Visit  The patient  location is: home   The chief complaint leading to consultation is: Diabetes    Visit type: Virtual visit with audio only (telephone)  Total time spent with patient: 30 min      The reason for the audio only service rather than synchronous audio and video virtual visit was related to technical difficulties or patient preference/necessity.     Each patient to whom I provide medical services by telemedicine is:  (1) informed of the relationship between the physician and patient and the respective role of any other health care provider with respect to management of the patient; and (2) notified that they may decline to receive medical services by telemedicine and may withdraw from such care at any time. Patient verbally consented to receive this service via voice-only telephone call.            Education Units of Time   Time Spent: 30 min

## 2020-05-03 VITALS — BODY MASS INDEX: 27.15 KG/M2 | WEIGHT: 229.94 LBS | HEIGHT: 77 IN

## 2020-05-03 NOTE — PROGRESS NOTES
Diabetes Education  Author: Asha Belle RD, CDE  Date: 5/3/2020    Diabetes Care Management Summary  Diabetes Education Record Assessment/Progress: Comprehensive/Group  Current Diabetes Risk Level: Moderate     Last A1c:   Lab Results   Component Value Date    HGBA1C 8.0 (H) 03/23/2020     Last visit with Diabetes Educator: : 04/28/2020      Diabetes Type  Diabetes Type : Type II    Diabetes History  Reviewed Problem List with Patient: Yes    Health Maintenance was reviewed today with patient. Discussed with patient importance of routine eye exams, foot exams/foot care, blood work (i.e.: A1c, microalbumin, and lipid), dental visits, yearly flu vaccine, and pneumonia vaccine as indicated by PCP. Patient verbalized understanding.     Health Maintenance Topics with due status: Not Due       Topic Last Completion Date    TETANUS VACCINE 02/10/2017    Pneumococcal Vaccine (65+ Low/Medium Risk) 03/22/2019    Colonoscopy 05/06/2019    Eye Exam 06/18/2019    Foot Exam 09/30/2019    Low Dose Statin 03/09/2020    Lipid Panel 03/23/2020    Hemoglobin A1c 03/23/2020     Health Maintenance Due   Topic Date Due    LDCT Lung Screen  12/02/2008       Nutrition  Meal Planning: (Patient has made improvements with diet to attempt to improve blood sugars)    Monitoring   Monitoring: Other  Self Monitoring : (Reportst blood sugars range 280-330 )  Blood Glucose Logs: No  Do you use a personal continuous glucose monitor?: No  In the last month, how often have you had a low blood sugar reaction?: never  Can you tell when your blood sugar is too high?: no    Exercise   Exercise Type: none         Social History  Preferred Learning Method: Face to Face  Primary Support: Self, Spouse, Family  Educational Level: Trade School  Smoking Status: Current Smoker  Alcohol Use: Rarely    Barriers to Change  Barriers to Change: Financial  Learning Challenges : None    Readiness to Learn   Readiness to Learn : Acceptance    Cultural  Influences  Cultural Influences: None    Diabetes Education Assessment/Progress  Diabetes Disease Process (diabetes disease process and treatment options): Discussion  Nutrition (Incorporating nutritional management into one's lifestyle): Discussion  Physical Activity (incorporating physical activity into one's lifestyle): Discussion  Medications (states correct name, dose, onset, peak, duration, side effects & timing of meds): Discussion, Instructed(Will send message to Dr. Whitehead to attempt to get financial assistance to cover medications for diabetes)  Monitoring (monitoring blood glucose/other parameters & using results): Discussion  Acute Complications (preventing, detecting, and treating acute complications): Discussion  Chronic Complications (preventing, detecting, and treating chronic complications): Discussion  Clinical (diabetes, other pertinent medical history, and relevant comorbidities reviewed during visit): Discussion  Cognitive (knowledge of self-management skills, functional health literacy): Discussion  Psychosocial (emotional response to diabetes): Discussion  Diabetes Distress and Support Systems: Discussion  Behavioral (readiness for change, lifestyle practices, self-care behaviors): Discussion    Goals  Patient has selected/evaluated goals during today's session: No    Diabetes Care Plan/Intervention  Education Plan/Intervention: Individual Follow-Up DSMT    Today's Self-Management Care Plan was developed with the patient's input and is based on barriers identified during today's assessment.    The long and short-term goals in the care plan were written with the patient/caregiver's input. The patient has agreed to work toward these goals to improve his overall diabetes control.      The patient received a copy of today's self-management plan and verbalized understanding of the care plan, goals, and all of today's instructions.      The patient was encouraged to communicate with his physician and  care team regarding his condition(s) and treatment.  I provided the patient with my contact information today and encouraged him to contact me via phone or patient portal as needed.     Diabetes Care Specialist Telehealth Visit Note     It was in the patient's best interest to receive diabetes self-management education and support services in this format to prevent the exposure to COVID-19.        Established Patient - Audio Only Telehealth Visit  The patient location is: home   The chief complaint leading to consultation is: Diabetes    Visit type: Virtual visit with audio only (telephone)  Total time spent with patient: 30 min      The reason for the audio only service rather than synchronous audio and video virtual visit was related to technical difficulties or patient preference/necessity.     Each patient to whom I provide medical services by telemedicine is:  (1) informed of the relationship between the physician and patient and the respective role of any other health care provider with respect to management of the patient; and (2) notified that they may decline to receive medical services by telemedicine and may withdraw from such care at any time. Patient verbally consented to receive this service via voice-only telephone call.    Education Units of Time   Time Spent: 30 min

## 2020-05-05 ENCOUNTER — PATIENT MESSAGE (OUTPATIENT)
Dept: ADMINISTRATIVE | Facility: HOSPITAL | Age: 65
End: 2020-05-05

## 2020-06-02 ENCOUNTER — TELEPHONE (OUTPATIENT)
Dept: FAMILY MEDICINE | Facility: CLINIC | Age: 65
End: 2020-06-02

## 2020-06-08 DIAGNOSIS — G89.4 CHRONIC PAIN DISORDER: Primary | ICD-10-CM

## 2020-06-08 DIAGNOSIS — M54.14 THORACIC RADICULITIS: ICD-10-CM

## 2020-06-08 DIAGNOSIS — B02.29 POST HERPETIC NEURALGIA: ICD-10-CM

## 2020-06-08 RX ORDER — TRAMADOL HYDROCHLORIDE 50 MG/1
50 TABLET ORAL EVERY 8 HOURS PRN
Qty: 90 TABLET | Refills: 0 | Status: SHIPPED | OUTPATIENT
Start: 2020-06-28 | End: 2020-07-27

## 2020-06-08 NOTE — PROGRESS NOTES
Referring Physician: No ref. provider found    PCP: Joey Whitehead MD      CC:  Mid back and rib pain    Interval history:  Jhonny Almazan is a 64 y.o. male with mid back and rib pain with suspected post herpetic neuralgia.  He underwent a thoracic TRINI 2019 with provided by 50% relief.  Overall pain is improved.  He is consider repeat procedure.   Continues to take gabapentin 600 mg b.i.d. with mild benefits.  He also takes tramadol very sparingly with moderate benefit.  He is pleased with his progress so far.  He denies any weakness.  No bowel bladder changes.  He did have a fall on his right shoulder has had some tenderness over his right shoulder past week.  No imaging.  Pain is slowly improving.  Pt has been seen in the clinic before, however pt is new to me.     History below per Dr. Green   Prior HPI:   Jhonny Almazan is a 64 y.o. male referred to us for mid back and rib pain.  Pain started in April 2018 without traumatic incident.  He reports having constant sharp, shooting pain in his mid back.  Pain travels to his right side ribs below his right chest. He states not developing a rash at that time. He feels pins and needles in those areas.  Pain worsens sitting, standing, touching, coughing and lifting. Pain worsens at night.  Pain improves with rest.  He has tried physical therapy with minimal benefit.  He has been evaluated by spine surgery.  MRI of the thoracic spine was ordered.  Currently takes gabapentin 300 mg t.i.d. prior to his incident with mild benefits.  He has tried topical ointments with mild benefits.  He denies any worsening weakness.  No bowel bladder changes. Pain today is rated 4/10.     ROS:  CONSTITUTIONAL: No fevers, chills, night sweats, wt. loss, appetite changes  SKIN: no rashes or itching  ENT: No headaches, head trauma, vision changes, or eye pain  LYMPH NODES: None noticed   CV: No chest pain, palpitations.   RESP: No shortness of breath, dyspnea on exertion, cough,  wheezing, or hemoptysis  GI: No nausea, emesis, diarrhea, constipation, melena, hematochezia, pain.    : No dysuria, hematuria, urgency, or frequency   HEME: No easy bruising, bleeding problems  PSYCHIATRIC: No depression, anxiety, psychosis, hallucinations.  NEURO: No seizures, memory loss, dizziness or difficulty sleeping  MSK:  Positive HPI      Past Medical History:   Diagnosis Date    Anticoagulant long-term use     Arthritis     Colon polyp     Diabetes mellitus     Diabetes mellitus, type 2     Fatty liver     Hypertension      Past Surgical History:   Procedure Laterality Date    BACK SURGERY      COLONOSCOPY  07/18/2014    Dr. Crane: 22 colon polyps removed, hemorrhoids, erythema to sigmoid, repeat in 1 year for surveillance; biopsy: sigmoid erythema- showed unremarkable colonic mucosa, tubular adenomas and hyperplastic polyps    COLONOSCOPY N/A 5/3/2019    Procedure: COLONOSCOPY;  Surgeon: Guero rCane MD;  Location: [facility];  Service: Endoscopy;  Laterality: N/A;    COLONOSCOPY N/A 5/6/2019    Procedure: COLONOSCOPY;  Surgeon: Guero Craen MD;  Location: [facility];  Service: Endoscopy;  Laterality: N/A;    EPIDURAL STEROID INJECTION INTO THORACIC SPINE N/A 8/30/2019    Procedure: Injection-steroid-epidural-thoracic;  Surgeon: Frantz Green MD;  [facility];  Service: Pain Management;  Laterality: N/A;  T6-7    ESOPHAGOGASTRODUODENOSCOPY N/A 4/26/2019    Procedure: EGD (ESOPHAGOGASTRODUODENOSCOPY);  Surgeon: Guero Crane MD;  Location: [facility];  Service: Endoscopy;  Laterality: N/A;    HERNIA REPAIR       Family History   Problem Relation Age of Onset    Heart disease Mother     Heart disease Father     Diabetes Brother     Suicide Brother     Brain cancer Brother     Colon cancer Neg Hx     Crohn's disease Neg Hx     Ulcerative colitis Neg Hx     Stomach cancer Neg Hx     Esophageal cancer Neg Hx     Glaucoma Neg Hx     Retinal detachment Neg Hx      "Macular degeneration Neg Hx      Social History     Socioeconomic History    Marital status:      Spouse name: Not on file    Number of children: Not on file    Years of education: Not on file    Highest education level: Not on file   Occupational History    Not on file   Social Needs    Financial resource strain: Not on file    Food insecurity:     Worry: Not on file     Inability: Not on file    Transportation needs:     Medical: Not on file     Non-medical: Not on file   Tobacco Use    Smoking status: Current Every Day Smoker     Packs/day: 1.00     Years: 50.00     Pack years: 50.00     Types: Cigarettes    Smokeless tobacco: Never Used   Substance and Sexual Activity    Alcohol use: Yes     Alcohol/week: 14.0 standard drinks     Types: 14 Cans of beer per week     Comment: 2-3 beers daily    Drug use: No    Sexual activity: Yes     Partners: Female   Lifestyle    Physical activity:     Days per week: Not on file     Minutes per session: Not on file    Stress: Not on file   Relationships    Social connections:     Talks on phone: Not on file     Gets together: Not on file     Attends [employer] service: Not on file     Active member of club or organization: Not on file     Attends meetings of clubs or organizations: Not on file     Relationship status: Not on file   Other Topics Concern    Not on file   Social History Narrative    Not on file         Medications/Allergies: See med card    Vitals:    06/09/20 0814   BP: (!) 156/88   Pulse: 82   Weight: 103.9 kg (229 lb)   Height: 6' 5" (1.956 m)   PainSc:   4   PainLoc: Back         Physical exam:    GENERAL: A and O x3, the patient appears well groomed and is in no acute distress.  Skin: No rashes or obvious lesions  HEENT: normocephalic, atraumatic  CARDIOVASCULAR:  RRR  LUNGS: non labored breathing  ABDOMEN: soft, nontender   UPPER EXTREMITIES: Normal alignment, normal range of motion, no atrophy, no skin changes,  hair growth and nail " growth normal and equal bilaterally. No swelling, no tenderness.    LOWER EXTREMITIES:  Normal alignment, normal range of motion, no atrophy, no skin changes,  hair growth and nail growth normal and equal bilaterally. No swelling, no tenderness.  Thoracolumbar SPINE  Lumbar spine: ROM is full with flexion extension and oblique extension with no increased pain.    George's test causes no increased pain on either side.    Supine straight leg raise is negative bilaterally.    Internal and external rotation of the hip causes no increased pain on either side.  Myofascial exam:  Moderate tenderness over right thoracic paraspinal.  There is some hyperalgesia over his T8 dermatome on the right       MENTAL STATUS: normal orientation, speech, language, and fund of knowledge for social situation.  Emotional state appropriate.    CRANIAL NERVES:  II:  PERRL bilaterally,   III,IV,VI: EOMI.    V:  Facial sensation equal bilaterally  VII:  Facial motor function normal.  VIII:  Hearing equal to finger rub bilaterally  IX/X: Gag normal, palate symmetric  XI:  Shoulder shrug equal, head turn equal  XII:  Tongue midline without fasciculations      MOTOR: Tone and bulk: normal bilateral upper and lower Strength: normal   Delt Bi Tri WE WF     R 5 5 5 5 5 5   L 5 5 5 5 5 5     IP ADD ABD Quad TA Gas HAM  R 5 5 5 5 5 5 5  L 5 5 5 5 5 5 5    SENSATION: Light touch and pinprick intact bilaterally  REFLEXES: normal, symmetric, nonbrisk.  Toes down, no clonus. No hoffmans.  GAIT: normal rise, base, steps, and arm swing.        Imaging:  MRI T-spine 6/2019  There is multilevel degenerative disc disease most notable at the T3-T4 through  the T8-T9 levels.. There is mild diffuse disc bulging with slight central disc  protrusion at T7-T8 and very slight right paracentral focal disc protrusion at  T8-T9. There is no significant encroachment of the central canal and no cord or  nerve root impingement.    Assessment:  Jhonny Almazan  is a 64 y.o. male with mid back pain  1. Thoracic radiculitis    2. Post herpetic neuralgia        Plan:  1. I have stressed the importance of physical activity and exercise to improve overall health  2. Reviewed pertinent imaging and records with patient  3.  Patient with mid back pain with equally bothersome radiating right rib pain following a specific dermatome.  He described a prodrome of symptoms prior to his mid back pain. I suspect he he has symptoms consistent with post herpetic neuralgia without appearance of rash.  There is a small percentage of patients that will have shingles without rash.  Continue gabapentin to 600 mg t.i.d..  Use topical ointment as tolerated.  4.  May consider repeat thoracic epidural steroid injection for further compounding benefit in the future.  We will schedule repeat procedure as patient wishes to see out-of-pocket cost.  5.  Refill given for Tramadol for his post herpetic neuralgia pain. Call for additional refill if needed.   6.  Follow-up in 3 months.  All medication management was performed by Dr. Frantz Green

## 2020-06-09 ENCOUNTER — OFFICE VISIT (OUTPATIENT)
Dept: PAIN MEDICINE | Facility: CLINIC | Age: 65
End: 2020-06-09
Payer: MEDICARE

## 2020-06-09 VITALS
HEART RATE: 82 BPM | SYSTOLIC BLOOD PRESSURE: 156 MMHG | DIASTOLIC BLOOD PRESSURE: 88 MMHG | WEIGHT: 229 LBS | BODY MASS INDEX: 27.04 KG/M2 | HEIGHT: 77 IN

## 2020-06-09 DIAGNOSIS — M54.14 THORACIC RADICULITIS: Primary | ICD-10-CM

## 2020-06-09 DIAGNOSIS — B02.29 POST HERPETIC NEURALGIA: ICD-10-CM

## 2020-06-09 PROCEDURE — 99999 PR PBB SHADOW E&M-EST. PATIENT-LVL IV: CPT | Mod: PBBFAC,,, | Performed by: PHYSICIAN ASSISTANT

## 2020-06-09 PROCEDURE — 99214 OFFICE O/P EST MOD 30 MIN: CPT | Mod: PBBFAC,PN | Performed by: PHYSICIAN ASSISTANT

## 2020-06-09 PROCEDURE — 99214 PR OFFICE/OUTPT VISIT, EST, LEVL IV, 30-39 MIN: ICD-10-PCS | Mod: S$PBB,,, | Performed by: PHYSICIAN ASSISTANT

## 2020-06-09 PROCEDURE — 99214 OFFICE O/P EST MOD 30 MIN: CPT | Mod: S$PBB,,, | Performed by: PHYSICIAN ASSISTANT

## 2020-06-09 PROCEDURE — 99999 PR PBB SHADOW E&M-EST. PATIENT-LVL IV: ICD-10-PCS | Mod: PBBFAC,,, | Performed by: PHYSICIAN ASSISTANT

## 2020-06-17 ENCOUNTER — PATIENT OUTREACH (OUTPATIENT)
Dept: ADMINISTRATIVE | Facility: HOSPITAL | Age: 65
End: 2020-06-17

## 2020-06-17 NOTE — PROGRESS NOTES
Chart review completed 06/17/2020.  Care Everywhere updates requested and reviewed.  Immunizations reconciled. Media reviewed.     DIS, [facility], and quest reviewed    HA1C SCHEDULED. PORTAL MESSAGE SENT W/ LINK TO SCHEDULE EYE EXAM.

## 2020-06-21 ENCOUNTER — PATIENT OUTREACH (OUTPATIENT)
Dept: ADMINISTRATIVE | Facility: OTHER | Age: 65
End: 2020-06-21

## 2020-06-21 NOTE — PROGRESS NOTES
Patient's chart was reviewed.   Requested updates within Care Everywhere.  Link to schedule eye exam sent by CCC.

## 2020-06-22 ENCOUNTER — OFFICE VISIT (OUTPATIENT)
Dept: PRIMARY CARE CLINIC | Facility: CLINIC | Age: 65
End: 2020-06-22
Payer: MEDICARE

## 2020-06-22 VITALS
HEART RATE: 79 BPM | OXYGEN SATURATION: 97 % | RESPIRATION RATE: 20 BRPM | SYSTOLIC BLOOD PRESSURE: 171 MMHG | TEMPERATURE: 98 F | DIASTOLIC BLOOD PRESSURE: 90 MMHG

## 2020-06-22 DIAGNOSIS — I10 HYPERTENSION, UNSPECIFIED TYPE: ICD-10-CM

## 2020-06-22 DIAGNOSIS — U07.1 COVID-19: Primary | ICD-10-CM

## 2020-06-22 PROCEDURE — U0003 INFECTIOUS AGENT DETECTION BY NUCLEIC ACID (DNA OR RNA); SEVERE ACUTE RESPIRATORY SYNDROME CORONAVIRUS 2 (SARS-COV-2) (CORONAVIRUS DISEASE [COVID-19]), AMPLIFIED PROBE TECHNIQUE, MAKING USE OF HIGH THROUGHPUT TECHNOLOGIES AS DESCRIBED BY CMS-2020-01-R: HCPCS

## 2020-06-22 PROCEDURE — 99213 PR OFFICE/OUTPT VISIT, EST, LEVL III, 20-29 MIN: ICD-10-PCS | Mod: S$GLB,,, | Performed by: PHYSICIAN ASSISTANT

## 2020-06-22 PROCEDURE — 99213 OFFICE O/P EST LOW 20 MIN: CPT | Mod: S$GLB,,, | Performed by: PHYSICIAN ASSISTANT

## 2020-06-22 NOTE — PROGRESS NOTES
Subjective:        Time seen by provider: 9:52 AM on 2020    Jhonny Almazan is a 64 y.o. male with HTN who presents for an evaluation of possible COVID-19. He is asymptomatic but states he has been exposed to sick contacts. 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 He denies fever, cough, diarrhea, or any other symptoms at this time. No pertinent PSHx.     Review of Systems   Constitutional: Negative for activity change, appetite change, fatigue and fever.   HENT: Negative for congestion, rhinorrhea and sore throat.    Respiratory: Negative for cough, chest tightness, shortness of breath and wheezing.    Cardiovascular: Negative for chest pain and palpitations.   Gastrointestinal: Negative for diarrhea, nausea and vomiting.   Musculoskeletal: Negative for arthralgias and myalgias.   Skin: Negative for rash.   Neurological: Negative for weakness, light-headedness and headaches.       Objective:      Physical Exam  Vitals signs and nursing note reviewed.   Constitutional:       General: He is not in acute distress.     Appearance: He is well-developed. He is not diaphoretic.   HENT:      Head: Normocephalic and atraumatic.      Nose: Nose normal.   Eyes:      Conjunctiva/sclera: Conjunctivae normal.   Neck:      Musculoskeletal: Normal range of motion.   Cardiovascular:      Rate and Rhythm: Normal rate and regular rhythm.      Heart sounds: Normal heart sounds. No murmur.   Pulmonary:      Effort: Pulmonary effort is normal. No respiratory distress.      Breath sounds: Normal breath sounds. No wheezing.   Musculoskeletal: Normal range of motion.   Skin:     General: Skin is warm and dry.   Neurological:      Mental Status: He is alert and oriented to person, place, and time.         Assessment and Plan:      Diagnoses and all orders for this visit:    COVID-19  -     COVID-19 Routine Screening  - Discharge home and  await results.   - Return to clinic or ED for new or worsening symptoms.   - Follow-up with PCP as needed.     Hypertension, unspecified type    - Your blood pressure is elevated. Take your blood pressure daily and follow up with your primary care provider. For worsening symptoms, chest pain, shortness of breath, increased abdominal pain, high-grade fever, stroke, or stroke like symptoms, immediately go to the nearest Emergency Room or call 911 as soon as possible.      Scribe Attestation:   I, Korin Powers, am scribing for, and in the presence of, Mihaela Tamayo PA-C. I performed the above scribed service and the documentation accurately describes the services I performed. I attest to the accuracy of the note.    I, Mihaela Tamayo PA-C, personally performed the services described in this documentation. All medical record entries made by the scribe were at my direction and in my presence.  I have reviewed the chart and agree that the record reflects my personal performance and is accurate and complete. Mihaela Tamayo PA-C.  10:14 AM 06/22/2020

## 2020-06-24 LAB — SARS-COV-2 RNA RESP QL NAA+PROBE: NOT DETECTED

## 2020-06-26 DIAGNOSIS — E11.9 TYPE 2 DIABETES MELLITUS WITHOUT COMPLICATION, UNSPECIFIED WHETHER LONG TERM INSULIN USE: ICD-10-CM

## 2020-07-02 ENCOUNTER — PATIENT OUTREACH (OUTPATIENT)
Dept: ADMINISTRATIVE | Facility: HOSPITAL | Age: 65
End: 2020-07-02

## 2020-07-02 NOTE — PROGRESS NOTES
Chart review completed 07/02/2020.  Care Everywhere updates requested and reviewed.  Immunizations reconciled. Media reviewed.

## 2020-07-06 ENCOUNTER — LAB VISIT (OUTPATIENT)
Dept: LAB | Facility: HOSPITAL | Age: 65
End: 2020-07-06
Attending: FAMILY MEDICINE
Payer: MEDICARE

## 2020-07-06 LAB
ANION GAP SERPL CALC-SCNC: 6 MMOL/L (ref 8–16)
BUN SERPL-MCNC: 14 MG/DL (ref 8–23)
CALCIUM SERPL-MCNC: 9.8 MG/DL (ref 8.7–10.5)
CHLORIDE SERPL-SCNC: 106 MMOL/L (ref 95–110)
CO2 SERPL-SCNC: 26 MMOL/L (ref 23–29)
CREAT SERPL-MCNC: 0.8 MG/DL (ref 0.5–1.4)
EST. GFR  (AFRICAN AMERICAN): >60 ML/MIN/1.73 M^2
EST. GFR  (NON AFRICAN AMERICAN): >60 ML/MIN/1.73 M^2
GLUCOSE SERPL-MCNC: 137 MG/DL (ref 70–110)
POTASSIUM SERPL-SCNC: 4.5 MMOL/L (ref 3.5–5.1)
SODIUM SERPL-SCNC: 138 MMOL/L (ref 136–145)

## 2020-07-06 PROCEDURE — 83036 HEMOGLOBIN GLYCOSYLATED A1C: CPT

## 2020-07-06 PROCEDURE — 80048 BASIC METABOLIC PNL TOTAL CA: CPT

## 2020-07-06 PROCEDURE — 36415 COLL VENOUS BLD VENIPUNCTURE: CPT | Mod: PO

## 2020-07-07 LAB
ESTIMATED AVG GLUCOSE: 157 MG/DL (ref 68–131)
HBA1C MFR BLD HPLC: 7.1 % (ref 4–5.6)

## 2020-07-08 ENCOUNTER — PATIENT OUTREACH (OUTPATIENT)
Dept: ADMINISTRATIVE | Facility: OTHER | Age: 65
End: 2020-07-08

## 2020-07-19 ENCOUNTER — PATIENT OUTREACH (OUTPATIENT)
Dept: ADMINISTRATIVE | Facility: OTHER | Age: 65
End: 2020-07-19

## 2020-07-19 NOTE — PROGRESS NOTES
Chart was reviewed for overdue Proactive [facility] Encounters ([facility])  topics  Updates were requested from care everywhere  Health Maintenance has been updated

## 2020-08-10 ENCOUNTER — OFFICE VISIT (OUTPATIENT)
Dept: ORTHOPEDICS | Facility: CLINIC | Age: 65
End: 2020-08-10
Payer: MEDICARE

## 2020-08-10 VITALS — WEIGHT: 235 LBS | SYSTOLIC BLOOD PRESSURE: 142 MMHG | BODY MASS INDEX: 27.87 KG/M2 | DIASTOLIC BLOOD PRESSURE: 70 MMHG

## 2020-08-10 DIAGNOSIS — B02.29 POST HERPETIC NEURALGIA: ICD-10-CM

## 2020-08-10 DIAGNOSIS — M46.04 SPINAL ENTHESOPATHY, THORACIC REGION: Primary | ICD-10-CM

## 2020-08-10 DIAGNOSIS — M54.14 THORACIC RADICULITIS: ICD-10-CM

## 2020-08-10 DIAGNOSIS — M51.9 THORACIC DISC DISEASE: ICD-10-CM

## 2020-08-10 DIAGNOSIS — E11.40 TYPE 2 DIABETES MELLITUS WITH DIABETIC NEUROPATHY, WITHOUT LONG-TERM CURRENT USE OF INSULIN: ICD-10-CM

## 2020-08-10 PROCEDURE — 99213 OFFICE O/P EST LOW 20 MIN: CPT | Mod: S$GLB,,, | Performed by: ORTHOPAEDIC SURGERY

## 2020-08-10 PROCEDURE — 99213 PR OFFICE/OUTPT VISIT, EST, LEVL III, 20-29 MIN: ICD-10-PCS | Mod: S$GLB,,, | Performed by: ORTHOPAEDIC SURGERY

## 2020-08-10 RX ORDER — TRAMADOL HYDROCHLORIDE 50 MG/1
50 TABLET ORAL EVERY 6 HOURS PRN
Qty: 28 TABLET | Refills: 3 | Status: SHIPPED | OUTPATIENT
Start: 2020-08-10 | End: 2020-08-17

## 2020-08-10 RX ORDER — GABAPENTIN 300 MG/1
600 CAPSULE ORAL NIGHTLY
Qty: 180 CAPSULE | Refills: 1 | Status: SHIPPED | OUTPATIENT
Start: 2020-08-10 | End: 2021-01-14 | Stop reason: SDUPTHER

## 2020-08-10 NOTE — PROGRESS NOTES
Subjective:       Patient ID: Jhonny Almazan is a 64 y.o. male.    Chief Complaint: Pain of the Thoracic Spine (Thoracic is the same. No other treatment)      History of Present Illness  Patient is here to follow-up for right-sided uppeer thoracic pain.   Overall he has minimal pain during the day which unfortunately, bothers him significantly at night.   was scheduled for a procedure with Dr. Green in April which was canceled.  He does state that his pain has, migrated a little bit superiorly into the chest on the right.    Current Medications  Current Outpatient Medications   Medication Sig Dispense Refill    buPROPion (WELLBUTRIN XL) 300 MG 24 hr tablet Take 1 tablet (300 mg total) by mouth once daily. 90 tablet 3    diclofenac sodium (VOLTAREN) 1 % Gel Apply 2 g topically 3 (three) times daily as needed. For hand pain 100 g 11    empagliflozin (JARDIANCE) 10 mg Tab Take 10 mg by mouth once daily. 30 tablet 11    folic acid-vit B6-vit B12 2.5-25-2 mg (FOLBIC OR EQUIV) 2.5-25-2 mg Tab Take 1 tablet by mouth once daily. 90 tablet 4    gabapentin (NEURONTIN) 300 MG capsule Take 2 capsules (600 mg total) by mouth 3 (three) times daily. 180 capsule 2    hydrALAZINE (APRESOLINE) 25 MG tablet Take 1 tablet (25 mg total) by mouth every 12 (twelve) hours. 180 tablet 8    insulin (BASAGLAR KWIKPEN U-100 INSULIN) glargine 100 units/mL (3mL) SubQ pen Inject 20 Units into the skin every evening. 1 Box 4    lactulose (CHRONULAC) 10 gram/15 mL solution TAKE 30 MLS BY MOUTH 2 TIMES DAILY. 1892 mL 3    lidocaine (LIDODERM) 5 % Place 1 patch onto the skin once daily. Remove & Discard patch within 12 hours or as directed by MD 30 patch 2    lisinopril-hydrochlorothiazide (PRINZIDE,ZESTORETIC) 20-12.5 mg per tablet TAKE 1 TABLET BY MOUTH TWICE A  tablet 3    magnesium citrate solution Take 296 mLs by mouth daily as needed (constipation).       metFORMIN (GLUCOPHAGE-XR) 500 MG 24 hr tablet Take 2 tablets  "(1,000 mg total) by mouth 2 (two) times daily. 360 tablet 0    pen needle, diabetic 32 gauge x 5/32" Ndle 1 each by Misc.(Non-Drug; Combo Route) route nightly. 50 each 11    pioglitazone (ACTOS) 30 MG tablet Take 1 tablet (30 mg total) by mouth once daily. 90 tablet 2    rosuvastatin (CRESTOR) 20 MG tablet Take 1 tablet (20 mg total) by mouth once daily. 90 tablet 3    omeprazole (PRILOSEC) 40 MG capsule TAKE 1 CAPSULE (40 MG TOTAL) BY MOUTH BEFORE BREAKFAST. 30 capsule 3     No current facility-administered medications for this visit.        Allergies  Review of patient's allergies indicates:  No Known Allergies    Past Medical History  Past Medical History:   Diagnosis Date    Anticoagulant long-term use     Arthritis     Colon polyp     Diabetes mellitus     Diabetes mellitus, type 2     Fatty liver     Hypertension        Surgical History  Past Surgical History:   Procedure Laterality Date    BACK SURGERY      COLONOSCOPY  07/18/2014    Dr. Crane: 22 colon polyps removed, hemorrhoids, erythema to sigmoid, repeat in 1 year for surveillance; biopsy: sigmoid erythema- showed unremarkable colonic mucosa, tubular adenomas and hyperplastic polyps    COLONOSCOPY N/A 5/3/2019    Procedure: COLONOSCOPY;  Surgeon: Guero Crane MD;  Location: [facility];  Service: Endoscopy;  Laterality: N/A;    COLONOSCOPY N/A 5/6/2019    Procedure: COLONOSCOPY;  Surgeon: Guero Crane MD;  Location: [facility];  Service: Endoscopy;  Laterality: N/A;    EPIDURAL STEROID INJECTION INTO THORACIC SPINE N/A 8/30/2019    Procedure: Injection-steroid-epidural-thoracic;  Surgeon: Frantz Green MD;  Location: [facility] OR;  Service: Pain Management;  Laterality: N/A;  T6-7    ESOPHAGOGASTRODUODENOSCOPY N/A 4/26/2019    Procedure: EGD (ESOPHAGOGASTRODUODENOSCOPY);  Surgeon: Guero Crane MD;  Location: [facility];  Service: Endoscopy;  Laterality: N/A;    HERNIA REPAIR         Family History:   Family History   Problem " Relation Age of Onset    Heart disease Mother     Heart disease Father     Diabetes Brother     Suicide Brother     Brain cancer Brother     Colon cancer Neg Hx     Crohn's disease Neg Hx     Ulcerative colitis Neg Hx     Stomach cancer Neg Hx     Esophageal cancer Neg Hx     Glaucoma Neg Hx     Retinal detachment Neg Hx     Macular degeneration Neg Hx        Social History:   Social History     Socioeconomic History    Marital status:      Spouse name: Not on file    Number of children: Not on file    Years of education: Not on file    Highest education level: Not on file   Occupational History    Not on file   Social Needs    Financial resource strain: Not on file    Food insecurity     Worry: Not on file     Inability: Not on file    Transportation needs     Medical: Not on file     Non-medical: Not on file   Tobacco Use    Smoking status: Current Every Day Smoker     Packs/day: 1.00     Years: 50.00     Pack years: 50.00     Types: Cigarettes    Smokeless tobacco: Never Used   Substance and Sexual Activity    Alcohol use: Yes     Alcohol/week: 14.0 standard drinks     Types: 14 Cans of beer per week     Comment: 2-3 beers daily    Drug use: No    Sexual activity: Yes     Partners: Female   Lifestyle    Physical activity     Days per week: Not on file     Minutes per session: Not on file    Stress: Not on file   Relationships    Social connections     Talks on phone: Not on file     Gets together: Not on file     Attends [employer] service: Not on file     Active member of club or organization: Not on file     Attends meetings of clubs or organizations: Not on file     Relationship status: Not on file   Other Topics Concern    Not on file   Social History Narrative    Not on file       Hospitalization/Major Diagnostic Procedure:     Review of Systems     General/Constitutional:  Chills denies. Fatigue denies. Fever denies. Weight gain denies. Weight loss  denies.    Respiratory:  Shortness of breath denies.    Cardiovascular:  Chest pain denies.    Gastrointestinal:  Constipation denies. Diarrhea denies. Nausea denies. Vomiting denies.     Hematology:  Easy bruising denies. Prolonged bleeding denies.     Genitourinary:  Frequent urination denies. Pain in lower back denies. Painful urination denies.     Musculoskeletal:  See HPI for details    Skin:  Rash denies.    Neurologic:  Dizziness denies. Gait abnormalities denies. Seizures denies. Tingling/Numbess denies.    Psychiatric:  Anxiety denies. Depressed mood denies.     Objective:   Vital Signs:   Vitals:    08/10/20 1123   BP: (!) 142/70        Physical Exam      General Examination:     Constitutional: The patient is alert and oriented to lace person and time. Mood is pleasant.     Head/Face: Normal facial features normal eyebrows    Eyes: Normal extraocular motion bilaterally    Lungs: Respirations are equal and unlabored    Gait is coordinated.    Cardiovascular: There are no swelling or varicosities present.    Lymphatic: Negative for adenopathy    Skin: Normal    Neurological: Level of consciousness normal. Oriented to place person and time and situation    Psychiatric: Oriented to time place person and situation    Thoracic exam:  Mild tenderness palpation and hypersensitivity of the right upper thoracic dermatome.  Bilateral upper and lower extremities are distal neurovascular intact. Negative clonus.    XRAY Report/ Interpretation:  Thoracic AP and lateral x-rays taken in the office today reviewed the patient demonstrates multilevel degenerative disc disease with osteophytes      Assessment:       1. Spinal enthesopathy, thoracic region    2. Thoracic disc disease        Plan:       Jhonny was seen today for pain.    Diagnoses and all orders for this visit:    Spinal enthesopathy, thoracic region  -     X-Ray Thoracic Spine AP Lateral    Thoracic disc disease  -     X-Ray Thoracic Spine AP Lateral        "  No follow-ups on file.  Marty Cross, physician's assistant served in the capacity as a "scribe" for this patient encounter  A "face to face" encounter occurred with Dr. Emmanuel on this date  The treatment plan and medical decision making is outlined below:  Recommend follow-up with Dr. Green to discuss more treatment options specifically thoracic medial branch blocks with progression towards rhizotomy.  Continue with medications as prescribed including tramadol and gabapentin 600 mg qHS    This note was created using Dragon voice recognition software that occasionally misinterpreted phrases or words.    "

## 2020-08-17 ENCOUNTER — PATIENT OUTREACH (OUTPATIENT)
Dept: ADMINISTRATIVE | Facility: OTHER | Age: 65
End: 2020-08-17

## 2020-08-17 NOTE — PROGRESS NOTES
Chart was reviewed for overdue [facility]active [facility] Encounters ([facility])  topics  Updates were requested from care everywhere  Health Maintenance was able to be updated  [facility] immunization registry triggered

## 2020-08-21 ENCOUNTER — OFFICE VISIT (OUTPATIENT)
Dept: PAIN MEDICINE | Facility: CLINIC | Age: 65
End: 2020-08-21
Payer: MEDICARE

## 2020-08-21 VITALS
DIASTOLIC BLOOD PRESSURE: 94 MMHG | HEART RATE: 95 BPM | HEIGHT: 77 IN | BODY MASS INDEX: 27.75 KG/M2 | WEIGHT: 235 LBS | SYSTOLIC BLOOD PRESSURE: 148 MMHG

## 2020-08-21 DIAGNOSIS — M54.14 THORACIC RADICULITIS: ICD-10-CM

## 2020-08-21 DIAGNOSIS — M51.9 THORACIC DISC DISEASE: ICD-10-CM

## 2020-08-21 DIAGNOSIS — B02.29 POST HERPETIC NEURALGIA: ICD-10-CM

## 2020-08-21 DIAGNOSIS — M79.18 MYOFASCIAL PAIN: Primary | ICD-10-CM

## 2020-08-21 DIAGNOSIS — M46.04 SPINAL ENTHESOPATHY, THORACIC REGION: ICD-10-CM

## 2020-08-21 PROCEDURE — 99213 PR OFFICE/OUTPT VISIT, EST, LEVL III, 20-29 MIN: ICD-10-PCS | Mod: S$PBB,,, | Performed by: PHYSICIAN ASSISTANT

## 2020-08-21 PROCEDURE — 99999 PR PBB SHADOW E&M-EST. PATIENT-LVL IV: ICD-10-PCS | Mod: PBBFAC,,, | Performed by: PHYSICIAN ASSISTANT

## 2020-08-21 PROCEDURE — 99214 OFFICE O/P EST MOD 30 MIN: CPT | Mod: PBBFAC,PN | Performed by: PHYSICIAN ASSISTANT

## 2020-08-21 PROCEDURE — 99999 PR PBB SHADOW E&M-EST. PATIENT-LVL IV: CPT | Mod: PBBFAC,,, | Performed by: PHYSICIAN ASSISTANT

## 2020-08-21 PROCEDURE — 99213 OFFICE O/P EST LOW 20 MIN: CPT | Mod: S$PBB,,, | Performed by: PHYSICIAN ASSISTANT

## 2020-08-21 NOTE — PROGRESS NOTES
Referring Physician: Mil Emmanuel MD    PCP: Joey Whitehead MD      CC:  Mid back and rib pain    Interval history:  Jhonny Almazan is a 64 y.o. male with mid back and rib pain with suspected post herpetic neuralgia.  He underwent a thoracic TRINI 2019 with provided by 50% relief. Today c/o pain a little higher in the thoracic spine and extends to his scapula on the right.  Continues to take gabapentin 600 mg b.i.d. with mild benefits.  He also takes tramadol very sparingly with moderate benefit.  This was prescribed by Dr. Aquino office on 8/10 with 3 refills. He denies any weakness.  No bowel bladder changes.   No imaging.    Prior HPI:   Jhonny Almazan is a 64 y.o. male referred to us for mid back and rib pain.  Pain started in April 2018 without traumatic incident.  He reports having constant sharp, shooting pain in his mid back.  Pain travels to his right side ribs below his right chest. He states not developing a rash at that time. He feels pins and needles in those areas.  Pain worsens sitting, standing, touching, coughing and lifting. Pain worsens at night.  Pain improves with rest.  He has tried physical therapy with minimal benefit.  He has been evaluated by spine surgery.  MRI of the thoracic spine was ordered.  Currently takes gabapentin 300 mg t.i.d. prior to his incident with mild benefits.  He has tried topical ointments with mild benefits.  He denies any worsening weakness.  No bowel bladder changes. Pain today is rated 4/10.     ROS:  CONSTITUTIONAL: No fevers, chills, night sweats, wt. loss, appetite changes  SKIN: no rashes or itching  ENT: No headaches, head trauma, vision changes, or eye pain  LYMPH NODES: None noticed   CV: No chest pain, palpitations.   RESP: No shortness of breath, dyspnea on exertion, cough, wheezing, or hemoptysis  GI: No nausea, emesis, diarrhea, constipation, melena, hematochezia, pain.    : No dysuria, hematuria, urgency, or frequency   HEME: No  easy bruising, bleeding problems  PSYCHIATRIC: No depression, anxiety, psychosis, hallucinations.  NEURO: No seizures, memory loss, dizziness or difficulty sleeping  MSK:  Positive HPI      Past Medical History:   Diagnosis Date    Anticoagulant long-term use     Arthritis     Colon polyp     Diabetes mellitus     Diabetes mellitus, type 2     Fatty liver     Hypertension      Past Surgical History:   Procedure Laterality Date    BACK SURGERY      COLONOSCOPY  07/18/2014    Dr. Crane: 22 colon polyps removed, hemorrhoids, erythema to sigmoid, repeat in 1 year for surveillance; biopsy: sigmoid erythema- showed unremarkable colonic mucosa, tubular adenomas and hyperplastic polyps    COLONOSCOPY N/A 5/3/2019    Procedure: COLONOSCOPY;  Surgeon: Guero Crane MD;  Location: [facility];  Service: Endoscopy;  Laterality: N/A;    COLONOSCOPY N/A 5/6/2019    Procedure: COLONOSCOPY;  Surgeon: Guero Crane MD;  Location: [facility];  Service: Endoscopy;  Laterality: N/A;    EPIDURAL STEROID INJECTION INTO THORACIC SPINE N/A 8/30/2019    Procedure: Injection-steroid-epidural-thoracic;  Surgeon: Frantz Green MD;  Location: [facility];  Service: Pain Management;  Laterality: N/A;  T6-7    ESOPHAGOGASTRODUODENOSCOPY N/A 4/26/2019    Procedure: EGD (ESOPHAGOGASTRODUODENOSCOPY);  Surgeon: Guero Crane MD;  Location: [facility];  Service: Endoscopy;  Laterality: N/A;    HERNIA REPAIR       Family History   Problem Relation Age of Onset    Heart disease Mother     Heart disease Father     Diabetes Brother     Suicide Brother     Brain cancer Brother     Colon cancer Neg Hx     Crohn's disease Neg Hx     Ulcerative colitis Neg Hx     Stomach cancer Neg Hx     Esophageal cancer Neg Hx     Glaucoma Neg Hx     Retinal detachment Neg Hx     Macular degeneration Neg Hx      Social History     Socioeconomic History    Marital status:      Spouse name: Not on file    Number of children: Not on  "file    Years of education: Not on file    Highest education level: Not on file   Occupational History    Not on file   Social Needs    Financial resource strain: Not on file    Food insecurity     Worry: Not on file     Inability: Not on file    Transportation needs     Medical: Not on file     Non-medical: Not on file   Tobacco Use    Smoking status: Current Every Day Smoker     Packs/day: 1.00     Years: 50.00     Pack years: 50.00     Types: Cigarettes    Smokeless tobacco: Never Used   Substance and Sexual Activity    Alcohol use: Yes     Alcohol/week: 14.0 standard drinks     Types: 14 Cans of beer per week     Comment: 2-3 beers daily    Drug use: No    Sexual activity: Yes     Partners: Female   Lifestyle    Physical activity     Days per week: Not on file     Minutes per session: Not on file    Stress: Not on file   Relationships    Social connections     Talks on phone: Not on file     Gets together: Not on file     Attends Sabianism service: Not on file     Active member of club or organization: Not on file     Attends meetings of clubs or organizations: Not on file     Relationship status: Not on file   Other Topics Concern    Not on file   Social History Narrative    Not on file         Medications/Allergies: See med card    Vitals:    08/21/20 1029   BP: (!) 148/94   Pulse: 95   Weight: 106.6 kg (235 lb)   Height: 6' 5" (1.956 m)   PainSc:   6   PainLoc: Back         Physical exam:    GENERAL: A and O x3, the patient appears well groomed and is in no acute distress.  Skin: No rashes or obvious lesions  HEENT: normocephalic, atraumatic  CARDIOVASCULAR:  RRR  LUNGS: non labored breathing  ABDOMEN: soft, nontender   UPPER EXTREMITIES: Normal alignment, normal range of motion, no atrophy, no skin changes,  hair growth and nail growth normal and equal bilaterally. No swelling, no tenderness.    LOWER EXTREMITIES:  Normal alignment, normal range of motion, no atrophy, no skin changes,  hair " growth and nail growth normal and equal bilaterally. No swelling, no tenderness.  Thoracolumbar SPINE  Lumbar spine: ROM is full with flexion extension and oblique extension with no increased pain.    George's test causes no increased pain on either side.    Supine straight leg raise is negative bilaterally.    Internal and external rotation of the hip causes no increased pain on either side.  Myofascial exam:  Moderate tenderness over right thoracic paraspinal.  There is some hyperalgesia over his T8 dermatome on the right       MENTAL STATUS: normal orientation, speech, language, and fund of knowledge for social situation.  Emotional state appropriate.    CRANIAL NERVES:  II:  PERRL bilaterally,   III,IV,VI: EOMI.    V:  Facial sensation equal bilaterally  VII:  Facial motor function normal.  VIII:  Hearing equal to finger rub bilaterally  IX/X: Gag normal, palate symmetric  XI:  Shoulder shrug equal, head turn equal  XII:  Tongue midline without fasciculations      MOTOR: Tone and bulk: normal bilateral upper and lower Strength: normal   Delt Bi Tri WE WF     R 5 5 5 5 5 5   L 5 5 5 5 5 5     IP ADD ABD Quad TA Gas HAM  R 5 5 5 5 5 5 5  L 5 5 5 5 5 5 5    SENSATION: Light touch and pinprick intact bilaterally  REFLEXES: normal, symmetric, nonbrisk.  Toes down, no clonus. No hoffmans.  GAIT: normal rise, base, steps, and arm swing.        Imaging:  MRI T-spine 6/2019  There is multilevel degenerative disc disease most notable at the T3-T4 through  the T8-T9 levels.. There is mild diffuse disc bulging with slight central disc  protrusion at T7-T8 and very slight right paracentral focal disc protrusion at  T8-T9. There is no significant encroachment of the central canal and no cord or  nerve root impingement.    Assessment:  Jhonny Almazan is a 64 y.o. male with mid back pain  1. Myofascial pain    2. Thoracic radiculitis        Plan:  1. I have stressed the importance of physical activity and exercise  to improve overall health  2. Reviewed pertinent imaging and records with patient  3  Continue gabapentin to 600 mg t.i.d..  Use topical ointment as tolerated.  4.  He would like to consider a TRINI at T1-2. Discussed that it may not relieve the pain he is currently experiencing into his Scapula.   5. Home exercises provided   6. Discussed addition of a muscle relaxant to his current medication regimen but he would like to avoid any additional medications.   7. F/u prn

## 2020-08-21 NOTE — LETTER
August 21, 2020      Mil Emmanuel MD  [address]  [address]  [place] [address]           [place] [facility]  [address] [place] [address] [address]  [place] [address]  Phone: [phone]  Fax: [phone]          Patient: Jhonny Almazan   MR Number: [id number]   YOB: 1955   Date of Visit: 8/21/2020       Dear Dr. Mil Emmanuel:    Thank you for referring Jhonny Almazan to me for evaluation. Attached you will find relevant portions of my assessment and plan of care.    If you have questions, please do not hesitate to call me. I look forward to following Jhonny Almazan along with you.    Sincerely,    Tara Lucas PA-C    Enclosure  CC:  No Recipients    If you would like to receive this communication electronically, please contact [email or web address] or [phone] to request more information on [employer] access.    For providers and/or their staff who would like to refer a patient to [facility], please contact us through our one-stop-shop provider referral line, [facility], at [phone].    If you feel you have received this communication in error or would no longer like to receive these types of communications, please e-mail [email or web address]

## 2020-10-01 ENCOUNTER — PATIENT MESSAGE (OUTPATIENT)
Dept: OTHER | Facility: OTHER | Age: 65
End: 2020-10-01

## 2020-10-12 ENCOUNTER — OFFICE VISIT (OUTPATIENT)
Dept: ORTHOPEDICS | Facility: CLINIC | Age: 65
End: 2020-10-12
Payer: MEDICARE

## 2020-10-12 VITALS
HEIGHT: 77 IN | BODY MASS INDEX: 27.75 KG/M2 | HEART RATE: 76 BPM | SYSTOLIC BLOOD PRESSURE: 140 MMHG | DIASTOLIC BLOOD PRESSURE: 80 MMHG | WEIGHT: 235 LBS

## 2020-10-12 DIAGNOSIS — M46.04 SPINAL ENTHESOPATHY, THORACIC REGION: ICD-10-CM

## 2020-10-12 DIAGNOSIS — B02.29 POST HERPETIC NEURALGIA: ICD-10-CM

## 2020-10-12 DIAGNOSIS — M51.9 THORACIC DISC DISEASE: Primary | ICD-10-CM

## 2020-10-12 PROCEDURE — 99213 OFFICE O/P EST LOW 20 MIN: CPT | Mod: S$GLB,,, | Performed by: ORTHOPAEDIC SURGERY

## 2020-10-12 PROCEDURE — 99213 PR OFFICE/OUTPT VISIT, EST, LEVL III, 20-29 MIN: ICD-10-PCS | Mod: S$GLB,,, | Performed by: ORTHOPAEDIC SURGERY

## 2020-10-12 RX ORDER — TRAMADOL HYDROCHLORIDE 50 MG/1
50 TABLET ORAL EVERY 6 HOURS PRN
COMMUNITY
Start: 2020-09-29 | End: 2020-10-12 | Stop reason: SDUPTHER

## 2020-10-12 RX ORDER — TRAMADOL HYDROCHLORIDE 50 MG/1
50 TABLET ORAL EVERY 6 HOURS PRN
Qty: 28 TABLET | Refills: 0 | Status: SHIPPED | OUTPATIENT
Start: 2020-10-12 | End: 2020-10-19

## 2020-10-12 NOTE — PROGRESS NOTES
Subjective:       Patient ID: Jhonny Almazan is a 64 y.o. male.    Chief Complaint: Pain of the Thoracic Spine (T spine pain f/u. States that his pain is about the same. Has not seen Dr. Green yet. Gets sharp mainly come at night and has pain in his right side. )      History of Present Illness     Patient is here to follow-up for right-sided uppeer thoracic pain.   Overall he has minimal pain during the day which unfortunately, bothers him significantly at night.   was scheduled for a procedure with Dr. Green in April which was canceled.  He does state that his pain has, migrated a little bit superiorly to right scapular area  Still awaiting to see Dr. Green  Current Medications  Current Outpatient Medications   Medication Sig Dispense Refill    buPROPion (WELLBUTRIN XL) 300 MG 24 hr tablet Take 1 tablet (300 mg total) by mouth once daily. 90 tablet 3    diclofenac sodium (VOLTAREN) 1 % Gel Apply 2 g topically 3 (three) times daily as needed. For hand pain 100 g 11    empagliflozin (JARDIANCE) 10 mg Tab Take 10 mg by mouth once daily. 30 tablet 11    folic acid-vit B6-vit B12 2.5-25-2 mg (FOLBIC OR EQUIV) 2.5-25-2 mg Tab Take 1 tablet by mouth once daily. 90 tablet 4    gabapentin (NEURONTIN) 300 MG capsule Take 2 capsules (600 mg total) by mouth every evening. 180 capsule 1    hydrALAZINE (APRESOLINE) 25 MG tablet Take 1 tablet (25 mg total) by mouth every 12 (twelve) hours. 180 tablet 8    insulin (BASAGLAR KWIKPEN U-100 INSULIN) glargine 100 units/mL (3mL) SubQ pen Inject 20 Units into the skin every evening. 1 Box 4    lactulose (CHRONULAC) 10 gram/15 mL solution TAKE 30 MLS BY MOUTH 2 TIMES DAILY. 1892 mL 3    lidocaine (LIDODERM) 5 % Place 1 patch onto the skin once daily. Remove & Discard patch within 12 hours or as directed by MD 30 patch 2    lisinopril-hydrochlorothiazide (PRINZIDE,ZESTORETIC) 20-12.5 mg per tablet TAKE 1 TABLET BY MOUTH TWICE A  tablet 3    metFORMIN (GLUCOPHAGE-XR) 500  "MG 24 hr tablet Take 2 tablets (1,000 mg total) by mouth 2 (two) times daily. 360 tablet 0    pen needle, diabetic 32 gauge x 5/32" Ndle 1 each by Misc.(Non-Drug; Combo Route) route nightly. 50 each 11    pioglitazone (ACTOS) 30 MG tablet Take 1 tablet (30 mg total) by mouth once daily. 90 tablet 2    rosuvastatin (CRESTOR) 20 MG tablet Take 1 tablet (20 mg total) by mouth once daily. 90 tablet 3    traMADoL (ULTRAM) 50 mg tablet Take 50 mg by mouth every 6 (six) hours as needed.      magnesium citrate solution Take 296 mLs by mouth daily as needed (constipation).       omeprazole (PRILOSEC) 40 MG capsule TAKE 1 CAPSULE (40 MG TOTAL) BY MOUTH BEFORE BREAKFAST. 30 capsule 3     No current facility-administered medications for this visit.        Allergies  Review of patient's allergies indicates:  No Known Allergies    Past Medical History  Past Medical History:   Diagnosis Date    Anticoagulant long-term use     Arthritis     Colon polyp     Diabetes mellitus     Diabetes mellitus, type 2     Fatty liver     Hypertension        Surgical History  Past Surgical History:   Procedure Laterality Date    BACK SURGERY      COLONOSCOPY  07/18/2014    Dr. Crane: 22 colon polyps removed, hemorrhoids, erythema to sigmoid, repeat in 1 year for surveillance; biopsy: sigmoid erythema- showed unremarkable colonic mucosa, tubular adenomas and hyperplastic polyps    COLONOSCOPY N/A 5/3/2019    Procedure: COLONOSCOPY;  Surgeon: Guero Crane MD;  Location: [facility];  Service: Endoscopy;  Laterality: N/A;    COLONOSCOPY N/A 5/6/2019    Procedure: COLONOSCOPY;  Surgeon: Guero Crane MD;  Location: [facility];  Service: Endoscopy;  Laterality: N/A;    EPIDURAL STEROID INJECTION INTO THORACIC SPINE N/A 8/30/2019    Procedure: Injection-steroid-epidural-thoracic;  Surgeon: Frantz Green MD;  Location[facility];  Service: Pain Management;  Laterality: N/A;  T6-7    ESOPHAGOGASTRODUODENOSCOPY N/A 4/26/2019    " Procedure: EGD (ESOPHAGOGASTRODUODENOSCOPY);  Surgeon: Guero Crane MD;  Location: [facility];  Service: Endoscopy;  Laterality: N/A;    HERNIA REPAIR         Family History:   Family History   Problem Relation Age of Onset    Heart disease Mother     Heart disease Father     Diabetes Brother     Suicide Brother     Brain cancer Brother     Colon cancer Neg Hx     Crohn's disease Neg Hx     Ulcerative colitis Neg Hx     Stomach cancer Neg Hx     Esophageal cancer Neg Hx     Glaucoma Neg Hx     Retinal detachment Neg Hx     Macular degeneration Neg Hx        Social History:   Social History     Socioeconomic History    Marital status:      Spouse name: Not on file    Number of children: Not on file    Years of education: Not on file    Highest education level: Not on file   Occupational History    Not on file   Social Needs    Financial resource strain: Not on file    Food insecurity     Worry: Not on file     Inability: Not on file    Transportation needs     Medical: Not on file     Non-medical: Not on file   Tobacco Use    Smoking status: Current Every Day Smoker     Packs/day: 1.00     Years: 50.00     Pack years: 50.00     Types: Cigarettes    Smokeless tobacco: Never Used   Substance and Sexual Activity    Alcohol use: Yes     Alcohol/week: 14.0 standard drinks     Types: 14 Cans of beer per week     Comment: 2-3 beers daily    Drug use: No    Sexual activity: Yes     Partners: Female   Lifestyle    Physical activity     Days per week: Not on file     Minutes per session: Not on file    Stress: Not on file   Relationships    Social connections     Talks on phone: Not on file     Gets together: Not on file     Attends Uatsdin service: Not on file     Active member of club or organization: Not on file     Attends meetings of clubs or organizations: Not on file     Relationship status: Not on file   Other Topics Concern    Not on file   Social History Narrative    Not  on file       Hospitalization/Major Diagnostic Procedure:     Review of Systems     General/Constitutional:  Chills denies. Fatigue denies. Fever denies. Weight gain denies. Weight loss denies.    Respiratory:  Shortness of breath denies.    Cardiovascular:  Chest pain denies.    Gastrointestinal:  Constipation denies. Diarrhea denies. Nausea denies. Vomiting denies.     Hematology:  Easy bruising denies. Prolonged bleeding denies.     Genitourinary:  Frequent urination denies. Pain in lower back denies. Painful urination denies.     Musculoskeletal:  See HPI for details    Skin:  Rash denies.    Neurologic:  Dizziness denies. Gait abnormalities denies. Seizures denies. Tingling/Numbess denies.    Psychiatric:  Anxiety denies. Depressed mood denies.     Objective:   Vital Signs:   Vitals:    10/12/20 1006   BP: (!) 140/80   Pulse: 76        Physical Exam      General Examination:     Constitutional: The patient is alert and oriented to lace person and time. Mood is pleasant.     Head/Face: Normal facial features normal eyebrows    Eyes: Normal extraocular motion bilaterally    Lungs: Respirations are equal and unlabored    Gait is coordinated.    Cardiovascular: There are no swelling or varicosities present.    Lymphatic: Negative for adenopathy    Skin: Normal    Neurological: Level of consciousness normal. Oriented to place person and time and situation    Psychiatric: Oriented to time place person and situation    Moderate tenderness right paraspinous muscles from the T1 level down to the thoracolumbar spine areas no spasm noted pain extension and bending  XRAY Report/ Interpretation:  None today      Assessment:       1. Thoracic disc disease    2. Spinal enthesopathy, thoracic region    3. Post herpetic neuralgia        Plan:       Jhonny was seen today for pain.    Diagnoses and all orders for this visit:    Thoracic disc disease    Spinal enthesopathy, thoracic region    Post herpetic neuralgia         No  follow-ups on file.    Patient had right-sided thorax is axial pain he was referred to Dr. Green for pain management per saw the physician's assistant who recommended physical therapy which has been unsuccessful he wants to now consider having the right-sided thoracic injections that I think medial branch blocks would be better than an epidural steroid injection in my opinion.  Wishes make his own appointment with the pain management doctor will rather than have was sent a referral  He requested a refill on tramadol I gave him a 1 week supply with no refills should he need long-term tramadol advised he sees primary care doctor or pain management    This note was created using Dragon voice recognition software that occasionally misinterpreted phrases or words.

## 2020-11-16 ENCOUNTER — HOSPITAL ENCOUNTER (INPATIENT)
Facility: HOSPITAL | Age: 65
LOS: 7 days | Discharge: NURSING, REHAB OR LONG-TERM CARE | DRG: 066 | End: 2020-11-24
Attending: EMERGENCY MEDICINE | Admitting: INTERNAL MEDICINE
Payer: MEDICARE

## 2020-11-16 DIAGNOSIS — R13.10 DYSPHAGIA, UNSPECIFIED TYPE: ICD-10-CM

## 2020-11-16 DIAGNOSIS — I10 HYPERTENSION: ICD-10-CM

## 2020-11-16 DIAGNOSIS — I63.9 CVA (CEREBRAL VASCULAR ACCIDENT): ICD-10-CM

## 2020-11-16 DIAGNOSIS — I63.411 STROKE DUE TO EMBOLISM OF RIGHT MIDDLE CEREBRAL ARTERY: Primary | ICD-10-CM

## 2020-11-16 DIAGNOSIS — R42 DIZZINESS: ICD-10-CM

## 2020-11-16 DIAGNOSIS — I63.9 STROKE: ICD-10-CM

## 2020-11-16 LAB
ALBUMIN SERPL BCP-MCNC: 4 G/DL (ref 3.5–5.2)
ALP SERPL-CCNC: 59 U/L (ref 55–135)
ALT SERPL W/O P-5'-P-CCNC: 26 U/L (ref 10–44)
ANION GAP SERPL CALC-SCNC: 13 MMOL/L (ref 8–16)
AST SERPL-CCNC: 13 U/L (ref 10–40)
BACTERIA #/AREA URNS HPF: NORMAL /HPF
BASOPHILS # BLD AUTO: 0.09 K/UL (ref 0–0.2)
BASOPHILS NFR BLD: 0.8 % (ref 0–1.9)
BILIRUB SERPL-MCNC: 0.4 MG/DL (ref 0.1–1)
BILIRUB UR QL STRIP: NEGATIVE
BNP SERPL-MCNC: <10 PG/ML (ref 0–99)
BUN SERPL-MCNC: 11 MG/DL (ref 8–23)
CALCIUM SERPL-MCNC: 9.3 MG/DL (ref 8.7–10.5)
CHLORIDE SERPL-SCNC: 104 MMOL/L (ref 95–110)
CHOLEST SERPL-MCNC: 199 MG/DL (ref 120–199)
CHOLEST/HDLC SERPL: 5.1 {RATIO} (ref 2–5)
CLARITY UR: CLEAR
CO2 SERPL-SCNC: 23 MMOL/L (ref 23–29)
COLOR UR: YELLOW
CREAT SERPL-MCNC: 0.8 MG/DL (ref 0.5–1.4)
DIFFERENTIAL METHOD: ABNORMAL
EOSINOPHIL # BLD AUTO: 0.2 K/UL (ref 0–0.5)
EOSINOPHIL NFR BLD: 1.8 % (ref 0–8)
ERYTHROCYTE [DISTWIDTH] IN BLOOD BY AUTOMATED COUNT: 12.9 % (ref 11.5–14.5)
EST. GFR  (AFRICAN AMERICAN): >60 ML/MIN/1.73 M^2
EST. GFR  (NON AFRICAN AMERICAN): >60 ML/MIN/1.73 M^2
ESTIMATED AVG GLUCOSE: 154 MG/DL (ref 68–131)
GLUCOSE SERPL-MCNC: 267 MG/DL (ref 70–110)
GLUCOSE UR QL STRIP: ABNORMAL
HBA1C MFR BLD HPLC: 7 % (ref 4–5.6)
HCT VFR BLD AUTO: 45.8 % (ref 40–54)
HDLC SERPL-MCNC: 39 MG/DL (ref 40–75)
HDLC SERPL: 19.6 % (ref 20–50)
HGB BLD-MCNC: 15.5 G/DL (ref 14–18)
HGB UR QL STRIP: NEGATIVE
IMM GRANULOCYTES # BLD AUTO: 0.05 K/UL (ref 0–0.04)
IMM GRANULOCYTES NFR BLD AUTO: 0.4 % (ref 0–0.5)
INR PPP: 0.9 (ref 0.8–1.2)
KETONES UR QL STRIP: NEGATIVE
LDLC SERPL CALC-MCNC: 128.4 MG/DL (ref 63–159)
LEUKOCYTE ESTERASE UR QL STRIP: NEGATIVE
LYMPHOCYTES # BLD AUTO: 3.6 K/UL (ref 1–4.8)
LYMPHOCYTES NFR BLD: 30.9 % (ref 18–48)
MAGNESIUM SERPL-MCNC: 2 MG/DL (ref 1.6–2.6)
MCH RBC QN AUTO: 31.4 PG (ref 27–31)
MCHC RBC AUTO-ENTMCNC: 33.8 G/DL (ref 32–36)
MCV RBC AUTO: 93 FL (ref 82–98)
MICROSCOPIC COMMENT: NORMAL
MONOCYTES # BLD AUTO: 0.7 K/UL (ref 0.3–1)
MONOCYTES NFR BLD: 5.7 % (ref 4–15)
NEUTROPHILS # BLD AUTO: 7 K/UL (ref 1.8–7.7)
NEUTROPHILS NFR BLD: 60.4 % (ref 38–73)
NITRITE UR QL STRIP: NEGATIVE
NONHDLC SERPL-MCNC: 160 MG/DL
NRBC BLD-RTO: 0 /100 WBC
PH UR STRIP: 7 [PH] (ref 5–8)
PLATELET # BLD AUTO: 218 K/UL (ref 150–350)
PMV BLD AUTO: 9.1 FL (ref 9.2–12.9)
POC PTINR: 1.1 (ref 0.9–1.2)
POC PTWBT: 12.6 SEC (ref 9.7–14.3)
POCT GLUCOSE: 168 MG/DL (ref 70–110)
POCT GLUCOSE: 190 MG/DL (ref 70–110)
POCT GLUCOSE: 196 MG/DL (ref 70–110)
POCT GLUCOSE: 196 MG/DL (ref 70–110)
POCT GLUCOSE: 267 MG/DL (ref 70–110)
POTASSIUM SERPL-SCNC: 3.6 MMOL/L (ref 3.5–5.1)
PROT SERPL-MCNC: 6.9 G/DL (ref 6–8.4)
PROT UR QL STRIP: NEGATIVE
PROTHROMBIN TIME: 10.5 SEC (ref 9–12.5)
RBC # BLD AUTO: 4.94 M/UL (ref 4.6–6.2)
RBC #/AREA URNS HPF: 0 /HPF (ref 0–4)
SAMPLE: NORMAL
SARS-COV-2 RDRP RESP QL NAA+PROBE: NEGATIVE
SODIUM SERPL-SCNC: 140 MMOL/L (ref 136–145)
SP GR UR STRIP: 1.01 (ref 1–1.03)
TRIGL SERPL-MCNC: 158 MG/DL (ref 30–150)
TROPONIN I SERPL DL<=0.01 NG/ML-MCNC: <0.006 NG/ML (ref 0–0.03)
TSH SERPL DL<=0.005 MIU/L-ACNC: 1.46 UIU/ML (ref 0.4–4)
URN SPEC COLLECT METH UR: ABNORMAL
UROBILINOGEN UR STRIP-ACNC: NEGATIVE EU/DL
WBC # BLD AUTO: 11.66 K/UL (ref 3.9–12.7)
WBC #/AREA URNS HPF: 0 /HPF (ref 0–5)
YEAST URNS QL MICRO: NORMAL

## 2020-11-16 PROCEDURE — 92610 EVALUATE SWALLOWING FUNCTION: CPT

## 2020-11-16 PROCEDURE — 83880 ASSAY OF NATRIURETIC PEPTIDE: CPT

## 2020-11-16 PROCEDURE — G0378 HOSPITAL OBSERVATION PER HR: HCPCS

## 2020-11-16 PROCEDURE — G0426 INPT/ED TELECONSULT50: HCPCS | Mod: 95,G0,, | Performed by: PSYCHIATRY & NEUROLOGY

## 2020-11-16 PROCEDURE — 63600175 PHARM REV CODE 636 W HCPCS: Performed by: NURSE PRACTITIONER

## 2020-11-16 PROCEDURE — 96372 THER/PROPH/DIAG INJ SC/IM: CPT | Mod: 59

## 2020-11-16 PROCEDURE — 25000003 PHARM REV CODE 250: Performed by: STUDENT IN AN ORGANIZED HEALTH CARE EDUCATION/TRAINING PROGRAM

## 2020-11-16 PROCEDURE — 63600175 PHARM REV CODE 636 W HCPCS: Performed by: STUDENT IN AN ORGANIZED HEALTH CARE EDUCATION/TRAINING PROGRAM

## 2020-11-16 PROCEDURE — G0426 PR INPT TELEHEALTH CONSULT 50M: ICD-10-PCS | Mod: 95,G0,, | Performed by: PSYCHIATRY & NEUROLOGY

## 2020-11-16 PROCEDURE — 93005 ELECTROCARDIOGRAM TRACING: CPT

## 2020-11-16 PROCEDURE — 80061 LIPID PANEL: CPT

## 2020-11-16 PROCEDURE — 96374 THER/PROPH/DIAG INJ IV PUSH: CPT | Mod: 59

## 2020-11-16 PROCEDURE — 84443 ASSAY THYROID STIM HORMONE: CPT

## 2020-11-16 PROCEDURE — U0002 COVID-19 LAB TEST NON-CDC: HCPCS

## 2020-11-16 PROCEDURE — 99291 CRITICAL CARE FIRST HOUR: CPT | Mod: 25

## 2020-11-16 PROCEDURE — 96376 TX/PRO/DX INJ SAME DRUG ADON: CPT

## 2020-11-16 PROCEDURE — 36415 COLL VENOUS BLD VENIPUNCTURE: CPT

## 2020-11-16 PROCEDURE — 96361 HYDRATE IV INFUSION ADD-ON: CPT

## 2020-11-16 PROCEDURE — 93010 ELECTROCARDIOGRAM REPORT: CPT | Mod: ,,, | Performed by: SPECIALIST

## 2020-11-16 PROCEDURE — 83735 ASSAY OF MAGNESIUM: CPT

## 2020-11-16 PROCEDURE — 25500020 PHARM REV CODE 255: Performed by: EMERGENCY MEDICINE

## 2020-11-16 PROCEDURE — 93010 EKG 12-LEAD: ICD-10-PCS | Mod: ,,, | Performed by: SPECIALIST

## 2020-11-16 PROCEDURE — 97803 MED NUTRITION INDIV SUBSEQ: CPT

## 2020-11-16 PROCEDURE — 84484 ASSAY OF TROPONIN QUANT: CPT

## 2020-11-16 PROCEDURE — 25000003 PHARM REV CODE 250: Performed by: NURSE PRACTITIONER

## 2020-11-16 PROCEDURE — 80053 COMPREHEN METABOLIC PANEL: CPT

## 2020-11-16 PROCEDURE — 94761 N-INVAS EAR/PLS OXIMETRY MLT: CPT

## 2020-11-16 PROCEDURE — 83036 HEMOGLOBIN GLYCOSYLATED A1C: CPT

## 2020-11-16 PROCEDURE — 85610 PROTHROMBIN TIME: CPT

## 2020-11-16 PROCEDURE — 96375 TX/PRO/DX INJ NEW DRUG ADDON: CPT

## 2020-11-16 PROCEDURE — 81000 URINALYSIS NONAUTO W/SCOPE: CPT

## 2020-11-16 PROCEDURE — 99900035 HC TECH TIME PER 15 MIN (STAT)

## 2020-11-16 PROCEDURE — 85025 COMPLETE CBC W/AUTO DIFF WBC: CPT

## 2020-11-16 PROCEDURE — 25000003 PHARM REV CODE 250: Performed by: EMERGENCY MEDICINE

## 2020-11-16 RX ORDER — LORAZEPAM 2 MG/ML
1 INJECTION INTRAMUSCULAR EVERY 4 HOURS PRN
Status: DISCONTINUED | OUTPATIENT
Start: 2020-11-16 | End: 2020-11-24 | Stop reason: HOSPADM

## 2020-11-16 RX ORDER — ONDANSETRON HYDROCHLORIDE 4 MG/5ML
4 SOLUTION ORAL EVERY 6 HOURS PRN
Status: DISCONTINUED | OUTPATIENT
Start: 2020-11-16 | End: 2020-11-24 | Stop reason: HOSPADM

## 2020-11-16 RX ORDER — BUPROPION HYDROCHLORIDE 150 MG/1
300 TABLET ORAL DAILY
Status: DISCONTINUED | OUTPATIENT
Start: 2020-11-17 | End: 2020-11-24 | Stop reason: HOSPADM

## 2020-11-16 RX ORDER — ENOXAPARIN SODIUM 100 MG/ML
40 INJECTION SUBCUTANEOUS EVERY 24 HOURS
Status: DISCONTINUED | OUTPATIENT
Start: 2020-11-16 | End: 2020-11-24 | Stop reason: HOSPADM

## 2020-11-16 RX ORDER — INSULIN ASPART 100 [IU]/ML
0-5 INJECTION, SOLUTION INTRAVENOUS; SUBCUTANEOUS
Status: DISCONTINUED | OUTPATIENT
Start: 2020-11-16 | End: 2020-11-24 | Stop reason: HOSPADM

## 2020-11-16 RX ORDER — MECLIZINE HYDROCHLORIDE 25 MG/1
25 TABLET ORAL 3 TIMES DAILY PRN
Status: DISCONTINUED | OUTPATIENT
Start: 2020-11-16 | End: 2020-11-24 | Stop reason: HOSPADM

## 2020-11-16 RX ORDER — HYDRALAZINE HYDROCHLORIDE 25 MG/1
25 TABLET, FILM COATED ORAL EVERY 12 HOURS
Status: DISCONTINUED | OUTPATIENT
Start: 2020-11-16 | End: 2020-11-17

## 2020-11-16 RX ORDER — GABAPENTIN 300 MG/1
600 CAPSULE ORAL NIGHTLY
Status: DISCONTINUED | OUTPATIENT
Start: 2020-11-16 | End: 2020-11-24 | Stop reason: HOSPADM

## 2020-11-16 RX ORDER — ONDANSETRON 2 MG/ML
4 INJECTION INTRAMUSCULAR; INTRAVENOUS EVERY 6 HOURS PRN
Status: DISCONTINUED | OUTPATIENT
Start: 2020-11-16 | End: 2020-11-16

## 2020-11-16 RX ORDER — ASPIRIN 325 MG
325 TABLET, DELAYED RELEASE (ENTERIC COATED) ORAL DAILY
Status: DISCONTINUED | OUTPATIENT
Start: 2020-11-16 | End: 2020-11-17

## 2020-11-16 RX ORDER — IBUPROFEN 200 MG
24 TABLET ORAL
Status: DISCONTINUED | OUTPATIENT
Start: 2020-11-16 | End: 2020-11-24 | Stop reason: HOSPADM

## 2020-11-16 RX ORDER — LABETALOL HYDROCHLORIDE 5 MG/ML
10 INJECTION, SOLUTION INTRAVENOUS
Status: DISCONTINUED | OUTPATIENT
Start: 2020-11-16 | End: 2020-11-24 | Stop reason: HOSPADM

## 2020-11-16 RX ORDER — ATORVASTATIN CALCIUM 40 MG/1
40 TABLET, FILM COATED ORAL DAILY
Status: DISCONTINUED | OUTPATIENT
Start: 2020-11-16 | End: 2020-11-24 | Stop reason: HOSPADM

## 2020-11-16 RX ORDER — BUPROPION HYDROCHLORIDE 100 MG/1
300 TABLET ORAL DAILY
Status: DISCONTINUED | OUTPATIENT
Start: 2020-11-16 | End: 2020-11-16 | Stop reason: SDUPTHER

## 2020-11-16 RX ORDER — SODIUM CHLORIDE 0.9 % (FLUSH) 0.9 %
10 SYRINGE (ML) INJECTION
Status: DISCONTINUED | OUTPATIENT
Start: 2020-11-16 | End: 2020-11-24 | Stop reason: HOSPADM

## 2020-11-16 RX ORDER — MECLIZINE HYDROCHLORIDE 25 MG/1
50 TABLET ORAL
Status: COMPLETED | OUTPATIENT
Start: 2020-11-16 | End: 2020-11-16

## 2020-11-16 RX ORDER — GLUCAGON 1 MG
1 KIT INJECTION
Status: DISCONTINUED | OUTPATIENT
Start: 2020-11-16 | End: 2020-11-24 | Stop reason: HOSPADM

## 2020-11-16 RX ORDER — PROMETHAZINE HYDROCHLORIDE 25 MG/ML
25 INJECTION, SOLUTION INTRAMUSCULAR; INTRAVENOUS EVERY 6 HOURS PRN
Status: DISCONTINUED | OUTPATIENT
Start: 2020-11-16 | End: 2020-11-16

## 2020-11-16 RX ORDER — LISINOPRIL 10 MG/1
20 TABLET ORAL DAILY
Status: DISCONTINUED | OUTPATIENT
Start: 2020-11-16 | End: 2020-11-17

## 2020-11-16 RX ORDER — ASPIRIN 325 MG
325 TABLET ORAL
Status: COMPLETED | OUTPATIENT
Start: 2020-11-16 | End: 2020-11-16

## 2020-11-16 RX ORDER — HYDROCHLOROTHIAZIDE 12.5 MG/1
12.5 TABLET ORAL DAILY
Status: DISCONTINUED | OUTPATIENT
Start: 2020-11-16 | End: 2020-11-17

## 2020-11-16 RX ORDER — SODIUM CHLORIDE 0.9 % (FLUSH) 0.9 %
10 SYRINGE (ML) INJECTION
Status: DISCONTINUED | OUTPATIENT
Start: 2020-11-16 | End: 2020-11-16 | Stop reason: SDUPTHER

## 2020-11-16 RX ORDER — IBUPROFEN 200 MG
16 TABLET ORAL
Status: DISCONTINUED | OUTPATIENT
Start: 2020-11-16 | End: 2020-11-24 | Stop reason: HOSPADM

## 2020-11-16 RX ADMIN — MECLIZINE HYDROCHLORIDE 25 MG: 25 TABLET ORAL at 09:11

## 2020-11-16 RX ADMIN — HYDRALAZINE HYDROCHLORIDE 25 MG: 25 TABLET, FILM COATED ORAL at 09:11

## 2020-11-16 RX ADMIN — ASPIRIN 325 MG: 325 TABLET, COATED ORAL at 09:11

## 2020-11-16 RX ADMIN — BUPROPION HYDROCHLORIDE 300 MG: 75 TABLET, FILM COATED ORAL at 03:11

## 2020-11-16 RX ADMIN — LISINOPRIL 20 MG: 10 TABLET ORAL at 03:11

## 2020-11-16 RX ADMIN — LORAZEPAM 1 MG: 2 INJECTION INTRAMUSCULAR; INTRAVENOUS at 04:11

## 2020-11-16 RX ADMIN — GABAPENTIN 600 MG: 300 CAPSULE ORAL at 09:11

## 2020-11-16 RX ADMIN — ASPIRIN 325 MG ORAL TABLET 325 MG: 325 PILL ORAL at 01:11

## 2020-11-16 RX ADMIN — SODIUM CHLORIDE 1000 ML: 0.9 INJECTION, SOLUTION INTRAVENOUS at 12:11

## 2020-11-16 RX ADMIN — INSULIN ASPART 3 UNITS: 100 INJECTION, SOLUTION INTRAVENOUS; SUBCUTANEOUS at 04:11

## 2020-11-16 RX ADMIN — ONDANSETRON 4 MG: 2 INJECTION INTRAMUSCULAR; INTRAVENOUS at 03:11

## 2020-11-16 RX ADMIN — IOHEXOL 100 ML: 350 INJECTION, SOLUTION INTRAVENOUS at 02:11

## 2020-11-16 RX ADMIN — MECLIZINE HYDROCHLORIDE 50 MG: 25 TABLET ORAL at 12:11

## 2020-11-16 RX ADMIN — PROMETHAZINE HYDROCHLORIDE 25 MG: 25 INJECTION INTRAMUSCULAR; INTRAVENOUS at 12:11

## 2020-11-16 RX ADMIN — ENOXAPARIN SODIUM 40 MG: 40 INJECTION SUBCUTANEOUS at 06:11

## 2020-11-16 RX ADMIN — ATORVASTATIN CALCIUM 40 MG: 40 TABLET, FILM COATED ORAL at 09:11

## 2020-11-16 RX ADMIN — ONDANSETRON 4 MG: 2 INJECTION INTRAMUSCULAR; INTRAVENOUS at 07:11

## 2020-11-16 RX ADMIN — HYDROCHLOROTHIAZIDE 12.5 MG: 12.5 TABLET ORAL at 03:11

## 2020-11-16 NOTE — HPI
Jhonny Almazan is a 64-year-old  male with past medical history significant for hypertension, diabetes, neuropathy and depression presenting today for dizziness.  Patient states for the past 3 days he has been experiencing intermittent dizziness that would worsen with ambulation.  He states today his dizziness became constant and was associated with nausea and 1 episode of vomiting.  He also is experiencing a right temple headache which he describes as dull quality, 3/10 intensity, no aggravating or alleviating factors.  He denies blurred vision or focal neuro deficits at home.  While in the ER he experienced an episode of left hand weakness with associated numbness and tingling.  ED workup revealed CT head was negative but CTA head and neck performed and revealed absent filling of the bilateral vertebral arteries and proximal basal artery with findings of bilateral PCA and mid/distal basal artery patent and no acute intracranial hemorrhage.  Patient is on longer experiencing any focal neuro deficits and is left hand weakness and numbness have resolved.  Patient will be admitted to hospital medicine services for further workup and management.  He was given aspirin in the ED and his nausea is controlled after receiving IV Zofran.  He also received meclizine and states that his dizziness is improving.

## 2020-11-16 NOTE — ED PROVIDER NOTES
Encounter Date: 11/16/2020       History     Chief Complaint   Patient presents with    Dizziness     dizziness x 2 days   N/V beginning tonight      HPI  Review of patient's allergies indicates:  No Known Allergies  Past Medical History:   Diagnosis Date    Anticoagulant long-term use     Arthritis     Colon polyp     Diabetes mellitus     Diabetes mellitus, type 2     Fatty liver     Hypertension      Past Surgical History:   Procedure Laterality Date    BACK SURGERY      COLONOSCOPY  07/18/2014    Dr. Crane: 22 colon polyps removed, hemorrhoids, erythema to sigmoid, repeat in 1 year for surveillance; biopsy: sigmoid erythema- showed unremarkable colonic mucosa, tubular adenomas and hyperplastic polyps    COLONOSCOPY N/A 5/3/2019    Procedure: COLONOSCOPY;  Surgeon: Guero Crane MD;  Location: [facility];  Service: Endoscopy;  Laterality: N/A;    COLONOSCOPY N/A 5/6/2019    Procedure: COLONOSCOPY;  Surgeon: Guero Crane MD;  Location: [facility];  Service: Endoscopy;  Laterality: N/A;    EPIDURAL STEROID INJECTION INTO THORACIC SPINE N/A 8/30/2019    Procedure: Injection-steroid-epidural-thoracic;  Surgeon: Frantz Green MD;  Location: [facility] OR;  Service: Pain Management;  Laterality: N/A;  T6-7    ESOPHAGOGASTRODUODENOSCOPY N/A 4/26/2019    Procedure: EGD (ESOPHAGOGASTRODUODENOSCOPY);  Surgeon: Guero Crane MD;  Location: [facility];  Service: Endoscopy;  Laterality: N/A;    HERNIA REPAIR       Family History   Problem Relation Age of Onset    Heart disease Mother     Heart disease Father     Diabetes Brother     Suicide Brother     Brain cancer Brother     Colon cancer Neg Hx     Crohn's disease Neg Hx     Ulcerative colitis Neg Hx     Stomach cancer Neg Hx     Esophageal cancer Neg Hx     Glaucoma Neg Hx     Retinal detachment Neg Hx     Macular degeneration Neg Hx      Social History     Tobacco Use    Smoking status: Current Every Day Smoker     Packs/day: 1.00      Years: 50.00     Pack years: 50.00     Types: Cigarettes    Smokeless tobacco: Never Used   Substance Use Topics    Alcohol use: Yes     Alcohol/week: 14.0 standard drinks     Types: 14 Cans of beer per week     Comment: 2-3 beers daily    Drug use: No     Review of Systems    Physical Exam     Initial Vitals [11/16/20 0007]   BP Pulse Resp Temp SpO2   (!) 186/92 60 17 97.4 °F (36.3 °C) 96 %      MAP       --         Physical Exam    ED Course   Procedures  Labs Reviewed   CBC W/ AUTO DIFFERENTIAL - Abnormal; Notable for the following components:       Result Value    MCH 31.4 (*)     MPV 9.1 (*)     Immature Grans (Abs) 0.05 (*)     All other components within normal limits   SARS-COV-2 RNA AMPLIFICATION, QUAL   COMPREHENSIVE METABOLIC PANEL   B-TYPE NATRIURETIC PEPTIDE   MAGNESIUM   TROPONIN I   URINALYSIS, REFLEX TO URINE CULTURE     EKG Readings: (Independently Interpreted)   Normal sinus rhythm, 61 beats per minute, borderline left axis, normal intervals, minimal voltage criteria for LVH, maybe normal variant, no STEMI       Imaging Results    None                                      Clinical Impression:       ICD-10-CM ICD-9-CM   1. Hypertension  I10 401.9

## 2020-11-16 NOTE — ED NOTES
Pt presents with new symptoms that were not present upon initial assessment. Pt states he is not able to use left hand as well as normal.  Decreased sensation on left side. Notified MD Barron. Code stroke initiated.

## 2020-11-16 NOTE — PLAN OF CARE
AAO x 4, Cardiac monitored SR, VSS within orders, minor complaint of headache, RODOLFO passed, neuro checks, NIH, SCD's, bed in low position, bed alarm set, urinal at bedside, SR up x 2, instructed to call for assistance, call light within reach, no problems identified, free from fall, safety maintained, will continue to monitor and round.

## 2020-11-16 NOTE — RESPIRATORY THERAPY
Results for DYLANGABRIELLE VERO MILLER (MRN [id number]) as of 11/16/2020 01:40   Ref. Range 11/16/2020 01:27   POC PTWBT Latest Ref Range: 9.7 - 14.3 sec 12.6   POC PTINR Latest Ref Range: 0.9 - 1.2  1.1

## 2020-11-16 NOTE — ED NOTES
Call placed to [facility] for consult to Telemedicine - Stroke for Jhonny Almazan.

## 2020-11-16 NOTE — CONSULTS
Consulted to review films for possible intervention.  Case discussed w attending MD.    Reported to have dizziness.    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  The basilar artery is small in caliber suggesting a chronic type collateral flow (meaning not coming from the vertebral arteries). An acute occlusion would likely show a normal caliber basilar or at least comparable in size with at least one of the vertebral arteries.    The lack of significant symptoms also suggest a chronic occlusion. No interventions are recommended at this time as risk outweigh the benefits.    Please do not hesitate to call me back with any changes in exam or any other question or concerns.        Avinash Spence MD  Vascular and Interventional Neurology   Director of [facility]  Section Head - Vascular Neurology  Departments of Neurology, Neurosurgery and Radiology  [facility] - [place], [place]

## 2020-11-16 NOTE — PT/OT/SLP PROGRESS
Physical Therapy      Patient Name:  Jhonny Almazan   MRN:  [id number]    Patient not seen today secondary to per RN Ophelia the patient is transferring to the ICU for closer monitoring. PT orders discharged. Patient will need new therapy orders when appropriate to participate with skilled physical therapy.    Celi Strickland, PT

## 2020-11-16 NOTE — SUBJECTIVE & OBJECTIVE
Past Medical History:   Diagnosis Date    Anticoagulant long-term use     Arthritis     Colon polyp     Diabetes mellitus     Diabetes mellitus, type 2     Fatty liver     Hypertension        Past Surgical History:   Procedure Laterality Date    BACK SURGERY      COLONOSCOPY  07/18/2014    Dr. Crane: 22 colon polyps removed, hemorrhoids, erythema to sigmoid, repeat in 1 year for surveillance; biopsy: sigmoid erythema- showed unremarkable colonic mucosa, tubular adenomas and hyperplastic polyps    COLONOSCOPY N/A 5/3/2019    Procedure: COLONOSCOPY;  Surgeon: Guero Crane MD;  Location: [facility] ENDO;  Service: Endoscopy;  Laterality: N/A;    COLONOSCOPY N/A 5/6/2019    Procedure: COLONOSCOPY;  Surgeon: Guero Crane MD;  Location: [facility] ENDO;  Service: Endoscopy;  Laterality: N/A;    EPIDURAL STEROID INJECTION INTO THORACIC SPINE N/A 8/30/2019    Procedure: Injection-steroid-epidural-thoracic;  Surgeon: Frantz Green MD;  [facility] OR;  Service: Pain Management;  Laterality: N/A;  T6-7    ESOPHAGOGASTRODUODENOSCOPY N/A 4/26/2019    Procedure: EGD (ESOPHAGOGASTRODUODENOSCOPY);  Surgeon: Guero Crane MD;  Location: [facility];  Service: Endoscopy;  Laterality: N/A;    HERNIA REPAIR         Review of patient's allergies indicates:  No Known Allergies    No current facility-administered medications on file prior to encounter.      Current Outpatient Medications on File Prior to Encounter   Medication Sig    buPROPion (WELLBUTRIN XL) 300 MG 24 hr tablet Take 1 tablet (300 mg total) by mouth once daily.    diclofenac sodium (VOLTAREN) 1 % Gel Apply 2 g topically 3 (three) times daily as needed. For hand pain    empagliflozin (JARDIANCE) 10 mg Tab Take 10 mg by mouth once daily.    folic acid-vit B6-vit B12 2.5-25-2 mg (FOLBIC OR EQUIV) 2.5-25-2 mg Tab Take 1 tablet by mouth once daily.    gabapentin (NEURONTIN) 300 MG capsule Take 2 capsules (600 mg total) by mouth every evening.     "hydrALAZINE (APRESOLINE) 25 MG tablet Take 1 tablet (25 mg total) by mouth every 12 (twelve) hours.    insulin (BASAGLAR KWIKPEN U-100 INSULIN) glargine 100 units/mL (3mL) SubQ pen Inject 20 Units into the skin every evening.    lactulose (CHRONULAC) 10 gram/15 mL solution TAKE 30 MLS BY MOUTH 2 TIMES DAILY.    lidocaine (LIDODERM) 5 % Place 1 patch onto the skin once daily. Remove & Discard patch within 12 hours or as directed by MD    lisinopril-hydrochlorothiazide (PRINZIDE,ZESTORETIC) 20-12.5 mg per tablet TAKE 1 TABLET BY MOUTH TWICE A DAY    magnesium citrate solution Take 296 mLs by mouth daily as needed (constipation).     metFORMIN (GLUCOPHAGE-XR) 500 MG 24 hr tablet Take 2 tablets (1,000 mg total) by mouth 2 (two) times daily.    omeprazole (PRILOSEC) 40 MG capsule TAKE 1 CAPSULE (40 MG TOTAL) BY MOUTH BEFORE BREAKFAST.    pen needle, diabetic 32 gauge x 5/32" Ndle 1 each by Misc.(Non-Drug; Combo Route) route nightly.    pioglitazone (ACTOS) 30 MG tablet Take 1 tablet (30 mg total) by mouth once daily.    rosuvastatin (CRESTOR) 20 MG tablet Take 1 tablet (20 mg total) by mouth once daily.     Family History     Problem Relation (Age of Onset)    Brain cancer Brother    Diabetes Brother    Heart disease Mother, Father    Suicide Brother        Tobacco Use    Smoking status: Current Every Day Smoker     Packs/day: 1.00     Years: 50.00     Pack years: 50.00     Types: Cigarettes    Smokeless tobacco: Never Used   Substance and Sexual Activity    Alcohol use: Yes     Alcohol/week: 14.0 standard drinks     Types: 14 Cans of beer per week     Comment: 2-3 beers daily    Drug use: No    Sexual activity: Yes     Partners: Female     Review of Systems   Constitutional: Negative for chills, fatigue and fever.   HENT: Negative for congestion, sore throat and trouble swallowing.    Eyes: Negative for photophobia and visual disturbance.   Respiratory: Negative for cough, chest tightness and shortness of " breath.    Cardiovascular: Negative for chest pain.   Gastrointestinal: Negative for abdominal pain, diarrhea, nausea and vomiting.   Endocrine: Negative for polyphagia and polyuria.   Genitourinary: Negative for difficulty urinating, dysuria and urgency.   Musculoskeletal: Positive for gait problem. Negative for arthralgias and back pain.        Unsteady gait due to dizziness   Skin: Negative for color change, pallor, rash and wound.   Neurological: Positive for dizziness, weakness, light-headedness and headaches. Negative for tremors, seizures, syncope, facial asymmetry, speech difficulty and numbness.   Hematological: Negative for adenopathy.   Psychiatric/Behavioral: Negative for agitation, behavioral problems and confusion.   All other systems reviewed and are negative.    Objective:     Vital Signs (Most Recent):  Temp: 97.4 °F (36.3 °C) (11/16/20 0007)  Pulse: 61 (11/16/20 0248)  Resp: 17 (11/16/20 0007)  BP: (!) 191/85 (11/16/20 0246)  SpO2: 95 % (11/16/20 0248) Vital Signs (24h Range):  Temp:  [97.4 °F (36.3 °C)] 97.4 °F (36.3 °C)  Pulse:  [59-66] 61  Resp:  [17] 17  SpO2:  [94 %-97 %] 95 %  BP: (168-200)/() 191/85     Weight: 104.3 kg (230 lb)  Body mass index is 27.27 kg/m².    Physical Exam  Vitals signs and nursing note reviewed.   Constitutional:       General: He is not in acute distress.     Appearance: Normal appearance. He is well-developed. He is not ill-appearing.   HENT:      Head: Normocephalic and atraumatic.      Right Ear: External ear normal.      Left Ear: External ear normal.      Nose: Nose normal. No congestion or rhinorrhea.      Mouth/Throat:      Mouth: Mucous membranes are moist.      Pharynx: Oropharynx is clear. No oropharyngeal exudate.   Eyes:      General: No scleral icterus.     Extraocular Movements: Extraocular movements intact.      Conjunctiva/sclera: Conjunctivae normal.      Pupils: Pupils are equal, round, and reactive to light.      Comments: No nystagmus noted    Neck:      Musculoskeletal: Normal range of motion and neck supple.      Vascular: No JVD.   Cardiovascular:      Rate and Rhythm: Normal rate and regular rhythm.      Pulses: Normal pulses.      Heart sounds: Normal heart sounds. No murmur.   Pulmonary:      Effort: Pulmonary effort is normal. No respiratory distress.      Breath sounds: Normal breath sounds. No stridor. No wheezing, rhonchi or rales.   Abdominal:      General: Bowel sounds are normal. There is no distension.      Palpations: Abdomen is soft.      Tenderness: There is no abdominal tenderness.   Musculoskeletal: Normal range of motion.         General: No swelling or tenderness.   Skin:     General: Skin is warm and dry.      Capillary Refill: Capillary refill takes 2 to 3 seconds.      Coloration: Skin is not jaundiced or pale.   Neurological:      General: No focal deficit present.      Mental Status: He is alert and oriented to person, place, and time.      Cranial Nerves: No cranial nerve deficit.      Sensory: No sensory deficit.      Motor: No weakness.      Coordination: Coordination normal.      Gait: Gait normal.      Deep Tendon Reflexes: Reflexes normal.      Comments: Able to perform finger to nose and heel to shin without difficulty.  Bilateral  equal and strong.  Strength to bilateral lower extremities equal and 5/5   Psychiatric:         Mood and Affect: Mood normal.         Behavior: Behavior normal.         Thought Content: Thought content normal.           CRANIAL NERVES     CN III, IV, VI   Pupils are equal, round, and reactive to light.       Significant Labs:   CBC:   Recent Labs   Lab 11/16/20  0038   WBC 11.66   HGB 15.5   HCT 45.8        CMP:   Recent Labs   Lab 11/16/20  0038      K 3.6      CO2 23   *   BUN 11   CREATININE 0.8   CALCIUM 9.3   PROT 6.9   ALBUMIN 4.0   BILITOT 0.4   ALKPHOS 59   AST 13   ALT 26   ANIONGAP 13   EGFRNONAA >60     All pertinent labs within the past 24 hours have  been reviewed.    Significant Imaging: I have reviewed all pertinent imaging results/findings within the past 24 hours.

## 2020-11-16 NOTE — RESPIRATORY THERAPY
11/16/20 0719   Patient Assessment/Suction   Level of Consciousness (AVPU) alert   PRE-TX-O2   O2 Device (Oxygen Therapy) room air   SpO2 96 %   Pulse Oximetry Type Intermittent   $ Pulse Oximetry - Multiple Charge Pulse Oximetry - Multiple     Pt stated he has never been SOB & he does not need O2 set up in his room

## 2020-11-16 NOTE — ASSESSMENT & PLAN NOTE
Chronic, controlled.  Latest blood pressure and vitals reviewed-   Temp:  [97.4 °F (36.3 °C)]   Pulse:  [59-66]   Resp:  [17]   BP: (168-200)/()   SpO2:  [94 %-97 %] .   Home meds for hypertension were reviewed and noted below. Hospital anti-hypertensive changes were made as shown below.  Hypertension Medications             hydrALAZINE (APRESOLINE) 25 MG tablet Take 1 tablet (25 mg total) by mouth every 12 (twelve) hours.    lisinopril-hydrochlorothiazide (PRINZIDE,ZESTORETIC) 20-12.5 mg per tablet TAKE 1 TABLET BY MOUTH TWICE A DAY        Will utilize p.r.n. blood pressure medication only if patient's blood pressure greater than  180/110 and he develops symptoms such as worsening chest pain or shortness of breath.

## 2020-11-16 NOTE — ASSESSMENT & PLAN NOTE
Patient's FSGs are controlled on current hypoglycemics.   Last A1c reviewed-   Lab Results   Component Value Date    HGBA1C 7.1 (H) 07/06/2020     Most recent fingerstick glucose reviewed- No results for input(s): POCTGLUCOSE in the last 24 hours.  Current correctional scale  Low  Maintain anti-hyperglycemic dose as follows-   Antihyperglycemics (From admission, onward)    Low dose SS insulin        Hold Oral hypoglycemics while patient is in the hospital.

## 2020-11-16 NOTE — PT/OT/SLP PROGRESS
Occupational Therapy      Patient Name:  Jhonny Almazan   MRN:  [id number]    Patient not seen this morning secondary to MD hold (Comment)(Nurse, Ophelia, stated that pt is very dizzy and nauseated and may be transferring to [facility] for potential neurosurgery. Will follow up later today.  ROSE Jesus  11/16/2020

## 2020-11-16 NOTE — ED PROVIDER NOTES
Encounter Date: 11/16/2020    SCRIBE #1 NOTE: I, Marlon Castillo, am scribing for, and in the presence of, Andre Conde MD.       History     Chief Complaint   Patient presents with    Dizziness     dizziness x 2 days   N/V beginning tonight        Time seen by provider: 12:04 AM on 11/16/2020    Jhonny Almazan is a 64 y.o. male with PMHx of HTN, DM, and fatty liver who presents to the ED with an onset of weakness noticed when trying to get up to go to the bathroom at home PTA. Associated symptoms include lightheadedness beginning x3 days ago, nausea, and vomiting. SHx includes smoking one pack of cigarettes per day. The patient denies leg swelling, fever, or any other symptoms at this time. Pertinent PSHx includes colonoscopy, endoscopy, and hernia repair.     The history is provided by the patient.     Review of patient's allergies indicates:  No Known Allergies  Past Medical History:   Diagnosis Date    Anticoagulant long-term use     Arthritis     Colon polyp     Diabetes mellitus     Diabetes mellitus, type 2     Fatty liver     Hypertension      Past Surgical History:   Procedure Laterality Date    BACK SURGERY      COLONOSCOPY  07/18/2014    Dr. Crane: 22 colon polyps removed, hemorrhoids, erythema to sigmoid, repeat in 1 year for surveillance; biopsy: sigmoid erythema- showed unremarkable colonic mucosa, tubular adenomas and hyperplastic polyps    COLONOSCOPY N/A 5/3/2019    Procedure: COLONOSCOPY;  Surgeon: Guero Crane MD;  Location: [facility];  Service: Endoscopy;  Laterality: N/A;    COLONOSCOPY N/A 5/6/2019    Procedure: COLONOSCOPY;  Surgeon: Guero Crane MD;  Location: [facility];  Service: Endoscopy;  Laterality: N/A;    EPIDURAL STEROID INJECTION INTO THORACIC SPINE N/A 8/30/2019    Procedure: Injection-steroid-epidural-thoracic;  Surgeon: Frantz Green MD;  Location: [facility] OR;  Service: Pain Management;  Laterality: N/A;  T6-7    ESOPHAGOGASTRODUODENOSCOPY N/A 4/26/2019     Procedure: EGD (ESOPHAGOGASTRODUODENOSCOPY);  Surgeon: Guero Crane MD;  Location: [facility];  Service: Endoscopy;  Laterality: N/A;    HERNIA REPAIR       Family History   Problem Relation Age of Onset    Heart disease Mother     Heart disease Father     Diabetes Brother     Suicide Brother     Brain cancer Brother     Colon cancer Neg Hx     Crohn's disease Neg Hx     Ulcerative colitis Neg Hx     Stomach cancer Neg Hx     Esophageal cancer Neg Hx     Glaucoma Neg Hx     Retinal detachment Neg Hx     Macular degeneration Neg Hx      Social History     Tobacco Use    Smoking status: Current Every Day Smoker     Packs/day: 1.00     Years: 50.00     Pack years: 50.00     Types: Cigarettes    Smokeless tobacco: Never Used   Substance Use Topics    Alcohol use: Yes     Alcohol/week: 14.0 standard drinks     Types: 14 Cans of beer per week     Comment: 2-3 beers daily    Drug use: No     Review of Systems   Constitutional: Negative for fever.   HENT: Negative for congestion.    Eyes: Negative for visual disturbance.   Respiratory: Negative for wheezing.    Cardiovascular: Negative for chest pain.   Gastrointestinal: Positive for nausea and vomiting. Negative for abdominal pain.   Genitourinary: Negative for dysuria.   Musculoskeletal: Negative for joint swelling.   Skin: Negative for rash.   Neurological: Positive for weakness and light-headedness. Negative for syncope.   Hematological: Does not bruise/bleed easily.   Psychiatric/Behavioral: Negative for confusion.       Physical Exam     Initial Vitals [11/16/20 0007]   BP Pulse Resp Temp SpO2   (!) 186/92 60 17 97.4 °F (36.3 °C) 96 %      MAP       --         Physical Exam    Nursing note and vitals reviewed.  Constitutional: He appears well-nourished.   HENT:   Head: Normocephalic and atraumatic.   Eyes: Conjunctivae and EOM are normal.   Neck: Normal range of motion. Neck supple. No thyroid mass present.   Cardiovascular: Normal rate,  regular rhythm and normal heart sounds. Exam reveals no gallop and no friction rub.    No murmur heard.  Pulmonary/Chest: Breath sounds normal. He has no wheezes. He has no rhonchi. He has no rales.   Abdominal: Soft. Normal appearance and bowel sounds are normal. There is no abdominal tenderness.   Neurological: He is alert and oriented to person, place, and time. He has normal strength. No cranial nerve deficit or sensory deficit.   Skin: Skin is warm and dry. No rash noted. No erythema.   Psychiatric: He has a normal mood and affect. His speech is normal. Cognition and memory are normal.         ED Course   Procedures  Labs Reviewed   CBC W/ AUTO DIFFERENTIAL - Abnormal; Notable for the following components:       Result Value    MCH 31.4 (*)     MPV 9.1 (*)     Immature Grans (Abs) 0.05 (*)     All other components within normal limits   COMPREHENSIVE METABOLIC PANEL - Abnormal; Notable for the following components:    Glucose 267 (*)     All other components within normal limits   LIPID PANEL - Abnormal; Notable for the following components:    Triglycerides 158 (*)     HDL 39 (*)     HDL/Cholesterol Ratio 19.6 (*)     Total Cholesterol/HDL Ratio 5.1 (*)     All other components within normal limits   B-TYPE NATRIURETIC PEPTIDE   MAGNESIUM   TROPONIN I   SARS-COV-2 RNA AMPLIFICATION, QUAL   PROTIME-INR   TSH   ISTAT PROCEDURE     EKG Readings: (Independently Interpreted)   Normal sinus rhythm, 61 beats per minute, borderline left axis, normal intervals, minimal voltage criteria for LVH, maybe normal variant, no STEMI       Imaging Results          CTA Head and Neck (xpd) (In process)                CT Head Without Contrast (In process)                  Medical Decision Making:   History:   Old Medical Records: I decided to obtain old medical records.  Independently Interpreted Test(s):   I have ordered and independently interpreted X-rays - see prior notes.  I have ordered and independently interpreted EKG  Reading(s) - see prior notes  Clinical Tests:   Lab Tests: Ordered and Reviewed  Radiological Study: Reviewed and Ordered  Medical Tests: Ordered and Reviewed  ED Management:  This patient was emergently received and assessed upon arrival.  Initial vital signs are significant for hypertension.  The patient reports generalized weakness with with sounds like vertiginous symptoms that have been waxing waning over the past 24 hr.  Initially there are no lateralizing deficits.  Screening labs obtained found to be largely unremarkable.  During observation, the patient began to experience heaviness and mild weakness in the left upper extremity and a code stroke was alerted.  CT scan noncontrast of the head is negative for overt infarct or intracranial bleed.  These mild deficits fully resolved within 10-15 minutes.  Patient is out of the window for tPA considering prior suspected deficits, including vertigo.  Case additionally discussed with Dr. Veras who recommends CTA of the head and neck.  This reveals an area of absent filling of the bilateral vertebral arteries and proximal basilar artery, indicating concern for thrombosis/vertebrobasilar insufficiency. Note is made of bilateral PCA and mid/distal basilar artery that are patent.  Dr. Veras is recommending admission further TIA workup, monitoring.  Case additionally discussed with and accepted by the on-call hospitalist.  Patient updated on the plan of care and he is in agreement with this disposition.  He is transported to a tele bed in guarded condition.            Scribe Attestation:   Scribe #1: I performed the above scribed service and the documentation accurately describes the services I performed. I attest to the accuracy of the note.    Attending Attestation:         Attending Critical Care:   Critical Care Times:   Direct Patient Care (initial evaluation, reassessments, and time considering the  case)................................................................15 minutes.   Additional History from reviewing old medical records or taking additional history from the family, EMS, PCP, etc.......................5 minutes.   Ordering, Reviewing, and Interpreting Diagnostic Studies...............................................................................................................5 minutes.   Documentation..................................................................................................................................................................................5 minutes.   Consultation with other Physicians. .................................................................................................................................................5 minutes.   Consultation with the patient's family directly relating to the patient's condition, care, and DNR status (when patient unable)......0 minutes.   Other..................................................................................................................................................................................................10 minutes.   ==============================================================  · Total Critical Care Time - exclusive of procedural time: 45 minutes.  ==============================================================  Critical care was necessary to treat or prevent imminent or life-threatening deterioration of the following conditions: stroke.   Critical care was time spent personally by me on the following activities: obtaining history from patient or relative, examination of patient, review of old charts, ordering lab, x-rays, and/or EKG, development of treatment plan with patient or relative, ordering and performing treatments and interventions, evaluation of patient's response to treatment, discussions with primary provider, discussion with consultants, interpretation of cardiac  measurements and re-evaluation of patient's conition.   Critical Care Condition: potentially life-threatening       I, Dr. Andre Conde, personally performed the services described in this documentation. All medical record entries made by the scribe were at my direction and in my presence.  I have reviewed the chart and agree that the record reflects my personal performance and is accurate and complete. Andre Conde MD.  4:35 AM 11/16/2020                    Clinical Impression:     ICD-10-CM ICD-9-CM   1. Hypertension  I10 401.9   2. Stroke  I63.9 434.91   3. Dizziness  R42 780.4   4. CVA (cerebral vascular accident)  I63.9 434.91                      Disposition:   Disposition: Placed in Observation  Condition: Fair     ED Disposition Condition    Observation                             Andre Conde MD  11/16/20 0435

## 2020-11-16 NOTE — PT/OT/SLP PROGRESS
Occupational Therapy      Patient Name:  Jhonny Almazan   MRN:  [id number]    Patient not seen today secondary to MD hold (Comment)(Nurse, Ophelia stated that pt was being transferred to ICU for closer monitoring and was not appropriate for therapy.). OT orders to be discontinued. Please re-consult OT when pt is able to participate in therapy..    Katy Veras, MERNAR  11/16/2020

## 2020-11-16 NOTE — HPI
Jhonny Almazan is a 64-year-old  male with past medical history significant for hypertension, diabetes, neuropathy and depression presenting today for dizziness.  Patient states for the past 3 days he has been experiencing intermittent dizziness that would worsen with ambulation.  He states today his dizziness became constant and was associated with nausea and 1 episode of vomiting.  He also is experiencing a right temple headache which he describes as dull quality, 3/10 intensity, no aggravating or alleviating factors.  He denies blurred vision or focal neuro deficits at home.  While in the ER he experienced an episode of left hand weakness with associated numbness and tingling.

## 2020-11-16 NOTE — [facility] SECONDARY REVIEW
Physician Advisor External    Level of Care Issue    11/16 @ 3:41pm Case submitted to EHR for review. gala

## 2020-11-16 NOTE — CONSULTS
[facility]  Neurology  Consult Note    Patient Name: Jhonny Almazan  MRN: [id number]  Admission Date: 11/16/2020  Hospital Length of Stay: 0 days  Code Status: Full Code   Attending Provider: Lino Caldera MD   Consulting Provider: Dr Burns  Primary Care Physician: Joey Whitehead MD  Principal Problem:CVA (cerebral vascular accident)    Consults  Subjective:     Chief Complaint:    Dizziness       dizziness x 2 days   N/V beginning tonight          HPI per EMR: Jhonny Almazan is a 64-year-old  male with past medical history significant for hypertension, diabetes, neuropathy and depression presenting today for dizziness.  Patient states for the past 3 days he has been experiencing intermittent dizziness that would worsen with ambulation.  He states today his dizziness became constant and was associated with nausea and 1 episode of vomiting.  He also is experiencing a right temple headache which he describes as dull quality, 3/10 intensity, no aggravating or alleviating factors.  He denies blurred vision or focal neuro deficits at home.  While in the ER he experienced an episode of left hand weakness with associated numbness and tingling.  ED workup revealed CT head was negative but CTA head and neck performed and revealed absent filling of the bilateral vertebral arteries and proximal basal artery with findings of bilateral PCA and mid/distal basal artery patent and no acute intracranial hemorrhage.  Patient is on longer experiencing any focal neuro deficits and is left hand weakness and numbness have resolved.  Patient will be admitted to hospital medicine services for further workup and management.  He was given aspirin in the ED and his nausea is controlled after receiving IV Zofran.  He also received meclizine and states that his dizziness is improving.    Neurological Consult: Pt seen and examined. POC discussed with Dr. Burns. PT with h/o HTN, DB, smokers, ETOH use  reports having dizziness, lightheadedness, bL blurry vision, BL UE weakness, and numbness in his hands and feet that started last night. He call ed EMS and was brought to ED. CTA head and neck shows clots in the basilar and /BL vertebral arteries with potential for removal, per Dr. Burns. It is recommended that pt is transferred to [facility] to see endovascular surgeon ASAP. POC discussed with Dr. Caldera as well as on call endovascular surgeon via Dr Burns. POC also discussed with pts wife Christine, per pt's request.     Past Medical History:   Diagnosis Date    Anticoagulant long-term use     Arthritis     Colon polyp     Diabetes mellitus     Diabetes mellitus, type 2     Fatty liver     Hypertension        Past Surgical History:   Procedure Laterality Date    BACK SURGERY      COLONOSCOPY  07/18/2014    Dr. Crane: 22 colon polyps removed, hemorrhoids, erythema to sigmoid, repeat in 1 year for surveillance; biopsy: sigmoid erythema- showed unremarkable colonic mucosa, tubular adenomas and hyperplastic polyps    COLONOSCOPY N/A 5/3/2019    Procedure: COLONOSCOPY;  Surgeon: Guero Crane MD;  Location: [facility];  Service: Endoscopy;  Laterality: N/A;    COLONOSCOPY N/A 5/6/2019    Procedure: COLONOSCOPY;  Surgeon: Guero Crane MD;  Location: [facility];  Service: Endoscopy;  Laterality: N/A;    EPIDURAL STEROID INJECTION INTO THORACIC SPINE N/A 8/30/2019    Procedure: Injection-steroid-epidural-thoracic;  Surgeon: Frantz Green MD;  Location: [facility];  Service: Pain Management;  Laterality: N/A;  T6-7    ESOPHAGOGASTRODUODENOSCOPY N/A 4/26/2019    Procedure: EGD (ESOPHAGOGASTRODUODENOSCOPY);  Surgeon: Guero Crane MD;  Location: [facility];  Service: Endoscopy;  Laterality: N/A;    HERNIA REPAIR         Review of patient's allergies indicates:  No Known Allergies    Current Neurological Medications:    No current facility-administered medications on file prior to encounter.      Current Outpatient  "Medications on File Prior to Encounter   Medication Sig    buPROPion (WELLBUTRIN XL) 300 MG 24 hr tablet Take 1 tablet (300 mg total) by mouth once daily.    empagliflozin (JARDIANCE) 10 mg Tab Take 10 mg by mouth once daily.    folic acid-vit B6-vit B12 2.5-25-2 mg (FOLBIC OR EQUIV) 2.5-25-2 mg Tab Take 1 tablet by mouth once daily.    gabapentin (NEURONTIN) 300 MG capsule Take 2 capsules (600 mg total) by mouth every evening.    hydrALAZINE (APRESOLINE) 25 MG tablet Take 1 tablet (25 mg total) by mouth every 12 (twelve) hours.    insulin (BASAGLAR KWIKPEN U-100 INSULIN) glargine 100 units/mL (3mL) SubQ pen Inject 20 Units into the skin every evening.    lactulose (CHRONULAC) 10 gram/15 mL solution TAKE 30 MLS BY MOUTH 2 TIMES DAILY.    lidocaine (LIDODERM) 5 % Place 1 patch onto the skin once daily. Remove & Discard patch within 12 hours or as directed by MD    lisinopril-hydrochlorothiazide (PRINZIDE,ZESTORETIC) 20-12.5 mg per tablet TAKE 1 TABLET BY MOUTH TWICE A DAY    metFORMIN (GLUCOPHAGE-XR) 500 MG 24 hr tablet Take 2 tablets (1,000 mg total) by mouth 2 (two) times daily.    pen needle, diabetic 32 gauge x 5/32" Ndle 1 each by Misc.(Non-Drug; Combo Route) route nightly.    pioglitazone (ACTOS) 30 MG tablet Take 1 tablet (30 mg total) by mouth once daily.    rosuvastatin (CRESTOR) 20 MG tablet Take 1 tablet (20 mg total) by mouth once daily.    diclofenac sodium (VOLTAREN) 1 % Gel Apply 2 g topically 3 (three) times daily as needed. For hand pain    magnesium citrate solution Take 296 mLs by mouth daily as needed (constipation).     omeprazole (PRILOSEC) 40 MG capsule TAKE 1 CAPSULE (40 MG TOTAL) BY MOUTH BEFORE BREAKFAST.      Family History     Problem Relation (Age of Onset)    Brain cancer Brother    Diabetes Brother    Heart disease Mother, Father    Suicide Brother        Tobacco Use    Smoking status: Current Every Day Smoker     Packs/day: 1.00     Years: 50.00     Pack years: 50.00 "     Types: Cigarettes    Smokeless tobacco: Never Used   Substance and Sexual Activity    Alcohol use: Yes     Alcohol/week: 14.0 standard drinks     Types: 14 Cans of beer per week     Comment: 2-3 beers daily    Drug use: No    Sexual activity: Yes     Partners: Female     Review of Systems   Constitutional: Negative.    HENT: Negative.    Eyes: Positive for visual disturbance.   Respiratory: Negative.    Cardiovascular: Negative.    Gastrointestinal: Positive for abdominal pain, nausea and vomiting.   Musculoskeletal: Negative.    Neurological: Positive for dizziness, weakness, numbness and headaches.   Hematological: Negative.    Psychiatric/Behavioral: Negative.      Objective:     Vital Signs (Most Recent):  Temp: 99 °F (37.2 °C) (11/16/20 0803)  Pulse: 63 (11/16/20 0803)  Resp: 18 (11/16/20 0803)  BP: (!) 166/76 (11/16/20 0803)  SpO2: 97 % (11/16/20 0803) Vital Signs (24h Range):  Temp:  [97.4 °F (36.3 °C)-99 °F (37.2 °C)] 99 °F (37.2 °C)  Pulse:  [59-68] 63  Resp:  [17-18] 18  SpO2:  [94 %-98 %] 97 %  BP: (166-200)/() 166/76     Weight: 110.8 kg (244 lb 4.3 oz)  Body mass index is 28.97 kg/m².    Physical Exam  Vitals signs reviewed.   HENT:      Head: Normocephalic.      Nose: Nose normal.      Mouth/Throat:      Mouth: Mucous membranes are moist.   Eyes:      Pupils: Pupils are equal, round, and reactive to light.   Neck:      Musculoskeletal: Normal range of motion.   Cardiovascular:      Rate and Rhythm: Normal rate.   Pulmonary:      Effort: Pulmonary effort is normal.   Musculoskeletal: Normal range of motion.   Skin:     General: Skin is warm and dry.   Neurological:      General: No focal deficit present.      Mental Status: He is oriented to person, place, and time.      Coordination: Finger-Nose-Finger Test abnormal.      Deep Tendon Reflexes: Strength normal.   Psychiatric:         Speech: Speech normal.         NEUROLOGICAL EXAMINATION:     MENTAL STATUS   Oriented to person, place, and  time.   Attention: normal. Concentration: normal.   Speech: speech is normal   Level of consciousness: alert  Able to name object. Able to repeat.     CRANIAL NERVES   Cranial nerves II through XII intact.     CN III, IV, VI   Pupils are equal, round, and reactive to light.    MOTOR EXAM     Strength   Strength 5/5 throughout.     SENSORY EXAM   Light touch normal.     GAIT AND COORDINATION      Coordination   Finger to nose coordination: abnormal    Tremor   Resting tremor: absent       nadir     NIH Stroke Scale:    Level of Consciousness: 0 - alert  LOC Questions: 0 - answers both correctly  LOC Commands: 0 - performs both correctly  Best Gaze: 0 - normal  Visual: 0 - no visual loss  Facial Palsy: 0 - normal  Motor Left Arm: 0 - no drift  Motor Right Arm: 0 - no drift  Motor Left Le - no drift  Motor Right Le - no drift  Limb Ataxia: 0 - absent  Sensory: 0 - normal  Best Language: 0 - no aphasia  Dysarthria: 0 - normal articulation  Extinction and Inattention: 0 - no neglect  NIH Stroke Scale Total: 0          Significant Labs:   Lab Results   Component Value Date    WBC 11.66 2020    HGB 15.5 2020    HCT 45.8 2020    MCV 93 2020     2020       CMP  Sodium   Date Value Ref Range Status   2020 140 136 - 145 mmol/L Final     Potassium   Date Value Ref Range Status   2020 3.6 3.5 - 5.1 mmol/L Final     Chloride   Date Value Ref Range Status   2020 104 95 - 110 mmol/L Final     CO2   Date Value Ref Range Status   2020 23 23 - 29 mmol/L Final     Glucose   Date Value Ref Range Status   2020 267 (H) 70 - 110 mg/dL Final     BUN   Date Value Ref Range Status   2020 11 8 - 23 mg/dL Final     Creatinine   Date Value Ref Range Status   2020 0.8 0.5 - 1.4 mg/dL Final     Calcium   Date Value Ref Range Status   2020 9.3 8.7 - 10.5 mg/dL Final     Total Protein   Date Value Ref Range Status   2020 6.9 6.0 - 8.4 g/dL Final      Albumin   Date Value Ref Range Status   11/16/2020 4.0 3.5 - 5.2 g/dL Final     Total Bilirubin   Date Value Ref Range Status   11/16/2020 0.4 0.1 - 1.0 mg/dL Final     Comment:     For infants and newborns, interpretation of results should be based  on gestational age, weight and in agreement with clinical  observations.  Premature Infant recommended reference ranges:  Up to 24 hours.............<8.0 mg/dL  Up to 48 hours............<12.0 mg/dL  3-5 days..................<15.0 mg/dL  6-29 days.................<15.0 mg/dL       Alkaline Phosphatase   Date Value Ref Range Status   11/16/2020 59 55 - 135 U/L Final     AST   Date Value Ref Range Status   11/16/2020 13 10 - 40 U/L Final     ALT   Date Value Ref Range Status   11/16/2020 26 10 - 44 U/L Final     Anion Gap   Date Value Ref Range Status   11/16/2020 13 8 - 16 mmol/L Final     eGFR if    Date Value Ref Range Status   11/16/2020 >60 >60 mL/min/1.73 m^2 Final     eGFR if non    Date Value Ref Range Status   11/16/2020 >60 >60 mL/min/1.73 m^2 Final     Comment:     Calculation used to obtain the estimated glomerular filtration  rate (eGFR) is the CKD-EPI equation.            Significant Imaging: CTA Head and Neck (xpd)  Narrative: EXAMINATION:  CTA HEAD AND NECK (XPD)    CLINICAL HISTORY:  Neuro deficit, acute, stroke suspected;Dizziness, persistent/recurrent, cardiac or vascular cause suspected;    TECHNIQUE:  CT angiogram was performed from the level of the jennifer to the top of the head following the IV administration of 100 mL of Omnipaque 350.   Sagittal and coronal reconstructions and maximum intensity projection reconstructions were performed. Arterial stenosis percentages are based on NASCET measurement criteria.    COMPARISON:  Head CT obtained the same day    FINDINGS:  CTA Neck:    Arch and great vessels:    There is scattered atherosclerosis of the aorta and origin of the great vessels as well as included  subclavian arteries but there is no critical stenosis, occlusion, thrombosis or dissection.    Carotid arteries:    Right Carotid artery: There is atherosclerosis but there is no critical stenosis, occlusion, thrombosis or dissection involving the common, internal or external carotid arteries.  There is mild stenosis, 50% of the proximal internal carotid artery.    Left Carotid artery: There is scattered atherosclerosis in the common, internal and external carotid arteries but there is no critical stenosis, occlusion, thrombosis or dissection.  Stenosis of the proximal internal carotid artery is approximately 30%.    Vertebral arteries:    The right vertebral artery is dominant.  Both vertebral arteries are patent throughout their course in the neck without critical stenosis, occlusion, thrombosis or dissection.    Other:    There is a 2 cm hypodense nodule in the left lobe of the thyroid gland which could be further evaluated on a nonemergent basis with thyroid ultrasound.    CTA Head:    Anterior circulation:    There is mild to moderate short segment stenosis of the cavernous segment of the right internal carotid artery.  Otherwise, the petrous and cavernous segments are patent.  The carotid terminus is normal and there is normal branching into the anterior and middle cerebral arteries without critical stenosis, occlusion, thrombosis or dissection.  There is no large aneurysm.    Posterior circulation:    The posterior circulation is abnormal.  There is abrupt cut off of the V4 segment of the right vertebral artery.  There is either very small branch or also occlusion of the left vertebral artery.  There is no filling at the origin of the basilar artery.  The more distal basilar artery is small in caliber and likely fills via reconstitution from collateral vessels.  There is a patent right posterior communicating artery the right left posterior cerebral arteries are patent without critical stenosis or  occlusion.    Dural sinuses, other:    The dural sinuses are patent.  Impression: 1. The study is abnormal.  There is abrupt cut off of the intracranial vertebral arteries bilaterally with no flow also demonstrated in the proximal basilar artery.  The remainder of the basilar artery is small in caliber with intermittent change in caliber.  There is a patent right-sided posterior communicating artery and both posterior cerebral arteries are patent.  2. There is a short segment mild-to-moderate nonocclusive stenosis of the right cavernous carotid artery.  3. There is scattered atherosclerosis throughout the neck arterial system with mild stenosis of bilateral internal carotid arteries but without critical stenosis, occlusion, thrombosis or dissection.  4. There is a 2 cm hypodense nodule in the left lobe of the thyroid gland which could be further evaluated on a nonemergent basis with thyroid ultrasound.  Note: Preliminary results were provided by Dr. Josse Portillo ([facility]).  There is no significant discrepancy.    Electronically signed by: Bruno Mathis MD  Date:    11/16/2020  Time:    06:52  CT Head Without Contrast  Narrative: EXAMINATION:  CT HEAD WITHOUT CONTRAST    CLINICAL HISTORY:  Neuro deficit, acute, stroke suspected;    TECHNIQUE:  Routine unenhanced axial images were obtained through the head.  Sagittal and coronal reformatted images were created.  The study is reviewed in bone and soft tissue windows.    COMPARISON:  None    FINDINGS:  Intracranial contents: There is no acute intracranial abnormality.  There is mild volume loss and nonspecific white matter change.  Ventricular size is likely related to volume loss.  There is no hemorrhage or mass/mass effect.  There is no acute edema or ischemia.  The gray-white interface is preserved without obvious acute infarction.  There is no dense vessel.  There is no abnormal extra-axial fluid collection.  The basilar cisterns are open.  The cerebellar tonsils  are in normal position at the level of the foramen magnum.  There is mild atherosclerosis in the vessels at the base of the brain.  The orbits are grossly normal.    Extracranial contents, calvarium, soft tissues: The calvarium is normal.  The included paranasal sinuses and mastoid air cells are clear.  Impression: 1.  There is no acute intracranial abnormality.  There is no hemorrhage, mass/mass effect, acute edema or ischemia.  It should be noted that MRI is more sensitive in the detection of subtle or acute nonhemorrhagic ischemic disease.    Note: Preliminary results were provided by Dr. Morro Shaw ([facility]).  There is no significant discrepancy.    Electronically signed by: Bruno Mathis MD  Date:    11/16/2020  Time:    06:37        Assessment and Plan:  PMH includes DB, HTN, nicotine dependence    Dizziness/Blurry Vision/Numbness/Weakness  -Rule out CVA  -CT head: negative acute  -CTA head and neck: basilar artery and vertebral artery no flow; details above  -MRI brain ordered  -ECHO: ordered  -Lipids: 199 chol 128 ldl  -A1C: 7  -Secondary stroke prevention: asa 81mg, high intensity statin at this time, more recs pending workup  -PT/OT/SLP  -Permissive /110  On day 3, begin decreasing BP by 20%  -Consult Vascular surgery: No interventional Recs at this time  -Rec transfer pt to ICU for frequent neuro checks    Dizziness  -PRN meclizine    PLAN: Main campus consulted for thrombectomy consideration. Dr Spence does not believe intervention benefits outweigh risks at this time. Will monitor pt closely and complete the stroke work up. Rec asa and plavix DAPT for at least 21 days at this time. Evaluate thyroid nodules incidentally found on CTA head and neck outpatient.       Patient to follow up with [facility] at [phone] within 3 days from discharge.     Stroke education was provided including stroke risk factors modification and any acute neurological changes including weakness,  confusion, visual changes to come straight to the ER.     All questions were answered.                                    Active Diagnoses:    Diagnosis Date Noted POA    PRINCIPAL PROBLEM:  CVA (cerebral vascular accident) [I63.9] 11/16/2020 Yes    Dizziness [R42] 11/16/2020 Yes    Hyperlipidemia associated with type 2 diabetes mellitus [E11.69, E78.5] 09/28/2016 Yes    Diabetes mellitus type 2, uncontrolled [E11.65] 09/19/2014 Yes    Hypertension associated with diabetes [E11.59, I10] 09/19/2014 Yes      Problems Resolved During this Admission:       VTE Risk Mitigation (From admission, onward)         Ordered     IP VTE LOW RISK PATIENT  Once      11/16/20 0328     Place sequential compression device  Until discontinued      11/16/20 0328                Thank you for your consult. I will follow-up with patient. Please contact us if you have any additional questions.    Tamela Meyer NP  Neurology  [facility]

## 2020-11-16 NOTE — ED NOTES
Jhonny Almazan with MRN#:  [id number] going to room 213 A placed on monitor box #[id number] and called to monitor room.

## 2020-11-16 NOTE — PT/OT/SLP EVAL
Speech Language Pathology Evaluation  Bedside Swallow    Patient Name:  Jhonny Almazan   MRN:  [id number]  Admitting Diagnosis: CVA (cerebral vascular accident)    Recommendations:                 General Recommendations:  Cognitive-linguistic evaluation  Diet recommendations:  Regular, Thin   Aspiration Precautions: Standard aspiration precautions   General Precautions: Standard, fall  Communication strategies:  none    History:     Past Medical History:   Diagnosis Date    Anticoagulant long-term use     Arthritis     Colon polyp     Diabetes mellitus     Diabetes mellitus, type 2     Fatty liver     Hypertension        Past Surgical History:   Procedure Laterality Date    BACK SURGERY      COLONOSCOPY  07/18/2014    Dr. Crane: 22 colon polyps removed, hemorrhoids, erythema to sigmoid, repeat in 1 year for surveillance; biopsy: sigmoid erythema- showed unremarkable colonic mucosa, tubular adenomas and hyperplastic polyps    COLONOSCOPY N/A 5/3/2019    Procedure: COLONOSCOPY;  Surgeon: Guero Crane MD;  Location: [facility];  Service: Endoscopy;  Laterality: N/A;    COLONOSCOPY N/A 5/6/2019    Procedure: COLONOSCOPY;  Surgeon: Guero Crane MD;  Location: [facility];  Service: Endoscopy;  Laterality: N/A;    EPIDURAL STEROID INJECTION INTO THORACIC SPINE N/A 8/30/2019    Procedure: Injection-steroid-epidural-thoracic;  Surgeon: Frantz Green MD;  [facility];  Service: Pain Management;  Laterality: N/A;  T6-7    ESOPHAGOGASTRODUODENOSCOPY N/A 4/26/2019    Procedure: EGD (ESOPHAGOGASTRODUODENOSCOPY);  Surgeon: Guero Crane MD;  Location: [facility];  Service: Endoscopy;  Laterality: N/A;    HERNIA REPAIR         Social History: Patient lives alone.    Prior Intubation HX:  None this admit    Modified Barium Swallow: None    Imaging:  CTA Head and Neck (xpd)   Final Result      1. The study is abnormal.  There is abrupt cut off of the intracranial vertebral arteries bilaterally  "with no flow also demonstrated in the proximal basilar artery.  The remainder of the basilar artery is small in caliber with intermittent change in caliber.  There is a patent right-sided posterior communicating artery and both posterior cerebral arteries are patent.   2. There is a short segment mild-to-moderate nonocclusive stenosis of the right cavernous carotid artery.   3. There is scattered atherosclerosis throughout the neck arterial system with mild stenosis of bilateral internal carotid arteries but without critical stenosis, occlusion, thrombosis or dissection.   4. There is a 2 cm hypodense nodule in the left lobe of the thyroid gland which could be further evaluated on a nonemergent basis with thyroid ultrasound.   Note: Preliminary results were provided by Dr. Josse Portillo ([facility]).  There is no significant discrepancy.         Electronically signed by: Bruno Mathis MD   Date:    11/16/2020   Time:    06:52      CT Head Without Contrast   Final Result      1.  There is no acute intracranial abnormality.  There is no hemorrhage, mass/mass effect, acute edema or ischemia.  It should be noted that MRI is more sensitive in the detection of subtle or acute nonhemorrhagic ischemic disease.      Note: Preliminary results were provided by Dr. Morro Shaw ([facility]).  There is no significant discrepancy.         Electronically signed by: Bruno Mathis MD   Date:    11/16/2020   Time:    06:37           Prior diet: Regular/thin.    Occupation/hobbies/homemaking: PLOF; independent with ADLS, medication management and finances. Ambulates with cane.     Subjective     "I just feel light headed and dizzy."  "I'm not up to eating but I'll try."    Objective:   Pt seen for clinical swallow evaluation. He is AAOx4. Speech is clear, fluent and appropriate. Provides reliable history. Pt denies any changes in mental status. Report N/V this AM. Agrees to attempt clinical swallow evaluation.    Oral Musculature " Evaluation  · Oral Musculature: WFL  · Dentition: present and adequate, scattered dentition  · Secretion Management: adequate  · Mucosal Quality: good  · Mandibular Strength and Mobility: WFL  · Oral Labial Strength and Mobility: WFL  · Lingual Strength and Mobility: WFL  · Volitional Cough: adequate  · Volitional Swallow: able to palpate laryngeal rise  · Voice Prior to PO Intake: clear    Bedside Swallow Eval:   Consistencies Assessed:  · Thin liquids --via straw  · Puree --tsp of applesauce x1  · Mixed consistencies --single bite of diced peaches  · Solids --single bite of cookie     Oral Phase:   · WFL    Pharyngeal Phase:   · no overt clinical signs/symptoms of aspiration    Compensatory Strategies  · None    Assessment:   Clinical swallowing evaluation completed. All po trials tolerated with no overt s/s swallow dysfunction. REC Regular textures with thin liquids. Will follow up for completion of full SLP evaluation. Pt wishes to rest (c/o dizziness, nausea, etc)    Plan:     · Patient to be seen:      · Plan of Care expires:     · Plan of Care reviewed with:  patient   · SLP Follow-Up:  Yes       Discharge recommendations:  home   Barriers to Discharge:  None    Time Tracking:     SLP Treatment Date:   11/16/20  Speech Start Time:  0928  Speech Stop Time:  0940     Speech Total Time (min):  12 min    Billable Minutes: Eval Swallow and Oral Function 12 and Total Time 12    Christa Hay CCC-SLP  11/16/2020

## 2020-11-16 NOTE — H&P
[facility] Medicine  History & Physical    Patient Name: Jhonny Almazan  MRN: [id number]  Admission Date: 11/16/2020  Attending Physician: Elle Newberry MD   Primary Care Provider: Joey Whitehead MD         Patient information was obtained from patient, past medical records and ER records.     Subjective:     Principal Problem:CVA (cerebral vascular accident)    Chief Complaint:   Chief Complaint   Patient presents with    Dizziness     dizziness x 2 days   N/V beginning tonight         HPI: Jhonny Almazan is a 64-year-old  male with past medical history significant for hypertension, diabetes, neuropathy and depression presenting today for dizziness.  Patient states for the past 3 days he has been experiencing intermittent dizziness that would worsen with ambulation.  He states today his dizziness became constant and was associated with nausea and 1 episode of vomiting.  He also is experiencing a right temple headache which he describes as dull quality, 3/10 intensity, no aggravating or alleviating factors.  He denies blurred vision or focal neuro deficits at home.  While in the ER he experienced an episode of left hand weakness with associated numbness and tingling.  ED workup revealed CT head was negative but CTA head and neck performed and revealed absent filling of the bilateral vertebral arteries and proximal basal artery with findings of bilateral PCA and mid/distal basal artery patent and no acute intracranial hemorrhage.  Patient is on longer experiencing any focal neuro deficits and is left hand weakness and numbness have resolved.  Patient will be admitted to hospital medicine services for further workup and management.  He was given aspirin in the ED and his nausea is controlled after receiving IV Zofran.  He also received meclizine and states that his dizziness is improving.      Past Medical History:   Diagnosis Date    Anticoagulant long-term use      Arthritis     Colon polyp     Diabetes mellitus     Diabetes mellitus, type 2     Fatty liver     Hypertension        Past Surgical History:   Procedure Laterality Date    BACK SURGERY      COLONOSCOPY  07/18/2014    Dr. Crane: 22 colon polyps removed, hemorrhoids, erythema to sigmoid, repeat in 1 year for surveillance; biopsy: sigmoid erythema- showed unremarkable colonic mucosa, tubular adenomas and hyperplastic polyps    COLONOSCOPY N/A 5/3/2019    Procedure: COLONOSCOPY;  Surgeon: Guero Crane MD;  Location: [facility] ENDO;  Service: Endoscopy;  Laterality: N/A;    COLONOSCOPY N/A 5/6/2019    Procedure: COLONOSCOPY;  Surgeon: Guero Crane MD;  Location: [facility] ENDO;  Service: Endoscopy;  Laterality: N/A;    EPIDURAL STEROID INJECTION INTO THORACIC SPINE N/A 8/30/2019    Procedure: Injection-steroid-epidural-thoracic;  Surgeon: Frantz Green MD;  Location: [facility];  Service: Pain Management;  Laterality: N/A;  T6-7    ESOPHAGOGASTRODUODENOSCOPY N/A 4/26/2019    Procedure: EGD (ESOPHAGOGASTRODUODENOSCOPY);  Surgeon: Guero Crane MD;  Location: [facility];  Service: Endoscopy;  Laterality: N/A;    HERNIA REPAIR         Review of patient's allergies indicates:  No Known Allergies    No current facility-administered medications on file prior to encounter.      Current Outpatient Medications on File Prior to Encounter   Medication Sig    buPROPion (WELLBUTRIN XL) 300 MG 24 hr tablet Take 1 tablet (300 mg total) by mouth once daily.    diclofenac sodium (VOLTAREN) 1 % Gel Apply 2 g topically 3 (three) times daily as needed. For hand pain    empagliflozin (JARDIANCE) 10 mg Tab Take 10 mg by mouth once daily.    folic acid-vit B6-vit B12 2.5-25-2 mg (FOLBIC OR EQUIV) 2.5-25-2 mg Tab Take 1 tablet by mouth once daily.    gabapentin (NEURONTIN) 300 MG capsule Take 2 capsules (600 mg total) by mouth every evening.    hydrALAZINE (APRESOLINE) 25 MG tablet Take 1 tablet (25 mg total) by mouth  "every 12 (twelve) hours.    insulin (BASAGLAR KWIKPEN U-100 INSULIN) glargine 100 units/mL (3mL) SubQ pen Inject 20 Units into the skin every evening.    lactulose (CHRONULAC) 10 gram/15 mL solution TAKE 30 MLS BY MOUTH 2 TIMES DAILY.    lidocaine (LIDODERM) 5 % Place 1 patch onto the skin once daily. Remove & Discard patch within 12 hours or as directed by MD    lisinopril-hydrochlorothiazide (PRINZIDE,ZESTORETIC) 20-12.5 mg per tablet TAKE 1 TABLET BY MOUTH TWICE A DAY    magnesium citrate solution Take 296 mLs by mouth daily as needed (constipation).     metFORMIN (GLUCOPHAGE-XR) 500 MG 24 hr tablet Take 2 tablets (1,000 mg total) by mouth 2 (two) times daily.    omeprazole (PRILOSEC) 40 MG capsule TAKE 1 CAPSULE (40 MG TOTAL) BY MOUTH BEFORE BREAKFAST.    pen needle, diabetic 32 gauge x 5/32" Ndle 1 each by Misc.(Non-Drug; Combo Route) route nightly.    pioglitazone (ACTOS) 30 MG tablet Take 1 tablet (30 mg total) by mouth once daily.    rosuvastatin (CRESTOR) 20 MG tablet Take 1 tablet (20 mg total) by mouth once daily.     Family History     Problem Relation (Age of Onset)    Brain cancer Brother    Diabetes Brother    Heart disease Mother, Father    Suicide Brother        Tobacco Use    Smoking status: Current Every Day Smoker     Packs/day: 1.00     Years: 50.00     Pack years: 50.00     Types: Cigarettes    Smokeless tobacco: Never Used   Substance and Sexual Activity    Alcohol use: Yes     Alcohol/week: 14.0 standard drinks     Types: 14 Cans of beer per week     Comment: 2-3 beers daily    Drug use: No    Sexual activity: Yes     Partners: Female     Review of Systems   Constitutional: Negative for chills, fatigue and fever.   HENT: Negative for congestion, sore throat and trouble swallowing.    Eyes: Negative for photophobia and visual disturbance.   Respiratory: Negative for cough, chest tightness and shortness of breath.    Cardiovascular: Negative for chest pain.   Gastrointestinal: " Negative for abdominal pain, diarrhea, nausea and vomiting.   Endocrine: Negative for polyphagia and polyuria.   Genitourinary: Negative for difficulty urinating, dysuria and urgency.   Musculoskeletal: Positive for gait problem. Negative for arthralgias and back pain.        Unsteady gait due to dizziness   Skin: Negative for color change, pallor, rash and wound.   Neurological: Positive for dizziness, weakness, light-headedness and headaches. Negative for tremors, seizures, syncope, facial asymmetry, speech difficulty and numbness.   Hematological: Negative for adenopathy.   Psychiatric/Behavioral: Negative for agitation, behavioral problems and confusion.   All other systems reviewed and are negative.    Objective:     Vital Signs (Most Recent):  Temp: 97.4 °F (36.3 °C) (11/16/20 0007)  Pulse: 61 (11/16/20 0248)  Resp: 17 (11/16/20 0007)  BP: (!) 191/85 (11/16/20 0246)  SpO2: 95 % (11/16/20 0248) Vital Signs (24h Range):  Temp:  [97.4 °F (36.3 °C)] 97.4 °F (36.3 °C)  Pulse:  [59-66] 61  Resp:  [17] 17  SpO2:  [94 %-97 %] 95 %  BP: (168-200)/() 191/85     Weight: 104.3 kg (230 lb)  Body mass index is 27.27 kg/m².    Physical Exam  Vitals signs and nursing note reviewed.   Constitutional:       General: He is not in acute distress.     Appearance: Normal appearance. He is well-developed. He is not ill-appearing.   HENT:      Head: Normocephalic and atraumatic.      Right Ear: External ear normal.      Left Ear: External ear normal.      Nose: Nose normal. No congestion or rhinorrhea.      Mouth/Throat:      Mouth: Mucous membranes are moist.      Pharynx: Oropharynx is clear. No oropharyngeal exudate.   Eyes:      General: No scleral icterus.     Extraocular Movements: Extraocular movements intact.      Conjunctiva/sclera: Conjunctivae normal.      Pupils: Pupils are equal, round, and reactive to light.      Comments: No nystagmus noted   Neck:      Musculoskeletal: Normal range of motion and neck supple.       Vascular: No JVD.   Cardiovascular:      Rate and Rhythm: Normal rate and regular rhythm.      Pulses: Normal pulses.      Heart sounds: Normal heart sounds. No murmur.   Pulmonary:      Effort: Pulmonary effort is normal. No respiratory distress.      Breath sounds: Normal breath sounds. No stridor. No wheezing, rhonchi or rales.   Abdominal:      General: Bowel sounds are normal. There is no distension.      Palpations: Abdomen is soft.      Tenderness: There is no abdominal tenderness.   Musculoskeletal: Normal range of motion.         General: No swelling or tenderness.   Skin:     General: Skin is warm and dry.      Capillary Refill: Capillary refill takes 2 to 3 seconds.      Coloration: Skin is not jaundiced or pale.   Neurological:      General: No focal deficit present.      Mental Status: He is alert and oriented to person, place, and time.      Cranial Nerves: No cranial nerve deficit.      Sensory: No sensory deficit.      Motor: No weakness.      Coordination: Coordination normal.      Gait: Gait normal.      Deep Tendon Reflexes: Reflexes normal.      Comments: Able to perform finger to nose and heel to shin without difficulty.  Bilateral  equal and strong.  Strength to bilateral lower extremities equal and 5/5   Psychiatric:         Mood and Affect: Mood normal.         Behavior: Behavior normal.         Thought Content: Thought content normal.           CRANIAL NERVES     CN III, IV, VI   Pupils are equal, round, and reactive to light.       Significant Labs:   CBC:   Recent Labs   Lab 11/16/20 0038   WBC 11.66   HGB 15.5   HCT 45.8        CMP:   Recent Labs   Lab 11/16/20 0038      K 3.6      CO2 23   *   BUN 11   CREATININE 0.8   CALCIUM 9.3   PROT 6.9   ALBUMIN 4.0   BILITOT 0.4   ALKPHOS 59   AST 13   ALT 26   ANIONGAP 13   EGFRNONAA >60     All pertinent labs within the past 24 hours have been reviewed.    Significant Imaging: I have reviewed all pertinent  imaging results/findings within the past 24 hours.    Assessment/Plan:     * CVA (cerebral vascular accident)  CT head negative  CTA head and neck reviewed - absent feeling bilateral vertebral arteries and proximal basal artery with patent bilateral PCA and mid/distal basal artery  Consult neurology  Aspirin administered in the ED  Neuro checks q.4 hours  PT/OT/ST eval  Check Lipid panel  Fall precautions  Echocardiogram  Further neuro workup per Neurology      Dizziness  Likely Secondary to acute CVA  Continue meclizine p.r.n.  Neurology consult      Hyperlipidemia associated with type 2 diabetes mellitus  Chronic  Resume statin  Check lipid panel        Hypertension associated with diabetes  Chronic, controlled.  Latest blood pressure and vitals reviewed-   Temp:  [97.4 °F (36.3 °C)]   Pulse:  [59-66]   Resp:  [17]   BP: (168-200)/()   SpO2:  [94 %-97 %] .   Home meds for hypertension were reviewed and noted below. Hospital anti-hypertensive changes were made as shown below.  Hypertension Medications             hydrALAZINE (APRESOLINE) 25 MG tablet Take 1 tablet (25 mg total) by mouth every 12 (twelve) hours.    lisinopril-hydrochlorothiazide (PRINZIDE,ZESTORETIC) 20-12.5 mg per tablet TAKE 1 TABLET BY MOUTH TWICE A DAY        Will utilize p.r.n. blood pressure medication only if patient's blood pressure greater than  180/110 and he develops symptoms such as worsening chest pain or shortness of breath.        Diabetes mellitus type 2, uncontrolled  Patient's FSGs are controlled on current hypoglycemics.   Last A1c reviewed-   Lab Results   Component Value Date    HGBA1C 7.1 (H) 07/06/2020     Most recent fingerstick glucose reviewed- No results for input(s): POCTGLUCOSE in the last 24 hours.  Current correctional scale  Low  Maintain anti-hyperglycemic dose as follows-   Antihyperglycemics (From admission, onward)    Low dose SS insulin        Hold Oral hypoglycemics while patient is in the  [facility].          VTE Risk Mitigation (From admission, onward)    SCDs for VTE prophylaxis             Kati Artis NP  Department of Hospital Medicine   [facility]

## 2020-11-16 NOTE — ASSESSMENT & PLAN NOTE
CT head negative  CTA head and neck reviewed - absent feeling bilateral vertebral arteries and proximal basal artery with patent bilateral PCA and mid/distal basal artery  Consult neurology  Aspirin administered in the ED  Neuro checks q.4 hours  PT/OT/ST eval  Check Lipid panel  Fall precautions  Echocardiogram  Further neuro workup per Neurology

## 2020-11-16 NOTE — CONSULTS
Food & Nutrition                                                           Education    Diet Education: Cardiac, diabetic diet  Time Spent: 20 minutes  Learners: Patient      Nutrition Education provided with handouts:  Yes      Comments: Pt admitted with CVA. PMH of DM2. HDL depleted, cholesterol and TG elevated. PTA pt had been working with a dietitian and carb counting to lower his A1C. [facility], [facility] is going through a divorce and not focusing on taking care of himself. Eating a lot of lunch meat and pickles. Reviewed carbohydrate consistent diet, label reading/snacking tips for low sodium diet, and ideas on how to raise HDL and lower cholesterol/TG. Pt says that he will be able to  where he left off when he goes home, he is motivated to prevent another stroke. Verbalized understanding.    Assessment:  NFPE done 11/16/20, no wasting seen. A1C 7.1. Ate 1/2 of a chicken salad sandwich @ visit but not much else since he has been admitted today, feeling nauseous.   All questions and concerns answered. Dietitian's contact information provided.       Follow-Up: yes    Please Re-consult as needed        Thanks!

## 2020-11-17 PROBLEM — I63.411 STROKE DUE TO EMBOLISM OF RIGHT MIDDLE CEREBRAL ARTERY: Status: ACTIVE | Noted: 2020-11-16

## 2020-11-17 PROBLEM — F32.9 MAJOR DEPRESSIVE DISORDER: Status: ACTIVE | Noted: 2020-11-17

## 2020-11-17 LAB
ALBUMIN SERPL BCP-MCNC: 3.9 G/DL (ref 3.5–5.2)
ALP SERPL-CCNC: 70 U/L (ref 55–135)
ALT SERPL W/O P-5'-P-CCNC: 25 U/L (ref 10–44)
ANION GAP SERPL CALC-SCNC: 10 MMOL/L (ref 8–16)
AST SERPL-CCNC: 15 U/L (ref 10–40)
BASOPHILS # BLD AUTO: 0.08 K/UL (ref 0–0.2)
BASOPHILS NFR BLD: 0.7 % (ref 0–1.9)
BILIRUB SERPL-MCNC: 0.5 MG/DL (ref 0.1–1)
BUN SERPL-MCNC: 8 MG/DL (ref 8–23)
CALCIUM SERPL-MCNC: 9.8 MG/DL (ref 8.7–10.5)
CHLORIDE SERPL-SCNC: 103 MMOL/L (ref 95–110)
CO2 SERPL-SCNC: 25 MMOL/L (ref 23–29)
CREAT SERPL-MCNC: 0.8 MG/DL (ref 0.5–1.4)
DIFFERENTIAL METHOD: ABNORMAL
EOSINOPHIL # BLD AUTO: 0.1 K/UL (ref 0–0.5)
EOSINOPHIL NFR BLD: 0.5 % (ref 0–8)
ERYTHROCYTE [DISTWIDTH] IN BLOOD BY AUTOMATED COUNT: 13 % (ref 11.5–14.5)
EST. GFR  (AFRICAN AMERICAN): >60 ML/MIN/1.73 M^2
EST. GFR  (NON AFRICAN AMERICAN): >60 ML/MIN/1.73 M^2
GLUCOSE SERPL-MCNC: 204 MG/DL (ref 70–110)
HCT VFR BLD AUTO: 46.2 % (ref 40–54)
HGB BLD-MCNC: 15.3 G/DL (ref 14–18)
IMM GRANULOCYTES # BLD AUTO: 0.06 K/UL (ref 0–0.04)
IMM GRANULOCYTES NFR BLD AUTO: 0.5 % (ref 0–0.5)
LYMPHOCYTES # BLD AUTO: 3.1 K/UL (ref 1–4.8)
LYMPHOCYTES NFR BLD: 25.9 % (ref 18–48)
MAGNESIUM SERPL-MCNC: 1.9 MG/DL (ref 1.6–2.6)
MCH RBC QN AUTO: 30.4 PG (ref 27–31)
MCHC RBC AUTO-ENTMCNC: 33.1 G/DL (ref 32–36)
MCV RBC AUTO: 92 FL (ref 82–98)
MONOCYTES # BLD AUTO: 0.7 K/UL (ref 0.3–1)
MONOCYTES NFR BLD: 6 % (ref 4–15)
NEUTROPHILS # BLD AUTO: 8 K/UL (ref 1.8–7.7)
NEUTROPHILS NFR BLD: 66.4 % (ref 38–73)
NRBC BLD-RTO: 0 /100 WBC
PHOSPHATE SERPL-MCNC: 3 MG/DL (ref 2.7–4.5)
PLATELET # BLD AUTO: 204 K/UL (ref 150–350)
PMV BLD AUTO: 9.1 FL (ref 9.2–12.9)
POCT GLUCOSE: 166 MG/DL (ref 70–110)
POCT GLUCOSE: 185 MG/DL (ref 70–110)
POCT GLUCOSE: 193 MG/DL (ref 70–110)
POTASSIUM SERPL-SCNC: 3.8 MMOL/L (ref 3.5–5.1)
PROT SERPL-MCNC: 6.8 G/DL (ref 6–8.4)
RBC # BLD AUTO: 5.03 M/UL (ref 4.6–6.2)
SODIUM SERPL-SCNC: 138 MMOL/L (ref 136–145)
WBC # BLD AUTO: 12.07 K/UL (ref 3.9–12.7)

## 2020-11-17 PROCEDURE — 51702 INSERT TEMP BLADDER CATH: CPT

## 2020-11-17 PROCEDURE — 85025 COMPLETE CBC W/AUTO DIFF WBC: CPT

## 2020-11-17 PROCEDURE — 63600175 PHARM REV CODE 636 W HCPCS: Performed by: STUDENT IN AN ORGANIZED HEALTH CARE EDUCATION/TRAINING PROGRAM

## 2020-11-17 PROCEDURE — 36415 COLL VENOUS BLD VENIPUNCTURE: CPT

## 2020-11-17 PROCEDURE — 83735 ASSAY OF MAGNESIUM: CPT

## 2020-11-17 PROCEDURE — 30200315 PPD INTRADERMAL TEST REV CODE 302: Performed by: STUDENT IN AN ORGANIZED HEALTH CARE EDUCATION/TRAINING PROGRAM

## 2020-11-17 PROCEDURE — 92523 SPEECH SOUND LANG COMPREHEN: CPT

## 2020-11-17 PROCEDURE — 80053 COMPREHEN METABOLIC PANEL: CPT

## 2020-11-17 PROCEDURE — 20000000 HC ICU ROOM

## 2020-11-17 PROCEDURE — 86580 TB INTRADERMAL TEST: CPT | Performed by: STUDENT IN AN ORGANIZED HEALTH CARE EDUCATION/TRAINING PROGRAM

## 2020-11-17 PROCEDURE — 84100 ASSAY OF PHOSPHORUS: CPT

## 2020-11-17 PROCEDURE — 25000003 PHARM REV CODE 250: Performed by: STUDENT IN AN ORGANIZED HEALTH CARE EDUCATION/TRAINING PROGRAM

## 2020-11-17 PROCEDURE — 92610 EVALUATE SWALLOWING FUNCTION: CPT

## 2020-11-17 PROCEDURE — 94761 N-INVAS EAR/PLS OXIMETRY MLT: CPT

## 2020-11-17 PROCEDURE — 97161 PT EVAL LOW COMPLEX 20 MIN: CPT

## 2020-11-17 RX ORDER — CLOPIDOGREL BISULFATE 75 MG/1
75 TABLET ORAL DAILY
Status: DISCONTINUED | OUTPATIENT
Start: 2020-11-18 | End: 2020-11-19

## 2020-11-17 RX ORDER — MUPIROCIN 20 MG/G
OINTMENT TOPICAL 2 TIMES DAILY
Status: DISPENSED | OUTPATIENT
Start: 2020-11-17 | End: 2020-11-22

## 2020-11-17 RX ORDER — TETRAHYDROZOLINE HCL 0.05 %
2 DROPS OPHTHALMIC (EYE) 4 TIMES DAILY PRN
Status: DISCONTINUED | OUTPATIENT
Start: 2020-11-17 | End: 2020-11-24 | Stop reason: HOSPADM

## 2020-11-17 RX ORDER — ASPIRIN 81 MG/1
81 TABLET ORAL DAILY
Status: DISCONTINUED | OUTPATIENT
Start: 2020-11-18 | End: 2020-11-24 | Stop reason: HOSPADM

## 2020-11-17 RX ADMIN — ENOXAPARIN SODIUM 40 MG: 40 INJECTION SUBCUTANEOUS at 05:11

## 2020-11-17 RX ADMIN — MECLIZINE HYDROCHLORIDE 25 MG: 25 TABLET ORAL at 04:11

## 2020-11-17 RX ADMIN — GABAPENTIN 600 MG: 300 CAPSULE ORAL at 08:11

## 2020-11-17 RX ADMIN — TUBERCULIN PURIFIED PROTEIN DERIVATIVE 5 UNITS: 5 INJECTION, SOLUTION INTRADERMAL at 05:11

## 2020-11-17 RX ADMIN — MUPIROCIN: 20 OINTMENT TOPICAL at 08:11

## 2020-11-17 NOTE — PLAN OF CARE
SW met with patient at bedside regarding inpatient rehab consult with back up plan skilled nursing facility placement.  Patient in agreement to inpatient rehab but would like to discuss SNFs after speaking with his ex-wife and son.         11/17/20 9025   Discharge Reassessment   Assessment Type Discharge Planning Reassessment   Provided patient/caregiver education on the expected discharge date and the discharge plan Yes   Do you have any problems affording any of your prescribed medications? No   Discharge Plan A Rehab   Discharge Plan B Skilled Nursing Facility

## 2020-11-17 NOTE — PT/OT/SLP EVAL
Physical Therapy Evaluation    Patient Name:  Jhonny Almazan   MRN:  [id number]    Recommendations:     Discharge Recommendations:  home, home health PT, rehabilitation facility   Discharge Equipment Recommendations: other (see comments)(unclear at this time)   Barriers to discharge: unclear at this time as to whether patient will need rehab prior go DC home alone    Assessment:     Jhonny Almazan is a 64 y.o. male admitted with a medical diagnosis of Stroke due to embolism of right middle cerebral artery.  He presents with the following impairments/functional limitations:  weakness, impaired endurance, impaired functional mobilty, gait instability, impaired balance, decreased lower extremity function, decreased safety awareness, decreased ROM .  Patient agreeable to PT evaluation but only able to sit EOB for a few minutes and had to lay back down due to extreme dizzy feeling.  Patient reported the dizzy feeling did resolve some once laying down but it did not go away. Patient did require mod assist to transfer supine to/from sitting.    Rehab Prognosis: Good; patient would benefit from acute skilled PT services to address these deficits and reach maximum level of function.    Recent Surgery: * No surgery found *      Plan:     During this hospitalization, patient to be seen daily to address the identified rehab impairments via gait training, therapeutic activities, therapeutic exercises and progress toward the following goals:    · Plan of Care Expires:  12/15/20    Subjective     Chief Complaint: feeling dizzy  Patient/Family Comments/goals: none given  Pain/Comfort:  ·      Patients cultural, spiritual, [employer]alism conflicts given the current situation:      Living Environment:  Currently lives alone in a 1 story home with 3 step entry.  Prior to admission, patients level of function was modified independent with a cane.  Equipment used at home: cane, straight.  DME owned (not currently used):  none.  Upon discharge, patient will have assistance from family.    Objective:     Communicated with nurse Pereyra prior to session.  Patient found supine with bed alarm, blood pressure cuff, oxygen, pulse ox (continuous), telemetry, SCD  upon PT entry to room.    General Precautions: Standard, fall   Orthopedic Precautions:N/A   Braces:       Exams:  · RLE ROM: WFL  · RLE Strength: Deficits: 3+/5 overall  · LLE ROM: WFL  · LLE Strength: Deficits: 4-/5 overall    Functional Mobility:  · Bed Mobility:     · Supine to Sit: moderate assistance  · Sit to Supine: moderate assistance    Therapeutic Activities and Exercises:   none given    AM-PAC 6 CLICK MOBILITY  Total Score:10     Patient left supine with call button in reach, bed alarm on and nurse notified.    GOALS:   Multidisciplinary Problems     Physical Therapy Goals        Problem: Physical Therapy Goal    Goal Priority Disciplines Outcome Goal Variances Interventions   Physical Therapy Goal     PT, PT/OT Ongoing, Progressing     Description: Goals to be met by: 2020    Patient will increase functional independence with mobility by performin. Supine to sit with Stand-by Assistance  2. Sit to supine with Stand-by Assistance  3. Sit to stand transfer with Contact Guard Assistance  4. Bed to chair transfer with Minimal Assistance using Rolling Walker  5. Gait  x 50 feet with Minimal Assistance using Rolling Walker.                      History:     Past Medical History:   Diagnosis Date    Anticoagulant long-term use     Arthritis     Colon polyp     Diabetes mellitus     Diabetes mellitus, type 2     Fatty liver     Hypertension        Past Surgical History:   Procedure Laterality Date    BACK SURGERY      COLONOSCOPY  2014    Dr. Miles: 22 colon polyps removed, hemorrhoids, erythema to sigmoid, repeat in 1 year for surveillance; biopsy: sigmoid erythema- showed unremarkable colonic mucosa, tubular adenomas and hyperplastic polyps     COLONOSCOPY N/A 5/3/2019    Procedure: COLONOSCOPY;  Surgeon: Guero Crane MD;  Location: [facility] ENDO;  Service: Endoscopy;  Laterality: N/A;    COLONOSCOPY N/A 5/6/2019    Procedure: COLONOSCOPY;  Surgeon: Guero Crane MD;  Location: [facility];  Service: Endoscopy;  Laterality: N/A;    EPIDURAL STEROID INJECTION INTO THORACIC SPINE N/A 8/30/2019    Procedure: Injection-steroid-epidural-thoracic;  Surgeon: Frantz Green MD;  Location: [facility];  Service: Pain Management;  Laterality: N/A;  T6-7    ESOPHAGOGASTRODUODENOSCOPY N/A 4/26/2019    Procedure: EGD (ESOPHAGOGASTRODUODENOSCOPY);  Surgeon: Guero Crane MD;  Location: [facility];  Service: Endoscopy;  Laterality: N/A;    HERNIA REPAIR         Time Tracking:     PT Received On: 11/17/20  PT Start Time: 0945     PT Stop Time: 1001  PT Total Time (min): 16 min     Billable Minutes: Evaluation 16      Chris Megilligan, PT  11/17/2020

## 2020-11-17 NOTE — ASSESSMENT & PLAN NOTE
Dangers of cigarette smoking were reviewed with patient in detail for 10 minutes and patient was encouraged to quit. Nicotine replacement options were discussed.   -Continue Wellbutrin

## 2020-11-17 NOTE — PLAN OF CARE
Problem: SLP Goal  Goal: SLP Goal  Description:   1) Participate in MBSS  2) Participate in further evaluation of higher level cognitive functions  Outcome: Ongoing, Progressing       Pt seen for swallow re-evaluation. Per nurse, pt with change in neuro status this AM including dysphagia. Clinical swallow evaluation completed yesterday revealed oropharyngeal swallow WFL. Today, pt presents with worsening double vision and dysphagia. REC MBSS piror to initiation of oral diet. Allow ice chips and meds crushed in applesauce if necessary.

## 2020-11-17 NOTE — PROGRESS NOTES
[facility]  Progress Note  Patient Name: Jhonny Almazan  MRN: [id number]  Admission Date: 11/16/2020  Hospital Length of Stay: 0 days  Code Status: Full Code   Attending Provider: Lino Caldera MD   Consulting Provider: Dr Burns  Primary Care Physician: Joey Whitehead MD  Principal Problem:Stroke due to embolism of right middle cerebral artery    Consults  Subjective:     Chief Complaint:    Dizziness       dizziness x 2 days   N/V beginning tonight          HPI per EMR: Jhonny Almazan is a 64-year-old  male with past medical history significant for hypertension, diabetes, neuropathy and depression presenting today for dizziness.  Patient states for the past 3 days he has been experiencing intermittent dizziness that would worsen with ambulation.  He states today his dizziness became constant and was associated with nausea and 1 episode of vomiting.  He also is experiencing a right temple headache which he describes as dull quality, 3/10 intensity, no aggravating or alleviating factors.  He denies blurred vision or focal neuro deficits at home.  While in the ER he experienced an episode of left hand weakness with associated numbness and tingling.  ED workup revealed CT head was negative but CTA head and neck performed and revealed absent filling of the bilateral vertebral arteries and proximal basal artery with findings of bilateral PCA and mid/distal basal artery patent and no acute intracranial hemorrhage.  Patient is on longer experiencing any focal neuro deficits and is left hand weakness and numbness have resolved.  Patient will be admitted to hospital medicine services for further workup and management.  He was given aspirin in the ED and his nausea is controlled after receiving IV Zofran.  He also received meclizine and states that his dizziness is improving.    Neurological Consult: Pt seen and examined. POC discussed with Dr. Burns. PT with h/o HTN, DB, smokers,  ETOH use reports having dizziness, lightheadedness, bL blurry vision, BL UE weakness, and numbness in his hands and feet that started last night. He call ed EMS and was brought to ED. CTA head and neck shows clots in the basilar and /BL vertebral arteries with potential for removal, per Dr. Burns. It is recommended that pt is transferred to [facility] to see endovascular surgeon ASAP. POC discussed with Dr. Caldera as well as on call endovascular surgeon via Dr Burns. POC also discussed with pts wife Christine, per pt's request.     11/17: Patient seen and examined this morning with Dr. Burns. He is alert and oriented x3. Horizontal nystagmus noted one exam and patient reports double vision. He states he feels as if his speech is slurred, continues to experience dizziness and reports decreased sensation to right face and LLE. This morning I was called by patient's nurse BURAK Pereyra to report symptoms of speech changes, facial numbness and difficulty swallowing. Repeat CT was ordered and did not show new strokes or acute bleed.     CTH wo contrast:   Impression:     Small areas of low-density right parietal corresponding to areas of infarctions seen on the MRI 11/16/2020 common not readily apparent on the CT and better evaluated visualized by the MRI.  No acute intracranial hemorrhage, mass effect, or acute major vascular territory infarct evident on the CT.     Past Medical History:   Diagnosis Date    Anticoagulant long-term use     Arthritis     Colon polyp     Diabetes mellitus     Diabetes mellitus, type 2     Fatty liver     Hypertension        Past Surgical History:   Procedure Laterality Date    BACK SURGERY      COLONOSCOPY  07/18/2014    Dr. Miles: 22 colon polyps removed, hemorrhoids, erythema to sigmoid, repeat in 1 year for surveillance; biopsy: sigmoid erythema- showed unremarkable colonic mucosa, tubular adenomas and hyperplastic polyps    COLONOSCOPY N/A 5/3/2019    Procedure: COLONOSCOPY;  Surgeon:  Guero Crane MD;  Location: [facility];  Service: Endoscopy;  Laterality: N/A;    COLONOSCOPY N/A 5/6/2019    Procedure: COLONOSCOPY;  Surgeon: Guero Crane MD;  Location: [facility] ENDO;  Service: Endoscopy;  Laterality: N/A;    EPIDURAL STEROID INJECTION INTO THORACIC SPINE N/A 8/30/2019    Procedure: Injection-steroid-epidural-thoracic;  Surgeon: Frantz Green MD;  Location: [facility];  Service: Pain Management;  Laterality: N/A;  T6-7    ESOPHAGOGASTRODUODENOSCOPY N/A 4/26/2019    Procedure: EGD (ESOPHAGOGASTRODUODENOSCOPY);  Surgeon: Guero Crane MD;  Location: [facility] ENDO;  Service: Endoscopy;  Laterality: N/A;    HERNIA REPAIR         Review of patient's allergies indicates:  No Known Allergies    Current Neurological Medications:    No current facility-administered medications on file prior to encounter.      Current Outpatient Medications on File Prior to Encounter   Medication Sig    buPROPion (WELLBUTRIN XL) 300 MG 24 hr tablet Take 1 tablet (300 mg total) by mouth once daily.    empagliflozin (JARDIANCE) 10 mg Tab Take 10 mg by mouth once daily.    folic acid-vit B6-vit B12 2.5-25-2 mg (FOLBIC OR EQUIV) 2.5-25-2 mg Tab Take 1 tablet by mouth once daily.    gabapentin (NEURONTIN) 300 MG capsule Take 2 capsules (600 mg total) by mouth every evening.    hydrALAZINE (APRESOLINE) 25 MG tablet Take 1 tablet (25 mg total) by mouth every 12 (twelve) hours.    insulin (BASAGLAR KWIKPEN U-100 INSULIN) glargine 100 units/mL (3mL) SubQ pen Inject 20 Units into the skin every evening.    lactulose (CHRONULAC) 10 gram/15 mL solution TAKE 30 MLS BY MOUTH 2 TIMES DAILY.    lidocaine (LIDODERM) 5 % Place 1 patch onto the skin once daily. Remove & Discard patch within 12 hours or as directed by MD    lisinopril-hydrochlorothiazide (PRINZIDE,ZESTORETIC) 20-12.5 mg per tablet TAKE 1 TABLET BY MOUTH TWICE A DAY    metFORMIN (GLUCOPHAGE-XR) 500 MG 24 hr tablet Take 2 tablets (1,000 mg total) by mouth 2 (two)  "times daily.    pen needle, diabetic 32 gauge x 5/32" Ndle 1 each by Misc.(Non-Drug; Combo Route) route nightly.    pioglitazone (ACTOS) 30 MG tablet Take 1 tablet (30 mg total) by mouth once daily.    rosuvastatin (CRESTOR) 20 MG tablet Take 1 tablet (20 mg total) by mouth once daily.    diclofenac sodium (VOLTAREN) 1 % Gel Apply 2 g topically 3 (three) times daily as needed. For hand pain    magnesium citrate solution Take 296 mLs by mouth daily as needed (constipation).     omeprazole (PRILOSEC) 40 MG capsule TAKE 1 CAPSULE (40 MG TOTAL) BY MOUTH BEFORE BREAKFAST.      Family History     Problem Relation (Age of Onset)    Brain cancer Brother    Diabetes Brother    Heart disease Mother, Father    Suicide Brother        Tobacco Use    Smoking status: Current Every Day Smoker     Packs/day: 1.00     Years: 50.00     Pack years: 50.00     Types: Cigarettes    Smokeless tobacco: Never Used   Substance and Sexual Activity    Alcohol use: Yes     Alcohol/week: 14.0 standard drinks     Types: 14 Cans of beer per week     Comment: 2-3 beers daily    Drug use: No    Sexual activity: Yes     Partners: Female     Review of Systems   Constitutional: Negative.    HENT: Negative.    Eyes: Positive for visual disturbance.   Respiratory: Negative.    Cardiovascular: Negative.    Gastrointestinal: Positive for abdominal pain, nausea and vomiting.   Musculoskeletal: Negative.    Neurological: Positive for dizziness, weakness, numbness and headaches.   Hematological: Negative.    Psychiatric/Behavioral: Negative.      Objective:     Vital Signs (Most Recent):  Temp: 97.9 °F (36.6 °C) (11/17/20 0330)  Pulse: 60 (11/17/20 0533)  Resp: 12 (11/17/20 0533)  BP: (!) 114/57 (11/17/20 0533)  SpO2: 96 % (11/17/20 0533) Vital Signs (24h Range):  Temp:  [97.8 °F (36.6 °C)-98 °F (36.7 °C)] 97.9 °F (36.6 °C)  Pulse:  [] 60  Resp:  [11-31] 12  SpO2:  [90 %-98 %] 96 %  BP: ()/(54-99) 114/57     Weight: 110.8 kg (244 lb 4.3 " oz)  Body mass index is 28.97 kg/m².    Physical Exam  Vitals signs and nursing note reviewed.   HENT:      Head: Normocephalic and atraumatic.      Nose: Nose normal.      Mouth/Throat:      Mouth: Mucous membranes are moist.      Pharynx: Oropharynx is clear.   Eyes:      Extraocular Movements: Extraocular movements intact and EOM normal.      Pupils: Pupils are equal, round, and reactive to light.   Neck:      Musculoskeletal: Normal range of motion.   Cardiovascular:      Rate and Rhythm: Normal rate.   Pulmonary:      Effort: Pulmonary effort is normal.   Musculoskeletal: Normal range of motion.   Skin:     General: Skin is warm and dry.   Neurological:      Mental Status: He is alert and oriented to person, place, and time.      Coordination: Finger-Nose-Finger Test abnormal.      Deep Tendon Reflexes: Strength normal.   Psychiatric:         Mood and Affect: Mood normal.         Speech: Speech is slurred.         NEUROLOGICAL EXAMINATION:     MENTAL STATUS   Oriented to person, place, and time.   Attention: normal. Concentration: normal.   Speech: slurred   Level of consciousness: alert  Able to name object. Able to repeat. Normal comprehension.     CRANIAL NERVES   Cranial nerves II through XII intact.     CN II   Right visual field deficit: none  Left visual field deficit: none     CN III, IV, VI   Pupils are equal, round, and reactive to light.  Extraocular motions are normal.   Nystagmus: bilateral   Nystagmus type: rapid and horizontal  Diplopia: bilateral    CN V   Right facial sensation deficit: cheeks and mandible  Left facial sensation deficit: none    MOTOR EXAM     Strength   Strength 5/5 throughout.     SENSORY EXAM   Right arm light touch: normal  Left arm light touch: normal  Right leg light touch: normal  Left leg light touch: decreased from knee    GAIT AND COORDINATION      Coordination   Finger to nose coordination: abnormal    Tremor   Resting tremor: absent       nadir     NIH Stroke  Scale:    Level of Consciousness: 0 - alert  LOC Questions: 0 - answers both correctly  LOC Commands: 0 - performs both correctly  Best Gaze: 0 - normal  Visual: 0 - no visual loss  Facial Palsy: 2 - partial  Motor Left Arm: 0 - no drift  Motor Right Arm: 0 - no drift  Motor Left Le - no drift  Motor Right Le - no drift  Limb Ataxia: 0 - absent  Sensory: 1 - mild to moderate loss  Best Language: 0 - no aphasia  Dysarthria: 1 - mild to moderate dysarthria  Extinction and Inattention: 0 - no neglect  NIH Stroke Scale Total: 4          Significant Labs:   Lab Results   Component Value Date    WBC 12.07 2020    HGB 15.3 2020    HCT 46.2 2020    MCV 92 2020     2020       CMP  Sodium   Date Value Ref Range Status   2020 138 136 - 145 mmol/L Final     Potassium   Date Value Ref Range Status   2020 3.8 3.5 - 5.1 mmol/L Final     Chloride   Date Value Ref Range Status   2020 103 95 - 110 mmol/L Final     CO2   Date Value Ref Range Status   2020 25 23 - 29 mmol/L Final     Glucose   Date Value Ref Range Status   2020 204 (H) 70 - 110 mg/dL Final     BUN   Date Value Ref Range Status   2020 8 8 - 23 mg/dL Final     Creatinine   Date Value Ref Range Status   2020 0.8 0.5 - 1.4 mg/dL Final     Calcium   Date Value Ref Range Status   2020 9.8 8.7 - 10.5 mg/dL Final     Total Protein   Date Value Ref Range Status   2020 6.8 6.0 - 8.4 g/dL Final     Albumin   Date Value Ref Range Status   2020 3.9 3.5 - 5.2 g/dL Final     Total Bilirubin   Date Value Ref Range Status   2020 0.5 0.1 - 1.0 mg/dL Final     Comment:     For infants and newborns, interpretation of results should be based  on gestational age, weight and in agreement with clinical  observations.  Premature Infant recommended reference ranges:  Up to 24 hours.............<8.0 mg/dL  Up to 48 hours............<12.0 mg/dL  3-5 days..................<15.0  mg/dL  6-29 days.................<15.0 mg/dL       Alkaline Phosphatase   Date Value Ref Range Status   11/17/2020 70 55 - 135 U/L Final     AST   Date Value Ref Range Status   11/17/2020 15 10 - 40 U/L Final     ALT   Date Value Ref Range Status   11/17/2020 25 10 - 44 U/L Final     Anion Gap   Date Value Ref Range Status   11/17/2020 10 8 - 16 mmol/L Final     eGFR if    Date Value Ref Range Status   11/17/2020 >60 >60 mL/min/1.73 m^2 Final     eGFR if non    Date Value Ref Range Status   11/17/2020 >60 >60 mL/min/1.73 m^2 Final     Comment:     Calculation used to obtain the estimated glomerular filtration  rate (eGFR) is the CKD-EPI equation.            Significant Imaging: CT Head Without Contrast  Narrative: EXAMINATION:  CT HEAD WITHOUT CONTRAST    CLINICAL HISTORY:  Stroke, follow up;new lip numbness,difficulty swallowing;    TECHNIQUE:  Low dose axial images were obtained through the head.  Coronal and sagittal reformations were also performed. Contrast was not administered.    COMPARISON:  MRI brain 11/16/2020.  CT head 11/16/2020    FINDINGS:  There is mild dilatation of ventricles, sulci, fissures.  There is decreased density in white matter suggesting microvascular ischemic change.  There are small areas of low-density right posterior parietal not significantly changed in appearance compared to the prior head CT corresponding to areas of restricted diffusion and infarct on the MRI of 11/16/2020 and better visualized and evaluated by that modality.  No acute intracranial hemorrhage.  No intracranial mass effect.  No acute major vascular territory infarct.  The calvarium appears intact, mastoids well pneumatized, visualized paranasal sinuses essentially clear.  Calcifications seen in parasellar portions of internal carotid arteries.  Note is made that MRI is typically more sensitive than CT particular for detection of early or small nonhemorrhagic infarcts.  Impression:  Small areas of low-density right parietal corresponding to areas of infarctions seen on the MRI 11/16/2020 common not readily apparent on the CT and better evaluated visualized by the MRI.  No acute intracranial hemorrhage, mass effect, or acute major vascular territory infarct evident on the CT.    Electronically signed by: Yaneth Green MD  Date:    11/17/2020  Time:    08:44        Assessment and Plan:  PMH includes DB, HTN, nicotine dependence    Dizziness/Blurry Vision/Numbness/Weakness    1. Acute Embolic Infarct   -CT head: negative acute  -CTA head and neck: basilar artery and vertebral artery no flow; details above  -MRI brain revealed scattered, acute, ischemic infarcts seen throughout the right cerebral hemisphere  -ECHO: ordered; report pending   -Lipids: 199 chol 128 ldl  -A1C: 7  -Secondary stroke prevention: asa 81mg, high intensity statin at this time, more recs pending workup  -PT/OT/SLP  -Permissive /110  On day 3, begin decreasing BP by 20%  -Consult Vascular surgery: No interventional Recs at this time  -Rec transfer pt to ICU for frequent neuro checks    Dizziness  -PRN meclizine    PLAN: Main campus consulted for thrombectomy consideration. Dr Spence does not believe intervention benefits outweigh risks at this time. Will monitor pt closely and complete the stroke work up. Rec asa and plavix DAPT for at least 21 days at this time. Recommend holding BP meds for now to allow permissive hypertension for now. Evaluate thyroid nodules incidentally found on CTA head and neck outpatient. Due to stroke location, patient may have fluctuating symptoms. This patient would benefit from intense PT/OT/ST to maximize recovery.        Patient to follow up with [facility] at [phone] within 3 days from discharge.     Stroke education was provided including stroke risk factors modification and any acute neurological changes including weakness, confusion, visual changes to come straight to  the ER.     All questions were answered.                                    Active Diagnoses:    Diagnosis Date Noted POA    PRINCIPAL PROBLEM:  Stroke due to embolism of right middle cerebral artery [I63.411] 11/16/2020 Yes    Dizziness [R42] 11/16/2020 Yes    Hypertension [I10] 11/16/2020 Yes    Hyperlipidemia associated with type 2 diabetes mellitus [E11.69, E78.5] 09/28/2016 Yes    Diabetes mellitus type 2, uncontrolled [E11.65] 09/19/2014 Yes    Hypertension associated with diabetes [E11.59, I10] 09/19/2014 Yes      Problems Resolved During this Admission:       VTE Risk Mitigation (From admission, onward)         Ordered     enoxaparin injection 40 mg  Every 24 hours      11/16/20 1456     IP VTE LOW RISK PATIENT  Once      11/16/20 0328     Place sequential compression device  Until discontinued      11/16/20 0328                Thank you for your consult. I will follow-up with patient. Please contact us if you have any additional questions.    Chantel Bautista DNP  Neurology  [facility]

## 2020-11-17 NOTE — PT/OT/SLP EVAL
Speech Language Pathology Evaluation  Cognitive/Bedside Swallow    Patient Name:  Jhonny Almazan   MRN:  [id number]  Admitting Diagnosis: Stroke due to embolism of right middle cerebral artery    Recommendations:                 General Recommendations:  Modified barium swallow study  Diet recommendations:  NPO, NPO(x ice chips and meds crushed in applesauce if necessary)   General Precautions: Standard, fall, vision impaired, NPO  Communication strategies:  none    History:     Past Medical History:   Diagnosis Date    Anticoagulant long-term use     Arthritis     Colon polyp     Diabetes mellitus     Diabetes mellitus, type 2     Fatty liver     Hypertension        Past Surgical History:   Procedure Laterality Date    BACK SURGERY      COLONOSCOPY  07/18/2014    Dr. Crane: 22 colon polyps removed, hemorrhoids, erythema to sigmoid, repeat in 1 year for surveillance; biopsy: sigmoid erythema- showed unremarkable colonic mucosa, tubular adenomas and hyperplastic polyps    COLONOSCOPY N/A 5/3/2019    Procedure: COLONOSCOPY;  Surgeon: Guero Crane MD;  Location: [facility];  Service: Endoscopy;  Laterality: N/A;    COLONOSCOPY N/A 5/6/2019    Procedure: COLONOSCOPY;  Surgeon: Guero Crane MD;  Location: [facility];  Service: Endoscopy;  Laterality: N/A;    EPIDURAL STEROID INJECTION INTO THORACIC SPINE N/A 8/30/2019    Procedure: Injection-steroid-epidural-thoracic;  Surgeon: Frantz Green MD;  Location: [facility] OR;  Service: Pain Management;  Laterality: N/A;  T6-7    ESOPHAGOGASTRODUODENOSCOPY N/A 4/26/2019    Procedure: EGD (ESOPHAGOGASTRODUODENOSCOPY);  Surgeon: Guero Crane MD;  Location: [facility];  Service: Endoscopy;  Laterality: N/A;    HERNIA REPAIR         Social History: Patient lives with alone.    Prior Intubation HX:  None this admit    Modified Barium Swallow: None    Imaging:  CT Head Without Contrast   Final Result      Small areas of low-density right parietal  corresponding to areas of infarctions seen on the MRI 11/16/2020 common not readily apparent on the CT and better evaluated visualized by the MRI.  No acute intracranial hemorrhage, mass effect, or acute major vascular territory infarct evident on the CT.         Electronically signed by: Yaneth Green MD   Date:    11/17/2020   Time:    08:44      MRI Brain Without Contrast   Final Result   Abnormal      There are scattered, acute, ischemic infarcts seen throughout the right cerebral hemisphere.  The diffuse vascular territories involve suggest embolic phenomenon. This report was flagged in Epic as abnormal.         Electronically signed by: Jacque Palumbo MD   Date:    11/16/2020   Time:    19:07      CTA Head and Neck (xpd)   Final Result      1. The study is abnormal.  There is abrupt cut off of the intracranial vertebral arteries bilaterally with no flow also demonstrated in the proximal basilar artery.  The remainder of the basilar artery is small in caliber with intermittent change in caliber.  There is a patent right-sided posterior communicating artery and both posterior cerebral arteries are patent.   2. There is a short segment mild-to-moderate nonocclusive stenosis of the right cavernous carotid artery.   3. There is scattered atherosclerosis throughout the neck arterial system with mild stenosis of bilateral internal carotid arteries but without critical stenosis, occlusion, thrombosis or dissection.   4. There is a 2 cm hypodense nodule in the left lobe of the thyroid gland which could be further evaluated on a nonemergent basis with thyroid ultrasound.   Note: Preliminary results were provided by Dr. Josse Portillo ([facility]).  There is no significant discrepancy.         Electronically signed by: Bruno Mathis MD   Date:    11/16/2020   Time:    06:52      CT Head Without Contrast   Final Result      1.  There is no acute intracranial abnormality.  There is no hemorrhage, mass/mass effect, acute  "edema or ischemia.  It should be noted that MRI is more sensitive in the detection of subtle or acute nonhemorrhagic ischemic disease.      Note: Preliminary results were provided by Dr. Morro Shaw ([facility]).  There is no significant discrepancy.         Electronically signed by: Bruno Mathis MD   Date:    11/16/2020   Time:    06:37      X-Ray Chest 1 View    (Results Pending)   Fl Modified Barium Swallow Speech    (Results Pending)        Prior diet: Regular/thin following clinical swallow evaluation completed 11/16/2020.    Occupation/hobbies/homemaking: [employer]; independent with ADLS, medication management and finances. Ambulates with cane. Graduated HS.     Subjective     "I think I may have gotten a little worse today."  "I hope I don't need one of those feeding tubes." (Shares story of his father having a feeding tube)  "Feels like its just hung there." (during PO trials)    Objective:   Pt seen for swallow re-evaluation. Pt transferred to ICU over night for closer monitoring of neuro status. Per nursing pt with change in neuro status this AM (dysphagia, tingling in lips, hearing loss). Pt is AAOx4. Speech is clear fluent and appropriate. Endorses worsening double vision, dysphagia and hearing loss; "Its like you're mumbling."    Cognitive Status:    Arousal/Alertness Appropriate response to stimuli  Attention No obvious deficits observed .  Orientation Oriented x4  Memory Immediate Recall --WFL, Delayed--Recalled 3/5 unrelated words following 5 minute filled delay; 5/5 with cues and recall general information --WFL  Problem Solving Sequencing --WFL [employer] --WFL  Simple calculation --Possible mild impairment; will continue to assess      Receptive Language:   Comprehension:      WFL    Pragmatics:    WFL    Expressive Language:  Verbal:    WFL      Motor Speech:  WFL    Voice:   WFL    Visual-Spatial:  Left Neglect--possible mild L visual neglect. Will continue to assess    Reading:   Pt able to " write self generated sentence without difficulty     Written Expression:   Oral reading WFL    Oral Musculature Evaluation  · Oral Musculature: WFL  · Dentition: present and adequate  · Secretion Management: adequate  · Mucosal Quality: good  · Mandibular Strength and Mobility: WFL  · Oral Labial Strength and Mobility: WFL  · Lingual Strength and Mobility: WFL  · Volitional Cough: adequate  · Volitional Swallow: able to palpate laryngeal rise  · Voice Prior to PO Intake: mildly wet; able to clear    Bedside Swallow Eval:   Consistencies Assessed:  · Thin liquids --via tsp, cup and straw  · Puree --applesauce  · Mixed consistencies --diced peach     Oral Phase:   · WFL    Pharyngeal Phase:   · Coughing/choking--thin via straw  · globus sensation--across all trials  · throat clearing--thin via cup  · wet vocal quality after swallow--inconsistently t/o evaluation.   · Pt independently using Dora t/o evaluation    Compensatory Strategies  · Alteration of bites/sips    Treatment: MOCA administered with pt achieving a score of 23/30 suggesting possible mild cognitive-linguistic impairment. Pt earned 4 of 5 points on visuospatial/executive functions, 3 of 3 points on naming, 4 of 6 points for attention, 2 of 3 points for language, 1 of 2 points for abstraction, 3 of 5 points for delayed recall and 5 of 6 points for orientation.      Assessment:     Jhonny Almazan is a 64 y.o. male with an SLP diagnosis of Dysphagia and possible mild cognitive-Linguistic Impairment. Pt seen for swallow re-evaluation. Per nurse, pt with change in neuro status this AM including dysphagia. Clinical swallow evaluation completed yesterday revealed oropharyngeal swallow WFL. Today, pt presents with worsening double vision and dysphagia. REC MBSS prior to initiation of oral diet. Allow ice chips and meds crushed in applesauce if necessary.      Goals:   Multidisciplinary Problems     SLP Goals        Problem: SLP Goal    Goal Priority  Disciplines Outcome   SLP Goal     SLP Ongoing, Progressing   Description:   1) Participate in MBSS  2) Participate in further evaluation of higher level cognitive functions                   Plan:     · Patient to be seen:      · Plan of Care expires:     · Plan of Care reviewed with:  patient   · SLP Follow-Up:  Yes       Discharge recommendations:  Discharge Facility/Level of Care Needs: rehabilitation facility   Barriers to Discharge:  None    Time Tracking:     SLP Treatment Date:   11/17/20  Speech Start Time:  1306  Speech Stop Time:  1342     Speech Total Time (min):  36 min    Billable Minutes: Eval 24 , Eval Swallow and Oral Function 12 and Total Time 36    Christa Hay CCC-SLP  11/17/2020

## 2020-11-17 NOTE — PT/OT/SLP PROGRESS
Speech Language Pathology  DISCHARGE    Jhonny Almazan  MRN: [id number]    Speech Language Pathology/Discharge    Pt transferred to ICU. SLP will need new order given change in status. Please re consult when appropriate.     Christa Hay, RYLIE-SLP

## 2020-11-17 NOTE — SUBJECTIVE & OBJECTIVE
"  Woke up with symptoms?: no    Recent bleeding noted: no  Does the patient take any Blood Thinners? no  Medications: No relevant medications      Past Medical History: hypertension, diabetes and hyperlipidemia    Past Surgical History: no relevant surgical history    Family History: no relevant history    Social History: smoker (active)    Allergies: No Known Allergies     Review of Systems   Constitutional: Negative for chills and fever.   HENT: Negative for congestion and sore throat.    Eyes: Negative for visual disturbance.   Respiratory: Negative for shortness of breath.    Cardiovascular: Negative for chest pain and palpitations.   Gastrointestinal: Negative for blood in stool, diarrhea, nausea and vomiting.   Genitourinary: Negative for difficulty urinating and hematuria.   Musculoskeletal: Negative for back pain and neck pain.   Neurological: Positive for weakness, numbness and headaches. Negative for dizziness and speech difficulty.     Objective:   Vitals: Blood pressure (!) 98/54, pulse 60, temperature 97.9 °F (36.6 °C), temperature source Oral, resp. rate 18, height 6' 5" (1.956 m), weight 110.8 kg (244 lb 4.3 oz), SpO2 (!) 92 %.     CT READ: Yes  No hemmorhage. No mass effect. No early infarct signs.     Physical Exam  Vitals signs reviewed.   Constitutional:       Appearance: Normal appearance. He is well-developed.   HENT:      Head: Normocephalic and atraumatic.      Nose: Nose normal.   Eyes:      Pupils: Pupils are equal, round, and reactive to light.   Cardiovascular:      Rate and Rhythm: Normal rate and regular rhythm.   Pulmonary:      Effort: Pulmonary effort is normal.   Neurological:      Mental Status: He is alert and oriented to person, place, and time.      Cranial Nerves: No cranial nerve deficit or facial asymmetry.      Sensory: Sensory deficit present.      Motor: Weakness present.   Psychiatric:         Mood and Affect: Mood normal.           "

## 2020-11-17 NOTE — SUBJECTIVE & OBJECTIVE
Interval History: This morning the patient complained of new speech difficulty; CT of the head showed no new areas of cerebral ischemia. Case Management consulted for rehab evaluation.    Review of Systems   Constitutional: Negative for chills and fever.   HENT: Negative for congestion and sore throat.    Eyes: Negative for visual disturbance.   Respiratory: Negative for cough, shortness of breath and wheezing.    Cardiovascular: Negative for chest pain and palpitations.   Gastrointestinal: Negative for abdominal pain, blood in stool, diarrhea, nausea and vomiting.   Endocrine: Negative.    Genitourinary: Negative for difficulty urinating and hematuria.   Musculoskeletal: Negative.  Negative for back pain and neck pain.   Skin: Negative.  Negative for color change and pallor.   Allergic/Immunologic: Negative.    Neurological: Positive for dizziness, speech difficulty, weakness, light-headedness, numbness and headaches.   Hematological: Negative.    Psychiatric/Behavioral: Negative.    All other systems reviewed and are negative.    Objective:     Vital Signs (Most Recent):  Temp: 97.9 °F (36.6 °C) (11/17/20 0330)  Pulse: 65 (11/17/20 0845)  Resp: 20 (11/17/20 0845)  BP: 132/76 (11/17/20 0845)  SpO2: 95 % (11/17/20 0845) Vital Signs (24h Range):  Temp:  [97.8 °F (36.6 °C)-98 °F (36.7 °C)] 97.9 °F (36.6 °C)  Pulse:  [] 65  Resp:  [11-31] 20  SpO2:  [90 %-96 %] 95 %  BP: ()/(54-99) 132/76     Weight: 110.8 kg (244 lb 4.3 oz)  Body mass index is 28.97 kg/m².    Intake/Output Summary (Last 24 hours) at 11/17/2020 1228  Last data filed at 11/17/2020 0700  Gross per 24 hour   Intake 250 ml   Output 1300 ml   Net -1050 ml      Physical Exam  Vitals signs reviewed.   Constitutional:       Appearance: Normal appearance. He is well-developed.   HENT:      Head: Normocephalic and atraumatic.      Right Ear: External ear normal.      Left Ear: External ear normal.      Nose: Nose normal.      Mouth/Throat:       Mouth: Mucous membranes are moist.   Eyes:      Pupils: Pupils are equal, round, and reactive to light.   Neck:      Musculoskeletal: Normal range of motion and neck supple.   Cardiovascular:      Rate and Rhythm: Normal rate and regular rhythm.      Pulses: Normal pulses.      Heart sounds: Normal heart sounds.   Pulmonary:      Effort: Pulmonary effort is normal.   Abdominal:      General: Abdomen is flat. Bowel sounds are normal. There is no distension.      Palpations: Abdomen is soft.   Musculoskeletal: Normal range of motion.      Right lower leg: No edema.      Left lower leg: No edema.   Skin:     General: Skin is warm and dry.   Neurological:      Mental Status: He is alert and oriented to person, place, and time.      Cranial Nerves: No cranial nerve deficit or facial asymmetry.      Sensory: Sensory deficit present.      Motor: Weakness present.   Psychiatric:         Mood and Affect: Mood normal.         Significant Labs: All pertinent labs within the past 24 hours have been reviewed.    Significant Imaging: I have reviewed all pertinent imaging results/findings within the past 24 hours.

## 2020-11-17 NOTE — NURSING
Report called to JANES Barraza RN in ICU.  Pt to transport to MRI then  txf directly to the ICU.  Pt aware of transfer.

## 2020-11-17 NOTE — PROGRESS NOTES
NPi: Right 4.7, Left 4.2  Size: Right 3.29, Left 4.42  MIN: Right 2.24, Left 2.99  CH: Right 32%, Left 32%  CV: 1.80 mm/s, Left 1.82 mm/s  MCV: 2.67 mm/s, Left 3.15 mm/s  LAT: Right 0.23 sec, Left 0.23 sec  DV: Right 0.88 mm/s, Left ----  Diff: NPi R>L 0.5  Diff: Size L>R 1.13  Diff: MIN L>R 0.75

## 2020-11-17 NOTE — PROGRESS NOTES
[facility] Hospital Medicine  Progress Note    Patient Name: Jhonny Almazan  MRN: [id number]  Patient Class: IP- Inpatient   Admission Date: 11/16/2020  Length of Stay: 0 days  Attending Physician: Lino Caldera MD  Primary Care Provider: Joey Whitehead MD        Subjective:     Principal Problem:Stroke due to embolism of right middle cerebral artery        HPI:  Jhonny Almazan is a 64-year-old  male with past medical history significant for hypertension, diabetes, neuropathy and depression presenting today for dizziness.  Patient states for the past 3 days he has been experiencing intermittent dizziness that would worsen with ambulation.  He states today his dizziness became constant and was associated with nausea and 1 episode of vomiting.  He also is experiencing a right temple headache which he describes as dull quality, 3/10 intensity, no aggravating or alleviating factors.  He denies blurred vision or focal neuro deficits at home.  While in the ER he experienced an episode of left hand weakness with associated numbness and tingling.  ED workup revealed CT head was negative but CTA head and neck performed and revealed absent filling of the bilateral vertebral arteries and proximal basal artery with findings of bilateral PCA and mid/distal basal artery patent and no acute intracranial hemorrhage.  Patient is on longer experiencing any focal neuro deficits and is left hand weakness and numbness have resolved.  Patient will be admitted to hospital medicine services for further workup and management.  He was given aspirin in the ED and his nausea is controlled after receiving IV Zofran.  He also received meclizine and states that his dizziness is improving.      Overview/Hospital Course:  -11/17 Patient remains in ICU for close neurologic monitoring. MRI brain with right cerebral infarctions.    Interval History: This morning the patient complained of new speech  difficulty; CT of the head showed no new areas of cerebral ischemia. Case Management consulted for rehab evaluation.    Review of Systems   Constitutional: Negative for chills and fever.   HENT: Negative for congestion and sore throat.    Eyes: Negative for visual disturbance.   Respiratory: Negative for cough, shortness of breath and wheezing.    Cardiovascular: Negative for chest pain and palpitations.   Gastrointestinal: Negative for abdominal pain, blood in stool, diarrhea, nausea and vomiting.   Endocrine: Negative.    Genitourinary: Negative for difficulty urinating and hematuria.   Musculoskeletal: Negative.  Negative for back pain and neck pain.   Skin: Negative.  Negative for color change and pallor.   Allergic/Immunologic: Negative.    Neurological: Positive for dizziness, speech difficulty, weakness, light-headedness, numbness and headaches.   Hematological: Negative.    Psychiatric/Behavioral: Negative.    All other systems reviewed and are negative.    Objective:     Vital Signs (Most Recent):  Temp: 97.9 °F (36.6 °C) (11/17/20 0330)  Pulse: 65 (11/17/20 0845)  Resp: 20 (11/17/20 0845)  BP: 132/76 (11/17/20 0845)  SpO2: 95 % (11/17/20 0845) Vital Signs (24h Range):  Temp:  [97.8 °F (36.6 °C)-98 °F (36.7 °C)] 97.9 °F (36.6 °C)  Pulse:  [] 65  Resp:  [11-31] 20  SpO2:  [90 %-96 %] 95 %  BP: ()/(54-99) 132/76     Weight: 110.8 kg (244 lb 4.3 oz)  Body mass index is 28.97 kg/m².    Intake/Output Summary (Last 24 hours) at 11/17/2020 1228  Last data filed at 11/17/2020 0700  Gross per 24 hour   Intake 250 ml   Output 1300 ml   Net -1050 ml      Physical Exam  Vitals signs reviewed.   Constitutional:       Appearance: Normal appearance. He is well-developed.   HENT:      Head: Normocephalic and atraumatic.      Right Ear: External ear normal.      Left Ear: External ear normal.      Nose: Nose normal.      Mouth/Throat:      Mouth: Mucous membranes are moist.   Eyes:      Pupils: Pupils are equal,  round, and reactive to light.   Neck:      Musculoskeletal: Normal range of motion and neck supple.   Cardiovascular:      Rate and Rhythm: Normal rate and regular rhythm.      Pulses: Normal pulses.      Heart sounds: Normal heart sounds.   Pulmonary:      Effort: Pulmonary effort is normal.   Abdominal:      General: Abdomen is flat. Bowel sounds are normal. There is no distension.      Palpations: Abdomen is soft.   Musculoskeletal: Normal range of motion.      Right lower leg: No edema.      Left lower leg: No edema.   Skin:     General: Skin is warm and dry.   Neurological:      Mental Status: He is alert and oriented to person, place, and time.      Cranial Nerves: No cranial nerve deficit or facial asymmetry.      Sensory: Sensory deficit present.      Motor: Weakness present.   Psychiatric:         Mood and Affect: Mood normal.         Significant Labs: All pertinent labs within the past 24 hours have been reviewed.    Significant Imaging: I have reviewed all pertinent imaging results/findings within the past 24 hours.      Assessment/Plan:      * Stroke due to embolism of right middle cerebral artery  CT head negative. CTA head and neck reviewed - absent feeling bilateral vertebral arteries and proximal basal artery with patent bilateral PCA and mid/distal basal artery. MRI brain shows right sided cerbral infarcts  -Consult Neurology, appreciate recommendations  -Aspirin administered in the ED, currently scheduled  -Neuro checks every hour  -PT/OT/ST evaluation; case management consulted for rehabilitation evaluation  -Fall precautions, aspiration precautions  -Echocardiogram ordered      Diabetes mellitus type 2, uncontrolled  Patient's FSGs are controlled on current hypoglycemics.   Last A1c reviewed-   Lab Results   Component Value Date    HGBA1C 7.0 (H) 11/16/2020     Most recent fingerstick glucose reviewed-   Recent Labs   Lab 11/16/20  1343 11/16/20  2139 11/17/20  0453   POCTGLUCOSE 196* 168* 193*      Current correctional scale  Low  Maintain anti-hyperglycemic dose as follows-   Antihyperglycemics (From admission, onward)    Low dose SS insulin        Hold Oral hypoglycemics while patient is in the hospital.        Hypertension associated with diabetes  Chronic, controlled.  Latest blood pressure and vitals reviewed-   Temp:  [97.8 °F (36.6 °C)-98 °F (36.7 °C)]   Pulse:  []   Resp:  [10-31]   BP: ()/(54-99)   SpO2:  [90 %-99 %] .   Home meds for hypertension were reviewed and noted below. Hospital anti-hypertensive changes were made as shown below.  Hypertension Medications             hydrALAZINE (APRESOLINE) 25 MG tablet Take 1 tablet (25 mg total) by mouth every 12 (twelve) hours.    lisinopril-hydrochlorothiazide (PRINZIDE,ZESTORETIC) 20-12.5 mg per tablet TAKE 1 TABLET BY MOUTH TWICE A DAY        Will utilize p.r.n. blood pressure medication only if patient's blood pressure greater than  180/110 and he develops symptoms such as worsening chest pain or shortness of breath.        Hyperlipidemia associated with type 2 diabetes mellitus  Chronic.  -Resume statin and ASA        Dizziness  Likely Secondary to acute CVA.  -Continue meclizine p.r.n.  -Neurology consult      Hypertension        Nicotine dependence  Dangers of cigarette smoking were reviewed with patient in detail for 10 minutes and patient was encouraged to quit. Nicotine replacement options were discussed.   -Continue Wellbutrin        VTE Risk Mitigation (From admission, onward)         Ordered     enoxaparin injection 40 mg  Every 24 hours      11/16/20 1456     IP VTE LOW RISK PATIENT  Once      11/16/20 0328     Place sequential compression device  Until discontinued      11/16/20 0328                Discharge Planning   YUDITH: 11/20/2020    Code Status: Full Code   Is the patient medically ready for discharge?:     Reason for patient still in hospital (select all that apply): Patient trending condition, Consult recommendations,  PT / OT recommendations and Pending disposition  Discharge Plan A: Rehab            Critical care time spent on the evaluation and treatment of severe organ dysfunction, review of pertinent labs and imaging studies, discussions with consulting providers and discussions with patient/family: 37 minutes.      Lino Caldera MD  Department of Hospital Medicine   [facility]

## 2020-11-17 NOTE — ASSESSMENT & PLAN NOTE
Stroke due to right MCA embolus  Antithrombotics for secondary stroke prevention: Antiplatelets: Aspirin: 81 mg daily  Clopidogrel: 75 mg daily    Statins for secondary stroke prevention and hyperlipidemia, if present:   Statins: Atorvastatin- 80 mg daily    Aggressive risk factor modification: HTN, DM, HLD     Rehab efforts: The patient has been evaluated by a stroke team provider and the therapy needs have been fully considered based off the presenting complaints and exam findings. The following therapy evaluations are needed: PT evaluate and treat, OT evaluate and treat, SLP evaluate and treat    Diagnostics ordered/pending: CTA Head to assess vasculature , CTA Neck/Arch to assess vasculature, HgbA1C to assess blood glucose levels, Lipid Profile to assess cholesterol levels, MRI head without contrast to assess brain parenchyma, TTE to assess cardiac function/status     VTE prophylaxis: None: Reason for No Pharmacological VTE Prophylaxis: Ambulating with or without assistance    BP parameters: Infarct: No intervention, SBP <220

## 2020-11-17 NOTE — ASSESSMENT & PLAN NOTE
Chronic, controlled.  Latest blood pressure and vitals reviewed-   Temp:  [97.8 °F (36.6 °C)-98 °F (36.7 °C)]   Pulse:  []   Resp:  [10-31]   BP: ()/(54-99)   SpO2:  [90 %-99 %] .   Home meds for hypertension were reviewed and noted below. Hospital anti-hypertensive changes were made as shown below.  Hypertension Medications             hydrALAZINE (APRESOLINE) 25 MG tablet Take 1 tablet (25 mg total) by mouth every 12 (twelve) hours.    lisinopril-hydrochlorothiazide (PRINZIDE,ZESTORETIC) 20-12.5 mg per tablet TAKE 1 TABLET BY MOUTH TWICE A DAY        Will utilize p.r.n. blood pressure medication only if patient's blood pressure greater than  180/110 and he develops symptoms such as worsening chest pain or shortness of breath.

## 2020-11-17 NOTE — ASSESSMENT & PLAN NOTE
CT head negative. CTA head and neck reviewed - absent feeling bilateral vertebral arteries and proximal basal artery with patent bilateral PCA and mid/distal basal artery. MRI brain shows right sided cerbral infarcts  -Consult Neurology, appreciate recommendations  -Aspirin administered in the ED, currently scheduled  -Neuro checks every hour  -PT/OT/ST evaluation; case management consulted for rehabilitation evaluation  -Fall precautions, aspiration precautions  -Echocardiogram ordered

## 2020-11-17 NOTE — HOSPITAL COURSE
ED workup revealed CT head was negative but CTA head and neck performed and revealed absent filling of the bilateral vertebral arteries and proximal basal artery with findings of bilateral PCA and mid/distal basal artery patent and no acute intracranial hemorrhage.  Patient on longer experienced any focal neuro deficits and is left hand weakness and numbness have resolved.  Patient will be admitted to hospital medicine services for further workup and management.  He was given aspirin in the ED and his nausea is controlled after receiving IV Zofran.  He also received meclizine and states that his dizziness is improving.   Neurology was consulted.  Continued to have dizziness, lightheadedness, bL blurry vision, BL UE weakness, and numbness in his hands and feet that started last night.  CTA head and neck showed clots in the basilar and /BL vertebral arteries with potential for removal,was recommended that pt is transferred to [facility] to see endovascular surgeon vascular surgeon was consulted recommended    The lack of significant symptoms also suggest a chronic occlusion. No interventions are recommended at this time as risk outweigh the benefits. Horizontal nystagmus noted one exam and patient reports double vision. He states he feels as if his speech is slurred, continues to experience dizziness and reports decreased sensation to right face and LLE.  Also had symptoms of speech changes, facial numbness and difficulty swallowing. Repeat CT was ordered and did not show new strokes or acute bleed  Patient TTE with bubble completed on 11/19 bubble study negative no  Pfo,  No  clots in left atrium or appendage significant abnormal cholesterol plaque in  descending and aorta and arch. Patient inpatient work up complete.  Urology Recommended to -continue dual antiplatelet therapy with aspirin 81mg daily and plavix 75mg daily for 21 days, then stop plavix and continue monotherapy with aspirin 81mg daily. Patient Plavix was  held due to PEG placementrelated to feeding difficulty patient failed his swallow study.  PT OT and speech continued to work with the patient.  Patient got a PEG tube on 11/23 and tube feeding was started patient has not received residuals.  Tube feeding was resumed at a lower rate, patient was medically cleared for discharge to rehab

## 2020-11-17 NOTE — PLAN OF CARE
Alert and orient x4. Complains of discomfort to patient right upper quadrant abdominal area only. Reports numbness, tingling, and loss of sensation in bilateral hands and feet. Redness to patient eyes. Reported to LAINE Anne NP and eyedrops ordered PRN. No drift noted to bilateral extremities. Urinal to urinate. Dark av urine noted. Urinated 500 cc this shift. SR/SB to monitor. SCDs placed to BLE. Room air. Blood glucose monitored this shift with no coverage needed per PRN sliding scale. Complains of nausea intermittently. Complains of continued dizziness. Medication given for dizziness with moderate effect. Afebrile this shift. Will continue to monitor. Safety intact.

## 2020-11-17 NOTE — CONSULTS
[facility]  Vascular Neurology  Comprehensive Stroke Center  Tele-Consultation Note      Consults    Consulting Provider: MEGAN MURRAY  Current Providers  No providers found    Patient Location:  [facility] EMERGENCY DEPARTMENT Emergency Department  Spoke hospital nurse at bedside with patient assisting consultant.     Patient information was obtained from patient.         Assessment/Plan:     STROKE DOCUMENTATION     Acute Stroke Times:   Acute Stroke Times   Last Known Normal Date: 11/15/20  Last Known Normal Time: 0110  Symptom Onset Date: 11/15/20  Symptom Onset Time: 0110  Stroke Team Called Date: 11/16/20  Stroke Team Called Time: 0117  Stroke Team Arrival Date: 11/16/20  Stroke Team Arrival Time: 0125  CT Interpretation Time: 0125    NIH Scale:        Modified Lumpkin Score: 0  [place] Coma Scale:    ABCD2 Score:    [id number] Score:   HAS -BLED Score:   ICH Score:   Hunt & Rendon Classification:       Diagnoses:   * Stroke due to embolism of right middle cerebral artery  Stroke due to right MCA embolus  Antithrombotics for secondary stroke prevention: Antiplatelets: Aspirin: 81 mg daily  Clopidogrel: 75 mg daily    Statins for secondary stroke prevention and hyperlipidemia, if present:   Statins: Atorvastatin- 80 mg daily    Aggressive risk factor modification: HTN, DM, HLD     Rehab efforts: The patient has been evaluated by a stroke team provider and the therapy needs have been fully considered based off the presenting complaints and exam findings. The following therapy evaluations are needed: PT evaluate and treat, OT evaluate and treat, SLP evaluate and treat    Diagnostics ordered/pending: CTA Head to assess vasculature , CTA Neck/Arch to assess vasculature, HgbA1C to assess blood glucose levels, Lipid Profile to assess cholesterol levels, MRI head without contrast to assess brain parenchyma, TTE to assess cardiac function/status     VTE prophylaxis: None: Reason for  "No Pharmacological VTE Prophylaxis: Ambulating with or without assistance    BP parameters: Infarct: No intervention, SBP <220            Blood pressure (!) 98/54, pulse 60, temperature 97.9 °F (36.6 °C), temperature source Oral, resp. rate 18, height 6' 5" (1.956 m), weight 110.8 kg (244 lb 4.3 oz), SpO2 (!) 92 %.  Alteplase Eligible?: No  Alteplase Recommendation: Alteplase not recommended due to Outside of treatment window   Possible Interventional Revascularization Candidate? Yes    Disposition Recommendation: admit to inpatient  do not transfer    Subjective:     History of Present Illness:  Jhonny Almazan is a 64-year-old  male with past medical history significant for hypertension, diabetes, neuropathy and depression presenting today for dizziness.  Patient states for the past 3 days he has been experiencing intermittent dizziness that would worsen with ambulation.  He states today his dizziness became constant and was associated with nausea and 1 episode of vomiting.  He also is experiencing a right temple headache which he describes as dull quality, 3/10 intensity, no aggravating or alleviating factors.  He denies blurred vision or focal neuro deficits at home.  While in the ER he experienced an episode of left hand weakness with associated numbness and tingling.      Woke up with symptoms?: no    Recent bleeding noted: no  Does the patient take any Blood Thinners? no  Medications: No relevant medications      Past Medical History: hypertension, diabetes and hyperlipidemia    Past Surgical History: no relevant surgical history    Family History: no relevant history    Social History: smoker (active)    Allergies: No Known Allergies     Review of Systems   Constitutional: Negative for chills and fever.   HENT: Negative for congestion and sore throat.    Eyes: Negative for visual disturbance.   Respiratory: Negative for shortness of breath.    Cardiovascular: Negative for chest pain and " "palpitations.   Gastrointestinal: Negative for blood in stool, diarrhea, nausea and vomiting.   Genitourinary: Negative for difficulty urinating and hematuria.   Musculoskeletal: Negative for back pain and neck pain.   Neurological: Positive for weakness, numbness and headaches. Negative for dizziness and speech difficulty.     Objective:   Vitals: Blood pressure (!) 98/54, pulse 60, temperature 97.9 °F (36.6 °C), temperature source Oral, resp. rate 18, height 6' 5" (1.956 m), weight 110.8 kg (244 lb 4.3 oz), SpO2 (!) 92 %.     CT READ: Yes  No hemmorhage. No mass effect. No early infarct signs.     Physical Exam  Vitals signs reviewed.   Constitutional:       Appearance: Normal appearance. He is well-developed.   HENT:      Head: Normocephalic and atraumatic.      Nose: Nose normal.   Eyes:      Pupils: Pupils are equal, round, and reactive to light.   Cardiovascular:      Rate and Rhythm: Normal rate and regular rhythm.   Pulmonary:      Effort: Pulmonary effort is normal.   Neurological:      Mental Status: He is alert and oriented to person, place, and time.      Cranial Nerves: No cranial nerve deficit or facial asymmetry.      Sensory: Sensory deficit present.      Motor: Weakness present.   Psychiatric:         Mood and Affect: Mood normal.               Recommended the emergency room physician to have a brief discussion with the patient and/or family if available regarding the risks and benefits of treatment, and to briefly document the occurrence of that discussion in his clinical encounter note.     The attending portion of this evaluation, treatment, and documentation was performed per Neetu Veras MD via audiovisual.    Billing code:  (non-intervention mild to moderate stroke, TIA, some mimics)    · This patient has a critical neurological condition/illness, with some potential for high morbidity and mortality.  · There is a moderate probability for acute neurological change leading to " clinical and possibly life-threatening deterioration requiring highest level of physician preparedness for urgent intervention.  · Care was coordinated with other physicians involved in the patient's care.  · Radiologic studies and laboratory data were reviewed and interpreted, and plan of care was re-assessed based on the results.  · Diagnosis, treatment options and prognosis may have been discussed with the patient and/or family members or caregiver.      In your opinion, this was a: Tier 2 Van Negative    Consult End Time: 142     Neetu Veras MD  [facility]  Vascular Neurology   [facility]

## 2020-11-17 NOTE — ASSESSMENT & PLAN NOTE
Patient's FSGs are controlled on current hypoglycemics.   Last A1c reviewed-   Lab Results   Component Value Date    HGBA1C 7.0 (H) 11/16/2020     Most recent fingerstick glucose reviewed-   Recent Labs   Lab 11/16/20  1343 11/16/20  2139 11/17/20  0453   POCTGLUCOSE 196* 168* 193*     Current correctional scale  Low  Maintain anti-hyperglycemic dose as follows-   Antihyperglycemics (From admission, onward)    Low dose SS insulin        Hold Oral hypoglycemics while patient is in the hospital.

## 2020-11-17 NOTE — PLAN OF CARE
11/16/20 1708   RUBIO Message   Medicare Outpatient and Observation Notification regarding financial responsibility Explained to patient/caregiver   Date RUBIO was signed 11/16/20   Time RUBIO was signed 1708

## 2020-11-17 NOTE — PROGRESS NOTES
Patient arrived from MRI on bed, transferred to ICU bed with moderate assistance. Patient placed on cardiac monitor. Call light given to patient and explained the buttons to him. Urinal in reach, cell phone in reach. Emesis bag in reach.

## 2020-11-17 NOTE — PLAN OF CARE
"Cm completed the assessment with pt at bedside. Pt resting quietly watching tv.  Pt verbalized he lives alone, he does not stay with wife.  Pt uses a cane when needed.  PCP is Dr. Whitehead.  Insurance verified as Medicare.  Pt is a diabetic, denies dialysis and coumadin.  Pharmacy of choice is [facility].  Disposition:  Cm asked pt if he has someone to assist him at home on discharge. Pt verbalized, he can get his son-in-law to assist him. He's not sure if he will have enough strength to care for self on discharge. Pt verbalized,  "I feel like I can barely walk." Cm informed pt that PT/OT will evaluate him for Rehab or  PT.  Cm will continue to follow-up with pt  No other needs verbalized at this time.       11/16/20 1075   Discharge Assessment   Assessment Type Discharge Planning Assessment   Confirmed/corrected address and phone number on facesheet? Yes   Assessment information obtained from? Patient   Expected Length of Stay (days) 2   Communicated expected length of stay with patient/caregiver yes   Prior to hospitilization cognitive status: Alert/Oriented   Prior to hospitalization functional status: Independent;Assistive Equipment   Current cognitive status: Alert/Oriented   Current Functional Status: Assistive Equipment;Needs Assistance   Facility Arrived From: home   Lives With alone   Able to Return to Prior Arrangements yes   Is patient able to care for self after discharge? Yes   Who are your caregiver(s) and their phone number(s)? valeria [phone]   Patient's perception of discharge disposition home or selfcare   Readmission Within the Last 30 Days no previous admission in last 30 days   Patient currently being followed by outpatient case management? No   Patient currently receives any other outside agency services? No   Equipment Currently Used at Home cane, straight;glucometer   Do you have any problems affording any of your prescribed medications? No   Is the patient taking medications as " prescribed? yes   Does the patient have transportation home? Yes   Transportation Anticipated family or friend will provide   Dialysis Name and Scheduled days na   Does the patient receive services at the Coumadin Clinic? No   Discharge Plan A Rehab   Discharge Plan B Home with family;Home Health   DME Needed Upon Discharge  walker, rolling   Patient/Family in Agreement with Plan yes

## 2020-11-17 NOTE — PROGRESS NOTES
Patient complaining of new symptoms of numb lips, difficulty swallowing and feels like everything he hears is mumbled. No deficits/weakness with extremities, face is symmetric. Notified neuro NP Chantel Bautista. Orders received for stat head CT.

## 2020-11-17 NOTE — PT/OT/SLP PROGRESS
Occupational Therapy      Patient Name:  Jhonny Almazan   MRN:  [id number]    Patient not seen today secondary to MD hold (Comment)(Nurse, Homa stated that patient is not appropriate for therapy all day and was at CT due to having some new neurological symptoms.). Will follow-up 11/18/2020.    ROSE Jesus  11/17/2020

## 2020-11-17 NOTE — RESPIRATORY THERAPY
11/16/20 1903   Patient Assessment/Suction   Level of Consciousness (AVPU) alert   Respiratory Effort Unlabored   PRE-TX-O2   O2 Device (Oxygen Therapy) room air   SpO2 (!) 94 %   Pulse Oximetry Type Continuous   $ Pulse Oximetry - Multiple Charge Pulse Oximetry - Multiple   Pulse 100   Resp (!) 27   BP (!) 153/90

## 2020-11-17 NOTE — PLAN OF CARE
Patient signed patient choice disclosure choosing [facility].  HO sent patient information to [facility] via [facility] for review.       11/17/20 8163   Post-Acute Status   Post-Acute Authorization Placement   Post-Acute Placement Status Referrals Sent   Patient choice form signed by patient/caregiver List with quality metrics by geographic area provided

## 2020-11-17 NOTE — PLAN OF CARE
Problem: Physical Therapy Goal  Goal: Physical Therapy Goal  Description: Goals to be met by: 2020    Patient will increase functional independence with mobility by performin. Supine to sit with Stand-by Assistance  2. Sit to supine with Stand-by Assistance  3. Sit to stand transfer with Contact Guard Assistance  4. Bed to chair transfer with Minimal Assistance using Rolling Walker  5. Gait  x 50 feet with Minimal Assistance using Rolling Walker.     Outcome: Ongoing, Progressing

## 2020-11-17 NOTE — PROGRESS NOTES
Patient complaining of bladder fullness and unable to empty bladder. Patient voiding  ml q hour. Bladder scanned patient showing almost 700 ml. Notified Dr. Caldera. Orders received to place olguin catheter. 550 ml of yellow urine immediately drained after placement.

## 2020-11-18 LAB
ALBUMIN SERPL BCP-MCNC: 3.9 G/DL (ref 3.5–5.2)
ALP SERPL-CCNC: 71 U/L (ref 55–135)
ALT SERPL W/O P-5'-P-CCNC: 27 U/L (ref 10–44)
ANION GAP SERPL CALC-SCNC: 11 MMOL/L (ref 8–16)
AORTIC ROOT ANNULUS: 3.15 CM
AORTIC VALVE CUSP SEPERATION: 1.99 CM
AST SERPL-CCNC: 16 U/L (ref 10–40)
AV INDEX (PROSTH): 0.84
AV MEAN GRADIENT: 5 MMHG
AV PEAK GRADIENT: 11 MMHG
AV VALVE AREA: 4.19 CM2
AV VELOCITY RATIO: 0.78
BASOPHILS # BLD AUTO: 0.12 K/UL (ref 0–0.2)
BASOPHILS NFR BLD: 1 % (ref 0–1.9)
BILIRUB SERPL-MCNC: 0.7 MG/DL (ref 0.1–1)
BSA FOR ECHO PROCEDURE: 2.45 M2
BUN SERPL-MCNC: 14 MG/DL (ref 8–23)
CALCIUM SERPL-MCNC: 10.1 MG/DL (ref 8.7–10.5)
CHLORIDE SERPL-SCNC: 103 MMOL/L (ref 95–110)
CO2 SERPL-SCNC: 24 MMOL/L (ref 23–29)
CREAT SERPL-MCNC: 0.8 MG/DL (ref 0.5–1.4)
CV ECHO LV RWT: 0.39 CM
DIFFERENTIAL METHOD: NORMAL
DOP CALC AO PEAK VEL: 1.63 M/S
DOP CALC AO VTI: 30.13 CM
DOP CALC LVOT AREA: 5 CM2
DOP CALC LVOT DIAMETER: 2.52 CM
DOP CALC LVOT PEAK VEL: 1.27 M/S
DOP CALC LVOT STROKE VOLUME: 126.32 CM3
DOP CALCLVOT PEAK VEL VTI: 25.34 CM
E WAVE DECELERATION TIME: 237.62 MSEC
E/A RATIO: 0.76
E/E' RATIO: 11.5 M/S
ECHO LV POSTERIOR WALL: 1.14 CM (ref 0.6–1.1)
EOSINOPHIL # BLD AUTO: 0.1 K/UL (ref 0–0.5)
EOSINOPHIL NFR BLD: 1 % (ref 0–8)
ERYTHROCYTE [DISTWIDTH] IN BLOOD BY AUTOMATED COUNT: 13 % (ref 11.5–14.5)
EST. GFR  (AFRICAN AMERICAN): >60 ML/MIN/1.73 M^2
EST. GFR  (NON AFRICAN AMERICAN): >60 ML/MIN/1.73 M^2
FRACTIONAL SHORTENING: 28 % (ref 28–44)
GLUCOSE SERPL-MCNC: 191 MG/DL (ref 70–110)
HCT VFR BLD AUTO: 49.2 % (ref 40–54)
HGB BLD-MCNC: 16.2 G/DL (ref 14–18)
IMM GRANULOCYTES # BLD AUTO: 0.04 K/UL (ref 0–0.04)
IMM GRANULOCYTES NFR BLD AUTO: 0.3 % (ref 0–0.5)
INTERVENTRICULAR SEPTUM: 1.06 CM (ref 0.6–1.1)
IVRT: 79.92 MSEC
LA MAJOR: 6.47 CM
LA MINOR: 5.42 CM
LA WIDTH: 4.46 CM
LEFT ATRIUM SIZE: 4.66 CM
LEFT ATRIUM VOLUME INDEX: 42.8 ML/M2
LEFT ATRIUM VOLUME: 104.21 CM3
LEFT INTERNAL DIMENSION IN SYSTOLE: 4.16 CM (ref 2.1–4)
LEFT VENTRICLE DIASTOLIC VOLUME INDEX: 68.41 ML/M2
LEFT VENTRICLE DIASTOLIC VOLUME: 166.57 ML
LEFT VENTRICLE MASS INDEX: 109 G/M2
LEFT VENTRICLE SYSTOLIC VOLUME INDEX: 31.6 ML/M2
LEFT VENTRICLE SYSTOLIC VOLUME: 76.89 ML
LEFT VENTRICULAR INTERNAL DIMENSION IN DIASTOLE: 5.8 CM (ref 3.5–6)
LEFT VENTRICULAR MASS: 264.25 G
LV LATERAL E/E' RATIO: 9.86 M/S
LV SEPTAL E/E' RATIO: 13.8 M/S
LYMPHOCYTES # BLD AUTO: 3.5 K/UL (ref 1–4.8)
LYMPHOCYTES NFR BLD: 29.5 % (ref 18–48)
MAGNESIUM SERPL-MCNC: 2 MG/DL (ref 1.6–2.6)
MCH RBC QN AUTO: 30.7 PG (ref 27–31)
MCHC RBC AUTO-ENTMCNC: 32.9 G/DL (ref 32–36)
MCV RBC AUTO: 93 FL (ref 82–98)
MONOCYTES # BLD AUTO: 0.9 K/UL (ref 0.3–1)
MONOCYTES NFR BLD: 7.8 % (ref 4–15)
MV A" WAVE DURATION": 126 MSEC
MV PEAK A VEL: 0.91 M/S
MV PEAK E VEL: 0.69 M/S
MV STENOSIS PRESSURE HALF TIME: 65.78 MS
MV VALVE AREA P 1/2 METHOD: 3.34 CM2
NEUTROPHILS # BLD AUTO: 7.2 K/UL (ref 1.8–7.7)
NEUTROPHILS NFR BLD: 60.4 % (ref 38–73)
NRBC BLD-RTO: 0 /100 WBC
PHOSPHATE SERPL-MCNC: 3.2 MG/DL (ref 2.7–4.5)
PLATELET # BLD AUTO: 226 K/UL (ref 150–350)
PMV BLD AUTO: 9.2 FL (ref 9.2–12.9)
POCT GLUCOSE: 143 MG/DL (ref 70–110)
POCT GLUCOSE: 157 MG/DL (ref 70–110)
POCT GLUCOSE: 194 MG/DL (ref 70–110)
POCT GLUCOSE: 204 MG/DL (ref 70–110)
POTASSIUM SERPL-SCNC: 3.8 MMOL/L (ref 3.5–5.1)
PROT SERPL-MCNC: 7.1 G/DL (ref 6–8.4)
PULM VEIN A" WAVE DURATION": 94 MSEC
PV PEAK VELOCITY: 0.95 CM/S
RA MAJOR: 5.05 CM
RA WIDTH: 3.25 CM
RBC # BLD AUTO: 5.28 M/UL (ref 4.6–6.2)
RIGHT VENTRICULAR END-DIASTOLIC DIMENSION: 2.61 CM
SODIUM SERPL-SCNC: 138 MMOL/L (ref 136–145)
TDI LATERAL: 0.07 M/S
TDI SEPTAL: 0.05 M/S
TDI: 0.06 M/S
TRICUSPID ANNULAR PLANE SYSTOLIC EXCURSION: 2.26 CM
WBC # BLD AUTO: 11.98 K/UL (ref 3.9–12.7)

## 2020-11-18 PROCEDURE — 36415 COLL VENOUS BLD VENIPUNCTURE: CPT

## 2020-11-18 PROCEDURE — 25000003 PHARM REV CODE 250: Performed by: INTERNAL MEDICINE

## 2020-11-18 PROCEDURE — 84100 ASSAY OF PHOSPHORUS: CPT

## 2020-11-18 PROCEDURE — 25000003 PHARM REV CODE 250: Performed by: STUDENT IN AN ORGANIZED HEALTH CARE EDUCATION/TRAINING PROGRAM

## 2020-11-18 PROCEDURE — 85025 COMPLETE CBC W/AUTO DIFF WBC: CPT

## 2020-11-18 PROCEDURE — 63600175 PHARM REV CODE 636 W HCPCS: Performed by: STUDENT IN AN ORGANIZED HEALTH CARE EDUCATION/TRAINING PROGRAM

## 2020-11-18 PROCEDURE — 97535 SELF CARE MNGMENT TRAINING: CPT

## 2020-11-18 PROCEDURE — 12000002 HC ACUTE/MED SURGE SEMI-PRIVATE ROOM

## 2020-11-18 PROCEDURE — 80053 COMPREHEN METABOLIC PANEL: CPT

## 2020-11-18 PROCEDURE — 63600175 PHARM REV CODE 636 W HCPCS: Performed by: INTERNAL MEDICINE

## 2020-11-18 PROCEDURE — 83735 ASSAY OF MAGNESIUM: CPT

## 2020-11-18 PROCEDURE — 97530 THERAPEUTIC ACTIVITIES: CPT

## 2020-11-18 PROCEDURE — 94761 N-INVAS EAR/PLS OXIMETRY MLT: CPT

## 2020-11-18 PROCEDURE — 97165 OT EVAL LOW COMPLEX 30 MIN: CPT

## 2020-11-18 PROCEDURE — 92611 MOTION FLUOROSCOPY/SWALLOW: CPT

## 2020-11-18 RX ORDER — TAMSULOSIN HYDROCHLORIDE 0.4 MG/1
0.4 CAPSULE ORAL DAILY
Status: DISCONTINUED | OUTPATIENT
Start: 2020-11-18 | End: 2020-11-24 | Stop reason: HOSPADM

## 2020-11-18 RX ADMIN — ASPIRIN 81 MG: 81 TABLET, COATED ORAL at 08:11

## 2020-11-18 RX ADMIN — ENOXAPARIN SODIUM 40 MG: 40 INJECTION SUBCUTANEOUS at 04:11

## 2020-11-18 RX ADMIN — ATORVASTATIN CALCIUM 40 MG: 40 TABLET, FILM COATED ORAL at 08:11

## 2020-11-18 RX ADMIN — GABAPENTIN 600 MG: 300 CAPSULE ORAL at 09:11

## 2020-11-18 RX ADMIN — BUPROPION HYDROCHLORIDE 300 MG: 150 TABLET, FILM COATED, EXTENDED RELEASE ORAL at 08:11

## 2020-11-18 RX ADMIN — INSULIN ASPART 2 UNITS: 100 INJECTION, SOLUTION INTRAVENOUS; SUBCUTANEOUS at 01:11

## 2020-11-18 RX ADMIN — CLOPIDOGREL 75 MG: 75 TABLET, FILM COATED ORAL at 08:11

## 2020-11-18 RX ADMIN — TAMSULOSIN HYDROCHLORIDE 0.4 MG: 0.4 CAPSULE ORAL at 12:11

## 2020-11-18 RX ADMIN — MUPIROCIN: 20 OINTMENT TOPICAL at 08:11

## 2020-11-18 RX ADMIN — MUPIROCIN: 20 OINTMENT TOPICAL at 09:11

## 2020-11-18 NOTE — PLAN OF CARE
[facility]---11/17/2020 4:28:12 PM Note: will review and let you know  [email or web address]       11/18/20 0930   Post-Acute Status   Post-Acute Authorization Placement   Post-Acute Placement Status Pending Post-Acute Clinical Review

## 2020-11-18 NOTE — SUBJECTIVE & OBJECTIVE
Interval History:    11/18/2020 - Patient is in intensive care unit with right cerebral cerebrovascular accident.  Patient is scheduled for barium swallow study today.  Patient reports less dizziness without any associated nausea and vomiting.  Right facial droop in apparent.  Patient denies any speech trouble today.  Patient does report seeing double at times.  Patient denies any chest pain or shortness of breath.  Inpatient rehab placement efforts are underway.     Review of Systems   Constitutional: Negative for chills and fever.   HENT: Negative for congestion and sore throat.    Eyes: Negative for visual disturbance.   Respiratory: Negative for cough, shortness of breath and wheezing.    Cardiovascular: Negative for chest pain and palpitations.   Gastrointestinal: Negative for abdominal pain, blood in stool, diarrhea, nausea and vomiting.   Endocrine: Negative.    Genitourinary: Negative for difficulty urinating and hematuria.   Musculoskeletal: Negative.  Negative for back pain and neck pain.   Skin: Negative.  Negative for color change and pallor.   Allergic/Immunologic: Negative.    Neurological: Positive for dizziness, weakness, light-headedness, numbness and headaches. Negative for speech difficulty.   Hematological: Negative.    Psychiatric/Behavioral: Negative.    All other systems reviewed and are negative.    Objective:     Vital Signs (Most Recent):  Temp: 98.5 °F (36.9 °C) (11/18/20 0811)  Pulse: 60 (11/18/20 1100)  Resp: (!) 21 (11/18/20 1100)  BP: (!) 143/107 (11/18/20 1100)  SpO2: (!) 93 % (11/18/20 1100) Vital Signs (24h Range):  Temp:  [97.5 °F (36.4 °C)-98.5 °F (36.9 °C)] 98.5 °F (36.9 °C)  Pulse:  [57-81] 60  Resp:  [10-33] 21  SpO2:  [90 %-99 %] 93 %  BP: (100-178)/() 143/107     Weight: 116.7 kg (257 lb 4.4 oz)  Body mass index is 30.51 kg/m².    Intake/Output Summary (Last 24 hours) at 11/18/2020 1142  Last data filed at 11/18/2020 1000  Gross per 24 hour   Intake 20 ml   Output 1405  ml   Net -1385 ml      Physical Exam  Vitals signs reviewed.   Constitutional:       Appearance: Normal appearance. He is well-developed.   HENT:      Head: Normocephalic and atraumatic.      Right Ear: External ear normal.      Left Ear: External ear normal.      Nose: Nose normal.      Mouth/Throat:      Mouth: Mucous membranes are moist.   Eyes:      Pupils: Pupils are equal, round, and reactive to light.   Neck:      Musculoskeletal: Normal range of motion and neck supple.   Cardiovascular:      Rate and Rhythm: Normal rate and regular rhythm.      Pulses: Normal pulses.      Heart sounds: Normal heart sounds.   Pulmonary:      Effort: Pulmonary effort is normal.   Abdominal:      General: Abdomen is flat. Bowel sounds are normal. There is no distension.      Palpations: Abdomen is soft.   Musculoskeletal: Normal range of motion.      Right lower leg: No edema.      Left lower leg: No edema.   Skin:     General: Skin is warm and dry.   Neurological:      Mental Status: He is alert and oriented to person, place, and time.      Cranial Nerves: No cranial nerve deficit or facial asymmetry.      Sensory: Sensory deficit present.      Motor: Weakness present.      Comments: Mild right facial drooping.  No speech dysfunction noted.   Psychiatric:         Mood and Affect: Mood normal.         Significant Labs: All pertinent labs within the past 24 hours have been reviewed.    Significant Imaging:   ECHO: Pending.    Imaging Results          CTA Head and Neck (xpd) (Final result)  Result time 11/16/20 06:52:12    Final result by Bruno Mathis MD (11/16/20 06:52:12)                 Impression:      1. The study is abnormal.  There is abrupt cut off of the intracranial vertebral arteries bilaterally with no flow also demonstrated in the proximal basilar artery.  The remainder of the basilar artery is small in caliber with intermittent change in caliber.  There is a patent right-sided posterior communicating artery  and both posterior cerebral arteries are patent.  2. There is a short segment mild-to-moderate nonocclusive stenosis of the right cavernous carotid artery.  3. There is scattered atherosclerosis throughout the neck arterial system with mild stenosis of bilateral internal carotid arteries but without critical stenosis, occlusion, thrombosis or dissection.  4. There is a 2 cm hypodense nodule in the left lobe of the thyroid gland which could be further evaluated on a nonemergent basis with thyroid ultrasound.  Note: Preliminary results were provided by Dr. Josse Portillo ([facility]).  There is no significant discrepancy.      Electronically signed by: Bruno Mathis MD  Date:    11/16/2020  Time:    06:52             Narrative:    EXAMINATION:  CTA HEAD AND NECK (XPD)    CLINICAL HISTORY:  Neuro deficit, acute, stroke suspected;Dizziness, persistent/recurrent, cardiac or vascular cause suspected;    TECHNIQUE:  CT angiogram was performed from the level of the jennifer to the top of the head following the IV administration of 100 mL of Omnipaque 350.   Sagittal and coronal reconstructions and maximum intensity projection reconstructions were performed. Arterial stenosis percentages are based on NASCET measurement criteria.    COMPARISON:  Head CT obtained the same day    FINDINGS:  CTA Neck:    Arch and great vessels:    There is scattered atherosclerosis of the aorta and origin of the great vessels as well as included subclavian arteries but there is no critical stenosis, occlusion, thrombosis or dissection.    Carotid arteries:    Right Carotid artery: There is atherosclerosis but there is no critical stenosis, occlusion, thrombosis or dissection involving the common, internal or external carotid arteries.  There is mild stenosis, 50% of the proximal internal carotid artery.    Left Carotid artery: There is scattered atherosclerosis in the common, internal and external carotid arteries but there is no critical stenosis,  occlusion, thrombosis or dissection.  Stenosis of the proximal internal carotid artery is approximately 30%.    Vertebral arteries:    The right vertebral artery is dominant.  Both vertebral arteries are patent throughout their course in the neck without critical stenosis, occlusion, thrombosis or dissection.    Other:    There is a 2 cm hypodense nodule in the left lobe of the thyroid gland which could be further evaluated on a nonemergent basis with thyroid ultrasound.    CTA Head:    Anterior circulation:    There is mild to moderate short segment stenosis of the cavernous segment of the right internal carotid artery.  Otherwise, the petrous and cavernous segments are patent.  The carotid terminus is normal and there is normal branching into the anterior and middle cerebral arteries without critical stenosis, occlusion, thrombosis or dissection.  There is no large aneurysm.    Posterior circulation:    The posterior circulation is abnormal.  There is abrupt cut off of the V4 segment of the right vertebral artery.  There is either very small branch or also occlusion of the left vertebral artery.  There is no filling at the origin of the basilar artery.  The more distal basilar artery is small in caliber and likely fills via reconstitution from collateral vessels.  There is a patent right posterior communicating artery the right left posterior cerebral arteries are patent without critical stenosis or occlusion.    Dural sinuses, other:    The dural sinuses are patent.                               CT Head Without Contrast (Final result)  Result time 11/16/20 06:37:57    Final result by Bruno Mathis MD (11/16/20 06:37:57)                 Impression:      1.  There is no acute intracranial abnormality.  There is no hemorrhage, mass/mass effect, acute edema or ischemia.  It should be noted that MRI is more sensitive in the detection of subtle or acute nonhemorrhagic ischemic disease.    Note: Preliminary  results were provided by Dr. Morro Shaw ([facility]).  There is no significant discrepancy.      Electronically signed by: Bruno Mathis MD  Date:    11/16/2020  Time:    06:37             Narrative:    EXAMINATION:  CT HEAD WITHOUT CONTRAST    CLINICAL HISTORY:  Neuro deficit, acute, stroke suspected;    TECHNIQUE:  Routine unenhanced axial images were obtained through the head.  Sagittal and coronal reformatted images were created.  The study is reviewed in bone and soft tissue windows.    COMPARISON:  None    FINDINGS:  Intracranial contents: There is no acute intracranial abnormality.  There is mild volume loss and nonspecific white matter change.  Ventricular size is likely related to volume loss.  There is no hemorrhage or mass/mass effect.  There is no acute edema or ischemia.  The gray-white interface is preserved without obvious acute infarction.  There is no dense vessel.  There is no abnormal extra-axial fluid collection.  The basilar cisterns are open.  The cerebellar tonsils are in normal position at the level of the foramen magnum.  There is mild atherosclerosis in the vessels at the base of the brain.  The orbits are grossly normal.    Extracranial contents, calvarium, soft tissues: The calvarium is normal.  The included paranasal sinuses and mastoid air cells are clear.

## 2020-11-18 NOTE — ASSESSMENT & PLAN NOTE
CT head negative. CTA head and neck reviewed - absent feeling bilateral vertebral arteries and proximal basal artery with patent bilateral PCA and mid/distal basal artery. MRI brain shows right sided cerbral infarcts  -follow Neurology recommendations, appreciate recommendations  -continue aspirin and Plavix therapy.  -Neuro checks every hour  -continue PT/OT/ST.  Patient will benefit with inpatient rehab in near future.  -Fall precautions, aspiration precautions  -follow 2D echocardiogram results.

## 2020-11-18 NOTE — PT/OT/SLP EVAL
"Occupational Therapy   Evaluation    Name: Jhonny Almazan  MRN: [id number]  Admitting Diagnosis:  Stroke due to embolism of right middle cerebral artery      Recommendations:     Discharge Recommendations: rehabilitation facility  Discharge Equipment Recommendations:  (TBD at next level of care)  Barriers to discharge:  None    Assessment:     Jhonny Almazan is a 64 y.o. male with a medical diagnosis of Stroke due to embolism of right middle cerebral artery.  He presents with persistent dizziness and diplopia at rest and worsens with movement, limiting participation in evaluation. Patient perform supine <> sit requiring SBA, but was only able to maintain EOB for several seconds at a time before demonstrating R lateral LOB. However, patient was able correct posture without assistance using bed rail for support. Patient would benefit from rehab placement to return back to [facility]. Performance deficits affecting function: weakness, impaired endurance, impaired self care skills, impaired functional mobilty, impaired balance, visual deficits, decreased coordination.      Rehab Prognosis: Good; patient would benefit from acute skilled OT services to address these deficits and reach maximum level of function.       Plan:     Patient to be seen 4 x/week to address the above listed problems via self-care/home management, therapeutic activities, therapeutic exercises  · Plan of Care Expires: 12/02/20  · Plan of Care Reviewed with: patient    Subjective     Chief Complaint: dizziness and diplopia  Patient/Family Comments/goals: "I'm seeing double right now."    Occupational Profile:  Living Environment: Patient lives alone in a [facility] but son lives on patient's property in a trailer.   Previous level of function: Patient was independent with ADLs and ambulatory with a cane.   Equipment Used at Home:  cane, straight  Assistance upon Discharge: Patient will receive assistance from son. Uncertain as to how much " assistance patient's son can provide.     Pain/Comfort:  · Pain Rating 1: 0/10  · Pain Rating Post-Intervention 1: 0/10    Patients cultural, spiritual, Congregational conflicts given the current situation:      Objective:     Communicated with: nurse Lior prior to session.  Patient found HOB elevated with pulse ox (continuous), telemetry, oxygen, olguin catheter, SCD, peripheral IV upon OT entry to room.    General Precautions: Standard, fall(visual disturbances - diplopia)   Orthopedic Precautions:N/A   Braces: N/A     Occupational Performance:    Bed Mobility:    · Patient completed Scooting/Bridging with stand by assistance  · Patient completed Supine to Sit with stand by assistance  · Patient completed Sit to Supine with stand by assistance    Functional Mobility/Transfers:  · Not performed due to safety concerns   · Functional Mobility: Patient able to maintain static sitting balance with some R leaning at times. Patient able to correct posture without assistance.    Activities of Daily Living:  · Toileting: dependence with olguin cath in place    Cognitive/Visual Perceptual:  Cognitive/Psychosocial Skills:     -       Oriented to: x4   -       Follows Commands/attention:Follows multistep  commands  -       Communication: clear/fluent  -       Safety awareness/insight to disability: impaired   -       Mood/Affect/Coping skills/emotional control: Appropriate to situation and Cooperative  Visual/Perceptual:      -Limited due to diplopia    Physical Exam:  Balance:    -       Static sitting is fair minus  Postural examination/scapula alignment:    -       Rounded shoulders  -       Forward head  Upper Extremity Range of Motion:     -       Right Upper Extremity: WFL  -       Left Upper Extremity: WFL  Upper Extremity Strength:    -       Right Upper Extremity: 4/5  -       Left Upper Extremity: 4/5   Strength:    -       Right Upper Extremity: 4/5  -       Left Upper Extremity: 4/5  Fine Motor Coordination:    -        Intact  Gross motor coordination:   Limited due to persistent dizziness.    AMPAC 6 Click ADL:  AMPAC Total Score: 20    Treatment & Education:  OT ed pt on OT role & POC as well as discharge recommendations.  OT repositioned pt to HOB & raised HOB, instructing pt on keeping HOB upright as pt can tolerate to preserve endurance and minimize effects of prolonged bedrest.     Education:    Patient left HOB elevated with all lines intact, call button in reach and nurse notified    GOALS:   Multidisciplinary Problems     Occupational Therapy Goals        Problem: Occupational Therapy Goal    Goal Priority Disciplines Outcome Interventions   Occupational Therapy Goal     OT, PT/OT Ongoing, Progressing    Description: Goals to be met by: 12/2/2020    Patient will increase functional independence with ADLs by performing:    LE Dressing with Supervision and Assistive Devices as needed.  Grooming while standing at sink with Contact Guard Assistance.  Toileting from bedside commode with Stand-by Assistance for hygiene and clothing management.   Sitting at edge of bed x15 minutes with Supervision.  Supine to sit with Supervision.  Toilet transfer to bedside commode with Stand-by Assistance.                     History:     Past Medical History:   Diagnosis Date    Anticoagulant long-term use     Arthritis     Colon polyp     Diabetes mellitus     Diabetes mellitus, type 2     Fatty liver     Hypertension        Past Surgical History:   Procedure Laterality Date    BACK SURGERY      COLONOSCOPY  07/18/2014    Dr. Crane: 22 colon polyps removed, hemorrhoids, erythema to sigmoid, repeat in 1 year for surveillance; biopsy: sigmoid erythema- showed unremarkable colonic mucosa, tubular adenomas and hyperplastic polyps    COLONOSCOPY N/A 5/3/2019    Procedure: COLONOSCOPY;  Surgeon: Guero Crane MD;  Location: [facility];  Service: Endoscopy;  Laterality: N/A;    COLONOSCOPY N/A 5/6/2019    Procedure: COLONOSCOPY;   Surgeon: Guero Crane MD;  Location: [facility];  Service: Endoscopy;  Laterality: N/A;    EPIDURAL STEROID INJECTION INTO THORACIC SPINE N/A 8/30/2019    Procedure: Injection-steroid-epidural-thoracic;  Surgeon: Frantz Green MD;  Location: [facility] OR;  Service: Pain Management;  Laterality: N/A;  T6-7    ESOPHAGOGASTRODUODENOSCOPY N/A 4/26/2019    Procedure: EGD (ESOPHAGOGASTRODUODENOSCOPY);  Surgeon: Guero Crane MD;  Location: [facility];  Service: Endoscopy;  Laterality: N/A;    HERNIA REPAIR         Time Tracking:     OT Date of Treatment: 11/18/20  OT Start Time: 1010  OT Stop Time: 1026  OT Total Time (min): 16 min    Billable Minutes:Evaluation 8  Self Care/Home Management 8    Bentley Moise, OT  11/18/2020

## 2020-11-18 NOTE — PLAN OF CARE
[facility]---11/17/2020 4:53:55 PM Note: Patient not medically ready, he is NPO for MBSS, no OT eval yet, I will follow his case and come and see him when he steps down out of ICU  [email or web address]       11/18/20 0933   Post-Acute Status   Post-Acute Authorization Placement   Post-Acute Placement Status Awaiting Internal Medical Clearance

## 2020-11-18 NOTE — ASSESSMENT & PLAN NOTE
Chronic, controlled.  Latest blood pressure and vitals reviewed-   Temp:  [97.5 °F (36.4 °C)-98.5 °F (36.9 °C)]   Pulse:  [57-81]   Resp:  [10-33]   BP: (100-178)/()   SpO2:  [90 %-99 %] .   Home meds for hypertension were reviewed and noted below. Hospital anti-hypertensive changes were made as shown below.  Hypertension Medications             hydrALAZINE (APRESOLINE) 25 MG tablet Take 1 tablet (25 mg total) by mouth every 12 (twelve) hours.    lisinopril-hydrochlorothiazide (PRINZIDE,ZESTORETIC) 20-12.5 mg per tablet TAKE 1 TABLET BY MOUTH TWICE A DAY        Will utilize p.r.n. blood pressure medication only if patient's blood pressure greater than  180/110 and he develops symptoms such as worsening chest pain or shortness of breath.

## 2020-11-18 NOTE — PLAN OF CARE
Problem: SLP Goal  Goal: SLP Goal  Description:   1) Participate in MBSS--MET  2) Participate in further evaluation of higher level cognitive functions  3) Pt will perform effortful swallow, mendelsohn, CTAR and Shaker given SPV    Outcome: Ongoing, Progressing    MBSS completed. Pt presents with severe pharyngeal dysphagia. REC NPO with alternative means of nutrition/hydration. Skilled ST to address deficits. Please allow ice chips for oral comfort/hydration following oral care.

## 2020-11-18 NOTE — PT/OT/SLP PROGRESS
Physical Therapy Treatment    Patient Name:  Jhonny Almazan   MRN:  [id number]    Recommendations:     Discharge Recommendations:  home, home health PT, rehabilitation facility   Discharge Equipment Recommendations: other (see comments)(unclear at this time)   Barriers to discharge: patient still gets very dizzy sitting EOB so is unable to do any functional mobility at this time.    Assessment:     Jhonny Almazan is a 64 y.o. male admitted with a medical diagnosis of Stroke due to embolism of right middle cerebral artery.  He presents with the following impairments/functional limitations:  weakness, impaired endurance, impaired functional mobilty, gait instability, impaired balance, decreased lower extremity function, decreased safety awareness, decreased ROM, impaired coordination .  Patient agreeable to PT treatment this morning but did report getting very dizzy with blurred vision entire time sitting EOB.   Patient able to sit EOB x 10 minutes with intermittent min assist for balance but refused standing attempt.     Rehab Prognosis: Good; patient would benefit from acute skilled PT services to address these deficits and reach maximum level of function.    Recent Surgery: * No surgery found *      Plan:     During this hospitalization, patient to be seen daily to address the identified rehab impairments via gait training, therapeutic activities, therapeutic exercises and progress toward the following goals:    · Plan of Care Expires:  12/15/20    Subjective     Chief Complaint: feeling dizzy  Patient/Family Comments/goals: feel better  Pain/Comfort:  ·        Objective:     Communicated with nurse Dempsey prior to session.  Patient found supine with bed alarm, oxygen, peripheral IV, olguin catheter, SCD, telemetry upon PT entry to room.     General Precautions: Standard, fall   Orthopedic Precautions:N/A   Braces:       Functional Mobility:  · Bed Mobility:     · Supine to Sit: moderate  assistance  · Sit to Supine: moderate assistance  · Balance: sitting EOB x 10 minutes with intermittent min assist for balance      AM-PAC 6 CLICK MOBILITY          Therapeutic Activities and Exercises:   transfer training supine to/from sit with mod assist  Sitting balance/tolerance x 10 minutes EOB with intermittent min assist for balance    Patient left supine with call button in reach, bed alarm on and nurse notified..    GOALS:   Multidisciplinary Problems     Physical Therapy Goals        Problem: Physical Therapy Goal    Goal Priority Disciplines Outcome Goal Variances Interventions   Physical Therapy Goal     PT, PT/OT Ongoing, Progressing     Description: Goals to be met by: 2020    Patient will increase functional independence with mobility by performin. Supine to sit with Stand-by Assistance  2. Sit to supine with Stand-by Assistance  3. Sit to stand transfer with Contact Guard Assistance  4. Bed to chair transfer with Minimal Assistance using Rolling Walker  5. Gait  x 50 feet with Minimal Assistance using Rolling Walker.                      Time Tracking:     PT Received On: 20  PT Start Time: 0932     PT Stop Time: 0950  PT Total Time (min): 18 min     Billable Minutes: Therapeutic Activity 18    Treatment Type: Treatment  PT/PTA: PT     PTA Visit Number: 0     Chris Megilligan, PT  2020

## 2020-11-18 NOTE — PLAN OF CARE
Ensured patient safety with frequent checks, assessing pain and neuro checks. Patient remains free of falls and skin intact. Neuro checks completed per order. Patient not noted to have slurred speech, states he feels at times he speaks slowly but his responses are all appropriate. ROYAL's, smile and facial symmetry noted.Oxygen applied (2L) overnight for slight drop on oxygen. Remains pleasant and follow commands. Will continue to monitor closely, no new changes. Pt states the numb/tingling in hands the same, still noted with blurred vision, and dizziness.

## 2020-11-18 NOTE — PLAN OF CARE
Problem: Occupational Therapy Goal  Goal: Occupational Therapy Goal  Description: Goals to be met by: 12/2/2020    Patient will increase functional independence with ADLs by performing:    LE Dressing with Supervision and Assistive Devices as needed.  Grooming while standing at sink with Contact Guard Assistance.  Toileting from bedside commode with Stand-by Assistance for hygiene and clothing management.   Sitting at edge of bed x15 minutes with Supervision.  Supine to sit with Supervision.  Toilet transfer to bedside commode with Stand-by Assistance.    Outcome: Ongoing, Progressing

## 2020-11-18 NOTE — ASSESSMENT & PLAN NOTE
Patient's FSGs are controlled on current hypoglycemics.   Last A1c reviewed-   Lab Results   Component Value Date    HGBA1C 7.0 (H) 11/16/2020     Most recent fingerstick glucose reviewed-   Recent Labs   Lab 11/17/20  1507 11/17/20  1758 11/18/20  0635   POCTGLUCOSE 185* 166* 194*     Current correctional scale  Low  Maintain anti-hyperglycemic dose as follows-   Antihyperglycemics (From admission, onward)    Low dose SS insulin        Hold Oral hypoglycemics while patient is in the hospital.

## 2020-11-18 NOTE — NURSING
Pt. Arrived to the floor via bed. AAOX4. Denies needs or complaints. Oriented to room and use of call light. Tele in place. Bed alarm set.

## 2020-11-18 NOTE — PROGRESS NOTES
[facility]  Progress Note    Patient Name: Jhonny Almazan  MRN: [id number]  Patient Class: IP- Inpatient   Admission Date: 11/16/2020  Length of Stay: 1 days  Attending Physician: Elle Newberry MD  Primary Care Provider: Joey Whitehead MD        Subjective:     Principal Problem:Stroke due to embolism of right middle cerebral artery        HPI:  Jhonny Almazan is a 64-year-old  male with past medical history significant for hypertension, diabetes, neuropathy and depression presenting today for dizziness.  Patient states for the past 3 days he has been experiencing intermittent dizziness that would worsen with ambulation.  He states today his dizziness became constant and was associated with nausea and 1 episode of vomiting.  He also is experiencing a right temple headache which he describes as dull quality, 3/10 intensity, no aggravating or alleviating factors.  He denies blurred vision or focal neuro deficits at home.  While in the ER he experienced an episode of left hand weakness with associated numbness and tingling.  ED workup revealed CT head was negative but CTA head and neck performed and revealed absent filling of the bilateral vertebral arteries and proximal basal artery with findings of bilateral PCA and mid/distal basal artery patent and no acute intracranial hemorrhage.  Patient is on longer experiencing any focal neuro deficits and is left hand weakness and numbness have resolved.  Patient will be admitted to hospital medicine services for further workup and management.  He was given aspirin in the ED and his nausea is controlled after receiving IV Zofran.  He also received meclizine and states that his dizziness is improving.      Overview/Hospital Course:  -11/17 Patient remains in ICU for close neurologic monitoring. MRI brain with right cerebral infarctions.    - 11/18/2020 - Patient is in intensive care unit with right cerebral  cerebrovascular accident.  Patient is scheduled for barium swallow study today.  Patient reports less dizziness without any associated nausea and vomiting.  Right facial droop in apparent.  Patient denies any speech trouble today.  Patient does report seeing double at times.  Patient denies any chest pain or shortness of breath.  Inpatient rehab placement efforts are underway.     Interval History:    11/18/2020 - Patient is in intensive care unit with right cerebral cerebrovascular accident.  Patient is scheduled for barium swallow study today.  Patient reports less dizziness without any associated nausea and vomiting.  Right facial droop in apparent.  Patient denies any speech trouble today.  Patient does report seeing double at times.  Patient denies any chest pain or shortness of breath.  Inpatient rehab placement efforts are underway.     Review of Systems   Constitutional: Negative for chills and fever.   HENT: Negative for congestion and sore throat.    Eyes: Negative for visual disturbance.   Respiratory: Negative for cough, shortness of breath and wheezing.    Cardiovascular: Negative for chest pain and palpitations.   Gastrointestinal: Negative for abdominal pain, blood in stool, diarrhea, nausea and vomiting.   Endocrine: Negative.    Genitourinary: Negative for difficulty urinating and hematuria.   Musculoskeletal: Negative.  Negative for back pain and neck pain.   Skin: Negative.  Negative for color change and pallor.   Allergic/Immunologic: Negative.    Neurological: Positive for dizziness, weakness, light-headedness, numbness and headaches. Negative for speech difficulty.   Hematological: Negative.    Psychiatric/Behavioral: Negative.    All other systems reviewed and are negative.    Objective:     Vital Signs (Most Recent):  Temp: 98.5 °F (36.9 °C) (11/18/20 0811)  Pulse: 60 (11/18/20 1100)  Resp: (!) 21 (11/18/20 1100)  BP: (!) 143/107 (11/18/20 1100)  SpO2: (!) 93 % (11/18/20 1100) Vital Signs  (24h Range):  Temp:  [97.5 °F (36.4 °C)-98.5 °F (36.9 °C)] 98.5 °F (36.9 °C)  Pulse:  [57-81] 60  Resp:  [10-33] 21  SpO2:  [90 %-99 %] 93 %  BP: (100-178)/() 143/107     Weight: 116.7 kg (257 lb 4.4 oz)  Body mass index is 30.51 kg/m².    Intake/Output Summary (Last 24 hours) at 11/18/2020 1142  Last data filed at 11/18/2020 1000  Gross per 24 hour   Intake 20 ml   Output 1405 ml   Net -1385 ml      Physical Exam  Vitals signs reviewed.   Constitutional:       Appearance: Normal appearance. He is well-developed.   HENT:      Head: Normocephalic and atraumatic.      Right Ear: External ear normal.      Left Ear: External ear normal.      Nose: Nose normal.      Mouth/Throat:      Mouth: Mucous membranes are moist.   Eyes:      Pupils: Pupils are equal, round, and reactive to light.   Neck:      Musculoskeletal: Normal range of motion and neck supple.   Cardiovascular:      Rate and Rhythm: Normal rate and regular rhythm.      Pulses: Normal pulses.      Heart sounds: Normal heart sounds.   Pulmonary:      Effort: Pulmonary effort is normal.   Abdominal:      General: Abdomen is flat. Bowel sounds are normal. There is no distension.      Palpations: Abdomen is soft.   Musculoskeletal: Normal range of motion.      Right lower leg: No edema.      Left lower leg: No edema.   Skin:     General: Skin is warm and dry.   Neurological:      Mental Status: He is alert and oriented to person, place, and time.      Cranial Nerves: No cranial nerve deficit or facial asymmetry.      Sensory: Sensory deficit present.      Motor: Weakness present.      Comments: Mild right facial drooping.  No speech dysfunction noted.   Psychiatric:         Mood and Affect: Mood normal.         Significant Labs: All pertinent labs within the past 24 hours have been reviewed.    Significant Imaging:   ECHO: Pending.    Imaging Results          CTA Head and Neck (xpd) (Final result)  Result time 11/16/20 06:52:12    Final result by Bruno BETANCOURT  MD Delfina (11/16/20 06:52:12)                 Impression:      1. The study is abnormal.  There is abrupt cut off of the intracranial vertebral arteries bilaterally with no flow also demonstrated in the proximal basilar artery.  The remainder of the basilar artery is small in caliber with intermittent change in caliber.  There is a patent right-sided posterior communicating artery and both posterior cerebral arteries are patent.  2. There is a short segment mild-to-moderate nonocclusive stenosis of the right cavernous carotid artery.  3. There is scattered atherosclerosis throughout the neck arterial system with mild stenosis of bilateral internal carotid arteries but without critical stenosis, occlusion, thrombosis or dissection.  4. There is a 2 cm hypodense nodule in the left lobe of the thyroid gland which could be further evaluated on a nonemergent basis with thyroid ultrasound.  Note: Preliminary results were provided by Dr. Josse Portillo ([facility]).  There is no significant discrepancy.      Electronically signed by: Bruno Mathis MD  Date:    11/16/2020  Time:    06:52             Narrative:    EXAMINATION:  CTA HEAD AND NECK (XPD)    CLINICAL HISTORY:  Neuro deficit, acute, stroke suspected;Dizziness, persistent/recurrent, cardiac or vascular cause suspected;    TECHNIQUE:  CT angiogram was performed from the level of the jennifer to the top of the head following the IV administration of 100 mL of Omnipaque 350.   Sagittal and coronal reconstructions and maximum intensity projection reconstructions were performed. Arterial stenosis percentages are based on NASCET measurement criteria.    COMPARISON:  Head CT obtained the same day    FINDINGS:  CTA Neck:    Arch and great vessels:    There is scattered atherosclerosis of the aorta and origin of the great vessels as well as included subclavian arteries but there is no critical stenosis, occlusion, thrombosis or dissection.    Carotid arteries:    Right  Carotid artery: There is atherosclerosis but there is no critical stenosis, occlusion, thrombosis or dissection involving the common, internal or external carotid arteries.  There is mild stenosis, 50% of the proximal internal carotid artery.    Left Carotid artery: There is scattered atherosclerosis in the common, internal and external carotid arteries but there is no critical stenosis, occlusion, thrombosis or dissection.  Stenosis of the proximal internal carotid artery is approximately 30%.    Vertebral arteries:    The right vertebral artery is dominant.  Both vertebral arteries are patent throughout their course in the neck without critical stenosis, occlusion, thrombosis or dissection.    Other:    There is a 2 cm hypodense nodule in the left lobe of the thyroid gland which could be further evaluated on a nonemergent basis with thyroid ultrasound.    CTA Head:    Anterior circulation:    There is mild to moderate short segment stenosis of the cavernous segment of the right internal carotid artery.  Otherwise, the petrous and cavernous segments are patent.  The carotid terminus is normal and there is normal branching into the anterior and middle cerebral arteries without critical stenosis, occlusion, thrombosis or dissection.  There is no large aneurysm.    Posterior circulation:    The posterior circulation is abnormal.  There is abrupt cut off of the V4 segment of the right vertebral artery.  There is either very small branch or also occlusion of the left vertebral artery.  There is no filling at the origin of the basilar artery.  The more distal basilar artery is small in caliber and likely fills via reconstitution from collateral vessels.  There is a patent right posterior communicating artery the right left posterior cerebral arteries are patent without critical stenosis or occlusion.    Dural sinuses, other:    The dural sinuses are patent.                               CT Head Without Contrast (Final  result)  Result time 11/16/20 06:37:57    Final result by Bruno Mathis MD (11/16/20 06:37:57)                 Impression:      1.  There is no acute intracranial abnormality.  There is no hemorrhage, mass/mass effect, acute edema or ischemia.  It should be noted that MRI is more sensitive in the detection of subtle or acute nonhemorrhagic ischemic disease.    Note: Preliminary results were provided by Dr. Morro Shaw ([facility]).  There is no significant discrepancy.      Electronically signed by: Bruno Mathis MD  Date:    11/16/2020  Time:    06:37             Narrative:    EXAMINATION:  CT HEAD WITHOUT CONTRAST    CLINICAL HISTORY:  Neuro deficit, acute, stroke suspected;    TECHNIQUE:  Routine unenhanced axial images were obtained through the head.  Sagittal and coronal reformatted images were created.  The study is reviewed in bone and soft tissue windows.    COMPARISON:  None    FINDINGS:  Intracranial contents: There is no acute intracranial abnormality.  There is mild volume loss and nonspecific white matter change.  Ventricular size is likely related to volume loss.  There is no hemorrhage or mass/mass effect.  There is no acute edema or ischemia.  The gray-white interface is preserved without obvious acute infarction.  There is no dense vessel.  There is no abnormal extra-axial fluid collection.  The basilar cisterns are open.  The cerebellar tonsils are in normal position at the level of the foramen magnum.  There is mild atherosclerosis in the vessels at the base of the brain.  The orbits are grossly normal.    Extracranial contents, calvarium, soft tissues: The calvarium is normal.  The included paranasal sinuses and mastoid air cells are clear.                                    Assessment/Plan:      * Stroke due to embolism of right middle cerebral artery  CT head negative. CTA head and neck reviewed - absent feeling bilateral vertebral arteries and proximal basal artery with patent bilateral  PCA and mid/distal basal artery. MRI brain shows right sided cerbral infarcts  -follow Neurology recommendations, appreciate recommendations  -continue aspirin and Plavix therapy.  -Neuro checks every hour  -continue PT/OT/ST.  Patient will benefit with inpatient rehab in near future.  -Fall precautions, aspiration precautions  -follow 2D echocardiogram results.  - follow MBSS.    Major depressive disorder  Continue Wellbutrin as before.      Hypertension  Chronic problem. Will continue chronic medications and monitor for any changes, adjusting as needed.          Dizziness  Likely Secondary to acute CVA.  -Continue meclizine p.r.n.  -follow Neurology recommendations.      Hyperlipidemia associated with type 2 diabetes mellitus  Chronic.  -Resume statin and ASA        Hypertension associated with diabetes  Chronic, controlled.  Latest blood pressure and vitals reviewed-   Temp:  [97.5 °F (36.4 °C)-98.5 °F (36.9 °C)]   Pulse:  [57-81]   Resp:  [10-33]   BP: (100-178)/()   SpO2:  [90 %-99 %] .   Home meds for hypertension were reviewed and noted below. Hospital anti-hypertensive changes were made as shown below.  Hypertension Medications             hydrALAZINE (APRESOLINE) 25 MG tablet Take 1 tablet (25 mg total) by mouth every 12 (twelve) hours.    lisinopril-hydrochlorothiazide (PRINZIDE,ZESTORETIC) 20-12.5 mg per tablet TAKE 1 TABLET BY MOUTH TWICE A DAY        Will utilize p.r.n. blood pressure medication only if patient's blood pressure greater than  180/110 and he develops symptoms such as worsening chest pain or shortness of breath.        Diabetes mellitus type 2, uncontrolled  Patient's FSGs are controlled on current hypoglycemics.   Last A1c reviewed-   Lab Results   Component Value Date    HGBA1C 7.0 (H) 11/16/2020     Most recent fingerstick glucose reviewed-   Recent Labs   Lab 11/17/20  1507 11/17/20  1758 11/18/20  0635   POCTGLUCOSE 185* 166* 194*     Current correctional scale  Low  Maintain  anti-hyperglycemic dose as follows-   Antihyperglycemics (From admission, onward)    Low dose SS insulin        Hold Oral hypoglycemics while patient is in the hospital.        Nicotine dependence  Smoking cessation counseling performed. Dangers of cigarette smoking were reviewed with patient in detail and patient was encouraged to quit. Nicotine replacement options were discussed for > 3 minutes.  -Continue Wellbutrin       Discontinue Gutierrez catheter.  Transfer patient to medicine telemetry floor if okay with Neuro Service.  VTE Risk Mitigation (From admission, onward)         Ordered     enoxaparin injection 40 mg  Every 24 hours      11/16/20 1456     IP VTE LOW RISK PATIENT  Once      11/16/20 0328     Place sequential compression device  Until discontinued      11/16/20 0328                Discharge Planning   YUDITH:  November 19, 2020    Code Status: Full Code   Is the patient medically ready for discharge?:     Reason for patient still in hospital (select all that apply): Pending disposition  Discharge Plan A: Rehab            Critical care time spent on the evaluation and treatment of severe organ dysfunction, review of pertinent labs and imaging studies, discussions with consulting providers and discussions with patient/family: 32 minutes.      Elle Newberry MD  Department of Hospital Medicine   [facility]

## 2020-11-18 NOTE — ASSESSMENT & PLAN NOTE
Smoking cessation counseling performed. Dangers of cigarette smoking were reviewed with patient in detail and patient was encouraged to quit. Nicotine replacement options were discussed for > 3 minutes.  -Continue Wellbutrin

## 2020-11-18 NOTE — PLAN OF CARE
Educated pt on need to remain NPO. Ice chips given for comfort. Pt. AAOx4. Neuro checks completed per order. Pt with clear speech. Responds appropriately. Pt. smile and facial symmetric.Dizziness with slight head movement. Declines meclizine. Purposeful rounding utilized to ensure needs met and safety maintained.

## 2020-11-18 NOTE — PLAN OF CARE
Care plan reviewed. Safety maintained. Patient neuro monitored every 2 hours. Patient has increased numbness and tingling to his right side of his face. There is a very slight change to facial symmetry compared to earlier, smile is very slightly crooked and when he sticks his tongue out, it goes towards the right very slightly. Double vision continues, he is having difficulty hearing correctly, need to speak slower and clear and loud for him to understand. No other deficits noted at this time. PPD placed to RFA. Patient choked on pudding early this morning. Patient is NPO, will have a MBSS tomorrow. Can have ice chips, and if needs meds, crush in applesauce. Hafsa reports patients wife/x-wife came for a visit today. Patients son came for a short visit this evening. Patient seen by Dr. Burns and Dr. Caldera.

## 2020-11-18 NOTE — PLAN OF CARE
11/17/20 1901   Patient Assessment/Suction   Level of Consciousness (AVPU) alert   Respiratory Effort Unlabored   Expansion/Accessory Muscles/Retractions no use of accessory muscles   PRE-TX-O2   O2 Device (Oxygen Therapy) room air   SpO2 96 %   Pulse Oximetry Type Continuous   $ Pulse Oximetry - Multiple Charge Pulse Oximetry - Multiple   Pulse 61   Resp 14

## 2020-11-18 NOTE — PROCEDURES
"Modified Barium Swallow    Patient Name:  Jhonny Almazan   MRN:  [id number]      Recommendations:     Recommendations:                General Recommendations:  Dysphagia therapy  Diet recommendations:  NPO, NPO(Please allow ice chips for oral comfort/hydration follwing oral care)   Aspiration Precautions: Frequent oral care   General Precautions: Standard, aspiration, fall, vision impaired  Communication strategies:  none    Referral     Reason for Referral  Patient was referred for a Modified Barium Swallow Study to assess the efficiency of his/her swallow function, rule out aspiration and make recommendations regarding safe dietary consistencies, effective compensatory strategies, and safe eating environment.     Diagnosis: Stroke due to embolism of right middle cerebral artery       History:     Past Medical History:   Diagnosis Date    Anticoagulant long-term use     Arthritis     Colon polyp     Diabetes mellitus     Diabetes mellitus, type 2     Fatty liver     Hypertension        Objective:     Current Respiratory Status: Room air  Alert: yes    Cooperative: yes    Follows Directions: yes    Visualization  · Patient was seen in the lateral view           11/18/20 1411   Speech Time Calculation   Speech Start Time 1200   Speech Stop Time 1220   Speech Total (min) 20 min   General Information   SLP Treatment Date 11/18/20   CT/MRI results MRI 11/16/2020: There are scattered, acute, ischemic infarcts seen throughout the right cerebral hemisphere.  The diffuse vascular territories involve suggest embolic phenomenon"   Current Respiratory Status room air   General Precautions aspiration;fall;vision impaired   Visual/Auditory Vision impaired  (c/o diplopia)   Current Diet   Current Diet Textures NPO except meds   Current Liquid Consistencies NPO  (except ice chips)   Subjective   Patient states "I need the suction." Pt with report of worsening dysphagia 11/17/2020 prompting clinical swallow " re-evaluation and need for MBSS for further evaluation   Oral Musculature Evaluation   Oral Musculature WFL  (subtle R weakness)   Dentition present and adequate   Secretion Management adequate   Mucosal Quality good   Mandibular Strength and Mobility WFL   Oral Labial Strength and Mobility WFL   Lingual Strength and Mobility WFL   Velar Elevation WFL   Volitional Cough good   Volitional Swallow Poor laryngeal elevation/hyolaryngeal excursion visualized on MBSS        MBS Eval: Thin Liquid Trial   Mode of Presentation, Thin Liquid cup;self fed  (5mlx2, 10mlx2)   Oral Prep/Phase, Thin Liquid WFL   Oral Residue, Thin Liquid back posterior tongue   Pharyngeal Phase, Thin Liquid impaired;decreased base of tongue retraction;no epiglottic inversion;multiple spontaneous swallows;reduction in laryngeal elevation;reduced hyolaryngeal excursion;pyriform sinuses stasis;vallecular stasis;throat clearing;immediate coughing/choking   Rosenbeck's 8-Point Penetration-Aspiration Scale, Thin Liquids (7) Material enters the airway, passes below the vocal folds, and is not ejected from the trachea despite effort.   Penetration/Aspiration, Thin Liquid Penetration after swallow of pyriform sinus residue following 10mL. Aspiration after the swallow of pyriform sinus residue via cup with strong reactive cough noted.    Esophageal Phase, Thin decreased UES opening  (judged to be 2' poor laryngeal elevation/hyolaryngeal excursion)   Successful Strategies Trialed During Procedure, Thin Liquid   (multiple swallows minimally effective in reducing pyriform sinus residue)        MBS Eval: Nectar Thick Liquid Trial   Mode of Presentation, Nectar straw;self fed  (5mLx2, 10mLx2)   Oral Prep/Phase, [place] WFL   Oral Residue, Nectar back posterior tongue   Pharyngeal Phase, Nectar impaired;decreased base of tongue retraction;no epiglottic inversion;multiple spontaneous swallows;reduction in laryngeal elevation;reduced hyolaryngeal excursion;pyriform  sinuses stasis;pyriform sinuses pooling;vallecular stasis;throat clearing   Rosenbeck's 8-Point Penetration-Aspiration Scale, Nectar Thick Liquids (8) Material enters the airway, passes below the vocal folds, and no effort is made to eject.  (clears with cue but with repeated penetration of residue)   Penetration/Aspiration, Nectar Trace penetration with 5mL and eventual trace aspiration of pyriform sinus residue after the swallow via straw   Esophageal Phase, Nectar decreased UES opening        MBS Eval: Pureed Trial   Mode of Presentation, Puree spoon;fed by clinician   Oral Prep/Phase, Puree WFL   Oral Residue, Puree back posterior tongue   Pharyngeal Phase, Puree impaired;decreased base of tongue retraction;reduction in laryngeal elevation;reduced hyolaryngeal excursion;pyriform sinuses stasis;vallecular stasis;multiple spontaneous swallows   Rosenbeck's 8-Point Penetration-Aspiration Scale, Pureed (4) Material enters the airway, contacts the vocal folds, and is ejected from the airway.   Penetration/Aspiration, Pureed Trace deep penetration on subsequent swallow of residue        MBS Eval: Soft Solid Trial   Mode of Presentation, Semisolid spoon;fed by clinician   Volume of Semisolid Food Presented single diced peach   Oral Prep/Phase, Semisolid WFL   Pharyngeal Phase, Semisolid impaired;reduction in laryngeal elevation;reduced hyolaryngeal excursion;pyriform sinuses stasis;vallecular stasis;decreased base of tongue retraction   Rosenbeck's 8-Point Penetration-Aspiration Scale, Semisolid (3) Material enters the airway, remains above the vocal folds, and is not ejected from the airway.   Penetration/Aspiration, Semisolid Transient penetration of peach during the swallow   Recommendations   Solid Diet Level NPO   Liquid Diet Level NPO  (Please allow ice chips for oral comfort/hydration following oral care)   Plan   Plan Patient Education;Family Education;Dysphagia Therapy;Plan of care initiated   Therapy  Frequency minimum of 5 times a week   Plan of Care Expires on 12/02/20   SLP Follow-up   SLP Follow-up? Yes   SLP - Next Visit Date 11/19/20   Treatment/Billable Minutes   Motion Fluoro Swallow, Cine/Vid 20   Total Time 20       Cervical Esophageal Phase  · Decreased UES opening--judged to be 2' poor laryngeal elevation/hyolaryngeal excursion    Assessment:     Impressions  MBSS completed. Pt presents with severe pharyngeal dysphagia 2' poor laryngeal elevation/hyolarygneal excursion resulting in poor relaxation of UES. Moderate-severe pyriform sinus residue noted across all consistencies. Subsequent effortful swallows minimally effective in reducing residue with inability to clear. Pt with good sensory awareness of residue. Aspiration noted with thin liquids and nectar thick liquids, after the swallow, of pyriform sinus residue. Mild swallow delay and no epiglottic inversion is also noted.  Pt with reactive cough following aspiration of thin liquids via cup. Delayed throat clearing noted follow trace aspiration of nectar thick liquids. Throat clearing appreciated throughout study with pt requesting [place]. Pt judged to be at HIGH risk for aspiration with oral intake. REC NPO with nonoral means of nutrition/hydration. Initiate skilled ST to address deficits.     Prognosis: Good    Barriers:  · None    Plan  Initiate dysphagia therapy    Education  Results were discussed with patient.    Goals:   Multidisciplinary Problems     SLP Goals        Problem: SLP Goal    Goal Priority Disciplines Outcome   SLP Goal     SLP Ongoing, Progressing   Description:   1) Participate in MBSS--MET  2) Participate in further evaluation of higher level cognitive functions  3) Pt will perform effortful swallow, mendelsohn, CTAR and Shaker given SPV                     Plan:   · Patient to be seen:  Therapy Frequency: 5 x/week   · Plan of Care expires:  12/02/20  · Plan of Care reviewed with:  patient        Discharge recommendations:   rehabilitation facility   Barriers to Discharge:  None    Time Tracking:   SLP Treatment Date:   11/18/20  Speech Start Time:  1200  Speech Stop Time:  1220     Speech Total Time (min):  20 min    Christa Hay CCC-SLP  11/18/2020

## 2020-11-18 NOTE — PLAN OF CARE
Informed on NPO except for ice and meds in applesauce. Reviewed on MBSS for today. Flomax given prior to removal of olguin. Informed pt and Dtr Christine on pending transfer to room 205. All safety maintained

## 2020-11-18 NOTE — NURSING TRANSFER
Nursing Transfer Note      11/18/2020     Transfer from  to room 205 PCU    Transfer via bed    Transfer with telemetry, cell phone,,and wallet w pt belongings    Transported by Lior BENNETT RN    Medicines sent: Yes    Chart send with patient: YES     Notified: report called to nurse Turpin at 1350    Patient reassessed at: per nurse Turpin at bedside    Upon arrival to floor: oriented to call light and bed controlls. Nurse Turpin at bedside with pt. At 1415

## 2020-11-18 NOTE — PROGRESS NOTES
Patient c/o increased numbness and tingling to right side of face. There is a very slight change to facial symmetry. Pupils checked with pupilometer again.   NPI: Right 4.5, Left 4.3  Size: Right 2.97 mm, Left 4.53 mm  MIN: Right 2.24 mm, Left 2.92 mm  CH: Right 25%, Left 36%  CV: Right 0.78 mm/s, Left 2.21 mm/s  MCV: Right 2.30 mm/s, Left 3.87 mm/s  LAT: Right 0.23 sec, Left 0.23 sec  DV: Right 0.97 mm/s, Left ---  Diff: NPI R>L 0.2  Diff: Size L>R 1.56  Diff: MIN L>R 0.68

## 2020-11-19 ENCOUNTER — ANESTHESIA EVENT (OUTPATIENT)
Dept: CARDIOLOGY | Facility: HOSPITAL | Age: 65
DRG: 066 | End: 2020-11-19
Payer: MEDICARE

## 2020-11-19 ENCOUNTER — ANESTHESIA (OUTPATIENT)
Dept: CARDIOLOGY | Facility: HOSPITAL | Age: 65
DRG: 066 | End: 2020-11-19
Payer: MEDICARE

## 2020-11-19 PROBLEM — R13.10 DYSPHAGIA: Status: ACTIVE | Noted: 2020-11-19

## 2020-11-19 LAB
ALBUMIN SERPL BCP-MCNC: 4 G/DL (ref 3.5–5.2)
ALP SERPL-CCNC: 73 U/L (ref 55–135)
ALT SERPL W/O P-5'-P-CCNC: 31 U/L (ref 10–44)
ANION GAP SERPL CALC-SCNC: 13 MMOL/L (ref 8–16)
AST SERPL-CCNC: 19 U/L (ref 10–40)
BASOPHILS # BLD AUTO: 0.1 K/UL (ref 0–0.2)
BASOPHILS NFR BLD: 0.7 % (ref 0–1.9)
BILIRUB SERPL-MCNC: 0.9 MG/DL (ref 0.1–1)
BSA FOR ECHO PROCEDURE: 2.52 M2
BUN SERPL-MCNC: 19 MG/DL (ref 8–23)
CALCIUM SERPL-MCNC: 10.1 MG/DL (ref 8.7–10.5)
CHLORIDE SERPL-SCNC: 102 MMOL/L (ref 95–110)
CO2 SERPL-SCNC: 25 MMOL/L (ref 23–29)
CREAT SERPL-MCNC: 0.8 MG/DL (ref 0.5–1.4)
DIFFERENTIAL METHOD: ABNORMAL
EOSINOPHIL # BLD AUTO: 0.2 K/UL (ref 0–0.5)
EOSINOPHIL NFR BLD: 1.4 % (ref 0–8)
ERYTHROCYTE [DISTWIDTH] IN BLOOD BY AUTOMATED COUNT: 12.7 % (ref 11.5–14.5)
EST. GFR  (AFRICAN AMERICAN): >60 ML/MIN/1.73 M^2
EST. GFR  (NON AFRICAN AMERICAN): >60 ML/MIN/1.73 M^2
GLUCOSE SERPL-MCNC: 158 MG/DL (ref 70–110)
HCT VFR BLD AUTO: 49.3 % (ref 40–54)
HGB BLD-MCNC: 16.5 G/DL (ref 14–18)
IMM GRANULOCYTES # BLD AUTO: 0.07 K/UL (ref 0–0.04)
IMM GRANULOCYTES NFR BLD AUTO: 0.5 % (ref 0–0.5)
LYMPHOCYTES # BLD AUTO: 3 K/UL (ref 1–4.8)
LYMPHOCYTES NFR BLD: 22.3 % (ref 18–48)
MAGNESIUM SERPL-MCNC: 1.9 MG/DL (ref 1.6–2.6)
MCH RBC QN AUTO: 31 PG (ref 27–31)
MCHC RBC AUTO-ENTMCNC: 33.5 G/DL (ref 32–36)
MCV RBC AUTO: 93 FL (ref 82–98)
MONOCYTES # BLD AUTO: 1 K/UL (ref 0.3–1)
MONOCYTES NFR BLD: 7.1 % (ref 4–15)
NEUTROPHILS # BLD AUTO: 9.2 K/UL (ref 1.8–7.7)
NEUTROPHILS NFR BLD: 68 % (ref 38–73)
NRBC BLD-RTO: 0 /100 WBC
PHOSPHATE SERPL-MCNC: 3 MG/DL (ref 2.7–4.5)
PLATELET # BLD AUTO: 199 K/UL (ref 150–350)
PMV BLD AUTO: 9.6 FL (ref 9.2–12.9)
POCT GLUCOSE: 137 MG/DL (ref 70–110)
POCT GLUCOSE: 153 MG/DL (ref 70–110)
POCT GLUCOSE: 163 MG/DL (ref 70–110)
POCT GLUCOSE: 174 MG/DL (ref 70–110)
POTASSIUM SERPL-SCNC: 3.3 MMOL/L (ref 3.5–5.1)
PROT SERPL-MCNC: 7.2 G/DL (ref 6–8.4)
RBC # BLD AUTO: 5.32 M/UL (ref 4.6–6.2)
SODIUM SERPL-SCNC: 140 MMOL/L (ref 136–145)
WBC # BLD AUTO: 13.56 K/UL (ref 3.9–12.7)

## 2020-11-19 PROCEDURE — 83735 ASSAY OF MAGNESIUM: CPT

## 2020-11-19 PROCEDURE — 99222 1ST HOSP IP/OBS MODERATE 55: CPT | Mod: ,,, | Performed by: INTERNAL MEDICINE

## 2020-11-19 PROCEDURE — 25000003 PHARM REV CODE 250: Performed by: INTERNAL MEDICINE

## 2020-11-19 PROCEDURE — D9220A PRA ANESTHESIA: Mod: CRNA,,, | Performed by: NURSE ANESTHETIST, CERTIFIED REGISTERED

## 2020-11-19 PROCEDURE — D9220A PRA ANESTHESIA: Mod: ANES,,, | Performed by: ANESTHESIOLOGY

## 2020-11-19 PROCEDURE — 84100 ASSAY OF PHOSPHORUS: CPT

## 2020-11-19 PROCEDURE — 97530 THERAPEUTIC ACTIVITIES: CPT

## 2020-11-19 PROCEDURE — 80053 COMPREHEN METABOLIC PANEL: CPT

## 2020-11-19 PROCEDURE — 99222 PR INITIAL HOSPITAL CARE,LEVL II: ICD-10-PCS | Mod: ,,, | Performed by: INTERNAL MEDICINE

## 2020-11-19 PROCEDURE — 12000002 HC ACUTE/MED SURGE SEMI-PRIVATE ROOM

## 2020-11-19 PROCEDURE — 94760 N-INVAS EAR/PLS OXIMETRY 1: CPT

## 2020-11-19 PROCEDURE — 36415 COLL VENOUS BLD VENIPUNCTURE: CPT

## 2020-11-19 PROCEDURE — 63600175 PHARM REV CODE 636 W HCPCS: Performed by: INTERNAL MEDICINE

## 2020-11-19 PROCEDURE — 25000003 PHARM REV CODE 250: Performed by: NURSE PRACTITIONER

## 2020-11-19 PROCEDURE — D9220A PRA ANESTHESIA: ICD-10-PCS | Mod: CRNA,,, | Performed by: NURSE ANESTHETIST, CERTIFIED REGISTERED

## 2020-11-19 PROCEDURE — 92526 ORAL FUNCTION THERAPY: CPT

## 2020-11-19 PROCEDURE — 63600175 PHARM REV CODE 636 W HCPCS: Performed by: NURSE ANESTHETIST, CERTIFIED REGISTERED

## 2020-11-19 PROCEDURE — 37000009 HC ANESTHESIA EA ADD 15 MINS: Performed by: ANESTHESIOLOGY

## 2020-11-19 PROCEDURE — 25000003 PHARM REV CODE 250: Performed by: NURSE ANESTHETIST, CERTIFIED REGISTERED

## 2020-11-19 PROCEDURE — 37000008 HC ANESTHESIA 1ST 15 MINUTES: Performed by: ANESTHESIOLOGY

## 2020-11-19 PROCEDURE — D9220A PRA ANESTHESIA: ICD-10-PCS | Mod: ANES,,, | Performed by: ANESTHESIOLOGY

## 2020-11-19 PROCEDURE — 37000009 HC ANESTHESIA EA ADD 15 MINS: Performed by: SPECIALIST

## 2020-11-19 PROCEDURE — 85025 COMPLETE CBC W/AUTO DIFF WBC: CPT

## 2020-11-19 PROCEDURE — 37000008 HC ANESTHESIA 1ST 15 MINUTES: Performed by: SPECIALIST

## 2020-11-19 RX ORDER — PROPOFOL 10 MG/ML
VIAL (ML) INTRAVENOUS
Status: DISCONTINUED | OUTPATIENT
Start: 2020-11-19 | End: 2020-11-19

## 2020-11-19 RX ORDER — TALC
6 POWDER (GRAM) TOPICAL NIGHTLY PRN
Status: DISCONTINUED | OUTPATIENT
Start: 2020-11-19 | End: 2020-11-24 | Stop reason: HOSPADM

## 2020-11-19 RX ORDER — ACETAMINOPHEN 325 MG/1
650 TABLET ORAL EVERY 6 HOURS PRN
Status: DISCONTINUED | OUTPATIENT
Start: 2020-11-19 | End: 2020-11-24 | Stop reason: HOSPADM

## 2020-11-19 RX ORDER — LIDOCAINE 50 MG/G
1 PATCH TOPICAL
Status: DISCONTINUED | OUTPATIENT
Start: 2020-11-19 | End: 2020-11-24 | Stop reason: HOSPADM

## 2020-11-19 RX ORDER — POTASSIUM CHLORIDE 7.45 MG/ML
10 INJECTION INTRAVENOUS ONCE
Status: COMPLETED | OUTPATIENT
Start: 2020-11-19 | End: 2020-11-19

## 2020-11-19 RX ORDER — LIDOCAINE HCL/PF 100 MG/5ML
SYRINGE (ML) INTRAVENOUS
Status: DISCONTINUED | OUTPATIENT
Start: 2020-11-19 | End: 2020-11-19

## 2020-11-19 RX ADMIN — LIDOCAINE 1 PATCH: 50 PATCH TOPICAL at 06:11

## 2020-11-19 RX ADMIN — POTASSIUM CHLORIDE 10 MEQ: 7.46 INJECTION, SOLUTION INTRAVENOUS at 04:11

## 2020-11-19 RX ADMIN — TAMSULOSIN HYDROCHLORIDE 0.4 MG: 0.4 CAPSULE ORAL at 04:11

## 2020-11-19 RX ADMIN — MUPIROCIN: 20 OINTMENT TOPICAL at 10:11

## 2020-11-19 RX ADMIN — SODIUM CHLORIDE: 0.9 INJECTION, SOLUTION INTRAVENOUS at 10:11

## 2020-11-19 RX ADMIN — PROPOFOL 30 MG: 10 INJECTION, EMULSION INTRAVENOUS at 11:11

## 2020-11-19 RX ADMIN — ENOXAPARIN SODIUM 40 MG: 40 INJECTION SUBCUTANEOUS at 04:11

## 2020-11-19 RX ADMIN — GABAPENTIN 600 MG: 300 CAPSULE ORAL at 10:11

## 2020-11-19 RX ADMIN — ACETAMINOPHEN 650 MG: 325 TABLET ORAL at 06:11

## 2020-11-19 RX ADMIN — LIDOCAINE HYDROCHLORIDE 100 MG: 20 INJECTION INTRAVENOUS at 11:11

## 2020-11-19 RX ADMIN — ASPIRIN 81 MG: 81 TABLET, COATED ORAL at 04:11

## 2020-11-19 RX ADMIN — ATORVASTATIN CALCIUM 40 MG: 40 TABLET, FILM COATED ORAL at 05:11

## 2020-11-19 RX ADMIN — PROPOFOL 70 MG: 10 INJECTION, EMULSION INTRAVENOUS at 11:11

## 2020-11-19 NOTE — CHAPLAIN
Visited w/pt and he's a little nervous about getting all his strength back post-stroke; said he's getting a feeding tube; pt said that he's following his dad's footsteps b/c he had a major stroke, couldn't swallow, had feeding tube and was in nursing home for a year; pt is Sikhism, welcomed me to pray with him and he accepted a rosary. Lord, in your mercy.

## 2020-11-19 NOTE — PROCEDURES
"Jhonny Almazan is a 64 y.o. male patient.    Temp: 97.6 °F (36.4 °C) (11/19/20 0800)  Pulse: 80 (11/19/20 0800)  Resp: 17 (11/19/20 0800)  BP: 125/70 (11/19/20 0800)  SpO2: 97 % (11/19/20 0800)  Weight: 116.6 kg (257 lb) (11/19/20 1140)  Height: 6' 5" (195.6 cm) (11/19/20 1140)       Procedures  M'allampati score2  anasthesia  Used propofol for sedation   SHIKHA probe then passed  via  transesophageal route and  imaging performed in the  standard 3 views ie  Upper esophageal, ,mid esophageal, and transgastric views . Saline bubbles were injected IV for PFO imaging of   Intraatrial septum.   At end of procedure, SHIKHA probe removed. The patient tolerated procedure well .    Preliminary- bubble study negative no   Pfo,  No  clotsi n left atrium or appendage    significant abnormal cholesterol plaque in  descending and aorta and arch     Marty Zee  11/19/2020    "

## 2020-11-19 NOTE — ASSESSMENT & PLAN NOTE
Chronic, controlled.  Latest blood pressure and vitals reviewed-   Temp:  [96.2 °F (35.7 °C)-98.2 °F (36.8 °C)]   Pulse:  [70-92]   Resp:  [16-24]   BP: ()/(52-94)   SpO2:  [92 %-100 %] .   Home meds for hypertension were reviewed and noted below. Hospital anti-hypertensive changes were made as shown below.  Hypertension Medications             hydrALAZINE (APRESOLINE) 25 MG tablet Take 1 tablet (25 mg total) by mouth every 12 (twelve) hours.    lisinopril-hydrochlorothiazide (PRINZIDE,ZESTORETIC) 20-12.5 mg per tablet TAKE 1 TABLET BY MOUTH TWICE A DAY        Will utilize p.r.n. blood pressure medication only if patient's blood pressure greater than  180/110 and he develops symptoms such as worsening chest pain or shortness of breath.

## 2020-11-19 NOTE — PLAN OF CARE
11/18/20 1901   Patient Assessment/Suction   Level of Consciousness (AVPU) alert   Respiratory Effort Normal;Unlabored   Expansion/Accessory Muscles/Retractions no retractions;no use of accessory muscles   All Lung Fields Breath Sounds clear;Anterior:;Lateral:   Rhythm/Pattern, Respiratory depth regular;pattern regular;unlabored   Cough Frequency no cough   PRE-TX-O2   O2 Device (Oxygen Therapy) room air   SpO2 (!) 92 %   Pulse Oximetry Type Intermittent   $ Pulse Oximetry - Multiple Charge Pulse Oximetry - Multiple   Pulse 76   Resp 18

## 2020-11-19 NOTE — PLAN OF CARE
Cm contacted Dr. Rosas about pt's Swallow study.  Cm asked if pt will get PEG,pt is a candidate for Rehab.  Cm will continue to follow-up.       11/19/20 9036   Discharge Reassessment   Assessment Type Discharge Planning Reassessment

## 2020-11-19 NOTE — PROGRESS NOTES
[facility] Hospital Medicine  Progress Note    Patient Name: Jhonny Almazan  MRN: [id number]  Patient Class: IP- Inpatient   Admission Date: 11/16/2020  Length of Stay: 2 days  Attending Physician: Neil Rosas MD  Primary Care Provider: Joey Whitehead MD        Subjective:     Principal Problem:Stroke due to embolism of right middle cerebral artery        HPI:  Jhonny Almazan is a 64-year-old  male with past medical history significant for hypertension, diabetes, neuropathy and depression presenting today for dizziness.  Patient states for the past 3 days he has been experiencing intermittent dizziness that would worsen with ambulation.  He states today his dizziness became constant and was associated with nausea and 1 episode of vomiting.  He also is experiencing a right temple headache which he describes as dull quality, 3/10 intensity, no aggravating or alleviating factors.  He denies blurred vision or focal neuro deficits at home.  While in the ER he experienced an episode of left hand weakness with associated numbness and tingling.  ED workup revealed CT head was negative but CTA head and neck performed and revealed absent filling of the bilateral vertebral arteries and proximal basal artery with findings of bilateral PCA and mid/distal basal artery patent and no acute intracranial hemorrhage.  Patient is on longer experiencing any focal neuro deficits and is left hand weakness and numbness have resolved.  Patient will be admitted to hospital medicine services for further workup and management.  He was given aspirin in the ED and his nausea is controlled after receiving IV Zofran.  He also received meclizine and states that his dizziness is improving.      Overview/Hospital Course:  -11/17 Patient remains in ICU for close neurologic monitoring. MRI brain with right cerebral infarctions.    - 11/18/2020 - Patient is in intensive care unit with right cerebral  cerebrovascular accident.  Patient is scheduled for barium swallow study today.  Patient reports less dizziness without any associated nausea and vomiting.  Right facial droop in apparent.  Patient denies any speech trouble today.  Patient does report seeing double at times.  Patient denies any chest pain or shortness of breath.  Inpatient rehab placement efforts are underway.     Interval History:  Patient scheduled for SHIKHA this morning.  States that his symptoms are better denies any difficulty with speech failed the patient's although yesterday continues to have double vision.  Will stop Plavix as patient is scheduled for a PEG tube placement on Monday    Review of Systems   Constitutional: Negative for chills and fever.   HENT: Negative for congestion and sore throat.    Eyes: Negative for visual disturbance.   Respiratory: Negative for cough, shortness of breath and wheezing.    Cardiovascular: Negative for chest pain and palpitations.   Gastrointestinal: Negative for abdominal pain, blood in stool, diarrhea, nausea and vomiting.   Endocrine: Negative.    Genitourinary: Negative for difficulty urinating and hematuria.   Musculoskeletal: Negative.  Negative for back pain and neck pain.   Skin: Negative.  Negative for color change and pallor.   Allergic/Immunologic: Negative.    Neurological: Positive for dizziness, weakness, light-headedness, numbness and headaches. Negative for speech difficulty.   Hematological: Negative.    Psychiatric/Behavioral: Negative.    All other systems reviewed and are negative.    Objective:     Vital Signs (Most Recent):  Temp: 97.6 °F (36.4 °C) (11/19/20 0800)  Pulse: 84 (11/19/20 1215)  Resp: (!) 22 (11/19/20 1215)  BP: 118/80 (11/19/20 1215)  SpO2: 99 % (11/19/20 1215) Vital Signs (24h Range):  Temp:  [96.2 °F (35.7 °C)-98.2 °F (36.8 °C)] 97.6 °F (36.4 °C)  Pulse:  [70-92] 84  Resp:  [16-24] 22  SpO2:  [92 %-100 %] 99 %  BP: ()/(52-94) 118/80     Weight: 116.6 kg (257  lb)  Body mass index is 30.48 kg/m².    Intake/Output Summary (Last 24 hours) at 11/19/2020 1345  Last data filed at 11/19/2020 1200  Gross per 24 hour   Intake 320 ml   Output 800 ml   Net -480 ml      Physical Exam  Vitals signs reviewed.   Constitutional:       Appearance: Normal appearance. He is well-developed.   HENT:      Head: Normocephalic and atraumatic.      Right Ear: External ear normal.      Left Ear: External ear normal.      Nose: Nose normal.      Mouth/Throat:      Mouth: Mucous membranes are moist.   Eyes:      Pupils: Pupils are equal, round, and reactive to light.   Neck:      Musculoskeletal: Normal range of motion and neck supple.   Cardiovascular:      Rate and Rhythm: Normal rate and regular rhythm.      Pulses: Normal pulses.      Heart sounds: Normal heart sounds.   Pulmonary:      Effort: Pulmonary effort is normal.   Abdominal:      General: Abdomen is flat. Bowel sounds are normal. There is no distension.      Palpations: Abdomen is soft.   Musculoskeletal: Normal range of motion.      Right lower leg: No edema.      Left lower leg: No edema.   Skin:     General: Skin is warm and dry.   Neurological:      Mental Status: He is alert and oriented to person, place, and time.      Cranial Nerves: No cranial nerve deficit or facial asymmetry.      Sensory: Sensory deficit present.      Motor: Weakness present.      Comments: Mild right facial drooping.  No speech dysfunction noted.   Psychiatric:         Mood and Affect: Mood normal.         Significant Labs:   CBC:   Recent Labs   Lab 11/18/20 0427 11/19/20 0439   WBC 11.98 13.56*   HGB 16.2 16.5   HCT 49.2 49.3    199     CMP:   Recent Labs   Lab 11/18/20 0427 11/19/20 0439    140   K 3.8 3.3*    102   CO2 24 25   * 158*   BUN 14 19   CREATININE 0.8 0.8   CALCIUM 10.1 10.1   PROT 7.1 7.2   ALBUMIN 3.9 4.0   BILITOT 0.7 0.9   ALKPHOS 71 73   AST 16 19   ALT 27 31   ANIONGAP 11 13   EGFRNONAA >60 >60        Significant Imaging: I have reviewed all pertinent imaging results/findings within the past 24 hours.      Assessment/Plan:      * Stroke due to embolism of right middle cerebral artery  CT head negative. CTA head and neck reviewed - absent feeling bilateral vertebral arteries and proximal basal artery with patent bilateral PCA and mid/distal basal artery. MRI brain shows right sided cerbral infarcts  -follow Neurology recommendations, appreciate recommendations  -continue aspirin   Plavix on hold for PEG tube placement  -Neuro checks every hour  -continue PT/OT/ST.  Patient will benefit with inpatient rehab in near future.  -Fall precautions, aspiration precautions  Results for orders placed during the hospital encounter of 11/16/20   Echo Color Flow Doppler? Yes    Narrative · There is no evidence of intracardiac shunting.  · Mild left atrial enlargement.  · The left ventricle is normal in size with normal systolic function. The   estimated ejection fraction is 54%.  · Normal left ventricular diastolic function.  · Normal right ventricular size with normal right ventricular systolic   function.  · There is right ventricular hypertrophy. Question of mild RVH  · No pulmonary hypertension  · Small of fat pad anterior wall of the right ventricle     Bubble study was technically suboptimal and uninterpretable for PFO  Small calcification at the tip of the anterior leaf of the mitral valve on   the ventricular surface             Dysphagia  Patient failed the barium swallow test  Scheduled for PEG tube placement on Monday holding plavix prior to the procedure as per GI recs      Major depressive disorder  Continue Wellbutrin as before.      Hypertension  Chronic problem. Will continue chronic medications and monitor for any changes, adjusting as needed.          Dizziness  Likely Secondary to acute CVA.  -Continue meclizine p.r.n.  -follow Neurology recommendations.      Hyperlipidemia associated with type 2  diabetes mellitus  Chronic.  -Resume statin         Hypertension associated with diabetes  Chronic, controlled.  Latest blood pressure and vitals reviewed-   Temp:  [96.2 °F (35.7 °C)-98.2 °F (36.8 °C)]   Pulse:  [70-92]   Resp:  [16-24]   BP: ()/(52-94)   SpO2:  [92 %-100 %] .   Home meds for hypertension were reviewed and noted below. Hospital anti-hypertensive changes were made as shown below.  Hypertension Medications             hydrALAZINE (APRESOLINE) 25 MG tablet Take 1 tablet (25 mg total) by mouth every 12 (twelve) hours.    lisinopril-hydrochlorothiazide (PRINZIDE,ZESTORETIC) 20-12.5 mg per tablet TAKE 1 TABLET BY MOUTH TWICE A DAY        Will utilize p.r.n. blood pressure medication only if patient's blood pressure greater than  180/110 and he develops symptoms such as worsening chest pain or shortness of breath.        Diabetes mellitus type 2, uncontrolled  Patient's FSGs are controlled on current hypoglycemics.   Last A1c reviewed-   Lab Results   Component Value Date    HGBA1C 7.0 (H) 11/16/2020     Most recent fingerstick glucose reviewed-   Recent Labs   Lab 11/18/20  1748 11/18/20  2132 11/19/20  0554 11/19/20  1226   POCTGLUCOSE 143* 157* 163* 174*     Current correctional scale  Low  Maintain anti-hyperglycemic dose as follows-   Antihyperglycemics (From admission, onward)    Low dose SS insulin        Hold Oral hypoglycemics while patient is in the hospital.        Nicotine dependence  Smoking cessation counseling performed. Dangers of cigarette smoking were reviewed with patient in detail and patient was encouraged to quit. Nicotine replacement options were discussed for > 3 minutes.  -Continue Wellbutrin        VTE Risk Mitigation (From admission, onward)         Ordered     enoxaparin injection 40 mg  Every 24 hours      11/16/20 1456     IP VTE LOW RISK PATIENT  Once      11/16/20 0328     Place sequential compression device  Until discontinued      11/16/20 0328                 Discharge Planning   YUDITH: 11/19/2020     Code Status: Full Code   Is the patient medically ready for discharge?:     Reason for patient still in hospital (select all that apply): Patient trending condition and Treatment  Discharge Plan A: Rehab                  Neil Rosas MD  Department of Hospital Medicine   [facility]

## 2020-11-19 NOTE — PROGRESS NOTES
[facility]  Progress Note  Patient Name: Jhonny Almazan  MRN: [id number]  Admission Date: 11/16/2020  Hospital Length of Stay: 2 days  Code Status: Full Code   Attending Provider: Neil Rosas MD   Consulting Provider: Dr Burns  Primary Care Physician: Joey Whitehead MD  Principal Problem:Stroke due to embolism of right middle cerebral artery    Consults  Subjective:     Chief Complaint:    Dizziness       dizziness x 2 days   N/V beginning tonight          HPI per EMR: Jhonny Almazan is a 64-year-old  male with past medical history significant for hypertension, diabetes, neuropathy and depression presenting today for dizziness.  Patient states for the past 3 days he has been experiencing intermittent dizziness that would worsen with ambulation.  He states today his dizziness became constant and was associated with nausea and 1 episode of vomiting.  He also is experiencing a right temple headache which he describes as dull quality, 3/10 intensity, no aggravating or alleviating factors.  He denies blurred vision or focal neuro deficits at home.  While in the ER he experienced an episode of left hand weakness with associated numbness and tingling.  ED workup revealed CT head was negative but CTA head and neck performed and revealed absent filling of the bilateral vertebral arteries and proximal basal artery with findings of bilateral PCA and mid/distal basal artery patent and no acute intracranial hemorrhage.  Patient is on longer experiencing any focal neuro deficits and is left hand weakness and numbness have resolved.  Patient will be admitted to hospital medicine services for further workup and management.  He was given aspirin in the ED and his nausea is controlled after receiving IV Zofran.  He also received meclizine and states that his dizziness is improving.    Neurological Consult: Pt seen and examined. POC discussed with Dr. Burns. PT with h/o HTN, DB,  smokers, ETOH use reports having dizziness, lightheadedness, bL blurry vision, BL UE weakness, and numbness in his hands and feet that started last night. He call ed EMS and was brought to ED. CTA head and neck shows clots in the basilar and /BL vertebral arteries with potential for removal, per Dr. Burns. It is recommended that pt is transferred to [facility] to see endovascular surgeon ASAP. POC discussed with Dr. Caldera as well as on call endovascular surgeon via Dr Burns. POC also discussed with pts wife Christine, per pt's request.     11/17: Patient seen and examined this morning with Dr. Burns. He is alert and oriented x3. Horizontal nystagmus noted one exam and patient reports double vision. He states he feels as if his speech is slurred, continues to experience dizziness and reports decreased sensation to right face and LLE. This morning I was called by patient's nurse BURAK Pereyra to report symptoms of speech changes, facial numbness and difficulty swallowing. Repeat CT was ordered and did not show new strokes or acute bleed.     CTH wo contrast:   Impression:     Small areas of low-density right parietal corresponding to areas of infarctions seen on the MRI 11/16/2020 common not readily apparent on the CT and better evaluated visualized by the MRI.  No acute intracranial hemorrhage, mass effect, or acute major vascular territory infarct evident on the CT.    11/18: Pt seen and examined and POC discussed with Dr. Burns. Pt reports he still has double vision and dizziness. He is having trouble ambulating. LS slightly weaker than right.        November 19th:  Patient seen and examined.  Plan of care discussed with Dr. Burns.  Patient is alert and oriented.  He still has a blurry vision, double vision, and dizziness.  Strength is intact.   Patient waiting for his SHIKHA.  Past Medical History:   Diagnosis Date    Anticoagulant long-term use     Arthritis     Colon polyp     Diabetes mellitus     Diabetes mellitus, type 2      Fatty liver     Hypertension        Past Surgical History:   Procedure Laterality Date    BACK SURGERY      COLONOSCOPY  07/18/2014    Dr. Crane: 22 colon polyps removed, hemorrhoids, erythema to sigmoid, repeat in 1 year for surveillance; biopsy: sigmoid erythema- showed unremarkable colonic mucosa, tubular adenomas and hyperplastic polyps    COLONOSCOPY N/A 5/3/2019    Procedure: COLONOSCOPY;  Surgeon: Guero Crane MD;  Location: [facility] ENDO;  Service: Endoscopy;  Laterality: N/A;    COLONOSCOPY N/A 5/6/2019    Procedure: COLONOSCOPY;  Surgeon: Guero Crane MD;  Location: [facility];  Service: Endoscopy;  Laterality: N/A;    EPIDURAL STEROID INJECTION INTO THORACIC SPINE N/A 8/30/2019    Procedure: Injection-steroid-epidural-thoracic;  Surgeon: Frantz Green MD;  [facility]: [facility];  Service: Pain Management;  Laterality: N/A;  T6-7    ESOPHAGOGASTRODUODENOSCOPY N/A 4/26/2019    Procedure: EGD (ESOPHAGOGASTRODUODENOSCOPY);  Surgeon: Guero Crane MD;  Location: [facility];  Service: Endoscopy;  Laterality: N/A;    HERNIA REPAIR         Review of patient's allergies indicates:  No Known Allergies    Current Neurological Medications:asa 81 mg, plavix 75 mg, atorvastatin 40 mg    No current facility-administered medications on file prior to encounter.      Current Outpatient Medications on File Prior to Encounter   Medication Sig    buPROPion (WELLBUTRIN XL) 300 MG 24 hr tablet Take 1 tablet (300 mg total) by mouth once daily.    empagliflozin (JARDIANCE) 10 mg Tab Take 10 mg by mouth once daily.    folic acid-vit B6-vit B12 2.5-25-2 mg (FOLBIC OR EQUIV) 2.5-25-2 mg Tab Take 1 tablet by mouth once daily.    gabapentin (NEURONTIN) 300 MG capsule Take 2 capsules (600 mg total) by mouth every evening.    hydrALAZINE (APRESOLINE) 25 MG tablet Take 1 tablet (25 mg total) by mouth every 12 (twelve) hours.    insulin (BASAGLAR KWIKPEN U-100 INSULIN) glargine 100 units/mL (3mL) SubQ pen Inject 20  "Units into the skin every evening.    lactulose (CHRONULAC) 10 gram/15 mL solution TAKE 30 MLS BY MOUTH 2 TIMES DAILY.    lidocaine (LIDODERM) 5 % Place 1 patch onto the skin once daily. Remove & Discard patch within 12 hours or as directed by MD    lisinopril-hydrochlorothiazide (PRINZIDE,ZESTORETIC) 20-12.5 mg per tablet TAKE 1 TABLET BY MOUTH TWICE A DAY    metFORMIN (GLUCOPHAGE-XR) 500 MG 24 hr tablet Take 2 tablets (1,000 mg total) by mouth 2 (two) times daily.    pen needle, diabetic 32 gauge x 5/32" Ndle 1 each by Misc.(Non-Drug; Combo Route) route nightly.    pioglitazone (ACTOS) 30 MG tablet Take 1 tablet (30 mg total) by mouth once daily.    rosuvastatin (CRESTOR) 20 MG tablet Take 1 tablet (20 mg total) by mouth once daily.    diclofenac sodium (VOLTAREN) 1 % Gel Apply 2 g topically 3 (three) times daily as needed. For hand pain    magnesium citrate solution Take 296 mLs by mouth daily as needed (constipation).     omeprazole (PRILOSEC) 40 MG capsule TAKE 1 CAPSULE (40 MG TOTAL) BY MOUTH BEFORE BREAKFAST.      Family History     Problem Relation (Age of Onset)    Brain cancer Brother    Diabetes Brother    Heart disease Mother, Father    Suicide Brother        Tobacco Use    Smoking status: Current Every Day Smoker     Packs/day: 1.00     Years: 50.00     Pack years: 50.00     Types: Cigarettes    Smokeless tobacco: Never Used   Substance and Sexual Activity    Alcohol use: Yes     Alcohol/week: 14.0 standard drinks     Types: 14 Cans of beer per week     Comment: 2-3 beers daily    Drug use: No    Sexual activity: Yes     Partners: Female     Review of Systems   Constitutional: Negative.    HENT: Negative.    Eyes: Positive for visual disturbance.   Respiratory: Negative.    Cardiovascular: Negative.    Gastrointestinal: Positive for nausea and vomiting.   Musculoskeletal: Negative.    Neurological: Positive for dizziness, weakness and numbness.   Hematological: Negative.  "   Psychiatric/Behavioral: Negative.      Objective:     Vital Signs (Most Recent):  Temp: 97.6 °F (36.4 °C) (11/19/20 0800)  Pulse: 84 (11/19/20 1215)  Resp: (!) 22 (11/19/20 1215)  BP: 118/80 (11/19/20 1215)  SpO2: 99 % (11/19/20 1215) Vital Signs (24h Range):  Temp:  [96.2 °F (35.7 °C)-97.6 °F (36.4 °C)] 97.6 °F (36.4 °C)  Pulse:  [76-92] 84  Resp:  [16-24] 22  SpO2:  [92 %-100 %] 99 %  BP: ()/(52-94) 118/80     Weight: 116.6 kg (257 lb)  Body mass index is 30.48 kg/m².    Physical Exam  Vitals signs and nursing note reviewed.   HENT:      Head: Normocephalic and atraumatic.      Nose: Nose normal.      Mouth/Throat:      Mouth: Mucous membranes are moist.      Pharynx: Oropharynx is clear.   Eyes:      Extraocular Movements: Extraocular movements intact and EOM normal.      Pupils: Pupils are equal, round, and reactive to light.   Neck:      Musculoskeletal: Normal range of motion.   Cardiovascular:      Rate and Rhythm: Normal rate.   Pulmonary:      Effort: Pulmonary effort is normal.   Musculoskeletal: Normal range of motion.   Skin:     General: Skin is warm and dry.   Neurological:      Mental Status: He is alert and oriented to person, place, and time.      Coordination: Finger-Nose-Finger Test abnormal.      Deep Tendon Reflexes: Strength normal.   Psychiatric:         Mood and Affect: Mood normal.         Speech: Speech is slurred.         NEUROLOGICAL EXAMINATION:     MENTAL STATUS   Oriented to person, place, and time.   Attention: normal. Concentration: normal.   Speech: slurred   Level of consciousness: alert  Able to name object. Able to repeat. Normal comprehension.     CRANIAL NERVES   Cranial nerves II through XII intact.     CN II   Right visual field deficit: none  Left visual field deficit: none     CN III, IV, VI   Pupils are equal, round, and reactive to light.  Extraocular motions are normal.   Nystagmus: bilateral   Nystagmus type: rapid and horizontal  Diplopia: bilateral    CN V    Right facial sensation deficit: cheeks and mandible  Left facial sensation deficit: none    MOTOR EXAM     Strength   Strength 5/5 throughout.     SENSORY EXAM   Right arm light touch: normal  Left arm light touch: normal  Right leg light touch: normal  Left leg light touch: decreased from knee    GAIT AND COORDINATION      Coordination   Finger to nose coordination: abnormal    Tremor   Resting tremor: absent       nadir     NIH Stroke Scale:    Level of Consciousness: 0 - alert  LOC Questions: 0 - answers both correctly  LOC Commands: 0 - performs both correctly  Best Gaze: 0 - normal  Visual: 0 - no visual loss  Facial Palsy: 1 - minor  Motor Left Arm: 0 - no drift  Motor Right Arm: 0 - no drift  Motor Left Le - no drift  Motor Right Le - no drift  Limb Ataxia: 0 - absent  Sensory: 1 - mild to moderate loss  Best Language: 0 - no aphasia  Dysarthria: 1 - mild to moderate dysarthria  Extinction and Inattention: 0 - no neglect  NIH Stroke Scale Total: 3          Significant Labs:   Lab Results   Component Value Date    WBC 13.56 (H) 2020    HGB 16.5 2020    HCT 49.3 2020    MCV 93 2020     2020       CMP  Sodium   Date Value Ref Range Status   2020 140 136 - 145 mmol/L Final     Potassium   Date Value Ref Range Status   2020 3.3 (L) 3.5 - 5.1 mmol/L Final     Chloride   Date Value Ref Range Status   2020 102 95 - 110 mmol/L Final     CO2   Date Value Ref Range Status   2020 25 23 - 29 mmol/L Final     Glucose   Date Value Ref Range Status   2020 158 (H) 70 - 110 mg/dL Final     BUN   Date Value Ref Range Status   2020 19 8 - 23 mg/dL Final     Creatinine   Date Value Ref Range Status   2020 0.8 0.5 - 1.4 mg/dL Final     Calcium   Date Value Ref Range Status   2020 10.1 8.7 - 10.5 mg/dL Final     Total Protein   Date Value Ref Range Status   2020 7.2 6.0 - 8.4 g/dL Final     Albumin   Date Value Ref Range Status    11/19/2020 4.0 3.5 - 5.2 g/dL Final     Total Bilirubin   Date Value Ref Range Status   11/19/2020 0.9 0.1 - 1.0 mg/dL Final     Comment:     For infants and newborns, interpretation of results should be based  on gestational age, weight and in agreement with clinical  observations.  Premature Infant recommended reference ranges:  Up to 24 hours.............<8.0 mg/dL  Up to 48 hours............<12.0 mg/dL  3-5 days..................<15.0 mg/dL  6-29 days.................<15.0 mg/dL       Alkaline Phosphatase   Date Value Ref Range Status   11/19/2020 73 55 - 135 U/L Final     AST   Date Value Ref Range Status   11/19/2020 19 10 - 40 U/L Final     ALT   Date Value Ref Range Status   11/19/2020 31 10 - 44 U/L Final     Anion Gap   Date Value Ref Range Status   11/19/2020 13 8 - 16 mmol/L Final     eGFR if    Date Value Ref Range Status   11/19/2020 >60 >60 mL/min/1.73 m^2 Final     eGFR if non    Date Value Ref Range Status   11/19/2020 >60 >60 mL/min/1.73 m^2 Final     Comment:     Calculation used to obtain the estimated glomerular filtration  rate (eGFR) is the CKD-EPI equation.            Significant Imaging: Transesophageal echo (SHIKHA)  · The left ventricle is normal in size with normal systolic function. The   estimated ejection fraction is 65%.  · Normal right ventricular size with normal right ventricular systolic   function.  · Normal appearing left atrial appendage. No thrombus is present in the   appendage. FELY occluder is absent. Normal appendage velocities.  · No interatrial septal defect present.  · The descending aorta is dilated.  · Grade 3 plaque present in the transverse aorta and descending aorta.  · No graft present.  · No evidence of coarctation.     Fl Modified Barium Swallow Speech  Narrative: EXAMINATION:  FL MODIFIED BARIUM SWALLOW SPEECH STUDY    CLINICAL HISTORY:  post CVA dysphagia;    TECHNIQUE:  Modified barium swallow was  acquired.    COMPARISON:  None    FINDINGS:  Lateral view of the cervical spine shows minimal degenerative changes.  There is straightening of the normal cervical lordosis.  There is no evidence anterior osteophyte to account for dysphagia.  There is no prevertebral soft tissue swelling.  Multiple teeth are missing.  Impression: Multiple teeth are missing and there are degenerative changes of the cervical spine.  With regards to speech mechanics these will be reported by the speech pathologist.    Electronically signed by: Jacque Palumbo MD  Date:    11/19/2020  Time:    08:09        Assessment and Plan:  PMH includes DB, HTN, nicotine dependence    Dizziness/Blurry Vision/Numbness/Weakness    1. Acute Embolic Infarct   -CT head: negative acute  -CTA head and neck: basilar artery and vertebral artery no flow; details above  -MRI brain revealed scattered, acute, ischemic infarcts seen throughout the right cerebral hemisphere  -ECHO: no evidence of shunting, bubble study suboptimal ef 54%,   -Lipids: 199 chol 128 ldl  -A1C: 7  -Secondary stroke prevention: asa 81mg, high intensity statin at this time, more recs pending workup  -PT/OT/SLP  -Permissive /110  On day 3, begin decreasing BP by 20%  -Consult Vascular surgery: No interventional Recs at this time  -Pt can be moved to telemetry unit with q4 neuro checks    Dizziness  -PRN meclizine    PLAN: Main campus consulted for thrombectomy consideration. Dr Spence does not believe intervention benefits outweigh risks at this time. Will monitor pt closely and complete the stroke work up. Rec asa and plavix DAPT for at least 21 days at this time. Recommend holding BP meds for now to allow permissive hypertension for now. Evaluate thyroid nodules incidentally found on CTA head and neck outpatient.  Atif was negative for thrombus, negative for septal defect, ejection fraction was 65%. Due to stroke location, patient may have fluctuating symptoms. This patient would  benefit from intense PT/OT/ST to maximize recovery.  Patient can try meclizine for his dizziness at this time.       [phone] 3 days from discharge.     Stroke education was provided including stroke risk factors modification and any acute neurological changes including weakness, confusion, visual changes to come straight to the ER.     All questions were answered.                                    Active Diagnoses:    Diagnosis Date Noted POA    PRINCIPAL PROBLEM:  Stroke due to embolism of right middle cerebral artery [I63.411] 11/16/2020 Yes    Dysphagia [R13.10] 11/19/2020 Yes    Major depressive disorder [F32.9] 11/17/2020 Yes    Dizziness [R42] 11/16/2020 Yes    Hypertension [I10] 11/16/2020 Yes    Hyperlipidemia associated with type 2 diabetes mellitus [E11.69, E78.5] 09/28/2016 Yes    Diabetes mellitus type 2, uncontrolled [E11.65] 09/19/2014 Yes    Hypertension associated with diabetes [E11.59, I10] 09/19/2014 Yes    Nicotine dependence [F17.200] 07/10/2014 Yes      Problems Resolved During this Admission:       VTE Risk Mitigation (From admission, onward)         Ordered     enoxaparin injection 40 mg  Every 24 hours      11/16/20 1456     IP VTE LOW RISK PATIENT  Once      11/16/20 0328     Place sequential compression device  Until discontinued      11/16/20 0328                Thank you for your consult.     Tamela Meyer NP  Neurology  [facility]

## 2020-11-19 NOTE — PT/OT/SLP PROGRESS
"Speech Language Pathology Treatment    Patient Name:  Jhonny Almazan   MRN:  [id number]  Admitting Diagnosis: Stroke due to embolism of right middle cerebral artery    Recommendations:                 General Recommendations:  Dysphagia therapy  Diet recommendations:  NPO, Liquid Diet Level: NPO(Please allow ice chips for oral comfort/hydration follwing oral care)   Aspiration Precautions: Frequent oral care and Ice chips sparingly   General Precautions: Standard, aspiration, fall, vision impaired, NPO  Communication strategies:  none    Subjective     "It's like a tickle in my throat."  "I thought I was getting it[PEG] and going to rehab tomorrow."  Expressing concern about being NPO until PEG placement Monday.     Objective:   Pt seen for dysphagia therapy. He is alert and cooperative. Denies any improvement in swallow function. Reports frequent coughing and need for Yankauer at bedside. Introduced dysphagia exercises. Pt performed Shaker (forehead) x5, 30 seconds each, with SPV. Able to perform Mendelsohn x10 with MIN verbal cues. Educated on use of effortful swallow with ice chips.     Has the patient been evaluated by SLP for swallowing?   Yes  Keep patient NPO? Yes   Current Respiratory Status: room air      Assessment:     Jhonny Almazan is a 64 y.o. male with an SLP diagnosis of Dysphagia.  He presents with good effort. Continue current POC.    Goals:   Multidisciplinary Problems     SLP Goals        Problem: SLP Goal    Goal Priority Disciplines Outcome   SLP Goal     SLP Ongoing, Progressing   Description:   1) Participate in MBSS--MET  2) Participate in further evaluation of higher level cognitive functions  3) Pt will perform effortful swallow, mendelsohn, CTAR and Shaker given SPV                     Plan:     · Patient to be seen:  5 x/week   · Plan of Care expires:  12/02/20  · Plan of Care reviewed with:  patient   · SLP Follow-Up:  Yes       Discharge recommendations:  rehabilitation " facility   Barriers to Discharge:  None    Time Tracking:     SLP Treatment Date:   11/19/20  Speech Start Time:  1432  Speech Stop Time:  1448     Speech Total Time (min):  16 min    Billable Minutes: Treatment Swallowing Dysfunction 16 and Total Time 16    Christa Hay CCC-SLP  11/19/2020

## 2020-11-19 NOTE — PT/OT/SLP PROGRESS
Physical Therapy Treatment    Patient Name:  Jhonny Almazan   MRN:  [id number]    Recommendations:     Discharge Recommendations:  home, home health PT, rehabilitation facility   Discharge Equipment Recommendations: other (see comments)(unclear at this time)   Barriers to discharge: None    Assessment:     Jhonny Almazan is a 64 y.o. male admitted with a medical diagnosis of Stroke due to embolism of right middle cerebral artery.  He presents with the following impairments/functional limitations:  weakness, impaired endurance, impaired functional mobilty, gait instability, impaired balance, decreased lower extremity function .  Patient agreeable to PT treatment this morning.  Patient presented supine in bed and required min assist to transfer to sitting and then min assist to stand.  Patient then transferred over to chair with RW with min assist using a step transfer.  Patient only sit up in chair about 5 minutes and needed to transfer back to bed due to being dizzy.  Patient was instructed to close eyes when sitting and patient did report dizziness did reduce when eyes closed.    Rehab Prognosis: Good; patient would benefit from acute skilled PT services to address these deficits and reach maximum level of function.    Recent Surgery: Procedure(s) (LRB):  Transesophageal echo (SHIKHA) intra-procedure log documentation (N/A)      Plan:     During this hospitalization, patient to be seen daily to address the identified rehab impairments via gait training, therapeutic activities, therapeutic exercises and progress toward the following goals:    · Plan of Care Expires:  12/15/20    Subjective     Chief Complaint: feeling dizzy  Patient/Family Comments/goals: go home  Pain/Comfort:  ·        Objective:     Communicated with nurse prior to session.  Patient found supine with bed alarm, oxygen, SCD upon PT entry to room.     General Precautions: Standard, fall   Orthopedic Precautions:N/A   Braces:        Functional Mobility:  · Bed Mobility:     · Supine to Sit: minimum assistance  · Sit to Supine: minimum assistance  · Transfers:     · Sit to Stand:  minimum assistance with rolling walker  · Gait: x 3 feet RW min assist to chair      AM-PAC 6 CLICK MOBILITY          Therapeutic Activities and Exercises:   transfer training supine to/from sit with min assist, sit to stand with min assist with RW, EOB to/from chair with min assist ambulating 3 feet    Patient left supine with call button in reach, bed alarm on and nurse notified..    GOALS:   Multidisciplinary Problems     Physical Therapy Goals        Problem: Physical Therapy Goal    Goal Priority Disciplines Outcome Goal Variances Interventions   Physical Therapy Goal     PT, PT/OT Ongoing, Progressing     Description: Goals to be met by: 2020    Patient will increase functional independence with mobility by performin. Supine to sit with Stand-by Assistance  2. Sit to supine with Stand-by Assistance  3. Sit to stand transfer with Contact Guard Assistance  4. Bed to chair transfer with Minimal Assistance using Rolling Walker  5. Gait  x 50 feet with Minimal Assistance using Rolling Walker.                      Time Tracking:     PT Received On: 20  PT Start Time: 1001     PT Stop Time: 1019  PT Total Time (min): 18 min     Billable Minutes: Therapeutic Activity 18    Treatment Type: Treatment  PT/PTA: PT     PTA Visit Number: 0     Chris Megilligan, PT  2020

## 2020-11-19 NOTE — PLAN OF CARE
Pt appears to be sleeping, eyes closed, turned on right side, respirations regular, deep and even. When awake, still c/o blurred and double  vision. Verbalizes understanding of PoC,  expresses willingness to participate as much as possible. NAD noted at this time, no c/o pain, call light in reach, will continue to  monitor.

## 2020-11-19 NOTE — ANESTHESIA PREPROCEDURE EVALUATION
11/19/2020  Jhonny Almazan is a 64 y.o., male.    Anesthesia Evaluation    I have reviewed the Patient Summary Reports.    I have reviewed the Nursing Notes.    I have reviewed the Medications.     Review of Systems  Anesthesia Hx:  No problems with previous Anesthesia    Social:  Smoker    Cardiovascular:   Hypertension, well controlled hyperlipidemia    Pulmonary:  Pulmonary Normal    Renal/:  Renal/ Normal     Hepatic/GI:   Liver Disease,    Musculoskeletal:   Arthritis     Neurological:   CVA Neuromuscular Disease,    Endocrine:   Diabetes, well controlled, type 2    Psych:   Psychiatric History depression          Physical Exam  General:  Well nourished, Morbid Obesity    Airway/Jaw/Neck:  Airway Findings: Mouth Opening: Normal Tongue: Normal  General Airway Assessment: Adult  Oropharynx Findings:  Mallampati: II  Jaw/Neck Findings:  Neck ROM: Normal ROM     Eyes/Ears/Nose:  Eyes/Ears/Nose Findings:    Dental:  Dental Findings:   Chest/Lungs:  Chest/Lungs Findings: Normal Respiratory Rate     Heart/Vascular:  Heart Findings: Rate: Normal  Rhythm: Regular Rhythm        Mental Status:  Mental Status Findings:  Cooperative, Alert and Oriented         Anesthesia Plan  Type of Anesthesia, risks & benefits discussed:  Anesthesia Type:  general  Patient's Preference:   Intra-op Monitoring Plan: standard ASA monitors  Intra-op Monitoring Plan Comments:   Post Op Pain Control Plan: multimodal analgesia  Post Op Pain Control Plan Comments:   Induction:   IV  Beta Blocker:  Patient is not currently on a Beta-Blocker (No further documentation required).       Informed Consent: Patient understands risks and agrees with Anesthesia plan.  Questions answered. Anesthesia consent signed with patient.  ASA Score: 3     Day of Surgery Review of History & Physical:            Ready For Surgery From Anesthesia  Perspective.

## 2020-11-19 NOTE — NURSING
Results for MARA VERO MILLER (MRN [id number]) as of 11/19/2020 15:28   Ref. Range 11/19/2020 04:39   Potassium Latest Ref Range: 3.5 - 5.1 mmol/L 3.3 (L)     Dr Rosas notified and ordered to give Potassium chloride 10mEq IV once to replace.

## 2020-11-19 NOTE — NURSING
In patients room explaining SHIKHA procedure with anesthesia.  All questions answered .  2 Identifers done.  Pt transported to room 502 for procedure via bed.  Consents obtained by MDs.  Pt connected to monitors.  Time out obtained.  Gil score 10.  Anesthesia performs sedation and monitoring.  SHIKHA probe introducted by Dr Zee.  SHIKHA performed with Bubble Study.  SHIKHA probe removed and pt recoved.  Report called to Sandra on PCU.  Pt transported when fully awake, no c/o pain or sob and VSS.

## 2020-11-19 NOTE — PROGRESS NOTES
[facility]  Progress Note  Patient Name: Jhonny Almazan  MRN: [id number]  Admission Date: 11/16/2020  Hospital Length of Stay: 1 days  Code Status: Full Code   Attending Provider: Elle Newberry MD   Consulting Provider: Dr Burns  Primary Care Physician: Joey Whitehead MD  Principal Problem:Stroke due to embolism of right middle cerebral artery    Consults  Subjective:     Chief Complaint:    Dizziness       dizziness x 2 days   N/V beginning tonight          HPI per EMR: Jhonny Almazan is a 64-year-old  male with past medical history significant for hypertension, diabetes, neuropathy and depression presenting today for dizziness.  Patient states for the past 3 days he has been experiencing intermittent dizziness that would worsen with ambulation.  He states today his dizziness became constant and was associated with nausea and 1 episode of vomiting.  He also is experiencing a right temple headache which he describes as dull quality, 3/10 intensity, no aggravating or alleviating factors.  He denies blurred vision or focal neuro deficits at home.  While in the ER he experienced an episode of left hand weakness with associated numbness and tingling.  ED workup revealed CT head was negative but CTA head and neck performed and revealed absent filling of the bilateral vertebral arteries and proximal basal artery with findings of bilateral PCA and mid/distal basal artery patent and no acute intracranial hemorrhage.  Patient is on longer experiencing any focal neuro deficits and is left hand weakness and numbness have resolved.  Patient will be admitted to hospital medicine services for further workup and management.  He was given aspirin in the ED and his nausea is controlled after receiving IV Zofran.  He also received meclizine and states that his dizziness is improving.    Neurological Consult: Pt seen and examined. POC discussed with Dr. Burns. PT with h/o HTN, DB, smokers,  ETOH use reports having dizziness, lightheadedness, bL blurry vision, BL UE weakness, and numbness in his hands and feet that started last night. He call ed EMS and was brought to ED. CTA head and neck shows clots in the basilar and /BL vertebral arteries with potential for removal, per Dr. Burns. It is recommended that pt is transferred to [facility] to see endovascular surgeon ASAP. POC discussed with Dr. Caldera as well as on call endovascular surgeon via Dr Burns. POC also discussed with pts wife Christine, per pt's request.     11/17: Patient seen and examined this morning with Dr. Burns. He is alert and oriented x3. Horizontal nystagmus noted one exam and patient reports double vision. He states he feels as if his speech is slurred, continues to experience dizziness and reports decreased sensation to right face and LLE. This morning I was called by patient's nurse BURAK Pereyra to report symptoms of speech changes, facial numbness and difficulty swallowing. Repeat CT was ordered and did not show new strokes or acute bleed.     CTH wo contrast:   Impression:     Small areas of low-density right parietal corresponding to areas of infarctions seen on the MRI 11/16/2020 common not readily apparent on the CT and better evaluated visualized by the MRI.  No acute intracranial hemorrhage, mass effect, or acute major vascular territory infarct evident on the CT.    11/18: Pt seen and examined and POC discussed with Dr. Burns. Pt reports he still has double vision and dizziness. He is having trouble ambulating. LS slightly weaker than right.      Past Medical History:   Diagnosis Date    Anticoagulant long-term use     Arthritis     Colon polyp     Diabetes mellitus     Diabetes mellitus, type 2     Fatty liver     Hypertension        Past Surgical History:   Procedure Laterality Date    BACK SURGERY      COLONOSCOPY  07/18/2014    Dr. Miles: 22 colon polyps removed, hemorrhoids, erythema to sigmoid, repeat in 1 year for  surveillance; biopsy: sigmoid erythema- showed unremarkable colonic mucosa, tubular adenomas and hyperplastic polyps    COLONOSCOPY N/A 5/3/2019    Procedure: COLONOSCOPY;  Surgeon: Guero Crane MD;  Location: [facility];  Service: Endoscopy;  Laterality: N/A;    COLONOSCOPY N/A 5/6/2019    Procedure: COLONOSCOPY;  Surgeon: Guero Crane MD;  Location: [facility] ENDO;  Service: Endoscopy;  Laterality: N/A;    EPIDURAL STEROID INJECTION INTO THORACIC SPINE N/A 8/30/2019    Procedure: Injection-steroid-epidural-thoracic;  Surgeon: Frantz Green MD;  Location: [facility] OR;  Service: Pain Management;  Laterality: N/A;  T6-7    ESOPHAGOGASTRODUODENOSCOPY N/A 4/26/2019    Procedure: EGD (ESOPHAGOGASTRODUODENOSCOPY);  Surgeon: Guero Crane MD;  Location: [facility];  Service: Endoscopy;  Laterality: N/A;    HERNIA REPAIR         Review of patient's allergies indicates:  No Known Allergies    Current Neurological Medications:asa 81 mg, plavix 75 mg, atorvastatin 40 mg    No current facility-administered medications on file prior to encounter.      Current Outpatient Medications on File Prior to Encounter   Medication Sig    buPROPion (WELLBUTRIN XL) 300 MG 24 hr tablet Take 1 tablet (300 mg total) by mouth once daily.    empagliflozin (JARDIANCE) 10 mg Tab Take 10 mg by mouth once daily.    folic acid-vit B6-vit B12 2.5-25-2 mg (FOLBIC OR EQUIV) 2.5-25-2 mg Tab Take 1 tablet by mouth once daily.    gabapentin (NEURONTIN) 300 MG capsule Take 2 capsules (600 mg total) by mouth every evening.    hydrALAZINE (APRESOLINE) 25 MG tablet Take 1 tablet (25 mg total) by mouth every 12 (twelve) hours.    insulin (BASAGLAR KWIKPEN U-100 INSULIN) glargine 100 units/mL (3mL) SubQ pen Inject 20 Units into the skin every evening.    lactulose (CHRONULAC) 10 gram/15 mL solution TAKE 30 MLS BY MOUTH 2 TIMES DAILY.    lidocaine (LIDODERM) 5 % Place 1 patch onto the skin once daily. Remove & Discard patch within 12 hours or as  "directed by MD    lisinopril-hydrochlorothiazide (PRINZIDE,ZESTORETIC) 20-12.5 mg per tablet TAKE 1 TABLET BY MOUTH TWICE A DAY    metFORMIN (GLUCOPHAGE-XR) 500 MG 24 hr tablet Take 2 tablets (1,000 mg total) by mouth 2 (two) times daily.    pen needle, diabetic 32 gauge x 5/32" Ndle 1 each by Misc.(Non-Drug; Combo Route) route nightly.    pioglitazone (ACTOS) 30 MG tablet Take 1 tablet (30 mg total) by mouth once daily.    rosuvastatin (CRESTOR) 20 MG tablet Take 1 tablet (20 mg total) by mouth once daily.    diclofenac sodium (VOLTAREN) 1 % Gel Apply 2 g topically 3 (three) times daily as needed. For hand pain    magnesium citrate solution Take 296 mLs by mouth daily as needed (constipation).     omeprazole (PRILOSEC) 40 MG capsule TAKE 1 CAPSULE (40 MG TOTAL) BY MOUTH BEFORE BREAKFAST.      Family History     Problem Relation (Age of Onset)    Brain cancer Brother    Diabetes Brother    Heart disease Mother, Father    Suicide Brother        Tobacco Use    Smoking status: Current Every Day Smoker     Packs/day: 1.00     Years: 50.00     Pack years: 50.00     Types: Cigarettes    Smokeless tobacco: Never Used   Substance and Sexual Activity    Alcohol use: Yes     Alcohol/week: 14.0 standard drinks     Types: 14 Cans of beer per week     Comment: 2-3 beers daily    Drug use: No    Sexual activity: Yes     Partners: Female     Review of Systems   Constitutional: Negative.    HENT: Negative.    Eyes: Positive for visual disturbance.   Respiratory: Negative.    Cardiovascular: Negative.    Gastrointestinal: Positive for nausea and vomiting.   Musculoskeletal: Negative.    Neurological: Positive for dizziness, weakness and numbness.   Hematological: Negative.    Psychiatric/Behavioral: Negative.      Objective:     Vital Signs (Most Recent):  Temp: 98.2 °F (36.8 °C) (11/18/20 1418)  Pulse: 70 (11/18/20 1418)  Resp: 19 (11/18/20 1418)  BP: 128/79 (11/18/20 1418)  SpO2: 95 % (11/18/20 1418) Vital Signs (24h " Range):  Temp:  [97.5 °F (36.4 °C)-98.6 °F (37 °C)] 98.2 °F (36.8 °C)  Pulse:  [57-84] 70  Resp:  [10-37] 19  SpO2:  [91 %-99 %] 95 %  BP: (100-178)/() 128/79     Weight: 116.7 kg (257 lb 4.4 oz)  Body mass index is 30.51 kg/m².    Physical Exam  Vitals signs and nursing note reviewed.   HENT:      Head: Normocephalic and atraumatic.      Nose: Nose normal.      Mouth/Throat:      Mouth: Mucous membranes are moist.      Pharynx: Oropharynx is clear.   Eyes:      Extraocular Movements: Extraocular movements intact and EOM normal.      Pupils: Pupils are equal, round, and reactive to light.   Neck:      Musculoskeletal: Normal range of motion.   Cardiovascular:      Rate and Rhythm: Normal rate.   Pulmonary:      Effort: Pulmonary effort is normal.   Musculoskeletal: Normal range of motion.   Skin:     General: Skin is warm and dry.   Neurological:      Mental Status: He is alert and oriented to person, place, and time.      Coordination: Finger-Nose-Finger Test abnormal.      Deep Tendon Reflexes: Strength normal.   Psychiatric:         Mood and Affect: Mood normal.         Speech: Speech is slurred.         NEUROLOGICAL EXAMINATION:     MENTAL STATUS   Oriented to person, place, and time.   Attention: normal. Concentration: normal.   Speech: slurred   Level of consciousness: alert  Able to name object. Able to repeat. Normal comprehension.     CRANIAL NERVES   Cranial nerves II through XII intact.     CN II   Right visual field deficit: none  Left visual field deficit: none     CN III, IV, VI   Pupils are equal, round, and reactive to light.  Extraocular motions are normal.   Nystagmus: bilateral   Nystagmus type: rapid and horizontal  Diplopia: bilateral    CN V   Right facial sensation deficit: cheeks and mandible  Left facial sensation deficit: none    MOTOR EXAM     Strength   Strength 5/5 throughout.     SENSORY EXAM   Right arm light touch: normal  Left arm light touch: normal  Right leg light touch:  normal  Left leg light touch: decreased from knee    GAIT AND COORDINATION      Coordination   Finger to nose coordination: abnormal    Tremor   Resting tremor: absent       nadir     NIH Stroke Scale:    Level of Consciousness: 0 - alert  LOC Questions: 0 - answers both correctly  LOC Commands: 0 - performs both correctly  Best Gaze: 0 - normal  Visual: 0 - no visual loss  Facial Palsy: 1 - minor  Motor Left Arm: 0 - no drift  Motor Right Arm: 0 - no drift  Motor Left Le - no drift  Motor Right Le - no drift  Limb Ataxia: 0 - absent  Sensory: 1 - mild to moderate loss  Best Language: 0 - no aphasia  Dysarthria: 1 - mild to moderate dysarthria  Extinction and Inattention: 0 - no neglect  NIH Stroke Scale Total: 3          Significant Labs:   Lab Results   Component Value Date    WBC 11.98 2020    HGB 16.2 2020    HCT 49.2 2020    MCV 93 2020     2020       CMP  Sodium   Date Value Ref Range Status   2020 138 136 - 145 mmol/L Final     Potassium   Date Value Ref Range Status   2020 3.8 3.5 - 5.1 mmol/L Final     Chloride   Date Value Ref Range Status   2020 103 95 - 110 mmol/L Final     CO2   Date Value Ref Range Status   2020 24 23 - 29 mmol/L Final     Glucose   Date Value Ref Range Status   2020 191 (H) 70 - 110 mg/dL Final     BUN   Date Value Ref Range Status   2020 14 8 - 23 mg/dL Final     Creatinine   Date Value Ref Range Status   2020 0.8 0.5 - 1.4 mg/dL Final     Calcium   Date Value Ref Range Status   2020 10.1 8.7 - 10.5 mg/dL Final     Total Protein   Date Value Ref Range Status   2020 7.1 6.0 - 8.4 g/dL Final     Albumin   Date Value Ref Range Status   2020 3.9 3.5 - 5.2 g/dL Final     Total Bilirubin   Date Value Ref Range Status   2020 0.7 0.1 - 1.0 mg/dL Final     Comment:     For infants and newborns, interpretation of results should be based  on gestational age, weight and in agreement with  clinical  observations.  Premature Infant recommended reference ranges:  Up to 24 hours.............<8.0 mg/dL  Up to 48 hours............<12.0 mg/dL  3-5 days..................<15.0 mg/dL  6-29 days.................<15.0 mg/dL       Alkaline Phosphatase   Date Value Ref Range Status   11/18/2020 71 55 - 135 U/L Final     AST   Date Value Ref Range Status   11/18/2020 16 10 - 40 U/L Final     ALT   Date Value Ref Range Status   11/18/2020 27 10 - 44 U/L Final     Anion Gap   Date Value Ref Range Status   11/18/2020 11 8 - 16 mmol/L Final     eGFR if    Date Value Ref Range Status   11/18/2020 >60 >60 mL/min/1.73 m^2 Final     eGFR if non    Date Value Ref Range Status   11/18/2020 >60 >60 mL/min/1.73 m^2 Final     Comment:     Calculation used to obtain the estimated glomerular filtration  rate (eGFR) is the CKD-EPI equation.            Significant Imaging: Echo Color Flow Doppler? Yes  · There is no evidence of intracardiac shunting.  · Mild left atrial enlargement.  · The left ventricle is normal in size with normal systolic function. The   estimated ejection fraction is 54%.  · Normal left ventricular diastolic function.  · Normal right ventricular size with normal right ventricular systolic   function.  · There is right ventricular hypertrophy. Question of mild RVH  · No pulmonary hypertension  · Small of fat pad anterior wall of the right ventricle     Bubble study was technically suboptimal and uninterpretable for PFO  Small calcification at the tip of the anterior leaf of the mitral valve on   the ventricular surface  X-Ray Chest 1 View  Narrative: EXAMINATION:  XR CHEST 1 VIEW    CLINICAL HISTORY:  SNF placement;    TECHNIQUE:  Single frontal view of the chest was performed.    COMPARISON:  Chest of June 14, 2019    FINDINGS:  The lungs are clear, with normal appearance of pulmonary vasculature and no pleural effusion or pneumothorax.    The cardiac silhouette is normal in  size. The hilar and mediastinal contours are unremarkable.    Bones are intact.  Impression: No acute abnormality.    Electronically signed by: Josse Brunner MD  Date:    11/18/2020  Time:    08:15        Assessment and Plan:  PMH includes DB, HTN, nicotine dependence    Dizziness/Blurry Vision/Numbness/Weakness    1. Acute Embolic Infarct   -CT head: negative acute  -CTA head and neck: basilar artery and vertebral artery no flow; details above  -MRI brain revealed scattered, acute, ischemic infarcts seen throughout the right cerebral hemisphere  -ECHO: no evidence of shunting, bubble study suboptimal ef 54%,   -Lipids: 199 chol 128 ldl  -A1C: 7  -Secondary stroke prevention: asa 81mg, high intensity statin at this time, more recs pending workup  -PT/OT/SLP  -Permissive /110  On day 3, begin decreasing BP by 20%  -Consult Vascular surgery: No interventional Recs at this time  -Pt can be moved to telemetry unit with q4 neuro checks    Dizziness  -PRN meclizine    PLAN: Main campus consulted for thrombectomy consideration. Dr Spence does not believe intervention benefits outweigh risks at this time. Will monitor pt closely and complete the stroke work up. Rec asa and plavix DAPT for at least 21 days at this time. Recommend holding BP meds for now to allow permissive hypertension for now. Evaluate thyroid nodules incidentally found on CTA head and neck outpatient. Due to stroke location, patient may have fluctuating symptoms. This patient would benefit from intense PT/OT/ST to maximize recovery. Rec SHIKHA to evaluate for cardiac emboli. Will consult cardiology.       [phone] within 3 days from discharge.     Stroke education was provided including stroke risk factors modification and any acute neurological changes including weakness, confusion, visual changes to come straight to the ER.     All questions were answered.                                     Active Diagnoses:    Diagnosis Date Noted POA    PRINCIPAL PROBLEM:  Stroke due to embolism of right middle cerebral artery [I63.411] 11/16/2020 Yes    Major depressive disorder [F32.9] 11/17/2020 Yes    Dizziness [R42] 11/16/2020 Yes    Hypertension [I10] 11/16/2020 Yes    Hyperlipidemia associated with type 2 diabetes mellitus [E11.69, E78.5] 09/28/2016 Yes    Diabetes mellitus type 2, uncontrolled [E11.65] 09/19/2014 Yes    Hypertension associated with diabetes [E11.59, I10] 09/19/2014 Yes    Nicotine dependence [F17.200] 07/10/2014 Yes      Problems Resolved During this Admission:       VTE Risk Mitigation (From admission, onward)         Ordered     enoxaparin injection 40 mg  Every 24 hours      11/16/20 1456     IP VTE LOW RISK PATIENT  Once      11/16/20 0328     Place sequential compression device  Until discontinued      11/16/20 0328                Thank you for your consult. I will follow-up with patient. Please contact us if you have any additional questions.    Tamela Meyer NP  Neurology  [facility]

## 2020-11-19 NOTE — CONSULTS
[facility]     CC: Feeding difficulty    HPI 64 y.o. male with feeding difficulty, recent onset, secondary to CVA, associated with failed swallow evaluation, severe, ongoing, with no alleviating/exacerbating factors.  Patient denies abdominal pain or bleeding.  He is amenable to PEG placement but is on Plavix with last dose yesterday.  No other acute GI complaints.      Past Medical History:   Diagnosis Date    Anticoagulant long-term use     Arthritis     Colon polyp     Diabetes mellitus     Diabetes mellitus, type 2     Fatty liver     Hypertension        Past Surgical History:   Procedure Laterality Date    BACK SURGERY      COLONOSCOPY  07/18/2014    Dr. Crane: 22 colon polyps removed, hemorrhoids, erythema to sigmoid, repeat in 1 year for surveillance; biopsy: sigmoid erythema- showed unremarkable colonic mucosa, tubular adenomas and hyperplastic polyps    COLONOSCOPY N/A 5/3/2019    Procedure: COLONOSCOPY;  Surgeon: Guero Crane MD;  Location: [facility];  Service: Endoscopy;  Laterality: N/A;    COLONOSCOPY N/A 5/6/2019    Procedure: COLONOSCOPY;  Surgeon: Guero Crane MD;  Location: [facility];  Service: Endoscopy;  Laterality: N/A;    EPIDURAL STEROID INJECTION INTO THORACIC SPINE N/A 8/30/2019    Procedure: Injection-steroid-epidural-thoracic;  Surgeon: Frantz Green MD;  Location: [facility] license of [facility] OR;  Service: Pain Management;  Laterality: N/A;  T6-7    ESOPHAGOGASTRODUODENOSCOPY N/A 4/26/2019    Procedure: EGD (ESOPHAGOGASTRODUODENOSCOPY);  Surgeon: Guero Crane MD;  Location: [facility];  Service: Endoscopy;  Laterality: N/A;    HERNIA REPAIR         Social History     Tobacco Use    Smoking status: Current Every Day Smoker     Packs/day: 1.00     Years: 50.00     Pack years: 50.00     Types: Cigarettes    Smokeless tobacco: Never Used   Substance Use Topics    Alcohol use: Yes     Alcohol/week: 14.0 standard drinks     Types: 14 Cans of beer per week     Comment: 2-3  "beers daily    Drug use: No       Family History   Problem Relation Age of Onset    Heart disease Mother     Heart disease Father     Diabetes Brother     Suicide Brother     Brain cancer Brother     Colon cancer Neg Hx     Crohn's disease Neg Hx     Ulcerative colitis Neg Hx     Stomach cancer Neg Hx     Esophageal cancer Neg Hx     Glaucoma Neg Hx     Retinal detachment Neg Hx     Macular degeneration Neg Hx        Review of Systems  General ROS: negative for - chills, fever or weight loss  Psychological ROS: negative for - hallucination, depression or suicidal ideation  Ophthalmic ROS: negative for - blurry vision, photophobia or eye pain  ENT ROS: negative for - epistaxis, sore throat or rhinorrhea  Respiratory ROS: no cough, shortness of breath, or wheezing  Cardiovascular ROS: no chest pain or dyspnea on exertion  Gastrointestinal ROS: + dysphagia  Genito-Urinary ROS: no dysuria, trouble voiding, or hematuria  Musculoskeletal ROS: negative for - arthralgia, myalgia, weakness  Neurological ROS: no syncope or seizures; no ataxia  Dermatological ROS: negative for pruritis, rash and jaundice    Physical Examination  /80   Pulse 84   Temp 97.6 °F (36.4 °C) (Oral)   Resp (!) 22   Ht 6' 5" (1.956 m)   Wt 116.6 kg (257 lb)   SpO2 99%   BMI 30.48 kg/m²   General appearance: alert, cooperative, no distress  HENT: Normocephalic, atraumatic, neck symmetrical, no nasal discharge   Eyes: conjunctivae/corneas clear, PERRL, EOM's intact, sclera anicteric  Lungs: clear to auscultation bilaterally, no dullness to percussion bilaterally, symmetric expansion, breathing unlabored  Heart: regular rate and rhythm without rub; no displacement of the PMI   Abdomen: obese, NT  Extremities: extremities symmetric; no clubbing, cyanosis, or edema  Integument: Skin color, texture, turgor normal; no rashes; hair distrubution normal, no jaundice  Neurologic: Alert and oriented X 3  Psychiatric: no pressured speech; " normal affect; no evidence of impaired cognition, no anxiety/depression     Labs:  Lab Results   Component Value Date    WBC 13.56 (H) 11/19/2020    HGB 16.5 11/19/2020    HCT 49.3 11/19/2020    MCV 93 11/19/2020     11/19/2020         CMP  Sodium   Date Value Ref Range Status   11/19/2020 140 136 - 145 mmol/L Final     Potassium   Date Value Ref Range Status   11/19/2020 3.3 (L) 3.5 - 5.1 mmol/L Final     Chloride   Date Value Ref Range Status   11/19/2020 102 95 - 110 mmol/L Final     CO2   Date Value Ref Range Status   11/19/2020 25 23 - 29 mmol/L Final     Glucose   Date Value Ref Range Status   11/19/2020 158 (H) 70 - 110 mg/dL Final     BUN   Date Value Ref Range Status   11/19/2020 19 8 - 23 mg/dL Final     Creatinine   Date Value Ref Range Status   11/19/2020 0.8 0.5 - 1.4 mg/dL Final     Calcium   Date Value Ref Range Status   11/19/2020 10.1 8.7 - 10.5 mg/dL Final     Total Protein   Date Value Ref Range Status   11/19/2020 7.2 6.0 - 8.4 g/dL Final     Albumin   Date Value Ref Range Status   11/19/2020 4.0 3.5 - 5.2 g/dL Final     Total Bilirubin   Date Value Ref Range Status   11/19/2020 0.9 0.1 - 1.0 mg/dL Final     Comment:     For infants and newborns, interpretation of results should be based  on gestational age, weight and in agreement with clinical  observations.  Premature Infant recommended reference ranges:  Up to 24 hours.............<8.0 mg/dL  Up to 48 hours............<12.0 mg/dL  3-5 days..................<15.0 mg/dL  6-29 days.................<15.0 mg/dL       Alkaline Phosphatase   Date Value Ref Range Status   11/19/2020 73 55 - 135 U/L Final     AST   Date Value Ref Range Status   11/19/2020 19 10 - 40 U/L Final     ALT   Date Value Ref Range Status   11/19/2020 31 10 - 44 U/L Final     Anion Gap   Date Value Ref Range Status   11/19/2020 13 8 - 16 mmol/L Final     eGFR if    Date Value Ref Range Status   11/19/2020 >60 >60 mL/min/1.73 m^2 Final     eGFR if non     Date Value Ref Range Status   11/19/2020 >60 >60 mL/min/1.73 m^2 Final     Comment:     Calculation used to obtain the estimated glomerular filtration  rate (eGFR) is the CKD-EPI equation.            Imaging:  MBSS was independently visualized and reviewed by me and showed severe pharyngeal dysphagia.    I have personally reviewed these images    Notes from ST reviewed consistent with above and recommendation for alternative nutrition noted.    Assessment:   1.  CVA  2.  Feeding difficulty  3.  Failed swallow study  4.  Ongoing antiplatelet therapy    Plan:  1.  Hold plavix  2.  EGD for PEG placement on Monday  3.  Continue current care per primary team in interim.  Discussed with Dr. Rosas.  4.  Further recommendations to follow after above.  5.  Communication will be sent to the referring provider, Dr. Rosas regarding my assessment and plan on this patient via EPIC.      Guero Miles MD  [facility]  [address], [address]  [place] [address]  Office: [phone]  Fax: [phone]

## 2020-11-19 NOTE — SUBJECTIVE & OBJECTIVE
Interval History:  Patient scheduled for SHIKHA this morning.  States that his symptoms are better denies any difficulty with speech failed the patient's although yesterday continues to have double vision.  Will stop Plavix as patient is scheduled for a PEG tube placement on Monday    Review of Systems   Constitutional: Negative for chills and fever.   HENT: Negative for congestion and sore throat.    Eyes: Negative for visual disturbance.   Respiratory: Negative for cough, shortness of breath and wheezing.    Cardiovascular: Negative for chest pain and palpitations.   Gastrointestinal: Negative for abdominal pain, blood in stool, diarrhea, nausea and vomiting.   Endocrine: Negative.    Genitourinary: Negative for difficulty urinating and hematuria.   Musculoskeletal: Negative.  Negative for back pain and neck pain.   Skin: Negative.  Negative for color change and pallor.   Allergic/Immunologic: Negative.    Neurological: Positive for dizziness, weakness, light-headedness, numbness and headaches. Negative for speech difficulty.   Hematological: Negative.    Psychiatric/Behavioral: Negative.    All other systems reviewed and are negative.    Objective:     Vital Signs (Most Recent):  Temp: 97.6 °F (36.4 °C) (11/19/20 0800)  Pulse: 84 (11/19/20 1215)  Resp: (!) 22 (11/19/20 1215)  BP: 118/80 (11/19/20 1215)  SpO2: 99 % (11/19/20 1215) Vital Signs (24h Range):  Temp:  [96.2 °F (35.7 °C)-98.2 °F (36.8 °C)] 97.6 °F (36.4 °C)  Pulse:  [70-92] 84  Resp:  [16-24] 22  SpO2:  [92 %-100 %] 99 %  BP: ()/(52-94) 118/80     Weight: 116.6 kg (257 lb)  Body mass index is 30.48 kg/m².    Intake/Output Summary (Last 24 hours) at 11/19/2020 1345  Last data filed at 11/19/2020 1200  Gross per 24 hour   Intake 320 ml   Output 800 ml   Net -480 ml      Physical Exam  Vitals signs reviewed.   Constitutional:       Appearance: Normal appearance. He is well-developed.   HENT:      Head: Normocephalic and atraumatic.      Right Ear:  External ear normal.      Left Ear: External ear normal.      Nose: Nose normal.      Mouth/Throat:      Mouth: Mucous membranes are moist.   Eyes:      Pupils: Pupils are equal, round, and reactive to light.   Neck:      Musculoskeletal: Normal range of motion and neck supple.   Cardiovascular:      Rate and Rhythm: Normal rate and regular rhythm.      Pulses: Normal pulses.      Heart sounds: Normal heart sounds.   Pulmonary:      Effort: Pulmonary effort is normal.   Abdominal:      General: Abdomen is flat. Bowel sounds are normal. There is no distension.      Palpations: Abdomen is soft.   Musculoskeletal: Normal range of motion.      Right lower leg: No edema.      Left lower leg: No edema.   Skin:     General: Skin is warm and dry.   Neurological:      Mental Status: He is alert and oriented to person, place, and time.      Cranial Nerves: No cranial nerve deficit or facial asymmetry.      Sensory: Sensory deficit present.      Motor: Weakness present.      Comments: Mild right facial drooping.  No speech dysfunction noted.   Psychiatric:         Mood and Affect: Mood normal.         Significant Labs:   CBC:   Recent Labs   Lab 11/18/20 0427 11/19/20  0439   WBC 11.98 13.56*   HGB 16.2 16.5   HCT 49.2 49.3    199     CMP:   Recent Labs   Lab 11/18/20 0427 11/19/20  0439    140   K 3.8 3.3*    102   CO2 24 25   * 158*   BUN 14 19   CREATININE 0.8 0.8   CALCIUM 10.1 10.1   PROT 7.1 7.2   ALBUMIN 3.9 4.0   BILITOT 0.7 0.9   ALKPHOS 71 73   AST 16 19   ALT 27 31   ANIONGAP 11 13   EGFRNONAA >60 >60       Significant Imaging: I have reviewed all pertinent imaging results/findings within the past 24 hours.

## 2020-11-19 NOTE — ASSESSMENT & PLAN NOTE
CT head negative. CTA head and neck reviewed - absent feeling bilateral vertebral arteries and proximal basal artery with patent bilateral PCA and mid/distal basal artery. MRI brain shows right sided cerbral infarcts  -follow Neurology recommendations, appreciate recommendations  -continue aspirin   Plavix on hold for PEG tube placement  -Neuro checks every hour  -continue PT/OT/ST.  Patient will benefit with inpatient rehab in near future.  -Fall precautions, aspiration precautions  Results for orders placed during the hospital encounter of 11/16/20   Echo Color Flow Doppler? Yes    Narrative · There is no evidence of intracardiac shunting.  · Mild left atrial enlargement.  · The left ventricle is normal in size with normal systolic function. The   estimated ejection fraction is 54%.  · Normal left ventricular diastolic function.  · Normal right ventricular size with normal right ventricular systolic   function.  · There is right ventricular hypertrophy. Question of mild RVH  · No pulmonary hypertension  · Small of fat pad anterior wall of the right ventricle     Bubble study was technically suboptimal and uninterpretable for PFO  Small calcification at the tip of the anterior leaf of the mitral valve on   the ventricular surface

## 2020-11-19 NOTE — PT/OT/SLP PROGRESS
Occupational Therapy   Treatment    Name: Jhonny Almazan  MRN: [id number]  Admitting Diagnosis:  Stroke due to embolism of right middle cerebral artery  Day of Surgery    Recommendations:     Discharge Recommendations: rehabilitation facility  Discharge Equipment Recommendations:  (TBD at next level of care)  Barriers to discharge:  None    Assessment:     Jhonny Almazan is a 64 y.o. male with a medical diagnosis of Stroke due to embolism of right middle cerebral artery.  He presents with performance deficits affecting function are weakness, impaired endurance, impaired self care skills, impaired functional mobilty, impaired balance, visual deficits and decreased coordination.    Patient performed supine <> sit with SBA and sat EOB x 10 minutes with SBA. He demonstrated fatigue after 3 rd sit <> stand transfer and requested to rest. Patient would benefit from skilled IRF to return to OF.        Rehab Prognosis:  Good; patient would benefit from acute skilled OT services to address these deficits and reach maximum level of function.       Plan:     Patient to be seen 4 x/week to address the above listed problems via self-care/home management, therapeutic activities, therapeutic exercises  · Plan of Care Expires: 12/02/20  · Plan of Care Reviewed with: patient    Subjective     Pain/Comfort:  Pain Rating 1: 5/10  Location 1: back  Pain Addressed 1: Reposition    Objective:     Communicated with: nurse prior to session.  Patient found supine with peripheral IV, telemetry upon OT entry to room.    General Precautions: Standard, fall   Orthopedic Precautions:N/A   Braces:       Occupational Performance:     Bed Mobility:    · Patient completed Rolling/Turning to Right with stand by assistance  · Patient completed Scooting/Bridging with stand by assistance  · Patient completed Supine to Sit with stand by assistance  · Patient completed Sit to Supine with stand by assistance     Functional  Mobility/Transfers:  · Patient completed Sit <> Stand Transfer with contact guard assistance  with  rolling walker     Activities of Daily Living:  · Upper Body Dressing: minimum assistance      [facility] 6 Click ADL: 17    Treatment & Education:  Seated EOB x 10 minutes with SBA  Pt was given instruction on sit <> stand transfers including proper hand placement, positioning of feet and reaching back prior to sitting. Pt performed 3 sit <> stand transfers with CGA and stood for 1 minute each trial.     Patient left supine with all lines intact and call button in reachEducation:      GOALS:   Multidisciplinary Problems     Occupational Therapy Goals        Problem: Occupational Therapy Goal    Goal Priority Disciplines Outcome Interventions   Occupational Therapy Goal     OT, PT/OT Ongoing, Progressing    Description: Goals to be met by: 12/2/2020    Patient will increase functional independence with ADLs by performing:    LE Dressing with Supervision and Assistive Devices as needed.  Grooming while standing at sink with Contact Guard Assistance.  Toileting from bedside commode with Stand-by Assistance for hygiene and clothing management.   Sitting at edge of bed x15 minutes with Supervision.  Supine to sit with Supervision.  Toilet transfer to bedside commode with Stand-by Assistance.                     Time Tracking:     OT Date of Treatment: 11/19/20  OT Start Time: 1047  OT Stop Time: 1110  OT Total Time (min): 23 min    Billable Minutes:Therapeutic Activity 23    Galina Bertrand, OTR  11/19/2020

## 2020-11-19 NOTE — TRANSFER OF CARE
"Anesthesia Transfer of Care Note    Patient: Jhonny Almazan    Procedure(s) Performed: Procedure(s) (LRB):  Transesophageal echo (SHIKHA) intra-procedure log documentation (N/A)    Patient location: PACU    Anesthesia Type: general    Transport from OR: Transported from OR on 2-3 L/min O2 by NC with adequate spontaneous ventilation    Post pain: adequate analgesia    Post assessment: no apparent anesthetic complications and tolerated procedure well    Post vital signs: stable    Level of consciousness: awake, alert and oriented    Nausea/Vomiting: no nausea/vomiting    Complications: none    Transfer of care protocol was followed      Last vitals:   Visit Vitals  /70   Pulse 80   Temp 36.4 °C (97.6 °F) (Oral)   Resp 17   Ht 6' 5" (1.956 m)   Wt 116.7 kg (257 lb 4.4 oz)   SpO2 97%   BMI 30.51 kg/m²     "

## 2020-11-19 NOTE — ASSESSMENT & PLAN NOTE
Patient's FSGs are controlled on current hypoglycemics.   Last A1c reviewed-   Lab Results   Component Value Date    HGBA1C 7.0 (H) 11/16/2020     Most recent fingerstick glucose reviewed-   Recent Labs   Lab 11/18/20  1748 11/18/20  2132 11/19/20  0554 11/19/20  1226   POCTGLUCOSE 143* 157* 163* 174*     Current correctional scale  Low  Maintain anti-hyperglycemic dose as follows-   Antihyperglycemics (From admission, onward)    Low dose SS insulin        Hold Oral hypoglycemics while patient is in the hospital.

## 2020-11-19 NOTE — PLAN OF CARE
Nivia with rehab will be here to see the pt this morning to evaluate for possible admit. Sita Dumont, [facility]    Date Status/Notes Created By   11/18/2020 2:35:21 PM Note: Lainey, I am following, looks like he failed his swallow study... possible PEG?, I will see him in AM on PCU  Nivia [email or web address]  11/18/2020 9:30:24 AM Note: yes ma'am...thank you  Lainey Diez  11/17/2020 4:53:55 PM Note: Patient not medically ready, he is NPO for MBSS, no OT eval yet, I will follow his case and come and see him when he steps down out of ICU  Nivia [email or web address]  11/17/2020 4:28:12 PM Note: will review and let you know  Nivia [email or web address]  11/17/2020 4:24:58 PM New: please review for inpatient rehab. thank you.  Lainey Diez   11/19/20 0833   Post-Acute Status   Post-Acute Authorization Placement   Post-Acute Placement Status Pending Post-Acute Clinical Review

## 2020-11-19 NOTE — ASSESSMENT & PLAN NOTE
Patient failed the barium swallow test  Scheduled for PEG tube placement on Monday holding plavix prior to the procedure as per GI recs

## 2020-11-19 NOTE — PLAN OF CARE
Problem: SLP Goal  Goal: SLP Goal  Description:   1) Participate in MBSS--MET  2) Participate in further evaluation of higher level cognitive functions  3) Pt will perform effortful swallow, mendelsohn, CTAR and Shaker given SPV    Outcome: Ongoing, Progressing    Participated in therapy. Continue current POC.

## 2020-11-20 LAB
ALBUMIN SERPL BCP-MCNC: 3.9 G/DL (ref 3.5–5.2)
ALP SERPL-CCNC: 69 U/L (ref 55–135)
ALT SERPL W/O P-5'-P-CCNC: 31 U/L (ref 10–44)
ANION GAP SERPL CALC-SCNC: 15 MMOL/L (ref 8–16)
AST SERPL-CCNC: 19 U/L (ref 10–40)
BASOPHILS # BLD AUTO: 0.1 K/UL (ref 0–0.2)
BASOPHILS NFR BLD: 1.1 % (ref 0–1.9)
BILIRUB SERPL-MCNC: 0.9 MG/DL (ref 0.1–1)
BUN SERPL-MCNC: 21 MG/DL (ref 8–23)
CALCIUM SERPL-MCNC: 9.7 MG/DL (ref 8.7–10.5)
CHLORIDE SERPL-SCNC: 102 MMOL/L (ref 95–110)
CO2 SERPL-SCNC: 24 MMOL/L (ref 23–29)
CREAT SERPL-MCNC: 0.8 MG/DL (ref 0.5–1.4)
DIFFERENTIAL METHOD: NORMAL
EOSINOPHIL # BLD AUTO: 0.2 K/UL (ref 0–0.5)
EOSINOPHIL NFR BLD: 2 % (ref 0–8)
ERYTHROCYTE [DISTWIDTH] IN BLOOD BY AUTOMATED COUNT: 12.7 % (ref 11.5–14.5)
EST. GFR  (AFRICAN AMERICAN): >60 ML/MIN/1.73 M^2
EST. GFR  (NON AFRICAN AMERICAN): >60 ML/MIN/1.73 M^2
GLUCOSE SERPL-MCNC: 137 MG/DL (ref 70–110)
HCT VFR BLD AUTO: 47 % (ref 40–54)
HGB BLD-MCNC: 15.2 G/DL (ref 14–18)
IMM GRANULOCYTES # BLD AUTO: 0.04 K/UL (ref 0–0.04)
IMM GRANULOCYTES NFR BLD AUTO: 0.4 % (ref 0–0.5)
LYMPHOCYTES # BLD AUTO: 2.9 K/UL (ref 1–4.8)
LYMPHOCYTES NFR BLD: 30.7 % (ref 18–48)
MAGNESIUM SERPL-MCNC: 2 MG/DL (ref 1.6–2.6)
MCH RBC QN AUTO: 29.9 PG (ref 27–31)
MCHC RBC AUTO-ENTMCNC: 32.3 G/DL (ref 32–36)
MCV RBC AUTO: 93 FL (ref 82–98)
MONOCYTES # BLD AUTO: 0.8 K/UL (ref 0.3–1)
MONOCYTES NFR BLD: 8.1 % (ref 4–15)
NEUTROPHILS # BLD AUTO: 5.4 K/UL (ref 1.8–7.7)
NEUTROPHILS NFR BLD: 57.7 % (ref 38–73)
NRBC BLD-RTO: 0 /100 WBC
PHOSPHATE SERPL-MCNC: 3 MG/DL (ref 2.7–4.5)
PLATELET # BLD AUTO: 208 K/UL (ref 150–350)
PMV BLD AUTO: 9.3 FL (ref 9.2–12.9)
POCT GLUCOSE: 112 MG/DL (ref 70–110)
POCT GLUCOSE: 153 MG/DL (ref 70–110)
POCT GLUCOSE: 163 MG/DL (ref 70–110)
POCT GLUCOSE: 176 MG/DL (ref 70–110)
POTASSIUM SERPL-SCNC: 3.2 MMOL/L (ref 3.5–5.1)
PROT SERPL-MCNC: 6.9 G/DL (ref 6–8.4)
RBC # BLD AUTO: 5.08 M/UL (ref 4.6–6.2)
SODIUM SERPL-SCNC: 141 MMOL/L (ref 136–145)
TB INDURATION 48 - 72 HR READ: 0 MM
WBC # BLD AUTO: 9.39 K/UL (ref 3.9–12.7)

## 2020-11-20 PROCEDURE — 92526 ORAL FUNCTION THERAPY: CPT

## 2020-11-20 PROCEDURE — 94761 N-INVAS EAR/PLS OXIMETRY MLT: CPT

## 2020-11-20 PROCEDURE — 99232 SBSQ HOSP IP/OBS MODERATE 35: CPT | Mod: ,,, | Performed by: INTERNAL MEDICINE

## 2020-11-20 PROCEDURE — 97803 MED NUTRITION INDIV SUBSEQ: CPT

## 2020-11-20 PROCEDURE — 80053 COMPREHEN METABOLIC PANEL: CPT

## 2020-11-20 PROCEDURE — 84100 ASSAY OF PHOSPHORUS: CPT

## 2020-11-20 PROCEDURE — 63600175 PHARM REV CODE 636 W HCPCS: Performed by: INTERNAL MEDICINE

## 2020-11-20 PROCEDURE — 99232 PR SUBSEQUENT HOSPITAL CARE,LEVL II: ICD-10-PCS | Mod: ,,, | Performed by: INTERNAL MEDICINE

## 2020-11-20 PROCEDURE — 12000002 HC ACUTE/MED SURGE SEMI-PRIVATE ROOM

## 2020-11-20 PROCEDURE — 83735 ASSAY OF MAGNESIUM: CPT

## 2020-11-20 PROCEDURE — 25000003 PHARM REV CODE 250: Performed by: NURSE PRACTITIONER

## 2020-11-20 PROCEDURE — 85025 COMPLETE CBC W/AUTO DIFF WBC: CPT

## 2020-11-20 PROCEDURE — 36415 COLL VENOUS BLD VENIPUNCTURE: CPT

## 2020-11-20 PROCEDURE — 25000003 PHARM REV CODE 250: Performed by: INTERNAL MEDICINE

## 2020-11-20 RX ADMIN — ENOXAPARIN SODIUM 40 MG: 40 INJECTION SUBCUTANEOUS at 05:11

## 2020-11-20 RX ADMIN — MUPIROCIN: 20 OINTMENT TOPICAL at 09:11

## 2020-11-20 RX ADMIN — GABAPENTIN 600 MG: 300 CAPSULE ORAL at 09:11

## 2020-11-20 RX ADMIN — LIDOCAINE 1 PATCH: 50 PATCH TOPICAL at 05:11

## 2020-11-20 RX ADMIN — Medication 6 MG: at 09:11

## 2020-11-20 RX ADMIN — Medication 6 MG: at 12:11

## 2020-11-20 NOTE — PHYSICIAN QUERY
PT Name: Jhonyn Almazan  MR #: [id number]    Hypertensive Condition Clarification      CDS/: Tatum Barnett RN, CDIS               Contact information:  [email or web address]    This form is a permanent document in the medical record.     Query Date: November 20, 2020    By submitting this query, we are merely seeking further clarification of documentation. Please utilize your independent clinical judgment when addressing the question(s) below.    The Medical Record contains the following:   Indicators   Supporting Clinical Findings Location in Medical Record   x Hypertension associated with diabetes documented --Hypertension associated with diabetes [facility] H&P-> Note 11/19   x Chronic condition(s) --Hyperlipidemia associated with type 2 diabetes mellitus  --Diabetes mellitus type 2, uncontrolled            -Patient's FSGs are controlled on current hypoglycemics   Hosp med H&P-> Note 11/19    Vital signs     x Treatment/Medication --atorvastatin tablet 40 mg PO QD    --jardiance 10mg PO QD  --Apresoline 25mg PO BID  --lisinopril-hydrochlorothiazide  12.5mg PO QD  --Fcspvuinf3h PO BID  --Crestor 20mg PO QD   MAR    Home Meds for H&P    Other         Provider, please clarify the relationship between the Hypertension and Diabetes Mellitus    [   ] Hypertension is a manifestation of Diabetes Mellitus (Secondary Hypertension)   [ x  ]  Hypertension is not a manifestation of Diabetes Mellitus (Essential Hypertension)   [   ] Other Clarification (please specify): ____________   [  ] Clinically Undetermined       Please document in your progress notes daily for the duration of treatment, until resolved, and include in your discharge summary.

## 2020-11-20 NOTE — PT/OT/SLP PROGRESS
"Speech Language Pathology Treatment    Patient Name:  Jhonny Almazan   MRN:  [id number]  Admitting Diagnosis: Stroke due to embolism of right middle cerebral artery    Recommendations:                 General Recommendations:  Dysphagia therapy  Diet recommendations:  NPO, Liquid Diet Level: NPO(Please allow ice chips for oral comfort/hydration follwing oral care)   Aspiration Precautions: Frequent oral care, Ice chips sparingly and Meds crushed in puree   General Precautions: Standard, aspiration, fall, vision impaired, NPO  Communication strategies:  none    Subjective     "Not good at all."  C/o not receiving tube feeding despite NGT placed this AM    Objective:   Pt seen for dysphagia therapy. C/o generalized weakness. Agreed to therapy. Yankauer at bedside. Demonstrated use of effortful swallows with ice chips given verbal cues; reactive coughing noted with ice chips. CTAR x6 for 30 seconds each +30. Shaker to forehead x30 seconds with c/o increased dizziness. Pt noted to frequently use Yankauer t/o session. Encouraged pt to complete dysphagia exercises t/o the day.     Has the patient been evaluated by SLP for swallowing?   Yes  Keep patient NPO? Yes   Current Respiratory Status: room air      Assessment:     Jhonny Almazan is a 64 y.o. male with an SLP diagnosis of Dysphagia.  He presents with good effort. Continue POC.    Goals:   Multidisciplinary Problems     SLP Goals        Problem: SLP Goal    Goal Priority Disciplines Outcome   SLP Goal     SLP Ongoing, Progressing   Description:   1) Participate in MBSS--MET  2) Participate in further evaluation of higher level cognitive functions  3) Pt will perform effortful swallow, mendelsohn, CTAR and Shaker given SPV                     Plan:     · Patient to be seen:  5 x/week   · Plan of Care expires:  12/02/20  · Plan of Care reviewed with:  patient   · SLP Follow-Up:  Yes       Discharge recommendations:  rehabilitation facility   Barriers to " Discharge:  None    Time Tracking:     SLP Treatment Date:   11/20/20  Speech Start Time:  1400  Speech Stop Time:  1426     Speech Total Time (min):  26 min    Billable Minutes: Treatment Swallowing Dysfunction 26 and Total Time 26    Christa Hay CCC-SLP  11/20/2020

## 2020-11-20 NOTE — PROGRESS NOTES
"  [facility]  Adult Nutrition  Consult Note    SUMMARY    Intervention: collaboration with care providers and enteral nutrition therapy     Recommendation:  1) Initiate TF via NGT Glucerna 1.5 @ 20 ml/hr advancing as pt tolerates to goal of 50 ml/hr + 200 ml flush q 4 hr or per MD  (provides 1800 kcal ( 84% EEN), 99 g protein ( 100% EPN), 910 ml free water)     2) weigh pt weekly   3) when medically able advance PO diet to DM 2000 kcal, cardiac, texture per SLP    Goals: 1) TF meeting > 75% EEN by f/u or diet advanced  Nutrition Goal Status: new  Communication of RD Recs: reviewed with physician(POC, sticky note, second sign)    Reason for Assessment    Reason For Assessment: consult  Diagnosis: (stroke)  Relevant Medical History: DM2, HTN, fatty liver  Interdisciplinary Rounds: attended    General Information Comments: 65 y/o male admitted with stroke. NPO per SLP, NGT placed. Awaiting PEG placement Monday. NFPE and nutrition education done, see nutrition note 20. No wt loss per chart review. NPO x 2 days.    Nutrition Discharge Planning: to be determined- TF above    Nutrition Risk Screen    Nutrition Risk Screen: no indicators present    Nutrition/Diet History    Patient Reported Diet/Restrictions/Preferences: general  Spiritual, Cultural Beliefs, [employer] Practices, Values that Affect Care: no  Food Allergies: NKFA  Factors Affecting Nutritional Intake: NPO, difficulty/impaired swallowing    Anthropometrics    Temp: 97.8 °F (36.6 °C)  Height Method: Measured(office 3/9/20)  Height: 6' 5"  Height (inches): 77 in  Weight Method: Bed Scale  Weight: 105.4 kg (232 lb 5.8 oz)  Weight (lb): 232.37 lb  Ideal Body Weight (IBW), Male: 208 lb  % Ideal Body Weight, Male (lb): 111.72 %  BMI (Calculated): 27.5  BMI Grade: 25 - 29.9 - overweight  Usual Body Weight (UBW), k.4 kg(20)  % Usual Body Weight: 99.27  % Weight Change From Usual Weight: -0.94 %   "     Lab/Procedures/Meds    Pertinent Labs Reviewed: reviewed  BMP  Lab Results   Component Value Date     11/20/2020    K 3.2 (L) 11/20/2020     11/20/2020    CO2 24 11/20/2020    BUN 21 11/20/2020    CREATININE 0.8 11/20/2020    CALCIUM 9.7 11/20/2020    ANIONGAP 15 11/20/2020    ESTGFRAFRICA >60 11/20/2020    EGFRNONAA >60 11/20/2020     Recent Labs   Lab 11/20/20  1117   POCTGLUCOSE 163*     Lab Results   Component Value Date    HGBA1C 7.0 (H) 11/16/2020       Pertinent Medications Reviewed: reviewed  Pertinent Medications Comments: statin, KCl, insulin, zofran    Estimated/Assessed Needs    Weight Used For Calorie Calculations: 105.4 kg (232 lb 5.8 oz)  Energy Calorie Requirements (kcal): MSJ ( x 1.1-1.2 overweight) = 8743-6733 kcal  Energy Need Method: [place]  Protein Requirements: 0.8-1.0 g protein/kg (  g protein)  Weight Used For Protein Calculations: 105.4 kg (232 lb 5.8 oz)  Fluid Requirements (mL): 2150 ml  Estimated Fluid Requirement Method: RDA Method  CHO Requirement: 241-268 g      Nutrition Prescription Ordered    Current Diet Order: NPO x 2 days + TF above    Evaluation of Received Nutrient/Fluid Intake    Energy Calories Required: not meeting needs  Protein Required: not meeting needs  Fluid Required: not meeting needs  Tolerance: tolerating  % Intake of Estimated Energy Needs: 0%  % Meal Intake: 0%    Nutrition Risk    Level of Risk/Frequency of Follow-up: moderate 2 x weekly    Assessment and Plan    Inadequate energy intake  R/t dysphagia, NPO  As evidenced by PO intakes < 50% EEN x 2 days  Intervention: above  new     Monitor and Evaluation    Food and Nutrient Intake: energy intake  Food and Nutrient Adminstration: diet order, enteral and parenteral nutrition administration  Anthropometric Measurements: weight  Biochemical Data, Medical Tests and Procedures: electrolyte and renal panel, glucose/endocrine profile, gastrointestinal profile  Nutrition-Focused Physical  Findings: overall appearance     Malnutrition Assessment     Skin (Micronutrient): ([place] = 17)   Micronutrient Evaluation: no deficiencies               Edema (Fluid Accumulation): 0-->no edema present   Subcutaneous Fat Loss (Final Summary): well nourished  Muscle Loss Evaluation (Final Summary): well nourished         Nutrition Follow-Up    RD Follow-up?: Yes

## 2020-11-20 NOTE — PLAN OF CARE
The pt is under review by [facility] for admit however is getting a peg placed Monday. CM following for discharge needs. Sita Dumont LCSW     11/20/20 2025   Post-Acute Status   Post-Acute Authorization Placement   Post-Acute Placement Status Pending Post-Acute Clinical Review

## 2020-11-20 NOTE — RESPIRATORY THERAPY
11/20/20 0728   Patient Assessment/Suction   Level of Consciousness (AVPU) alert   All Lung Fields Breath Sounds clear   PRE-TX-O2   O2 Device (Oxygen Therapy) room air   SpO2 95 %   Pulse Oximetry Type Intermittent   $ Pulse Oximetry - Multiple Charge Pulse Oximetry - Multiple

## 2020-11-20 NOTE — PLAN OF CARE
Problem: SLP Goal  Goal: SLP Goal  Description:   1) Participate in MBSS--MET  2) Participate in further evaluation of higher level cognitive functions  3) Pt will perform effortful swallow, mendelsohn, CTAR and Shaker given SPV    Outcome: Ongoing, Progressing     Participated in therapy. Continue POC

## 2020-11-20 NOTE — ANESTHESIA POSTPROCEDURE EVALUATION
Anesthesia Post Evaluation    Patient: Jhonny Almazan    Procedure(s) Performed: Procedure(s) (LRB):  Transesophageal echo (SHIKHA) intra-procedure log documentation (N/A)    Final Anesthesia Type: general    Patient location during evaluation: PACU  Patient participation: Yes- Able to Participate  Level of consciousness: awake and alert and oriented  Post-procedure vital signs: reviewed and stable  Pain management: adequate  Airway patency: patent    PONV status at discharge: No PONV  Anesthetic complications: no      Cardiovascular status: blood pressure returned to baseline and stable  Respiratory status: unassisted and spontaneous ventilation  Hydration status: euvolemic  Follow-up not needed.          Vitals Value Taken Time   /79 11/19/20 1600   Temp 36.1 °C (97 °F) 11/19/20 1600   Pulse 82 11/19/20 1600   Resp 20 11/19/20 1600   SpO2 98 % 11/19/20 1600         No case tracking events are documented in the log.      Pain/Jonny Score: Pain Rating Prior to Med Admin: 6 (11/19/2020  6:34 AM)  Pain Rating Post Med Admin: 4 (11/19/2020  7:20 AM)

## 2020-11-20 NOTE — PROGRESS NOTES
"[facility] Gastroenterology Note    CC: Feeding difficulty    HPI 64 y.o. male with feeding difficulty, recent onset, secondary to CVA, associated with failed swallow evaluation, severe, ongoing, with no alleviating/exacerbating factors.  Patient denies abdominal pain or bleeding.  He is amenable to PEG placement but is on Plavix with last dose yesterday.  No other acute GI complaints.      INTERVAL HISTORY:  No new complaints.  NGT placed for feeds today.  No abdominal pain or bleeding.  Plavix being held per primary team.    Past Medical History:   Diagnosis Date    Anticoagulant long-term use     Arthritis     Colon polyp     Diabetes mellitus     Diabetes mellitus, type 2     Fatty liver     Hypertension          Review of Systems  General ROS: negative for - chills, fever or weight loss  Cardiovascular ROS: no chest pain or dyspnea on exertion  Gastrointestinal ROS: no bleeding.    Physical Examination  /70   Pulse 80   Temp 97.8 °F (36.6 °C)   Resp 17   Ht 6' 5" (1.956 m)   Wt 105.4 kg (232 lb 5.8 oz)   SpO2 98%   BMI 27.55 kg/m²   General appearance: alert, cooperative, no distress  HENT: Normocephalic, atraumatic, neck symmetrical, no nasal discharge + NGT in place for feeding  Eyes: conjunctivae/corneas clear, PERRL, EOM's intact, sclera anicteric  Lungs: clear to auscultation bilaterally, no dullness to percussion bilaterally, symmetric expansion, breathing unlabored  Heart: regular rate and rhythm without rub; no displacement of the PMI   Abdomen: obese, NT  Extremities: extremities symmetric; no clubbing, cyanosis, or edema  Integument: Skin color, texture, turgor normal; no rashes; hair distrubution normal, no jaundice  Neurologic: Alert and oriented X 3  Psychiatric: no pressured speech; normal affect; no evidence of impaired cognition, no anxiety/depression       Labs:  Lab Results   Component Value Date    WBC 9.39 11/20/2020    HGB 15.2 11/20/2020    HCT 47.0 11/20/2020    MCV 93 " 11/20/2020     11/20/2020           Assessment:   1.  CVA  2.  Feeding difficulty  3.  Failed swallow study  4.  Ongoing antiplatelet therapy - on hold     Plan:  1.  Hold plavix  2.  EGD for PEG placement on Monday  3.  Continue current care per primary team in interim.  Discussed with Dr. Rosas.  4.  Further recommendations to follow after above.      Geuro Miles MD  [facility]  [address], [address]  [place] [address]  Office: [phone]  Fax: [phone]

## 2020-11-20 NOTE — PT/OT/SLP PROGRESS
Physical Therapy      Patient Name:  Jhonny Almazan   MRN:  [id number]    Patient not seen today secondary to Patient unwilling to participate, Patient ill (Comment), Patient fatigue. Nurse (Juvenal) informed.  Will follow-up 11/21.    Shimon Magallanes, PT

## 2020-11-20 NOTE — NURSING
NG feeding tube inserted into right nare without difficulty.  Patient tolerated very well.  Secured to bridge of nose.  No resp distress.  Order for xray in for placement.

## 2020-11-20 NOTE — PHYSICIAN QUERY
PT Name: Jhonny Almazan  MR #: [id number]    Consultant Diagnosis Clarification     CDS/: Tatum Barnett RN, CDS               Contact information: [email or web address]  This form is a permanent document in the medical record.    Query Date: November 20, 2020      By submitting this query, we are merely seeking further clarification of documentation.  Please utilize your independent clinical judgment when addressing the question(s) below.    The Medical Record reflects the following:    Clinical Information Location in Medical Record     --Stroke due to embolism of right middle cerebral artery  --Dysphagia      -Patient failed the barium swallow test          -Scheduled for PEG tube placement on Monday holding plavix prior to the procedure as per GI recs    --Impressions            -MBSS completed. Pt presents with severe pharyngeal dysphagia 2' poor laryngeal elevation/hyolarygneal excursion resulting in poor relaxation of UES. Moderate-severe pyriform sinus residue noted across all consistencies           -REC NPO with nonoral means of nutrition/hydration   Hosp med Note 11/19            SLP MBSS report 11/18       Please clarify/confirm the Consultants specificity of the diagnosis __severe pharyngeal dysphagia ____:     [x  ] Diagnosis ruled in   [  ] Diagnosis ruled out   [  ] Other diagnosis (please specify): _____________________________   [  ] Clinically undetermined

## 2020-11-20 NOTE — PLAN OF CARE
Medications administered per orders  Tube feeding administered per orders  Ambulation and mobility encouraged as tolerated  Safe environment maintained   Toothache   WHAT YOU NEED TO KNOW:   A toothache is pain that is caused by irritation of the nerves in the center of your tooth  The irritation may be caused by several problems, such as a cavity, an infection, a cracked tooth, or gum disease  It is very important to follow up with your dentist so the cause of your toothache can be diagnosed and treated  This can help prevent more serious problems  DISCHARGE INSTRUCTIONS:   Medicines: You may  need any of the following:  · NSAIDs  decrease swelling and pain  This medicine can be bought with or without a doctor's order  This medicine can cause stomach bleeding or kidney problems in certain people  If you take blood thinner medicine, always ask your healthcare provider if NSAIDs are safe for you  Always read the medicine label and follow the directions on it before using this medicine  · Acetaminophen  decreases pain  It is available without a doctor's order  Ask how much to take and how often to take it  Follow directions  Acetaminophen can cause liver damage if not taken correctly  · Pain medicine  may be given as a pill or as medicine that you put directly on your tooth or gums  Do not wait until the pain is severe before you take this medicine  · Antibiotics  help fight or prevent an infection caused by bacteria  Take them as directed  · Take your medicine as directed  Contact your healthcare provider if you think your medicine is not helping or if you have side effects  Tell him of her if you are allergic to any medicine  Keep a list of the medicines, vitamins, and herbs you take  Include the amounts, and when and why you take them  Bring the list or the pill bottles to follow-up visits  Carry your medicine list with you in case of an emergency  Follow up with your dentist as directed: You may be referred to a dental surgeon  Write down your questions so you remember to ask them during your visits     Self-care:   · Rinse your mouth with warm salt water 4 times a day or as directed  · You may need to eat soft foods to help relieve pain caused by chewing  Contact your dentist if:   · You have questions or concerns about your condition or care  Return to the emergency department if:   · You have trouble breathing  · You have swelling in your face or neck  · You have a fever and chills  · You have trouble speaking or swallowing  · You have trouble opening or closing your mouth  © 2017 [employer]  Information is for End User's use only and may not be sold, redistributed or otherwise used for commercial purposes  All illustrations and images included in CareNotes® are the copyrighted property of A D A M , Inc  or [address]  The above information is an  only  It is not intended as medical advice for individual conditions or treatments  Talk to your doctor, nurse or pharmacist before following any medical regimen to see if it is safe and effective for you  Toothache   Heather K: Emergency Dental Procedures  In: Srinivas Kohli  Clinical Procedures in Emergency Medicine, 5th ed  [address] [facility], [place], [place], 2010  John Suarez FD: Toothache  In: Rochelle Frederick, Hoa QUIROGA, [employer], et al, eds  [address] Emergency Medicine Consult, 4th ed  [address] [facility] [place], [place], 2011  Tatum APONTE & Katarzyna Coelho E: Decision making for the patient who presents with acute dental pain  [employer] Clin Issues 2005; 16(3):359-372  © 2017 [address]  Information is for End User's use only and may not be sold, redistributed or otherwise used for commercial purposes  All illustrations and images included in CareNotes® are the copyrighted property of [employer] D A M [employer] Inc  or [address]  The above information is an  only  It is not intended as medical advice for individual conditions or treatments   Talk to your doctor, nurse or pharmacist before following any medical regimen to see if it is safe and effective for you

## 2020-11-20 NOTE — PLAN OF CARE
Intervention: collaboration with care providers and enteral nutrition therapy     Recommendation:  1) Initiate TF via NGT Glucerna 1.5 @ 20 ml/hr advancing as pt tolerates to goal of 50 ml/hr + 200 ml flush q 4 hr or per MD  (provides 1800 kcal ( 84% EEN), 99 g protein ( 100% EPN), 910 ml free water)     2) weigh pt weekly   3) when medically able advance PO diet to DM 2000 kcal, cardiac, texture per SLP    Goals: 1) TF meeting > 75% EEN by f/u or diet advanced  Nutrition Goal Status: new  Communication of RD Recs: reviewed with physician(POC, sticky note, second sign)

## 2020-11-21 LAB
ALBUMIN SERPL BCP-MCNC: 4.1 G/DL (ref 3.5–5.2)
ALP SERPL-CCNC: 78 U/L (ref 55–135)
ALT SERPL W/O P-5'-P-CCNC: 42 U/L (ref 10–44)
ANION GAP SERPL CALC-SCNC: 14 MMOL/L (ref 8–16)
AST SERPL-CCNC: 27 U/L (ref 10–40)
BASOPHILS # BLD AUTO: 0.08 K/UL (ref 0–0.2)
BASOPHILS NFR BLD: 0.9 % (ref 0–1.9)
BILIRUB SERPL-MCNC: 0.8 MG/DL (ref 0.1–1)
BUN SERPL-MCNC: 20 MG/DL (ref 8–23)
CALCIUM SERPL-MCNC: 10.1 MG/DL (ref 8.7–10.5)
CHLORIDE SERPL-SCNC: 103 MMOL/L (ref 95–110)
CO2 SERPL-SCNC: 23 MMOL/L (ref 23–29)
CREAT SERPL-MCNC: 0.8 MG/DL (ref 0.5–1.4)
DIFFERENTIAL METHOD: NORMAL
EOSINOPHIL # BLD AUTO: 0.2 K/UL (ref 0–0.5)
EOSINOPHIL NFR BLD: 2 % (ref 0–8)
ERYTHROCYTE [DISTWIDTH] IN BLOOD BY AUTOMATED COUNT: 12.6 % (ref 11.5–14.5)
EST. GFR  (AFRICAN AMERICAN): >60 ML/MIN/1.73 M^2
EST. GFR  (NON AFRICAN AMERICAN): >60 ML/MIN/1.73 M^2
GLUCOSE SERPL-MCNC: 123 MG/DL (ref 70–110)
HCT VFR BLD AUTO: 49.8 % (ref 40–54)
HGB BLD-MCNC: 16.6 G/DL (ref 14–18)
IMM GRANULOCYTES # BLD AUTO: 0.03 K/UL (ref 0–0.04)
IMM GRANULOCYTES NFR BLD AUTO: 0.3 % (ref 0–0.5)
LYMPHOCYTES # BLD AUTO: 2.8 K/UL (ref 1–4.8)
LYMPHOCYTES NFR BLD: 30.1 % (ref 18–48)
MAGNESIUM SERPL-MCNC: 1.9 MG/DL (ref 1.6–2.6)
MCH RBC QN AUTO: 30.8 PG (ref 27–31)
MCHC RBC AUTO-ENTMCNC: 33.3 G/DL (ref 32–36)
MCV RBC AUTO: 92 FL (ref 82–98)
MONOCYTES # BLD AUTO: 0.7 K/UL (ref 0.3–1)
MONOCYTES NFR BLD: 7.6 % (ref 4–15)
NEUTROPHILS # BLD AUTO: 5.5 K/UL (ref 1.8–7.7)
NEUTROPHILS NFR BLD: 59.1 % (ref 38–73)
NRBC BLD-RTO: 0 /100 WBC
PHOSPHATE SERPL-MCNC: 3.1 MG/DL (ref 2.7–4.5)
PLATELET # BLD AUTO: 193 K/UL (ref 150–350)
PMV BLD AUTO: 9.6 FL (ref 9.2–12.9)
POCT GLUCOSE: 128 MG/DL (ref 70–110)
POCT GLUCOSE: 134 MG/DL (ref 70–110)
POCT GLUCOSE: 146 MG/DL (ref 70–110)
POCT GLUCOSE: 152 MG/DL (ref 70–110)
POTASSIUM SERPL-SCNC: 3.7 MMOL/L (ref 3.5–5.1)
PROT SERPL-MCNC: 7.4 G/DL (ref 6–8.4)
RBC # BLD AUTO: 5.39 M/UL (ref 4.6–6.2)
SODIUM SERPL-SCNC: 140 MMOL/L (ref 136–145)
WBC # BLD AUTO: 9.27 K/UL (ref 3.9–12.7)

## 2020-11-21 PROCEDURE — 36415 COLL VENOUS BLD VENIPUNCTURE: CPT

## 2020-11-21 PROCEDURE — 84100 ASSAY OF PHOSPHORUS: CPT

## 2020-11-21 PROCEDURE — 63600175 PHARM REV CODE 636 W HCPCS: Performed by: INTERNAL MEDICINE

## 2020-11-21 PROCEDURE — 25000003 PHARM REV CODE 250: Performed by: NURSE PRACTITIONER

## 2020-11-21 PROCEDURE — 12000002 HC ACUTE/MED SURGE SEMI-PRIVATE ROOM

## 2020-11-21 PROCEDURE — 83735 ASSAY OF MAGNESIUM: CPT

## 2020-11-21 PROCEDURE — 92526 ORAL FUNCTION THERAPY: CPT

## 2020-11-21 PROCEDURE — 99232 PR SUBSEQUENT HOSPITAL CARE,LEVL II: ICD-10-PCS | Mod: ,,, | Performed by: INTERNAL MEDICINE

## 2020-11-21 PROCEDURE — S5010 5% DEXTROSE AND 0.45% SALINE: HCPCS | Performed by: INTERNAL MEDICINE

## 2020-11-21 PROCEDURE — 97530 THERAPEUTIC ACTIVITIES: CPT

## 2020-11-21 PROCEDURE — 25000003 PHARM REV CODE 250: Performed by: INTERNAL MEDICINE

## 2020-11-21 PROCEDURE — 80053 COMPREHEN METABOLIC PANEL: CPT

## 2020-11-21 PROCEDURE — 85025 COMPLETE CBC W/AUTO DIFF WBC: CPT

## 2020-11-21 PROCEDURE — 99232 SBSQ HOSP IP/OBS MODERATE 35: CPT | Mod: ,,, | Performed by: INTERNAL MEDICINE

## 2020-11-21 PROCEDURE — 94761 N-INVAS EAR/PLS OXIMETRY MLT: CPT

## 2020-11-21 RX ORDER — DEXTROSE MONOHYDRATE AND SODIUM CHLORIDE 5; .45 G/100ML; G/100ML
INJECTION, SOLUTION INTRAVENOUS CONTINUOUS
Status: DISCONTINUED | OUTPATIENT
Start: 2020-11-21 | End: 2020-11-24 | Stop reason: HOSPADM

## 2020-11-21 RX ADMIN — MECLIZINE HYDROCHLORIDE 25 MG: 25 TABLET ORAL at 09:11

## 2020-11-21 RX ADMIN — Medication 6 MG: at 09:11

## 2020-11-21 RX ADMIN — DEXTROSE AND SODIUM CHLORIDE: 5; .45 INJECTION, SOLUTION INTRAVENOUS at 03:11

## 2020-11-21 RX ADMIN — GABAPENTIN 600 MG: 300 CAPSULE ORAL at 08:11

## 2020-11-21 RX ADMIN — LIDOCAINE 1 PATCH: 50 PATCH TOPICAL at 06:11

## 2020-11-21 RX ADMIN — ENOXAPARIN SODIUM 40 MG: 40 INJECTION SUBCUTANEOUS at 05:11

## 2020-11-21 NOTE — PLAN OF CARE
Patient observed in bed with his eyes closed, VSS, patient denies any pain or discomfort at this time, HOB elevated, Bed alarm on, continuous Tele, Tele # [phone], running Normal Sinus rhythm, no s/sx of infection or infiltration observed at IV site, general condition stable, no s/sx of acute distress observed, will continue to  monitor.

## 2020-11-21 NOTE — SUBJECTIVE & OBJECTIVE
Interval History:  Patient did not pass a swallow test.  States that his symptoms are much better no acute events overnight schedule for gastrostomy on Monday    Review of Systems   Constitutional: Negative for chills and fever.   HENT: Negative for congestion and sore throat.    Eyes: Negative for visual disturbance.   Respiratory: Negative for cough, shortness of breath and wheezing.    Cardiovascular: Negative for chest pain and palpitations.   Gastrointestinal: Negative for abdominal pain, blood in stool, diarrhea, nausea and vomiting.   Endocrine: Negative.    Genitourinary: Negative for difficulty urinating and hematuria.   Musculoskeletal: Negative.  Negative for back pain and neck pain.   Skin: Negative.  Negative for color change and pallor.   Allergic/Immunologic: Negative.    Neurological: Positive for dizziness, weakness, light-headedness, numbness and headaches. Negative for speech difficulty.   Hematological: Negative.    Psychiatric/Behavioral: Negative.    All other systems reviewed and are negative.    Objective:     Vital Signs (Most Recent):  Temp: 97.1 °F (36.2 °C) (11/21/20 1221)  Pulse: 72 (11/21/20 1221)  Resp: 18 (11/21/20 1221)  BP: 116/75 (11/21/20 1221)  SpO2: 98 % (11/21/20 1221) Vital Signs (24h Range):  Temp:  [96.4 °F (35.8 °C)-97.6 °F (36.4 °C)] 97.1 °F (36.2 °C)  Pulse:  [57-76] 72  Resp:  [17-18] 18  SpO2:  [95 %-98 %] 98 %  BP: (108-150)/(67-76) 116/75     Weight: 105.4 kg (232 lb 5.8 oz)  Body mass index is 27.55 kg/m².    Intake/Output Summary (Last 24 hours) at 11/21/2020 1425  Last data filed at 11/21/2020 0500  Gross per 24 hour   Intake 280 ml   Output 475 ml   Net -195 ml      Physical Exam  Vitals signs reviewed.   Constitutional:       Appearance: Normal appearance. He is well-developed.   HENT:      Head: Normocephalic and atraumatic.      Right Ear: External ear normal.      Left Ear: External ear normal.      Nose: Nose normal.      Mouth/Throat:      Mouth: Mucous  membranes are moist.   Eyes:      Pupils: Pupils are equal, round, and reactive to light.   Neck:      Musculoskeletal: Normal range of motion and neck supple.   Cardiovascular:      Rate and Rhythm: Normal rate and regular rhythm.      Pulses: Normal pulses.      Heart sounds: Normal heart sounds.   Pulmonary:      Effort: Pulmonary effort is normal.   Abdominal:      General: Abdomen is flat. Bowel sounds are normal. There is no distension.      Palpations: Abdomen is soft.   Musculoskeletal: Normal range of motion.      Right lower leg: No edema.      Left lower leg: No edema.   Skin:     General: Skin is warm and dry.   Neurological:      Mental Status: He is alert and oriented to person, place, and time.      Cranial Nerves: No cranial nerve deficit or facial asymmetry.      Sensory: Sensory deficit present.      Motor: Weakness present.      Comments: Mild right facial drooping.  No speech dysfunction noted.   Psychiatric:         Mood and Affect: Mood normal.         Significant Labs: All pertinent labs within the past 24 hours have been reviewed.    Significant Imaging: I have reviewed all pertinent imaging results/findings within the past 24 hours.

## 2020-11-21 NOTE — PROGRESS NOTES
[facility] Medicine  Progress Note    Patient Name: Jhonny Almazan  MRN: [id number]  Patient Class: IP- Inpatient   Admission Date: 11/16/2020  Length of Stay: 4 days  Attending Physician: Neil Rosas MD  Primary Care Provider: Joey Whitehead MD        Subjective:     Principal Problem:Stroke due to embolism of right middle cerebral artery        HPI:  Jhonny Almazan is a 64-year-old  male with past medical history significant for hypertension, diabetes, neuropathy and depression presenting today for dizziness.  Patient states for the past 3 days he has been experiencing intermittent dizziness that would worsen with ambulation.  He states today his dizziness became constant and was associated with nausea and 1 episode of vomiting.  He also is experiencing a right temple headache which he describes as dull quality, 3/10 intensity, no aggravating or alleviating factors.  He denies blurred vision or focal neuro deficits at home.  While in the ER he experienced an episode of left hand weakness with associated numbness and tingling.  ED workup revealed CT head was negative but CTA head and neck performed and revealed absent filling of the bilateral vertebral arteries and proximal basal artery with findings of bilateral PCA and mid/distal basal artery patent and no acute intracranial hemorrhage.  Patient is on longer experiencing any focal neuro deficits and is left hand weakness and numbness have resolved.  Patient will be admitted to hospital medicine services for further workup and management.  He was given aspirin in the ED and his nausea is controlled after receiving IV Zofran.  He also received meclizine and states that his dizziness is improving.      Overview/Hospital Course:  -11/17 Patient remains in ICU for close neurologic monitoring. MRI brain with right cerebral infarctions.    - 11/18/2020 - Patient is in intensive care unit with right cerebral  cerebrovascular accident.  Patient is scheduled for barium swallow study today.  Patient reports less dizziness without any associated nausea and vomiting.  Right facial droop in apparent.  Patient denies any speech trouble today.  Patient does report seeing double at times.  Patient denies any chest pain or shortness of breath.  Inpatient rehab placement efforts are underway.     Interval History:  Patient did not pass a swallow test.  States that his symptoms are much better no acute events overnight schedule for gastrostomy on Monday    Review of Systems   Constitutional: Negative for chills and fever.   HENT: Negative for congestion and sore throat.    Eyes: Negative for visual disturbance.   Respiratory: Negative for cough, shortness of breath and wheezing.    Cardiovascular: Negative for chest pain and palpitations.   Gastrointestinal: Negative for abdominal pain, blood in stool, diarrhea, nausea and vomiting.   Endocrine: Negative.    Genitourinary: Negative for difficulty urinating and hematuria.   Musculoskeletal: Negative.  Negative for back pain and neck pain.   Skin: Negative.  Negative for color change and pallor.   Allergic/Immunologic: Negative.    Neurological: Positive for dizziness, weakness, light-headedness, numbness and headaches. Negative for speech difficulty.   Hematological: Negative.    Psychiatric/Behavioral: Negative.    All other systems reviewed and are negative.    Objective:     Vital Signs (Most Recent):  Temp: 97.1 °F (36.2 °C) (11/21/20 1221)  Pulse: 72 (11/21/20 1221)  Resp: 18 (11/21/20 1221)  BP: 116/75 (11/21/20 1221)  SpO2: 98 % (11/21/20 1221) Vital Signs (24h Range):  Temp:  [96.4 °F (35.8 °C)-97.6 °F (36.4 °C)] 97.1 °F (36.2 °C)  Pulse:  [57-76] 72  Resp:  [17-18] 18  SpO2:  [95 %-98 %] 98 %  BP: (108-150)/(67-76) 116/75     Weight: 105.4 kg (232 lb 5.8 oz)  Body mass index is 27.55 kg/m².    Intake/Output Summary (Last 24 hours) at 11/21/2020 8875  Last data filed at  11/21/2020 0500  Gross per 24 hour   Intake 280 ml   Output 475 ml   Net -195 ml      Physical Exam  Vitals signs reviewed.   Constitutional:       Appearance: Normal appearance. He is well-developed.   HENT:      Head: Normocephalic and atraumatic.      Right Ear: External ear normal.      Left Ear: External ear normal.      Nose: Nose normal.      Mouth/Throat:      Mouth: Mucous membranes are moist.   Eyes:      Pupils: Pupils are equal, round, and reactive to light.   Neck:      Musculoskeletal: Normal range of motion and neck supple.   Cardiovascular:      Rate and Rhythm: Normal rate and regular rhythm.      Pulses: Normal pulses.      Heart sounds: Normal heart sounds.   Pulmonary:      Effort: Pulmonary effort is normal.   Abdominal:      General: Abdomen is flat. Bowel sounds are normal. There is no distension.      Palpations: Abdomen is soft.   Musculoskeletal: Normal range of motion.      Right lower leg: No edema.      Left lower leg: No edema.   Skin:     General: Skin is warm and dry.   Neurological:      Mental Status: He is alert and oriented to person, place, and time.      Cranial Nerves: No cranial nerve deficit or facial asymmetry.      Sensory: Sensory deficit present.      Motor: Weakness present.      Comments: Mild right facial drooping.  No speech dysfunction noted.   Psychiatric:         Mood and Affect: Mood normal.         Significant Labs: All pertinent labs within the past 24 hours have been reviewed.    Significant Imaging: I have reviewed all pertinent imaging results/findings within the past 24 hours.      Assessment/Plan:      * Stroke due to embolism of right middle cerebral artery  CT head negative. CTA head and neck reviewed - absent feeling bilateral vertebral arteries and proximal basal artery with patent bilateral PCA and mid/distal basal artery. MRI brain shows right sided cerbral infarcts  -follow Neurology recommendations, appreciate recommendations  -continue aspirin    Plavix on hold for PEG tube placement  -Neuro checks every hour  -continue PT/OT/ST.  Patient will benefit with inpatient rehab in near future.  -Fall precautions, aspiration precautions  Results for orders placed during the hospital encounter of 11/16/20   Echo Color Flow Doppler? Yes    Narrative · There is no evidence of intracardiac shunting.  · Mild left atrial enlargement.  · The left ventricle is normal in size with normal systolic function. The   estimated ejection fraction is 54%.  · Normal left ventricular diastolic function.  · Normal right ventricular size with normal right ventricular systolic   function.  · There is right ventricular hypertrophy. Question of mild RVH  · No pulmonary hypertension  · Small of fat pad anterior wall of the right ventricle     Bubble study was technically suboptimal and uninterpretable for PFO  Small calcification at the tip of the anterior leaf of the mitral valve on   the ventricular surface             Dysphagia  Patient failed the barium swallow test  Scheduled for PEG tube placement on Monday holding plavix prior to the procedure as per GI recs      Major depressive disorder  Continue Wellbutrin as before.      Hypertension  Chronic problem. Will continue chronic medications and monitor for any changes, adjusting as needed.          Dizziness  Likely Secondary to acute CVA.  -Continue meclizine p.r.n.  -follow Neurology recommendations.      Hyperlipidemia associated with type 2 diabetes mellitus  Chronic.  -Resume statin         Hypertension associated with diabetes  Chronic, controlled.  Latest blood pressure and vitals reviewed-   Temp:  [96.2 °F (35.7 °C)-98.2 °F (36.8 °C)]   Pulse:  [70-92]   Resp:  [16-24]   BP: ()/(52-94)   SpO2:  [92 %-100 %] .   Home meds for hypertension were reviewed and noted below. Hospital anti-hypertensive changes were made as shown below.  Hypertension Medications             hydrALAZINE (APRESOLINE) 25 MG tablet Take 1 tablet  (25 mg total) by mouth every 12 (twelve) hours.    lisinopril-hydrochlorothiazide (PRINZIDE,ZESTORETIC) 20-12.5 mg per tablet TAKE 1 TABLET BY MOUTH TWICE A DAY        Will utilize p.r.n. blood pressure medication only if patient's blood pressure greater than  180/110 and he develops symptoms such as worsening chest pain or shortness of breath.        Diabetes mellitus type 2, uncontrolled  Patient's FSGs are controlled on current hypoglycemics.   Last A1c reviewed-   Lab Results   Component Value Date    HGBA1C 7.0 (H) 11/16/2020     Most recent fingerstick glucose reviewed-   Recent Labs   Lab 11/18/20  1748 11/18/20  2132 11/19/20  0554 11/19/20  1226   POCTGLUCOSE 143* 157* 163* 174*     Current correctional scale  Low  Maintain anti-hyperglycemic dose as follows-   Antihyperglycemics (From admission, onward)    Low dose SS insulin        Hold Oral hypoglycemics while patient is in the hospital.        Nicotine dependence  Smoking cessation counseling performed. Dangers of cigarette smoking were reviewed with patient in detail and patient was encouraged to quit. Nicotine replacement options were discussed for > 3 minutes.  -Continue Wellbutrin        VTE Risk Mitigation (From admission, onward)         Ordered     enoxaparin injection 40 mg  Every 24 hours      11/16/20 1456     IP VTE LOW RISK PATIENT  Once      11/16/20 0328     Place sequential compression device  Until discontinued      11/16/20 0328                Discharge Planning   YUDITH: 11/20/2020     Code Status: Full Code   Is the patient medically ready for discharge?:     Reason for patient still in hospital (select all that apply): Patient trending condition and Treatment  Discharge Plan A: Rehab                  Neil Rosas MD  Department of Hospital Medicine   [facility]

## 2020-11-21 NOTE — PT/OT/SLP PROGRESS
Speech Language Pathology Treatment    Patient Name:  Jhonny Almazan   MRN:  [id number]  Admitting Diagnosis: Stroke due to embolism of right middle cerebral artery    Recommendations:                 General Recommendations:  Dysphagia therapy  Diet recommendations:  NPO, Liquid Diet Level: NPO(allow ice chips)   Aspiration Precautions: Strict aspiration precautions   General Precautions: Standard, NPO(allow ice chips)  Communication strategies:  none    Subjective     [facility]  Patient goals: eat solid foods again       Objective:     Has the patient been evaluated by SLP for swallowing?   Yes  Keep patient NPO? Yes(allow ice chips so pt can perform laryngeal/pharyngeal strengthening exercises)   Current Respiratory Status: room air      Pt seen for tx & education re exercises, swallowing precautions, reason for NPO status,  & expected outcomes.  See Care Plan for details.     Assessment:     Jhonny Almazan is a 64 y.o. male with an SLP diagnosis of Dysphagia.  He completed exercises given min cues, and had no s/s pharyngeal dysphagia on 23 ice chips today when performing SSGS.  Cont POC.  PEG tube placement scheduled for Monday.    Goals:   Multidisciplinary Problems     SLP Goals        Problem: SLP Goal    Goal Priority Disciplines Outcome   SLP Goal     SLP Ongoing, Progressing   Description:   1) Participate in MBSS--MET  2) Participate in further evaluation of higher level cognitive functions  3) Pt will perform effortful swallow, mendelsohn, CTAR and Shaker given SPV                     Plan:     · Patient to be seen:  5 x/week   · Plan of Care expires:  12/02/20  · Plan of Care reviewed with:  patient   · SLP Follow-Up:  Yes       Discharge recommendations:  rehabilitation facility   Barriers to Discharge:  None    Time Tracking:     SLP Treatment Date:   11/21/20  Speech Start Time:  1104  Speech Stop Time:  1134     Speech Total  Time (min):  30 min    Billable Minutes: Treatment Swallowing Dysfunction 30    Мария Winn CCC-SLP/A  11/21/2020

## 2020-11-21 NOTE — PLAN OF CARE
Medications administered per orders  Ambulation and mobility encouraged as tolerated  Assistance with elimination provided as needed  Safe environment maintained

## 2020-11-21 NOTE — PROGRESS NOTES
"[facility] Note    CC: Feeding difficulty    HPI 64 y.o. male with feeding difficulty, recent onset, secondary to CVA, associated with failed swallow evaluation, severe, ongoing, with no alleviating/exacerbating factors.  Patient denies abdominal pain or bleeding.  He is amenable to PEG placement but is on Plavix with last dose yesterday.  No other acute GI complaints.      INTERVAL HISTORY:  No new complaints.  NGT placed for feeds yesterday but has since been removed secondary to patient intolerance.  No abdominal pain or bleeding.  Plavix being held per primary team.  No new issues this morning.    Past Medical History:   Diagnosis Date    Anticoagulant long-term use     Arthritis     Colon polyp     Diabetes mellitus     Diabetes mellitus, type 2     Fatty liver     Hypertension          Review of Systems  General ROS: negative for - chills, fever or weight loss  Cardiovascular ROS: no chest pain or dyspnea on exertion  Gastrointestinal ROS: no bleeding.    Physical Examination  /71 (BP Location: Right arm, Patient Position: Lying)   Pulse (!) 57   Temp 97.3 °F (36.3 °C) (Oral)   Resp 17   Ht 6' 5" (1.956 m)   Wt 105.4 kg (232 lb 5.8 oz)   SpO2 97%   BMI 27.55 kg/m²   General appearance: alert, cooperative, no distress  HENT: Normocephalic, atraumatic, neck symmetrical, no nasal discharge + NGT in place for feeding  Eyes: conjunctivae/corneas clear, PERRL, EOM's intact, sclera anicteric  Lungs: clear to auscultation bilaterally, no dullness to percussion bilaterally, symmetric expansion, breathing unlabored  Heart: regular rate and rhythm without rub; no displacement of the PMI   Abdomen: obese, NT  Extremities: extremities symmetric; no clubbing, cyanosis, or edema  Integument: Skin color, texture, turgor normal; no rashes; hair distrubution normal, no jaundice  Neurologic: Alert and oriented X 3  Psychiatric: no pressured speech; normal affect; no evidence of impaired " cognition, no anxiety/depression       Labs:  Lab Results   Component Value Date    WBC 9.27 11/21/2020    HGB 16.6 11/21/2020    HCT 49.8 11/21/2020    MCV 92 11/21/2020     11/21/2020           Assessment:   1.  CVA  2.  Feeding difficulty  3.  Failed swallow study  4.  Ongoing antiplatelet therapy - on hold     Plan:  1.  Hold plavix  2.  EGD for PEG placement on Monday  3.  Continue current care per primary team in interim.  Discussed with Dr. Rosas.  4.  Further recommendations to follow after above.      Guero Miles MD  [facility]  [address], [address]  [place] [address]  Office: [phone]  Fax: [phone]

## 2020-11-21 NOTE — PROGRESS NOTES
[facility] Medicine  Progress Note    Patient Name: Jhonny Almazan  MRN: [id number]  Patient Class: IP- Inpatient   Admission Date: 11/16/2020  Length of Stay: 4 days  Attending Physician: Neil Rosas MD  Primary Care Provider: Joey Whitehead MD        Subjective:     Principal Problem:Stroke due to embolism of right middle cerebral artery        HPI:  Jhonny Almazan is a 64-year-old  male with past medical history significant for hypertension, diabetes, neuropathy and depression presenting today for dizziness.  Patient states for the past 3 days he has been experiencing intermittent dizziness that would worsen with ambulation.  He states today his dizziness became constant and was associated with nausea and 1 episode of vomiting.  He also is experiencing a right temple headache which he describes as dull quality, 3/10 intensity, no aggravating or alleviating factors.  He denies blurred vision or focal neuro deficits at home.  While in the ER he experienced an episode of left hand weakness with associated numbness and tingling.  ED workup revealed CT head was negative but CTA head and neck performed and revealed absent filling of the bilateral vertebral arteries and proximal basal artery with findings of bilateral PCA and mid/distal basal artery patent and no acute intracranial hemorrhage.  Patient is on longer experiencing any focal neuro deficits and is left hand weakness and numbness have resolved.  Patient will be admitted to hospital medicine services for further workup and management.  He was given aspirin in the ED and his nausea is controlled after receiving IV Zofran.  He also received meclizine and states that his dizziness is improving.      Overview/Hospital Course:  -11/17 Patient remains in ICU for close neurologic monitoring. MRI brain with right cerebral infarctions.    - 11/18/2020 - Patient is in intensive care unit with right cerebral  cerebrovascular accident.  Patient is scheduled for barium swallow study today.  Patient reports less dizziness without any associated nausea and vomiting.  Right facial droop in apparent.  Patient denies any speech trouble today.  Patient does report seeing double at times.  Patient denies any chest pain or shortness of breath.  Inpatient rehab placement efforts are underway.     Interval History:  Patient had feeding through the feeding tube that was placed yesterday but was discontinued last night as the patient did not want to continue having it.  Vitals stable, states that his swelling is slightly better this morning.  Symptoms overall better  Review of Systems   Constitutional: Negative for chills and fever.   HENT: Negative for congestion and sore throat.    Eyes: Negative for visual disturbance.   Respiratory: Negative for cough, shortness of breath and wheezing.    Cardiovascular: Negative for chest pain and palpitations.   Gastrointestinal: Negative for abdominal pain, blood in stool, diarrhea, nausea and vomiting.   Endocrine: Negative.    Genitourinary: Negative for difficulty urinating and hematuria.   Musculoskeletal: Negative.  Negative for back pain and neck pain.   Skin: Negative.  Negative for color change and pallor.   Allergic/Immunologic: Negative.    Neurological: Positive for dizziness, weakness, light-headedness, numbness and headaches. Negative for speech difficulty.   Hematological: Negative.    Psychiatric/Behavioral: Negative.    All other systems reviewed and are negative.    Objective:     Vital Signs (Most Recent):  Temp: 97.1 °F (36.2 °C) (11/21/20 1221)  Pulse: 72 (11/21/20 1221)  Resp: 18 (11/21/20 1221)  BP: 116/75 (11/21/20 1221)  SpO2: 98 % (11/21/20 1221) Vital Signs (24h Range):  Temp:  [96.4 °F (35.8 °C)-97.6 °F (36.4 °C)] 97.1 °F (36.2 °C)  Pulse:  [57-76] 72  Resp:  [17-18] 18  SpO2:  [95 %-98 %] 98 %  BP: (108-150)/(67-76) 116/75     Weight: 105.4 kg (232 lb 5.8 oz)  Body  mass index is 27.55 kg/m².    Intake/Output Summary (Last 24 hours) at 11/21/2020 1427  Last data filed at 11/21/2020 0500  Gross per 24 hour   Intake 280 ml   Output 475 ml   Net -195 ml      Physical Exam  Vitals signs reviewed.   Constitutional:       Appearance: Normal appearance. He is well-developed.   HENT:      Head: Normocephalic and atraumatic.      Right Ear: External ear normal.      Left Ear: External ear normal.      Nose: Nose normal.      Mouth/Throat:      Mouth: Mucous membranes are moist.   Eyes:      Pupils: Pupils are equal, round, and reactive to light.   Neck:      Musculoskeletal: Normal range of motion and neck supple.   Cardiovascular:      Rate and Rhythm: Normal rate and regular rhythm.      Pulses: Normal pulses.      Heart sounds: Normal heart sounds.   Pulmonary:      Effort: Pulmonary effort is normal.   Abdominal:      General: Abdomen is flat. Bowel sounds are normal. There is no distension.      Palpations: Abdomen is soft.   Musculoskeletal: Normal range of motion.      Right lower leg: No edema.      Left lower leg: No edema.   Skin:     General: Skin is warm and dry.   Neurological:      Mental Status: He is alert and oriented to person, place, and time.      Cranial Nerves: No cranial nerve deficit or facial asymmetry.      Sensory: Sensory deficit present.      Motor: Weakness present.      Comments:  No speech dysfunction noted.   Psychiatric:         Mood and Affect: Mood normal.         Significant Labs:   CBC:   Recent Labs   Lab 11/20/20 0425 11/21/20  0441   WBC 9.39 9.27   HGB 15.2 16.6   HCT 47.0 49.8    193     CMP:   Recent Labs   Lab 11/20/20 0425 11/21/20  0441    140   K 3.2* 3.7    103   CO2 24 23   * 123*   BUN 21 20   CREATININE 0.8 0.8   CALCIUM 9.7 10.1   PROT 6.9 7.4   ALBUMIN 3.9 4.1   BILITOT 0.9 0.8   ALKPHOS 69 78   AST 19 27   ALT 31 42   ANIONGAP 15 14   EGFRNONAA >60 >60       Significant Imaging: I have reviewed all  pertinent imaging results/findings within the past 24 hours.      Assessment/Plan:      * Stroke due to embolism of right middle cerebral artery  CT head negative. CTA head and neck reviewed - absent feeling bilateral vertebral arteries and proximal basal artery with patent bilateral PCA and mid/distal basal artery. MRI brain shows right sided cerbral infarcts  -follow Neurology recommendations, appreciate recommendations  -continue aspirin   Plavix on hold for PEG tube placement  -Neuro checks every hour  -continue PT/OT/ST.  Patient will benefit with inpatient rehab in near future.  -Fall precautions, aspiration precautions  Results for orders placed during the hospital encounter of 11/16/20   Echo Color Flow Doppler? Yes    Narrative · There is no evidence of intracardiac shunting.  · Mild left atrial enlargement.  · The left ventricle is normal in size with normal systolic function. The   estimated ejection fraction is 54%.  · Normal left ventricular diastolic function.  · Normal right ventricular size with normal right ventricular systolic   function.  · There is right ventricular hypertrophy. Question of mild RVH  · No pulmonary hypertension  · Small of fat pad anterior wall of the right ventricle     Bubble study was technically suboptimal and uninterpretable for PFO  Small calcification at the tip of the anterior leaf of the mitral valve on   the ventricular surface             Dysphagia  Patient failed the barium swallow test  Scheduled for PEG tube placement on Monday holding plavix prior to the procedure as per GI recs      Major depressive disorder  Continue Wellbutrin as before.      Hypertension  Chronic problem. Will continue chronic medications and monitor for any changes, adjusting as needed.          Dizziness  Likely Secondary to acute CVA.  -Continue meclizine p.r.n.  -follow Neurology recommendations.      Hyperlipidemia associated with type 2 diabetes mellitus  Chronic.  -Resume statin          Hypertension associated with diabetes  Chronic, controlled.  Latest blood pressure and vitals reviewed-   Temp:  [96.2 °F (35.7 °C)-98.2 °F (36.8 °C)]   Pulse:  [70-92]   Resp:  [16-24]   BP: ()/(52-94)   SpO2:  [92 %-100 %] .   Home meds for hypertension were reviewed and noted below. Hospital anti-hypertensive changes were made as shown below.  Hypertension Medications             hydrALAZINE (APRESOLINE) 25 MG tablet Take 1 tablet (25 mg total) by mouth every 12 (twelve) hours.    lisinopril-hydrochlorothiazide (PRINZIDE,ZESTORETIC) 20-12.5 mg per tablet TAKE 1 TABLET BY MOUTH TWICE A DAY        Will utilize p.r.n. blood pressure medication only if patient's blood pressure greater than  180/110 and he develops symptoms such as worsening chest pain or shortness of breath.        Diabetes mellitus type 2, uncontrolled  Patient's FSGs are controlled on current hypoglycemics.   Last A1c reviewed-   Lab Results   Component Value Date    HGBA1C 7.0 (H) 11/16/2020     Most recent fingerstick glucose reviewed-   Recent Labs   Lab 11/18/20  1748 11/18/20  2132 11/19/20  0554 11/19/20  1226   POCTGLUCOSE 143* 157* 163* 174*     Current correctional scale  Low  Maintain anti-hyperglycemic dose as follows-   Antihyperglycemics (From admission, onward)    Low dose SS insulin        Hold Oral hypoglycemics while patient is in the hospital.        Nicotine dependence  Smoking cessation counseling performed. Dangers of cigarette smoking were reviewed with patient in detail and patient was encouraged to quit. Nicotine replacement options were discussed for > 3 minutes.  -Continue Wellbutrin        VTE Risk Mitigation (From admission, onward)         Ordered     enoxaparin injection 40 mg  Every 24 hours      11/16/20 1456     IP VTE LOW RISK PATIENT  Once      11/16/20 0328     Place sequential compression device  Until discontinued      11/16/20 0328                Discharge Planning   YUDITH: 11/20/2020     Code  Status: Full Code   Is the patient medically ready for discharge?:     Reason for patient still in hospital (select all that apply): Patient trending condition and Treatment  Discharge Plan A: Rehab                  Neil Rosas MD  Department of Hospital Medicine   [facility]

## 2020-11-21 NOTE — PLAN OF CARE
"  Problem: SLP Goal  Goal: SLP Goal  Description:   1) Participate in MBSS--MET  2) Participate in further evaluation of higher level cognitive functions  3) Pt will perform effortful swallow, mendelsohn, CTAR and Shaker given SPV    Outcome: Ongoing, Progressing   3. Pt completed CTAR 5x for 30 secs ech with rolled towel +30x  given Vilma to perform correctly & verbal counting to reinforce task completion.  Performed effortful swallow x23 as part of super-supraglottic swallow given verbal cues.  No s/s pharyngeal dysphagia on ice chips when performing SSGS.  Written instructions reviewed wit pt.  "NPO except ice chips" posted above bed.  "

## 2020-11-21 NOTE — CARE UPDATE
11/21/20 1006   PRE-TX-O2   O2 Device (Oxygen Therapy) room air   SpO2 97 %   Pulse Oximetry Type Intermittent   $ Pulse Oximetry - Multiple Charge Pulse Oximetry - Multiple

## 2020-11-21 NOTE — PT/OT/SLP PROGRESS
Physical Therapy Treatment    Patient Name:  Jhonny Almazan   MRN:  [id number]    Recommendations:     Discharge Recommendations:  rehabilitation facility   Discharge Equipment Recommendations: other (see comments)(TBD)   Barriers to discharge: limited mobilty, safety    Assessment:     Jhonny Almazan is a 64 y.o. male admitted with a medical diagnosis of Stroke due to embolism of right middle cerebral artery.  He presents with the following impairments/functional limitations:     .    Rehab Prognosis: Good and Fair; patient would benefit from acute skilled PT services to address these deficits and reach maximum level of function.    Recent Surgery: Procedure(s) (LRB):  Transesophageal echo (SHIKHA) intra-procedure log documentation (N/A) 2 Days Post-Op    Plan:     During this hospitalization, patient to be seen daily to address the identified rehab impairments via gait training, therapeutic activities, therapeutic exercises, neuromuscular re-education and progress toward the following goals:    · Plan of Care Expires:  12/15/20    Subjective     Chief Complaint: dizzy  Patient/Family Comments/goals: agrees to work with PT    Objective:     Communicated with nurse (Juvenal) prior to session.  Patient found supine with bed alarm, telemetry upon PT entry to room.     General Precautions: Standard, fall   Orthopedic Precautions:N/A   Braces: N/A     Functional Mobility training:  · Bed Mobility:     · Rolling Left:  stand by assistance and with use of siderail  · Supine to Sit: stand by assistance and with use of siderail and HOB elevated  · Transfers:     · Sit to Stand:  minimum assistance with rolling walker and  x 2 reps  · Bed to Chair: minimum assistance with  rolling walker  using  Stand Pivot  · Gait: 5' with RW with min A and cues      AM-PAC 6 CLICK MOBILITY  Turning over in bed (including adjusting bedclothes, sheets and blankets)?: 3  Sitting down on and standing up from a chair with arms (e.g.,  wheelchair, bedside commode, etc.): 3  Moving from lying on back to sitting on the side of the bed?: 3  Moving to and from a bed to a chair (including a wheelchair)?: 3  Need to walk in hospital room?: 3  Climbing 3-5 steps with a railing?: 1  Basic Mobility Total Score: 16       Therapeutic Activities and Exercises:   SUPINE: SLR, ankle DF, x 10 reps each     Patient left up in chair with all lines intact, call button in reach, chair alarm on and nurse (Juvenal) notified.    GOALS:   Multidisciplinary Problems     Physical Therapy Goals        Problem: Physical Therapy Goal    Goal Priority Disciplines Outcome Goal Variances Interventions   Physical Therapy Goal     PT, PT/OT Ongoing, Progressing     Description: Goals to be met by: 2020    Patient will increase functional independence with mobility by performin. Supine to sit with Stand-by Assistance  2. Sit to supine with Stand-by Assistance  3. Sit to stand transfer with Contact Guard Assistance  4. Bed to chair transfer with Minimal Assistance using Rolling Walker  5. Gait  x 50 feet with Minimal Assistance using Rolling Walker.                      Time Tracking:     PT Received On: 20  PT Start Time: 1007     PT Stop Time: 1032  PT Total Time (min): 25 min     Billable Minutes: Therapeutic Activity 25    Treatment Type: Treatment  PT/PTA: PT     PTA Visit Number: 0     Shimon Magallanes, PT  2020

## 2020-11-21 NOTE — SUBJECTIVE & OBJECTIVE
Interval History:  Patient had feeding through the feeding tube that was placed yesterday but was discontinued last night as the patient did not want to continue having it.  Vitals stable, states that his swelling is slightly better this morning.  Symptoms overall better  Review of Systems   Constitutional: Negative for chills and fever.   HENT: Negative for congestion and sore throat.    Eyes: Negative for visual disturbance.   Respiratory: Negative for cough, shortness of breath and wheezing.    Cardiovascular: Negative for chest pain and palpitations.   Gastrointestinal: Negative for abdominal pain, blood in stool, diarrhea, nausea and vomiting.   Endocrine: Negative.    Genitourinary: Negative for difficulty urinating and hematuria.   Musculoskeletal: Negative.  Negative for back pain and neck pain.   Skin: Negative.  Negative for color change and pallor.   Allergic/Immunologic: Negative.    Neurological: Positive for dizziness, weakness, light-headedness, numbness and headaches. Negative for speech difficulty.   Hematological: Negative.    Psychiatric/Behavioral: Negative.    All other systems reviewed and are negative.    Objective:     Vital Signs (Most Recent):  Temp: 97.1 °F (36.2 °C) (11/21/20 1221)  Pulse: 72 (11/21/20 1221)  Resp: 18 (11/21/20 1221)  BP: 116/75 (11/21/20 1221)  SpO2: 98 % (11/21/20 1221) Vital Signs (24h Range):  Temp:  [96.4 °F (35.8 °C)-97.6 °F (36.4 °C)] 97.1 °F (36.2 °C)  Pulse:  [57-76] 72  Resp:  [17-18] 18  SpO2:  [95 %-98 %] 98 %  BP: (108-150)/(67-76) 116/75     Weight: 105.4 kg (232 lb 5.8 oz)  Body mass index is 27.55 kg/m².    Intake/Output Summary (Last 24 hours) at 11/21/2020 1427  Last data filed at 11/21/2020 0500  Gross per 24 hour   Intake 280 ml   Output 475 ml   Net -195 ml      Physical Exam  Vitals signs reviewed.   Constitutional:       Appearance: Normal appearance. He is well-developed.   HENT:      Head: Normocephalic and atraumatic.      Right Ear: External ear  normal.      Left Ear: External ear normal.      Nose: Nose normal.      Mouth/Throat:      Mouth: Mucous membranes are moist.   Eyes:      Pupils: Pupils are equal, round, and reactive to light.   Neck:      Musculoskeletal: Normal range of motion and neck supple.   Cardiovascular:      Rate and Rhythm: Normal rate and regular rhythm.      Pulses: Normal pulses.      Heart sounds: Normal heart sounds.   Pulmonary:      Effort: Pulmonary effort is normal.   Abdominal:      General: Abdomen is flat. Bowel sounds are normal. There is no distension.      Palpations: Abdomen is soft.   Musculoskeletal: Normal range of motion.      Right lower leg: No edema.      Left lower leg: No edema.   Skin:     General: Skin is warm and dry.   Neurological:      Mental Status: He is alert and oriented to person, place, and time.      Cranial Nerves: No cranial nerve deficit or facial asymmetry.      Sensory: Sensory deficit present.      Motor: Weakness present.      Comments:  No speech dysfunction noted.   Psychiatric:         Mood and Affect: Mood normal.         Significant Labs:   CBC:   Recent Labs   Lab 11/20/20  0425 11/21/20  0441   WBC 9.39 9.27   HGB 15.2 16.6   HCT 47.0 49.8    193     CMP:   Recent Labs   Lab 11/20/20  0425 11/21/20  0441    140   K 3.2* 3.7    103   CO2 24 23   * 123*   BUN 21 20   CREATININE 0.8 0.8   CALCIUM 9.7 10.1   PROT 6.9 7.4   ALBUMIN 3.9 4.1   BILITOT 0.9 0.8   ALKPHOS 69 78   AST 19 27   ALT 31 42   ANIONGAP 15 14   EGFRNONAA >60 >60       Significant Imaging: I have reviewed all pertinent imaging results/findings within the past 24 hours.

## 2020-11-21 NOTE — PLAN OF CARE
Patient observed in bed with his eyes closed, VSS, patient denies any c/o pain or discomfort at this time, HOB elevated, Bed alarm on, continuous Tele, Tele # [phone], running Normal sinus rhythm on Tele, no s/sx of infection or infiltration observed at IV site, safety precaution maintained, personal belonging within reach, general condition stable, no s/sx of acute distress observed, will continue to  monitor.

## 2020-11-21 NOTE — RESPIRATORY THERAPY
11/20/20 2030   Patient Assessment/Suction   Level of Consciousness (AVPU) alert   PRE-TX-O2   O2 Device (Oxygen Therapy) room air   SpO2 97 %   Pulse Oximetry Type Intermittent

## 2020-11-22 LAB
POCT GLUCOSE: 125 MG/DL (ref 70–110)
POCT GLUCOSE: 145 MG/DL (ref 70–110)
POCT GLUCOSE: 165 MG/DL (ref 70–110)
POCT GLUCOSE: 170 MG/DL (ref 70–110)

## 2020-11-22 PROCEDURE — 25000003 PHARM REV CODE 250: Performed by: INTERNAL MEDICINE

## 2020-11-22 PROCEDURE — 97530 THERAPEUTIC ACTIVITIES: CPT | Mod: CQ

## 2020-11-22 PROCEDURE — 63600175 PHARM REV CODE 636 W HCPCS: Performed by: INTERNAL MEDICINE

## 2020-11-22 PROCEDURE — 12000002 HC ACUTE/MED SURGE SEMI-PRIVATE ROOM

## 2020-11-22 PROCEDURE — 25000003 PHARM REV CODE 250: Performed by: NURSE PRACTITIONER

## 2020-11-22 PROCEDURE — 97535 SELF CARE MNGMENT TRAINING: CPT

## 2020-11-22 PROCEDURE — S5010 5% DEXTROSE AND 0.45% SALINE: HCPCS | Performed by: INTERNAL MEDICINE

## 2020-11-22 PROCEDURE — 94761 N-INVAS EAR/PLS OXIMETRY MLT: CPT

## 2020-11-22 RX ORDER — CEFAZOLIN SODIUM 1 G/50ML
1 SOLUTION INTRAVENOUS ONCE
Status: COMPLETED | OUTPATIENT
Start: 2020-11-23 | End: 2020-11-23

## 2020-11-22 RX ADMIN — GABAPENTIN 600 MG: 300 CAPSULE ORAL at 09:11

## 2020-11-22 RX ADMIN — Medication 6 MG: at 09:11

## 2020-11-22 RX ADMIN — DEXTROSE AND SODIUM CHLORIDE: 5; .45 INJECTION, SOLUTION INTRAVENOUS at 05:11

## 2020-11-22 RX ADMIN — LIDOCAINE 1 PATCH: 50 PATCH TOPICAL at 05:11

## 2020-11-22 RX ADMIN — ENOXAPARIN SODIUM 40 MG: 40 INJECTION SUBCUTANEOUS at 05:11

## 2020-11-22 NOTE — SUBJECTIVE & OBJECTIVE
Interval History:  Patient was started on ice chips, but currently not able to swallow.  Patient states that he had bouts of coughing last night.  Continues to complain of tingling sensation on the right side of the face and lower extremity.  No acute events overnight    Review of Systems   Constitutional: Negative for chills and fever.   HENT: Negative for congestion and sore throat.    Eyes: Negative for visual disturbance.   Respiratory: Negative for cough, shortness of breath and wheezing.    Cardiovascular: Negative for chest pain and palpitations.   Gastrointestinal: Negative for abdominal pain, blood in stool, diarrhea, nausea and vomiting.   Endocrine: Negative.    Genitourinary: Negative for difficulty urinating and hematuria.   Musculoskeletal: Negative.  Negative for back pain and neck pain.   Skin: Negative.  Negative for color change and pallor.   Allergic/Immunologic: Negative.    Neurological: Positive for dizziness, weakness, light-headedness, numbness and headaches. Negative for speech difficulty.   Hematological: Negative.    Psychiatric/Behavioral: Negative.    All other systems reviewed and are negative.    Objective:     Vital Signs (Most Recent):  Temp: 96.3 °F (35.7 °C) (11/22/20 1144)  Pulse: 66 (11/22/20 1144)  Resp: 18 (11/22/20 1144)  BP: 119/61 (11/22/20 1144)  SpO2: 98 % (11/22/20 1255) Vital Signs (24h Range):  Temp:  [96 °F (35.6 °C)-97.1 °F (36.2 °C)] 96.3 °F (35.7 °C)  Pulse:  [59-73] 66  Resp:  [18-19] 18  SpO2:  [94 %-98 %] 98 %  BP: (119-158)/(61-76) 119/61     Weight: 105.6 kg (232 lb 12.9 oz)  Body mass index is 27.61 kg/m².    Intake/Output Summary (Last 24 hours) at 11/22/2020 1545  Last data filed at 11/22/2020 1300  Gross per 24 hour   Intake 276.25 ml   Output 400 ml   Net -123.75 ml      Physical Exam  Vitals signs reviewed.   Constitutional:       Appearance: Normal appearance. He is well-developed.   HENT:      Head: Normocephalic and atraumatic.      Right Ear: External  ear normal.      Left Ear: External ear normal.      Nose: Nose normal.      Mouth/Throat:      Mouth: Mucous membranes are moist.   Eyes:      Pupils: Pupils are equal, round, and reactive to light.   Neck:      Musculoskeletal: Normal range of motion and neck supple.   Cardiovascular:      Rate and Rhythm: Normal rate and regular rhythm.      Pulses: Normal pulses.      Heart sounds: Normal heart sounds.   Pulmonary:      Effort: Pulmonary effort is normal.   Abdominal:      General: Abdomen is flat. Bowel sounds are normal. There is no distension.      Palpations: Abdomen is soft.   Musculoskeletal: Normal range of motion.      Right lower leg: No edema.      Left lower leg: No edema.   Skin:     General: Skin is warm and dry.   Neurological:      Mental Status: He is alert and oriented to person, place, and time.      Cranial Nerves: No cranial nerve deficit or facial asymmetry.      Sensory: Sensory deficit present.      Motor: Weakness present.      Comments:  No speech dysfunction noted.   Psychiatric:         Mood and Affect: Mood normal.         Significant Labs:   BMP:   Recent Labs   Lab 11/21/20  0441   *      K 3.7      CO2 23   BUN 20   CREATININE 0.8   CALCIUM 10.1   MG 1.9     CBC:   Recent Labs   Lab 11/21/20  0441   WBC 9.27   HGB 16.6   HCT 49.8          Significant Imaging: I have reviewed all pertinent imaging results/findings within the past 24 hours.

## 2020-11-22 NOTE — PLAN OF CARE
Problem: Occupational Therapy Goal  Goal: Occupational Therapy Goal  Description: Goals to be met by: 12/2/2020    Patient will increase functional independence with ADLs by performing:    LE Dressing with Supervision and Assistive Devices as needed.  Grooming while standing at sink with Contact Guard Assistance.  Toileting from bedside commode with Stand-by Assistance for hygiene and clothing management.   Sitting at edge of bed x15 minutes with Supervision.  Supine to sit with Supervision.  Toilet transfer to bedside commode with Stand-by Assistance.    Outcome: Ongoing, Progressing   Patient is making progress. Goals remain appropriate. Continue OT plan of care.  ROSE Johnson  11/22/2020

## 2020-11-22 NOTE — CARE UPDATE
11/22/20 1255   PRE-TX-O2   O2 Device (Oxygen Therapy) room air   SpO2 98 %   Pulse Oximetry Type Intermittent   $ Pulse Oximetry - Multiple Charge Pulse Oximetry - Multiple

## 2020-11-22 NOTE — RESPIRATORY THERAPY
11/21/20 1906   Patient Assessment/Suction   Level of Consciousness (AVPU) alert   PRE-TX-O2   O2 Device (Oxygen Therapy) room air   SpO2 98 %   Pulse Oximetry Type Intermittent

## 2020-11-22 NOTE — PLAN OF CARE
Problem: Physical Therapy Goal  Goal: Physical Therapy Goal  Description: Goals to be met by: 2020    Patient will increase functional independence with mobility by performin. Supine to sit with Stand-by Assistance  2. Sit to supine with Stand-by Assistance  3. Sit to stand transfer with Contact Guard Assistance  4. Bed to chair transfer with Minimal Assistance using Rolling Walker  5. Gait  x 50 feet with Minimal Assistance using Rolling Walker.     Outcome: Ongoing, Progressing   Therapeutic activity : bed mobility , transfers, gait with rw and assistance for safety, LE exercises.

## 2020-11-22 NOTE — PT/OT/SLP PROGRESS
Occupational Therapy   Treatment    Name: Jhonny Almazan  MRN: [id number]  Admitting Diagnosis:  Stroke due to embolism of right middle cerebral artery  3 Days Post-Op    Recommendations:     Discharge Recommendations: rehabilitation facility  Discharge Equipment Recommendations:  (TBD)  Barriers to discharge:  None    Assessment:     Jhonny Almazan is a 64 y.o. male with a medical diagnosis of Stroke due to embolism of right middle cerebral artery.  He presents with continued diplopia and dizziness, impacting his balance. Pt needed CGA and cues for hand placement and wide stance with sit to stand from bedside chair at sink. Pt was able to stand at sink with close SBA,  leaning to his R side against the wall for support to wash his face, hands & hair and then comb it. He was able to use urinal bottle and perform seated LB dressing tasks with supervision. Continue OT treatment to maximize safety & independence with ADLs.    Performance deficits affecting function are visual deficits, impaired sensation, decreased coordination, impaired fine motor, impaired self care skills, impaired functional mobilty, gait instability, impaired balance, decreased upper extremity function, decreased lower extremity function.     Rehab Prognosis:  Good; patient would benefit from acute skilled OT services to address these deficits and reach maximum level of function.       Plan:     Patient to be seen 4 x/week to address the above listed problems via self-care/home management, therapeutic activities, therapeutic exercises  · Plan of Care Expires: 12/02/20  · Plan of Care Reviewed with: patient    Subjective     Pain/Comfort:  · Pain Rating 1: 0/10  · Pain Rating Post-Intervention 1: 0/10    Objective:     Communicated with: nurse Eleno prior to session.  Patient found up in chair with telemetry, peripheral IV(chair alarm) upon OT entry to room.    General Precautions: Standard, NPO   Orthopedic Precautions:N/A    Braces: N/A     Occupational Performance:     Functional Mobility/Transfers:  · Patient completed Sit <> Stand Transfer with contact guard assistance  with  cues to place one hand on armrest of chair and the other on countertop at sink       Activities of Daily Living:  · Grooming: stand by assistance standing at sink for ~10 minutes with wide stance and good tolerance  · Lower Body Dressing: supervision to don/doff B socks cross leg seated  · Toileting: supervision to use urinal bottle seated in chair      AMPAC 6 Click ADL: 19    Treatment & Education:  OT ed pt on fall risk and strongly advised pt to call for help for all OOB mobility.  Pt verbalized understanding.      Patient left up in chair with all lines intact, call button in reach and chair alarm onEducation:      GOALS:   Multidisciplinary Problems     Occupational Therapy Goals        Problem: Occupational Therapy Goal    Goal Priority Disciplines Outcome Interventions   Occupational Therapy Goal     OT, PT/OT Ongoing, Progressing    Description: Goals to be met by: 12/2/2020    Patient will increase functional independence with ADLs by performing:    LE Dressing with Supervision and Assistive Devices as needed.  Grooming while standing at sink with Contact Guard Assistance.  Toileting from bedside commode with Stand-by Assistance for hygiene and clothing management.   Sitting at edge of bed x15 minutes with Supervision.  Supine to sit with Supervision.  Toilet transfer to bedside commode with Stand-by Assistance.                     Time Tracking:     OT Date of Treatment: 11/22/20  OT Start Time: 1020  OT Stop Time: 1048  OT Total Time (min): 28 min    Billable Minutes:Self Care/Home Management 28    ROSE Jesus  11/22/2020

## 2020-11-22 NOTE — PROGRESS NOTES
[facility] Medicine  Progress Note    Patient Name: Jhonny Almazan  MRN: [id number]  Patient Class: IP- Inpatient   Admission Date: 11/16/2020  Length of Stay: 5 days  Attending Physician: Neil Rosas MD  Primary Care Provider: Joey Whitehead MD        Subjective:     Principal Problem:Stroke due to embolism of right middle cerebral artery        HPI:  Jhonny Almazan is a 64-year-old  male with past medical history significant for hypertension, diabetes, neuropathy and depression presenting today for dizziness.  Patient states for the past 3 days he has been experiencing intermittent dizziness that would worsen with ambulation.  He states today his dizziness became constant and was associated with nausea and 1 episode of vomiting.  He also is experiencing a right temple headache which he describes as dull quality, 3/10 intensity, no aggravating or alleviating factors.  He denies blurred vision or focal neuro deficits at home.  While in the ER he experienced an episode of left hand weakness with associated numbness and tingling.  ED workup revealed CT head was negative but CTA head and neck performed and revealed absent filling of the bilateral vertebral arteries and proximal basal artery with findings of bilateral PCA and mid/distal basal artery patent and no acute intracranial hemorrhage.  Patient is on longer experiencing any focal neuro deficits and is left hand weakness and numbness have resolved.  Patient will be admitted to hospital medicine services for further workup and management.  He was given aspirin in the ED and his nausea is controlled after receiving IV Zofran.  He also received meclizine and states that his dizziness is improving.      Overview/Hospital Course:  -11/17 Patient remains in ICU for close neurologic monitoring. MRI brain with right cerebral infarctions.    - 11/18/2020 - Patient is in intensive care unit with right cerebral  cerebrovascular accident.  Patient is scheduled for barium swallow study today.  Patient reports less dizziness without any associated nausea and vomiting.  Right facial droop in apparent.  Patient denies any speech trouble today.  Patient does report seeing double at times.  Patient denies any chest pain or shortness of breath.  Inpatient rehab placement efforts are underway.     Interval History:  Patient was started on ice chips, but currently not able to swallow.  Patient states that he had bouts of coughing last night.  Continues to complain of tingling sensation on the right side of the face and lower extremity.  No acute events overnight    Review of Systems   Constitutional: Negative for chills and fever.   HENT: Negative for congestion and sore throat.    Eyes: Negative for visual disturbance.   Respiratory: Negative for cough, shortness of breath and wheezing.    Cardiovascular: Negative for chest pain and palpitations.   Gastrointestinal: Negative for abdominal pain, blood in stool, diarrhea, nausea and vomiting.   Endocrine: Negative.    Genitourinary: Negative for difficulty urinating and hematuria.   Musculoskeletal: Negative.  Negative for back pain and neck pain.   Skin: Negative.  Negative for color change and pallor.   Allergic/Immunologic: Negative.    Neurological: Positive for dizziness, weakness, light-headedness, numbness and headaches. Negative for speech difficulty.   Hematological: Negative.    Psychiatric/Behavioral: Negative.    All other systems reviewed and are negative.    Objective:     Vital Signs (Most Recent):  Temp: 96.3 °F (35.7 °C) (11/22/20 1144)  Pulse: 66 (11/22/20 1144)  Resp: 18 (11/22/20 1144)  BP: 119/61 (11/22/20 1144)  SpO2: 98 % (11/22/20 1255) Vital Signs (24h Range):  Temp:  [96 °F (35.6 °C)-97.1 °F (36.2 °C)] 96.3 °F (35.7 °C)  Pulse:  [59-73] 66  Resp:  [18-19] 18  SpO2:  [94 %-98 %] 98 %  BP: (119-158)/(61-76) 119/61     Weight: 105.6 kg (232 lb 12.9 oz)  Body  mass index is 27.61 kg/m².    Intake/Output Summary (Last 24 hours) at 11/22/2020 1545  Last data filed at 11/22/2020 1300  Gross per 24 hour   Intake 276.25 ml   Output 400 ml   Net -123.75 ml      Physical Exam  Vitals signs reviewed.   Constitutional:       Appearance: Normal appearance. He is well-developed.   HENT:      Head: Normocephalic and atraumatic.      Right Ear: External ear normal.      Left Ear: External ear normal.      Nose: Nose normal.      Mouth/Throat:      Mouth: Mucous membranes are moist.   Eyes:      Pupils: Pupils are equal, round, and reactive to light.   Neck:      Musculoskeletal: Normal range of motion and neck supple.   Cardiovascular:      Rate and Rhythm: Normal rate and regular rhythm.      Pulses: Normal pulses.      Heart sounds: Normal heart sounds.   Pulmonary:      Effort: Pulmonary effort is normal.   Abdominal:      General: Abdomen is flat. Bowel sounds are normal. There is no distension.      Palpations: Abdomen is soft.   Musculoskeletal: Normal range of motion.      Right lower leg: No edema.      Left lower leg: No edema.   Skin:     General: Skin is warm and dry.   Neurological:      Mental Status: He is alert and oriented to person, place, and time.      Cranial Nerves: No cranial nerve deficit or facial asymmetry.      Sensory: Sensory deficit present.      Motor: Weakness present.      Comments:  No speech dysfunction noted.   Psychiatric:         Mood and Affect: Mood normal.         Significant Labs:   BMP:   Recent Labs   Lab 11/21/20  0441   *      K 3.7      CO2 23   BUN 20   CREATININE 0.8   CALCIUM 10.1   MG 1.9     CBC:   Recent Labs   Lab 11/21/20  0441   WBC 9.27   HGB 16.6   HCT 49.8          Significant Imaging: I have reviewed all pertinent imaging results/findings within the past 24 hours.      Assessment/Plan:      * Stroke due to embolism of right middle cerebral artery  CT head negative. CTA head and neck reviewed - absent  feeling bilateral vertebral arteries and proximal basal artery with patent bilateral PCA and mid/distal basal artery. MRI brain shows right sided cerbral infarcts  -follow Neurology recommendations, appreciate recommendations  -continue aspirin   Plavix on hold for PEG tube placement  -Neuro checks every hour  -continue PT/OT/ST.  Patient will benefit with inpatient rehab in near future.  -Fall precautions, aspiration precautions  Results for orders placed during the hospital encounter of 11/16/20   Echo Color Flow Doppler? Yes    Narrative · There is no evidence of intracardiac shunting.  · Mild left atrial enlargement.  · The left ventricle is normal in size with normal systolic function. The   estimated ejection fraction is 54%.  · Normal left ventricular diastolic function.  · Normal right ventricular size with normal right ventricular systolic   function.  · There is right ventricular hypertrophy. Question of mild RVH  · No pulmonary hypertension  · Small of fat pad anterior wall of the right ventricle     Bubble study was technically suboptimal and uninterpretable for PFO  Small calcification at the tip of the anterior leaf of the mitral valve on   the ventricular surface             Dysphagia  Patient failed the barium swallow test  Scheduled for PEG tube placement on Monday holding plavix prior to the procedure as per GI recs      Major depressive disorder  Continue Wellbutrin as before.      Hypertension  Chronic problem. Will continue chronic medications and monitor for any changes, adjusting as needed.          Dizziness  Likely Secondary to acute CVA.  -Continue meclizine p.r.n.  -follow Neurology recommendations.      Hyperlipidemia associated with type 2 diabetes mellitus  Chronic.  -Resume statin         Hypertension associated with diabetes  Chronic, controlled.  Latest blood pressure and vitals reviewed-   Temp:  [96.2 °F (35.7 °C)-98.2 °F (36.8 °C)]   Pulse:  [70-92]   Resp:  [16-24]   BP:  ()/(52-94)   SpO2:  [92 %-100 %] .   Home meds for hypertension were reviewed and noted below. Hospital anti-hypertensive changes were made as shown below.  Hypertension Medications             hydrALAZINE (APRESOLINE) 25 MG tablet Take 1 tablet (25 mg total) by mouth every 12 (twelve) hours.    lisinopril-hydrochlorothiazide (PRINZIDE,ZESTORETIC) 20-12.5 mg per tablet TAKE 1 TABLET BY MOUTH TWICE A DAY        Will utilize p.r.n. blood pressure medication only if patient's blood pressure greater than  180/110 and he develops symptoms such as worsening chest pain or shortness of breath.        Diabetes mellitus type 2, uncontrolled  Patient's FSGs are controlled on current hypoglycemics.   Last A1c reviewed-   Lab Results   Component Value Date    HGBA1C 7.0 (H) 11/16/2020     Most recent fingerstick glucose reviewed-   Recent Labs   Lab 11/18/20  1748 11/18/20  2132 11/19/20  0554 11/19/20  1226   POCTGLUCOSE 143* 157* 163* 174*     Current correctional scale  Low  Maintain anti-hyperglycemic dose as follows-   Antihyperglycemics (From admission, onward)    Low dose SS insulin        Hold Oral hypoglycemics while patient is in the hospital.        Nicotine dependence  Smoking cessation counseling performed. Dangers of cigarette smoking were reviewed with patient in detail and patient was encouraged to quit. Nicotine replacement options were discussed for > 3 minutes.  -Continue Wellbutrin        VTE Risk Mitigation (From admission, onward)         Ordered     enoxaparin injection 40 mg  Every 24 hours      11/16/20 1456     IP VTE LOW RISK PATIENT  Once      11/16/20 0328     Place sequential compression device  Until discontinued      11/16/20 0328                Discharge Planning   YUDITH: 11/20/2020     Code Status: Full Code   Is the patient medically ready for discharge?:     Reason for patient still in hospital (select all that apply): Patient trending condition and Treatment  Discharge Plan A: Rehab                   Neil Rosas MD  Department of Hospital Medicine   [facility]

## 2020-11-22 NOTE — PLAN OF CARE
Patient AOX4 resting in bed, appears asleep, eyes closed, respirations even and unlabored. NAD. Denies pain or SOB. VSS. Afebrile. Cardiac monitoring maintained. Normal SR. Up with standby assistance. Room air. Glucose monitoring maintained. Diabetic snack offered. Medications administered per MD/NP order. Bed alarm active. Side Rails up X2. Plan of care reviewed with patient. Verbalizes understanding. Frequent checks performed Q2H. Call light in reach. Pt free from fall or injury. Will monitor.

## 2020-11-22 NOTE — PT/OT/SLP PROGRESS
Physical Therapy Treatment    Patient Name:  Jhonny Almazan   MRN:  [id number]    Recommendations:     Discharge Recommendations:  rehabilitation facility   Discharge Equipment Recommendations: other (see comments)(TBD)   Barriers to discharge: None    Assessment:     Jhonny Almazan is a 64 y.o. male admitted with a medical diagnosis of Stroke due to embolism of right middle cerebral artery.  He presents with the following impairments/functional limitations:  weakness, impaired endurance, impaired self care skills, impaired functional mobilty, gait instability, impaired balance, decreased upper extremity function, decreased lower extremity function, pain . Tolerated treatment. Reported increased back pain while in bed as well as lack of sleep.   Agreed to mobilize. Ambulated short distance with rw and Min A with verbal cues for safety, c/o dizziness ( nurse aware). Requested to brush teeth , performed without difficulty with set up.    Rehab Prognosis: Good; patient would benefit from acute skilled PT services to address these deficits and reach maximum level of function.    Recent Surgery: Procedure(s) (LRB):  Transesophageal echo (SHIKHA) intra-procedure log documentation (N/A) 3 Days Post-Op    Plan:     During this hospitalization, patient to be seen daily to address the identified rehab impairments via gait training, therapeutic activities, therapeutic exercises and progress toward the following goals:    · Plan of Care Expires:  12/15/20    Subjective     Chief Complaint: back pain  Patient/Family Comments/goals: none stated  Pain/Comfort:  · Pain Rating 1: other (see comments)(did not rate)  · Location - Orientation 1: lower  · Location 1: back  · Pain Addressed 1: Reposition, Nurse notified(nurse arrived , made  aware of back pain.)      Objective:     Communicated with nurse Johansen prior to session.  Patient found left sidelying with bed alarm, telemetry, peripheral IV upon PT entry to room.      General Precautions: Standard, NPO   Orthopedic Precautions:N/A   Braces: N/A     Functional Mobility:  · Bed Mobility:     · Supine to Sit: contact guard assistance  · Transfers:     · Sit to Stand:  minimum assistance with rolling walker  · Gait: 8 short steps with rw and Min A and verbal cues .       AM-PAC 6 CLICK MOBILITY          Therapeutic Activities and Exercises:   Transferred EOB with assistance.   Stood with rw and Min A. Dizziness reported. Able to take 8 steps with rw and Min A.    Sat in chair. BLE exercises at 10 reps each ; TKE's, Hip abd./add/ flexion, AP's.   Set up to brush teeth with S.       Patient left up in chair with all lines intact, call button in reach, chair alarm on and nurse Eleno notified..    GOALS:   Multidisciplinary Problems     Physical Therapy Goals        Problem: Physical Therapy Goal    Goal Priority Disciplines Outcome Goal Variances Interventions   Physical Therapy Goal     PT, PT/OT Ongoing, Progressing     Description: Goals to be met by: 2020    Patient will increase functional independence with mobility by performin. Supine to sit with Stand-by Assistance  2. Sit to supine with Stand-by Assistance  3. Sit to stand transfer with Contact Guard Assistance  4. Bed to chair transfer with Minimal Assistance using Rolling Walker  5. Gait  x 50 feet with Minimal Assistance using Rolling Walker.                      Time Tracking:     PT Received On: 20  PT Start Time: 0945     PT Stop Time: 1014  PT Total Time (min): 29 min     Billable Minutes: Therapeutic Activity 29min    Treatment Type: Treatment  PT/PTA: PTA     PTA Visit Number: Brigette     Ana Harshil, PTA  2020

## 2020-11-22 NOTE — PROGRESS NOTES
[facility]  Progress Note  Patient Name: Jhonny Almazan  MRN: [id number]  Admission Date: 11/16/2020  Hospital Length of Stay: 5 days  Code Status: Full Code   Attending Provider: Neil Rosas MD   Consulting Provider: Dr Burns  Primary Care Physician: Joey Whitehead MD  Principal Problem:Stroke due to embolism of right middle cerebral artery      Subjective:     Chief Complaint:    Dizziness       dizziness x 2 days   N/V beginning tonight          HPI per EMR: Jhonny Almazan is a 64-year-old  male with past medical history significant for hypertension, diabetes, neuropathy and depression presenting today for dizziness.  Patient states for the past 3 days he has been experiencing intermittent dizziness that would worsen with ambulation.  He states today his dizziness became constant and was associated with nausea and 1 episode of vomiting.  He also is experiencing a right temple headache which he describes as dull quality, 3/10 intensity, no aggravating or alleviating factors.  He denies blurred vision or focal neuro deficits at home.  While in the ER he experienced an episode of left hand weakness with associated numbness and tingling.  ED workup revealed CT head was negative but CTA head and neck performed and revealed absent filling of the bilateral vertebral arteries and proximal basal artery with findings of bilateral PCA and mid/distal basal artery patent and no acute intracranial hemorrhage.  Patient is on longer experiencing any focal neuro deficits and is left hand weakness and numbness have resolved.  Patient will be admitted to hospital medicine services for further workup and management.  He was given aspirin in the ED and his nausea is controlled after receiving IV Zofran.  He also received meclizine and states that his dizziness is improving.    Neurological Consult: Pt seen and examined. POC discussed with Dr. Burns. PT with h/o HTN, DB, smokers,  ETOH use reports having dizziness, lightheadedness, bL blurry vision, BL UE weakness, and numbness in his hands and feet that started last night. He call ed EMS and was brought to ED. CTA head and neck shows clots in the basilar and /BL vertebral arteries with potential for removal, per Dr. Burns. It is recommended that pt is transferred to [facility] to see endovascular surgeon ASAP. POC discussed with Dr. Caldera as well as on call endovascular surgeon via Dr Burns. POC also discussed with pts wife Christine, per pt's request.     11/17: Patient seen and examined this morning with Dr. Burns. He is alert and oriented x3. Horizontal nystagmus noted one exam and patient reports double vision. He states he feels as if his speech is slurred, continues to experience dizziness and reports decreased sensation to right face and LLE. This morning I was called by patient's nurse BURAK Pereyra to report symptoms of speech changes, facial numbness and difficulty swallowing. Repeat CT was ordered and did not show new strokes or acute bleed.     CTH wo contrast:   Impression:     Small areas of low-density right parietal corresponding to areas of infarctions seen on the MRI 11/16/2020 common not readily apparent on the CT and better evaluated visualized by the MRI.  No acute intracranial hemorrhage, mass effect, or acute major vascular territory infarct evident on the CT.    11/18: Pt seen and examined and POC discussed with Dr. Burns. Pt reports he still has double vision and dizziness. He is having trouble ambulating. LS slightly weaker than right.        November 19th:  Patient seen and examined.  Plan of care discussed with Dr. Burns.  Patient is alert and oriented.  He still has a blurry vision, double vision, and dizziness.  Strength is intact.   Patient waiting for his SHIKHA.      11/22 Patient seen and examined, discussed plan of care with Dr. Burns. Patient is doing well no interval changes noted overnight. Patient has had no neurological  decline noted. Patient TTE with bubble completed on 11/19 bubble study negative no  Pfo,  No  clotsi n left atrium or appendage significant abnormal cholesterol plaque in  descending and aorta and arch. Patient inpatient work up complete. Recommend -continue dual antiplatelet therapy with aspirin 81mg daily and plavix 75mg daily for 21 days, then stop plavix and continue monotherapy with aspirin 81mg daily. Patient Plavix is on hold due to PEG placement on Monday related to feeding difficulty patient failed his swallow study. Follow up with Neurology post discharge.          Past Medical History:   Diagnosis Date    Anticoagulant long-term use     Arthritis     Colon polyp     Diabetes mellitus     Diabetes mellitus, type 2     Fatty liver     Hypertension        Past Surgical History:   Procedure Laterality Date    BACK SURGERY      COLONOSCOPY  07/18/2014    Dr. Crane: 22 colon polyps removed, hemorrhoids, erythema to sigmoid, repeat in 1 year for surveillance; biopsy: sigmoid erythema- showed unremarkable colonic mucosa, tubular adenomas and hyperplastic polyps    COLONOSCOPY N/A 5/3/2019    Procedure: COLONOSCOPY;  Surgeon: Guero Crane MD;  Location: [facility];  Service: Endoscopy;  Laterality: N/A;    COLONOSCOPY N/A 5/6/2019    Procedure: COLONOSCOPY;  Surgeon: Guero Crane MD;  Location: [facility];  Service: Endoscopy;  Laterality: N/A;    EPIDURAL STEROID INJECTION INTO THORACIC SPINE N/A 8/30/2019    Procedure: Injection-steroid-epidural-thoracic;  Surgeon: Frantz Green MD;  [facility];  Service: Pain Management;  Laterality: N/A;  T6-7    ESOPHAGOGASTRODUODENOSCOPY N/A 4/26/2019    Procedure: EGD (ESOPHAGOGASTRODUODENOSCOPY);  Surgeon: Guero Crane MD;  Location: [facility];  Service: Endoscopy;  Laterality: N/A;    HERNIA REPAIR         Review of patient's allergies indicates:  No Known Allergies    Current Neurological Medications:asa 81 mg, plavix 75 mg, atorvastatin  "40 mg    No current facility-administered medications on file prior to encounter.      Current Outpatient Medications on File Prior to Encounter   Medication Sig    buPROPion (WELLBUTRIN XL) 300 MG 24 hr tablet Take 1 tablet (300 mg total) by mouth once daily.    empagliflozin (JARDIANCE) 10 mg Tab Take 10 mg by mouth once daily.    folic acid-vit B6-vit B12 2.5-25-2 mg (FOLBIC OR EQUIV) 2.5-25-2 mg Tab Take 1 tablet by mouth once daily.    gabapentin (NEURONTIN) 300 MG capsule Take 2 capsules (600 mg total) by mouth every evening.    hydrALAZINE (APRESOLINE) 25 MG tablet Take 1 tablet (25 mg total) by mouth every 12 (twelve) hours.    insulin (BASAGLAR KWIKPEN U-100 INSULIN) glargine 100 units/mL (3mL) SubQ pen Inject 20 Units into the skin every evening.    lactulose (CHRONULAC) 10 gram/15 mL solution TAKE 30 MLS BY MOUTH 2 TIMES DAILY.    lidocaine (LIDODERM) 5 % Place 1 patch onto the skin once daily. Remove & Discard patch within 12 hours or as directed by MD    lisinopril-hydrochlorothiazide (PRINZIDE,ZESTORETIC) 20-12.5 mg per tablet TAKE 1 TABLET BY MOUTH TWICE A DAY    metFORMIN (GLUCOPHAGE-XR) 500 MG 24 hr tablet Take 2 tablets (1,000 mg total) by mouth 2 (two) times daily.    pen needle, diabetic 32 gauge x 5/32" Ndle 1 each by Misc.(Non-Drug; Combo Route) route nightly.    pioglitazone (ACTOS) 30 MG tablet Take 1 tablet (30 mg total) by mouth once daily.    rosuvastatin (CRESTOR) 20 MG tablet Take 1 tablet (20 mg total) by mouth once daily.    diclofenac sodium (VOLTAREN) 1 % Gel Apply 2 g topically 3 (three) times daily as needed. For hand pain    magnesium citrate solution Take 296 mLs by mouth daily as needed (constipation).     omeprazole (PRILOSEC) 40 MG capsule TAKE 1 CAPSULE (40 MG TOTAL) BY MOUTH BEFORE BREAKFAST.      Family History     Problem Relation (Age of Onset)    Brain cancer Brother    Diabetes Brother    Heart disease Mother, Father    Suicide Brother        Tobacco Use "    Smoking status: Current Every Day Smoker     Packs/day: 1.00     Years: 50.00     Pack years: 50.00     Types: Cigarettes    Smokeless tobacco: Never Used   Substance and Sexual Activity    Alcohol use: Yes     Alcohol/week: 14.0 standard drinks     Types: 14 Cans of beer per week     Comment: 2-3 beers daily    Drug use: No    Sexual activity: Yes     Partners: Female     Review of Systems   Constitutional: Negative.    HENT: Negative.    Eyes: Negative for visual disturbance.   Respiratory: Negative.    Cardiovascular: Negative.    Gastrointestinal: Negative for nausea and vomiting.   Musculoskeletal: Negative.    Skin: Negative.    Neurological: Positive for weakness and numbness. Negative for dizziness.   Hematological: Negative.    Psychiatric/Behavioral: Negative.      Objective:     Vital Signs (Most Recent):  Temp: 96.1 °F (35.6 °C) (11/22/20 0731)  Pulse: 66 (11/22/20 0731)  Resp: 18 (11/22/20 0731)  BP: 134/71 (11/22/20 0731)  SpO2: 98 % (11/22/20 0731) Vital Signs (24h Range):  Temp:  [96 °F (35.6 °C)-97.4 °F (36.3 °C)] 96.1 °F (35.6 °C)  Pulse:  [59-73] 66  Resp:  [18-19] 18  SpO2:  [94 %-98 %] 98 %  BP: (116-158)/(71-81) 134/71     Weight: 105.6 kg (232 lb 12.9 oz)  Body mass index is 27.61 kg/m².    Physical Exam  Vitals signs and nursing note reviewed.   HENT:      Head: Normocephalic and atraumatic.      Nose: Nose normal.      Mouth/Throat:      Mouth: Mucous membranes are moist.      Pharynx: Oropharynx is clear.   Eyes:      Extraocular Movements: Extraocular movements intact and EOM normal.      Pupils: Pupils are equal, round, and reactive to light.   Neck:      Musculoskeletal: Normal range of motion.   Cardiovascular:      Rate and Rhythm: Normal rate.   Pulmonary:      Effort: Pulmonary effort is normal.   Musculoskeletal: Normal range of motion.   Skin:     General: Skin is warm and dry.   Neurological:      Mental Status: He is alert and oriented to person, place, and time.       Coordination: Finger-Nose-Finger Test abnormal.      Deep Tendon Reflexes: Strength normal.   Psychiatric:         Mood and Affect: Mood normal.         Speech: Speech is slurred.         NEUROLOGICAL EXAMINATION:     MENTAL STATUS   Oriented to person, place, and time.   Attention: normal. Concentration: normal.   Speech: slurred   Level of consciousness: alert  Able to name object. Able to repeat. Normal comprehension.     CRANIAL NERVES   Cranial nerves II through XII intact.     CN II   Right visual field deficit: none  Left visual field deficit: none     CN III, IV, VI   Pupils are equal, round, and reactive to light.  Extraocular motions are normal.   Nystagmus: bilateral   Nystagmus type: rapid and horizontal  Diplopia: bilateral    CN V   Right facial sensation deficit: cheeks and mandible  Left facial sensation deficit: none    MOTOR EXAM     Strength   Strength 5/5 throughout.     SENSORY EXAM   Right arm light touch: normal  Left arm light touch: normal  Right leg light touch: normal  Left leg light touch: decreased from knee    GAIT AND COORDINATION      Coordination   Finger to nose coordination: abnormal    Tremor   Resting tremor: absent       nadir     NIH Stroke Scale:    Level of Consciousness: 0 - alert  LOC Questions: 0 - answers both correctly  LOC Commands: 0 - performs both correctly  Best Gaze: 0 - normal  Visual: 0 - no visual loss  Facial Palsy: 1 - minor  Motor Left Arm: 0 - no drift  Motor Right Arm: 0 - no drift  Motor Left Le - no drift  Motor Right Le - no drift  Limb Ataxia: 0 - absent  Sensory: 1 - mild to moderate loss  Best Language: 0 - no aphasia  Dysarthria: 1 - mild to moderate dysarthria  Extinction and Inattention: 0 - no neglect  NIH Stroke Scale Total: 3          Significant Labs:   Lab Results   Component Value Date    WBC 9.27 2020    HGB 16.6 2020    HCT 49.8 2020    MCV 92 2020     2020       CMP  Sodium   Date Value Ref Range  Status   11/21/2020 140 136 - 145 mmol/L Final     Potassium   Date Value Ref Range Status   11/21/2020 3.7 3.5 - 5.1 mmol/L Final     Chloride   Date Value Ref Range Status   11/21/2020 103 95 - 110 mmol/L Final     CO2   Date Value Ref Range Status   11/21/2020 23 23 - 29 mmol/L Final     Glucose   Date Value Ref Range Status   11/21/2020 123 (H) 70 - 110 mg/dL Final     BUN   Date Value Ref Range Status   11/21/2020 20 8 - 23 mg/dL Final     Creatinine   Date Value Ref Range Status   11/21/2020 0.8 0.5 - 1.4 mg/dL Final     Calcium   Date Value Ref Range Status   11/21/2020 10.1 8.7 - 10.5 mg/dL Final     Total Protein   Date Value Ref Range Status   11/21/2020 7.4 6.0 - 8.4 g/dL Final     Albumin   Date Value Ref Range Status   11/21/2020 4.1 3.5 - 5.2 g/dL Final     Total Bilirubin   Date Value Ref Range Status   11/21/2020 0.8 0.1 - 1.0 mg/dL Final     Comment:     For infants and newborns, interpretation of results should be based  on gestational age, weight and in agreement with clinical  observations.  Premature Infant recommended reference ranges:  Up to 24 hours.............<8.0 mg/dL  Up to 48 hours............<12.0 mg/dL  3-5 days..................<15.0 mg/dL  6-29 days.................<15.0 mg/dL       Alkaline Phosphatase   Date Value Ref Range Status   11/21/2020 78 55 - 135 U/L Final     AST   Date Value Ref Range Status   11/21/2020 27 10 - 40 U/L Final     ALT   Date Value Ref Range Status   11/21/2020 42 10 - 44 U/L Final     Anion Gap   Date Value Ref Range Status   11/21/2020 14 8 - 16 mmol/L Final     eGFR if    Date Value Ref Range Status   11/21/2020 >60 >60 mL/min/1.73 m^2 Final     eGFR if non    Date Value Ref Range Status   11/21/2020 >60 >60 mL/min/1.73 m^2 Final     Comment:     Calculation used to obtain the estimated glomerular filtration  rate (eGFR) is the CKD-EPI equation.            Significant Imaging: X-Ray Chest AP Portable  Addendum: An enteric  tube has been placed and has its tip in the gastric cardia,  just past the gastroesophageal junction.  Advancement is suggested.     Electronically signed by: Oracio Le MD   Date:    11/20/2020   Time:    12:46  Narrative: EXAMINATION:  XR CHEST AP PORTABLE    CLINICAL HISTORY:  feeding tube placement;    TECHNIQUE:  Single frontal view of the chest was performed.    COMPARISON:  11/17/2020    FINDINGS:  The cardiomediastinal silhouette is within normal limits.  The lungs are well expanded without consolidation or pleural effusion.  A calcified granuloma is present within the left upper lobe.  Impression: No acute findings.  No significant change.    Electronically signed by: Oracio Le MD  Date:    11/20/2020  Time:    12:02    · The left ventricle is normal in size with normal systolic function. The estimated ejection fraction is 65%.  · Normal right ventricular size with normal right ventricular systolic function.  · Normal appearing left atrial appendage. No thrombus is present in the appendage. FELY occluder is absent. Normal appendage velocities.  · No interatrial septal defect present.  · The descending aorta is dilated.  · Grade 3 plaque present in the transverse aorta and descending aorta.  · No graft present.  · No evidence of coarctation.      Study Details    A limited echo was performed using limited 2D, color flow Doppler and limited spectral Doppler. During the study the esophageal view was captured. Saline (bubble) contrast was used during the study. The probe was inserted by the cardiologist         Assessment and Plan:  PMH includes DB, HTN, nicotine dependence    Dizziness/Blurry Vision/Numbness/Weakness    1. Acute Embolic Infarct   -CT head: negative acute  -CTA head and neck: basilar artery and vertebral artery no flow; details above  -MRI brain revealed scattered, acute, ischemic infarcts seen throughout the right cerebral hemisphere  -ECHO: no evidence of shunting, bubble study  suboptimal ef 54%,   -Lipids: 199 chol 128 ldl  -A1C: 7  -Secondary stroke prevention: asa 81mg, high intensity statin at this time, more recs pending workup  -PT/OT/SLP  -Permissive /110  On day 3, begin decreasing BP by 20%  -Consult Vascular surgery: No interventional Recs at this time  -Pt can be moved to telemetry unit with q4 neuro checks    Dizziness  -PRN meclizine    PLAN: Main campus consulted for thrombectomy consideration. Dr Spence does not believe intervention benefits outweigh risks at this time. Will monitor pt closely and complete the stroke work up. Rec asa and plavix DAPT for at least 21 days at this time. Recommend holding BP meds for now to allow permissive hypertension for now. Evaluate thyroid nodules incidentally found on CTA head and neck outpatient.  Atif was negative for thrombus, negative for septal defect, ejection fraction was 65%. Due to stroke location, patient may have fluctuating symptoms. This patient would benefit from intense PT/OT/ST to maximize recovery.  Patient can try meclizine for his dizziness at this time.     11/22/20 Patient has had no neurological decline noted. Patient TTE w bubble completed on 11/19 bubble study negative no  Pfo,  No  clotsi n left atrium or appendage significant abnormal cholesterol plaque in  descending and aorta and arch. Patient inpatient work up complete. Recommend -continue dual antiplatelet therapy with aspirin 81mg daily and plavix 75mg daily for 21 days, then stop plavix and continue monotherapy with aspirin 81mg daily. Patient Plavix is on hold due to PEG placement on Monday related to feeding difficulty patient failed his swallow study. Follow up with Neurology post discharge.    Inpatient workup complete.  Patient to follow up with [facility] at [phone] within 3 days from discharge.     Stroke education was provided including stroke risk factors modification and any acute neurological changes including weakness,  confusion, visual changes to come straight to the ER.     All questions were answered.                                    Active Diagnoses:    Diagnosis Date Noted POA    PRINCIPAL PROBLEM:  Stroke due to embolism of right middle cerebral artery [I63.411] 11/16/2020 Yes    Dysphagia [R13.10] 11/19/2020 Yes    Major depressive disorder [F32.9] 11/17/2020 Yes    Dizziness [R42] 11/16/2020 Yes    Hypertension [I10] 11/16/2020 Yes    Hyperlipidemia associated with type 2 diabetes mellitus [E11.69, E78.5] 09/28/2016 Yes    Diabetes mellitus type 2, uncontrolled [E11.65] 09/19/2014 Yes    Hypertension associated with diabetes [E11.59, I10] 09/19/2014 Yes    Nicotine dependence [F17.200] 07/10/2014 Yes      Problems Resolved During this Admission:       VTE Risk Mitigation (From admission, onward)         Ordered     enoxaparin injection 40 mg  Every 24 hours      11/16/20 1456     IP VTE LOW RISK PATIENT  Once      11/16/20 0328     Place sequential compression device  Until discontinued      11/16/20 0328                Thank you for your consult.     Susan Macdonald NP  Neurology  [facility]

## 2020-11-23 ENCOUNTER — ANESTHESIA EVENT (OUTPATIENT)
Dept: ENDOSCOPY | Facility: HOSPITAL | Age: 65
DRG: 066 | End: 2020-11-23
Payer: MEDICARE

## 2020-11-23 ENCOUNTER — ANESTHESIA (OUTPATIENT)
Dept: ENDOSCOPY | Facility: HOSPITAL | Age: 65
DRG: 066 | End: 2020-11-23
Payer: MEDICARE

## 2020-11-23 LAB
POCT GLUCOSE: 126 MG/DL (ref 70–110)
POCT GLUCOSE: 135 MG/DL (ref 70–110)
POCT GLUCOSE: 160 MG/DL (ref 70–110)
POCT GLUCOSE: 192 MG/DL (ref 70–110)

## 2020-11-23 PROCEDURE — 43246 PR EGD, FLEX, W/PLCMT, GASTROSTOMY TUBE: ICD-10-PCS | Mod: ,,, | Performed by: INTERNAL MEDICINE

## 2020-11-23 PROCEDURE — 25000003 PHARM REV CODE 250: Performed by: INTERNAL MEDICINE

## 2020-11-23 PROCEDURE — 37000009 HC ANESTHESIA EA ADD 15 MINS: Performed by: INTERNAL MEDICINE

## 2020-11-23 PROCEDURE — 43246 EGD PLACE GASTROSTOMY TUBE: CPT | Mod: ,,, | Performed by: INTERNAL MEDICINE

## 2020-11-23 PROCEDURE — D9220A PRA ANESTHESIA: ICD-10-PCS | Mod: ANES,,, | Performed by: ANESTHESIOLOGY

## 2020-11-23 PROCEDURE — 12000002 HC ACUTE/MED SURGE SEMI-PRIVATE ROOM

## 2020-11-23 PROCEDURE — 63600175 PHARM REV CODE 636 W HCPCS: Performed by: REGISTERED NURSE

## 2020-11-23 PROCEDURE — 97116 GAIT TRAINING THERAPY: CPT | Mod: CQ

## 2020-11-23 PROCEDURE — 94761 N-INVAS EAR/PLS OXIMETRY MLT: CPT

## 2020-11-23 PROCEDURE — 63600175 PHARM REV CODE 636 W HCPCS: Performed by: INTERNAL MEDICINE

## 2020-11-23 PROCEDURE — 37000008 HC ANESTHESIA 1ST 15 MINUTES: Performed by: INTERNAL MEDICINE

## 2020-11-23 PROCEDURE — D9220A PRA ANESTHESIA: Mod: CRNA,,, | Performed by: REGISTERED NURSE

## 2020-11-23 PROCEDURE — 43246 EGD PLACE GASTROSTOMY TUBE: CPT | Performed by: INTERNAL MEDICINE

## 2020-11-23 PROCEDURE — 25000003 PHARM REV CODE 250: Performed by: NURSE PRACTITIONER

## 2020-11-23 PROCEDURE — 97110 THERAPEUTIC EXERCISES: CPT | Mod: CQ

## 2020-11-23 PROCEDURE — D9220A PRA ANESTHESIA: Mod: ANES,,, | Performed by: ANESTHESIOLOGY

## 2020-11-23 PROCEDURE — D9220A PRA ANESTHESIA: ICD-10-PCS | Mod: CRNA,,, | Performed by: REGISTERED NURSE

## 2020-11-23 PROCEDURE — 25000003 PHARM REV CODE 250: Performed by: REGISTERED NURSE

## 2020-11-23 PROCEDURE — 27201018 HC KIT, PEG (ANY): Performed by: INTERNAL MEDICINE

## 2020-11-23 RX ORDER — PROPOFOL 10 MG/ML
VIAL (ML) INTRAVENOUS
Status: DISCONTINUED | OUTPATIENT
Start: 2020-11-23 | End: 2020-11-23

## 2020-11-23 RX ORDER — SODIUM CHLORIDE 9 MG/ML
INJECTION, SOLUTION INTRAVENOUS CONTINUOUS
Status: DISCONTINUED | OUTPATIENT
Start: 2020-11-23 | End: 2020-11-24 | Stop reason: HOSPADM

## 2020-11-23 RX ORDER — LIDOCAINE HYDROCHLORIDE 20 MG/ML
INJECTION INTRAVENOUS
Status: DISCONTINUED | OUTPATIENT
Start: 2020-11-23 | End: 2020-11-23

## 2020-11-23 RX ORDER — FENTANYL CITRATE 50 UG/ML
INJECTION, SOLUTION INTRAMUSCULAR; INTRAVENOUS
Status: DISCONTINUED | OUTPATIENT
Start: 2020-11-23 | End: 2020-11-23

## 2020-11-23 RX ORDER — FAMOTIDINE 10 MG/ML
20 INJECTION INTRAVENOUS 2 TIMES DAILY
Status: DISCONTINUED | OUTPATIENT
Start: 2020-11-23 | End: 2020-11-24 | Stop reason: HOSPADM

## 2020-11-23 RX ORDER — CLOPIDOGREL BISULFATE 75 MG/1
75 TABLET ORAL DAILY
Status: DISCONTINUED | OUTPATIENT
Start: 2020-11-24 | End: 2020-11-24 | Stop reason: HOSPADM

## 2020-11-23 RX ADMIN — GLYCOPYRROLATE 0.2 MG: 0.2 INJECTION, SOLUTION INTRAMUSCULAR; INTRAVITREAL at 01:11

## 2020-11-23 RX ADMIN — PROPOFOL 30 MG: 10 INJECTION, EMULSION INTRAVENOUS at 01:11

## 2020-11-23 RX ADMIN — FENTANYL CITRATE 50 MCG: 50 INJECTION, SOLUTION INTRAMUSCULAR; INTRAVENOUS at 01:11

## 2020-11-23 RX ADMIN — ASPIRIN 81 MG: 81 TABLET, COATED ORAL at 09:11

## 2020-11-23 RX ADMIN — ENOXAPARIN SODIUM 40 MG: 40 INJECTION SUBCUTANEOUS at 05:11

## 2020-11-23 RX ADMIN — LIDOCAINE 1 PATCH: 50 PATCH TOPICAL at 05:11

## 2020-11-23 RX ADMIN — ATORVASTATIN CALCIUM 40 MG: 40 TABLET, FILM COATED ORAL at 09:11

## 2020-11-23 RX ADMIN — SODIUM CHLORIDE: 0.9 INJECTION, SOLUTION INTRAVENOUS at 02:11

## 2020-11-23 RX ADMIN — FAMOTIDINE 20 MG: 10 INJECTION INTRAVENOUS at 09:11

## 2020-11-23 RX ADMIN — Medication 6 MG: at 09:11

## 2020-11-23 RX ADMIN — PROPOFOL 80 MG: 10 INJECTION, EMULSION INTRAVENOUS at 01:11

## 2020-11-23 RX ADMIN — TAMSULOSIN HYDROCHLORIDE 0.4 MG: 0.4 CAPSULE ORAL at 09:11

## 2020-11-23 RX ADMIN — GABAPENTIN 600 MG: 300 CAPSULE ORAL at 09:11

## 2020-11-23 RX ADMIN — SODIUM CHLORIDE: 0.9 INJECTION, SOLUTION INTRAVENOUS at 12:11

## 2020-11-23 RX ADMIN — CEFAZOLIN SODIUM 1 G: 1 SOLUTION INTRAVENOUS at 01:11

## 2020-11-23 RX ADMIN — BUPROPION HYDROCHLORIDE 300 MG: 150 TABLET, FILM COATED, EXTENDED RELEASE ORAL at 09:11

## 2020-11-23 RX ADMIN — LIDOCAINE HYDROCHLORIDE 100 MG: 20 INJECTION, SOLUTION INTRAVENOUS at 01:11

## 2020-11-23 NOTE — PLAN OF CARE
Pt awake, alert.  Vital signs stable, denies nausea or pain, abd soft, dressings clean dry intact. No adverse effects of anesthesia noted. Transferred to room per wheelchair,  Report given to rian

## 2020-11-23 NOTE — PROGRESS NOTES
[facility] Medicine  Progress Note    Patient Name: Jhonny Almazan  MRN: [id number]  Patient Class: IP- Inpatient   Admission Date: 11/16/2020  Length of Stay: 6 days  Attending Physician: Neil Rosas MD  Primary Care Provider: Joey Whitehead MD        Subjective:     Principal Problem:Stroke due to embolism of right middle cerebral artery        HPI:  Jhonny Almazan is a 64-year-old  male with past medical history significant for hypertension, diabetes, neuropathy and depression presenting today for dizziness.  Patient states for the past 3 days he has been experiencing intermittent dizziness that would worsen with ambulation.  He states today his dizziness became constant and was associated with nausea and 1 episode of vomiting.  He also is experiencing a right temple headache which he describes as dull quality, 3/10 intensity, no aggravating or alleviating factors.  He denies blurred vision or focal neuro deficits at home.  While in the ER he experienced an episode of left hand weakness with associated numbness and tingling.  ED workup revealed CT head was negative but CTA head and neck performed and revealed absent filling of the bilateral vertebral arteries and proximal basal artery with findings of bilateral PCA and mid/distal basal artery patent and no acute intracranial hemorrhage.  Patient is on longer experiencing any focal neuro deficits and is left hand weakness and numbness have resolved.  Patient will be admitted to hospital medicine services for further workup and management.  He was given aspirin in the ED and his nausea is controlled after receiving IV Zofran.  He also received meclizine and states that his dizziness is improving.      Overview/Hospital Course:  -11/17 Patient remains in ICU for close neurologic monitoring. MRI brain with right cerebral infarctions.    - 11/18/2020 - Patient is in intensive care unit with right cerebral  cerebrovascular accident.  Patient is scheduled for barium swallow study today.  Patient reports less dizziness without any associated nausea and vomiting.  Right facial droop in apparent.  Patient denies any speech trouble today.  Patient does report seeing double at times.  Patient denies any chest pain or shortness of breath.  Inpatient rehab placement efforts are underway.     Interval History:  No acute events overnight scheduled for a PEG tube placement today.  Continues to have tingling sensation on the right side of the face and lower extremity    Review of Systems   Constitutional: Negative for chills and fever.   HENT: Negative for congestion and sore throat.    Eyes: Negative for visual disturbance.   Respiratory: Negative for cough, shortness of breath and wheezing.    Cardiovascular: Negative for chest pain and palpitations.   Gastrointestinal: Negative for abdominal pain, blood in stool, diarrhea, nausea and vomiting.   Endocrine: Negative.    Genitourinary: Negative for difficulty urinating and hematuria.   Musculoskeletal: Negative.  Negative for back pain and neck pain.   Skin: Negative.  Negative for color change and pallor.   Allergic/Immunologic: Negative.    Neurological: Positive for dizziness, weakness, light-headedness, numbness and headaches. Negative for speech difficulty.   Hematological: Negative.    Psychiatric/Behavioral: Negative.    All other systems reviewed and are negative.    Objective:     Vital Signs (Most Recent):  Temp: (!) 95.9 °F (35.5 °C) (11/23/20 1614)  Pulse: 70 (11/23/20 1614)  Resp: 18 (11/23/20 1614)  BP: (!) 143/79 (11/23/20 1614)  SpO2: 97 % (11/23/20 1614) Vital Signs (24h Range):  Temp:  [94.4 °F (34.7 °C)-98.3 °F (36.8 °C)] 95.9 °F (35.5 °C)  Pulse:  [64-90] 70  Resp:  [12-20] 18  SpO2:  [96 %-99 %] 97 %  BP: (106-153)/(58-82) 143/79     Weight: 105.6 kg (232 lb 12.9 oz)  Body mass index is 27.61 kg/m².    Intake/Output Summary (Last 24 hours) at 11/23/2020  1621  Last data filed at 11/23/2020 1345  Gross per 24 hour   Intake 400 ml   Output 950 ml   Net -550 ml      Physical Exam  Vitals signs reviewed.   Constitutional:       Appearance: Normal appearance. He is well-developed.   HENT:      Head: Normocephalic and atraumatic.      Right Ear: External ear normal.      Left Ear: External ear normal.      Nose: Nose normal.      Mouth/Throat:      Mouth: Mucous membranes are moist.   Eyes:      Pupils: Pupils are equal, round, and reactive to light.   Neck:      Musculoskeletal: Normal range of motion and neck supple.   Cardiovascular:      Rate and Rhythm: Normal rate and regular rhythm.      Pulses: Normal pulses.      Heart sounds: Normal heart sounds.   Pulmonary:      Effort: Pulmonary effort is normal.   Abdominal:      General: Abdomen is flat. Bowel sounds are normal. There is no distension.      Palpations: Abdomen is soft.   Musculoskeletal: Normal range of motion.      Right lower leg: No edema.      Left lower leg: No edema.   Skin:     General: Skin is warm and dry.   Neurological:      Mental Status: He is alert and oriented to person, place, and time.      Cranial Nerves: No cranial nerve deficit or facial asymmetry.      Sensory: Sensory deficit present.      Motor: Weakness present.      Comments:  No speech dysfunction noted.   Psychiatric:         Mood and Affect: Mood normal.         Significant Labs: CBC: No results for input(s): WBC, HGB, HCT, PLT in the last 48 hours.  CMP: No results for input(s): NA, K, CL, CO2, GLU, BUN, CREATININE, CALCIUM, PROT, ALBUMIN, BILITOT, ALKPHOS, AST, ALT, ANIONGAP, EGFRNONAA in the last 48 hours.    Invalid input(s): ESTGF[place]    Significant Imaging: I have reviewed all pertinent imaging results/findings within the past 24 hours.      Assessment/Plan:      * Stroke due to embolism of right middle cerebral artery  CT head negative. CTA head and neck reviewed - absent feeling bilateral vertebral arteries and proximal  basal artery with patent bilateral PCA and mid/distal basal artery. MRI brain shows right sided cerbral infarcts  -follow Neurology recommendations, appreciate recommendations  -continue aspirin   Plavix on hold for PEG tube placement  -Neuro checks every hour  -continue PT/OT/ST.  Patient will benefit with inpatient rehab in near future.  -Fall precautions, aspiration precautions  Results for orders placed during the hospital encounter of 11/16/20   Echo Color Flow Doppler? Yes    Narrative · There is no evidence of intracardiac shunting.  · Mild left atrial enlargement.  · The left ventricle is normal in size with normal systolic function. The   estimated ejection fraction is 54%.  · Normal left ventricular diastolic function.  · Normal right ventricular size with normal right ventricular systolic   function.  · There is right ventricular hypertrophy. Question of mild RVH  · No pulmonary hypertension  · Small of fat pad anterior wall of the right ventricle     Bubble study was technically suboptimal and uninterpretable for PFO  Small calcification at the tip of the anterior leaf of the mitral valve on   the ventricular surface             Dysphagia  Severe pharyngeal dysphagia secondary to stroke  Patient failed the barium swallow test  Scheduled for PEG tube placement today holding plavix prior to the procedure as per GI recs      Major depressive disorder  Continue Wellbutrin as before.      Hypertension  Chronic problem. Will continue chronic medications and monitor for any changes, adjusting as needed.          Dizziness  Likely Secondary to acute CVA.  -Continue meclizine p.r.n.  -follow Neurology recommendations.      Hyperlipidemia associated with type 2 diabetes mellitus  Chronic.  -Resume statin         Hypertension associated with diabetes  Chronic, controlled.  Latest blood pressure and vitals reviewed-   Temp:  [96.2 °F (35.7 °C)-98.2 °F (36.8 °C)]   Pulse:  [70-92]   Resp:  [16-24]   BP:  ()/(52-94)   SpO2:  [92 %-100 %] .   Home meds for hypertension were reviewed and noted below. Hospital anti-hypertensive changes were made as shown below.  Hypertension Medications             hydrALAZINE (APRESOLINE) 25 MG tablet Take 1 tablet (25 mg total) by mouth every 12 (twelve) hours.    lisinopril-hydrochlorothiazide (PRINZIDE,ZESTORETIC) 20-12.5 mg per tablet TAKE 1 TABLET BY MOUTH TWICE A DAY        Will utilize p.r.n. blood pressure medication only if patient's blood pressure greater than  180/110 and he develops symptoms such as worsening chest pain or shortness of breath.        Diabetes mellitus type 2, uncontrolled  Patient's FSGs are controlled on current hypoglycemics.   Last A1c reviewed-   Lab Results   Component Value Date    HGBA1C 7.0 (H) 11/16/2020     Most recent fingerstick glucose reviewed-   Recent Labs   Lab 11/18/20  1748 11/18/20  2132 11/19/20  0554 11/19/20  1226   POCTGLUCOSE 143* 157* 163* 174*     Current correctional scale  Low  Maintain anti-hyperglycemic dose as follows-   Antihyperglycemics (From admission, onward)    Low dose SS insulin        Hold Oral hypoglycemics while patient is in the hospital.        Nicotine dependence  Smoking cessation counseling performed. Dangers of cigarette smoking were reviewed with patient in detail and patient was encouraged to quit. Nicotine replacement options were discussed for > 3 minutes.  -Continue Wellbutrin        VTE Risk Mitigation (From admission, onward)         Ordered     enoxaparin injection 40 mg  Every 24 hours      11/16/20 1456     IP VTE LOW RISK PATIENT  Once      11/16/20 0328     Place sequential compression device  Until discontinued      11/16/20 0328                Discharge Planning   YUDITH: 11/20/2020     Code Status: Full Code   Is the patient medically ready for discharge?:     Reason for patient still in hospital (select all that apply): Patient trending condition and Treatment  Discharge Plan A: Rehab                   Neil Rosas MD  Department of Hospital Medicine   [facility]

## 2020-11-23 NOTE — [employer] PATIENT INSTRUCTIONS
Discharge Summary/Instructions after an Endoscopic Procedure  Patient Name: Jhonny Almazan  Patient MRN: [id number]  Patient YOB: 1955 Monday, November 23, 2020  Guero Miles MD  RESTRICTIONS:  During your procedure today, you received medications for sedation.  These   medications may affect your judgment, balance and coordination.  Therefore,   for 24 hours, you have the following restrictions:   - DO NOT drive a car, operate machinery, make legal/financial decisions,   sign important papers or drink alcohol.    ACTIVITY:  Today: no heavy lifting, straining or running due to procedural   sedation/anesthesia.  The following day: return to full activity including work.  DIET:  Eat and drink normally unless instructed otherwise.     TREATMENT FOR COMMON SIDE EFFECTS:  - Mild abdominal pain, nausea, belching, bloating or excessive gas:  rest,   eat lightly and use a heating pad.  - Sore Throat: treat with throat lozenges and/or gargle with warm salt   water.  - Because air was used during the procedure, expelling large amounts of air   from your rectum or belching is normal.  - If a bowel prep was taken, you may not have a bowel movement for 1-3 days.    This is normal.  SYMPTOMS TO WATCH FOR AND REPORT TO YOUR PHYSICIAN:  1. Abdominal pain or bloating, other than gas cramps.  2. Chest pain.  3. Back pain.  4. Signs of infection such as: chills or fever occurring within 24 hours   after the procedure.  5. Rectal bleeding, which would show as bright red, maroon, or black stools.   (A tablespoon of blood from the rectum is not serious, especially if   hemorrhoids are present.)  6. Vomiting.  7. Weakness or dizziness.  GO DIRECTLY TO THE NEAREST EMERGENCY ROOM IF YOU HAVE ANY OF THE FOLLOWING:      Difficulty breathing              Chills and/or fever over 101 F   Persistent vomiting and/or vomiting blood   Severe abdominal pain   Severe chest pain   Black, tarry stools   Bleeding- more than one  tablespoon   Any other symptom or condition that you feel may need urgent attention  Your doctor recommends these additional instructions:  If any biopsies were taken, your doctors clinic will contact you in 1 to 2   weeks with any results.  - Patient has a contact number available for emergencies.  The signs and   symptoms of potential delayed complications were discussed with the   patient.  Return to normal activities tomorrow.  Written discharge   instructions were provided to the patient.   - Diet per speech therapy.   - Continue present medications.   - No aspirin, ibuprofen, naproxen, or other non-steroidal anti-inflammatory   drugs.   - Await pathology results.   - Follow an antireflux regimen.   - Return patient to hospital yusuf for ongoing care.   - Please follow the post-PEG recommendations including: Nutrition consult   for formula and volume, advance food and medications per primary care   provider, external bolster snug to abdominal wall, change dressing once per   day, may use PEG today for meds and water and clean site with soap and   water daily and dry thoroughly.  For questions, problems or results please call your physician - Guero Miles MD at Work:  [phone].  [facility], EMERGENCY ROOM PHONE NUMBER: [phone]  IF A COMPLICATION OR EMERGENCY SITUATION ARISES AND YOU ARE UNABLE TO REACH   YOUR PHYSICIAN - GO DIRECTLY TO THE EMERGENCY ROOM.  Guero Miles MD  11/23/2020 1:25:42 PM  This report has been verified and signed electronically.  [employer]

## 2020-11-23 NOTE — RESPIRATORY THERAPY
11/23/20 0813   PRE-TX-O2   O2 Device (Oxygen Therapy) room air   SpO2 97 %   Pulse Oximetry Type Intermittent   $ Pulse Oximetry - Multiple Charge Pulse Oximetry - Multiple

## 2020-11-23 NOTE — PT/OT/SLP PROGRESS
Physical Therapy Treatment    Patient Name:  Jhonny Almazan   MRN:  [id number]    Recommendations:     Discharge Recommendations:  rehabilitation facility   Discharge Equipment Recommendations: other (see comments)(TBD)   Barriers to discharge: None    Assessment:     Jhonny Almazan is a 64 y.o. male admitted with a medical diagnosis of Stroke due to embolism of right middle cerebral artery.  He presents with the following impairments/functional limitations:  weakness, impaired endurance, impaired self care skills, impaired functional mobilty, gait instability, impaired balance, decreased lower extremity function, pain, visual deficits . Tolerated treatment. Presents with dizziness upon movement and impaired vision but reports not as bad as it had been. Requires assistance for safety with mobility due to weakness as well as visual problems. Encouraged to move slowly with assistive device for safety and take rests as needed.     Rehab Prognosis: Good; patient would benefit from acute skilled PT services to address these deficits and reach maximum level of function.    Recent Surgery: Procedure(s) (LRB):  Transesophageal echo (SHIKHA) intra-procedure log documentation (N/A) 4 Days Post-Op    Plan:     During this hospitalization, patient to be seen daily to address the identified rehab impairments via gait training, therapeutic activities, therapeutic exercises and progress toward the following goals:    · Plan of Care Expires:  12/15/20    Subjective     Chief Complaint: NPO   Patient/Family Comments/goals: to go to rehab then home.  Pain/Comfort:  · Pain Rating 1: other (see comments)(did not rate)  · Location - Orientation 1: lower  · Location 1: back(reports from being in bed.)  · Pain Addressed 1: Reposition, Nurse notified      Objective:     Communicated with nurse Mercedes prior to session.  Patient found supine with bed alarm, telemetry upon PT entry to room.     General Precautions: Standard, NPO    Orthopedic Precautions:N/A   Braces:       Functional Mobility:  · Bed Mobility:     · Rolling Left:  contact guard assistance  · Supine to Sit: contact guard assistance  · Transfers:     · Sit to Stand:  moderate assistance with rolling walker  · Gait: 20' with rw and Mod A with 2 standing rests and chair follow. Verbal cues for safe technique.       AM-PAC 6 CLICK MOBILITY          Therapeutic Activities and Exercises:   Transferred to sitting EOB. Sat briefly due to dizziness.   Stood with rw and Mod A with extra time for balance.   Ambulated slowly in hallway with rw and Mod A with standing rests as needed then sat in chair.   BLE exercises at 10 reps each : TKE's, hip abd/add/ flexion, AP's.      Patient left up in chair with all lines intact, call button in reach, chair alarm on and nurse Mago notified..    GOALS:   Multidisciplinary Problems     Physical Therapy Goals        Problem: Physical Therapy Goal    Goal Priority Disciplines Outcome Goal Variances Interventions   Physical Therapy Goal     PT, PT/OT Ongoing, Progressing     Description: Goals to be met by: 2020    Patient will increase functional independence with mobility by performin. Supine to sit with Stand-by Assistance  2. Sit to supine with Stand-by Assistance  3. Sit to stand transfer with Contact Guard Assistance  4. Bed to chair transfer with Minimal Assistance using Rolling Walker  5. Gait  x 50 feet with Minimal Assistance using Rolling Walker.                      Time Tracking:     PT Received On: 20  PT Start Time: 820     PT Stop Time: 08  PT Total Time (min): 23 min     Billable Minutes: Gait Training 13min and Therapeutic Exercise 10min    Treatment Type: Treatment  PT/PTA: PTA     PTA Visit Number: 2     Ana Chua PTA  2020

## 2020-11-23 NOTE — PT/OT/SLP PROGRESS
Speech Language Pathology      Jhonny Almazan  MRN: [id number]    Patient not seen today secondary to Unavailable (Comment)(out of room for PEG placement). Will follow-up 11/24/2020.    Christa Hay, RYLIE-SLP

## 2020-11-23 NOTE — PLAN OF CARE
"[facility]"s the pt will be ready for discharge tomorrow once he is tolerating his peg tube feedings. [facility]ita Dumont[facility] good, they will have to start PEG feedings today if we plan to take tomorrow, supposed to be 24 hours without problems, but if they start sometime today, we can take him in the afternoon. not sure if family will transport, he was going to ask        I updated the pts nurse that feedings needs to be started today. 2nd floor charge, Laina also updated.        11/23/20 121   Post-Acute Status   Post-Acute Authorization Placement   Post-Acute Placement Status Pending Post-Acute Clinical Review     "

## 2020-11-23 NOTE — SUBJECTIVE & OBJECTIVE
Interval History:  No acute events overnight scheduled for a PEG tube placement today.  Continues to have tingling sensation on the right side of the face and lower extremity    Review of Systems   Constitutional: Negative for chills and fever.   HENT: Negative for congestion and sore throat.    Eyes: Negative for visual disturbance.   Respiratory: Negative for cough, shortness of breath and wheezing.    Cardiovascular: Negative for chest pain and palpitations.   Gastrointestinal: Negative for abdominal pain, blood in stool, diarrhea, nausea and vomiting.   Endocrine: Negative.    Genitourinary: Negative for difficulty urinating and hematuria.   Musculoskeletal: Negative.  Negative for back pain and neck pain.   Skin: Negative.  Negative for color change and pallor.   Allergic/Immunologic: Negative.    Neurological: Positive for dizziness, weakness, light-headedness, numbness and headaches. Negative for speech difficulty.   Hematological: Negative.    Psychiatric/Behavioral: Negative.    All other systems reviewed and are negative.    Objective:     Vital Signs (Most Recent):  Temp: (!) 95.9 °F (35.5 °C) (11/23/20 1614)  Pulse: 70 (11/23/20 1614)  Resp: 18 (11/23/20 1614)  BP: (!) 143/79 (11/23/20 1614)  SpO2: 97 % (11/23/20 1614) Vital Signs (24h Range):  Temp:  [94.4 °F (34.7 °C)-98.3 °F (36.8 °C)] 95.9 °F (35.5 °C)  Pulse:  [64-90] 70  Resp:  [12-20] 18  SpO2:  [96 %-99 %] 97 %  BP: (106-153)/(58-82) 143/79     Weight: 105.6 kg (232 lb 12.9 oz)  Body mass index is 27.61 kg/m².    Intake/Output Summary (Last 24 hours) at 11/23/2020 1621  Last data filed at 11/23/2020 1345  Gross per 24 hour   Intake 400 ml   Output 950 ml   Net -550 ml      Physical Exam  Vitals signs reviewed.   Constitutional:       Appearance: Normal appearance. He is well-developed.   HENT:      Head: Normocephalic and atraumatic.      Right Ear: External ear normal.      Left Ear: External ear normal.      Nose: Nose normal.       Mouth/Throat:      Mouth: Mucous membranes are moist.   Eyes:      Pupils: Pupils are equal, round, and reactive to light.   Neck:      Musculoskeletal: Normal range of motion and neck supple.   Cardiovascular:      Rate and Rhythm: Normal rate and regular rhythm.      Pulses: Normal pulses.      Heart sounds: Normal heart sounds.   Pulmonary:      Effort: Pulmonary effort is normal.   Abdominal:      General: Abdomen is flat. Bowel sounds are normal. There is no distension.      Palpations: Abdomen is soft.   Musculoskeletal: Normal range of motion.      Right lower leg: No edema.      Left lower leg: No edema.   Skin:     General: Skin is warm and dry.   Neurological:      Mental Status: He is alert and oriented to person, place, and time.      Cranial Nerves: No cranial nerve deficit or facial asymmetry.      Sensory: Sensory deficit present.      Motor: Weakness present.      Comments:  No speech dysfunction noted.   Psychiatric:         Mood and Affect: Mood normal.         Significant Labs: CBC: No results for input(s): WBC, HGB, HCT, PLT in the last 48 hours.  CMP: No results for input(s): NA, K, CL, CO2, GLU, BUN, CREATININE, CALCIUM, PROT, ALBUMIN, BILITOT, ALKPHOS, AST, ALT, ANIONGAP, EGFRNONAA in the last 48 hours.    Invalid input(s): ESTGFAFR[place]    Significant Imaging: I have reviewed all pertinent imaging results/findings within the past 24 hours.

## 2020-11-23 NOTE — PROGRESS NOTES
OK to use PEG tube for meds and flushes now.  OK to use PEG tube for feeds in 8 hours from now.  Consult nutrition for tube feed recommendations.  No gauze between PEG bumper and skin.  OK to place gauze over entire tube and tape down to prevent dislodging.

## 2020-11-23 NOTE — ANESTHESIA PREPROCEDURE EVALUATION
11/23/2020  Jhonny Almazan is a 64 y.o., male.    Pre-op Assessment    I have reviewed the Patient Summary Reports.     I have reviewed the Nursing Notes. I have reviewed the NPO Status.   I have reviewed the Medications.     Review of Systems  Anesthesia Hx:  No problems with previous Anesthesia  Denies Family Hx of Anesthesia complications.   Denies Personal Hx of Anesthesia complications.   Social:  Smoker    Cardiovascular:   Hypertension, well controlled hyperlipidemia ECG has been reviewed.    Pulmonary:  Pulmonary Normal    Renal/:  Renal/ Normal     Hepatic/GI:   Liver Disease,    Musculoskeletal:   Arthritis     Neurological:   CVA, residual symptoms Neuromuscular Disease,    Endocrine:   Diabetes, well controlled, type 2    Psych:   Psychiatric History depression          Physical Exam  General:  Well nourished, Morbid Obesity    Airway/Jaw/Neck:  Airway Findings: Mouth Opening: Normal Tongue: Normal  General Airway Assessment: Adult  Oropharynx Findings:  Mallampati: II  Jaw/Neck Findings:  Neck ROM: Normal ROM     Eyes/Ears/Nose:  Eyes/Ears/Nose Findings:    Dental:  Dental Findings:   Chest/Lungs:  Chest/Lungs Findings: Normal Respiratory Rate     Heart/Vascular:  Heart Findings: Rate: Normal  Rhythm: Regular Rhythm        Mental Status:  Mental Status Findings:  Cooperative, Alert and Oriented         Anesthesia Plan  Type of Anesthesia, risks & benefits discussed:  Anesthesia Type:  general  Patient's Preference:   Intra-op Monitoring Plan: standard ASA monitors  Intra-op Monitoring Plan Comments:   Post Op Pain Control Plan: multimodal analgesia  Post Op Pain Control Plan Comments:   Induction:   IV  Beta Blocker:  Patient is not currently on a Beta-Blocker (No further documentation required).       Informed Consent: Patient understands risks and agrees with Anesthesia plan.   Questions answered. Anesthesia consent signed with patient.  ASA Score: 3     Day of Surgery Review of History & Physical:    H&P update referred to the provider.         Ready For Surgery From Anesthesia Perspective.

## 2020-11-23 NOTE — ASSESSMENT & PLAN NOTE
Severe pharyngeal dysphagia secondary to stroke  Patient failed the barium swallow test  Scheduled for PEG tube placement today holding plavix prior to the procedure as per GI recs

## 2020-11-23 NOTE — TRANSFER OF CARE
"Anesthesia Transfer of Care Note    Patient: Jhonny Almazan    Procedure(s) Performed: Procedure(s) (LRB):  EGD (ESOPHAGOGASTRODUODENOSCOPY) (N/A)  INSERTION, PEG TUBE (N/A)    Patient location: GI    Anesthesia Type: general    Transport from OR: Transported from OR on 2-3 L/min O2 by NC with adequate spontaneous ventilation    Post pain: adequate analgesia    Post assessment: tolerated procedure well and no apparent anesthetic complications    Post vital signs: stable    Level of consciousness: sedated    Nausea/Vomiting: no nausea/vomiting    Complications: none    Transfer of care protocol was followed      Last vitals:   Visit Vitals  /73 (BP Location: Left arm, Patient Position: Lying)   Pulse 75   Temp 36.8 °C (98.2 °F) (Skin)   Resp 16   Ht 6' 5" (1.956 m)   Wt 105.6 kg (232 lb 12.9 oz)   SpO2 96%   BMI 27.61 kg/m²     "

## 2020-11-23 NOTE — PLAN OF CARE
Problem: Physical Therapy Goal  Goal: Physical Therapy Goal  Description: Goals to be met by: 2020    Patient will increase functional independence with mobility by performin. Supine to sit with Stand-by Assistance  2. Sit to supine with Stand-by Assistance  3. Sit to stand transfer with Contact Guard Assistance  4. Bed to chair transfer with Minimal Assistance using Rolling Walker  5. Gait  x 50 feet with Minimal Assistance using Rolling Walker.     Outcome: Ongoing, Progressing   Therapeutic activity to improve functional mobility : bed mobility , transfers , gait with rw and assistance for safety.

## 2020-11-24 VITALS
HEIGHT: 77 IN | TEMPERATURE: 96 F | OXYGEN SATURATION: 96 % | BODY MASS INDEX: 27.49 KG/M2 | HEART RATE: 61 BPM | SYSTOLIC BLOOD PRESSURE: 163 MMHG | RESPIRATION RATE: 16 BRPM | DIASTOLIC BLOOD PRESSURE: 75 MMHG | WEIGHT: 232.81 LBS

## 2020-11-24 LAB
ANION GAP SERPL CALC-SCNC: 9 MMOL/L (ref 8–16)
BASOPHILS # BLD AUTO: 0.07 K/UL (ref 0–0.2)
BASOPHILS NFR BLD: 0.7 % (ref 0–1.9)
BUN SERPL-MCNC: 7 MG/DL (ref 8–23)
CALCIUM SERPL-MCNC: 9.6 MG/DL (ref 8.7–10.5)
CHLORIDE SERPL-SCNC: 106 MMOL/L (ref 95–110)
CO2 SERPL-SCNC: 26 MMOL/L (ref 23–29)
CREAT SERPL-MCNC: 0.7 MG/DL (ref 0.5–1.4)
DIFFERENTIAL METHOD: NORMAL
EOSINOPHIL # BLD AUTO: 0.1 K/UL (ref 0–0.5)
EOSINOPHIL NFR BLD: 1.4 % (ref 0–8)
ERYTHROCYTE [DISTWIDTH] IN BLOOD BY AUTOMATED COUNT: 12.4 % (ref 11.5–14.5)
EST. GFR  (AFRICAN AMERICAN): >60 ML/MIN/1.73 M^2
EST. GFR  (NON AFRICAN AMERICAN): >60 ML/MIN/1.73 M^2
GLUCOSE SERPL-MCNC: 129 MG/DL (ref 70–110)
HCT VFR BLD AUTO: 44.6 % (ref 40–54)
HGB BLD-MCNC: 14.8 G/DL (ref 14–18)
IMM GRANULOCYTES # BLD AUTO: 0.02 K/UL (ref 0–0.04)
IMM GRANULOCYTES NFR BLD AUTO: 0.2 % (ref 0–0.5)
LYMPHOCYTES # BLD AUTO: 2.8 K/UL (ref 1–4.8)
LYMPHOCYTES NFR BLD: 28 % (ref 18–48)
MCH RBC QN AUTO: 30.6 PG (ref 27–31)
MCHC RBC AUTO-ENTMCNC: 33.2 G/DL (ref 32–36)
MCV RBC AUTO: 92 FL (ref 82–98)
MONOCYTES # BLD AUTO: 0.8 K/UL (ref 0.3–1)
MONOCYTES NFR BLD: 8.3 % (ref 4–15)
NEUTROPHILS # BLD AUTO: 6.3 K/UL (ref 1.8–7.7)
NEUTROPHILS NFR BLD: 61.4 % (ref 38–73)
NRBC BLD-RTO: 0 /100 WBC
PLATELET # BLD AUTO: 197 K/UL (ref 150–350)
PMV BLD AUTO: 9.8 FL (ref 9.2–12.9)
POCT GLUCOSE: 135 MG/DL (ref 70–110)
POCT GLUCOSE: 145 MG/DL (ref 70–110)
POTASSIUM SERPL-SCNC: 3.6 MMOL/L (ref 3.5–5.1)
RBC # BLD AUTO: 4.84 M/UL (ref 4.6–6.2)
SODIUM SERPL-SCNC: 141 MMOL/L (ref 136–145)
WBC # BLD AUTO: 10.16 K/UL (ref 3.9–12.7)

## 2020-11-24 PROCEDURE — 85025 COMPLETE CBC W/AUTO DIFF WBC: CPT

## 2020-11-24 PROCEDURE — 80048 BASIC METABOLIC PNL TOTAL CA: CPT

## 2020-11-24 PROCEDURE — 25000003 PHARM REV CODE 250: Performed by: INTERNAL MEDICINE

## 2020-11-24 PROCEDURE — 97116 GAIT TRAINING THERAPY: CPT | Mod: CQ

## 2020-11-24 PROCEDURE — 36415 COLL VENOUS BLD VENIPUNCTURE: CPT

## 2020-11-24 PROCEDURE — 94761 N-INVAS EAR/PLS OXIMETRY MLT: CPT

## 2020-11-24 PROCEDURE — 97110 THERAPEUTIC EXERCISES: CPT | Mod: CQ

## 2020-11-24 PROCEDURE — 25000003 PHARM REV CODE 250: Performed by: NURSE PRACTITIONER

## 2020-11-24 PROCEDURE — 99222 PR INITIAL HOSPITAL CARE,LEVL II: ICD-10-PCS | Mod: ,,, | Performed by: PHYSICAL MEDICINE & REHABILITATION

## 2020-11-24 PROCEDURE — 99222 1ST HOSP IP/OBS MODERATE 55: CPT | Mod: ,,, | Performed by: PHYSICAL MEDICINE & REHABILITATION

## 2020-11-24 RX ORDER — CALCIUM CARBONATE 200(500)MG
1 TABLET,CHEWABLE ORAL DAILY
Qty: 30 TABLET | Refills: 11 | Status: ON HOLD | COMMUNITY
Start: 2020-11-24 | End: 2021-10-04 | Stop reason: HOSPADM

## 2020-11-24 RX ORDER — LANOLIN ALCOHOL/MO/W.PET/CERES
400 CREAM (GRAM) TOPICAL DAILY
Refills: 0 | COMMUNITY
Start: 2020-11-24 | End: 2021-01-14 | Stop reason: SDUPTHER

## 2020-11-24 RX ORDER — ATORVASTATIN CALCIUM 40 MG/1
40 TABLET, FILM COATED ORAL DAILY
Qty: 90 TABLET | Refills: 3 | Status: SHIPPED | OUTPATIENT
Start: 2020-11-25 | End: 2021-01-14 | Stop reason: SDUPTHER

## 2020-11-24 RX ORDER — TETRAHYDROZOLINE HCL 0.05 %
2 DROPS OPHTHALMIC (EYE) 4 TIMES DAILY PRN
Refills: 0 | COMMUNITY
Start: 2020-11-24

## 2020-11-24 RX ORDER — TALC
6 POWDER (GRAM) TOPICAL NIGHTLY PRN
Refills: 0 | COMMUNITY
Start: 2020-11-24 | End: 2021-01-14 | Stop reason: SDUPTHER

## 2020-11-24 RX ORDER — ASPIRIN 81 MG/1
81 TABLET ORAL DAILY
Qty: 360 TABLET | Refills: 0 | Status: SHIPPED | OUTPATIENT
Start: 2020-11-25 | End: 2023-02-15

## 2020-11-24 RX ORDER — TAMSULOSIN HYDROCHLORIDE 0.4 MG/1
0.4 CAPSULE ORAL DAILY
Qty: 30 CAPSULE | Refills: 11 | Status: SHIPPED | OUTPATIENT
Start: 2020-11-25 | End: 2021-01-14 | Stop reason: SDUPTHER

## 2020-11-24 RX ORDER — CLOPIDOGREL BISULFATE 75 MG/1
75 TABLET ORAL DAILY
Qty: 30 TABLET | Refills: 11 | Status: SHIPPED | OUTPATIENT
Start: 2020-11-25 | End: 2021-01-14 | Stop reason: SDUPTHER

## 2020-11-24 RX ORDER — MECLIZINE HYDROCHLORIDE 25 MG/1
25 TABLET ORAL 3 TIMES DAILY PRN
Refills: 0
Start: 2020-11-24

## 2020-11-24 RX ADMIN — TAMSULOSIN HYDROCHLORIDE 0.4 MG: 0.4 CAPSULE ORAL at 08:11

## 2020-11-24 RX ADMIN — ATORVASTATIN CALCIUM 40 MG: 40 TABLET, FILM COATED ORAL at 08:11

## 2020-11-24 RX ADMIN — CLOPIDOGREL 75 MG: 75 TABLET, FILM COATED ORAL at 08:11

## 2020-11-24 RX ADMIN — FAMOTIDINE 20 MG: 10 INJECTION INTRAVENOUS at 08:11

## 2020-11-24 RX ADMIN — LIDOCAINE 1 PATCH: 50 PATCH TOPICAL at 06:11

## 2020-11-24 RX ADMIN — ACETAMINOPHEN 650 MG: 325 TABLET ORAL at 06:11

## 2020-11-24 RX ADMIN — ASPIRIN 81 MG: 81 TABLET, COATED ORAL at 08:11

## 2020-11-24 NOTE — PLAN OF CARE
I sent the pts discharge orders to [facility] rehab to review for discharge . Sita Dumont LCSW     I spoke to Nivia at [phone] with rehab and she stated that per Dr. Mendoza they can manage residuals at rehab and he does not need to wait to transfer for that. Dr. Mendoza also requests that if the pt has IV access that it is not removed.  She stated that the pts son Gurdeep is hard of hearing and doesn't drive. He has another son Ashkan and he is  from his wife. She can order a van if she needs to but I told her that I would go talk to the pt first to discuss transport.       Per Pt- his ex wife and both sons do not feel comfortable transporting him and he would like to go in the wheelchair van. I updated Nivia and she is going to arrange transport. Sita Dumont LCSW       11/24/20 0939   Post-Acute Status   Post-Acute Authorization Placement   Post-Acute Placement Status Pending Post-Acute Clinical Review

## 2020-11-24 NOTE — CARE UPDATE
11/24/20 0824   PRE-TX-O2   O2 Device (Oxygen Therapy) room air   Oxygen Concentration (%) 21   SpO2 97 %   Pulse Oximetry Type Intermittent   $ Pulse Oximetry - Multiple Charge Pulse Oximetry - Multiple   Pulse 78   Resp (!) 105   Positioning Sitting on edge of bed (dangling)   POx

## 2020-11-24 NOTE — DISCHARGE INSTRUCTIONS
[facility] Transfer Orders        Admit to: [facility] rehab     Diagnoses:   Active Hospital Problems    Diagnosis  POA    *Stroke due to embolism of right middle cerebral artery [I63.411]  Yes    Dysphagia [R13.10]  Yes    Major depressive disorder [F32.9]  Yes     -Continue Wellbutrin      Dizziness [R42]  Yes    Hypertension [I10]  Yes    Hyperlipidemia associated with type 2 diabetes mellitus [E11.69, E78.5]  Yes    Diabetes mellitus type 2, uncontrolled [E11.65]  Yes    Hypertension associated with diabetes [E11.59, I10]  Yes    Nicotine dependence [F17.200]  Yes      Resolved Hospital Problems   No resolved problems to display.     Allergies: Review of patient's allergies indicates:  No Known Allergies    Code Status: Full code    Vitals: Routine       Diet: Nutrition:   1) Initiate TF via Peg tube Glucerna 1.5 @ 20 ml/hr advancing as pt tolerates to goal of 50 ml/hr + 200 ml flush q 4 hr or per MD    2) weigh pt weekly     Activity: Activity as tolerated    Nursing Precautions: Aspiration  and Fall    Bed/Surface: Low Air Loss    Consults: PT to evaluate and treat- n times a week, OT to evaluate and treat- n times a week, ST to evaluate and treat- n times a week and Nutrition to evaluate and recommend diet    Oxygen: room air    Dialysis: Patient is not on dialysis.        Pending Diagnostic Studies:     None            Miscellaneous Care:       IV Access: Peripheral     Medications: Discontinue all previous medication orders, if any. See new list below.  Current Discharge Medication List      START taking these medications    Details   aspirin (ECOTRIN) 81 MG EC tablet Take 1 tablet (81 mg total) by mouth once daily.  Qty: 360 tablet, Refills: 0      atorvastatin (LIPITOR) 40 MG tablet Take 1 tablet (40 mg total) by mouth once daily.  Qty: 90 tablet, Refills: 3      calcium carbonate (TUMS) 200 mg calcium (500 mg) chewable tablet Take 1 tablet (500 mg total) by mouth  once daily.  Qty: 30 tablet, Refills: 11      clopidogreL (PLAVIX) 75 mg tablet 1 tablet (75 mg total) by Per G Tube route once daily.  Qty: 30 tablet, Refills: 11      magnesium oxide (MAG-OX) 400 mg (241.3 mg magnesium) tablet Take 1 tablet (400 mg total) by mouth once daily.  Refills: 0      meclizine (ANTIVERT) 25 mg tablet Take 1 tablet (25 mg total) by mouth 3 (three) times daily as needed for Dizziness.  Qty:  , Refills: 0      melatonin (MELATIN) 3 mg tablet Take 2 tablets (6 mg total) by mouth nightly as needed for Insomnia.  Qty:  , Refills: 0      tamsulosin (FLOMAX) 0.4 mg Cap Take 1 capsule (0.4 mg total) by mouth once daily.  Qty: 30 capsule, Refills: 11      tetrahydrozoline 0.05% (VISION CLEAR) 0.05 % Drop Place 2 drops into both eyes 4 (four) times daily as needed (eye irritation).  Qty:  , Refills: 0         CONTINUE these medications which have NOT CHANGED    Details   buPROPion (WELLBUTRIN XL) 300 MG 24 hr tablet Take 1 tablet (300 mg total) by mouth once daily.  Qty: 90 tablet, Refills: 3    Associated Diagnoses: Cigarette nicotine dependence without complication      empagliflozin (JARDIANCE) 10 mg Tab Take 10 mg by mouth once daily.  Qty: 30 tablet, Refills: 11    Associated Diagnoses: Uncontrolled type 2 diabetes mellitus with hyperglycemia      folic acid-vit B6-vit B12 2.5-25-2 mg (FOLBIC OR EQUIV) 2.5-25-2 mg Tab Take 1 tablet by mouth once daily.  Qty: 90 tablet, Refills: 4    Associated Diagnoses: Uncontrolled type 2 diabetes mellitus with complication, without long-term current use of insulin; Type 2 diabetes mellitus with diabetic neuropathy, without long-term current use of insulin      gabapentin (NEURONTIN) 300 MG capsule Take 2 capsules (600 mg total) by mouth every evening.  Qty: 180 capsule, Refills: 1    Associated Diagnoses: Thoracic radiculitis; Spinal enthesopathy, thoracic region; Thoracic disc disease; Post herpetic neuralgia; Uncontrolled type 2 diabetes mellitus with  "complication, without long-term current use of insulin; Type 2 diabetes mellitus with diabetic neuropathy, without long-term current use of insulin      hydrALAZINE (APRESOLINE) 25 MG tablet Take 1 tablet (25 mg total) by mouth every 12 (twelve) hours.  Qty: 180 tablet, Refills: 8    Associated Diagnoses: Hypertension associated with diabetes; Uncontrolled type 2 diabetes mellitus with complication, without long-term current use of insulin      insulin (BASAGLAR KWIKPEN U-100 INSULIN) glargine 100 units/mL (3mL) SubQ pen Inject 20 Units into the skin every evening.  Qty: 1 Box, Refills: 4    Associated Diagnoses: Uncontrolled type 2 diabetes mellitus with complication, without long-term current use of insulin      lactulose (CHRONULAC) 10 gram/15 mL solution TAKE 30 MLS BY MOUTH 2 TIMES DAILY.  Qty: 1892 mL, Refills: 3    Associated Diagnoses: Constipation, unspecified constipation type      lidocaine (LIDODERM) 5 % Place 1 patch onto the skin once daily. Remove & Discard patch within 12 hours or as directed by MD  Qty: 30 patch, Refills: 2    Associated Diagnoses: Post herpetic neuralgia      lisinopril-hydrochlorothiazide (PRINZIDE,ZESTORETIC) 20-12.5 mg per tablet TAKE 1 TABLET BY MOUTH TWICE A DAY  Qty: 180 tablet, Refills: 3    Associated Diagnoses: Uncontrolled type 2 diabetes mellitus with complication, without long-term current use of insulin; Hypertension associated with diabetes      metFORMIN (GLUCOPHAGE-XR) 500 MG 24 hr tablet Take 2 tablets (1,000 mg total) by mouth 2 (two) times daily.  Qty: 360 tablet, Refills: 0    Associated Diagnoses: Uncontrolled type 2 diabetes mellitus with complication, without long-term current use of insulin      pen needle, diabetic 32 gauge x 5/32" Ndle 1 each by Misc.(Non-Drug; Combo Route) route nightly.  Qty: 50 each, Refills: 11    Associated Diagnoses: Uncontrolled type 2 diabetes mellitus with complication, without long-term current use of insulin      pioglitazone " (ACTOS) 30 MG tablet Take 1 tablet (30 mg total) by mouth once daily.  Qty: 90 tablet, Refills: 2    Associated Diagnoses: Type 2 diabetes mellitus with diabetic neuropathy, without long-term current use of insulin      diclofenac sodium (VOLTAREN) 1 % Gel Apply 2 g topically 3 (three) times daily as needed. For hand pain  Qty: 100 g, Refills: 11    Associated Diagnoses: Bilateral hand pain         STOP taking these medications       rosuvastatin (CRESTOR) 20 MG tablet Comments:   Reason for Stopping:         magnesium citrate solution Comments:   Reason for Stopping:         omeprazole (PRILOSEC) 40 MG capsule Comments:   Reason for Stopping:             Follow up:   Follow-up Information     Joey Whitehead MD In 4 weeks.    Specialty: Family Medicine  Contact information:  [address]  [place] [address] [phone]

## 2020-11-24 NOTE — PLAN OF CARE
Updated Dr. Rosas that per Nivia with [facility] rehab that Dr. Mendoza is ready to get the pt admitted ASAP. Per the pts nurse Stacy- pt had high residuals from peg but she clamped it and he is going to take a walk. She feels like he will be ready for discharge sometime after lunch . Added BURAK Kumar  to secure chat regarding pts progress. Sita Dumont, CACHORRO     11/24/20 0933   Post-Acute Status   Post-Acute Authorization Placement   Post-Acute Placement Status Awaiting Internal Medical Clearance

## 2020-11-24 NOTE — RESPIRATORY THERAPY
11/23/20 2015   Patient Assessment/Suction   Level of Consciousness (AVPU) alert   PRE-TX-O2   O2 Device (Oxygen Therapy) room air   SpO2 96 %   Pulse Oximetry Type Intermittent

## 2020-11-24 NOTE — PT/OT/SLP PROGRESS
Physical Therapy Treatment    Patient Name:  Jhonny Almazan   MRN:  [id number]    Recommendations:     Discharge Recommendations:  rehabilitation facility   Discharge Equipment Recommendations: other (see comments)(TBD)   Barriers to discharge: None    Assessment:     Jhonny Almazan is a 64 y.o. male admitted with a medical diagnosis of Stroke due to embolism of right middle cerebral artery.  He presents with the following impairments/functional limitations:  weakness, impaired endurance, impaired self care skills, impaired functional mobilty, gait instability, decreased lower extremity function, pain . Tolerated treatment. Requested to urinate standing at toilet in restroom. Ambulated with assistance for safety as patient unsteady due to weakness and dizziness with activity , also reports visual problems. Able to brush teeth per request with set up.     Rehab Prognosis: Good; patient would benefit from acute skilled PT services to address these deficits and reach maximum level of function.    Recent Surgery: Procedure(s) (LRB):  EGD (ESOPHAGOGASTRODUODENOSCOPY) (N/A)  INSERTION, PEG TUBE (N/A) 1 Day Post-Op    Plan:     During this hospitalization, patient to be seen daily to address the identified rehab impairments via gait training, therapeutic activities, therapeutic exercises and progress toward the following goals:    · Plan of Care Expires:  12/15/20    Subjective     Chief Complaint: limited mobility  Patient/Family Comments/goals: to go to rehab  Pain/Comfort:  · Pain Rating 1: other (see comments)(did not rate)  · Location - Orientation 1: lower  · Location 1: back  · Pain Addressed 1: Reposition, Nurse notified      Objective:     Communicated with nurse Kumar prior to session.  Patient found supine with bed alarm, PEG Tube, telemetry upon PT entry to room.     General Precautions: Standard, NPO   Orthopedic Precautions:N/A   Braces: N/A     Functional Mobility:  · Bed Mobility:      · Rolling Left:  contact guard assistance  · Supine to Sit: contact guard assistance  · Transfers:     · Sit to Stand:  moderate assistance with rolling walker  · Gait: 8' to restroom, 40' with rw and Mod A with verbal cues for safe technique.      AM-PAC 6 CLICK MOBILITY          Therapeutic Activities and Exercises:   Ambulated to restroom. Stood to void with CGA. Verbal cues required for safe turns with rw.   Ambulated an additional 40' with rw and Mod A.   BLE exercises at 10 reps each ; TKE's, Hip abd/ add/ flexion, AP's.   Brushed teeth seated with set up.     Patient left up in chair with all lines intact, call button in reach, chair alarm on and nurse Stacy notified..    GOALS:   Multidisciplinary Problems     Physical Therapy Goals        Problem: Physical Therapy Goal    Goal Priority Disciplines Outcome Goal Variances Interventions   Physical Therapy Goal     PT, PT/OT Ongoing, Progressing     Description: Goals to be met by: 2020    Patient will increase functional independence with mobility by performin. Supine to sit with Stand-by Assistance  2. Sit to supine with Stand-by Assistance  3. Sit to stand transfer with Contact Guard Assistance  4. Bed to chair transfer with Minimal Assistance using Rolling Walker  5. Gait  x 50 feet with Minimal Assistance using Rolling Walker.                      Time Tracking:     PT Received On: 20  PT Start Time: 0900     PT Stop Time: 0930  PT Total Time (min): 30 min     Billable Minutes: Gait Training 12min and Therapeutic Exercise 18min    Treatment Type: Treatment  PT/PTA: PTA     PTA Visit Number: 3     Ana Chua, PTA  2020

## 2020-11-24 NOTE — ANESTHESIA POSTPROCEDURE EVALUATION
Anesthesia Post Evaluation    Patient: Jhonny Almazan    Procedure(s) Performed: Procedure(s) (LRB):  EGD (ESOPHAGOGASTRODUODENOSCOPY) (N/A)  INSERTION, PEG TUBE (N/A)    Final Anesthesia Type: general    Patient location during evaluation: PACU  Patient participation: Yes- Able to Participate  Level of consciousness: awake and alert  Post-procedure vital signs: reviewed and stable  Pain management: adequate  Airway patency: patent    PONV status at discharge: No PONV  Anesthetic complications: no      Cardiovascular status: blood pressure returned to baseline  Respiratory status: unassisted  Hydration status: euvolemic  Follow-up not needed.          Vitals Value Taken Time   /64 11/24/20 0326   Temp 35.9 °C (96.6 °F) 11/24/20 0326   Pulse 56 11/24/20 0326   Resp 18 11/24/20 0326   SpO2 95 % 11/24/20 0326         Event Time   Out of Recovery 11/23/2020 13:56:21         Pain/Jonny Score: Pain Rating Prior to Med Admin: 4 (11/24/2020  6:39 AM)  Jonny Score: 10 (11/23/2020  1:55 PM)

## 2020-11-24 NOTE — PLAN OF CARE
POC discussed with patient. Patient verbalized understanding. VSS. Afebrile. AAO. NPO with ice chips. PEG tube place today. First tube feeding due at 2200. PIV cdi infusing NS at 125. Urinal at bedside. Tele [id number] NSR. Neuro checks WDL. Accuchecks WDL. Bed low. Call light in place. Will continue to monitor.

## 2020-11-24 NOTE — PLAN OF CARE
Reviewed POC with pt, pt verbalized understanding, A&O x4, room air maintained, no c/o sob or pain during shift,  NPO Diet with ice chips maintained, Tele maintained running NSR, VTE maintained, mid abd wound/peg site dressing has dried drainage, no redness or discharge, Tube feeding 20cc/hr, 200 flush q4 hr, self turn maintained, IV maintained, Visualized pt, safety intact, bed alarm on, bed in low position, locked, call light and personal items within reach. Will continue to monitor.         Problem: Fall Injury Risk  Goal: Absence of Fall and Fall-Related Injury  Outcome: Ongoing, Progressing     Problem: Adult Inpatient Plan of Care  Goal: Plan of Care Review  Outcome: Ongoing, Progressing  Goal: Patient-Specific Goal (Individualization)  Outcome: Ongoing, Progressing  Goal: Absence of Hospital-Acquired Illness or Injury  Outcome: Ongoing, Progressing  Goal: Optimal Comfort and Wellbeing  Outcome: Ongoing, Progressing  Goal: Readiness for Transition of Care  Outcome: Ongoing, Progressing  Goal: Rounds/Family Conference  Outcome: Ongoing, Progressing     Problem: Adjustment to Illness (Stroke, Ischemic/Transient Ischemic Attack)  Goal: Optimal Coping  Outcome: Ongoing, Progressing     Problem: Cerebral Tissue Perfusion Risk (Stroke, Ischemic/Transient Ischemic Attack)  Goal: Optimal Cerebral Tissue Perfusion  Outcome: Ongoing, Progressing     Problem: Communication Impairment (Stroke, Ischemic/Transient Ischemic Attack)  Goal: Improved Communication Skills  Outcome: Ongoing, Progressing     Problem: Eating/Swallowing Impairment (Stroke, Ischemic/Transient Ischemic Attack)  Goal: Oral Intake without Aspiration  Outcome: Ongoing, Progressing     Problem: Functional Ability Impaired (Stroke, Ischemic/Transient Ischemic Attack)  Goal: Optimal Functional Ability  Outcome: Ongoing, Progressing     Problem: Hemodynamic Instability (Stroke, Ischemic/Transient Ischemic Attack)  Goal: Vital Signs Remain in Desired  Range  Outcome: Ongoing, Progressing     Problem: Pain (Stroke, Ischemic/Transient Ischemic Attack)  Goal: Acceptable Pain Control  Outcome: Ongoing, Progressing     Problem: Sensorimotor Impairment (Stroke, Ischemic/Transient Ischemic Attack)  Goal: Improved Sensorimotor Function  Outcome: Ongoing, Progressing     Problem: Urinary Elimination Impaired (Stroke, Ischemic/Transient Ischemic Attack)  Goal: Effective Urinary Elimination  Outcome: Ongoing, Progressing     Problem: Wound  Goal: Optimal Wound Healing  Outcome: Ongoing, Progressing     Problem: Diabetes Comorbidity  Goal: Blood Glucose Level Within Desired Range  Outcome: Ongoing, Progressing     Problem: Infection  Goal: Infection Symptom Resolution  Outcome: Ongoing, Progressing     Problem: Skin Injury Risk Increased  Goal: Skin Health and Integrity  Outcome: Ongoing, Progressing

## 2020-11-24 NOTE — PLAN OF CARE
Per Nivia with [facility] rehab- report can be called to [phone] and the [facility] van will be here between 11:00 - 11:15. Discharge destination booked. Sita Dumont LCSW     11/24/20 0957   Post-Acute Status   Post-Acute Authorization Placement   Post-Acute Placement Status Set-up Complete

## 2020-11-24 NOTE — NURSING
Spoke with CM regarding pt discharge to [facility] facility. Spoke with her and Dr Rosas about peg tube feed at rate of 30mlhr and residual of 230ml, without any abd distention,discomfort, emesis or issues. Notified by CM that Per Nivia with [facility]- report can be called to [phone] and the [facility] van will be here between 11:00 - 11:15. Pt has been updated and the [facility], Dr. Mendoza, can manage residuals there and wants the IV left in.   Contacted pt's wife Christine and notified her of discharge and instructions. Provided her with Room number that pt will d/c to a rehab per pt's nurse Demetrice Room number 92. Will call and update her when pt leaves hospital per her request. Report called to rehab facility and given to Demetrice the receiving nurse. Pt aware of d/c. IV remains intact and clamped for transfer. Clamped peg tube for transfer no issues noted and remains intact and secure. D/C instructions provided to pt and placed in bag, along with all  pt's belongings in prep for d/c.     Removed tele and returned to monitor room. Vss. No complaints.    1038-Refused Flu and pneumonia vaccine stated he will get it later and refuses administration prior to d/c. Stroke education provided and stoke info packet given. Educated on peg tube feeding etc. Continued reiforcement at rehab need, but pt verbalized understanding  1128-Transport arrived to transfer pt to rehab. No issues or complaints. No distress noted. All belongings with pt at time of discharge. Transferred off unit via wheelchair.

## 2020-11-24 NOTE — PLAN OF CARE
The pt is discharging to [facility] and is clear for discharge from case management. Sita Dumont LCSW     11/24/20 0959   Final Note   Assessment Type Final Discharge Note   Anticipated Discharge Disposition Rehab

## 2020-11-24 NOTE — PLAN OF CARE
Problem: Physical Therapy Goal  Goal: Physical Therapy Goal  Description: Goals to be met by: 2020    Patient will increase functional independence with mobility by performin. Supine to sit with Stand-by Assistance  2. Sit to supine with Stand-by Assistance  3. Sit to stand transfer with Contact Guard Assistance  4. Bed to chair transfer with Minimal Assistance using Rolling Walker  5. Gait  x 50 feet with Minimal Assistance using Rolling Walker.     Outcome: Ongoing, Progressing   Therapeutic activity : bed mobility , transfers, gait with rw and assistance, LE exercises.

## 2020-11-25 NOTE — DISCHARGE SUMMARY
[facility] Hospital Medicine  Discharge Summary      Patient Name: Jhonny Almazan  MRN: [id number]  Admission Date: 11/16/2020  Hospital Length of Stay: 7 days  Discharge Date and Time: 11/24/2020 11:30 AM  Attending Physician: Kim att. providers found   Discharging Provider: Neil Rosas MD  Primary Care Provider: Joey Whitehead MD      HPI:   Jhonny Almazan is a 64-year-old  male with past medical history significant for hypertension, diabetes, neuropathy and depression presenting today for dizziness.  Patient states for the past 3 days he has been experiencing intermittent dizziness that would worsen with ambulation.  He states today his dizziness became constant and was associated with nausea and 1 episode of vomiting.  He also is experiencing a right temple headache which he describes as dull quality, 3/10 intensity, no aggravating or alleviating factors.  He denies blurred vision or focal neuro deficits at home.  While in the ER he experienced an episode of left hand weakness with associated numbness and tingling.  ED workup revealed CT head was negative but CTA head and neck performed and revealed absent filling of the bilateral vertebral arteries and proximal basal artery with findings of bilateral PCA and mid/distal basal artery patent and no acute intracranial hemorrhage.  Patient is on longer experiencing any focal neuro deficits and is left hand weakness and numbness have resolved.  Patient will be admitted to hospital medicine services for further workup and management.  He was given aspirin in the ED and his nausea is controlled after receiving IV Zofran.  He also received meclizine and states that his dizziness is improving.      Procedure(s) (LRB):  Transesophageal echo (SHIKHA) intra-procedure log documentation (N/A)      Hospital Course:    ED workup revealed CT head was negative but CTA head and neck performed and revealed absent filling of the  bilateral vertebral arteries and proximal basal artery with findings of bilateral PCA and mid/distal basal artery patent and no acute intracranial hemorrhage.  Patient on longer experienced any focal neuro deficits and is left hand weakness and numbness have resolved.  Patient will be admitted to hospital medicine services for further workup and management.  He was given aspirin in the ED and his nausea is controlled after receiving IV Zofran.  He also received meclizine and states that his dizziness is improving.   Neurology was consulted.  Continued to have dizziness, lightheadedness, bL blurry vision, BL UE weakness, and numbness in his hands and feet that started last night.  CTA head and neck showed clots in the basilar and /BL vertebral arteries with potential for removal,was recommended that pt is transferred to [facility] to see endovascular surgeon vascular surgeon was consulted recommended    The lack of significant symptoms also suggest a chronic occlusion. No interventions are recommended at this time as risk outweigh the benefits. Horizontal nystagmus noted one exam and patient reports double vision. He states he feels as if his speech is slurred, continues to experience dizziness and reports decreased sensation to right face and LLE.  Also had symptoms of speech changes, facial numbness and difficulty swallowing. Repeat CT was ordered and did not show new strokes or acute bleed  Patient TTE with bubble completed on 11/19 bubble study negative no  Pfo,  No  clots in left atrium or appendage significant abnormal cholesterol plaque in  descending and aorta and arch. Patient inpatient work up complete.  Urology Recommended to -continue dual antiplatelet therapy with aspirin 81mg daily and plavix 75mg daily for 21 days, then stop plavix and continue monotherapy with aspirin 81mg daily. Patient Plavix was held due to PEG placementrelated to feeding difficulty patient failed his swallow study.  PT OT and speech  continued to work with the patient.  Patient got a PEG tube on 11/23 and tube feeding was started patient has not received residuals.  Tube feeding was resumed at a lower rate, patient was medically cleared for discharge to rehab     Consults:   Consults (From admission, onward)        Status Ordering Provider     Inpatient consult to Gastroenterology  Once     Provider:  Guero Crane MD    Completed SHIRA BROCK     Inpatient consult to Registered Dietitian/Nutritionist  Once     Provider:  (Not yet assigned)    Completed DAVONTE LOZANO     Inpatient consult to Social Work/Case Management  Once     Provider:  (Not yet assigned)    Completed JAMIN WALKER     IP consult to case management/social work  Once     Provider:  (Not yet assigned)    Completed DAVONTE LOZANO          No new Assessment & Plan notes have been filed under this hospital service since the last note was generated.  Service: Hospital Medicine    Final Active Diagnoses:    Diagnosis Date Noted POA    PRINCIPAL PROBLEM:  Stroke due to embolism of right middle cerebral artery [I63.411] 11/16/2020 Yes    Dysphagia [R13.10] 11/19/2020 Yes    Major depressive disorder [F32.9] 11/17/2020 Yes    Dizziness [R42] 11/16/2020 Yes    Hypertension [I10] 11/16/2020 Yes    Hyperlipidemia associated with type 2 diabetes mellitus [E11.69, E78.5] 09/28/2016 Yes    Diabetes mellitus type 2, uncontrolled [E11.65] 09/19/2014 Yes    Hypertension associated with diabetes [E11.59, I10] 09/19/2014 Yes    Nicotine dependence [F17.200] 07/10/2014 Yes      Problems Resolved During this Admission:       Discharged Condition: stable    Disposition: Rehab Facility    Follow Up:  Follow-up Information     Joey Whitehead MD In 4 weeks.    Specialty: Family Medicine  Contact information:  [address] [facility] [address]  [phone]             [facility].    Why: Rehab  Contact information:  [address] [address]  [address]  [phone]               Patient Instructions:      Activity as tolerated       Significant Diagnostic Studies: Labs:   BMP:   Recent Labs   Lab 11/24/20  0433   *      K 3.6      CO2 26   BUN 7*   CREATININE 0.7   CALCIUM 9.6   , CBC   Recent Labs   Lab 11/24/20  0433   WBC 10.16   HGB 14.8   HCT 44.6      , Lipid Panel   Lab Results   Component Value Date    CHOL 199 11/16/2020    HDL 39 (L) 11/16/2020    LDLCALC 128.4 11/16/2020    TRIG 158 (H) 11/16/2020    CHOLHDL 19.6 (L) 11/16/2020   , Troponin No results for input(s): TROPONINI in the last 168 hours. and A1C:   Recent Labs   Lab 07/06/20  1010 11/16/20  0511   HGBA1C 7.1* 7.0*     Microbiology:   Blood Culture No results found for: LABBLOO, Sputum Culture No results found for: GSRESP, RESPIRATORYC and Urine Culture    Lab Results   Component Value Date    LABURIN No growth 01/17/2017       Pending Diagnostic Studies:     None         Medications:  Reconciled Home Medications:      Medication List      START taking these medications    aspirin 81 MG EC tablet  Commonly known as: ECOTRIN  Take 1 tablet (81 mg total) by mouth once daily.     atorvastatin 40 MG tablet  Commonly known as: LIPITOR  Take 1 tablet (40 mg total) by mouth once daily.     calcium carbonate 200 mg calcium (500 mg) chewable tablet  Commonly known as: TUMS  Take 1 tablet (500 mg total) by mouth once daily.     clopidogreL 75 mg tablet  Commonly known as: PLAVIX  1 tablet (75 mg total) by Per G Tube route once daily.     magnesium oxide 400 mg (241.3 mg magnesium) tablet  Commonly known as: MAG-OX  Take 1 tablet (400 mg total) by mouth once daily.     meclizine 25 mg tablet  Commonly known as: ANTIVERT  Take 1 tablet (25 mg total) by mouth 3 (three) times daily as needed for Dizziness.     melatonin 3 mg tablet  Commonly known as: MELATIN  Take 2 tablets (6 mg total) by mouth nightly as needed for Insomnia.     tamsulosin 0.4 mg Cap  Commonly known as:  "FLOMAX  Take 1 capsule (0.4 mg total) by mouth once daily.     tetrahydrozoline 0.05% 0.05 % Drop  Commonly known as: Vision Clear  Place 2 drops into both eyes 4 (four) times daily as needed (eye irritation).        CONTINUE taking these medications    buPROPion 300 MG 24 hr tablet  Commonly known as: WELLBUTRIN XL  Take 1 tablet (300 mg total) by mouth once daily.     diclofenac sodium 1 % Gel  Commonly known as: VOLTAREN  Apply 2 g topically 3 (three) times daily as needed. For hand pain     empagliflozin 10 mg tablet  Commonly known as: JARDIANCE  Take 10 mg by mouth once daily.     folic acid-vit B6-vit B12 2.5-25-2 mg 2.5-25-2 mg Tab  Commonly known as: FOLBIC or Equiv  Take 1 tablet by mouth once daily.     gabapentin 300 MG capsule  Commonly known as: NEURONTIN  Take 2 capsules (600 mg total) by mouth every evening.     hydrALAZINE 25 MG tablet  Commonly known as: APRESOLINE  Take 1 tablet (25 mg total) by mouth every 12 (twelve) hours.     insulin glargine 100 units/mL (3mL) SubQ pen  Commonly known as: BASAGLAR KWIKPEN U-100 INSULIN  Inject 20 Units into the skin every evening.     lactulose 10 gram/15 mL solution  Commonly known as: CHRONULAC  TAKE 30 MLS BY MOUTH 2 TIMES DAILY.     lidocaine 5 %  Commonly known as: LIDODERM  Place 1 patch onto the skin once daily. Remove & Discard patch within 12 hours or as directed by MD     lisinopriL-hydrochlorothiazide 20-12.5 mg per tablet  Commonly known as: PRINZIDE,ZESTORETIC  TAKE 1 TABLET BY MOUTH TWICE A DAY     metFORMIN 500 MG ER 24hr tablet  Commonly known as: GLUCOPHAGE-XR  Take 2 tablets (1,000 mg total) by mouth 2 (two) times daily.     pen needle, diabetic 32 gauge x 5/32" Ndle  1 each by Misc.(Non-Drug; Combo Route) route nightly.     pioglitazone 30 MG tablet  Commonly known as: ACTOS  Take 1 tablet (30 mg total) by mouth once daily.        STOP taking these medications    magnesium citrate solution     omeprazole 40 MG capsule  Commonly known as: " PRILOSEC     rosuvastatin 20 MG tablet  Commonly known as: CRESTOR            Indwelling Lines/Drains at time of discharge:   Lines/Drains/Airways     Drain                 Gastrostomy/Enterostomy 11/23/20 1318 Percutaneous endoscopic gastrostomy (PEG) LUQ feeding 1 day                Time spent on the discharge of patient: 60 minutes  Patient was seen and examined on the date of discharge and determined to be suitable for discharge.         Neil Rosas MD  Department of Hospital Medicine  [facility]

## 2020-12-09 PROCEDURE — G0180 PR HOME HEALTH MD CERTIFICATION: ICD-10-PCS | Mod: ,,, | Performed by: FAMILY MEDICINE

## 2020-12-09 PROCEDURE — G0180 MD CERTIFICATION HHA PATIENT: HCPCS | Mod: ,,, | Performed by: FAMILY MEDICINE

## 2020-12-11 ENCOUNTER — PATIENT MESSAGE (OUTPATIENT)
Dept: OTHER | Facility: OTHER | Age: 65
End: 2020-12-11

## 2020-12-16 ENCOUNTER — EXTERNAL HOME HEALTH (OUTPATIENT)
Dept: HOME HEALTH SERVICES | Facility: HOSPITAL | Age: 65
End: 2020-12-16
Payer: MEDICARE

## 2020-12-17 ENCOUNTER — PATIENT OUTREACH (OUTPATIENT)
Dept: ADMINISTRATIVE | Facility: OTHER | Age: 65
End: 2020-12-17

## 2020-12-17 NOTE — PROGRESS NOTES
Chart was reviewed for overdue Proactive [facility] Encounters (ROM[facility])  topics  Updates were requested from care everywhere  Health Maintenance has been updated  LINKS immunization registry triggered       Ensure compact BID/Commercial beverage

## 2020-12-18 ENCOUNTER — OFFICE VISIT (OUTPATIENT)
Dept: UROLOGY | Facility: CLINIC | Age: 65
End: 2020-12-18
Payer: MEDICARE

## 2020-12-18 VITALS
DIASTOLIC BLOOD PRESSURE: 61 MMHG | SYSTOLIC BLOOD PRESSURE: 110 MMHG | TEMPERATURE: 98 F | WEIGHT: 231.5 LBS | BODY MASS INDEX: 27.33 KG/M2 | HEART RATE: 91 BPM | HEIGHT: 77 IN

## 2020-12-18 DIAGNOSIS — R33.9 URINE RETENTION: Primary | ICD-10-CM

## 2020-12-18 PROCEDURE — 99215 OFFICE O/P EST HI 40 MIN: CPT | Mod: PBBFAC,PN | Performed by: UROLOGY

## 2020-12-18 PROCEDURE — 99204 PR OFFICE/OUTPT VISIT, NEW, LEVL IV, 45-59 MIN: ICD-10-PCS | Mod: S$PBB,,, | Performed by: UROLOGY

## 2020-12-18 PROCEDURE — 99999 PR PBB SHADOW E&M-EST. PATIENT-LVL V: ICD-10-PCS | Mod: PBBFAC,,, | Performed by: UROLOGY

## 2020-12-18 PROCEDURE — 99999 PR PBB SHADOW E&M-EST. PATIENT-LVL V: CPT | Mod: PBBFAC,,, | Performed by: UROLOGY

## 2020-12-18 PROCEDURE — 99204 OFFICE O/P NEW MOD 45 MIN: CPT | Mod: S$PBB,,, | Performed by: UROLOGY

## 2020-12-18 PROCEDURE — 51702 INSERT TEMP BLADDER CATH: CPT | Mod: PBBFAC,PN | Performed by: UROLOGY

## 2020-12-18 RX ORDER — ROSUVASTATIN CALCIUM 20 MG/1
20 TABLET, COATED ORAL
COMMUNITY
End: 2021-01-14 | Stop reason: SDUPTHER

## 2020-12-18 RX ORDER — TRAMADOL HYDROCHLORIDE 50 MG/1
50 TABLET ORAL EVERY 6 HOURS PRN
Status: ON HOLD | COMMUNITY
Start: 2020-11-10 | End: 2021-09-10

## 2020-12-18 RX ORDER — OMEPRAZOLE 40 MG/1
40 CAPSULE, DELAYED RELEASE ORAL
COMMUNITY
End: 2021-01-14 | Stop reason: SDUPTHER

## 2020-12-18 NOTE — PROGRESS NOTES
[facility] New Patient/H&P:    Jhonny Almazan is a 65 y.o. male who presents for urinary retention.    Patient presented to the emergency department on 11/16/20 with dizziness. He was subsequently admitted for CVA complicated by urinary retention requiring olguin catheter placement. Unsure how much urine was obtained after catheter was placed.     He was discharged to rehab with the olguin catheter in place and states it was exchanged once prior to discharge home. He states that his neurological function has been improving. He reports that prior to his stroke he was urinating every hour. Started on Flomax 0.4 mg on 11/25/20.     Patient is on ASA and Plavix.     Denies any fever, chills, bone pain, unintentional weight loss,  trauma or history of  malignancy.       PSA  0.44  3/23/20  0.48  4/27/18  0.37  7/23/10  0.3  1/28/08    Past Medical History:   Diagnosis Date    Anticoagulant long-term use     Arthritis     Colon polyp     Diabetes mellitus     Diabetes mellitus, type 2     Fatty liver     Hypertension        Past Surgical History:   Procedure Laterality Date    BACK SURGERY      COLONOSCOPY  07/18/2014    Dr. Crane: 22 colon polyps removed, hemorrhoids, erythema to sigmoid, repeat in 1 year for surveillance; biopsy: sigmoid erythema- showed unremarkable colonic mucosa, tubular adenomas and hyperplastic polyps    COLONOSCOPY N/A 5/3/2019    Procedure: COLONOSCOPY;  Surgeon: Guero Crane MD;  Location: [facility];  Service: Endoscopy;  Laterality: N/A;    COLONOSCOPY N/A 5/6/2019    Procedure: COLONOSCOPY;  Surgeon: Guero Crane MD;  Location: [facility];  Service: Endoscopy;  Laterality: N/A;    EPIDURAL STEROID INJECTION INTO THORACIC SPINE N/A 8/30/2019    Procedure: Injection-steroid-epidural-thoracic;  Surgeon: Frantz Green MD;  Location: [facility] OR;  Service: Pain Management;  Laterality: N/A;  T6-7    ESOPHAGOGASTRODUODENOSCOPY N/A 4/26/2019     Procedure: EGD (ESOPHAGOGASTRODUODENOSCOPY);  Surgeon: Guero Crane MD;  Location: [facility];  Service: Endoscopy;  Laterality: N/A;    ESOPHAGOGASTRODUODENOSCOPY N/A 11/23/2020    Procedure: EGD (ESOPHAGOGASTRODUODENOSCOPY);  Surgeon: Guero Crane MD;  Location: [facility] ENDO;  Service: Endoscopy;  Laterality: N/A;    HERNIA REPAIR         Family History   Problem Relation Age of Onset    Heart disease Mother     Heart disease Father     Diabetes Brother     Suicide Brother     Brain cancer Brother     Colon cancer Neg Hx     Crohn's disease Neg Hx     Ulcerative colitis Neg Hx     Stomach cancer Neg Hx     Esophageal cancer Neg Hx     Glaucoma Neg Hx     Retinal detachment Neg Hx     Macular degeneration Neg Hx        Social History     Socioeconomic History    Marital status:      Spouse name: Not on file    Number of children: Not on file    Years of education: Not on file    Highest education level: Not on file   Occupational History    Not on file   Social Needs    Financial resource strain: Not on file    Food insecurity     Worry: Not on file     Inability: Not on file    Transportation needs     Medical: Not on file     Non-medical: Not on file   Tobacco Use    Smoking status: Current Every Day Smoker     Packs/day: 1.00     Years: 50.00     Pack years: 50.00     Types: Cigarettes    Smokeless tobacco: Never Used   Substance and Sexual Activity    Alcohol use: Yes     Alcohol/week: 14.0 standard drinks     Types: 14 Cans of beer per week     Comment: 2-3 beers daily    Drug use: No    Sexual activity: Yes     Partners: Female   Lifestyle    Physical activity     Days per week: Not on file     Minutes per session: Not on file    Stress: Not on file   Relationships    Social connections     Talks on phone: Not on file     Gets together: Not on file     Attends Christianity service: Not on file     Active member of club or organization: Not on file     Attends meetings  "of clubs or organizations: Not on file     Relationship status: Not on file   Other Topics Concern    Not on file   Social History Narrative    Not on file       Review of patient's allergies indicates:  No Known Allergies    Medications Reviewed: see MAR    ROS:    Constitutional: denies fevers, chills, night sweats, fatigue, malaise  Respiratory: negative for cough, shortness of breath, wheezing, dyspnea.  Cardiovascular: negative for chest pain, varicose veins, ankle swelling, palpitations, syncope.  GI: negative for abdominal pain, heartburn, indigestion, nausea, vomiting, constipation, diarrhea, blood in stool.   Urology: as noted above in HPI  Endocrinology: negative for cold intolerance, excessive thirst, not feeling tired/sluggish, no heat intolerance.   Hematology/Lymph: negative for easy bleeding, easy bruising, swollen glands.  Musculoskeletal: negative for back pain, joint pain, joint swelling, neck pain.  Allergy-Immunology: negative for seasonal allergies, negative for unusual infections.   Skin: negative for boils, breast lumps, hives, itching, rash.   Neurology: negative for, dizziness, headache, tingling/numbness, tremors.   Psych: satisfied with life; negative for, anxiety, depression, suicidal thoughts.     PHYSICAL EXAM:    Vitals:    12/18/20 0944   BP: 110/61   Pulse: 91   Temp: 98 °F (36.7 °C)     Body mass index is 27.45 kg/m². Weight: 105 kg (231 lb 7.7 oz) Height: 6' 5" (195.6 cm)       General: Alert, cooperative, no distress, appears stated age  Head: Normocephalic, without obvious abnormality, atraumatic  Neck: no masses, no thyromegaly, no lymphadenopathy  Eyes: PERRL, conjunctiva/corneas clear  Lungs: Respirations unlabored, normal effort, no accessory muscle use  CV: Warm and well perfused extremities  Abdomen: Soft, non-tender, no CVA tenderness, no hepatosplenomegaly, no hernia  : Gutierrez in place with clear urine  Extremities: Extremities normal, atraumatic, no cyanosis or " edema  Skin: Normal color, texture, and turgor, no rashes or lesions  Psych: Appropriate, well oriented, normal affect, normal mood  Neuro: Non-focal      LABS:    No results found for this or any previous visit (from the past 336 hour(s)).      IMAGING:    No urologic studies      Assessment/Diagnosis:    1. Urine retention         Plans:    - Extensive discussion with patient regarding his urinary retention after stroke. We discussed that this could be neurological or due to an enlarged prostate. Patient states he lives at home alone and would prefer to have the catheter in place while recovering. Gutierrez catheter exchanged per nursing today with direct supervision.  - Continue Flomax 0.4 mg PO daily.   - RTC in 1 month for voiding trial.

## 2020-12-18 NOTE — LETTER
December 18, 2020      Meeta Mendoza MD  [address] [place] [address]   [address]  [place] [address]           [place] [facility]  [address] [place] [address] [address]  [facility] [address]  Phone: [phone]  Fax: [phone]          Patient: Jhonny Almazan   MR Number: [id number]   YOB: 1955   Date of Visit: 12/18/2020       Dear Dr. Meeta Mendoza:    Thank you for referring Jhonny Almazan to me for evaluation. Attached you will find relevant portions of my assessment and plan of care.    If you have questions, please do not hesitate to call me. I look forward to following Jhonny Almazan along with you.    Sincerely,    Jaylen Antonio Jr., MD    Enclosure  CC:  No Recipients    If you would like to receive this communication electronically, please contact [email or web address] or [phone] to request more information on [employer] access.    For providers and/or their staff who would like to refer a patient to [facility], please contact us through our one-stop-shop provider referral line, [facility], at [phone].    If you feel you have received this communication in error or would no longer like to receive these types of communications, please e-mail [email or web address]

## 2020-12-22 ENCOUNTER — OFFICE VISIT (OUTPATIENT)
Dept: FAMILY MEDICINE | Facility: CLINIC | Age: 65
End: 2020-12-22
Payer: MEDICARE

## 2020-12-22 VITALS
DIASTOLIC BLOOD PRESSURE: 68 MMHG | HEIGHT: 77 IN | OXYGEN SATURATION: 96 % | BODY MASS INDEX: 28.82 KG/M2 | TEMPERATURE: 97 F | SYSTOLIC BLOOD PRESSURE: 118 MMHG | HEART RATE: 92 BPM | WEIGHT: 244.06 LBS

## 2020-12-22 DIAGNOSIS — E11.59 HYPERTENSION ASSOCIATED WITH DIABETES: ICD-10-CM

## 2020-12-22 DIAGNOSIS — Z59.9 FINANCIAL DIFFICULTIES: ICD-10-CM

## 2020-12-22 DIAGNOSIS — I15.2 HYPERTENSION ASSOCIATED WITH DIABETES: ICD-10-CM

## 2020-12-22 PROCEDURE — 99999 PR PBB SHADOW E&M-EST. PATIENT-LVL V: ICD-10-PCS | Mod: PBBFAC,,, | Performed by: NURSE PRACTITIONER

## 2020-12-22 PROCEDURE — 99215 OFFICE O/P EST HI 40 MIN: CPT | Mod: PBBFAC,PO | Performed by: NURSE PRACTITIONER

## 2020-12-22 PROCEDURE — 99213 PR OFFICE/OUTPT VISIT, EST, LEVL III, 20-29 MIN: ICD-10-PCS | Mod: S$PBB,,, | Performed by: NURSE PRACTITIONER

## 2020-12-22 PROCEDURE — 99213 OFFICE O/P EST LOW 20 MIN: CPT | Mod: S$PBB,,, | Performed by: NURSE PRACTITIONER

## 2020-12-22 PROCEDURE — 99999 PR PBB SHADOW E&M-EST. PATIENT-LVL V: CPT | Mod: PBBFAC,,, | Performed by: NURSE PRACTITIONER

## 2020-12-22 SDOH — SOCIAL DETERMINANTS OF HEALTH (SDOH): PROBLEM RELATED TO HOUSING AND ECONOMIC CIRCUMSTANCES, UNSPECIFIED: Z59.9

## 2020-12-22 NOTE — PROGRESS NOTES
Patient ID: Jhonny Almazan is a 65 y.o. male.    Chief Complaint:   Hospital Follow Up    HPI   Mr. Almazan presents to the clinic after being treated in the hospital for a stroke. During his stay a PEG tube and olguin catheter were placed. Patient has followed up with urology since discharge for urinary retention and the olguin remains in place. Patient reports his PEG tube is scheduled to get removed on 12/29/20. He spent 2 weeks at [facility] rehab after being discharged from the hospital. He also states home health including a nurse and PT come to his house 3 times weekly. Patient requests financial assistance for medication.  Patient is new to me. Reviewed past medical and social history.    Past Medical History:   Diagnosis Date    Anticoagulant long-term use     Arthritis     Colon polyp     Diabetes mellitus     Diabetes mellitus, type 2     Fatty liver     Hypertension      Past Surgical History:   Procedure Laterality Date    BACK SURGERY      COLONOSCOPY  07/18/2014    Dr. Crane: 22 colon polyps removed, hemorrhoids, erythema to sigmoid, repeat in 1 year for surveillance; biopsy: sigmoid erythema- showed unremarkable colonic mucosa, tubular adenomas and hyperplastic polyps    COLONOSCOPY N/A 5/3/2019    Procedure: COLONOSCOPY;  Surgeon: Guero Crane MD;  Location: [facility];  Service: Endoscopy;  Laterality: N/A;    COLONOSCOPY N/A 5/6/2019    Procedure: COLONOSCOPY;  Surgeon: Guero Crane MD;  Location: [facility];  Service: Endoscopy;  Laterality: N/A;    EPIDURAL STEROID INJECTION INTO THORACIC SPINE N/A 8/30/2019    Procedure: Injection-steroid-epidural-thoracic;  Surgeon: Frantz Green MD;  Location: [facility] OR;  Service: Pain Management;  Laterality: N/A;  T6-7    ESOPHAGOGASTRODUODENOSCOPY N/A 4/26/2019    Procedure: EGD (ESOPHAGOGASTRODUODENOSCOPY);  Surgeon: Guero Crane MD;  Location: [facility];  Service: Endoscopy;  Laterality: N/A;     ESOPHAGOGASTRODUODENOSCOPY N/A 11/23/2020    Procedure: EGD (ESOPHAGOGASTRODUODENOSCOPY);  Surgeon: Guero Crane MD;  Location: [facility];  Service: Endoscopy;  Laterality: N/A;    HERNIA REPAIR       Current Outpatient Medications on File Prior to Visit   Medication Sig Dispense Refill    aspirin (ECOTRIN) 81 MG EC tablet Take 1 tablet (81 mg total) by mouth once daily. 360 tablet 0    atorvastatin (LIPITOR) 40 MG tablet Take 1 tablet (40 mg total) by mouth once daily. 90 tablet 3    buPROPion (WELLBUTRIN XL) 300 MG 24 hr tablet Take 1 tablet (300 mg total) by mouth once daily. 90 tablet 3    calcium carbonate (TUMS) 200 mg calcium (500 mg) chewable tablet Take 1 tablet (500 mg total) by mouth once daily. 30 tablet 11    clopidogreL (PLAVIX) 75 mg tablet 1 tablet (75 mg total) by Per G Tube route once daily. 30 tablet 11    diclofenac sodium (VOLTAREN) 1 % Gel Apply 2 g topically 3 (three) times daily as needed. For hand pain 100 g 11    empagliflozin (JARDIANCE) 10 mg Tab Take 10 mg by mouth once daily. 30 tablet 11    folic acid-vit B6-vit B12 2.5-25-2 mg (FOLBIC OR EQUIV) 2.5-25-2 mg Tab Take 1 tablet by mouth once daily. 90 tablet 4    gabapentin (NEURONTIN) 300 MG capsule Take 2 capsules (600 mg total) by mouth every evening. 180 capsule 1    hydrALAZINE (APRESOLINE) 25 MG tablet Take 1 tablet (25 mg total) by mouth every 12 (twelve) hours. 180 tablet 8    insulin (BASAGLAR KWIKPEN U-100 INSULIN) glargine 100 units/mL (3mL) SubQ pen Inject 20 Units into the skin every evening. 1 Box 4    lactulose (CHRONULAC) 10 gram/15 mL solution TAKE 30 MLS BY MOUTH 2 TIMES DAILY. 1892 mL 3    lidocaine (LIDODERM) 5 % Place 1 patch onto the skin once daily. Remove & Discard patch within 12 hours or as directed by MD 30 patch 2    lisinopril-hydrochlorothiazide (PRINZIDE,ZESTORETIC) 20-12.5 mg per tablet TAKE 1 TABLET BY MOUTH TWICE A  tablet 3    magnesium oxide (MAG-OX) 400 mg (241.3 mg  "magnesium) tablet Take 1 tablet (400 mg total) by mouth once daily.  0    meclizine (ANTIVERT) 25 mg tablet Take 1 tablet (25 mg total) by mouth 3 (three) times daily as needed for Dizziness.  0    melatonin (MELATIN) 3 mg tablet Take 2 tablets (6 mg total) by mouth nightly as needed for Insomnia.  0    metFORMIN (GLUCOPHAGE-XR) 500 MG 24 hr tablet Take 2 tablets (1,000 mg total) by mouth 2 (two) times daily. 360 tablet 0    omeprazole (PRILOSEC) 40 MG capsule Take 40 mg by mouth.      pen needle, diabetic 32 gauge x 5/32" Ndle 1 each by Misc.(Non-Drug; Combo Route) route nightly. 50 each 11    pioglitazone (ACTOS) 30 MG tablet Take 1 tablet (30 mg total) by mouth once daily. 90 tablet 2    rosuvastatin (CRESTOR) 20 MG tablet Take 20 mg by mouth.      tamsulosin (FLOMAX) 0.4 mg Cap Take 1 capsule (0.4 mg total) by mouth once daily. 30 capsule 11    tetrahydrozoline 0.05% (VISION CLEAR) 0.05 % Drop Place 2 drops into both eyes 4 (four) times daily as needed (eye irritation).  0    traMADoL (ULTRAM) 50 mg tablet Take 50 mg by mouth every 6 (six) hours as needed.       No current facility-administered medications on file prior to visit.        Review of Systems   Constitutional: Negative for chills and fever.   HENT: Negative for congestion.    Respiratory: Negative for shortness of breath.    Cardiovascular: Negative for chest pain and palpitations.   Gastrointestinal: Negative for diarrhea, nausea and vomiting.        PEG tube in place however patient tolerates solid food by mouth.   Musculoskeletal: Positive for gait problem.        Patient in a wheelchair during visit   Psychiatric/Behavioral: Positive for confusion.   All other systems reviewed and are negative.      Objective:      Physical Exam  Vitals signs reviewed.   Constitutional:       Appearance: Normal appearance.   HENT:      Head: Normocephalic and atraumatic.   Eyes:      Conjunctiva/sclera: Conjunctivae normal.   Neck:      Musculoskeletal: " Normal range of motion.   Cardiovascular:      Rate and Rhythm: Normal rate and regular rhythm.   Pulmonary:      Effort: Pulmonary effort is normal.   Abdominal:      Palpations: Abdomen is soft.      Tenderness: There is no abdominal tenderness.       Skin:     General: Skin is warm and dry.   Neurological:      Mental Status: He is alert and oriented to person, place, and time.   Psychiatric:         Mood and Affect: Mood normal.         Behavior: Behavior normal.         Assessment:       1. Uncontrolled type 2 diabetes mellitus with complication, without long-term current use of insulin    2. Hypertension associated with diabetes    3. Financial difficulties        Plan:       Jhonny was seen today for hospital follow up.    Diagnoses and all orders for this visit:    Uncontrolled type 2 diabetes mellitus with complication, without long-term current use of insulin  -     Ambulatory referral/consult to Outpatient Case Management    Hypertension associated with diabetes  -     Ambulatory referral/consult to Outpatient Case Management    Financial difficulties    Transitional Care Note    Family and/or Caretaker present at visit?  No.  Diagnostic tests reviewed/disposition: No diagnosic tests pending after this hospitalization.  Disease/illness education: Stroke  Home health/community services discussion/referrals: Patient has home health established at [facility].   Establishment or re-establishment of referral orders for community resources: case management referral placed.   Discussion with other health care providers: No discussion with other health care providers necessary.     Patient education provided.  All questions and concerns addressed  RTC PRN and if symptoms worsen or fail to improve  Patient verbalizes understanding        There are no Patient Instructions on file for this visit.

## 2020-12-23 ENCOUNTER — TELEPHONE (OUTPATIENT)
Dept: FAMILY MEDICINE | Facility: CLINIC | Age: 65
End: 2020-12-23

## 2020-12-23 NOTE — TELEPHONE ENCOUNTER
Anne from [facility] home health will be faxing over a list of medications that the patient is currently on. Pt was confused on medication.

## 2020-12-23 NOTE — TELEPHONE ENCOUNTER
----- Message from Olegario Su sent at 12/23/2020  3:00 PM CST -----  Contact: Anne  Type: Needs Medical Advice  Who Called:  Anne with [facility] home health  Symptoms (please be specific):    How long has patient had these symptoms:    Pharmacy name and phone #:    Best Call Back Number:   Additional Information: requesting a call back regarding patient's medications

## 2020-12-28 ENCOUNTER — DOCUMENT SCAN (OUTPATIENT)
Dept: HOME HEALTH SERVICES | Facility: HOSPITAL | Age: 65
End: 2020-12-28
Payer: MEDICARE

## 2020-12-28 ENCOUNTER — HOSPITAL ENCOUNTER (EMERGENCY)
Facility: HOSPITAL | Age: 65
Discharge: HOME | End: 2020-12-28
Attending: EMERGENCY MEDICINE
Payer: MEDICARE

## 2020-12-28 VITALS
TEMPERATURE: 98 F | OXYGEN SATURATION: 95 % | SYSTOLIC BLOOD PRESSURE: 143 MMHG | BODY MASS INDEX: 27.16 KG/M2 | WEIGHT: 230 LBS | RESPIRATION RATE: 18 BRPM | HEIGHT: 77 IN | HEART RATE: 72 BPM | DIASTOLIC BLOOD PRESSURE: 67 MMHG

## 2020-12-28 DIAGNOSIS — T83.9XXA FOLEY CATHETER PROBLEM, INITIAL ENCOUNTER: ICD-10-CM

## 2020-12-28 DIAGNOSIS — N30.90 CATHETER CYSTITIS, INITIAL ENCOUNTER: Primary | ICD-10-CM

## 2020-12-28 DIAGNOSIS — T83.518A CATHETER CYSTITIS, INITIAL ENCOUNTER: Primary | ICD-10-CM

## 2020-12-28 LAB
ALBUMIN SERPL BCP-MCNC: 3.7 G/DL (ref 3.5–5.2)
ALP SERPL-CCNC: 96 U/L (ref 55–135)
ALT SERPL W/O P-5'-P-CCNC: 38 U/L (ref 10–44)
AMORPH CRY URNS QL MICRO: ABNORMAL
ANION GAP SERPL CALC-SCNC: 10 MMOL/L (ref 8–16)
AST SERPL-CCNC: 17 U/L (ref 10–40)
BACTERIA #/AREA URNS HPF: ABNORMAL /HPF
BASOPHILS # BLD AUTO: 0.06 K/UL (ref 0–0.2)
BASOPHILS NFR BLD: 0.6 % (ref 0–1.9)
BILIRUB SERPL-MCNC: 0.5 MG/DL (ref 0.1–1)
BILIRUB UR QL STRIP: NEGATIVE
BUN SERPL-MCNC: 17 MG/DL (ref 8–23)
CALCIUM SERPL-MCNC: 9.6 MG/DL (ref 8.7–10.5)
CHLORIDE SERPL-SCNC: 103 MMOL/L (ref 95–110)
CLARITY UR: CLEAR
CO2 SERPL-SCNC: 24 MMOL/L (ref 23–29)
COLOR UR: YELLOW
CREAT SERPL-MCNC: 0.8 MG/DL (ref 0.5–1.4)
DIFFERENTIAL METHOD: ABNORMAL
EOSINOPHIL # BLD AUTO: 0.2 K/UL (ref 0–0.5)
EOSINOPHIL NFR BLD: 1.9 % (ref 0–8)
ERYTHROCYTE [DISTWIDTH] IN BLOOD BY AUTOMATED COUNT: 12.6 % (ref 11.5–14.5)
EST. GFR  (AFRICAN AMERICAN): >60 ML/MIN/1.73 M^2
EST. GFR  (NON AFRICAN AMERICAN): >60 ML/MIN/1.73 M^2
GLUCOSE SERPL-MCNC: 183 MG/DL (ref 70–110)
GLUCOSE UR QL STRIP: ABNORMAL
HCT VFR BLD AUTO: 41.7 % (ref 40–54)
HGB BLD-MCNC: 13.9 G/DL (ref 14–18)
HGB UR QL STRIP: ABNORMAL
IMM GRANULOCYTES # BLD AUTO: 0.04 K/UL (ref 0–0.04)
IMM GRANULOCYTES NFR BLD AUTO: 0.4 % (ref 0–0.5)
KETONES UR QL STRIP: ABNORMAL
LEUKOCYTE ESTERASE UR QL STRIP: NEGATIVE
LYMPHOCYTES # BLD AUTO: 1.8 K/UL (ref 1–4.8)
LYMPHOCYTES NFR BLD: 18.3 % (ref 18–48)
MCH RBC QN AUTO: 30.3 PG (ref 27–31)
MCHC RBC AUTO-ENTMCNC: 33.3 G/DL (ref 32–36)
MCV RBC AUTO: 91 FL (ref 82–98)
MICROSCOPIC COMMENT: ABNORMAL
MONOCYTES # BLD AUTO: 0.6 K/UL (ref 0.3–1)
MONOCYTES NFR BLD: 6.5 % (ref 4–15)
NEUTROPHILS # BLD AUTO: 7 K/UL (ref 1.8–7.7)
NEUTROPHILS NFR BLD: 72.3 % (ref 38–73)
NITRITE UR QL STRIP: POSITIVE
NRBC BLD-RTO: 0 /100 WBC
PH UR STRIP: 5 [PH] (ref 5–8)
PLATELET # BLD AUTO: 238 K/UL (ref 150–350)
PMV BLD AUTO: 9.2 FL (ref 9.2–12.9)
POTASSIUM SERPL-SCNC: 4 MMOL/L (ref 3.5–5.1)
PROT SERPL-MCNC: 7.2 G/DL (ref 6–8.4)
PROT UR QL STRIP: NEGATIVE
RBC # BLD AUTO: 4.59 M/UL (ref 4.6–6.2)
RBC #/AREA URNS HPF: 0 /HPF (ref 0–4)
SARS-COV-2 RDRP RESP QL NAA+PROBE: NEGATIVE
SODIUM SERPL-SCNC: 137 MMOL/L (ref 136–145)
SP GR UR STRIP: 1.02 (ref 1–1.03)
URN SPEC COLLECT METH UR: ABNORMAL
UROBILINOGEN UR STRIP-ACNC: NEGATIVE EU/DL
WBC # BLD AUTO: 9.71 K/UL (ref 3.9–12.7)
WBC #/AREA URNS HPF: 10 /HPF (ref 0–5)
YEAST URNS QL MICRO: ABNORMAL

## 2020-12-28 PROCEDURE — 99283 EMERGENCY DEPT VISIT LOW MDM: CPT | Mod: 25

## 2020-12-28 PROCEDURE — 81000 URINALYSIS NONAUTO W/SCOPE: CPT

## 2020-12-28 PROCEDURE — 36415 COLL VENOUS BLD VENIPUNCTURE: CPT

## 2020-12-28 PROCEDURE — U0002 COVID-19 LAB TEST NON-CDC: HCPCS

## 2020-12-28 PROCEDURE — 25000003 PHARM REV CODE 250: Performed by: EMERGENCY MEDICINE

## 2020-12-28 PROCEDURE — 80053 COMPREHEN METABOLIC PANEL: CPT

## 2020-12-28 PROCEDURE — 85025 COMPLETE CBC W/AUTO DIFF WBC: CPT

## 2020-12-28 RX ORDER — CEPHALEXIN 500 MG/1
500 CAPSULE ORAL EVERY 8 HOURS
Qty: 21 CAPSULE | Refills: 0 | Status: SHIPPED | OUTPATIENT
Start: 2020-12-28 | End: 2021-01-04

## 2020-12-28 RX ORDER — CEPHALEXIN 250 MG/1
500 CAPSULE ORAL
Status: COMPLETED | OUTPATIENT
Start: 2020-12-28 | End: 2020-12-28

## 2020-12-28 RX ADMIN — CEPHALEXIN 500 MG: 250 CAPSULE ORAL at 10:12

## 2020-12-28 NOTE — ED PROVIDER NOTES
Encounter Date: 12/28/2020    SCRIBE #1 NOTE: I, Doris Garrido, am scribing for, and in the presence of, Frantz Starr MD.       History     Chief Complaint   Patient presents with    Urinary Retention     suspected olguin cath dislodged last PM     Time seen by provider: 9:05 AM on 12/28/2020    Jhonny Almazan is a 65 y.o. male with PMHx of CVA, DM, and HTN who presents to the ED via EMS for dislodged catheter with an onset of pelvic discomfort. Patient had a stroke a month ago. He was hospitalized for 2 weeks and later in rehab at which time olguin catheter was placed. Olguin catheter was to be removed, but patient felt as though he was still unable to urinate normally on his own. He contacted urologist who said to leave olguin catheter in for another 2 weeks. 2 days ago, patient fell, and in the process of moving from the floor to his couch, he felt as though catheter was pulled. While at rehabilitation, olguin catheter was found to be dislodged and had to be irrigated. Patient currently c/o mild pelvic discomfort, generalized fatigue and overall decreased strength. He reports residual weakness to left side as result of stroke but denies any new or worsening weakness to the left side. He additionally c/o cough. He denies flank discomfort, N/V/D, fever, black or bloody stools, or any other symptoms at this time. Patient has been home from rehab for 2 weeks and currently lives with his son. PSHx of colonoscopy, EGD, and hernia repair.    The history is provided by the patient.     Review of patient's allergies indicates:  No Known Allergies  Past Medical History:   Diagnosis Date    Anticoagulant long-term use     Arthritis     Colon polyp     Diabetes mellitus     Diabetes mellitus, type 2     Fatty liver     Hypertension      Past Surgical History:   Procedure Laterality Date    BACK SURGERY      COLONOSCOPY  07/18/2014    Dr. Miles: 22 colon polyps removed, hemorrhoids, erythema to sigmoid,  repeat in 1 year for surveillance; biopsy: sigmoid erythema- showed unremarkable colonic mucosa, tubular adenomas and hyperplastic polyps    COLONOSCOPY N/A 5/3/2019    Procedure: COLONOSCOPY;  Surgeon: Guero Crane MD;  Location: [facility];  Service: Endoscopy;  Laterality: N/A;    COLONOSCOPY N/A 5/6/2019    Procedure: COLONOSCOPY;  Surgeon: Guero Crane MD;  Location: [facility] ENDO;  Service: Endoscopy;  Laterality: N/A;    EPIDURAL STEROID INJECTION INTO THORACIC SPINE N/A 8/30/2019    Procedure: Injection-steroid-epidural-thoracic;  Surgeon: Frantz Green MD;  Location: [facility] OR;  Service: Pain Management;  Laterality: N/A;  T6-7    ESOPHAGOGASTRODUODENOSCOPY N/A 4/26/2019    Procedure: EGD (ESOPHAGOGASTRODUODENOSCOPY);  Surgeon: Guero Crane MD;  Location: [facility];  Service: Endoscopy;  Laterality: N/A;    ESOPHAGOGASTRODUODENOSCOPY N/A 11/23/2020    Procedure: EGD (ESOPHAGOGASTRODUODENOSCOPY);  Surgeon: Guero Crane MD;  Location: [facility];  Service: Endoscopy;  Laterality: N/A;    HERNIA REPAIR       Family History   Problem Relation Age of Onset    Heart disease Mother     Heart disease Father     Diabetes Brother     Suicide Brother     Brain cancer Brother     Colon cancer Neg Hx     Crohn's disease Neg Hx     Ulcerative colitis Neg Hx     Stomach cancer Neg Hx     Esophageal cancer Neg Hx     Glaucoma Neg Hx     Retinal detachment Neg Hx     Macular degeneration Neg Hx      Social History     Tobacco Use    Smoking status: Current Every Day Smoker     Packs/day: 1.00     Years: 50.00     Pack years: 50.00     Types: Cigarettes    Smokeless tobacco: Never Used   Substance Use Topics    Alcohol use: Yes     Alcohol/week: 14.0 standard drinks     Types: 14 Cans of beer per week     Comment: 2-3 beers daily    Drug use: No     Review of Systems   Constitutional: Positive for fatigue. Negative for fever.   HENT: Negative for sore throat.    Respiratory: Negative for  shortness of breath.    Cardiovascular: Negative for chest pain.   Gastrointestinal: Negative for blood in stool, diarrhea, nausea and vomiting.   Genitourinary: Negative for dysuria and flank pain.        Positive for pelvic discomfort.   Musculoskeletal: Negative for back pain.   Skin: Negative for rash.   Neurological: Positive for weakness. Negative for headaches.   Hematological: Does not bruise/bleed easily.       Physical Exam     Initial Vitals [12/28/20 0841]   BP Pulse Resp Temp SpO2   (!) 140/73 75 18 97.9 °F (36.6 °C) 96 %      MAP       --         Physical Exam    Nursing note and vitals reviewed.  Constitutional: He appears well-developed and well-nourished. No distress.   Non-toxic, well-appearing male.   HENT:   Head: Normocephalic and atraumatic.   Yellow mustache stained from cigarette smoke.   Eyes: Conjunctivae and EOM are normal. Pupils are equal, round, and reactive to light.   Neck: Neck supple.   Cardiovascular: Normal rate, regular rhythm and normal heart sounds. Exam reveals no gallop and no friction rub.    No murmur heard.  Pulmonary/Chest: Breath sounds normal. No respiratory distress. He has no wheezes. He has no rhonchi. He has no rales.   Abdominal: Soft. Bowel sounds are normal. He exhibits no distension. There is abdominal tenderness.   Abdomen slightly tender in mid pelvic region.   Genitourinary:    Genitourinary Comments: Gutierrez catheter in place with urine in tube. Mild discharge at urethral meatus.     Musculoskeletal: Normal range of motion. No tenderness or edema.   Neurological: He is alert and oriented to person, place, and time.   Subtle weakness on left side.   Skin: Skin is warm and dry.   Psychiatric: He has a normal mood and affect.         ED Course   Procedures  Labs Reviewed   CBC W/ AUTO DIFFERENTIAL - Abnormal; Notable for the following components:       Result Value    RBC 4.59 (*)     Hemoglobin 13.9 (*)     All other components within normal limits    COMPREHENSIVE METABOLIC PANEL - Abnormal; Notable for the following components:    Glucose 183 (*)     All other components within normal limits   URINALYSIS - Abnormal; Notable for the following components:    Glucose, UA 4+ (*)     Ketones, UA 2+ (*)     Occult Blood UA Trace (*)     Nitrite, UA Positive (*)     All other components within normal limits   URINALYSIS MICROSCOPIC - Abnormal; Notable for the following components:    WBC, UA 10 (*)     All other components within normal limits   SARS-COV-2 RNA AMPLIFICATION, QUAL          Imaging Results          X-Ray Chest AP Portable (Final result)  Result time 12/28/20 09:29:35    Final result by Oracio Le MD (12/28/20 09:29:35)                 Impression:      No acute process.  No significant change.      Electronically signed by: Oracio Le MD  Date:    12/28/2020  Time:    09:29             Narrative:    EXAMINATION:  XR CHEST AP PORTABLE    CLINICAL HISTORY:  cough, weakness;    TECHNIQUE:  Single frontal view of the chest was performed.    COMPARISON:  11/20/2020    FINDINGS:  The cardiomediastinal silhouette is within normal limits.  The lungs are well expanded without consolidation or pleural effusion.                                 Medical Decision Making:   History:   Old Medical Records: I decided to obtain old medical records.  Clinical Tests:   Lab Tests: Ordered and Reviewed  Radiological Study: Ordered and Reviewed            Scribe Attestation:   Scribe #1: I performed the above scribed service and the documentation accurately describes the services I performed. I attest to the accuracy of the note.    I, Dr. Frantz Starr personally performed the services described in this documentation. All medical record entries made by the scribe were at my direction and in my presence.  I have reviewed the chart and agree that the record reflects my personal performance and is accurate and complete. Frantz Starr MD.  6:00 PM  12/28/2020    DISCLAIMER: This note was prepared with Dragon NaturallySpeaking voice recognition transcription software. Garbled syntax, mangled pronouns, and other bizarre constructions may be attributed to that software system         ED Course as of Dec 28 1031   Mon Dec 28, 2020   1011 Patient appears have urinary tract infection.  CBC is stable.  Awaiting chemistry.    [JS]   1022 Patient appears have a mild urinaryTract infection.  Chemistry is stable.  Patient will need to follow up with Urology.  I will start the patient on antibiotics given positive nitrates and fatigue.  Urine culture high risk will be ordered    [JS]      ED Course User Index  [JS] Frantz Starr MD            Clinical Impression:     ICD-10-CM ICD-9-CM   1. Catheter cystitis, initial encounter  T83.518A 996.64    N30.90 595.89   2. Gutierrez catheter problem, initial encounter  T83.9XXA 996.76                      Disposition:   Disposition: Discharged  Condition: Stable     ED Disposition Condition    Discharge Stable        ED Prescriptions     Medication Sig Dispense Start Date End Date Auth. Provider    cephALEXin (KEFLEX) 500 MG capsule Take 1 capsule (500 mg total) by mouth every 8 (eight) hours. for 7 days 21 capsule 12/28/2020 1/4/2021 Frantz Starr MD        Follow-up Information     Follow up With Specialties Details Why Contact Info    Jaylen Antonio Jr., MD Urology Schedule an appointment as soon as possible for a visit   [address] [place] [address] [address]  [place] [address]  [phone]                                         Frantz Starr MD  12/28/20 [id number]

## 2020-12-29 ENCOUNTER — OFFICE VISIT (OUTPATIENT)
Dept: GASTROENTEROLOGY | Facility: CLINIC | Age: 65
End: 2020-12-29
Payer: MEDICARE

## 2020-12-29 ENCOUNTER — OUTPATIENT CASE MANAGEMENT (OUTPATIENT)
Dept: ADMINISTRATIVE | Facility: OTHER | Age: 65
End: 2020-12-29

## 2020-12-29 ENCOUNTER — TELEPHONE (OUTPATIENT)
Dept: FAMILY MEDICINE | Facility: CLINIC | Age: 65
End: 2020-12-29

## 2020-12-29 VITALS — SYSTOLIC BLOOD PRESSURE: 114 MMHG | DIASTOLIC BLOOD PRESSURE: 82 MMHG | HEART RATE: 101 BPM

## 2020-12-29 DIAGNOSIS — Z86.010 HISTORY OF COLON POLYPS: ICD-10-CM

## 2020-12-29 DIAGNOSIS — E11.65 UNCONTROLLED TYPE 2 DIABETES MELLITUS WITH HYPERGLYCEMIA: ICD-10-CM

## 2020-12-29 DIAGNOSIS — F32.3 CURRENT SEVERE EPISODE OF MAJOR DEPRESSIVE DISORDER WITH PSYCHOTIC FEATURES WITHOUT PRIOR EPISODE: ICD-10-CM

## 2020-12-29 DIAGNOSIS — Z01.818 PREOP TESTING: ICD-10-CM

## 2020-12-29 DIAGNOSIS — E11.65 TYPE 2 DIABETES MELLITUS WITH HYPERGLYCEMIA, WITHOUT LONG-TERM CURRENT USE OF INSULIN: Primary | ICD-10-CM

## 2020-12-29 DIAGNOSIS — Z43.1 PEG (PERCUTANEOUS ENDOSCOPIC GASTROSTOMY) ADJUSTMENT/REPLACEMENT/REMOVAL: Primary | ICD-10-CM

## 2020-12-29 PROCEDURE — 99214 OFFICE O/P EST MOD 30 MIN: CPT | Mod: S$PBB,,, | Performed by: INTERNAL MEDICINE

## 2020-12-29 PROCEDURE — 99999 PR PBB SHADOW E&M-EST. PATIENT-LVL IV: ICD-10-PCS | Mod: PBBFAC,,, | Performed by: INTERNAL MEDICINE

## 2020-12-29 PROCEDURE — 99999 PR PBB SHADOW E&M-EST. PATIENT-LVL IV: CPT | Mod: PBBFAC,,, | Performed by: INTERNAL MEDICINE

## 2020-12-29 PROCEDURE — 99214 PR OFFICE/OUTPT VISIT, EST, LEVL IV, 30-39 MIN: ICD-10-PCS | Mod: S$PBB,,, | Performed by: INTERNAL MEDICINE

## 2020-12-29 PROCEDURE — 99214 OFFICE O/P EST MOD 30 MIN: CPT | Mod: PBBFAC,PN | Performed by: INTERNAL MEDICINE

## 2020-12-29 NOTE — PROGRESS NOTES
Subjective:       Patient ID: Jhonny Almazan is a 65 y.o. male.    This is an established patient.      Chief Complaint: PEG tube evaluation    Patient seen for abdominal discomfort, located at PEG site, mild, with no associated PEG complication and with no alleviating/exacerbating factors.  He denies bleeding.  PEG was placed 5 weeks ago secondary to CVA.  He states that he has used it only once and is eating by mouth at all meals.  Denies any other GI complaints.  He is in a wheelchair and Gutierrez is in place.  Albumin normal.  No other acute issues.  Last colonoscopy was in 2019 and he had multiple polyps noted.     Review of Systems   Constitutional: Negative for chills, fatigue and fever.   HENT: Negative for trouble swallowing.    Respiratory: Negative for cough, shortness of breath and wheezing.    Cardiovascular: Negative for chest pain and palpitations.   Gastrointestinal: Negative for abdominal pain, constipation, diarrhea, nausea and vomiting.        + discomfort at PEG site.      Musculoskeletal: Negative for arthralgias and myalgias.   Integumentary:  Negative for color change and rash.   Neurological: Positive for weakness. Negative for dizziness and numbness.   Psychiatric/Behavioral: Negative for confusion. The patient is not nervous/anxious.    All other systems reviewed and are negative.        Objective:       Vitals:    12/29/20 1330   BP: 114/82   Pulse: 101       Physical Exam  Constitutional:       Appearance: He is well-developed.      Comments: Wheelchair bound   HENT:      Head: Normocephalic and atraumatic.   Eyes:      General: No scleral icterus.     Pupils: Pupils are equal, round, and reactive to light.   Neck:      Musculoskeletal: Normal range of motion and neck supple.      Thyroid: No thyromegaly.   Cardiovascular:      Rate and Rhythm: Normal rate and regular rhythm.      Heart sounds: No murmur.   Pulmonary:      Effort: Pulmonary effort is normal.      Breath sounds:  Normal breath sounds. No wheezing.   Abdominal:      General: Bowel sounds are normal. There is no distension.      Palpations: Abdomen is soft.      Tenderness: There is no abdominal tenderness.      Comments: Obese with gastrostomy site with patent G tube noted.  No obvious complications.     Genitourinary:     Comments: + Gutierrez in place with clear urine noted    Lymphadenopathy:      Cervical: No cervical adenopathy.   Skin:     General: Skin is warm and dry.      Findings: No erythema or rash.   Neurological:      Mental Status: He is alert and oriented to person, place, and time.   Psychiatric:         Behavior: Behavior normal.           Lab Results   Component Value Date    WBC 9.71 12/28/2020    HGB 13.9 (L) 12/28/2020    HCT 41.7 12/28/2020    MCV 91 12/28/2020     12/28/2020         CMP  Sodium   Date Value Ref Range Status   12/28/2020 137 136 - 145 mmol/L Final     Potassium   Date Value Ref Range Status   12/28/2020 4.0 3.5 - 5.1 mmol/L Final     Chloride   Date Value Ref Range Status   12/28/2020 103 95 - 110 mmol/L Final     CO2   Date Value Ref Range Status   12/28/2020 24 23 - 29 mmol/L Final     Glucose   Date Value Ref Range Status   12/28/2020 183 (H) 70 - 110 mg/dL Final     BUN   Date Value Ref Range Status   12/28/2020 17 8 - 23 mg/dL Final     Creatinine   Date Value Ref Range Status   12/28/2020 0.8 0.5 - 1.4 mg/dL Final     Calcium   Date Value Ref Range Status   12/28/2020 9.6 8.7 - 10.5 mg/dL Final     Total Protein   Date Value Ref Range Status   12/28/2020 7.2 6.0 - 8.4 g/dL Final     Albumin   Date Value Ref Range Status   12/28/2020 3.7 3.5 - 5.2 g/dL Final     Total Bilirubin   Date Value Ref Range Status   12/28/2020 0.5 0.1 - 1.0 mg/dL Final     Comment:     For infants and newborns, interpretation of results should be based  on gestational age, weight and in agreement with clinical  observations.  Premature Infant recommended reference ranges:  Up to 24  hours.............<8.0 mg/dL  Up to 48 hours............<12.0 mg/dL  3-5 days..................<15.0 mg/dL  6-29 days.................<15.0 mg/dL       Alkaline Phosphatase   Date Value Ref Range Status   12/28/2020 96 55 - 135 U/L Final     AST   Date Value Ref Range Status   12/28/2020 17 10 - 40 U/L Final     ALT   Date Value Ref Range Status   12/28/2020 38 10 - 44 U/L Final     Anion Gap   Date Value Ref Range Status   12/28/2020 10 8 - 16 mmol/L Final     eGFR if    Date Value Ref Range Status   12/28/2020 >60 >60 mL/min/1.73 m^2 Final     eGFR if non    Date Value Ref Range Status   12/28/2020 >60 >60 mL/min/1.73 m^2 Final     Comment:     Calculation used to obtain the estimated glomerular filtration  rate (eGFR) is the CKD-EPI equation.          Assessment:       1. PEG (percutaneous endoscopic gastrostomy) adjustment/replacement/removal    2. Uncontrolled type 2 diabetes mellitus with hyperglycemia    3. History of colon polyps        Plan:       1.  PEG tube placed 5 weeks ago.  Will plan for endoscopic removal next week.  2.  Colonoscopy due for screening in 2022  3.  Continue current medical regimen  4.  Further recommendations to follow after above.

## 2020-12-29 NOTE — TELEPHONE ENCOUNTER
Pt's daughter in law called in regards to pt. States pt is unable to stay at home at home alone. Saying pt has had balance issues, went to ER and pulled catheter out, had anger issues , having severe depression. Pt is asking to be put into a rehab. Pt wants to go to a nursing home. Will forward message to provider for further assistance.

## 2020-12-29 NOTE — TELEPHONE ENCOUNTER
----- Message from Dianne Poe, Patient Care Assistant sent at 12/29/2020  1:19 PM CST -----  Regarding: advice  Contact: ashleigh pt's relative  Type: Needs Medical Advice  Who Called:  ashleigh pt's relative   Symptoms (please be specific): losing his balance   How long has patient had these symptoms:  5 days   Best Call Back Number: [phone]   Additional Information: pt states he would like a callback regarding requesting PT. Thanks!

## 2020-12-30 ENCOUNTER — OFFICE VISIT (OUTPATIENT)
Dept: FAMILY MEDICINE | Facility: CLINIC | Age: 65
End: 2020-12-30
Payer: MEDICARE

## 2020-12-30 ENCOUNTER — DOCUMENT SCAN (OUTPATIENT)
Dept: HOME HEALTH SERVICES | Facility: HOSPITAL | Age: 65
End: 2020-12-30
Payer: MEDICARE

## 2020-12-30 ENCOUNTER — TELEPHONE (OUTPATIENT)
Dept: FAMILY MEDICINE | Facility: CLINIC | Age: 65
End: 2020-12-30

## 2020-12-30 VITALS
TEMPERATURE: 98 F | HEART RATE: 86 BPM | DIASTOLIC BLOOD PRESSURE: 78 MMHG | RESPIRATION RATE: 16 BRPM | SYSTOLIC BLOOD PRESSURE: 112 MMHG | HEIGHT: 77 IN | BODY MASS INDEX: 29.39 KG/M2 | WEIGHT: 248.88 LBS | OXYGEN SATURATION: 96 %

## 2020-12-30 DIAGNOSIS — F32.3 CURRENT SEVERE EPISODE OF MAJOR DEPRESSIVE DISORDER WITH PSYCHOTIC FEATURES WITHOUT PRIOR EPISODE: ICD-10-CM

## 2020-12-30 DIAGNOSIS — F17.200 SMOKING ADDICTION: ICD-10-CM

## 2020-12-30 DIAGNOSIS — E11.59 HYPERTENSION ASSOCIATED WITH DIABETES: ICD-10-CM

## 2020-12-30 DIAGNOSIS — E11.65 TYPE 2 DIABETES MELLITUS WITH HYPERGLYCEMIA, WITHOUT LONG-TERM CURRENT USE OF INSULIN: Primary | ICD-10-CM

## 2020-12-30 DIAGNOSIS — I15.2 HYPERTENSION ASSOCIATED WITH DIABETES: ICD-10-CM

## 2020-12-30 DIAGNOSIS — I63.411 STROKE DUE TO EMBOLISM OF RIGHT MIDDLE CEREBRAL ARTERY: ICD-10-CM

## 2020-12-30 LAB — GLUCOSE SERPL-MCNC: 138 MG/DL (ref 70–110)

## 2020-12-30 PROCEDURE — 99999 PR PBB SHADOW E&M-EST. PATIENT-LVL V: CPT | Mod: PBBFAC,,, | Performed by: FAMILY MEDICINE

## 2020-12-30 PROCEDURE — 99999 PR PBB SHADOW E&M-EST. PATIENT-LVL V: ICD-10-PCS | Mod: PBBFAC,,, | Performed by: FAMILY MEDICINE

## 2020-12-30 PROCEDURE — 99214 OFFICE O/P EST MOD 30 MIN: CPT | Mod: S$PBB,,, | Performed by: FAMILY MEDICINE

## 2020-12-30 PROCEDURE — 82962 GLUCOSE BLOOD TEST: CPT | Mod: PBBFAC,PO | Performed by: FAMILY MEDICINE

## 2020-12-30 PROCEDURE — 99214 PR OFFICE/OUTPT VISIT, EST, LEVL IV, 30-39 MIN: ICD-10-PCS | Mod: S$PBB,,, | Performed by: FAMILY MEDICINE

## 2020-12-30 PROCEDURE — 99215 OFFICE O/P EST HI 40 MIN: CPT | Mod: PBBFAC,PO | Performed by: FAMILY MEDICINE

## 2020-12-30 RX ORDER — INSULIN HUMAN 100 [IU]/ML
20 INJECTION, SUSPENSION SUBCUTANEOUS
COMMUNITY
Start: 2020-12-30 | End: 2021-01-14 | Stop reason: SDUPTHER

## 2020-12-30 RX ORDER — QUETIAPINE FUMARATE 25 MG/1
25 TABLET, FILM COATED ORAL NIGHTLY
Qty: 30 TABLET | Refills: 11 | Status: SHIPPED | OUTPATIENT
Start: 2020-12-30 | End: 2021-01-14 | Stop reason: SDUPTHER

## 2020-12-30 NOTE — TELEPHONE ENCOUNTER
----- Message from Jhonny Romo sent at 12/29/2020  3:26 PM CST -----  Type: Needs Medical Advice    Who Called:  Julisa Khan (Daughter-In-Law)  Best Call Back Number: [phone]  Additional Information: Caller would like to discuss receiving orders for patient to continue in-patient rehab. Please call to advise. Thanks!

## 2020-12-31 ENCOUNTER — DOCUMENT SCAN (OUTPATIENT)
Dept: HOME HEALTH SERVICES | Facility: HOSPITAL | Age: 65
End: 2020-12-31
Payer: MEDICARE

## 2021-01-04 ENCOUNTER — DOCUMENT SCAN (OUTPATIENT)
Dept: HOME HEALTH SERVICES | Facility: HOSPITAL | Age: 66
End: 2021-01-04
Payer: MEDICARE

## 2021-01-04 ENCOUNTER — ANESTHESIA EVENT (OUTPATIENT)
Dept: ENDOSCOPY | Facility: HOSPITAL | Age: 66
End: 2021-01-04
Payer: MEDICARE

## 2021-01-05 ENCOUNTER — TELEPHONE (OUTPATIENT)
Dept: GASTROENTEROLOGY | Facility: CLINIC | Age: 66
End: 2021-01-05

## 2021-01-05 ENCOUNTER — DOCUMENT SCAN (OUTPATIENT)
Dept: HOME HEALTH SERVICES | Facility: HOSPITAL | Age: 66
End: 2021-01-05
Payer: MEDICARE

## 2021-01-06 ENCOUNTER — ANESTHESIA (OUTPATIENT)
Dept: ENDOSCOPY | Facility: HOSPITAL | Age: 66
End: 2021-01-06
Payer: MEDICARE

## 2021-01-06 ENCOUNTER — HOSPITAL ENCOUNTER (OUTPATIENT)
Facility: HOSPITAL | Age: 66
Discharge: HOME | End: 2021-01-06
Attending: INTERNAL MEDICINE | Admitting: INTERNAL MEDICINE
Payer: MEDICARE

## 2021-01-06 ENCOUNTER — TELEPHONE (OUTPATIENT)
Dept: FAMILY MEDICINE | Facility: CLINIC | Age: 66
End: 2021-01-06

## 2021-01-06 VITALS
SYSTOLIC BLOOD PRESSURE: 136 MMHG | DIASTOLIC BLOOD PRESSURE: 79 MMHG | TEMPERATURE: 97 F | OXYGEN SATURATION: 95 % | BODY MASS INDEX: 27.16 KG/M2 | RESPIRATION RATE: 18 BRPM | WEIGHT: 230 LBS | HEIGHT: 77 IN | HEART RATE: 66 BPM

## 2021-01-06 DIAGNOSIS — K29.70 GASTRITIS, PRESENCE OF BLEEDING UNSPECIFIED, UNSPECIFIED CHRONICITY, UNSPECIFIED GASTRITIS TYPE: Primary | ICD-10-CM

## 2021-01-06 DIAGNOSIS — Z43.1 PEG (PERCUTANEOUS ENDOSCOPIC GASTROSTOMY) ADJUSTMENT/REPLACEMENT/REMOVAL: ICD-10-CM

## 2021-01-06 LAB
POCT GLUCOSE: 190 MG/DL (ref 70–110)
SARS-COV-2 RDRP RESP QL NAA+PROBE: NEGATIVE

## 2021-01-06 PROCEDURE — 88305 TISSUE EXAM BY PATHOLOGIST: CPT | Performed by: PATHOLOGY

## 2021-01-06 PROCEDURE — 43239 PR EGD, FLEX, W/BIOPSY, SGL/MULTI: ICD-10-PCS | Mod: 51,,, | Performed by: INTERNAL MEDICINE

## 2021-01-06 PROCEDURE — 63600175 PHARM REV CODE 636 W HCPCS: Performed by: NURSE ANESTHETIST, CERTIFIED REGISTERED

## 2021-01-06 PROCEDURE — 43247 EGD REMOVE FOREIGN BODY: CPT | Mod: ,,, | Performed by: INTERNAL MEDICINE

## 2021-01-06 PROCEDURE — 88305 TISSUE EXAM BY PATHOLOGIST: ICD-10-PCS | Mod: 26,,, | Performed by: PATHOLOGY

## 2021-01-06 PROCEDURE — 82962 GLUCOSE BLOOD TEST: CPT | Performed by: INTERNAL MEDICINE

## 2021-01-06 PROCEDURE — D9220A PRA ANESTHESIA: Mod: CRNA,,, | Performed by: NURSE ANESTHETIST, CERTIFIED REGISTERED

## 2021-01-06 PROCEDURE — 43247 PR EGD, FLEX, W/REMOVAL, FOREIGN BODY: ICD-10-PCS | Mod: ,,, | Performed by: INTERNAL MEDICINE

## 2021-01-06 PROCEDURE — 27201042 HC RETRIEVAL NET: Performed by: INTERNAL MEDICINE

## 2021-01-06 PROCEDURE — U0002 COVID-19 LAB TEST NON-CDC: HCPCS

## 2021-01-06 PROCEDURE — D9220A PRA ANESTHESIA: Mod: ANES,,, | Performed by: ANESTHESIOLOGY

## 2021-01-06 PROCEDURE — 88305 TISSUE EXAM BY PATHOLOGIST: CPT | Mod: 26,,, | Performed by: PATHOLOGY

## 2021-01-06 PROCEDURE — 43239 EGD BIOPSY SINGLE/MULTIPLE: CPT | Performed by: INTERNAL MEDICINE

## 2021-01-06 PROCEDURE — 43247 EGD REMOVE FOREIGN BODY: CPT | Performed by: INTERNAL MEDICINE

## 2021-01-06 PROCEDURE — 25000003 PHARM REV CODE 250: Performed by: NURSE ANESTHETIST, CERTIFIED REGISTERED

## 2021-01-06 PROCEDURE — 43239 EGD BIOPSY SINGLE/MULTIPLE: CPT | Mod: 51,,, | Performed by: INTERNAL MEDICINE

## 2021-01-06 PROCEDURE — D9220A PRA ANESTHESIA: ICD-10-PCS | Mod: ANES,,, | Performed by: ANESTHESIOLOGY

## 2021-01-06 PROCEDURE — 37000008 HC ANESTHESIA 1ST 15 MINUTES: Performed by: INTERNAL MEDICINE

## 2021-01-06 PROCEDURE — 27201012 HC FORCEPS, HOT/COLD, DISP: Performed by: INTERNAL MEDICINE

## 2021-01-06 PROCEDURE — D9220A PRA ANESTHESIA: ICD-10-PCS | Mod: CRNA,,, | Performed by: NURSE ANESTHETIST, CERTIFIED REGISTERED

## 2021-01-06 RX ORDER — PROPOFOL 10 MG/ML
INJECTION, EMULSION INTRAVENOUS
Status: DISCONTINUED | OUTPATIENT
Start: 2021-01-06 | End: 2021-01-06

## 2021-01-06 RX ORDER — LIDOCAINE HCL/PF 100 MG/5ML
SYRINGE (ML) INTRAVENOUS
Status: DISCONTINUED | OUTPATIENT
Start: 2021-01-06 | End: 2021-01-06

## 2021-01-06 RX ORDER — SODIUM CHLORIDE 9 MG/ML
INJECTION, SOLUTION INTRAVENOUS CONTINUOUS
Status: DISCONTINUED | OUTPATIENT
Start: 2021-01-06 | End: 2021-01-06 | Stop reason: HOSPADM

## 2021-01-06 RX ADMIN — PROPOFOL 50 MG: 10 INJECTION, EMULSION INTRAVENOUS at 10:01

## 2021-01-06 RX ADMIN — PROPOFOL 100 MG: 10 INJECTION, EMULSION INTRAVENOUS at 10:01

## 2021-01-06 RX ADMIN — LIDOCAINE HYDROCHLORIDE 100 MG: 20 INJECTION INTRAVENOUS at 10:01

## 2021-01-08 ENCOUNTER — TELEPHONE (OUTPATIENT)
Dept: FAMILY MEDICINE | Facility: CLINIC | Age: 66
End: 2021-01-08

## 2021-01-08 DIAGNOSIS — I67.89 ISCHEMIC ENCEPHALOPATHY: Primary | ICD-10-CM

## 2021-01-08 DIAGNOSIS — E11.59 HYPERTENSION ASSOCIATED WITH DIABETES: ICD-10-CM

## 2021-01-08 DIAGNOSIS — I15.2 HYPERTENSION ASSOCIATED WITH DIABETES: ICD-10-CM

## 2021-01-08 LAB
FINAL PATHOLOGIC DIAGNOSIS: NORMAL
GROSS: NORMAL
Lab: NORMAL

## 2021-01-14 DIAGNOSIS — M79.641 BILATERAL HAND PAIN: ICD-10-CM

## 2021-01-14 DIAGNOSIS — M54.14 THORACIC RADICULITIS: ICD-10-CM

## 2021-01-14 DIAGNOSIS — M79.642 BILATERAL HAND PAIN: ICD-10-CM

## 2021-01-14 DIAGNOSIS — E11.65 UNCONTROLLED TYPE 2 DIABETES MELLITUS WITH HYPERGLYCEMIA: ICD-10-CM

## 2021-01-14 DIAGNOSIS — M51.9 THORACIC DISC DISEASE: ICD-10-CM

## 2021-01-14 DIAGNOSIS — E11.59 HYPERTENSION ASSOCIATED WITH DIABETES: ICD-10-CM

## 2021-01-14 DIAGNOSIS — F17.210 CIGARETTE NICOTINE DEPENDENCE WITHOUT COMPLICATION: ICD-10-CM

## 2021-01-14 DIAGNOSIS — E11.40 TYPE 2 DIABETES MELLITUS WITH DIABETIC NEUROPATHY, WITHOUT LONG-TERM CURRENT USE OF INSULIN: ICD-10-CM

## 2021-01-14 DIAGNOSIS — K59.00 CONSTIPATION, UNSPECIFIED CONSTIPATION TYPE: ICD-10-CM

## 2021-01-14 DIAGNOSIS — B02.29 POST HERPETIC NEURALGIA: ICD-10-CM

## 2021-01-14 DIAGNOSIS — M46.04 SPINAL ENTHESOPATHY, THORACIC REGION: ICD-10-CM

## 2021-01-14 DIAGNOSIS — F32.3 CURRENT SEVERE EPISODE OF MAJOR DEPRESSIVE DISORDER WITH PSYCHOTIC FEATURES WITHOUT PRIOR EPISODE: ICD-10-CM

## 2021-01-14 DIAGNOSIS — I15.2 HYPERTENSION ASSOCIATED WITH DIABETES: ICD-10-CM

## 2021-01-14 RX ORDER — PIOGLITAZONEHYDROCHLORIDE 30 MG/1
30 TABLET ORAL DAILY
Qty: 90 TABLET | Refills: 2 | Status: ON HOLD | OUTPATIENT
Start: 2021-01-14 | End: 2021-10-04 | Stop reason: HOSPADM

## 2021-01-14 RX ORDER — MECLIZINE HYDROCHLORIDE 25 MG/1
25 TABLET ORAL 3 TIMES DAILY PRN
Refills: 0 | Status: CANCELLED
Start: 2021-01-14

## 2021-01-14 RX ORDER — ATORVASTATIN CALCIUM 40 MG/1
40 TABLET, FILM COATED ORAL DAILY
Qty: 90 TABLET | Refills: 3 | Status: SHIPPED | OUTPATIENT
Start: 2021-01-14 | End: 2023-11-22

## 2021-01-14 RX ORDER — HYDRALAZINE HYDROCHLORIDE 25 MG/1
25 TABLET, FILM COATED ORAL EVERY 12 HOURS
Qty: 180 TABLET | Refills: 8 | Status: ON HOLD | OUTPATIENT
Start: 2021-01-14 | End: 2021-09-03 | Stop reason: SDUPTHER

## 2021-01-14 RX ORDER — ROSUVASTATIN CALCIUM 20 MG/1
20 TABLET, COATED ORAL DAILY
Qty: 30 TABLET | Refills: 3 | Status: ON HOLD | OUTPATIENT
Start: 2021-01-14 | End: 2021-09-10 | Stop reason: HOSPADM

## 2021-01-14 RX ORDER — METFORMIN HYDROCHLORIDE 500 MG/1
1000 TABLET, EXTENDED RELEASE ORAL 2 TIMES DAILY
Qty: 360 TABLET | Refills: 0 | Status: ON HOLD | OUTPATIENT
Start: 2021-01-14 | End: 2021-10-04 | Stop reason: HOSPADM

## 2021-01-14 RX ORDER — BUPROPION HYDROCHLORIDE 300 MG/1
300 TABLET ORAL DAILY
Qty: 90 TABLET | Refills: 3 | Status: ON HOLD | OUTPATIENT
Start: 2021-01-14 | End: 2022-11-05 | Stop reason: SDUPTHER

## 2021-01-14 RX ORDER — TALC
6 POWDER (GRAM) TOPICAL NIGHTLY PRN
Refills: 0 | COMMUNITY
Start: 2021-01-14

## 2021-01-14 RX ORDER — INSULIN HUMAN 100 [IU]/ML
20 INJECTION, SUSPENSION SUBCUTANEOUS
Qty: 12 ML | Refills: 11 | Status: SHIPPED | OUTPATIENT
Start: 2021-01-14 | End: 2021-03-30 | Stop reason: ALTCHOICE

## 2021-01-14 RX ORDER — LISINOPRIL AND HYDROCHLOROTHIAZIDE 12.5; 2 MG/1; MG/1
1 TABLET ORAL 2 TIMES DAILY
Qty: 180 TABLET | Refills: 3 | Status: ON HOLD | OUTPATIENT
Start: 2021-01-14 | End: 2021-09-03 | Stop reason: SDUPTHER

## 2021-01-14 RX ORDER — TAMSULOSIN HYDROCHLORIDE 0.4 MG/1
0.4 CAPSULE ORAL DAILY
Qty: 30 CAPSULE | Refills: 11 | Status: ON HOLD | OUTPATIENT
Start: 2021-01-14 | End: 2021-09-03 | Stop reason: HOSPADM

## 2021-01-14 RX ORDER — QUETIAPINE FUMARATE 25 MG/1
25 TABLET, FILM COATED ORAL NIGHTLY
Qty: 30 TABLET | Refills: 11 | Status: SHIPPED | OUTPATIENT
Start: 2021-01-14 | End: 2022-12-13

## 2021-01-14 RX ORDER — DICLOFENAC SODIUM 10 MG/G
2 GEL TOPICAL 3 TIMES DAILY PRN
Qty: 100 G | Refills: 11 | Status: CANCELLED | OUTPATIENT
Start: 2021-01-14

## 2021-01-14 RX ORDER — CLOPIDOGREL BISULFATE 75 MG/1
75 TABLET ORAL DAILY
Qty: 30 TABLET | Refills: 11 | Status: SHIPPED | OUTPATIENT
Start: 2021-01-14 | End: 2023-11-22

## 2021-01-14 RX ORDER — LANOLIN ALCOHOL/MO/W.PET/CERES
400 CREAM (GRAM) TOPICAL DAILY
Refills: 0 | Status: ON HOLD | COMMUNITY
Start: 2021-01-14 | End: 2021-10-04 | Stop reason: HOSPADM

## 2021-01-14 RX ORDER — EMPAGLIFLOZIN 10 MG/1
10 TABLET, FILM COATED ORAL DAILY
Qty: 30 TABLET | Refills: 11 | Status: SHIPPED | OUTPATIENT
Start: 2021-01-14 | End: 2021-03-30 | Stop reason: ALTCHOICE

## 2021-01-14 RX ORDER — LACTULOSE 10 G/15ML
SOLUTION ORAL; RECTAL
Qty: 1892 ML | Refills: 3 | Status: ON HOLD | OUTPATIENT
Start: 2021-01-14 | End: 2021-09-10 | Stop reason: SDUPTHER

## 2021-01-14 RX ORDER — OMEPRAZOLE 40 MG/1
40 CAPSULE, DELAYED RELEASE ORAL DAILY
Qty: 30 CAPSULE | Refills: 3 | Status: SHIPPED | OUTPATIENT
Start: 2021-01-14 | End: 2021-11-18 | Stop reason: SDUPTHER

## 2021-01-14 RX ORDER — LIDOCAINE 50 MG/G
1 PATCH TOPICAL DAILY
Qty: 30 PATCH | Refills: 3 | Status: CANCELLED | OUTPATIENT
Start: 2021-01-14

## 2021-01-14 RX ORDER — TRAMADOL HYDROCHLORIDE 50 MG/1
50 TABLET ORAL EVERY 6 HOURS PRN
Qty: 30 TABLET | Refills: 0 | Status: CANCELLED | OUTPATIENT
Start: 2021-01-14

## 2021-01-14 RX ORDER — GABAPENTIN 300 MG/1
600 CAPSULE ORAL NIGHTLY
Qty: 180 CAPSULE | Refills: 1 | Status: SHIPPED | OUTPATIENT
Start: 2021-01-14 | End: 2021-07-21

## 2021-01-19 ENCOUNTER — PATIENT OUTREACH (OUTPATIENT)
Dept: ADMINISTRATIVE | Facility: OTHER | Age: 66
End: 2021-01-19

## 2021-01-19 ENCOUNTER — DOCUMENT SCAN (OUTPATIENT)
Dept: HOME HEALTH SERVICES | Facility: HOSPITAL | Age: 66
End: 2021-01-19
Payer: MEDICARE

## 2021-01-20 ENCOUNTER — OFFICE VISIT (OUTPATIENT)
Dept: UROLOGY | Facility: CLINIC | Age: 66
End: 2021-01-20
Payer: MEDICARE

## 2021-01-20 VITALS
HEART RATE: 73 BPM | TEMPERATURE: 98 F | SYSTOLIC BLOOD PRESSURE: 125 MMHG | DIASTOLIC BLOOD PRESSURE: 73 MMHG | WEIGHT: 229.25 LBS | BODY MASS INDEX: 27.07 KG/M2 | HEIGHT: 77 IN

## 2021-01-20 DIAGNOSIS — R33.9 URINE RETENTION: Primary | ICD-10-CM

## 2021-01-20 PROCEDURE — 99999 PR PBB SHADOW E&M-EST. PATIENT-LVL V: ICD-10-PCS | Mod: PBBFAC,,, | Performed by: UROLOGY

## 2021-01-20 PROCEDURE — 99499 UNLISTED E&M SERVICE: CPT | Mod: S$PBB,,, | Performed by: UROLOGY

## 2021-01-20 PROCEDURE — 99999 PR PBB SHADOW E&M-EST. PATIENT-LVL V: CPT | Mod: PBBFAC,,, | Performed by: UROLOGY

## 2021-01-20 PROCEDURE — 51700 PR IRRIGATION, BLADDER: ICD-10-PCS | Mod: S$PBB,,, | Performed by: UROLOGY

## 2021-01-20 PROCEDURE — 51702 INSERT TEMP BLADDER CATH: CPT | Mod: PBBFAC,PN | Performed by: UROLOGY

## 2021-01-20 PROCEDURE — 51700 IRRIGATION OF BLADDER: CPT | Mod: PBBFAC,PN | Performed by: UROLOGY

## 2021-01-20 PROCEDURE — 99499 NO LOS: ICD-10-PCS | Mod: S$PBB,,, | Performed by: UROLOGY

## 2021-01-20 PROCEDURE — 51700 IRRIGATION OF BLADDER: CPT | Mod: S$PBB,,, | Performed by: UROLOGY

## 2021-01-20 PROCEDURE — 99215 OFFICE O/P EST HI 40 MIN: CPT | Mod: PBBFAC,PN | Performed by: UROLOGY

## 2021-01-22 ENCOUNTER — EXTERNAL HOME HEALTH (OUTPATIENT)
Dept: HOME HEALTH SERVICES | Facility: HOSPITAL | Age: 66
End: 2021-01-22
Payer: MEDICARE

## 2021-01-22 ENCOUNTER — DOCUMENT SCAN (OUTPATIENT)
Dept: HOME HEALTH SERVICES | Facility: HOSPITAL | Age: 66
End: 2021-01-22
Payer: MEDICARE

## 2021-01-25 ENCOUNTER — DOCUMENT SCAN (OUTPATIENT)
Dept: HOME HEALTH SERVICES | Facility: HOSPITAL | Age: 66
End: 2021-01-25
Payer: MEDICARE

## 2021-01-26 ENCOUNTER — DOCUMENT SCAN (OUTPATIENT)
Dept: HOME HEALTH SERVICES | Facility: HOSPITAL | Age: 66
End: 2021-01-26
Payer: MEDICARE

## 2021-01-27 ENCOUNTER — TELEPHONE (OUTPATIENT)
Dept: FAMILY MEDICINE | Facility: CLINIC | Age: 66
End: 2021-01-27

## 2021-02-18 ENCOUNTER — TELEPHONE (OUTPATIENT)
Dept: FAMILY MEDICINE | Facility: CLINIC | Age: 66
End: 2021-02-18

## 2021-02-18 DIAGNOSIS — I67.89 ISCHEMIC ENCEPHALOPATHY: ICD-10-CM

## 2021-02-19 ENCOUNTER — TELEPHONE (OUTPATIENT)
Dept: UROLOGY | Facility: CLINIC | Age: 66
End: 2021-02-19

## 2021-02-24 ENCOUNTER — TELEPHONE (OUTPATIENT)
Dept: DIABETES | Facility: CLINIC | Age: 66
End: 2021-02-24

## 2021-02-25 ENCOUNTER — TELEPHONE (OUTPATIENT)
Dept: PHARMACY | Facility: AMBULARY SURGERY CENTER | Age: 66
End: 2021-02-25

## 2021-02-26 ENCOUNTER — TELEPHONE (OUTPATIENT)
Dept: FAMILY MEDICINE | Facility: CLINIC | Age: 66
End: 2021-02-26

## 2021-03-03 ENCOUNTER — DOCUMENT SCAN (OUTPATIENT)
Dept: HOME HEALTH SERVICES | Facility: HOSPITAL | Age: 66
End: 2021-03-03
Payer: MEDICARE

## 2021-03-04 ENCOUNTER — OUTPATIENT CASE MANAGEMENT (OUTPATIENT)
Dept: ADMINISTRATIVE | Facility: OTHER | Age: 66
End: 2021-03-04

## 2021-03-09 ENCOUNTER — DOCUMENT SCAN (OUTPATIENT)
Dept: HOME HEALTH SERVICES | Facility: HOSPITAL | Age: 66
End: 2021-03-09
Payer: MEDICARE

## 2021-03-10 ENCOUNTER — TELEPHONE (OUTPATIENT)
Dept: FAMILY MEDICINE | Facility: CLINIC | Age: 66
End: 2021-03-10

## 2021-03-10 ENCOUNTER — TELEPHONE (OUTPATIENT)
Dept: UROLOGY | Facility: CLINIC | Age: 66
End: 2021-03-10

## 2021-03-10 RX ORDER — LANCETS
EACH MISCELLANEOUS
Qty: 100 EACH | Refills: 11 | Status: SHIPPED | OUTPATIENT
Start: 2021-03-10

## 2021-03-15 PROCEDURE — G0179 PR HOME HEALTH MD RECERTIFICATION: ICD-10-PCS | Mod: ,,, | Performed by: FAMILY MEDICINE

## 2021-03-15 PROCEDURE — G0179 MD RECERTIFICATION HHA PT: HCPCS | Mod: ,,, | Performed by: FAMILY MEDICINE

## 2021-03-19 ENCOUNTER — EXTERNAL HOME HEALTH (OUTPATIENT)
Dept: HOME HEALTH SERVICES | Facility: HOSPITAL | Age: 66
End: 2021-03-19
Payer: MEDICARE

## 2021-03-19 ENCOUNTER — EXTERNAL HOME HEALTH (OUTPATIENT)
Dept: HOME HEALTH SERVICES | Facility: HOSPITAL | Age: 66
End: 2021-03-19

## 2021-03-30 ENCOUNTER — TELEPHONE (OUTPATIENT)
Dept: FAMILY MEDICINE | Facility: CLINIC | Age: 66
End: 2021-03-30

## 2021-03-30 DIAGNOSIS — E11.65 UNCONTROLLED TYPE 2 DIABETES MELLITUS WITH HYPERGLYCEMIA: ICD-10-CM

## 2021-03-30 RX ORDER — ACARBOSE 25 MG/1
25 TABLET ORAL
Qty: 270 TABLET | Refills: 3 | Status: ON HOLD | OUTPATIENT
Start: 2021-03-30 | End: 2021-09-10 | Stop reason: HOSPADM

## 2021-03-30 RX ORDER — PEN NEEDLE, DIABETIC 29 G X1/2"
30 NEEDLE, DISPOSABLE MISCELLANEOUS
Qty: 100 EACH | Refills: 3 | Status: ON HOLD | OUTPATIENT
Start: 2021-03-30 | End: 2021-10-04 | Stop reason: HOSPADM

## 2021-04-21 ENCOUNTER — TELEPHONE (OUTPATIENT)
Dept: ADMINISTRATIVE | Facility: HOSPITAL | Age: 66
End: 2021-04-21

## 2021-04-23 ENCOUNTER — TELEPHONE (OUTPATIENT)
Dept: FAMILY MEDICINE | Facility: CLINIC | Age: 66
End: 2021-04-23

## 2021-04-23 RX ORDER — HUMAN INSULIN 100 [IU]/ML
30 INJECTION, SUSPENSION SUBCUTANEOUS
Qty: 15 ML | Refills: 11 | Status: ON HOLD | OUTPATIENT
Start: 2021-04-23 | End: 2021-10-04 | Stop reason: HOSPADM

## 2021-04-30 ENCOUNTER — TELEPHONE (OUTPATIENT)
Dept: FAMILY MEDICINE | Facility: CLINIC | Age: 66
End: 2021-04-30

## 2021-05-05 DIAGNOSIS — E11.9 TYPE 2 DIABETES MELLITUS WITHOUT COMPLICATION: ICD-10-CM

## 2021-05-10 ENCOUNTER — TELEPHONE (OUTPATIENT)
Dept: FAMILY MEDICINE | Facility: CLINIC | Age: 66
End: 2021-05-10

## 2021-05-14 PROCEDURE — G0179 MD RECERTIFICATION HHA PT: HCPCS | Mod: ,,, | Performed by: FAMILY MEDICINE

## 2021-05-14 PROCEDURE — G0179 PR HOME HEALTH MD RECERTIFICATION: ICD-10-PCS | Mod: ,,, | Performed by: FAMILY MEDICINE

## 2021-05-19 ENCOUNTER — EXTERNAL HOME HEALTH (OUTPATIENT)
Dept: HOME HEALTH SERVICES | Facility: HOSPITAL | Age: 66
End: 2021-05-19
Payer: MEDICARE

## 2021-05-19 ENCOUNTER — TELEPHONE (OUTPATIENT)
Dept: ADMINISTRATIVE | Facility: HOSPITAL | Age: 66
End: 2021-05-19

## 2021-06-02 DIAGNOSIS — E11.9 TYPE 2 DIABETES MELLITUS WITHOUT COMPLICATION: ICD-10-CM

## 2021-07-07 ENCOUNTER — PATIENT MESSAGE (OUTPATIENT)
Dept: ADMINISTRATIVE | Facility: HOSPITAL | Age: 66
End: 2021-07-07

## 2021-07-13 PROCEDURE — G0179 PR HOME HEALTH MD RECERTIFICATION: ICD-10-PCS | Mod: ,,, | Performed by: FAMILY MEDICINE

## 2021-07-13 PROCEDURE — G0179 MD RECERTIFICATION HHA PT: HCPCS | Mod: ,,, | Performed by: FAMILY MEDICINE

## 2021-07-19 ENCOUNTER — EXTERNAL HOME HEALTH (OUTPATIENT)
Dept: HOME HEALTH SERVICES | Facility: HOSPITAL | Age: 66
End: 2021-07-19
Payer: MEDICARE

## 2021-07-21 DIAGNOSIS — M54.14 THORACIC RADICULITIS: ICD-10-CM

## 2021-07-21 DIAGNOSIS — M51.9 THORACIC DISC DISEASE: ICD-10-CM

## 2021-07-21 DIAGNOSIS — E11.40 TYPE 2 DIABETES MELLITUS WITH DIABETIC NEUROPATHY, WITHOUT LONG-TERM CURRENT USE OF INSULIN: ICD-10-CM

## 2021-07-21 DIAGNOSIS — B02.29 POST HERPETIC NEURALGIA: ICD-10-CM

## 2021-07-21 DIAGNOSIS — M46.04 SPINAL ENTHESOPATHY, THORACIC REGION: ICD-10-CM

## 2021-07-21 RX ORDER — GABAPENTIN 300 MG/1
CAPSULE ORAL
Qty: 180 CAPSULE | Refills: 3 | Status: ON HOLD | OUTPATIENT
Start: 2021-07-21 | End: 2021-10-04 | Stop reason: HOSPADM

## 2021-08-04 ENCOUNTER — PATIENT MESSAGE (OUTPATIENT)
Dept: ADMINISTRATIVE | Facility: HOSPITAL | Age: 66
End: 2021-08-04

## 2021-09-01 ENCOUNTER — HOSPITAL ENCOUNTER (INPATIENT)
Facility: HOSPITAL | Age: 66
LOS: 3 days | Discharge: HOME HEALTH | DRG: 698 | End: 2021-09-04
Attending: EMERGENCY MEDICINE | Admitting: INTERNAL MEDICINE
Payer: MEDICARE

## 2021-09-01 DIAGNOSIS — I15.2 HYPERTENSION ASSOCIATED WITH DIABETES: ICD-10-CM

## 2021-09-01 DIAGNOSIS — E86.0 DEHYDRATION: ICD-10-CM

## 2021-09-01 DIAGNOSIS — N39.0 SEPSIS SECONDARY TO UTI: ICD-10-CM

## 2021-09-01 DIAGNOSIS — E11.59 HYPERTENSION ASSOCIATED WITH DIABETES: ICD-10-CM

## 2021-09-01 DIAGNOSIS — A41.9 SEPSIS SECONDARY TO UTI: ICD-10-CM

## 2021-09-01 DIAGNOSIS — A41.9 SEPSIS, DUE TO UNSPECIFIED ORGANISM, UNSPECIFIED WHETHER ACUTE ORGAN DYSFUNCTION PRESENT: Primary | ICD-10-CM

## 2021-09-01 DIAGNOSIS — R65.20 SEVERE SEPSIS: ICD-10-CM

## 2021-09-01 DIAGNOSIS — A41.9 SEVERE SEPSIS: ICD-10-CM

## 2021-09-01 DIAGNOSIS — R41.82 ALTERED MENTAL STATUS: ICD-10-CM

## 2021-09-01 PROBLEM — N30.00 ACUTE CYSTITIS: Status: ACTIVE | Noted: 2021-09-01

## 2021-09-01 LAB
ALBUMIN SERPL BCP-MCNC: 4.4 G/DL (ref 3.5–5.2)
ALLENS TEST: ABNORMAL
ALLENS TEST: ABNORMAL
ALP SERPL-CCNC: 91 U/L (ref 55–135)
ALT SERPL W/O P-5'-P-CCNC: 17 U/L (ref 10–44)
AMPHET+METHAMPHET UR QL: NEGATIVE
ANION GAP SERPL CALC-SCNC: 16 MMOL/L (ref 8–16)
APTT PPP: 30.3 SEC (ref 25.6–35.8)
AST SERPL-CCNC: 13 U/L (ref 10–40)
BACTERIA #/AREA URNS HPF: NEGATIVE /HPF
BARBITURATES UR QL SCN>200 NG/ML: NEGATIVE
BASOPHILS # BLD AUTO: 0.1 K/UL (ref 0–0.2)
BASOPHILS NFR BLD: 0.8 % (ref 0–1.9)
BENZODIAZ UR QL SCN>200 NG/ML: NEGATIVE
BILIRUB SERPL-MCNC: 0.8 MG/DL (ref 0.1–1)
BILIRUB UR QL STRIP: ABNORMAL
BUN SERPL-MCNC: 37 MG/DL (ref 8–23)
BZE UR QL SCN: NEGATIVE
CALCIUM SERPL-MCNC: 10.4 MG/DL (ref 8.7–10.5)
CANNABINOIDS UR QL SCN: NEGATIVE
CHLORIDE SERPL-SCNC: 99 MMOL/L (ref 95–110)
CK SERPL-CCNC: 188 U/L (ref 20–200)
CLARITY UR: ABNORMAL
CO2 SERPL-SCNC: 22 MMOL/L (ref 23–29)
COLOR UR: ABNORMAL
CREAT SERPL-MCNC: 3.2 MG/DL (ref 0.5–1.4)
CREAT UR-MCNC: 74 MG/DL (ref 23–375)
DELSYS: ABNORMAL
DELSYS: ABNORMAL
DIFFERENTIAL METHOD: ABNORMAL
EOSINOPHIL # BLD AUTO: 0.1 K/UL (ref 0–0.5)
EOSINOPHIL NFR BLD: 0.4 % (ref 0–8)
ERYTHROCYTE [DISTWIDTH] IN BLOOD BY AUTOMATED COUNT: 13.2 % (ref 11.5–14.5)
EST. GFR  (AFRICAN AMERICAN): 22 ML/MIN/1.73 M^2
EST. GFR  (NON AFRICAN AMERICAN): 19 ML/MIN/1.73 M^2
FLOW: 4
GLUCOSE SERPL-MCNC: 266 MG/DL (ref 70–110)
GLUCOSE SERPL-MCNC: 279 MG/DL (ref 70–110)
GLUCOSE SERPL-MCNC: 294 MG/DL (ref 70–110)
GLUCOSE SERPL-MCNC: 295 MG/DL (ref 70–110)
GLUCOSE SERPL-MCNC: 323 MG/DL (ref 70–110)
GLUCOSE UR QL STRIP: ABNORMAL
HCO3 UR-SCNC: 25.5 MMOL/L (ref 24–28)
HCO3 UR-SCNC: 26.7 MMOL/L (ref 24–28)
HCT VFR BLD AUTO: 46.6 % (ref 40–54)
HCT VFR BLD CALC: 44 %PCV (ref 36–54)
HGB BLD-MCNC: 15.9 G/DL (ref 14–18)
HGB UR QL STRIP: ABNORMAL
HYALINE CASTS #/AREA URNS LPF: 46 /LPF
IMM GRANULOCYTES # BLD AUTO: 0.05 K/UL (ref 0–0.04)
IMM GRANULOCYTES NFR BLD AUTO: 0.4 % (ref 0–0.5)
INR PPP: 1
KETONES UR QL STRIP: NEGATIVE
LACTATE SERPL-SCNC: 2.4 MMOL/L (ref 0.5–1.9)
LACTATE SERPL-SCNC: 2.4 MMOL/L (ref 0.5–1.9)
LEUKOCYTE ESTERASE UR QL STRIP: ABNORMAL
LIPASE SERPL-CCNC: 20 U/L (ref 4–60)
LYMPHOCYTES # BLD AUTO: 2.7 K/UL (ref 1–4.8)
LYMPHOCYTES NFR BLD: 21.6 % (ref 18–48)
MAGNESIUM SERPL-MCNC: 2.1 MG/DL (ref 1.6–2.6)
MCH RBC QN AUTO: 28.9 PG (ref 27–31)
MCHC RBC AUTO-ENTMCNC: 34.1 G/DL (ref 32–36)
MCV RBC AUTO: 85 FL (ref 82–98)
MICROSCOPIC COMMENT: ABNORMAL
MODE: ABNORMAL
MONOCYTES # BLD AUTO: 0.9 K/UL (ref 0.3–1)
MONOCYTES NFR BLD: 6.9 % (ref 4–15)
NEUTROPHILS # BLD AUTO: 8.9 K/UL (ref 1.8–7.7)
NEUTROPHILS NFR BLD: 69.9 % (ref 38–73)
NITRITE UR QL STRIP: NEGATIVE
NRBC BLD-RTO: 0 /100 WBC
OPIATES UR QL SCN: NEGATIVE
PCO2 BLDA: 43.6 MMHG (ref 35–45)
PCO2 BLDA: 48.5 MMHG (ref 35–45)
PCP UR QL SCN>25 NG/ML: NEGATIVE
PH SMN: 7.35 [PH] (ref 7.35–7.45)
PH SMN: 7.38 [PH] (ref 7.35–7.45)
PH UR STRIP: 6 [PH] (ref 5–8)
PHOSPHATE SERPL-MCNC: 4.9 MG/DL (ref 2.7–4.5)
PLATELET # BLD AUTO: 280 K/UL (ref 150–450)
PMV BLD AUTO: 9.9 FL (ref 9.2–12.9)
PO2 BLDA: 103 MMHG (ref 80–100)
PO2 BLDA: 125 MMHG (ref 80–100)
POC BE: 0 MMOL/L
POC BE: 1 MMOL/L
POC IONIZED CALCIUM: 1.34 MMOL/L (ref 1.06–1.42)
POC SATURATED O2: 98 % (ref 95–100)
POC SATURATED O2: 99 % (ref 95–100)
POC TCO2: 27 MMOL/L (ref 23–27)
POC TCO2: 28 MMOL/L (ref 23–27)
POTASSIUM BLD-SCNC: 3.4 MMOL/L (ref 3.5–5.1)
POTASSIUM SERPL-SCNC: 4.2 MMOL/L (ref 3.5–5.1)
PROCALCITONIN SERPL IA-MCNC: 0.12 NG/ML (ref 0–0.5)
PROCALCITONIN SERPL IA-MCNC: 0.12 NG/ML (ref 0–0.5)
PROT SERPL-MCNC: 7.4 G/DL (ref 6–8.4)
PROT UR QL STRIP: ABNORMAL
PROTHROMBIN TIME: 13 SEC (ref 11.8–14.3)
RBC # BLD AUTO: 5.5 M/UL (ref 4.6–6.2)
RBC #/AREA URNS HPF: 5 /HPF (ref 0–4)
SAMPLE: ABNORMAL
SAMPLE: ABNORMAL
SARS-COV-2 RDRP RESP QL NAA+PROBE: NEGATIVE
SITE: ABNORMAL
SITE: ABNORMAL
SODIUM BLD-SCNC: 139 MMOL/L (ref 136–145)
SODIUM SERPL-SCNC: 137 MMOL/L (ref 136–145)
SP GR UR STRIP: 1.03 (ref 1–1.03)
SQUAMOUS #/AREA URNS HPF: 3 /HPF
T4 FREE SERPL-MCNC: 1.07 NG/DL (ref 0.71–1.51)
TOXICOLOGY INFORMATION: NORMAL
TROPONIN I SERPL DL<=0.01 NG/ML-MCNC: <0.03 NG/ML
TSH SERPL DL<=0.005 MIU/L-ACNC: 0.29 UIU/ML (ref 0.34–5.6)
URN SPEC COLLECT METH UR: ABNORMAL
UROBILINOGEN UR STRIP-ACNC: ABNORMAL EU/DL
WBC # BLD AUTO: 12.68 K/UL (ref 3.9–12.7)
WBC #/AREA URNS HPF: >100 /HPF (ref 0–5)

## 2021-09-01 PROCEDURE — 93010 ELECTROCARDIOGRAM REPORT: CPT | Mod: ,,, | Performed by: INTERNAL MEDICINE

## 2021-09-01 PROCEDURE — 25000003 PHARM REV CODE 250: Performed by: NURSE PRACTITIONER

## 2021-09-01 PROCEDURE — 85610 PROTHROMBIN TIME: CPT | Performed by: EMERGENCY MEDICINE

## 2021-09-01 PROCEDURE — 87147 CULTURE TYPE IMMUNOLOGIC: CPT | Performed by: EMERGENCY MEDICINE

## 2021-09-01 PROCEDURE — 84145 PROCALCITONIN (PCT): CPT | Performed by: EMERGENCY MEDICINE

## 2021-09-01 PROCEDURE — 36600 WITHDRAWAL OF ARTERIAL BLOOD: CPT

## 2021-09-01 PROCEDURE — 83690 ASSAY OF LIPASE: CPT | Performed by: EMERGENCY MEDICINE

## 2021-09-01 PROCEDURE — 99285 EMERGENCY DEPT VISIT HI MDM: CPT | Mod: 25

## 2021-09-01 PROCEDURE — U0002 COVID-19 LAB TEST NON-CDC: HCPCS | Performed by: EMERGENCY MEDICINE

## 2021-09-01 PROCEDURE — 82803 BLOOD GASES ANY COMBINATION: CPT

## 2021-09-01 PROCEDURE — 87086 URINE CULTURE/COLONY COUNT: CPT | Performed by: EMERGENCY MEDICINE

## 2021-09-01 PROCEDURE — 80307 DRUG TEST PRSMV CHEM ANLYZR: CPT | Performed by: INTERNAL MEDICINE

## 2021-09-01 PROCEDURE — 94761 N-INVAS EAR/PLS OXIMETRY MLT: CPT

## 2021-09-01 PROCEDURE — 25000003 PHARM REV CODE 250

## 2021-09-01 PROCEDURE — 99900035 HC TECH TIME PER 15 MIN (STAT)

## 2021-09-01 PROCEDURE — 84132 ASSAY OF SERUM POTASSIUM: CPT

## 2021-09-01 PROCEDURE — 93005 ELECTROCARDIOGRAM TRACING: CPT | Performed by: INTERNAL MEDICINE

## 2021-09-01 PROCEDURE — 84100 ASSAY OF PHOSPHORUS: CPT | Performed by: EMERGENCY MEDICINE

## 2021-09-01 PROCEDURE — 63600175 PHARM REV CODE 636 W HCPCS

## 2021-09-01 PROCEDURE — 84295 ASSAY OF SERUM SODIUM: CPT

## 2021-09-01 PROCEDURE — 85025 COMPLETE CBC W/AUTO DIFF WBC: CPT | Performed by: EMERGENCY MEDICINE

## 2021-09-01 PROCEDURE — 83605 ASSAY OF LACTIC ACID: CPT | Mod: 91 | Performed by: EMERGENCY MEDICINE

## 2021-09-01 PROCEDURE — 63600175 PHARM REV CODE 636 W HCPCS: Performed by: NURSE PRACTITIONER

## 2021-09-01 PROCEDURE — 82962 GLUCOSE BLOOD TEST: CPT

## 2021-09-01 PROCEDURE — 87186 SC STD MICRODIL/AGAR DIL: CPT | Mod: 59 | Performed by: EMERGENCY MEDICINE

## 2021-09-01 PROCEDURE — 25000003 PHARM REV CODE 250: Performed by: EMERGENCY MEDICINE

## 2021-09-01 PROCEDURE — 93010 EKG 12-LEAD: ICD-10-PCS | Mod: ,,, | Performed by: INTERNAL MEDICINE

## 2021-09-01 PROCEDURE — 63600175 PHARM REV CODE 636 W HCPCS: Performed by: INTERNAL MEDICINE

## 2021-09-01 PROCEDURE — 85014 HEMATOCRIT: CPT

## 2021-09-01 PROCEDURE — 20000000 HC ICU ROOM

## 2021-09-01 PROCEDURE — 83605 ASSAY OF LACTIC ACID: CPT | Mod: 91 | Performed by: NURSE PRACTITIONER

## 2021-09-01 PROCEDURE — 96374 THER/PROPH/DIAG INJ IV PUSH: CPT

## 2021-09-01 PROCEDURE — 83735 ASSAY OF MAGNESIUM: CPT | Performed by: EMERGENCY MEDICINE

## 2021-09-01 PROCEDURE — 82550 ASSAY OF CK (CPK): CPT | Performed by: EMERGENCY MEDICINE

## 2021-09-01 PROCEDURE — 84484 ASSAY OF TROPONIN QUANT: CPT | Performed by: EMERGENCY MEDICINE

## 2021-09-01 PROCEDURE — 36415 COLL VENOUS BLD VENIPUNCTURE: CPT | Performed by: EMERGENCY MEDICINE

## 2021-09-01 PROCEDURE — 82330 ASSAY OF CALCIUM: CPT

## 2021-09-01 PROCEDURE — 84443 ASSAY THYROID STIM HORMONE: CPT | Performed by: INTERNAL MEDICINE

## 2021-09-01 PROCEDURE — 87077 CULTURE AEROBIC IDENTIFY: CPT | Performed by: EMERGENCY MEDICINE

## 2021-09-01 PROCEDURE — 87040 BLOOD CULTURE FOR BACTERIA: CPT | Performed by: EMERGENCY MEDICINE

## 2021-09-01 PROCEDURE — 27000221 HC OXYGEN, UP TO 24 HOURS

## 2021-09-01 PROCEDURE — 63600175 PHARM REV CODE 636 W HCPCS: Performed by: EMERGENCY MEDICINE

## 2021-09-01 PROCEDURE — 80053 COMPREHEN METABOLIC PANEL: CPT | Performed by: EMERGENCY MEDICINE

## 2021-09-01 PROCEDURE — 85730 THROMBOPLASTIN TIME PARTIAL: CPT | Performed by: EMERGENCY MEDICINE

## 2021-09-01 PROCEDURE — 96365 THER/PROPH/DIAG IV INF INIT: CPT

## 2021-09-01 PROCEDURE — 84439 ASSAY OF FREE THYROXINE: CPT | Performed by: INTERNAL MEDICINE

## 2021-09-01 PROCEDURE — 81001 URINALYSIS AUTO W/SCOPE: CPT | Performed by: EMERGENCY MEDICINE

## 2021-09-01 RX ORDER — SODIUM CHLORIDE 0.9 % (FLUSH) 0.9 %
10 SYRINGE (ML) INJECTION
Status: DISCONTINUED | OUTPATIENT
Start: 2021-09-01 | End: 2021-09-04 | Stop reason: HOSPADM

## 2021-09-01 RX ORDER — GABAPENTIN 300 MG/1
300 CAPSULE ORAL 2 TIMES DAILY
Status: DISCONTINUED | OUTPATIENT
Start: 2021-09-01 | End: 2021-09-02

## 2021-09-01 RX ORDER — LANOLIN ALCOHOL/MO/W.PET/CERES
400 CREAM (GRAM) TOPICAL DAILY
Status: DISCONTINUED | OUTPATIENT
Start: 2021-09-02 | End: 2021-09-04 | Stop reason: HOSPADM

## 2021-09-01 RX ORDER — CLOPIDOGREL BISULFATE 75 MG/1
75 TABLET ORAL DAILY
Status: DISCONTINUED | OUTPATIENT
Start: 2021-09-02 | End: 2021-09-04 | Stop reason: HOSPADM

## 2021-09-01 RX ORDER — LACTULOSE 10 G/15ML
10 SOLUTION ORAL DAILY
Status: DISCONTINUED | OUTPATIENT
Start: 2021-09-02 | End: 2021-09-02

## 2021-09-01 RX ORDER — MECLIZINE HCL 12.5 MG 12.5 MG/1
25 TABLET ORAL 3 TIMES DAILY PRN
Status: DISCONTINUED | OUTPATIENT
Start: 2021-09-01 | End: 2021-09-04 | Stop reason: HOSPADM

## 2021-09-01 RX ORDER — TRAMADOL HYDROCHLORIDE 50 MG/1
50 TABLET ORAL EVERY 6 HOURS PRN
Status: DISCONTINUED | OUTPATIENT
Start: 2021-09-01 | End: 2021-09-04 | Stop reason: HOSPADM

## 2021-09-01 RX ORDER — CALCIUM CARBONATE 200(500)MG
1 TABLET,CHEWABLE ORAL DAILY
Status: DISCONTINUED | OUTPATIENT
Start: 2021-09-02 | End: 2021-09-04 | Stop reason: HOSPADM

## 2021-09-01 RX ORDER — ATORVASTATIN CALCIUM 20 MG/1
20 TABLET, FILM COATED ORAL DAILY
Status: DISCONTINUED | OUTPATIENT
Start: 2021-09-02 | End: 2021-09-01

## 2021-09-01 RX ORDER — BUPROPION HYDROCHLORIDE 150 MG/1
300 TABLET ORAL DAILY
Status: DISCONTINUED | OUTPATIENT
Start: 2021-09-02 | End: 2021-09-02

## 2021-09-01 RX ORDER — LANOLIN ALCOHOL/MO/W.PET/CERES
800 CREAM (GRAM) TOPICAL
Status: DISCONTINUED | OUTPATIENT
Start: 2021-09-01 | End: 2021-09-04 | Stop reason: HOSPADM

## 2021-09-01 RX ORDER — ASPIRIN 81 MG/1
81 TABLET ORAL DAILY
Status: DISCONTINUED | OUTPATIENT
Start: 2021-09-02 | End: 2021-09-04 | Stop reason: HOSPADM

## 2021-09-01 RX ORDER — IBUPROFEN 200 MG
24 TABLET ORAL
Status: DISCONTINUED | OUTPATIENT
Start: 2021-09-01 | End: 2021-09-04 | Stop reason: HOSPADM

## 2021-09-01 RX ORDER — PROMETHAZINE HYDROCHLORIDE 25 MG/1
25 TABLET ORAL EVERY 6 HOURS PRN
Status: DISCONTINUED | OUTPATIENT
Start: 2021-09-01 | End: 2021-09-04 | Stop reason: HOSPADM

## 2021-09-01 RX ORDER — ATORVASTATIN CALCIUM 40 MG/1
40 TABLET, FILM COATED ORAL DAILY
Status: DISCONTINUED | OUTPATIENT
Start: 2021-09-02 | End: 2021-09-04 | Stop reason: HOSPADM

## 2021-09-01 RX ORDER — TAMSULOSIN HYDROCHLORIDE 0.4 MG/1
0.4 CAPSULE ORAL DAILY
Status: DISCONTINUED | OUTPATIENT
Start: 2021-09-02 | End: 2021-09-03

## 2021-09-01 RX ORDER — ACETAMINOPHEN 325 MG/1
650 TABLET ORAL EVERY 8 HOURS PRN
Status: DISCONTINUED | OUTPATIENT
Start: 2021-09-01 | End: 2021-09-04 | Stop reason: HOSPADM

## 2021-09-01 RX ORDER — IPRATROPIUM BROMIDE AND ALBUTEROL SULFATE 2.5; .5 MG/3ML; MG/3ML
3 SOLUTION RESPIRATORY (INHALATION) EVERY 8 HOURS
Status: DISCONTINUED | OUTPATIENT
Start: 2021-09-02 | End: 2021-09-02

## 2021-09-01 RX ORDER — INSULIN ASPART 100 [IU]/ML
0-5 INJECTION, SOLUTION INTRAVENOUS; SUBCUTANEOUS
Status: DISCONTINUED | OUTPATIENT
Start: 2021-09-01 | End: 2021-09-04 | Stop reason: HOSPADM

## 2021-09-01 RX ORDER — TALC
6 POWDER (GRAM) TOPICAL NIGHTLY PRN
Status: DISCONTINUED | OUTPATIENT
Start: 2021-09-01 | End: 2021-09-04 | Stop reason: HOSPADM

## 2021-09-01 RX ORDER — SODIUM CHLORIDE 9 MG/ML
INJECTION, SOLUTION INTRAVENOUS CONTINUOUS
Status: ACTIVE | OUTPATIENT
Start: 2021-09-01 | End: 2021-09-02

## 2021-09-01 RX ORDER — IBUPROFEN 200 MG
16 TABLET ORAL
Status: DISCONTINUED | OUTPATIENT
Start: 2021-09-01 | End: 2021-09-04 | Stop reason: HOSPADM

## 2021-09-01 RX ORDER — QUETIAPINE FUMARATE 25 MG/1
25 TABLET, FILM COATED ORAL NIGHTLY
Status: DISCONTINUED | OUTPATIENT
Start: 2021-09-01 | End: 2021-09-02

## 2021-09-01 RX ORDER — ACETAMINOPHEN 10 MG/ML
1000 INJECTION, SOLUTION INTRAVENOUS ONCE
Status: COMPLETED | OUTPATIENT
Start: 2021-09-01 | End: 2021-09-01

## 2021-09-01 RX ORDER — ENOXAPARIN SODIUM 100 MG/ML
30 INJECTION SUBCUTANEOUS EVERY 24 HOURS
Status: DISCONTINUED | OUTPATIENT
Start: 2021-09-01 | End: 2021-09-01

## 2021-09-01 RX ORDER — ENOXAPARIN SODIUM 100 MG/ML
40 INJECTION SUBCUTANEOUS EVERY 24 HOURS
Status: DISCONTINUED | OUTPATIENT
Start: 2021-09-01 | End: 2021-09-04 | Stop reason: HOSPADM

## 2021-09-01 RX ORDER — GLUCAGON 1 MG
1 KIT INJECTION
Status: DISCONTINUED | OUTPATIENT
Start: 2021-09-01 | End: 2021-09-04 | Stop reason: HOSPADM

## 2021-09-01 RX ADMIN — QUETIAPINE FUMARATE 25 MG: 25 TABLET, FILM COATED ORAL at 11:09

## 2021-09-01 RX ADMIN — CEFTRIAXONE 2 G: 2 INJECTION, SOLUTION INTRAVENOUS at 01:09

## 2021-09-01 RX ADMIN — GABAPENTIN 300 MG: 300 CAPSULE ORAL at 11:09

## 2021-09-01 RX ADMIN — SODIUM CHLORIDE, SODIUM LACTATE, POTASSIUM CHLORIDE, AND CALCIUM CHLORIDE 3855 ML: .6; .31; .03; .02 INJECTION, SOLUTION INTRAVENOUS at 08:09

## 2021-09-01 RX ADMIN — SODIUM CHLORIDE, SODIUM LACTATE, POTASSIUM CHLORIDE, AND CALCIUM CHLORIDE 1000 ML: .6; .31; .03; .02 INJECTION, SOLUTION INTRAVENOUS at 01:09

## 2021-09-01 RX ADMIN — NOREPINEPHRINE BITARTRATE 0.05 MCG/KG/MIN: 1 INJECTION INTRAVENOUS at 11:09

## 2021-09-01 RX ADMIN — SODIUM CHLORIDE: 0.9 INJECTION, SOLUTION INTRAVENOUS at 11:09

## 2021-09-01 RX ADMIN — INSULIN ASPART 1 UNITS: 100 INJECTION, SOLUTION INTRAVENOUS; SUBCUTANEOUS at 11:09

## 2021-09-01 RX ADMIN — NOREPINEPHRINE BITARTRATE 0.05 MCG/KG/MIN: 1 INJECTION INTRAVENOUS at 02:09

## 2021-09-01 RX ADMIN — ENOXAPARIN SODIUM 40 MG: 40 INJECTION SUBCUTANEOUS at 11:09

## 2021-09-01 RX ADMIN — ACETAMINOPHEN 1000 MG: 10 INJECTION INTRAVENOUS at 02:09

## 2021-09-02 PROBLEM — I95.1 ORTHOSTATIC HYPOTENSION: Chronic | Status: ACTIVE | Noted: 2017-01-17

## 2021-09-02 PROBLEM — F32.9 MAJOR DEPRESSIVE DISORDER: Chronic | Status: ACTIVE | Noted: 2020-11-17

## 2021-09-02 PROBLEM — N39.0 SEPSIS SECONDARY TO UTI: Status: ACTIVE | Noted: 2021-09-02

## 2021-09-02 PROBLEM — E11.65 TYPE 2 DIABETES MELLITUS WITH HYPERGLYCEMIA: Chronic | Status: ACTIVE | Noted: 2019-04-25

## 2021-09-02 PROBLEM — I10 HYPERTENSION: Chronic | Status: ACTIVE | Noted: 2020-11-16

## 2021-09-02 PROBLEM — A41.9 SEPSIS SECONDARY TO UTI: Status: ACTIVE | Noted: 2021-09-02

## 2021-09-02 LAB
ALBUMIN SERPL BCP-MCNC: 3.8 G/DL (ref 3.5–5.2)
ALP SERPL-CCNC: 89 U/L (ref 55–135)
ALT SERPL W/O P-5'-P-CCNC: 15 U/L (ref 10–44)
AMMONIA PLAS-SCNC: 22 UMOL/L (ref 10–50)
ANION GAP SERPL CALC-SCNC: 12 MMOL/L (ref 8–16)
AST SERPL-CCNC: 25 U/L (ref 10–40)
BASOPHILS # BLD AUTO: 0.1 K/UL (ref 0–0.2)
BASOPHILS NFR BLD: 0.8 % (ref 0–1.9)
BILIRUB SERPL-MCNC: 1.7 MG/DL (ref 0.1–1)
BUN SERPL-MCNC: 25 MG/DL (ref 8–23)
CALCIUM SERPL-MCNC: 9.7 MG/DL (ref 8.7–10.5)
CHLORIDE SERPL-SCNC: 101 MMOL/L (ref 95–110)
CO2 SERPL-SCNC: 27 MMOL/L (ref 23–29)
CREAT SERPL-MCNC: 1.5 MG/DL (ref 0.5–1.4)
DIFFERENTIAL METHOD: ABNORMAL
EOSINOPHIL # BLD AUTO: 0.2 K/UL (ref 0–0.5)
EOSINOPHIL NFR BLD: 1.6 % (ref 0–8)
ERYTHROCYTE [DISTWIDTH] IN BLOOD BY AUTOMATED COUNT: 13.1 % (ref 11.5–14.5)
EST. GFR  (AFRICAN AMERICAN): 54.9 ML/MIN/1.73 M^2
EST. GFR  (NON AFRICAN AMERICAN): 47.5 ML/MIN/1.73 M^2
GLUCOSE SERPL-MCNC: 206 MG/DL (ref 70–110)
GLUCOSE SERPL-MCNC: 215 MG/DL (ref 70–110)
GLUCOSE SERPL-MCNC: 270 MG/DL (ref 70–110)
HCT VFR BLD AUTO: 44.1 % (ref 40–54)
HGB BLD-MCNC: 14.7 G/DL (ref 14–18)
IMM GRANULOCYTES # BLD AUTO: 0.06 K/UL (ref 0–0.04)
IMM GRANULOCYTES NFR BLD AUTO: 0.5 % (ref 0–0.5)
LACTATE SERPL-SCNC: 1.4 MMOL/L (ref 0.5–1.9)
LACTATE SERPL-SCNC: 1.5 MMOL/L (ref 0.5–1.9)
LYMPHOCYTES # BLD AUTO: 2.8 K/UL (ref 1–4.8)
LYMPHOCYTES NFR BLD: 21.9 % (ref 18–48)
MAGNESIUM SERPL-MCNC: 1.9 MG/DL (ref 1.6–2.6)
MCH RBC QN AUTO: 29 PG (ref 27–31)
MCHC RBC AUTO-ENTMCNC: 33.3 G/DL (ref 32–36)
MCV RBC AUTO: 87 FL (ref 82–98)
MONOCYTES # BLD AUTO: 1 K/UL (ref 0.3–1)
MONOCYTES NFR BLD: 7.9 % (ref 4–15)
NEUTROPHILS # BLD AUTO: 8.7 K/UL (ref 1.8–7.7)
NEUTROPHILS NFR BLD: 67.3 % (ref 38–73)
NRBC BLD-RTO: 0 /100 WBC
PHOSPHATE SERPL-MCNC: 3.5 MG/DL (ref 2.7–4.5)
PLATELET # BLD AUTO: 238 K/UL (ref 150–450)
PMV BLD AUTO: 9.7 FL (ref 9.2–12.9)
POTASSIUM SERPL-SCNC: 4.1 MMOL/L (ref 3.5–5.1)
PROT SERPL-MCNC: 6.7 G/DL (ref 6–8.4)
RBC # BLD AUTO: 5.07 M/UL (ref 4.6–6.2)
SODIUM SERPL-SCNC: 140 MMOL/L (ref 136–145)
WBC # BLD AUTO: 12.86 K/UL (ref 3.9–12.7)

## 2021-09-02 PROCEDURE — 83735 ASSAY OF MAGNESIUM: CPT | Performed by: NURSE PRACTITIONER

## 2021-09-02 PROCEDURE — 87040 BLOOD CULTURE FOR BACTERIA: CPT | Performed by: FAMILY MEDICINE

## 2021-09-02 PROCEDURE — 25000003 PHARM REV CODE 250

## 2021-09-02 PROCEDURE — 99900035 HC TECH TIME PER 15 MIN (STAT)

## 2021-09-02 PROCEDURE — 80053 COMPREHEN METABOLIC PANEL: CPT | Performed by: NURSE PRACTITIONER

## 2021-09-02 PROCEDURE — 12000002 HC ACUTE/MED SURGE SEMI-PRIVATE ROOM

## 2021-09-02 PROCEDURE — 85025 COMPLETE CBC W/AUTO DIFF WBC: CPT | Performed by: NURSE PRACTITIONER

## 2021-09-02 PROCEDURE — 25000003 PHARM REV CODE 250: Performed by: NURSE PRACTITIONER

## 2021-09-02 PROCEDURE — 82140 ASSAY OF AMMONIA: CPT | Performed by: FAMILY MEDICINE

## 2021-09-02 PROCEDURE — 63600175 PHARM REV CODE 636 W HCPCS: Performed by: INTERNAL MEDICINE

## 2021-09-02 PROCEDURE — 36415 COLL VENOUS BLD VENIPUNCTURE: CPT | Performed by: FAMILY MEDICINE

## 2021-09-02 PROCEDURE — 84100 ASSAY OF PHOSPHORUS: CPT | Performed by: NURSE PRACTITIONER

## 2021-09-02 PROCEDURE — 83605 ASSAY OF LACTIC ACID: CPT | Performed by: NURSE PRACTITIONER

## 2021-09-02 RX ORDER — MUPIROCIN 20 MG/G
OINTMENT TOPICAL 2 TIMES DAILY
Status: DISCONTINUED | OUTPATIENT
Start: 2021-09-02 | End: 2021-09-04 | Stop reason: HOSPADM

## 2021-09-02 RX ORDER — CHLORHEXIDINE GLUCONATE ORAL RINSE 1.2 MG/ML
15 SOLUTION DENTAL 2 TIMES DAILY
Status: DISCONTINUED | OUTPATIENT
Start: 2021-09-02 | End: 2021-09-04 | Stop reason: HOSPADM

## 2021-09-02 RX ADMIN — CHLORHEXIDINE GLUCONATE 15 ML: 1.2 RINSE ORAL at 11:09

## 2021-09-02 RX ADMIN — TAMSULOSIN HYDROCHLORIDE 0.4 MG: 0.4 CAPSULE ORAL at 11:09

## 2021-09-02 RX ADMIN — CLOPIDOGREL BISULFATE 75 MG: 75 TABLET, FILM COATED ORAL at 11:09

## 2021-09-02 RX ADMIN — SODIUM CHLORIDE: 0.9 INJECTION, SOLUTION INTRAVENOUS at 11:09

## 2021-09-02 RX ADMIN — CALCIUM CARBONATE (ANTACID) CHEW TAB 500 MG 500 MG: 500 CHEW TAB at 11:09

## 2021-09-02 RX ADMIN — INSULIN ASPART 1 UNITS: 100 INJECTION, SOLUTION INTRAVENOUS; SUBCUTANEOUS at 08:09

## 2021-09-02 RX ADMIN — CHLORHEXIDINE GLUCONATE 15 ML: 1.2 RINSE ORAL at 08:09

## 2021-09-02 RX ADMIN — ATORVASTATIN CALCIUM 40 MG: 40 TABLET, FILM COATED ORAL at 11:09

## 2021-09-02 RX ADMIN — MELATONIN 6 MG: at 12:09

## 2021-09-02 RX ADMIN — MUPIROCIN: 20 OINTMENT TOPICAL at 08:09

## 2021-09-02 RX ADMIN — ASPIRIN 81 MG: 81 TABLET, COATED ORAL at 11:09

## 2021-09-02 RX ADMIN — MAGNESIUM OXIDE 400 MG: 400 TABLET ORAL at 11:09

## 2021-09-02 RX ADMIN — TRAMADOL HYDROCHLORIDE 50 MG: 50 TABLET, FILM COATED ORAL at 12:09

## 2021-09-02 RX ADMIN — INSULIN ASPART 2 UNITS: 100 INJECTION, SOLUTION INTRAVENOUS; SUBCUTANEOUS at 11:09

## 2021-09-02 RX ADMIN — MUPIROCIN: 20 OINTMENT TOPICAL at 11:09

## 2021-09-02 RX ADMIN — ENOXAPARIN SODIUM 40 MG: 40 INJECTION SUBCUTANEOUS at 05:09

## 2021-09-03 PROBLEM — N30.00 ACUTE CYSTITIS: Status: RESOLVED | Noted: 2021-09-01 | Resolved: 2021-09-03

## 2021-09-03 PROBLEM — A41.9 SEPSIS: Status: RESOLVED | Noted: 2021-09-01 | Resolved: 2021-09-03

## 2021-09-03 PROBLEM — A41.9 SEPSIS SECONDARY TO UTI: Status: RESOLVED | Noted: 2021-09-02 | Resolved: 2021-09-03

## 2021-09-03 PROBLEM — A41.9 SEVERE SEPSIS: Status: RESOLVED | Noted: 2021-09-01 | Resolved: 2021-09-03

## 2021-09-03 PROBLEM — N39.0 SEPSIS SECONDARY TO UTI: Status: RESOLVED | Noted: 2021-09-02 | Resolved: 2021-09-03

## 2021-09-03 PROBLEM — N17.9 AKI (ACUTE KIDNEY INJURY): Status: RESOLVED | Noted: 2017-01-16 | Resolved: 2021-09-03

## 2021-09-03 PROBLEM — E86.0 DEHYDRATION: Status: ACTIVE | Noted: 2021-09-03

## 2021-09-03 PROBLEM — R65.20 SEVERE SEPSIS: Status: RESOLVED | Noted: 2021-09-01 | Resolved: 2021-09-03

## 2021-09-03 LAB
ALBUMIN SERPL BCP-MCNC: 3.9 G/DL (ref 3.5–5.2)
ALP SERPL-CCNC: 89 U/L (ref 55–135)
ALT SERPL W/O P-5'-P-CCNC: 18 U/L (ref 10–44)
ANION GAP SERPL CALC-SCNC: 12 MMOL/L (ref 8–16)
AST SERPL-CCNC: 21 U/L (ref 10–40)
BASOPHILS # BLD AUTO: 0.07 K/UL (ref 0–0.2)
BASOPHILS NFR BLD: 0.6 % (ref 0–1.9)
BILIRUB SERPL-MCNC: 1.2 MG/DL (ref 0.1–1)
BUN SERPL-MCNC: 18 MG/DL (ref 8–23)
CALCIUM SERPL-MCNC: 9.5 MG/DL (ref 8.7–10.5)
CHLORIDE SERPL-SCNC: 98 MMOL/L (ref 95–110)
CO2 SERPL-SCNC: 24 MMOL/L (ref 23–29)
CREAT SERPL-MCNC: 0.8 MG/DL (ref 0.5–1.4)
DIFFERENTIAL METHOD: NORMAL
EOSINOPHIL # BLD AUTO: 0.3 K/UL (ref 0–0.5)
EOSINOPHIL NFR BLD: 2.5 % (ref 0–8)
ERYTHROCYTE [DISTWIDTH] IN BLOOD BY AUTOMATED COUNT: 12.8 % (ref 11.5–14.5)
EST. GFR  (AFRICAN AMERICAN): >60 ML/MIN/1.73 M^2
EST. GFR  (NON AFRICAN AMERICAN): >60 ML/MIN/1.73 M^2
GLUCOSE SERPL-MCNC: 165 MG/DL (ref 70–110)
GLUCOSE SERPL-MCNC: 199 MG/DL (ref 70–110)
GLUCOSE SERPL-MCNC: 205 MG/DL (ref 70–110)
GLUCOSE SERPL-MCNC: 207 MG/DL (ref 70–110)
HCT VFR BLD AUTO: 44 % (ref 40–54)
HGB BLD-MCNC: 14.8 G/DL (ref 14–18)
IMM GRANULOCYTES # BLD AUTO: 0.04 K/UL (ref 0–0.04)
IMM GRANULOCYTES NFR BLD AUTO: 0.4 % (ref 0–0.5)
LYMPHOCYTES # BLD AUTO: 2.1 K/UL (ref 1–4.8)
LYMPHOCYTES NFR BLD: 19.1 % (ref 18–48)
MAGNESIUM SERPL-MCNC: 1.5 MG/DL (ref 1.6–2.6)
MCH RBC QN AUTO: 28.8 PG (ref 27–31)
MCHC RBC AUTO-ENTMCNC: 33.6 G/DL (ref 32–36)
MCV RBC AUTO: 86 FL (ref 82–98)
MONOCYTES # BLD AUTO: 0.9 K/UL (ref 0.3–1)
MONOCYTES NFR BLD: 8 % (ref 4–15)
NEUTROPHILS # BLD AUTO: 7.7 K/UL (ref 1.8–7.7)
NEUTROPHILS NFR BLD: 69.4 % (ref 38–73)
NRBC BLD-RTO: 0 /100 WBC
PHOSPHATE SERPL-MCNC: 2.5 MG/DL (ref 2.7–4.5)
PLATELET # BLD AUTO: 227 K/UL (ref 150–450)
PMV BLD AUTO: 9.5 FL (ref 9.2–12.9)
POTASSIUM SERPL-SCNC: 3.4 MMOL/L (ref 3.5–5.1)
PROT SERPL-MCNC: 6.8 G/DL (ref 6–8.4)
RBC # BLD AUTO: 5.13 M/UL (ref 4.6–6.2)
SODIUM SERPL-SCNC: 134 MMOL/L (ref 136–145)
WBC # BLD AUTO: 11.15 K/UL (ref 3.9–12.7)

## 2021-09-03 PROCEDURE — 36415 COLL VENOUS BLD VENIPUNCTURE: CPT | Performed by: NURSE PRACTITIONER

## 2021-09-03 PROCEDURE — 25000003 PHARM REV CODE 250

## 2021-09-03 PROCEDURE — 84100 ASSAY OF PHOSPHORUS: CPT | Performed by: NURSE PRACTITIONER

## 2021-09-03 PROCEDURE — 85025 COMPLETE CBC W/AUTO DIFF WBC: CPT | Performed by: NURSE PRACTITIONER

## 2021-09-03 PROCEDURE — 12000002 HC ACUTE/MED SURGE SEMI-PRIVATE ROOM

## 2021-09-03 PROCEDURE — 63600175 PHARM REV CODE 636 W HCPCS: Performed by: INTERNAL MEDICINE

## 2021-09-03 PROCEDURE — 25000003 PHARM REV CODE 250: Performed by: NURSE PRACTITIONER

## 2021-09-03 PROCEDURE — 63600175 PHARM REV CODE 636 W HCPCS: Performed by: FAMILY MEDICINE

## 2021-09-03 PROCEDURE — 83735 ASSAY OF MAGNESIUM: CPT | Performed by: NURSE PRACTITIONER

## 2021-09-03 PROCEDURE — 25000003 PHARM REV CODE 250: Performed by: FAMILY MEDICINE

## 2021-09-03 PROCEDURE — 80053 COMPREHEN METABOLIC PANEL: CPT | Performed by: NURSE PRACTITIONER

## 2021-09-03 RX ORDER — FAMOTIDINE 20 MG/1
20 TABLET, FILM COATED ORAL 2 TIMES DAILY
Status: DISCONTINUED | OUTPATIENT
Start: 2021-09-03 | End: 2021-09-04 | Stop reason: HOSPADM

## 2021-09-03 RX ORDER — HYDRALAZINE HYDROCHLORIDE 25 MG/1
25 TABLET, FILM COATED ORAL EVERY 12 HOURS
Qty: 180 TABLET | Refills: 8 | Status: ON HOLD
Start: 2021-09-03 | End: 2021-09-10 | Stop reason: HOSPADM

## 2021-09-03 RX ORDER — LISINOPRIL AND HYDROCHLOROTHIAZIDE 12.5; 2 MG/1; MG/1
1 TABLET ORAL 2 TIMES DAILY
Qty: 180 TABLET | Refills: 3 | Status: ON HOLD
Start: 2021-09-03 | End: 2021-09-10 | Stop reason: SDUPTHER

## 2021-09-03 RX ORDER — FAMOTIDINE 20 MG/1
20 TABLET, FILM COATED ORAL 2 TIMES DAILY
Qty: 60 TABLET | Refills: 0 | Status: SHIPPED | OUTPATIENT
Start: 2021-09-03 | End: 2021-09-03 | Stop reason: HOSPADM

## 2021-09-03 RX ORDER — LEVOFLOXACIN 500 MG/1
500 TABLET, FILM COATED ORAL DAILY
Qty: 5 TABLET | Refills: 0 | Status: ON HOLD | OUTPATIENT
Start: 2021-09-03 | End: 2021-09-10 | Stop reason: HOSPADM

## 2021-09-03 RX ORDER — TAMSULOSIN HYDROCHLORIDE 0.4 MG/1
0.8 CAPSULE ORAL DAILY
Status: DISCONTINUED | OUTPATIENT
Start: 2021-09-03 | End: 2021-09-04 | Stop reason: HOSPADM

## 2021-09-03 RX ORDER — TAMSULOSIN HYDROCHLORIDE 0.4 MG/1
0.8 CAPSULE ORAL DAILY
Qty: 60 CAPSULE | Refills: 0 | Status: ON HOLD | OUTPATIENT
Start: 2021-09-04 | End: 2021-09-08 | Stop reason: SDUPTHER

## 2021-09-03 RX ADMIN — CHLORHEXIDINE GLUCONATE 15 ML: 1.2 RINSE ORAL at 08:09

## 2021-09-03 RX ADMIN — MUPIROCIN: 20 OINTMENT TOPICAL at 09:09

## 2021-09-03 RX ADMIN — TAMSULOSIN HYDROCHLORIDE 0.4 MG: 0.4 CAPSULE ORAL at 09:09

## 2021-09-03 RX ADMIN — ASPIRIN 81 MG: 81 TABLET, COATED ORAL at 09:09

## 2021-09-03 RX ADMIN — FAMOTIDINE 20 MG: 20 TABLET ORAL at 01:09

## 2021-09-03 RX ADMIN — CEFTRIAXONE 1 G: 1 INJECTION, SOLUTION INTRAVENOUS at 03:09

## 2021-09-03 RX ADMIN — SODIUM CHLORIDE, SODIUM LACTATE, POTASSIUM CHLORIDE, AND CALCIUM CHLORIDE 1000 ML: .6; .31; .03; .02 INJECTION, SOLUTION INTRAVENOUS at 02:09

## 2021-09-03 RX ADMIN — FAMOTIDINE 20 MG: 20 TABLET ORAL at 08:09

## 2021-09-03 RX ADMIN — MAGNESIUM OXIDE 400 MG: 400 TABLET ORAL at 09:09

## 2021-09-03 RX ADMIN — ENOXAPARIN SODIUM 40 MG: 40 INJECTION SUBCUTANEOUS at 04:09

## 2021-09-03 RX ADMIN — MUPIROCIN: 20 OINTMENT TOPICAL at 08:09

## 2021-09-03 RX ADMIN — MELATONIN 6 MG: at 01:09

## 2021-09-03 RX ADMIN — CALCIUM CARBONATE (ANTACID) CHEW TAB 500 MG 500 MG: 500 CHEW TAB at 09:09

## 2021-09-03 RX ADMIN — TAMSULOSIN HYDROCHLORIDE 0.8 MG: 0.4 CAPSULE ORAL at 02:09

## 2021-09-03 RX ADMIN — CLOPIDOGREL BISULFATE 75 MG: 75 TABLET, FILM COATED ORAL at 09:09

## 2021-09-03 RX ADMIN — CHLORHEXIDINE GLUCONATE 15 ML: 1.2 RINSE ORAL at 09:09

## 2021-09-03 RX ADMIN — ATORVASTATIN CALCIUM 40 MG: 40 TABLET, FILM COATED ORAL at 09:09

## 2021-09-04 ENCOUNTER — PATIENT OUTREACH (OUTPATIENT)
Dept: ADMINISTRATIVE | Facility: HOSPITAL | Age: 66
End: 2021-09-04

## 2021-09-04 VITALS
TEMPERATURE: 98 F | BODY MASS INDEX: 26 KG/M2 | HEART RATE: 84 BPM | OXYGEN SATURATION: 94 % | HEIGHT: 77 IN | WEIGHT: 220.25 LBS | DIASTOLIC BLOOD PRESSURE: 69 MMHG | RESPIRATION RATE: 18 BRPM | SYSTOLIC BLOOD PRESSURE: 105 MMHG

## 2021-09-04 LAB
ALBUMIN SERPL BCP-MCNC: 3.7 G/DL (ref 3.5–5.2)
ALP SERPL-CCNC: 90 U/L (ref 55–135)
ALT SERPL W/O P-5'-P-CCNC: 16 U/L (ref 10–44)
ANION GAP SERPL CALC-SCNC: 13 MMOL/L (ref 8–16)
AST SERPL-CCNC: 15 U/L (ref 10–40)
BACTERIA BLD CULT: ABNORMAL
BACTERIA UR CULT: ABNORMAL
BACTERIA UR CULT: ABNORMAL
BASOPHILS # BLD AUTO: 0.07 K/UL (ref 0–0.2)
BASOPHILS NFR BLD: 0.8 % (ref 0–1.9)
BILIRUB SERPL-MCNC: 0.9 MG/DL (ref 0.1–1)
BUN SERPL-MCNC: 16 MG/DL (ref 8–23)
CALCIUM SERPL-MCNC: 9.9 MG/DL (ref 8.7–10.5)
CHLORIDE SERPL-SCNC: 99 MMOL/L (ref 95–110)
CO2 SERPL-SCNC: 25 MMOL/L (ref 23–29)
CREAT SERPL-MCNC: 0.8 MG/DL (ref 0.5–1.4)
DIFFERENTIAL METHOD: NORMAL
EOSINOPHIL # BLD AUTO: 0.2 K/UL (ref 0–0.5)
EOSINOPHIL NFR BLD: 1.8 % (ref 0–8)
ERYTHROCYTE [DISTWIDTH] IN BLOOD BY AUTOMATED COUNT: 12.8 % (ref 11.5–14.5)
EST. GFR  (AFRICAN AMERICAN): >60 ML/MIN/1.73 M^2
EST. GFR  (NON AFRICAN AMERICAN): >60 ML/MIN/1.73 M^2
GLUCOSE SERPL-MCNC: 186 MG/DL (ref 70–110)
GLUCOSE SERPL-MCNC: 270 MG/DL (ref 70–110)
GLUCOSE SERPL-MCNC: 274 MG/DL (ref 70–110)
HCT VFR BLD AUTO: 42.8 % (ref 40–54)
HGB BLD-MCNC: 14.7 G/DL (ref 14–18)
IMM GRANULOCYTES # BLD AUTO: 0.03 K/UL (ref 0–0.04)
IMM GRANULOCYTES NFR BLD AUTO: 0.3 % (ref 0–0.5)
LYMPHOCYTES # BLD AUTO: 2.5 K/UL (ref 1–4.8)
LYMPHOCYTES NFR BLD: 28.9 % (ref 18–48)
MAGNESIUM SERPL-MCNC: 1.5 MG/DL (ref 1.6–2.6)
MCH RBC QN AUTO: 29.2 PG (ref 27–31)
MCHC RBC AUTO-ENTMCNC: 34.3 G/DL (ref 32–36)
MCV RBC AUTO: 85 FL (ref 82–98)
MONOCYTES # BLD AUTO: 0.7 K/UL (ref 0.3–1)
MONOCYTES NFR BLD: 8.1 % (ref 4–15)
NEUTROPHILS # BLD AUTO: 5.2 K/UL (ref 1.8–7.7)
NEUTROPHILS NFR BLD: 60.1 % (ref 38–73)
NRBC BLD-RTO: 0 /100 WBC
PHOSPHATE SERPL-MCNC: 2.4 MG/DL (ref 2.7–4.5)
PLATELET # BLD AUTO: 218 K/UL (ref 150–450)
PMV BLD AUTO: 9.5 FL (ref 9.2–12.9)
POTASSIUM SERPL-SCNC: 3.7 MMOL/L (ref 3.5–5.1)
PROT SERPL-MCNC: 6.7 G/DL (ref 6–8.4)
RBC # BLD AUTO: 5.04 M/UL (ref 4.6–6.2)
SODIUM SERPL-SCNC: 137 MMOL/L (ref 136–145)
WBC # BLD AUTO: 8.72 K/UL (ref 3.9–12.7)

## 2021-09-04 PROCEDURE — 25000003 PHARM REV CODE 250: Performed by: NURSE PRACTITIONER

## 2021-09-04 PROCEDURE — 25000003 PHARM REV CODE 250

## 2021-09-04 PROCEDURE — 36415 COLL VENOUS BLD VENIPUNCTURE: CPT | Performed by: NURSE PRACTITIONER

## 2021-09-04 PROCEDURE — 80053 COMPREHEN METABOLIC PANEL: CPT | Performed by: NURSE PRACTITIONER

## 2021-09-04 PROCEDURE — 83735 ASSAY OF MAGNESIUM: CPT | Performed by: NURSE PRACTITIONER

## 2021-09-04 PROCEDURE — 85025 COMPLETE CBC W/AUTO DIFF WBC: CPT | Performed by: NURSE PRACTITIONER

## 2021-09-04 PROCEDURE — 63600175 PHARM REV CODE 636 W HCPCS: Performed by: FAMILY MEDICINE

## 2021-09-04 PROCEDURE — 84100 ASSAY OF PHOSPHORUS: CPT | Performed by: NURSE PRACTITIONER

## 2021-09-04 PROCEDURE — 25000003 PHARM REV CODE 250: Performed by: FAMILY MEDICINE

## 2021-09-04 RX ADMIN — TAMSULOSIN HYDROCHLORIDE 0.8 MG: 0.4 CAPSULE ORAL at 09:09

## 2021-09-04 RX ADMIN — ATORVASTATIN CALCIUM 40 MG: 40 TABLET, FILM COATED ORAL at 09:09

## 2021-09-04 RX ADMIN — CALCIUM CARBONATE (ANTACID) CHEW TAB 500 MG 500 MG: 500 CHEW TAB at 09:09

## 2021-09-04 RX ADMIN — CHLORHEXIDINE GLUCONATE 15 ML: 1.2 RINSE ORAL at 09:09

## 2021-09-04 RX ADMIN — POTASSIUM BICARBONATE 50 MEQ: 25 TABLET, EFFERVESCENT ORAL at 07:09

## 2021-09-04 RX ADMIN — ASPIRIN 81 MG: 81 TABLET, COATED ORAL at 09:09

## 2021-09-04 RX ADMIN — FAMOTIDINE 20 MG: 20 TABLET ORAL at 09:09

## 2021-09-04 RX ADMIN — MAGNESIUM OXIDE 400 MG: 400 TABLET ORAL at 09:09

## 2021-09-04 RX ADMIN — MUPIROCIN: 20 OINTMENT TOPICAL at 09:09

## 2021-09-04 RX ADMIN — CEFTRIAXONE 1 G: 1 INJECTION, SOLUTION INTRAVENOUS at 03:09

## 2021-09-04 RX ADMIN — Medication 800 MG: at 07:09

## 2021-09-04 RX ADMIN — CLOPIDOGREL BISULFATE 75 MG: 75 TABLET, FILM COATED ORAL at 09:09

## 2021-09-06 ENCOUNTER — HOSPITAL ENCOUNTER (INPATIENT)
Facility: HOSPITAL | Age: 66
LOS: 4 days | Discharge: NURSING, REHAB OR LONG-TERM CARE | DRG: 871 | End: 2021-09-10
Attending: EMERGENCY MEDICINE | Admitting: INTERNAL MEDICINE
Payer: MEDICARE

## 2021-09-06 DIAGNOSIS — A41.9 SEPTIC SHOCK: ICD-10-CM

## 2021-09-06 DIAGNOSIS — N30.00 ACUTE CYSTITIS WITHOUT HEMATURIA: ICD-10-CM

## 2021-09-06 DIAGNOSIS — K59.00 CONSTIPATION, UNSPECIFIED CONSTIPATION TYPE: ICD-10-CM

## 2021-09-06 DIAGNOSIS — R40.0 SOMNOLENCE: Primary | ICD-10-CM

## 2021-09-06 DIAGNOSIS — T83.511S URINARY TRACT INFECTION ASSOCIATED WITH INDWELLING URETHRAL CATHETER, SEQUELA: ICD-10-CM

## 2021-09-06 DIAGNOSIS — R33.9 URINARY RETENTION: ICD-10-CM

## 2021-09-06 DIAGNOSIS — R41.82 ALTERED MENTAL STATUS, UNSPECIFIED ALTERED MENTAL STATUS TYPE: ICD-10-CM

## 2021-09-06 DIAGNOSIS — Z91.89: ICD-10-CM

## 2021-09-06 DIAGNOSIS — N39.0 URINARY TRACT INFECTION ASSOCIATED WITH INDWELLING URETHRAL CATHETER, SEQUELA: ICD-10-CM

## 2021-09-06 DIAGNOSIS — R65.21 SEPTIC SHOCK: ICD-10-CM

## 2021-09-06 DIAGNOSIS — E11.59 HYPERTENSION ASSOCIATED WITH DIABETES: ICD-10-CM

## 2021-09-06 DIAGNOSIS — R53.1 WEAKNESS: ICD-10-CM

## 2021-09-06 DIAGNOSIS — I15.2 HYPERTENSION ASSOCIATED WITH DIABETES: ICD-10-CM

## 2021-09-06 LAB
ALBUMIN SERPL BCP-MCNC: 3 G/DL (ref 3.5–5.2)
ALBUMIN SERPL BCP-MCNC: 3 G/DL (ref 3.5–5.2)
ALLENS TEST: ABNORMAL
ALP SERPL-CCNC: 65 U/L (ref 55–135)
ALP SERPL-CCNC: 65 U/L (ref 55–135)
ALT SERPL W/O P-5'-P-CCNC: 18 U/L (ref 10–44)
ALT SERPL W/O P-5'-P-CCNC: 18 U/L (ref 10–44)
AMMONIA PLAS-SCNC: 26 UMOL/L (ref 10–50)
ANION GAP SERPL CALC-SCNC: 10 MMOL/L (ref 8–16)
ANION GAP SERPL CALC-SCNC: 10 MMOL/L (ref 8–16)
APTT PPP: 33 SEC (ref 25.6–35.8)
AST SERPL-CCNC: 16 U/L (ref 10–40)
AST SERPL-CCNC: 16 U/L (ref 10–40)
BACTERIA #/AREA URNS HPF: ABNORMAL /HPF
BACTERIA BLD CULT: NORMAL
BASOPHILS # BLD AUTO: 0.05 K/UL (ref 0–0.2)
BASOPHILS # BLD AUTO: 0.05 K/UL (ref 0–0.2)
BASOPHILS NFR BLD: 0.7 % (ref 0–1.9)
BASOPHILS NFR BLD: 0.7 % (ref 0–1.9)
BILIRUB SERPL-MCNC: 0.5 MG/DL (ref 0.1–1)
BILIRUB SERPL-MCNC: 0.5 MG/DL (ref 0.1–1)
BILIRUB UR QL STRIP: NEGATIVE
BNP SERPL-MCNC: 16 PG/ML (ref 0–99)
BNP SERPL-MCNC: 16 PG/ML (ref 0–99)
BUN SERPL-MCNC: 33 MG/DL (ref 8–23)
BUN SERPL-MCNC: 33 MG/DL (ref 8–23)
CALCIUM SERPL-MCNC: 9 MG/DL (ref 8.7–10.5)
CALCIUM SERPL-MCNC: 9 MG/DL (ref 8.7–10.5)
CHLORIDE SERPL-SCNC: 104 MMOL/L (ref 95–110)
CHLORIDE SERPL-SCNC: 104 MMOL/L (ref 95–110)
CLARITY UR: ABNORMAL
CO2 SERPL-SCNC: 22 MMOL/L (ref 23–29)
CO2 SERPL-SCNC: 22 MMOL/L (ref 23–29)
COLOR UR: YELLOW
CREAT SERPL-MCNC: 1.4 MG/DL (ref 0.5–1.4)
CREAT SERPL-MCNC: 1.4 MG/DL (ref 0.5–1.4)
DELSYS: ABNORMAL
DIFFERENTIAL METHOD: ABNORMAL
DIFFERENTIAL METHOD: ABNORMAL
EOSINOPHIL # BLD AUTO: 0.1 K/UL (ref 0–0.5)
EOSINOPHIL # BLD AUTO: 0.1 K/UL (ref 0–0.5)
EOSINOPHIL NFR BLD: 1.6 % (ref 0–8)
EOSINOPHIL NFR BLD: 1.6 % (ref 0–8)
ERYTHROCYTE [DISTWIDTH] IN BLOOD BY AUTOMATED COUNT: 12.8 % (ref 11.5–14.5)
ERYTHROCYTE [DISTWIDTH] IN BLOOD BY AUTOMATED COUNT: 12.8 % (ref 11.5–14.5)
EST. GFR  (AFRICAN AMERICAN): 59.7 ML/MIN/1.73 M^2
EST. GFR  (AFRICAN AMERICAN): 59.7 ML/MIN/1.73 M^2
EST. GFR  (NON AFRICAN AMERICAN): 51.6 ML/MIN/1.73 M^2
EST. GFR  (NON AFRICAN AMERICAN): 51.6 ML/MIN/1.73 M^2
FLOW: 15
GLUCOSE SERPL-MCNC: 184 MG/DL (ref 70–110)
GLUCOSE SERPL-MCNC: 202 MG/DL (ref 70–110)
GLUCOSE SERPL-MCNC: 202 MG/DL (ref 70–110)
GLUCOSE SERPL-MCNC: 215 MG/DL (ref 70–110)
GLUCOSE UR QL STRIP: ABNORMAL
HCO3 UR-SCNC: 23.9 MMOL/L (ref 24–28)
HCT VFR BLD AUTO: 38.8 % (ref 40–54)
HCT VFR BLD AUTO: 38.8 % (ref 40–54)
HCT VFR BLD CALC: 43 %PCV (ref 36–54)
HGB BLD-MCNC: 12.9 G/DL (ref 14–18)
HGB BLD-MCNC: 12.9 G/DL (ref 14–18)
HGB UR QL STRIP: ABNORMAL
HYALINE CASTS #/AREA URNS LPF: 152 /LPF
IMM GRANULOCYTES # BLD AUTO: 0.02 K/UL (ref 0–0.04)
IMM GRANULOCYTES # BLD AUTO: 0.02 K/UL (ref 0–0.04)
IMM GRANULOCYTES NFR BLD AUTO: 0.3 % (ref 0–0.5)
IMM GRANULOCYTES NFR BLD AUTO: 0.3 % (ref 0–0.5)
INR PPP: 1.1
KETONES UR QL STRIP: NEGATIVE
LACTATE SERPL-SCNC: 2.5 MMOL/L (ref 0.5–1.9)
LEUKOCYTE ESTERASE UR QL STRIP: ABNORMAL
LIPASE SERPL-CCNC: 22 U/L (ref 4–60)
LYMPHOCYTES # BLD AUTO: 2.3 K/UL (ref 1–4.8)
LYMPHOCYTES # BLD AUTO: 2.3 K/UL (ref 1–4.8)
LYMPHOCYTES NFR BLD: 30.4 % (ref 18–48)
LYMPHOCYTES NFR BLD: 30.4 % (ref 18–48)
MAGNESIUM SERPL-MCNC: 1.5 MG/DL (ref 1.6–2.6)
MCH RBC QN AUTO: 28.5 PG (ref 27–31)
MCH RBC QN AUTO: 28.5 PG (ref 27–31)
MCHC RBC AUTO-ENTMCNC: 33.2 G/DL (ref 32–36)
MCHC RBC AUTO-ENTMCNC: 33.2 G/DL (ref 32–36)
MCV RBC AUTO: 86 FL (ref 82–98)
MCV RBC AUTO: 86 FL (ref 82–98)
MICROSCOPIC COMMENT: ABNORMAL
MODE: ABNORMAL
MONOCYTES # BLD AUTO: 0.6 K/UL (ref 0.3–1)
MONOCYTES # BLD AUTO: 0.6 K/UL (ref 0.3–1)
MONOCYTES NFR BLD: 7.9 % (ref 4–15)
MONOCYTES NFR BLD: 7.9 % (ref 4–15)
NEUTROPHILS # BLD AUTO: 4.4 K/UL (ref 1.8–7.7)
NEUTROPHILS # BLD AUTO: 4.4 K/UL (ref 1.8–7.7)
NEUTROPHILS NFR BLD: 59.1 % (ref 38–73)
NEUTROPHILS NFR BLD: 59.1 % (ref 38–73)
NITRITE UR QL STRIP: NEGATIVE
NRBC BLD-RTO: 0 /100 WBC
NRBC BLD-RTO: 0 /100 WBC
PCO2 BLDA: 37.1 MMHG (ref 35–45)
PH SMN: 7.42 [PH] (ref 7.35–7.45)
PH UR STRIP: 5 [PH] (ref 5–8)
PLATELET # BLD AUTO: 224 K/UL (ref 150–450)
PLATELET # BLD AUTO: 224 K/UL (ref 150–450)
PMV BLD AUTO: 9.4 FL (ref 9.2–12.9)
PMV BLD AUTO: 9.4 FL (ref 9.2–12.9)
PO2 BLDA: 76 MMHG (ref 80–100)
POC BE: -1 MMOL/L
POC IONIZED CALCIUM: 1.34 MMOL/L (ref 1.06–1.42)
POC SATURATED O2: 95 % (ref 95–100)
POC TCO2: 25 MMOL/L (ref 23–27)
POTASSIUM BLD-SCNC: 4.4 MMOL/L (ref 3.5–5.1)
POTASSIUM SERPL-SCNC: 3.9 MMOL/L (ref 3.5–5.1)
POTASSIUM SERPL-SCNC: 3.9 MMOL/L (ref 3.5–5.1)
PROCALCITONIN SERPL IA-MCNC: 0.19 NG/ML (ref 0–0.5)
PROT SERPL-MCNC: 5.7 G/DL (ref 6–8.4)
PROT SERPL-MCNC: 5.7 G/DL (ref 6–8.4)
PROT UR QL STRIP: ABNORMAL
PROTHROMBIN TIME: 13.5 SEC (ref 11.8–14.3)
RBC # BLD AUTO: 4.53 M/UL (ref 4.6–6.2)
RBC # BLD AUTO: 4.53 M/UL (ref 4.6–6.2)
RBC #/AREA URNS HPF: 9 /HPF (ref 0–4)
SAMPLE: ABNORMAL
SARS-COV-2 RDRP RESP QL NAA+PROBE: NEGATIVE
SITE: ABNORMAL
SODIUM BLD-SCNC: 136 MMOL/L (ref 136–145)
SODIUM SERPL-SCNC: 136 MMOL/L (ref 136–145)
SODIUM SERPL-SCNC: 136 MMOL/L (ref 136–145)
SP GR UR STRIP: 1.02 (ref 1–1.03)
SQUAMOUS #/AREA URNS HPF: 20 /HPF
TROPONIN I SERPL DL<=0.01 NG/ML-MCNC: <0.03 NG/ML
URN SPEC COLLECT METH UR: ABNORMAL
UROBILINOGEN UR STRIP-ACNC: NEGATIVE EU/DL
WBC # BLD AUTO: 7.44 K/UL (ref 3.9–12.7)
WBC # BLD AUTO: 7.44 K/UL (ref 3.9–12.7)
WBC #/AREA URNS HPF: 76 /HPF (ref 0–5)

## 2021-09-06 PROCEDURE — 83880 ASSAY OF NATRIURETIC PEPTIDE: CPT | Performed by: EMERGENCY MEDICINE

## 2021-09-06 PROCEDURE — 20000000 HC ICU ROOM

## 2021-09-06 PROCEDURE — 99291 CRITICAL CARE FIRST HOUR: CPT

## 2021-09-06 PROCEDURE — 94761 N-INVAS EAR/PLS OXIMETRY MLT: CPT

## 2021-09-06 PROCEDURE — 93010 ELECTROCARDIOGRAM REPORT: CPT | Mod: ,,, | Performed by: INTERNAL MEDICINE

## 2021-09-06 PROCEDURE — 85014 HEMATOCRIT: CPT

## 2021-09-06 PROCEDURE — 96360 HYDRATION IV INFUSION INIT: CPT | Mod: 59

## 2021-09-06 PROCEDURE — 93005 ELECTROCARDIOGRAM TRACING: CPT | Performed by: INTERNAL MEDICINE

## 2021-09-06 PROCEDURE — 94799 UNLISTED PULMONARY SVC/PX: CPT

## 2021-09-06 PROCEDURE — 84132 ASSAY OF SERUM POTASSIUM: CPT

## 2021-09-06 PROCEDURE — 96365 THER/PROPH/DIAG IV INF INIT: CPT | Mod: 59

## 2021-09-06 PROCEDURE — 85025 COMPLETE CBC W/AUTO DIFF WBC: CPT | Performed by: EMERGENCY MEDICINE

## 2021-09-06 PROCEDURE — 99900035 HC TECH TIME PER 15 MIN (STAT)

## 2021-09-06 PROCEDURE — 87040 BLOOD CULTURE FOR BACTERIA: CPT | Performed by: EMERGENCY MEDICINE

## 2021-09-06 PROCEDURE — 25000003 PHARM REV CODE 250: Performed by: EMERGENCY MEDICINE

## 2021-09-06 PROCEDURE — 83735 ASSAY OF MAGNESIUM: CPT | Performed by: EMERGENCY MEDICINE

## 2021-09-06 PROCEDURE — 81001 URINALYSIS AUTO W/SCOPE: CPT | Performed by: EMERGENCY MEDICINE

## 2021-09-06 PROCEDURE — 85730 THROMBOPLASTIN TIME PARTIAL: CPT | Performed by: EMERGENCY MEDICINE

## 2021-09-06 PROCEDURE — 87502 INFLUENZA DNA AMP PROBE: CPT | Performed by: EMERGENCY MEDICINE

## 2021-09-06 PROCEDURE — U0002 COVID-19 LAB TEST NON-CDC: HCPCS | Performed by: EMERGENCY MEDICINE

## 2021-09-06 PROCEDURE — 93010 EKG 12-LEAD: ICD-10-PCS | Mod: ,,, | Performed by: INTERNAL MEDICINE

## 2021-09-06 PROCEDURE — 85610 PROTHROMBIN TIME: CPT | Performed by: EMERGENCY MEDICINE

## 2021-09-06 PROCEDURE — 80053 COMPREHEN METABOLIC PANEL: CPT | Performed by: EMERGENCY MEDICINE

## 2021-09-06 PROCEDURE — 83605 ASSAY OF LACTIC ACID: CPT | Performed by: EMERGENCY MEDICINE

## 2021-09-06 PROCEDURE — 82330 ASSAY OF CALCIUM: CPT

## 2021-09-06 PROCEDURE — 36600 WITHDRAWAL OF ARTERIAL BLOOD: CPT

## 2021-09-06 PROCEDURE — 84145 PROCALCITONIN (PCT): CPT | Performed by: EMERGENCY MEDICINE

## 2021-09-06 PROCEDURE — 84484 ASSAY OF TROPONIN QUANT: CPT | Performed by: EMERGENCY MEDICINE

## 2021-09-06 PROCEDURE — 87086 URINE CULTURE/COLONY COUNT: CPT | Performed by: EMERGENCY MEDICINE

## 2021-09-06 PROCEDURE — 83690 ASSAY OF LIPASE: CPT | Performed by: EMERGENCY MEDICINE

## 2021-09-06 PROCEDURE — 82803 BLOOD GASES ANY COMBINATION: CPT

## 2021-09-06 PROCEDURE — 63600175 PHARM REV CODE 636 W HCPCS: Performed by: EMERGENCY MEDICINE

## 2021-09-06 PROCEDURE — 82140 ASSAY OF AMMONIA: CPT | Performed by: EMERGENCY MEDICINE

## 2021-09-06 PROCEDURE — 84295 ASSAY OF SERUM SODIUM: CPT

## 2021-09-06 PROCEDURE — 82962 GLUCOSE BLOOD TEST: CPT

## 2021-09-06 RX ORDER — NOREPINEPHRINE BITARTRATE 1 MG/ML
INJECTION, SOLUTION INTRAVENOUS
Status: DISPENSED
Start: 2021-09-06 | End: 2021-09-07

## 2021-09-06 RX ORDER — LEVOFLOXACIN 5 MG/ML
750 INJECTION, SOLUTION INTRAVENOUS
Status: COMPLETED | OUTPATIENT
Start: 2021-09-06 | End: 2021-09-07

## 2021-09-06 RX ORDER — MAGNESIUM SULFATE HEPTAHYDRATE 40 MG/ML
2 INJECTION, SOLUTION INTRAVENOUS ONCE
Status: COMPLETED | OUTPATIENT
Start: 2021-09-07 | End: 2021-09-07

## 2021-09-06 RX ADMIN — NOREPINEPHRINE BITARTRATE 0.04 MCG/KG/MIN: 1 INJECTION, SOLUTION, CONCENTRATE INTRAVENOUS at 09:09

## 2021-09-06 RX ADMIN — LEVOFLOXACIN 750 MG: 750 INJECTION, SOLUTION INTRAVENOUS at 10:09

## 2021-09-06 RX ADMIN — CEFTRIAXONE 2 G: 2 INJECTION, SOLUTION INTRAVENOUS at 10:09

## 2021-09-06 RX ADMIN — SODIUM CHLORIDE, SODIUM LACTATE, POTASSIUM CHLORIDE, AND CALCIUM CHLORIDE 2673 ML: .6; .31; .03; .02 INJECTION, SOLUTION INTRAVENOUS at 10:09

## 2021-09-07 PROBLEM — N39.0 UTI (URINARY TRACT INFECTION): Status: ACTIVE | Noted: 2021-09-07

## 2021-09-07 PROBLEM — G93.41 ENCEPHALOPATHY, METABOLIC: Status: ACTIVE | Noted: 2021-09-07

## 2021-09-07 PROBLEM — R33.9 URINARY RETENTION: Status: ACTIVE | Noted: 2021-09-07

## 2021-09-07 LAB
ANION GAP SERPL CALC-SCNC: 10 MMOL/L (ref 8–16)
BACTERIA BLD CULT: NORMAL
BUN SERPL-MCNC: 27 MG/DL (ref 8–23)
CALCIUM SERPL-MCNC: 9.7 MG/DL (ref 8.7–10.5)
CHLORIDE SERPL-SCNC: 108 MMOL/L (ref 95–110)
CO2 SERPL-SCNC: 25 MMOL/L (ref 23–29)
CREAT SERPL-MCNC: 1.2 MG/DL (ref 0.5–1.4)
EST. GFR  (AFRICAN AMERICAN): >60 ML/MIN/1.73 M^2
EST. GFR  (NON AFRICAN AMERICAN): >60 ML/MIN/1.73 M^2
GLUCOSE SERPL-MCNC: 207 MG/DL (ref 70–110)
GLUCOSE SERPL-MCNC: 218 MG/DL (ref 70–110)
GLUCOSE SERPL-MCNC: 248 MG/DL (ref 70–110)
GLUCOSE SERPL-MCNC: 256 MG/DL (ref 70–110)
GLUCOSE SERPL-MCNC: 297 MG/DL (ref 70–110)
INFLUENZA A, MOLECULAR: NEGATIVE
INFLUENZA B, MOLECULAR: NEGATIVE
LACTATE SERPL-SCNC: 1.2 MMOL/L (ref 0.5–1.9)
LACTATE SERPL-SCNC: 2.1 MMOL/L (ref 0.5–1.9)
MAGNESIUM SERPL-MCNC: 1.9 MG/DL (ref 1.6–2.6)
POTASSIUM SERPL-SCNC: 3.5 MMOL/L (ref 3.5–5.1)
SODIUM SERPL-SCNC: 143 MMOL/L (ref 136–145)
SPECIMEN SOURCE: NORMAL
TROPONIN I SERPL DL<=0.01 NG/ML-MCNC: <0.03 NG/ML

## 2021-09-07 PROCEDURE — 83605 ASSAY OF LACTIC ACID: CPT | Mod: 91 | Performed by: INTERNAL MEDICINE

## 2021-09-07 PROCEDURE — 99223 PR INITIAL HOSPITAL CARE,LEVL III: ICD-10-PCS | Mod: ,,, | Performed by: INTERNAL MEDICINE

## 2021-09-07 PROCEDURE — 25000003 PHARM REV CODE 250: Performed by: EMERGENCY MEDICINE

## 2021-09-07 PROCEDURE — 25000003 PHARM REV CODE 250: Performed by: INTERNAL MEDICINE

## 2021-09-07 PROCEDURE — 82962 GLUCOSE BLOOD TEST: CPT

## 2021-09-07 PROCEDURE — 25000003 PHARM REV CODE 250

## 2021-09-07 PROCEDURE — 80048 BASIC METABOLIC PNL TOTAL CA: CPT | Performed by: NURSE PRACTITIONER

## 2021-09-07 PROCEDURE — 63600175 PHARM REV CODE 636 W HCPCS: Performed by: NURSE PRACTITIONER

## 2021-09-07 PROCEDURE — 83605 ASSAY OF LACTIC ACID: CPT | Performed by: EMERGENCY MEDICINE

## 2021-09-07 PROCEDURE — 25000003 PHARM REV CODE 250: Performed by: NURSE PRACTITIONER

## 2021-09-07 PROCEDURE — 84484 ASSAY OF TROPONIN QUANT: CPT | Performed by: NURSE PRACTITIONER

## 2021-09-07 PROCEDURE — 27000221 HC OXYGEN, UP TO 24 HOURS

## 2021-09-07 PROCEDURE — 83735 ASSAY OF MAGNESIUM: CPT | Performed by: NURSE PRACTITIONER

## 2021-09-07 PROCEDURE — 63600175 PHARM REV CODE 636 W HCPCS: Performed by: INTERNAL MEDICINE

## 2021-09-07 PROCEDURE — 94761 N-INVAS EAR/PLS OXIMETRY MLT: CPT

## 2021-09-07 PROCEDURE — 20000000 HC ICU ROOM

## 2021-09-07 PROCEDURE — 63600175 PHARM REV CODE 636 W HCPCS: Performed by: EMERGENCY MEDICINE

## 2021-09-07 PROCEDURE — 99223 1ST HOSP IP/OBS HIGH 75: CPT | Mod: ,,, | Performed by: INTERNAL MEDICINE

## 2021-09-07 PROCEDURE — 99900035 HC TECH TIME PER 15 MIN (STAT)

## 2021-09-07 PROCEDURE — 36415 COLL VENOUS BLD VENIPUNCTURE: CPT | Performed by: NURSE PRACTITIONER

## 2021-09-07 RX ORDER — POTASSIUM CHLORIDE 7.45 MG/ML
40 INJECTION INTRAVENOUS
Status: DISCONTINUED | OUTPATIENT
Start: 2021-09-07 | End: 2021-09-10 | Stop reason: HOSPADM

## 2021-09-07 RX ORDER — IBUPROFEN 200 MG
16 TABLET ORAL
Status: DISCONTINUED | OUTPATIENT
Start: 2021-09-07 | End: 2021-09-10 | Stop reason: HOSPADM

## 2021-09-07 RX ORDER — IBUPROFEN 200 MG
24 TABLET ORAL
Status: DISCONTINUED | OUTPATIENT
Start: 2021-09-07 | End: 2021-09-10 | Stop reason: HOSPADM

## 2021-09-07 RX ORDER — MAGNESIUM SULFATE 1 G/100ML
1 INJECTION INTRAVENOUS
Status: DISCONTINUED | OUTPATIENT
Start: 2021-09-07 | End: 2021-09-10 | Stop reason: HOSPADM

## 2021-09-07 RX ORDER — MECLIZINE HCL 12.5 MG 12.5 MG/1
25 TABLET ORAL 3 TIMES DAILY PRN
Status: DISCONTINUED | OUTPATIENT
Start: 2021-09-07 | End: 2021-09-10 | Stop reason: HOSPADM

## 2021-09-07 RX ORDER — INSULIN ASPART 100 [IU]/ML
0-5 INJECTION, SOLUTION INTRAVENOUS; SUBCUTANEOUS
Status: DISCONTINUED | OUTPATIENT
Start: 2021-09-07 | End: 2021-09-10 | Stop reason: HOSPADM

## 2021-09-07 RX ORDER — MAGNESIUM SULFATE HEPTAHYDRATE 40 MG/ML
4 INJECTION, SOLUTION INTRAVENOUS
Status: DISCONTINUED | OUTPATIENT
Start: 2021-09-07 | End: 2021-09-10 | Stop reason: HOSPADM

## 2021-09-07 RX ORDER — ATORVASTATIN CALCIUM 40 MG/1
40 TABLET, FILM COATED ORAL DAILY
Status: DISCONTINUED | OUTPATIENT
Start: 2021-09-07 | End: 2021-09-10 | Stop reason: HOSPADM

## 2021-09-07 RX ORDER — CLOPIDOGREL BISULFATE 75 MG/1
75 TABLET ORAL DAILY
Status: DISCONTINUED | OUTPATIENT
Start: 2021-09-07 | End: 2021-09-10 | Stop reason: HOSPADM

## 2021-09-07 RX ORDER — TAMSULOSIN HYDROCHLORIDE 0.4 MG/1
0.8 CAPSULE ORAL DAILY
Status: DISCONTINUED | OUTPATIENT
Start: 2021-09-07 | End: 2021-09-10 | Stop reason: HOSPADM

## 2021-09-07 RX ORDER — POTASSIUM CHLORIDE 7.45 MG/ML
20 INJECTION INTRAVENOUS
Status: DISCONTINUED | OUTPATIENT
Start: 2021-09-07 | End: 2021-09-10 | Stop reason: HOSPADM

## 2021-09-07 RX ORDER — MEROPENEM AND SODIUM CHLORIDE 1 G/50ML
1 INJECTION, SOLUTION INTRAVENOUS
Status: DISCONTINUED | OUTPATIENT
Start: 2021-09-07 | End: 2021-09-07

## 2021-09-07 RX ORDER — ONDANSETRON 2 MG/ML
4 INJECTION INTRAMUSCULAR; INTRAVENOUS EVERY 6 HOURS PRN
Status: DISCONTINUED | OUTPATIENT
Start: 2021-09-07 | End: 2021-09-10 | Stop reason: HOSPADM

## 2021-09-07 RX ORDER — ASPIRIN 81 MG/1
81 TABLET ORAL DAILY
Status: DISCONTINUED | OUTPATIENT
Start: 2021-09-07 | End: 2021-09-10 | Stop reason: HOSPADM

## 2021-09-07 RX ORDER — LANOLIN ALCOHOL/MO/W.PET/CERES
800 CREAM (GRAM) TOPICAL
Status: DISCONTINUED | OUTPATIENT
Start: 2021-09-07 | End: 2021-09-10 | Stop reason: HOSPADM

## 2021-09-07 RX ORDER — AMOXICILLIN 250 MG
1 CAPSULE ORAL 2 TIMES DAILY PRN
Status: DISCONTINUED | OUTPATIENT
Start: 2021-09-07 | End: 2021-09-10 | Stop reason: HOSPADM

## 2021-09-07 RX ORDER — MUPIROCIN 20 MG/G
OINTMENT TOPICAL 2 TIMES DAILY
Status: DISCONTINUED | OUTPATIENT
Start: 2021-09-07 | End: 2021-09-10

## 2021-09-07 RX ORDER — POTASSIUM CHLORIDE 20 MEQ/1
40 TABLET, EXTENDED RELEASE ORAL
Status: DISCONTINUED | OUTPATIENT
Start: 2021-09-07 | End: 2021-09-10 | Stop reason: HOSPADM

## 2021-09-07 RX ORDER — LACTULOSE 10 G/15ML
20 SOLUTION ORAL 2 TIMES DAILY
Status: DISCONTINUED | OUTPATIENT
Start: 2021-09-07 | End: 2021-09-10 | Stop reason: HOSPADM

## 2021-09-07 RX ORDER — POTASSIUM CHLORIDE 20 MEQ/1
20 TABLET, EXTENDED RELEASE ORAL
Status: DISCONTINUED | OUTPATIENT
Start: 2021-09-07 | End: 2021-09-10 | Stop reason: HOSPADM

## 2021-09-07 RX ORDER — BUPROPION HYDROCHLORIDE 150 MG/1
300 TABLET ORAL DAILY
Status: DISCONTINUED | OUTPATIENT
Start: 2021-09-07 | End: 2021-09-10 | Stop reason: HOSPADM

## 2021-09-07 RX ORDER — ACETAMINOPHEN 325 MG/1
650 TABLET ORAL EVERY 4 HOURS PRN
Status: DISCONTINUED | OUTPATIENT
Start: 2021-09-07 | End: 2021-09-10 | Stop reason: HOSPADM

## 2021-09-07 RX ORDER — MAGNESIUM SULFATE HEPTAHYDRATE 40 MG/ML
2 INJECTION, SOLUTION INTRAVENOUS
Status: DISCONTINUED | OUTPATIENT
Start: 2021-09-07 | End: 2021-09-10 | Stop reason: HOSPADM

## 2021-09-07 RX ORDER — SODIUM CHLORIDE 0.9 % (FLUSH) 0.9 %
10 SYRINGE (ML) INJECTION
Status: DISCONTINUED | OUTPATIENT
Start: 2021-09-07 | End: 2021-09-10 | Stop reason: HOSPADM

## 2021-09-07 RX ORDER — SODIUM CHLORIDE 9 MG/ML
INJECTION, SOLUTION INTRAVENOUS CONTINUOUS
Status: DISCONTINUED | OUTPATIENT
Start: 2021-09-07 | End: 2021-09-10

## 2021-09-07 RX ORDER — TETRAHYDROZOLINE HCL 0.05 %
2 DROPS OPHTHALMIC (EYE) 4 TIMES DAILY PRN
Status: DISCONTINUED | OUTPATIENT
Start: 2021-09-07 | End: 2021-09-10 | Stop reason: HOSPADM

## 2021-09-07 RX ORDER — PANTOPRAZOLE SODIUM 40 MG/1
40 TABLET, DELAYED RELEASE ORAL
Status: DISCONTINUED | OUTPATIENT
Start: 2021-09-07 | End: 2021-09-10 | Stop reason: HOSPADM

## 2021-09-07 RX ORDER — GLUCAGON 1 MG
1 KIT INJECTION
Status: DISCONTINUED | OUTPATIENT
Start: 2021-09-07 | End: 2021-09-10 | Stop reason: HOSPADM

## 2021-09-07 RX ORDER — CHLORHEXIDINE GLUCONATE ORAL RINSE 1.2 MG/ML
15 SOLUTION DENTAL 2 TIMES DAILY
Status: DISCONTINUED | OUTPATIENT
Start: 2021-09-07 | End: 2021-09-10

## 2021-09-07 RX ORDER — LIDOCAINE 50 MG/G
1 PATCH TOPICAL DAILY
Status: DISCONTINUED | OUTPATIENT
Start: 2021-09-07 | End: 2021-09-10 | Stop reason: HOSPADM

## 2021-09-07 RX ADMIN — CHLORHEXIDINE GLUCONATE 15 ML: 1.2 RINSE ORAL at 10:09

## 2021-09-07 RX ADMIN — CLOPIDOGREL BISULFATE 75 MG: 75 TABLET, FILM COATED ORAL at 12:09

## 2021-09-07 RX ADMIN — PIPERACILLIN SODIUM AND TAZOBACTAM SODIUM 3.38 G: 3; .375 INJECTION, POWDER, LYOPHILIZED, FOR SOLUTION INTRAVENOUS at 05:09

## 2021-09-07 RX ADMIN — INSULIN ASPART 3 UNITS: 100 INJECTION, SOLUTION INTRAVENOUS; SUBCUTANEOUS at 05:09

## 2021-09-07 RX ADMIN — INSULIN ASPART 2 UNITS: 100 INJECTION, SOLUTION INTRAVENOUS; SUBCUTANEOUS at 12:09

## 2021-09-07 RX ADMIN — ATORVASTATIN CALCIUM 40 MG: 40 TABLET, FILM COATED ORAL at 12:09

## 2021-09-07 RX ADMIN — VANCOMYCIN HYDROCHLORIDE 1500 MG: 1.5 INJECTION, POWDER, LYOPHILIZED, FOR SOLUTION INTRAVENOUS at 12:09

## 2021-09-07 RX ADMIN — MAGNESIUM SULFATE 2 G: 2 INJECTION INTRAVENOUS at 12:09

## 2021-09-07 RX ADMIN — LIDOCAINE 5% 1 PATCH: 700 PATCH TOPICAL at 12:09

## 2021-09-07 RX ADMIN — MUPIROCIN: 20 OINTMENT TOPICAL at 10:09

## 2021-09-07 RX ADMIN — INSULIN ASPART 2 UNITS: 100 INJECTION, SOLUTION INTRAVENOUS; SUBCUTANEOUS at 08:09

## 2021-09-07 RX ADMIN — NOREPINEPHRINE BITARTRATE 0.06 MCG/KG/MIN: 1 INJECTION, SOLUTION, CONCENTRATE INTRAVENOUS at 10:09

## 2021-09-07 RX ADMIN — SODIUM CHLORIDE: 0.9 INJECTION, SOLUTION INTRAVENOUS at 02:09

## 2021-09-07 RX ADMIN — PIPERACILLIN SODIUM AND TAZOBACTAM SODIUM 3.38 G: 3; .375 INJECTION, POWDER, LYOPHILIZED, FOR SOLUTION INTRAVENOUS at 10:09

## 2021-09-07 RX ADMIN — CHLORHEXIDINE GLUCONATE 15 ML: 1.2 RINSE ORAL at 08:09

## 2021-09-07 RX ADMIN — TAMSULOSIN HYDROCHLORIDE 0.8 MG: 0.4 CAPSULE ORAL at 12:09

## 2021-09-07 RX ADMIN — ASPIRIN 81 MG: 81 TABLET, DELAYED RELEASE ORAL at 12:09

## 2021-09-07 RX ADMIN — BUPROPION HYDROCHLORIDE 300 MG: 150 TABLET, FILM COATED, EXTENDED RELEASE ORAL at 12:09

## 2021-09-07 RX ADMIN — PANTOPRAZOLE SODIUM 40 MG: 40 TABLET, DELAYED RELEASE ORAL at 01:09

## 2021-09-07 RX ADMIN — LACTULOSE 20 G: 20 SOLUTION ORAL at 08:09

## 2021-09-07 RX ADMIN — LACTULOSE 20 G: 20 SOLUTION ORAL at 12:09

## 2021-09-07 RX ADMIN — MEROPENEM 1 G: 1 INJECTION, POWDER, FOR SOLUTION INTRAVENOUS at 04:09

## 2021-09-07 RX ADMIN — MUPIROCIN: 20 OINTMENT TOPICAL at 08:09

## 2021-09-07 RX ADMIN — POTASSIUM CHLORIDE 40 MEQ: 1500 TABLET, EXTENDED RELEASE ORAL at 05:09

## 2021-09-07 RX ADMIN — FOLIC ACID-PYRIDOXINE-CYANOCOBALAMIN TAB 2.5-25-2 MG 1 TABLET: 2.5-25-2 TAB at 01:09

## 2021-09-08 LAB
ALBUMIN SERPL BCP-MCNC: 3.5 G/DL (ref 3.5–5.2)
ALP SERPL-CCNC: 81 U/L (ref 55–135)
ALT SERPL W/O P-5'-P-CCNC: 19 U/L (ref 10–44)
ANION GAP SERPL CALC-SCNC: 10 MMOL/L (ref 8–16)
AST SERPL-CCNC: 13 U/L (ref 10–40)
BACTERIA UR CULT: ABNORMAL
BASOPHILS # BLD AUTO: 0.04 K/UL (ref 0–0.2)
BASOPHILS NFR BLD: 0.5 % (ref 0–1.9)
BILIRUB SERPL-MCNC: 0.9 MG/DL (ref 0.1–1)
BUN SERPL-MCNC: 19 MG/DL (ref 8–23)
CALCIUM SERPL-MCNC: 9.3 MG/DL (ref 8.7–10.5)
CHLORIDE SERPL-SCNC: 104 MMOL/L (ref 95–110)
CO2 SERPL-SCNC: 27 MMOL/L (ref 23–29)
CREAT SERPL-MCNC: 1 MG/DL (ref 0.5–1.4)
DIFFERENTIAL METHOD: ABNORMAL
EOSINOPHIL # BLD AUTO: 0.3 K/UL (ref 0–0.5)
EOSINOPHIL NFR BLD: 3.3 % (ref 0–8)
ERYTHROCYTE [DISTWIDTH] IN BLOOD BY AUTOMATED COUNT: 13.1 % (ref 11.5–14.5)
EST. GFR  (AFRICAN AMERICAN): >60 ML/MIN/1.73 M^2
EST. GFR  (NON AFRICAN AMERICAN): >60 ML/MIN/1.73 M^2
GLUCOSE SERPL-MCNC: 200 MG/DL (ref 70–110)
GLUCOSE SERPL-MCNC: 215 MG/DL (ref 70–110)
GLUCOSE SERPL-MCNC: 243 MG/DL (ref 70–110)
GLUCOSE SERPL-MCNC: 254 MG/DL (ref 70–110)
HCT VFR BLD AUTO: 42.7 % (ref 40–54)
HGB BLD-MCNC: 14.1 G/DL (ref 14–18)
IMM GRANULOCYTES # BLD AUTO: 0.05 K/UL (ref 0–0.04)
IMM GRANULOCYTES NFR BLD AUTO: 0.6 % (ref 0–0.5)
LYMPHOCYTES # BLD AUTO: 1.1 K/UL (ref 1–4.8)
LYMPHOCYTES NFR BLD: 12.8 % (ref 18–48)
MAGNESIUM SERPL-MCNC: 1.6 MG/DL (ref 1.6–2.6)
MCH RBC QN AUTO: 28.5 PG (ref 27–31)
MCHC RBC AUTO-ENTMCNC: 33 G/DL (ref 32–36)
MCV RBC AUTO: 86 FL (ref 82–98)
MONOCYTES # BLD AUTO: 0.5 K/UL (ref 0.3–1)
MONOCYTES NFR BLD: 6.2 % (ref 4–15)
NEUTROPHILS # BLD AUTO: 6.7 K/UL (ref 1.8–7.7)
NEUTROPHILS NFR BLD: 76.6 % (ref 38–73)
NRBC BLD-RTO: 0 /100 WBC
PHOSPHATE SERPL-MCNC: 2.4 MG/DL (ref 2.7–4.5)
PLATELET # BLD AUTO: 232 K/UL (ref 150–450)
PMV BLD AUTO: 9.3 FL (ref 9.2–12.9)
POTASSIUM SERPL-SCNC: 4.1 MMOL/L (ref 3.5–5.1)
PROT SERPL-MCNC: 6.4 G/DL (ref 6–8.4)
RBC # BLD AUTO: 4.94 M/UL (ref 4.6–6.2)
SODIUM SERPL-SCNC: 141 MMOL/L (ref 136–145)
WBC # BLD AUTO: 8.77 K/UL (ref 3.9–12.7)

## 2021-09-08 PROCEDURE — 36415 COLL VENOUS BLD VENIPUNCTURE: CPT | Performed by: NURSE PRACTITIONER

## 2021-09-08 PROCEDURE — 21400001 HC TELEMETRY ROOM

## 2021-09-08 PROCEDURE — 93005 ELECTROCARDIOGRAM TRACING: CPT | Performed by: INTERNAL MEDICINE

## 2021-09-08 PROCEDURE — 99223 PR INITIAL HOSPITAL CARE,LEVL III: ICD-10-PCS | Mod: ,,, | Performed by: UROLOGY

## 2021-09-08 PROCEDURE — 83735 ASSAY OF MAGNESIUM: CPT | Performed by: NURSE PRACTITIONER

## 2021-09-08 PROCEDURE — 84100 ASSAY OF PHOSPHORUS: CPT | Performed by: NURSE PRACTITIONER

## 2021-09-08 PROCEDURE — 99223 1ST HOSP IP/OBS HIGH 75: CPT | Mod: ,,, | Performed by: UROLOGY

## 2021-09-08 PROCEDURE — 25000003 PHARM REV CODE 250: Performed by: NURSE PRACTITIONER

## 2021-09-08 PROCEDURE — 97535 SELF CARE MNGMENT TRAINING: CPT

## 2021-09-08 PROCEDURE — 63700000 PHARM REV CODE 250 ALT 637 W/O HCPCS: Performed by: INTERNAL MEDICINE

## 2021-09-08 PROCEDURE — 63600175 PHARM REV CODE 636 W HCPCS: Performed by: INTERNAL MEDICINE

## 2021-09-08 PROCEDURE — 80053 COMPREHEN METABOLIC PANEL: CPT | Performed by: NURSE PRACTITIONER

## 2021-09-08 PROCEDURE — 99232 PR SUBSEQUENT HOSPITAL CARE,LEVL II: ICD-10-PCS | Mod: ,,, | Performed by: INTERNAL MEDICINE

## 2021-09-08 PROCEDURE — 25000003 PHARM REV CODE 250

## 2021-09-08 PROCEDURE — 97530 THERAPEUTIC ACTIVITIES: CPT

## 2021-09-08 PROCEDURE — 97166 OT EVAL MOD COMPLEX 45 MIN: CPT

## 2021-09-08 PROCEDURE — 99232 SBSQ HOSP IP/OBS MODERATE 35: CPT | Mod: ,,, | Performed by: INTERNAL MEDICINE

## 2021-09-08 PROCEDURE — 97162 PT EVAL MOD COMPLEX 30 MIN: CPT

## 2021-09-08 PROCEDURE — 63600175 PHARM REV CODE 636 W HCPCS: Performed by: NURSE PRACTITIONER

## 2021-09-08 PROCEDURE — 93010 ELECTROCARDIOGRAM REPORT: CPT | Mod: ,,, | Performed by: INTERNAL MEDICINE

## 2021-09-08 PROCEDURE — 85025 COMPLETE CBC W/AUTO DIFF WBC: CPT | Performed by: NURSE PRACTITIONER

## 2021-09-08 PROCEDURE — 93010 EKG 12-LEAD: ICD-10-PCS | Mod: ,,, | Performed by: INTERNAL MEDICINE

## 2021-09-08 PROCEDURE — 25000003 PHARM REV CODE 250: Performed by: INTERNAL MEDICINE

## 2021-09-08 PROCEDURE — 82962 GLUCOSE BLOOD TEST: CPT

## 2021-09-08 RX ORDER — TAMSULOSIN HYDROCHLORIDE 0.4 MG/1
0.8 CAPSULE ORAL NIGHTLY
Qty: 180 CAPSULE | Refills: 3 | Status: ON HOLD | OUTPATIENT
Start: 2021-09-08 | End: 2021-10-04 | Stop reason: HOSPADM

## 2021-09-08 RX ORDER — FLUCONAZOLE 50 MG/1
200 TABLET ORAL DAILY
Status: DISCONTINUED | OUTPATIENT
Start: 2021-09-08 | End: 2021-09-10 | Stop reason: HOSPADM

## 2021-09-08 RX ORDER — FINASTERIDE 5 MG/1
5 TABLET, FILM COATED ORAL DAILY
Qty: 90 TABLET | Refills: 3 | Status: ON HOLD | OUTPATIENT
Start: 2021-09-08 | End: 2022-12-13 | Stop reason: HOSPADM

## 2021-09-08 RX ADMIN — ATORVASTATIN CALCIUM 40 MG: 40 TABLET, FILM COATED ORAL at 08:09

## 2021-09-08 RX ADMIN — PIPERACILLIN SODIUM AND TAZOBACTAM SODIUM 3.38 G: 3; .375 INJECTION, POWDER, LYOPHILIZED, FOR SOLUTION INTRAVENOUS at 05:09

## 2021-09-08 RX ADMIN — FOLIC ACID-PYRIDOXINE-CYANOCOBALAMIN TAB 2.5-25-2 MG 1 TABLET: 2.5-25-2 TAB at 08:09

## 2021-09-08 RX ADMIN — LACTULOSE 20 G: 20 SOLUTION ORAL at 08:09

## 2021-09-08 RX ADMIN — ASPIRIN 81 MG: 81 TABLET, DELAYED RELEASE ORAL at 08:09

## 2021-09-08 RX ADMIN — PIPERACILLIN SODIUM AND TAZOBACTAM SODIUM 3.38 G: 3; .375 INJECTION, POWDER, LYOPHILIZED, FOR SOLUTION INTRAVENOUS at 12:09

## 2021-09-08 RX ADMIN — INSULIN ASPART 3 UNITS: 100 INJECTION, SOLUTION INTRAVENOUS; SUBCUTANEOUS at 08:09

## 2021-09-08 RX ADMIN — MAGNESIUM OXIDE 800 MG: 400 TABLET ORAL at 05:09

## 2021-09-08 RX ADMIN — FLUCONAZOLE 200 MG: 100 TABLET ORAL at 12:09

## 2021-09-08 RX ADMIN — SODIUM CHLORIDE: 0.9 INJECTION, SOLUTION INTRAVENOUS at 05:09

## 2021-09-08 RX ADMIN — TAMSULOSIN HYDROCHLORIDE 0.8 MG: 0.4 CAPSULE ORAL at 08:09

## 2021-09-08 RX ADMIN — LIDOCAINE 5% 1 PATCH: 700 PATCH TOPICAL at 09:09

## 2021-09-08 RX ADMIN — MAGNESIUM SULFATE 2 G: 2 INJECTION INTRAVENOUS at 07:09

## 2021-09-08 RX ADMIN — CLOPIDOGREL BISULFATE 75 MG: 75 TABLET, FILM COATED ORAL at 08:09

## 2021-09-08 RX ADMIN — PIPERACILLIN SODIUM AND TAZOBACTAM SODIUM 3.38 G: 3; .375 INJECTION, POWDER, LYOPHILIZED, FOR SOLUTION INTRAVENOUS at 02:09

## 2021-09-08 RX ADMIN — MUPIROCIN: 20 OINTMENT TOPICAL at 08:09

## 2021-09-08 RX ADMIN — BUPROPION HYDROCHLORIDE 300 MG: 150 TABLET, FILM COATED, EXTENDED RELEASE ORAL at 08:09

## 2021-09-08 RX ADMIN — CHLORHEXIDINE GLUCONATE 15 ML: 1.2 RINSE ORAL at 08:09

## 2021-09-08 RX ADMIN — INSULIN ASPART 3 UNITS: 100 INJECTION, SOLUTION INTRAVENOUS; SUBCUTANEOUS at 04:09

## 2021-09-08 RX ADMIN — PANTOPRAZOLE SODIUM 40 MG: 40 TABLET, DELAYED RELEASE ORAL at 05:09

## 2021-09-09 ENCOUNTER — TELEPHONE (OUTPATIENT)
Dept: UROLOGY | Facility: CLINIC | Age: 66
End: 2021-09-09

## 2021-09-09 LAB
ALBUMIN SERPL BCP-MCNC: 3.2 G/DL (ref 3.5–5.2)
ALP SERPL-CCNC: 76 U/L (ref 55–135)
ALT SERPL W/O P-5'-P-CCNC: 18 U/L (ref 10–44)
ANION GAP SERPL CALC-SCNC: 9 MMOL/L (ref 8–16)
AST SERPL-CCNC: 14 U/L (ref 10–40)
BASOPHILS # BLD AUTO: 0.05 K/UL (ref 0–0.2)
BASOPHILS NFR BLD: 0.7 % (ref 0–1.9)
BILIRUB SERPL-MCNC: 0.8 MG/DL (ref 0.1–1)
BUN SERPL-MCNC: 12 MG/DL (ref 8–23)
CALCIUM SERPL-MCNC: 9.4 MG/DL (ref 8.7–10.5)
CHLORIDE SERPL-SCNC: 104 MMOL/L (ref 95–110)
CO2 SERPL-SCNC: 27 MMOL/L (ref 23–29)
CREAT SERPL-MCNC: 0.9 MG/DL (ref 0.5–1.4)
DIFFERENTIAL METHOD: ABNORMAL
EOSINOPHIL # BLD AUTO: 0.3 K/UL (ref 0–0.5)
EOSINOPHIL NFR BLD: 4.5 % (ref 0–8)
ERYTHROCYTE [DISTWIDTH] IN BLOOD BY AUTOMATED COUNT: 13 % (ref 11.5–14.5)
EST. GFR  (AFRICAN AMERICAN): >60 ML/MIN/1.73 M^2
EST. GFR  (NON AFRICAN AMERICAN): >60 ML/MIN/1.73 M^2
GLUCOSE SERPL-MCNC: 170 MG/DL (ref 70–110)
GLUCOSE SERPL-MCNC: 199 MG/DL (ref 70–110)
GLUCOSE SERPL-MCNC: 209 MG/DL (ref 70–110)
GLUCOSE SERPL-MCNC: 214 MG/DL (ref 70–110)
GLUCOSE SERPL-MCNC: 217 MG/DL (ref 70–110)
HCT VFR BLD AUTO: 39.2 % (ref 40–54)
HGB BLD-MCNC: 13 G/DL (ref 14–18)
IMM GRANULOCYTES # BLD AUTO: 0.03 K/UL (ref 0–0.04)
IMM GRANULOCYTES NFR BLD AUTO: 0.4 % (ref 0–0.5)
LYMPHOCYTES # BLD AUTO: 2 K/UL (ref 1–4.8)
LYMPHOCYTES NFR BLD: 28.1 % (ref 18–48)
MAGNESIUM SERPL-MCNC: 1.7 MG/DL (ref 1.6–2.6)
MCH RBC QN AUTO: 28.8 PG (ref 27–31)
MCHC RBC AUTO-ENTMCNC: 33.2 G/DL (ref 32–36)
MCV RBC AUTO: 87 FL (ref 82–98)
MONOCYTES # BLD AUTO: 0.6 K/UL (ref 0.3–1)
MONOCYTES NFR BLD: 8.4 % (ref 4–15)
NEUTROPHILS # BLD AUTO: 4.2 K/UL (ref 1.8–7.7)
NEUTROPHILS NFR BLD: 57.9 % (ref 38–73)
NRBC BLD-RTO: 0 /100 WBC
PHOSPHATE SERPL-MCNC: 2.7 MG/DL (ref 2.7–4.5)
PLATELET # BLD AUTO: 207 K/UL (ref 150–450)
PMV BLD AUTO: 9.5 FL (ref 9.2–12.9)
POTASSIUM SERPL-SCNC: 3.7 MMOL/L (ref 3.5–5.1)
PROT SERPL-MCNC: 6 G/DL (ref 6–8.4)
RBC # BLD AUTO: 4.52 M/UL (ref 4.6–6.2)
SODIUM SERPL-SCNC: 140 MMOL/L (ref 136–145)
WBC # BLD AUTO: 7.18 K/UL (ref 3.9–12.7)

## 2021-09-09 PROCEDURE — 97110 THERAPEUTIC EXERCISES: CPT

## 2021-09-09 PROCEDURE — 80053 COMPREHEN METABOLIC PANEL: CPT | Performed by: NURSE PRACTITIONER

## 2021-09-09 PROCEDURE — 25000003 PHARM REV CODE 250: Performed by: NURSE PRACTITIONER

## 2021-09-09 PROCEDURE — 83735 ASSAY OF MAGNESIUM: CPT | Performed by: NURSE PRACTITIONER

## 2021-09-09 PROCEDURE — 99232 SBSQ HOSP IP/OBS MODERATE 35: CPT | Mod: ,,, | Performed by: INTERNAL MEDICINE

## 2021-09-09 PROCEDURE — 97116 GAIT TRAINING THERAPY: CPT

## 2021-09-09 PROCEDURE — 85025 COMPLETE CBC W/AUTO DIFF WBC: CPT | Performed by: NURSE PRACTITIONER

## 2021-09-09 PROCEDURE — 63600175 PHARM REV CODE 636 W HCPCS: Performed by: INTERNAL MEDICINE

## 2021-09-09 PROCEDURE — 36415 COLL VENOUS BLD VENIPUNCTURE: CPT | Performed by: NURSE PRACTITIONER

## 2021-09-09 PROCEDURE — 99232 PR SUBSEQUENT HOSPITAL CARE,LEVL II: ICD-10-PCS | Mod: ,,, | Performed by: INTERNAL MEDICINE

## 2021-09-09 PROCEDURE — 97535 SELF CARE MNGMENT TRAINING: CPT

## 2021-09-09 PROCEDURE — 84100 ASSAY OF PHOSPHORUS: CPT | Performed by: NURSE PRACTITIONER

## 2021-09-09 PROCEDURE — 25000003 PHARM REV CODE 250

## 2021-09-09 PROCEDURE — 21400001 HC TELEMETRY ROOM

## 2021-09-09 PROCEDURE — 25000003 PHARM REV CODE 250: Performed by: INTERNAL MEDICINE

## 2021-09-09 RX ADMIN — MUPIROCIN: 20 OINTMENT TOPICAL at 09:09

## 2021-09-09 RX ADMIN — TAMSULOSIN HYDROCHLORIDE 0.8 MG: 0.4 CAPSULE ORAL at 09:09

## 2021-09-09 RX ADMIN — BUPROPION HYDROCHLORIDE 300 MG: 150 TABLET, FILM COATED, EXTENDED RELEASE ORAL at 09:09

## 2021-09-09 RX ADMIN — CLOPIDOGREL BISULFATE 75 MG: 75 TABLET, FILM COATED ORAL at 09:09

## 2021-09-09 RX ADMIN — CHLORHEXIDINE GLUCONATE 15 ML: 1.2 RINSE ORAL at 09:09

## 2021-09-09 RX ADMIN — PIPERACILLIN SODIUM AND TAZOBACTAM SODIUM 3.38 G: 3; .375 INJECTION, POWDER, LYOPHILIZED, FOR SOLUTION INTRAVENOUS at 01:09

## 2021-09-09 RX ADMIN — PANTOPRAZOLE SODIUM 40 MG: 40 TABLET, DELAYED RELEASE ORAL at 06:09

## 2021-09-09 RX ADMIN — ATORVASTATIN CALCIUM 40 MG: 40 TABLET, FILM COATED ORAL at 09:09

## 2021-09-09 RX ADMIN — PIPERACILLIN SODIUM AND TAZOBACTAM SODIUM 3.38 G: 3; .375 INJECTION, POWDER, LYOPHILIZED, FOR SOLUTION INTRAVENOUS at 10:09

## 2021-09-09 RX ADMIN — PIPERACILLIN SODIUM AND TAZOBACTAM SODIUM 3.38 G: 3; .375 INJECTION, POWDER, LYOPHILIZED, FOR SOLUTION INTRAVENOUS at 05:09

## 2021-09-09 RX ADMIN — FLUCONAZOLE 200 MG: 100 TABLET ORAL at 09:09

## 2021-09-09 RX ADMIN — LACTULOSE 20 G: 20 SOLUTION ORAL at 09:09

## 2021-09-09 RX ADMIN — LIDOCAINE 5% 1 PATCH: 700 PATCH TOPICAL at 09:09

## 2021-09-09 RX ADMIN — ASPIRIN 81 MG: 81 TABLET, DELAYED RELEASE ORAL at 09:09

## 2021-09-09 RX ADMIN — FOLIC ACID-PYRIDOXINE-CYANOCOBALAMIN TAB 2.5-25-2 MG 1 TABLET: 2.5-25-2 TAB at 09:09

## 2021-09-10 VITALS
HEIGHT: 77 IN | SYSTOLIC BLOOD PRESSURE: 127 MMHG | OXYGEN SATURATION: 94 % | TEMPERATURE: 98 F | RESPIRATION RATE: 20 BRPM | BODY MASS INDEX: 25.92 KG/M2 | HEART RATE: 65 BPM | WEIGHT: 219.56 LBS | DIASTOLIC BLOOD PRESSURE: 72 MMHG

## 2021-09-10 PROBLEM — Z97.8 CHRONIC INDWELLING FOLEY CATHETER: Chronic | Status: ACTIVE | Noted: 2021-09-10

## 2021-09-10 PROBLEM — N17.9 AKI (ACUTE KIDNEY INJURY): Status: RESOLVED | Noted: 2021-09-10 | Resolved: 2021-09-10

## 2021-09-10 PROBLEM — N39.0 UTI (URINARY TRACT INFECTION): Status: RESOLVED | Noted: 2021-09-07 | Resolved: 2021-09-10

## 2021-09-10 PROBLEM — N17.9 AKI (ACUTE KIDNEY INJURY): Status: ACTIVE | Noted: 2021-09-10

## 2021-09-10 PROBLEM — N40.0 BPH (BENIGN PROSTATIC HYPERPLASIA): Chronic | Status: ACTIVE | Noted: 2021-09-10

## 2021-09-10 PROBLEM — R65.21 SEPTIC SHOCK: Status: RESOLVED | Noted: 2021-09-06 | Resolved: 2021-09-10

## 2021-09-10 PROBLEM — G93.41 ENCEPHALOPATHY, METABOLIC: Status: RESOLVED | Noted: 2021-09-07 | Resolved: 2021-09-10

## 2021-09-10 PROBLEM — R41.3 MEMORY DEFICIT: Status: ACTIVE | Noted: 2021-09-10

## 2021-09-10 PROBLEM — A41.9 SEPTIC SHOCK: Status: RESOLVED | Noted: 2021-09-06 | Resolved: 2021-09-10

## 2021-09-10 PROBLEM — G89.29 OTHER CHRONIC PAIN: Chronic | Status: ACTIVE | Noted: 2021-09-10

## 2021-09-10 LAB
ALBUMIN SERPL BCP-MCNC: 3.4 G/DL (ref 3.5–5.2)
ALP SERPL-CCNC: 76 U/L (ref 55–135)
ALT SERPL W/O P-5'-P-CCNC: 17 U/L (ref 10–44)
ANION GAP SERPL CALC-SCNC: 8 MMOL/L (ref 8–16)
AST SERPL-CCNC: 12 U/L (ref 10–40)
BASOPHILS # BLD AUTO: 0.06 K/UL (ref 0–0.2)
BASOPHILS NFR BLD: 0.8 % (ref 0–1.9)
BILIRUB SERPL-MCNC: 0.7 MG/DL (ref 0.1–1)
BUN SERPL-MCNC: 14 MG/DL (ref 8–23)
CALCIUM SERPL-MCNC: 9.6 MG/DL (ref 8.7–10.5)
CHLORIDE SERPL-SCNC: 105 MMOL/L (ref 95–110)
CO2 SERPL-SCNC: 27 MMOL/L (ref 23–29)
CREAT SERPL-MCNC: 0.9 MG/DL (ref 0.5–1.4)
DIFFERENTIAL METHOD: ABNORMAL
EOSINOPHIL # BLD AUTO: 0.3 K/UL (ref 0–0.5)
EOSINOPHIL NFR BLD: 3.9 % (ref 0–8)
ERYTHROCYTE [DISTWIDTH] IN BLOOD BY AUTOMATED COUNT: 12.9 % (ref 11.5–14.5)
EST. GFR  (AFRICAN AMERICAN): >60 ML/MIN/1.73 M^2
EST. GFR  (NON AFRICAN AMERICAN): >60 ML/MIN/1.73 M^2
GLUCOSE SERPL-MCNC: 197 MG/DL (ref 70–110)
GLUCOSE SERPL-MCNC: 214 MG/DL (ref 70–110)
GLUCOSE SERPL-MCNC: 223 MG/DL (ref 70–110)
GLUCOSE SERPL-MCNC: 230 MG/DL (ref 70–110)
HCT VFR BLD AUTO: 37.9 % (ref 40–54)
HGB BLD-MCNC: 12.9 G/DL (ref 14–18)
IMM GRANULOCYTES # BLD AUTO: 0.02 K/UL (ref 0–0.04)
IMM GRANULOCYTES NFR BLD AUTO: 0.3 % (ref 0–0.5)
LYMPHOCYTES # BLD AUTO: 1.8 K/UL (ref 1–4.8)
LYMPHOCYTES NFR BLD: 24.4 % (ref 18–48)
MAGNESIUM SERPL-MCNC: 1.6 MG/DL (ref 1.6–2.6)
MCH RBC QN AUTO: 29.1 PG (ref 27–31)
MCHC RBC AUTO-ENTMCNC: 34 G/DL (ref 32–36)
MCV RBC AUTO: 86 FL (ref 82–98)
MONOCYTES # BLD AUTO: 0.6 K/UL (ref 0.3–1)
MONOCYTES NFR BLD: 8.8 % (ref 4–15)
NEUTROPHILS # BLD AUTO: 4.4 K/UL (ref 1.8–7.7)
NEUTROPHILS NFR BLD: 61.8 % (ref 38–73)
NRBC BLD-RTO: 0 /100 WBC
PHOSPHATE SERPL-MCNC: 2.4 MG/DL (ref 2.7–4.5)
PLATELET # BLD AUTO: 217 K/UL (ref 150–450)
PMV BLD AUTO: 9.5 FL (ref 9.2–12.9)
POTASSIUM SERPL-SCNC: 3.6 MMOL/L (ref 3.5–5.1)
PROT SERPL-MCNC: 6.2 G/DL (ref 6–8.4)
RBC # BLD AUTO: 4.43 M/UL (ref 4.6–6.2)
SARS-COV-2 RDRP RESP QL NAA+PROBE: NEGATIVE
SODIUM SERPL-SCNC: 140 MMOL/L (ref 136–145)
WBC # BLD AUTO: 7.17 K/UL (ref 3.9–12.7)

## 2021-09-10 PROCEDURE — 84100 ASSAY OF PHOSPHORUS: CPT | Performed by: NURSE PRACTITIONER

## 2021-09-10 PROCEDURE — 25000003 PHARM REV CODE 250: Performed by: INTERNAL MEDICINE

## 2021-09-10 PROCEDURE — 85025 COMPLETE CBC W/AUTO DIFF WBC: CPT | Performed by: NURSE PRACTITIONER

## 2021-09-10 PROCEDURE — 80053 COMPREHEN METABOLIC PANEL: CPT | Performed by: NURSE PRACTITIONER

## 2021-09-10 PROCEDURE — 83735 ASSAY OF MAGNESIUM: CPT | Performed by: NURSE PRACTITIONER

## 2021-09-10 PROCEDURE — 86580 TB INTRADERMAL TEST: CPT | Performed by: INTERNAL MEDICINE

## 2021-09-10 PROCEDURE — 36415 COLL VENOUS BLD VENIPUNCTURE: CPT | Performed by: NURSE PRACTITIONER

## 2021-09-10 PROCEDURE — 30200315 PPD INTRADERMAL TEST REV CODE 302: Performed by: INTERNAL MEDICINE

## 2021-09-10 PROCEDURE — 63600175 PHARM REV CODE 636 W HCPCS: Performed by: INTERNAL MEDICINE

## 2021-09-10 PROCEDURE — U0002 COVID-19 LAB TEST NON-CDC: HCPCS | Performed by: INTERNAL MEDICINE

## 2021-09-10 PROCEDURE — 25000003 PHARM REV CODE 250

## 2021-09-10 PROCEDURE — 25000003 PHARM REV CODE 250: Performed by: NURSE PRACTITIONER

## 2021-09-10 RX ORDER — LISINOPRIL AND HYDROCHLOROTHIAZIDE 12.5; 2 MG/1; MG/1
1 TABLET ORAL 2 TIMES DAILY
Qty: 60 TABLET | Refills: 0 | Status: SHIPPED | OUTPATIENT
Start: 2021-09-10 | End: 2021-09-10 | Stop reason: SDUPTHER

## 2021-09-10 RX ORDER — LISINOPRIL AND HYDROCHLOROTHIAZIDE 12.5; 2 MG/1; MG/1
1 TABLET ORAL 2 TIMES DAILY
Qty: 60 TABLET | Refills: 0 | Status: ON HOLD | OUTPATIENT
Start: 2021-09-10 | End: 2021-10-04 | Stop reason: HOSPADM

## 2021-09-10 RX ORDER — TRAMADOL HYDROCHLORIDE 50 MG/1
50 TABLET ORAL EVERY 8 HOURS PRN
Qty: 15 TABLET | Refills: 0 | Status: SHIPPED | OUTPATIENT
Start: 2021-09-10 | End: 2021-09-15

## 2021-09-10 RX ORDER — FLUCONAZOLE 200 MG/1
200 TABLET ORAL DAILY
Qty: 7 TABLET | Refills: 0 | Status: SHIPPED | OUTPATIENT
Start: 2021-09-11 | End: 2021-09-18

## 2021-09-10 RX ORDER — AMOXICILLIN AND CLAVULANATE POTASSIUM 875; 125 MG/1; MG/1
1 TABLET, FILM COATED ORAL 2 TIMES DAILY
Qty: 14 TABLET | Refills: 0 | Status: SHIPPED | OUTPATIENT
Start: 2021-09-10 | End: 2021-09-17

## 2021-09-10 RX ORDER — LACTULOSE 10 G/15ML
20 SOLUTION ORAL; RECTAL DAILY
Qty: 900 ML | Refills: 0 | Status: ON HOLD | OUTPATIENT
Start: 2021-09-10 | End: 2021-10-04 | Stop reason: HOSPADM

## 2021-09-10 RX ADMIN — FLUCONAZOLE 200 MG: 100 TABLET ORAL at 09:09

## 2021-09-10 RX ADMIN — SODIUM CHLORIDE: 0.9 INJECTION, SOLUTION INTRAVENOUS at 02:09

## 2021-09-10 RX ADMIN — MAGNESIUM OXIDE 800 MG: 400 TABLET ORAL at 06:09

## 2021-09-10 RX ADMIN — MECLIZINE HYDROCHLORIDE 25 MG: 12.5 TABLET ORAL at 06:09

## 2021-09-10 RX ADMIN — PANTOPRAZOLE SODIUM 40 MG: 40 TABLET, DELAYED RELEASE ORAL at 06:09

## 2021-09-10 RX ADMIN — CLOPIDOGREL BISULFATE 75 MG: 75 TABLET, FILM COATED ORAL at 09:09

## 2021-09-10 RX ADMIN — CHLORHEXIDINE GLUCONATE 15 ML: 1.2 RINSE ORAL at 09:09

## 2021-09-10 RX ADMIN — POTASSIUM CHLORIDE 20 MEQ: 20 TABLET, EXTENDED RELEASE ORAL at 06:09

## 2021-09-10 RX ADMIN — TUBERCULIN PURIFIED PROTEIN DERIVATIVE 5 UNITS: 5 INJECTION, SOLUTION INTRADERMAL at 04:09

## 2021-09-10 RX ADMIN — ATORVASTATIN CALCIUM 40 MG: 40 TABLET, FILM COATED ORAL at 09:09

## 2021-09-10 RX ADMIN — BUPROPION HYDROCHLORIDE 300 MG: 150 TABLET, FILM COATED, EXTENDED RELEASE ORAL at 09:09

## 2021-09-10 RX ADMIN — FOLIC ACID-PYRIDOXINE-CYANOCOBALAMIN TAB 2.5-25-2 MG 1 TABLET: 2.5-25-2 TAB at 01:09

## 2021-09-10 RX ADMIN — PIPERACILLIN SODIUM AND TAZOBACTAM SODIUM 3.38 G: 3; .375 INJECTION, POWDER, LYOPHILIZED, FOR SOLUTION INTRAVENOUS at 02:09

## 2021-09-10 RX ADMIN — PIPERACILLIN SODIUM AND TAZOBACTAM SODIUM 3.38 G: 3; .375 INJECTION, POWDER, LYOPHILIZED, FOR SOLUTION INTRAVENOUS at 10:09

## 2021-09-10 RX ADMIN — LIDOCAINE 5% 1 PATCH: 700 PATCH TOPICAL at 09:09

## 2021-09-10 RX ADMIN — TAMSULOSIN HYDROCHLORIDE 0.8 MG: 0.4 CAPSULE ORAL at 09:09

## 2021-09-10 RX ADMIN — ASPIRIN 81 MG: 81 TABLET, DELAYED RELEASE ORAL at 09:09

## 2021-09-10 RX ADMIN — MUPIROCIN: 20 OINTMENT TOPICAL at 09:09

## 2021-09-12 LAB
BACTERIA BLD CULT: NORMAL
BACTERIA BLD CULT: NORMAL

## 2021-09-14 ENCOUNTER — PATIENT OUTREACH (OUTPATIENT)
Dept: ADMINISTRATIVE | Facility: OTHER | Age: 66
End: 2021-09-14

## 2021-09-14 DIAGNOSIS — E11.65 TYPE 2 DIABETES MELLITUS WITH HYPERGLYCEMIA, WITHOUT LONG-TERM CURRENT USE OF INSULIN: Primary | ICD-10-CM

## 2021-09-16 ENCOUNTER — DOCUMENT SCAN (OUTPATIENT)
Dept: HOME HEALTH SERVICES | Facility: HOSPITAL | Age: 66
End: 2021-09-16
Payer: MEDICARE

## 2021-09-21 ENCOUNTER — DOCUMENT SCAN (OUTPATIENT)
Dept: HOME HEALTH SERVICES | Facility: HOSPITAL | Age: 66
End: 2021-09-21
Payer: MEDICARE

## 2021-09-30 ENCOUNTER — HOSPITAL ENCOUNTER (OUTPATIENT)
Facility: HOSPITAL | Age: 66
Discharge: HOME HEALTH | End: 2021-10-05
Attending: EMERGENCY MEDICINE | Admitting: INTERNAL MEDICINE
Payer: MEDICARE

## 2021-09-30 DIAGNOSIS — N30.00 ACUTE CYSTITIS WITHOUT HEMATURIA: ICD-10-CM

## 2021-09-30 DIAGNOSIS — E86.0 DEHYDRATION: Primary | ICD-10-CM

## 2021-09-30 DIAGNOSIS — I63.411 STROKE DUE TO EMBOLISM OF RIGHT MIDDLE CEREBRAL ARTERY: ICD-10-CM

## 2021-09-30 LAB
ALBUMIN SERPL BCP-MCNC: 3.8 G/DL (ref 3.5–5.2)
ALP SERPL-CCNC: 83 U/L (ref 55–135)
ALT SERPL W/O P-5'-P-CCNC: 38 U/L (ref 10–44)
ANION GAP SERPL CALC-SCNC: 12 MMOL/L (ref 8–16)
AST SERPL-CCNC: 19 U/L (ref 10–40)
BACTERIA #/AREA URNS HPF: ABNORMAL /HPF
BASOPHILS # BLD AUTO: 0.07 K/UL (ref 0–0.2)
BASOPHILS NFR BLD: 0.8 % (ref 0–1.9)
BILIRUB SERPL-MCNC: 0.6 MG/DL (ref 0.1–1)
BILIRUB UR QL STRIP: NEGATIVE
BUN SERPL-MCNC: 15 MG/DL (ref 8–23)
CALCIUM SERPL-MCNC: 10.5 MG/DL (ref 8.7–10.5)
CHLORIDE SERPL-SCNC: 103 MMOL/L (ref 95–110)
CLARITY UR: ABNORMAL
CO2 SERPL-SCNC: 24 MMOL/L (ref 23–29)
COLOR UR: YELLOW
CREAT SERPL-MCNC: 0.9 MG/DL (ref 0.5–1.4)
DIFFERENTIAL METHOD: ABNORMAL
EOSINOPHIL # BLD AUTO: 0.2 K/UL (ref 0–0.5)
EOSINOPHIL NFR BLD: 2.5 % (ref 0–8)
ERYTHROCYTE [DISTWIDTH] IN BLOOD BY AUTOMATED COUNT: 13.8 % (ref 11.5–14.5)
EST. GFR  (AFRICAN AMERICAN): >60 ML/MIN/1.73 M^2
EST. GFR  (NON AFRICAN AMERICAN): >60 ML/MIN/1.73 M^2
ESTIMATED AVG GLUCOSE: 206 MG/DL (ref 68–131)
GLUCOSE SERPL-MCNC: 235 MG/DL (ref 70–110)
GLUCOSE UR QL STRIP: ABNORMAL
HBA1C MFR BLD: 8.8 % (ref 4–5.6)
HCT VFR BLD AUTO: 44.2 % (ref 40–54)
HGB BLD-MCNC: 14.2 G/DL (ref 14–18)
HGB UR QL STRIP: ABNORMAL
IMM GRANULOCYTES # BLD AUTO: 0.05 K/UL (ref 0–0.04)
IMM GRANULOCYTES NFR BLD AUTO: 0.6 % (ref 0–0.5)
KETONES UR QL STRIP: NEGATIVE
LACTATE SERPL-SCNC: 1.7 MMOL/L (ref 0.5–2.2)
LEUKOCYTE ESTERASE UR QL STRIP: ABNORMAL
LYMPHOCYTES # BLD AUTO: 1.8 K/UL (ref 1–4.8)
LYMPHOCYTES NFR BLD: 21.9 % (ref 18–48)
MCH RBC QN AUTO: 28.5 PG (ref 27–31)
MCHC RBC AUTO-ENTMCNC: 32.1 G/DL (ref 32–36)
MCV RBC AUTO: 89 FL (ref 82–98)
MICROSCOPIC COMMENT: ABNORMAL
MONOCYTES # BLD AUTO: 0.5 K/UL (ref 0.3–1)
MONOCYTES NFR BLD: 6.5 % (ref 4–15)
NEUTROPHILS # BLD AUTO: 5.6 K/UL (ref 1.8–7.7)
NEUTROPHILS NFR BLD: 67.7 % (ref 38–73)
NITRITE UR QL STRIP: POSITIVE
NRBC BLD-RTO: 0 /100 WBC
PH UR STRIP: 8 [PH] (ref 5–8)
PLATELET # BLD AUTO: 258 K/UL (ref 150–450)
PMV BLD AUTO: 8.9 FL (ref 9.2–12.9)
POCT GLUCOSE: 130 MG/DL (ref 70–110)
POCT GLUCOSE: 167 MG/DL (ref 70–110)
POTASSIUM SERPL-SCNC: 4.6 MMOL/L (ref 3.5–5.1)
PROT SERPL-MCNC: 7.2 G/DL (ref 6–8.4)
PROT UR QL STRIP: ABNORMAL
RBC # BLD AUTO: 4.99 M/UL (ref 4.6–6.2)
RBC #/AREA URNS HPF: 2 /HPF (ref 0–4)
SARS-COV-2 RDRP RESP QL NAA+PROBE: NEGATIVE
SODIUM SERPL-SCNC: 139 MMOL/L (ref 136–145)
SP GR UR STRIP: 1.01 (ref 1–1.03)
URN SPEC COLLECT METH UR: ABNORMAL
UROBILINOGEN UR STRIP-ACNC: ABNORMAL EU/DL
WBC # BLD AUTO: 8.34 K/UL (ref 3.9–12.7)
WBC #/AREA URNS HPF: 12 /HPF (ref 0–5)
YEAST URNS QL MICRO: ABNORMAL

## 2021-09-30 PROCEDURE — 99285 EMERGENCY DEPT VISIT HI MDM: CPT | Mod: 25

## 2021-09-30 PROCEDURE — 36415 COLL VENOUS BLD VENIPUNCTURE: CPT | Performed by: INTERNAL MEDICINE

## 2021-09-30 PROCEDURE — 83605 ASSAY OF LACTIC ACID: CPT | Performed by: EMERGENCY MEDICINE

## 2021-09-30 PROCEDURE — 63600175 PHARM REV CODE 636 W HCPCS: Performed by: INTERNAL MEDICINE

## 2021-09-30 PROCEDURE — 83036 HEMOGLOBIN GLYCOSYLATED A1C: CPT | Performed by: INTERNAL MEDICINE

## 2021-09-30 PROCEDURE — 87186 SC STD MICRODIL/AGAR DIL: CPT | Performed by: EMERGENCY MEDICINE

## 2021-09-30 PROCEDURE — 25000003 PHARM REV CODE 250: Performed by: EMERGENCY MEDICINE

## 2021-09-30 PROCEDURE — 36415 COLL VENOUS BLD VENIPUNCTURE: CPT | Performed by: EMERGENCY MEDICINE

## 2021-09-30 PROCEDURE — G0378 HOSPITAL OBSERVATION PER HR: HCPCS

## 2021-09-30 PROCEDURE — 96372 THER/PROPH/DIAG INJ SC/IM: CPT | Mod: 59

## 2021-09-30 PROCEDURE — 96361 HYDRATE IV INFUSION ADD-ON: CPT

## 2021-09-30 PROCEDURE — 25000003 PHARM REV CODE 250: Performed by: INTERNAL MEDICINE

## 2021-09-30 PROCEDURE — U0002 COVID-19 LAB TEST NON-CDC: HCPCS | Performed by: EMERGENCY MEDICINE

## 2021-09-30 PROCEDURE — 81000 URINALYSIS NONAUTO W/SCOPE: CPT | Performed by: EMERGENCY MEDICINE

## 2021-09-30 PROCEDURE — 85025 COMPLETE CBC W/AUTO DIFF WBC: CPT | Performed by: EMERGENCY MEDICINE

## 2021-09-30 PROCEDURE — 80053 COMPREHEN METABOLIC PANEL: CPT | Performed by: EMERGENCY MEDICINE

## 2021-09-30 PROCEDURE — 96374 THER/PROPH/DIAG INJ IV PUSH: CPT | Mod: 59

## 2021-09-30 PROCEDURE — 87086 URINE CULTURE/COLONY COUNT: CPT | Performed by: EMERGENCY MEDICINE

## 2021-09-30 PROCEDURE — 87077 CULTURE AEROBIC IDENTIFY: CPT | Performed by: EMERGENCY MEDICINE

## 2021-09-30 PROCEDURE — 87088 URINE BACTERIA CULTURE: CPT | Performed by: EMERGENCY MEDICINE

## 2021-09-30 PROCEDURE — 96360 HYDRATION IV INFUSION INIT: CPT

## 2021-09-30 RX ORDER — TETRAHYDROZOLINE HCL 0.05 %
2 DROPS OPHTHALMIC (EYE) 4 TIMES DAILY PRN
Status: DISCONTINUED | OUTPATIENT
Start: 2021-09-30 | End: 2021-10-05 | Stop reason: HOSPADM

## 2021-09-30 RX ORDER — INSULIN ASPART 100 [IU]/ML
20 INJECTION, SUSPENSION SUBCUTANEOUS
Status: DISCONTINUED | OUTPATIENT
Start: 2021-09-30 | End: 2021-10-04

## 2021-09-30 RX ORDER — IBUPROFEN 200 MG
24 TABLET ORAL
Status: DISCONTINUED | OUTPATIENT
Start: 2021-09-30 | End: 2021-10-05 | Stop reason: HOSPADM

## 2021-09-30 RX ORDER — FINASTERIDE 5 MG/1
5 TABLET, FILM COATED ORAL DAILY
Status: DISCONTINUED | OUTPATIENT
Start: 2021-10-01 | End: 2021-10-05 | Stop reason: HOSPADM

## 2021-09-30 RX ORDER — SODIUM CHLORIDE 0.9 % (FLUSH) 0.9 %
10 SYRINGE (ML) INJECTION
Status: DISCONTINUED | OUTPATIENT
Start: 2021-09-30 | End: 2021-10-05 | Stop reason: HOSPADM

## 2021-09-30 RX ORDER — INSULIN ASPART 100 [IU]/ML
0-5 INJECTION, SOLUTION INTRAVENOUS; SUBCUTANEOUS
Status: DISCONTINUED | OUTPATIENT
Start: 2021-09-30 | End: 2021-10-05 | Stop reason: HOSPADM

## 2021-09-30 RX ORDER — GLUCAGON 1 MG
1 KIT INJECTION
Status: DISCONTINUED | OUTPATIENT
Start: 2021-09-30 | End: 2021-10-05 | Stop reason: HOSPADM

## 2021-09-30 RX ORDER — BUPROPION HYDROCHLORIDE 150 MG/1
300 TABLET ORAL DAILY
Status: DISCONTINUED | OUTPATIENT
Start: 2021-10-01 | End: 2021-10-05 | Stop reason: HOSPADM

## 2021-09-30 RX ORDER — SODIUM CHLORIDE 9 MG/ML
INJECTION, SOLUTION INTRAVENOUS CONTINUOUS
Status: DISCONTINUED | OUTPATIENT
Start: 2021-09-30 | End: 2021-10-02

## 2021-09-30 RX ORDER — CLOPIDOGREL BISULFATE 75 MG/1
75 TABLET ORAL DAILY
Status: DISCONTINUED | OUTPATIENT
Start: 2021-10-01 | End: 2021-10-05 | Stop reason: HOSPADM

## 2021-09-30 RX ORDER — ACETAMINOPHEN 325 MG/1
650 TABLET ORAL EVERY 4 HOURS PRN
Status: DISCONTINUED | OUTPATIENT
Start: 2021-09-30 | End: 2021-10-05 | Stop reason: HOSPADM

## 2021-09-30 RX ORDER — TAMSULOSIN HYDROCHLORIDE 0.4 MG/1
0.8 CAPSULE ORAL NIGHTLY
Status: DISCONTINUED | OUTPATIENT
Start: 2021-09-30 | End: 2021-10-01

## 2021-09-30 RX ORDER — IBUPROFEN 200 MG
16 TABLET ORAL
Status: DISCONTINUED | OUTPATIENT
Start: 2021-09-30 | End: 2021-10-05 | Stop reason: HOSPADM

## 2021-09-30 RX ORDER — ASPIRIN 81 MG/1
81 TABLET ORAL DAILY
Status: DISCONTINUED | OUTPATIENT
Start: 2021-10-01 | End: 2021-10-05 | Stop reason: HOSPADM

## 2021-09-30 RX ORDER — SODIUM CHLORIDE 9 MG/ML
INJECTION, SOLUTION INTRAVENOUS
Status: COMPLETED | OUTPATIENT
Start: 2021-09-30 | End: 2021-09-30

## 2021-09-30 RX ORDER — QUETIAPINE FUMARATE 25 MG/1
25 TABLET, FILM COATED ORAL NIGHTLY
Status: DISCONTINUED | OUTPATIENT
Start: 2021-09-30 | End: 2021-10-05 | Stop reason: HOSPADM

## 2021-09-30 RX ORDER — GABAPENTIN 300 MG/1
600 CAPSULE ORAL NIGHTLY
Status: DISCONTINUED | OUTPATIENT
Start: 2021-09-30 | End: 2021-10-02

## 2021-09-30 RX ORDER — PANTOPRAZOLE SODIUM 40 MG/1
40 TABLET, DELAYED RELEASE ORAL DAILY
Status: DISCONTINUED | OUTPATIENT
Start: 2021-10-01 | End: 2021-10-05 | Stop reason: HOSPADM

## 2021-09-30 RX ORDER — ATORVASTATIN CALCIUM 40 MG/1
40 TABLET, FILM COATED ORAL DAILY
Status: DISCONTINUED | OUTPATIENT
Start: 2021-10-01 | End: 2021-10-05 | Stop reason: HOSPADM

## 2021-09-30 RX ADMIN — QUETIAPINE FUMARATE 25 MG: 25 TABLET ORAL at 09:09

## 2021-09-30 RX ADMIN — SODIUM CHLORIDE: 0.9 INJECTION, SOLUTION INTRAVENOUS at 06:09

## 2021-09-30 RX ADMIN — TAMSULOSIN HYDROCHLORIDE 0.8 MG: 0.4 CAPSULE ORAL at 09:09

## 2021-09-30 RX ADMIN — INSULIN ASPART 20 UNITS: 100 INJECTION, SUSPENSION SUBCUTANEOUS at 07:09

## 2021-09-30 RX ADMIN — SODIUM CHLORIDE: 0.9 INJECTION, SOLUTION INTRAVENOUS at 01:09

## 2021-09-30 RX ADMIN — CEFTRIAXONE 1 G: 1 INJECTION, SOLUTION INTRAVENOUS at 06:09

## 2021-09-30 RX ADMIN — GABAPENTIN 600 MG: 300 CAPSULE ORAL at 09:09

## 2021-10-01 LAB
ANION GAP SERPL CALC-SCNC: 8 MMOL/L (ref 8–16)
BASOPHILS # BLD AUTO: 0.07 K/UL (ref 0–0.2)
BASOPHILS NFR BLD: 0.9 % (ref 0–1.9)
BUN SERPL-MCNC: 14 MG/DL (ref 8–23)
CALCIUM SERPL-MCNC: 9.5 MG/DL (ref 8.7–10.5)
CHLORIDE SERPL-SCNC: 108 MMOL/L (ref 95–110)
CO2 SERPL-SCNC: 24 MMOL/L (ref 23–29)
CREAT SERPL-MCNC: 0.8 MG/DL (ref 0.5–1.4)
DIFFERENTIAL METHOD: ABNORMAL
EOSINOPHIL # BLD AUTO: 0.3 K/UL (ref 0–0.5)
EOSINOPHIL NFR BLD: 3.2 % (ref 0–8)
ERYTHROCYTE [DISTWIDTH] IN BLOOD BY AUTOMATED COUNT: 13.7 % (ref 11.5–14.5)
EST. GFR  (AFRICAN AMERICAN): >60 ML/MIN/1.73 M^2
EST. GFR  (NON AFRICAN AMERICAN): >60 ML/MIN/1.73 M^2
GLUCOSE SERPL-MCNC: 176 MG/DL (ref 70–110)
HCT VFR BLD AUTO: 40.2 % (ref 40–54)
HGB BLD-MCNC: 12.4 G/DL (ref 14–18)
IMM GRANULOCYTES # BLD AUTO: 0.02 K/UL (ref 0–0.04)
IMM GRANULOCYTES NFR BLD AUTO: 0.2 % (ref 0–0.5)
LYMPHOCYTES # BLD AUTO: 3 K/UL (ref 1–4.8)
LYMPHOCYTES NFR BLD: 37.1 % (ref 18–48)
MCH RBC QN AUTO: 27.8 PG (ref 27–31)
MCHC RBC AUTO-ENTMCNC: 30.8 G/DL (ref 32–36)
MCV RBC AUTO: 90 FL (ref 82–98)
MONOCYTES # BLD AUTO: 0.6 K/UL (ref 0.3–1)
MONOCYTES NFR BLD: 7 % (ref 4–15)
NEUTROPHILS # BLD AUTO: 4.2 K/UL (ref 1.8–7.7)
NEUTROPHILS NFR BLD: 51.6 % (ref 38–73)
NRBC BLD-RTO: 0 /100 WBC
PLATELET # BLD AUTO: 223 K/UL (ref 150–450)
PMV BLD AUTO: 8.7 FL (ref 9.2–12.9)
POCT GLUCOSE: 154 MG/DL (ref 70–110)
POCT GLUCOSE: 191 MG/DL (ref 70–110)
POCT GLUCOSE: 254 MG/DL (ref 70–110)
POTASSIUM SERPL-SCNC: 3.9 MMOL/L (ref 3.5–5.1)
RBC # BLD AUTO: 4.46 M/UL (ref 4.6–6.2)
SODIUM SERPL-SCNC: 140 MMOL/L (ref 136–145)
WBC # BLD AUTO: 8.14 K/UL (ref 3.9–12.7)

## 2021-10-01 PROCEDURE — 96361 HYDRATE IV INFUSION ADD-ON: CPT

## 2021-10-01 PROCEDURE — 92610 EVALUATE SWALLOWING FUNCTION: CPT

## 2021-10-01 PROCEDURE — G0378 HOSPITAL OBSERVATION PER HR: HCPCS

## 2021-10-01 PROCEDURE — 97116 GAIT TRAINING THERAPY: CPT

## 2021-10-01 PROCEDURE — 96376 TX/PRO/DX INJ SAME DRUG ADON: CPT

## 2021-10-01 PROCEDURE — 80048 BASIC METABOLIC PNL TOTAL CA: CPT | Performed by: INTERNAL MEDICINE

## 2021-10-01 PROCEDURE — 36415 COLL VENOUS BLD VENIPUNCTURE: CPT | Performed by: INTERNAL MEDICINE

## 2021-10-01 PROCEDURE — 25000003 PHARM REV CODE 250: Performed by: INTERNAL MEDICINE

## 2021-10-01 PROCEDURE — 97161 PT EVAL LOW COMPLEX 20 MIN: CPT

## 2021-10-01 PROCEDURE — 96372 THER/PROPH/DIAG INJ SC/IM: CPT

## 2021-10-01 PROCEDURE — 63600175 PHARM REV CODE 636 W HCPCS: Performed by: INTERNAL MEDICINE

## 2021-10-01 PROCEDURE — 85025 COMPLETE CBC W/AUTO DIFF WBC: CPT | Performed by: INTERNAL MEDICINE

## 2021-10-01 RX ORDER — ENOXAPARIN SODIUM 100 MG/ML
40 INJECTION SUBCUTANEOUS EVERY 24 HOURS
Status: DISCONTINUED | OUTPATIENT
Start: 2021-10-01 | End: 2021-10-05 | Stop reason: HOSPADM

## 2021-10-01 RX ADMIN — QUETIAPINE FUMARATE 25 MG: 25 TABLET ORAL at 08:10

## 2021-10-01 RX ADMIN — SODIUM CHLORIDE: 0.9 INJECTION, SOLUTION INTRAVENOUS at 09:10

## 2021-10-01 RX ADMIN — GABAPENTIN 600 MG: 300 CAPSULE ORAL at 08:10

## 2021-10-01 RX ADMIN — CEFTRIAXONE 1 G: 1 INJECTION, SOLUTION INTRAVENOUS at 05:10

## 2021-10-01 RX ADMIN — CLOPIDOGREL BISULFATE 75 MG: 75 TABLET, FILM COATED ORAL at 08:10

## 2021-10-01 RX ADMIN — PANTOPRAZOLE SODIUM 40 MG: 40 TABLET, DELAYED RELEASE ORAL at 08:10

## 2021-10-01 RX ADMIN — INSULIN ASPART 3 UNITS: 100 INJECTION, SOLUTION INTRAVENOUS; SUBCUTANEOUS at 11:10

## 2021-10-01 RX ADMIN — ATORVASTATIN CALCIUM 40 MG: 40 TABLET, FILM COATED ORAL at 08:10

## 2021-10-01 RX ADMIN — ASPIRIN 81 MG: 81 TABLET, COATED ORAL at 08:10

## 2021-10-01 RX ADMIN — ENOXAPARIN SODIUM 40 MG: 40 INJECTION SUBCUTANEOUS at 05:10

## 2021-10-01 RX ADMIN — FINASTERIDE 5 MG: 5 TABLET, FILM COATED ORAL at 08:10

## 2021-10-01 RX ADMIN — BUPROPION HYDROCHLORIDE 300 MG: 150 TABLET, EXTENDED RELEASE ORAL at 08:10

## 2021-10-01 RX ADMIN — INSULIN ASPART 20 UNITS: 100 INJECTION, SUSPENSION SUBCUTANEOUS at 05:10

## 2021-10-02 LAB
ANION GAP SERPL CALC-SCNC: 6 MMOL/L (ref 8–16)
BACTERIA UR CULT: ABNORMAL
BASOPHILS # BLD AUTO: 0.08 K/UL (ref 0–0.2)
BASOPHILS NFR BLD: 1 % (ref 0–1.9)
BUN SERPL-MCNC: 15 MG/DL (ref 8–23)
CALCIUM SERPL-MCNC: 9.3 MG/DL (ref 8.7–10.5)
CHLORIDE SERPL-SCNC: 111 MMOL/L (ref 95–110)
CO2 SERPL-SCNC: 24 MMOL/L (ref 23–29)
CREAT SERPL-MCNC: 0.8 MG/DL (ref 0.5–1.4)
DIFFERENTIAL METHOD: ABNORMAL
EOSINOPHIL # BLD AUTO: 0.3 K/UL (ref 0–0.5)
EOSINOPHIL NFR BLD: 3.6 % (ref 0–8)
ERYTHROCYTE [DISTWIDTH] IN BLOOD BY AUTOMATED COUNT: 13.6 % (ref 11.5–14.5)
EST. GFR  (AFRICAN AMERICAN): >60 ML/MIN/1.73 M^2
EST. GFR  (NON AFRICAN AMERICAN): >60 ML/MIN/1.73 M^2
GLUCOSE SERPL-MCNC: 144 MG/DL (ref 70–110)
HCT VFR BLD AUTO: 39 % (ref 40–54)
HGB BLD-MCNC: 12.2 G/DL (ref 14–18)
IMM GRANULOCYTES # BLD AUTO: 0.03 K/UL (ref 0–0.04)
IMM GRANULOCYTES NFR BLD AUTO: 0.4 % (ref 0–0.5)
LYMPHOCYTES # BLD AUTO: 2.6 K/UL (ref 1–4.8)
LYMPHOCYTES NFR BLD: 33.2 % (ref 18–48)
MCH RBC QN AUTO: 28.2 PG (ref 27–31)
MCHC RBC AUTO-ENTMCNC: 31.3 G/DL (ref 32–36)
MCV RBC AUTO: 90 FL (ref 82–98)
MONOCYTES # BLD AUTO: 0.6 K/UL (ref 0.3–1)
MONOCYTES NFR BLD: 7 % (ref 4–15)
NEUTROPHILS # BLD AUTO: 4.3 K/UL (ref 1.8–7.7)
NEUTROPHILS NFR BLD: 54.8 % (ref 38–73)
NRBC BLD-RTO: 0 /100 WBC
PLATELET # BLD AUTO: 197 K/UL (ref 150–450)
PMV BLD AUTO: 8.8 FL (ref 9.2–12.9)
POCT GLUCOSE: 151 MG/DL (ref 70–110)
POCT GLUCOSE: 155 MG/DL (ref 70–110)
POCT GLUCOSE: 206 MG/DL (ref 70–110)
POTASSIUM SERPL-SCNC: 4.2 MMOL/L (ref 3.5–5.1)
RBC # BLD AUTO: 4.33 M/UL (ref 4.6–6.2)
SODIUM SERPL-SCNC: 141 MMOL/L (ref 136–145)
WBC # BLD AUTO: 7.86 K/UL (ref 3.9–12.7)

## 2021-10-02 PROCEDURE — 63600175 PHARM REV CODE 636 W HCPCS: Performed by: INTERNAL MEDICINE

## 2021-10-02 PROCEDURE — 80048 BASIC METABOLIC PNL TOTAL CA: CPT | Performed by: INTERNAL MEDICINE

## 2021-10-02 PROCEDURE — 96372 THER/PROPH/DIAG INJ SC/IM: CPT

## 2021-10-02 PROCEDURE — 85025 COMPLETE CBC W/AUTO DIFF WBC: CPT | Performed by: INTERNAL MEDICINE

## 2021-10-02 PROCEDURE — G0378 HOSPITAL OBSERVATION PER HR: HCPCS

## 2021-10-02 PROCEDURE — 97116 GAIT TRAINING THERAPY: CPT | Mod: CQ

## 2021-10-02 PROCEDURE — 96376 TX/PRO/DX INJ SAME DRUG ADON: CPT

## 2021-10-02 PROCEDURE — 25000003 PHARM REV CODE 250: Performed by: INTERNAL MEDICINE

## 2021-10-02 PROCEDURE — 36415 COLL VENOUS BLD VENIPUNCTURE: CPT | Performed by: INTERNAL MEDICINE

## 2021-10-02 RX ORDER — GABAPENTIN 300 MG/1
600 CAPSULE ORAL NIGHTLY
Status: DISCONTINUED | OUTPATIENT
Start: 2021-10-02 | End: 2021-10-04

## 2021-10-02 RX ORDER — GABAPENTIN 300 MG/1
300 CAPSULE ORAL DAILY
Status: DISCONTINUED | OUTPATIENT
Start: 2021-10-03 | End: 2021-10-04

## 2021-10-02 RX ADMIN — GABAPENTIN 600 MG: 300 CAPSULE ORAL at 08:10

## 2021-10-02 RX ADMIN — CEFTRIAXONE 1 G: 1 INJECTION, SOLUTION INTRAVENOUS at 05:10

## 2021-10-02 RX ADMIN — FINASTERIDE 5 MG: 5 TABLET, FILM COATED ORAL at 08:10

## 2021-10-02 RX ADMIN — TETRAHYDROZOLINE HCL 2 DROP: 0.05 LIQUID OPHTHALMIC at 08:10

## 2021-10-02 RX ADMIN — BUPROPION HYDROCHLORIDE 300 MG: 150 TABLET, EXTENDED RELEASE ORAL at 08:10

## 2021-10-02 RX ADMIN — INSULIN ASPART 20 UNITS: 100 INJECTION, SUSPENSION SUBCUTANEOUS at 05:10

## 2021-10-02 RX ADMIN — ASPIRIN 81 MG: 81 TABLET, COATED ORAL at 08:10

## 2021-10-02 RX ADMIN — QUETIAPINE FUMARATE 25 MG: 25 TABLET ORAL at 08:10

## 2021-10-02 RX ADMIN — CLOPIDOGREL BISULFATE 75 MG: 75 TABLET, FILM COATED ORAL at 08:10

## 2021-10-02 RX ADMIN — ATORVASTATIN CALCIUM 40 MG: 40 TABLET, FILM COATED ORAL at 08:10

## 2021-10-02 RX ADMIN — ENOXAPARIN SODIUM 40 MG: 40 INJECTION SUBCUTANEOUS at 05:10

## 2021-10-03 LAB
POCT GLUCOSE: 103 MG/DL (ref 70–110)
POCT GLUCOSE: 124 MG/DL (ref 70–110)
POCT GLUCOSE: 200 MG/DL (ref 70–110)
POCT GLUCOSE: 231 MG/DL (ref 70–110)

## 2021-10-03 PROCEDURE — 25000003 PHARM REV CODE 250: Performed by: INTERNAL MEDICINE

## 2021-10-03 PROCEDURE — 96372 THER/PROPH/DIAG INJ SC/IM: CPT

## 2021-10-03 PROCEDURE — G0378 HOSPITAL OBSERVATION PER HR: HCPCS

## 2021-10-03 PROCEDURE — 63600175 PHARM REV CODE 636 W HCPCS: Performed by: INTERNAL MEDICINE

## 2021-10-03 RX ORDER — CIPROFLOXACIN 500 MG/1
500 TABLET ORAL EVERY 12 HOURS
Status: DISCONTINUED | OUTPATIENT
Start: 2021-10-03 | End: 2021-10-05 | Stop reason: HOSPADM

## 2021-10-03 RX ORDER — MECLIZINE HYDROCHLORIDE 25 MG/1
25 TABLET ORAL 3 TIMES DAILY PRN
Status: DISCONTINUED | OUTPATIENT
Start: 2021-10-03 | End: 2021-10-05 | Stop reason: HOSPADM

## 2021-10-03 RX ADMIN — ATORVASTATIN CALCIUM 40 MG: 40 TABLET, FILM COATED ORAL at 09:10

## 2021-10-03 RX ADMIN — GABAPENTIN 300 MG: 300 CAPSULE ORAL at 09:10

## 2021-10-03 RX ADMIN — MECLIZINE HYDROCHLORIDE 25 MG: 25 TABLET ORAL at 09:10

## 2021-10-03 RX ADMIN — BUPROPION HYDROCHLORIDE 300 MG: 150 TABLET, EXTENDED RELEASE ORAL at 09:10

## 2021-10-03 RX ADMIN — GABAPENTIN 600 MG: 300 CAPSULE ORAL at 08:10

## 2021-10-03 RX ADMIN — ENOXAPARIN SODIUM 40 MG: 40 INJECTION SUBCUTANEOUS at 05:10

## 2021-10-03 RX ADMIN — FINASTERIDE 5 MG: 5 TABLET, FILM COATED ORAL at 09:10

## 2021-10-03 RX ADMIN — CIPROFLOXACIN 500 MG: 500 TABLET, FILM COATED ORAL at 08:10

## 2021-10-03 RX ADMIN — CLOPIDOGREL BISULFATE 75 MG: 75 TABLET, FILM COATED ORAL at 09:10

## 2021-10-03 RX ADMIN — ASPIRIN 81 MG: 81 TABLET, COATED ORAL at 09:10

## 2021-10-03 RX ADMIN — PANTOPRAZOLE SODIUM 40 MG: 40 TABLET, DELAYED RELEASE ORAL at 09:10

## 2021-10-03 RX ADMIN — CIPROFLOXACIN 500 MG: 500 TABLET, FILM COATED ORAL at 09:10

## 2021-10-03 RX ADMIN — QUETIAPINE FUMARATE 25 MG: 25 TABLET ORAL at 08:10

## 2021-10-04 LAB
POCT GLUCOSE: 144 MG/DL (ref 70–110)
POCT GLUCOSE: 188 MG/DL (ref 70–110)
POCT GLUCOSE: 260 MG/DL (ref 70–110)

## 2021-10-04 PROCEDURE — C9399 UNCLASSIFIED DRUGS OR BIOLOG: HCPCS | Performed by: INTERNAL MEDICINE

## 2021-10-04 PROCEDURE — G0378 HOSPITAL OBSERVATION PER HR: HCPCS

## 2021-10-04 PROCEDURE — 63600175 PHARM REV CODE 636 W HCPCS: Performed by: INTERNAL MEDICINE

## 2021-10-04 PROCEDURE — 25000003 PHARM REV CODE 250: Performed by: INTERNAL MEDICINE

## 2021-10-04 PROCEDURE — 97116 GAIT TRAINING THERAPY: CPT

## 2021-10-04 PROCEDURE — 96372 THER/PROPH/DIAG INJ SC/IM: CPT

## 2021-10-04 RX ORDER — CIPROFLOXACIN 500 MG/1
500 TABLET ORAL EVERY 12 HOURS
Start: 2021-10-04 | End: 2021-10-05

## 2021-10-04 RX ORDER — INSULIN ASPART 100 [IU]/ML
5 INJECTION, SOLUTION INTRAVENOUS; SUBCUTANEOUS
Status: DISCONTINUED | OUTPATIENT
Start: 2021-10-04 | End: 2021-10-05 | Stop reason: HOSPADM

## 2021-10-04 RX ORDER — INSULIN ASPART 100 [IU]/ML
5 INJECTION, SOLUTION INTRAVENOUS; SUBCUTANEOUS 3 TIMES DAILY
Refills: 0
Start: 2021-10-04 | End: 2021-10-05

## 2021-10-04 RX ADMIN — MECLIZINE HYDROCHLORIDE 25 MG: 25 TABLET ORAL at 09:10

## 2021-10-04 RX ADMIN — INSULIN ASPART 5 UNITS: 100 INJECTION, SOLUTION INTRAVENOUS; SUBCUTANEOUS at 05:10

## 2021-10-04 RX ADMIN — ENOXAPARIN SODIUM 40 MG: 40 INJECTION SUBCUTANEOUS at 05:10

## 2021-10-04 RX ADMIN — PANTOPRAZOLE SODIUM 40 MG: 40 TABLET, DELAYED RELEASE ORAL at 09:10

## 2021-10-04 RX ADMIN — CIPROFLOXACIN 500 MG: 500 TABLET, FILM COATED ORAL at 09:10

## 2021-10-04 RX ADMIN — INSULIN ASPART 5 UNITS: 100 INJECTION, SOLUTION INTRAVENOUS; SUBCUTANEOUS at 12:10

## 2021-10-04 RX ADMIN — QUETIAPINE FUMARATE 25 MG: 25 TABLET ORAL at 09:10

## 2021-10-04 RX ADMIN — ASPIRIN 81 MG: 81 TABLET, COATED ORAL at 09:10

## 2021-10-04 RX ADMIN — INSULIN DETEMIR 15 UNITS: 100 INJECTION, SOLUTION SUBCUTANEOUS at 09:10

## 2021-10-04 RX ADMIN — BUPROPION HYDROCHLORIDE 300 MG: 150 TABLET, EXTENDED RELEASE ORAL at 09:10

## 2021-10-04 RX ADMIN — ATORVASTATIN CALCIUM 40 MG: 40 TABLET, FILM COATED ORAL at 09:10

## 2021-10-04 RX ADMIN — CLOPIDOGREL BISULFATE 75 MG: 75 TABLET, FILM COATED ORAL at 09:10

## 2021-10-04 RX ADMIN — INSULIN ASPART 3 UNITS: 100 INJECTION, SOLUTION INTRAVENOUS; SUBCUTANEOUS at 12:10

## 2021-10-04 RX ADMIN — FINASTERIDE 5 MG: 5 TABLET, FILM COATED ORAL at 09:10

## 2021-10-05 VITALS
HEART RATE: 54 BPM | DIASTOLIC BLOOD PRESSURE: 77 MMHG | HEIGHT: 77 IN | WEIGHT: 220 LBS | OXYGEN SATURATION: 99 % | TEMPERATURE: 96 F | RESPIRATION RATE: 18 BRPM | SYSTOLIC BLOOD PRESSURE: 177 MMHG | BODY MASS INDEX: 25.98 KG/M2

## 2021-10-05 LAB
POCT GLUCOSE: 178 MG/DL (ref 70–110)
POCT GLUCOSE: 180 MG/DL (ref 70–110)
POCT GLUCOSE: 192 MG/DL (ref 70–110)

## 2021-10-05 PROCEDURE — 30200315 PPD INTRADERMAL TEST REV CODE 302: Performed by: INTERNAL MEDICINE

## 2021-10-05 PROCEDURE — 25000003 PHARM REV CODE 250: Performed by: INTERNAL MEDICINE

## 2021-10-05 PROCEDURE — 86580 TB INTRADERMAL TEST: CPT | Performed by: INTERNAL MEDICINE

## 2021-10-05 PROCEDURE — 97165 OT EVAL LOW COMPLEX 30 MIN: CPT

## 2021-10-05 PROCEDURE — 97116 GAIT TRAINING THERAPY: CPT

## 2021-10-05 PROCEDURE — 97530 THERAPEUTIC ACTIVITIES: CPT

## 2021-10-05 PROCEDURE — 96372 THER/PROPH/DIAG INJ SC/IM: CPT

## 2021-10-05 PROCEDURE — 63600175 PHARM REV CODE 636 W HCPCS: Performed by: INTERNAL MEDICINE

## 2021-10-05 PROCEDURE — 97110 THERAPEUTIC EXERCISES: CPT

## 2021-10-05 PROCEDURE — G0378 HOSPITAL OBSERVATION PER HR: HCPCS

## 2021-10-05 RX ORDER — CIPROFLOXACIN 500 MG/1
500 TABLET ORAL EVERY 12 HOURS
Qty: 6 TABLET | Refills: 0 | Status: SHIPPED | OUTPATIENT
Start: 2021-10-05 | End: 2021-10-08

## 2021-10-05 RX ORDER — INSULIN ASPART 100 [IU]/ML
5 INJECTION, SOLUTION INTRAVENOUS; SUBCUTANEOUS 3 TIMES DAILY
Qty: 4.5 ML | Refills: 11 | Status: SHIPPED | OUTPATIENT
Start: 2021-10-05 | End: 2021-10-06 | Stop reason: CLARIF

## 2021-10-05 RX ADMIN — TUBERCULIN PURIFIED PROTEIN DERIVATIVE 5 UNITS: 5 INJECTION, SOLUTION INTRADERMAL at 05:10

## 2021-10-05 RX ADMIN — INSULIN ASPART 5 UNITS: 100 INJECTION, SOLUTION INTRAVENOUS; SUBCUTANEOUS at 07:10

## 2021-10-05 RX ADMIN — ATORVASTATIN CALCIUM 40 MG: 40 TABLET, FILM COATED ORAL at 09:10

## 2021-10-05 RX ADMIN — CIPROFLOXACIN 500 MG: 500 TABLET, FILM COATED ORAL at 09:10

## 2021-10-05 RX ADMIN — INSULIN ASPART 0 UNITS: 100 INJECTION, SOLUTION INTRAVENOUS; SUBCUTANEOUS at 12:10

## 2021-10-05 RX ADMIN — BUPROPION HYDROCHLORIDE 300 MG: 150 TABLET, EXTENDED RELEASE ORAL at 09:10

## 2021-10-05 RX ADMIN — FINASTERIDE 5 MG: 5 TABLET, FILM COATED ORAL at 09:10

## 2021-10-05 RX ADMIN — INSULIN ASPART 5 UNITS: 100 INJECTION, SOLUTION INTRAVENOUS; SUBCUTANEOUS at 12:10

## 2021-10-05 RX ADMIN — ASPIRIN 81 MG: 81 TABLET, COATED ORAL at 09:10

## 2021-10-05 RX ADMIN — CLOPIDOGREL BISULFATE 75 MG: 75 TABLET, FILM COATED ORAL at 09:10

## 2021-10-06 ENCOUNTER — TELEPHONE (OUTPATIENT)
Dept: MEDSURG UNIT | Facility: HOSPITAL | Age: 66
End: 2021-10-06

## 2021-10-06 ENCOUNTER — TELEPHONE (OUTPATIENT)
Dept: FAMILY MEDICINE | Facility: CLINIC | Age: 66
End: 2021-10-06

## 2021-10-06 DIAGNOSIS — I67.89 ISCHEMIC ENCEPHALOPATHY: ICD-10-CM

## 2021-10-06 DIAGNOSIS — I15.2 HYPERTENSION ASSOCIATED WITH DIABETES: ICD-10-CM

## 2021-10-06 DIAGNOSIS — E11.65 UNCONTROLLED TYPE 2 DIABETES MELLITUS WITH HYPERGLYCEMIA: Primary | ICD-10-CM

## 2021-10-06 DIAGNOSIS — E11.59 HYPERTENSION ASSOCIATED WITH DIABETES: ICD-10-CM

## 2021-10-06 PROCEDURE — 96376 TX/PRO/DX INJ SAME DRUG ADON: CPT

## 2021-10-06 PROCEDURE — 96372 THER/PROPH/DIAG INJ SC/IM: CPT

## 2021-10-06 PROCEDURE — G0180 PR HOME HEALTH MD CERTIFICATION: ICD-10-PCS | Mod: ,,, | Performed by: FAMILY MEDICINE

## 2021-10-06 PROCEDURE — G0180 MD CERTIFICATION HHA PATIENT: HCPCS | Mod: ,,, | Performed by: FAMILY MEDICINE

## 2021-10-06 RX ORDER — PEN NEEDLE, DIABETIC 30 GX3/16"
NEEDLE, DISPOSABLE MISCELLANEOUS
Qty: 100 EACH | Refills: 11 | Status: ON HOLD | OUTPATIENT
Start: 2021-10-06 | End: 2022-08-09 | Stop reason: SDUPTHER

## 2021-10-06 RX ORDER — LOSARTAN POTASSIUM 25 MG/1
25 TABLET ORAL DAILY
Qty: 90 TABLET | Refills: 3 | Status: SHIPPED | OUTPATIENT
Start: 2021-10-06 | End: 2021-10-29 | Stop reason: ALTCHOICE

## 2021-10-07 ENCOUNTER — PATIENT MESSAGE (OUTPATIENT)
Dept: ADMINISTRATIVE | Facility: HOSPITAL | Age: 66
End: 2021-10-07

## 2021-10-07 ENCOUNTER — TELEPHONE (OUTPATIENT)
Dept: FAMILY MEDICINE | Facility: CLINIC | Age: 66
End: 2021-10-07

## 2021-10-13 ENCOUNTER — EXTERNAL HOME HEALTH (OUTPATIENT)
Dept: HOME HEALTH SERVICES | Facility: HOSPITAL | Age: 66
End: 2021-10-13
Payer: MEDICARE

## 2021-10-18 ENCOUNTER — PATIENT MESSAGE (OUTPATIENT)
Dept: ADMINISTRATIVE | Facility: HOSPITAL | Age: 66
End: 2021-10-18
Payer: MEDICARE

## 2021-10-19 ENCOUNTER — TELEPHONE (OUTPATIENT)
Dept: FAMILY MEDICINE | Facility: CLINIC | Age: 66
End: 2021-10-19

## 2021-10-25 ENCOUNTER — TELEPHONE (OUTPATIENT)
Dept: FAMILY MEDICINE | Facility: CLINIC | Age: 66
End: 2021-10-25
Payer: MEDICARE

## 2021-10-25 ENCOUNTER — LAB VISIT (OUTPATIENT)
Dept: LAB | Facility: HOSPITAL | Age: 66
End: 2021-10-25
Attending: FAMILY MEDICINE
Payer: MEDICARE

## 2021-10-25 DIAGNOSIS — N40.1 ENLARGED PROSTATE WITH URINARY OBSTRUCTION: ICD-10-CM

## 2021-10-25 DIAGNOSIS — N39.0 URINARY TRACT INFECTION, SITE NOT SPECIFIED: Primary | ICD-10-CM

## 2021-10-25 DIAGNOSIS — R35.0 URINARY FREQUENCY: Primary | ICD-10-CM

## 2021-10-25 DIAGNOSIS — R33.8 ENLARGED PROSTATE WITH URINARY RETENTION: ICD-10-CM

## 2021-10-25 DIAGNOSIS — N40.1 ENLARGED PROSTATE WITH URINARY RETENTION: ICD-10-CM

## 2021-10-25 DIAGNOSIS — N13.8 ENLARGED PROSTATE WITH URINARY OBSTRUCTION: ICD-10-CM

## 2021-10-25 DIAGNOSIS — R35.0 URINARY FREQUENCY: ICD-10-CM

## 2021-10-25 LAB
BACTERIA #/AREA URNS HPF: ABNORMAL /HPF
BILIRUB UR QL STRIP: NEGATIVE
CLARITY UR: ABNORMAL
COLOR UR: YELLOW
GLUCOSE UR QL STRIP: NEGATIVE
HGB UR QL STRIP: ABNORMAL
HYALINE CASTS #/AREA URNS LPF: 0 /LPF
KETONES UR QL STRIP: NEGATIVE
LEUKOCYTE ESTERASE UR QL STRIP: ABNORMAL
MICROSCOPIC COMMENT: ABNORMAL
NITRITE UR QL STRIP: NEGATIVE
PH UR STRIP: 6 [PH] (ref 5–8)
PROT UR QL STRIP: ABNORMAL
RBC #/AREA URNS HPF: 22 /HPF (ref 0–4)
SP GR UR STRIP: 1.02 (ref 1–1.03)
URN SPEC COLLECT METH UR: ABNORMAL
UROBILINOGEN UR STRIP-ACNC: 1 EU/DL
WBC #/AREA URNS HPF: 30 /HPF (ref 0–5)
YEAST URNS QL MICRO: ABNORMAL

## 2021-10-25 PROCEDURE — 87086 URINE CULTURE/COLONY COUNT: CPT | Performed by: FAMILY MEDICINE

## 2021-10-25 PROCEDURE — 81000 URINALYSIS NONAUTO W/SCOPE: CPT | Performed by: FAMILY MEDICINE

## 2021-10-27 LAB — BACTERIA UR CULT: NORMAL

## 2021-10-29 ENCOUNTER — DOCUMENT SCAN (OUTPATIENT)
Dept: HOME HEALTH SERVICES | Facility: HOSPITAL | Age: 66
End: 2021-10-29
Payer: MEDICARE

## 2021-10-29 RX ORDER — LISINOPRIL AND HYDROCHLOROTHIAZIDE 12.5; 2 MG/1; MG/1
TABLET ORAL
Qty: 180 TABLET | Refills: 0 | Status: ON HOLD | OUTPATIENT
Start: 2021-10-29 | End: 2022-08-08 | Stop reason: HOSPADM

## 2021-11-02 ENCOUNTER — DOCUMENT SCAN (OUTPATIENT)
Dept: HOME HEALTH SERVICES | Facility: HOSPITAL | Age: 66
End: 2021-11-02
Payer: MEDICARE

## 2021-11-05 ENCOUNTER — OFFICE VISIT (OUTPATIENT)
Dept: FAMILY MEDICINE | Facility: CLINIC | Age: 66
End: 2021-11-05
Payer: MEDICARE

## 2021-11-05 ENCOUNTER — DOCUMENT SCAN (OUTPATIENT)
Dept: HOME HEALTH SERVICES | Facility: HOSPITAL | Age: 66
End: 2021-11-05
Payer: MEDICARE

## 2021-11-05 VITALS
HEIGHT: 77 IN | BODY MASS INDEX: 26.09 KG/M2 | OXYGEN SATURATION: 97 % | DIASTOLIC BLOOD PRESSURE: 72 MMHG | SYSTOLIC BLOOD PRESSURE: 124 MMHG | TEMPERATURE: 99 F | HEART RATE: 86 BPM

## 2021-11-05 DIAGNOSIS — I10 PRIMARY HYPERTENSION: Chronic | ICD-10-CM

## 2021-11-05 DIAGNOSIS — L60.2 HYPERTROPHIC TOENAIL: ICD-10-CM

## 2021-11-05 DIAGNOSIS — Z79.4 TYPE 2 DIABETES MELLITUS WITH HYPERGLYCEMIA, WITH LONG-TERM CURRENT USE OF INSULIN: Chronic | ICD-10-CM

## 2021-11-05 DIAGNOSIS — E11.69 HYPERLIPIDEMIA ASSOCIATED WITH TYPE 2 DIABETES MELLITUS: Chronic | ICD-10-CM

## 2021-11-05 DIAGNOSIS — I15.2 HYPERTENSION ASSOCIATED WITH DIABETES: ICD-10-CM

## 2021-11-05 DIAGNOSIS — R41.3 MEMORY DEFICIT: ICD-10-CM

## 2021-11-05 DIAGNOSIS — N13.8 BENIGN PROSTATIC HYPERPLASIA WITH URINARY OBSTRUCTION: Chronic | ICD-10-CM

## 2021-11-05 DIAGNOSIS — N40.1 BENIGN PROSTATIC HYPERPLASIA WITH URINARY OBSTRUCTION: Chronic | ICD-10-CM

## 2021-11-05 DIAGNOSIS — I63.411 STROKE DUE TO EMBOLISM OF RIGHT MIDDLE CEREBRAL ARTERY: ICD-10-CM

## 2021-11-05 DIAGNOSIS — E78.5 HYPERLIPIDEMIA ASSOCIATED WITH TYPE 2 DIABETES MELLITUS: Chronic | ICD-10-CM

## 2021-11-05 DIAGNOSIS — N39.0 URINARY TRACT INFECTION WITHOUT HEMATURIA, SITE UNSPECIFIED: Primary | ICD-10-CM

## 2021-11-05 DIAGNOSIS — F32.1 CURRENT MODERATE EPISODE OF MAJOR DEPRESSIVE DISORDER WITHOUT PRIOR EPISODE: Chronic | ICD-10-CM

## 2021-11-05 DIAGNOSIS — E11.59 HYPERTENSION ASSOCIATED WITH DIABETES: ICD-10-CM

## 2021-11-05 DIAGNOSIS — E11.641 UNCONTROLLED TYPE 2 DIABETES MELLITUS WITH HYPOGLYCEMIA AND COMA: ICD-10-CM

## 2021-11-05 DIAGNOSIS — E11.65 TYPE 2 DIABETES MELLITUS WITH HYPERGLYCEMIA, WITH LONG-TERM CURRENT USE OF INSULIN: Chronic | ICD-10-CM

## 2021-11-05 LAB
BILIRUB SERPL-MCNC: ABNORMAL MG/DL
BLOOD URINE, POC: ABNORMAL
CLARITY, POC UA: ABNORMAL
COLOR, POC UA: YELLOW
GLUCOSE UR QL STRIP: 1000
KETONES UR QL STRIP: ABNORMAL
LEUKOCYTE ESTERASE URINE, POC: ABNORMAL
NITRITE, POC UA: ABNORMAL
PH, POC UA: 6
PROTEIN, POC: ABNORMAL
SPECIFIC GRAVITY, POC UA: 1.02
UROBILINOGEN, POC UA: ABNORMAL

## 2021-11-05 PROCEDURE — 87077 CULTURE AEROBIC IDENTIFY: CPT | Performed by: PHYSICIAN ASSISTANT

## 2021-11-05 PROCEDURE — 81002 URINALYSIS NONAUTO W/O SCOPE: CPT | Mod: S$GLB,,, | Performed by: PHYSICIAN ASSISTANT

## 2021-11-05 PROCEDURE — 87186 SC STD MICRODIL/AGAR DIL: CPT | Performed by: PHYSICIAN ASSISTANT

## 2021-11-05 PROCEDURE — 99999 PR PBB SHADOW E&M-EST. PATIENT-LVL V: ICD-10-PCS | Mod: PBBFAC,,, | Performed by: PHYSICIAN ASSISTANT

## 2021-11-05 PROCEDURE — 87086 URINE CULTURE/COLONY COUNT: CPT | Performed by: PHYSICIAN ASSISTANT

## 2021-11-05 PROCEDURE — 81002 POCT URINE DIPSTICK WITHOUT MICROSCOPE: ICD-10-PCS | Mod: S$GLB,,, | Performed by: PHYSICIAN ASSISTANT

## 2021-11-05 PROCEDURE — 87088 URINE BACTERIA CULTURE: CPT | Performed by: PHYSICIAN ASSISTANT

## 2021-11-05 PROCEDURE — 99214 PR OFFICE/OUTPT VISIT, EST, LEVL IV, 30-39 MIN: ICD-10-PCS | Mod: S$GLB,,, | Performed by: PHYSICIAN ASSISTANT

## 2021-11-05 PROCEDURE — 99999 PR PBB SHADOW E&M-EST. PATIENT-LVL V: CPT | Mod: PBBFAC,,, | Performed by: PHYSICIAN ASSISTANT

## 2021-11-05 PROCEDURE — 99214 OFFICE O/P EST MOD 30 MIN: CPT | Mod: S$GLB,,, | Performed by: PHYSICIAN ASSISTANT

## 2021-11-05 RX ORDER — CIPROFLOXACIN 500 MG/1
500 TABLET ORAL 2 TIMES DAILY
Qty: 20 TABLET | Refills: 0 | Status: SHIPPED | OUTPATIENT
Start: 2021-11-05 | End: 2022-05-16

## 2021-11-05 RX ORDER — PIOGLITAZONEHYDROCHLORIDE 30 MG/1
30 TABLET ORAL DAILY
Status: ON HOLD | COMMUNITY
Start: 2021-11-02 | End: 2022-08-08 | Stop reason: HOSPADM

## 2021-11-05 RX ORDER — LOSARTAN POTASSIUM 25 MG/1
TABLET ORAL
COMMUNITY
Start: 2021-10-18

## 2021-11-08 ENCOUNTER — PATIENT OUTREACH (OUTPATIENT)
Dept: ADMINISTRATIVE | Facility: OTHER | Age: 66
End: 2021-11-08
Payer: MEDICARE

## 2021-11-08 LAB — BACTERIA UR CULT: ABNORMAL

## 2021-11-17 DIAGNOSIS — E11.9 TYPE 2 DIABETES MELLITUS WITHOUT COMPLICATION: ICD-10-CM

## 2021-11-30 ENCOUNTER — TELEPHONE (OUTPATIENT)
Dept: UROLOGY | Facility: CLINIC | Age: 66
End: 2021-11-30
Payer: MEDICARE

## 2021-11-30 ENCOUNTER — HOSPITAL ENCOUNTER (EMERGENCY)
Facility: HOSPITAL | Age: 66
Discharge: HOME | End: 2021-11-30
Attending: EMERGENCY MEDICINE
Payer: MEDICARE

## 2021-11-30 VITALS
WEIGHT: 220 LBS | DIASTOLIC BLOOD PRESSURE: 88 MMHG | BODY MASS INDEX: 25.98 KG/M2 | RESPIRATION RATE: 16 BRPM | OXYGEN SATURATION: 100 % | TEMPERATURE: 98 F | HEART RATE: 88 BPM | HEIGHT: 77 IN | SYSTOLIC BLOOD PRESSURE: 138 MMHG

## 2021-11-30 DIAGNOSIS — T83.9XXA FOLEY CATHETER PROBLEM, INITIAL ENCOUNTER: Primary | ICD-10-CM

## 2021-11-30 PROCEDURE — 51702 INSERT TEMP BLADDER CATH: CPT

## 2021-11-30 PROCEDURE — 99283 EMERGENCY DEPT VISIT LOW MDM: CPT | Mod: 25

## 2021-12-05 PROCEDURE — G0179 PR HOME HEALTH MD RECERTIFICATION: ICD-10-PCS | Mod: ,,, | Performed by: FAMILY MEDICINE

## 2021-12-05 PROCEDURE — G0179 MD RECERTIFICATION HHA PT: HCPCS | Mod: ,,, | Performed by: FAMILY MEDICINE

## 2021-12-08 ENCOUNTER — EXTERNAL HOME HEALTH (OUTPATIENT)
Dept: HOME HEALTH SERVICES | Facility: HOSPITAL | Age: 66
End: 2021-12-08
Payer: MEDICARE

## 2022-01-06 ENCOUNTER — PATIENT OUTREACH (OUTPATIENT)
Dept: ADMINISTRATIVE | Facility: HOSPITAL | Age: 67
End: 2022-01-06
Payer: MEDICARE

## 2022-01-06 ENCOUNTER — PATIENT MESSAGE (OUTPATIENT)
Dept: ADMINISTRATIVE | Facility: HOSPITAL | Age: 67
End: 2022-01-06
Payer: MEDICARE

## 2022-01-06 NOTE — PROGRESS NOTES
EYE EXAM gap report review. HM, chart, care everywhere, media reviewed.    TELEPHONE OUTREACH ATTEMPTED. LINE RINGS SEVERAL TIMES AND THEN DISCONNECTS. NO OPPORTUNITY TO LEAVE A VM. PORTAL MSG SENT WITH REMINDER TO SCHEDULE EYE EXAM

## 2022-01-19 ENCOUNTER — PATIENT OUTREACH (OUTPATIENT)
Dept: ADMINISTRATIVE | Facility: OTHER | Age: 67
End: 2022-01-19
Payer: MEDICARE

## 2022-01-19 NOTE — PROGRESS NOTES
Chart was reviewed for overdue [facility] ([facility])  topics  Updates were requested from care everywhere  Health Maintenance has been updated  LINKS immunization registry triggered

## 2022-01-21 ENCOUNTER — OFFICE VISIT (OUTPATIENT)
Dept: UROLOGY | Facility: CLINIC | Age: 67
End: 2022-01-21
Payer: MEDICARE

## 2022-01-21 VITALS
HEIGHT: 77 IN | WEIGHT: 220 LBS | DIASTOLIC BLOOD PRESSURE: 80 MMHG | HEART RATE: 67 BPM | SYSTOLIC BLOOD PRESSURE: 130 MMHG | BODY MASS INDEX: 25.98 KG/M2

## 2022-01-21 DIAGNOSIS — R33.9 URINE RETENTION: Primary | ICD-10-CM

## 2022-01-21 DIAGNOSIS — Z01.818 PRE-OP TESTING: ICD-10-CM

## 2022-01-21 DIAGNOSIS — R39.9 LOWER URINARY TRACT SYMPTOMS (LUTS): ICD-10-CM

## 2022-01-21 PROCEDURE — 99214 OFFICE O/P EST MOD 30 MIN: CPT | Mod: 25,S$GLB,CS, | Performed by: UROLOGY

## 2022-01-21 PROCEDURE — 87086 URINE CULTURE/COLONY COUNT: CPT | Performed by: UROLOGY

## 2022-01-21 PROCEDURE — 99999 PR PBB SHADOW E&M-EST. PATIENT-LVL V: ICD-10-PCS | Mod: PBBFAC,,, | Performed by: UROLOGY

## 2022-01-21 PROCEDURE — 51700 IRRIGATION OF BLADDER: CPT | Mod: S$GLB,,, | Performed by: UROLOGY

## 2022-01-21 PROCEDURE — 99999 PR PBB SHADOW E&M-EST. PATIENT-LVL V: CPT | Mod: PBBFAC,,, | Performed by: UROLOGY

## 2022-01-21 PROCEDURE — 99214 PR OFFICE/OUTPT VISIT, EST, LEVL IV, 30-39 MIN: ICD-10-PCS | Mod: 25,S$GLB,CS, | Performed by: UROLOGY

## 2022-01-21 PROCEDURE — 51700 PR IRRIGATION, BLADDER: ICD-10-PCS | Mod: S$GLB,,, | Performed by: UROLOGY

## 2022-01-21 NOTE — PROGRESS NOTES
[facility] Established Patient/H&P:    Jhonny Almazan is a 66 y.o. male who presents for follow up for urinary retention.     Patient presented to the emergency department on 11/16/20 with dizziness. He was subsequently admitted for CVA complicated by urinary retention requiring olguin catheter placement. Unsure how much urine was obtained after catheter was placed.      He was discharged to rehab with the olguin catheter in place and states it was exchanged once prior to discharge home. He states that his neurological function had been improving.     He reported that prior to his stroke he was urinating every hour. Started on Flomax 0.4 mg on 11/25/20.      Patient is on ASA and Plavix.        Interval History     1/20/21: Patient remains with a olguin catheter in place. Here for voiding trial. Has remained on Flomax 0.4 mg PO daily.     1/21/22: Underwent unsuccessful voiding trial in 2/2021. Subsequently admitted in 9/2021. He was evaluated by Dr. Daniels. On review of record, his Flomax was increased to 0.8 mg and he was started on Finasteride 5 mg PO daily. Plan was for voiding trial at his nursing home which has not been done.     Per his home health nurse, he has not been compliant with his medication. Remains with a olguin in place.  CT chest abdomen pelvis on 9/1/21 with mineral densities in the bladder.      Denies any fever, chills, bone pain, unintentional weight loss,  trauma or history of  malignancy.         PSA  0.44                 3/23/20  0.48                 4/27/18  0.37                 7/23/10  0.3                   1/28/08    Urine culture  Pseudomonas 11/5/21    Past Medical History:   Diagnosis Date    Anticoagulant long-term use     Arthritis     Colon polyp     Diabetes mellitus     Diabetes mellitus, type 2     Fatty liver     Hypertension     Major depressive disorder 11/17/2020    -Continue Wellbutrin    PEG (percutaneous endoscopic gastrostomy)  "adjustment/replacement/removal 1/6/2021    Stroke 11/2020    left sided weakness    Urinary retention 9/7/2021       Past Surgical History:   Procedure Laterality Date    BACK SURGERY      COLONOSCOPY  07/18/2014    Dr. Crane: 22 colon polyps removed, hemorrhoids, erythema to sigmoid, repeat in 1 year for surveillance; biopsy: sigmoid erythema- showed unremarkable colonic mucosa, tubular adenomas and hyperplastic polyps    COLONOSCOPY N/A 5/3/2019    Procedure: COLONOSCOPY;  Surgeon: Guero Crane MD;  Location: [facility] ENDO;  Service: Endoscopy;  Laterality: N/A;    COLONOSCOPY N/A 5/6/2019    Procedure: COLONOSCOPY;  Surgeon: Guero Crane MD;  Location: [facility] ENDO;  Service: Endoscopy;  Laterality: N/A;    EPIDURAL STEROID INJECTION INTO THORACIC SPINE N/A 8/30/2019    Procedure: Injection-steroid-epidural-thoracic;  Surgeon: Frantz Green MD;  Location: [facility] OR;  Service: Pain Management;  Laterality: N/A;  T6-7    ESOPHAGOGASTRODUODENOSCOPY N/A 4/26/2019    Procedure: EGD (ESOPHAGOGASTRODUODENOSCOPY);  Surgeon: Guero Crane MD;  Location: [facility];  Service: Endoscopy;  Laterality: N/A;    ESOPHAGOGASTRODUODENOSCOPY N/A 11/23/2020    Procedure: EGD (ESOPHAGOGASTRODUODENOSCOPY);  Surgeon: Guero Crane MD;  Location: [facility];  Service: Endoscopy;  Laterality: N/A;    ESOPHAGOGASTRODUODENOSCOPY N/A 1/6/2021    Procedure: EGD (ESOPHAGOGASTRODUODENOSCOPY);  Surgeon: Guero Crane MD;  Location: [facility];  Service: Endoscopy;  Laterality: N/A;    HERNIA REPAIR         Review of patient's allergies indicates:  No Known Allergies    Medications Reviewed: see MAR    FOCUSED PHYSICAL EXAM:    Vitals:    01/21/22 1504   BP: 130/80   Pulse: 67     Body mass index is 26.09 kg/m². Weight: 99.8 kg (220 lb 0.3 oz) Height: 6' 5" (195.6 cm)       General: Alert, cooperative, no distress, appears stated age  Abdomen: Soft, non-tender, no CVA tenderness, non-distended  : Gutierrez in place with " cloudy urine.         LABS:    No results found for this or any previous visit (from the past 336 hour(s)).      Assessment/Diagnosis:    1. Urine retention  Case Request Operating Room: CYSTOSCOPY, ULTRASOUND, RECTAL APPROACH    Urine culture   2. Lower urinary tract symptoms (LUTS)  Case Request Operating Room: CYSTOSCOPY, ULTRASOUND, RECTAL APPROACH    Urine culture   3. Pre-op testing  COVID-19 Routine Screening       Plans:    - I spent 30 minutes of the day of this encounter preparing for, treating and managing this patient. Extensive discussion with patient regarding the etiology and management of his lower urinary tract symptoms. Explained that LUTS are multifactorial and can be secondary to an enlarged prostate, PO intake of bladder irritants, overactive bladder, constipation, malignancy, trauma, infection, stones or medications.  - Continue Flomax 0.8 mg and Finasteride 5 mg PO daily.   - Upsize olguin to an 18 [place] today. Irrigated with NS.   - Scheduled for cystoscopy and TRUS on 2/2/22. COVID screening prior.   - Urine culture today.

## 2022-01-21 NOTE — H&P (VIEW-ONLY)
[facility] Established Patient/H&P:    Jhonny Almazan is a 66 y.o. male who presents for follow up for urinary retention.     Patient presented to the emergency department on 11/16/20 with dizziness. He was subsequently admitted for CVA complicated by urinary retention requiring olguin catheter placement. Unsure how much urine was obtained after catheter was placed.      He was discharged to rehab with the olguin catheter in place and states it was exchanged once prior to discharge home. He states that his neurological function had been improving.     He reported that prior to his stroke he was urinating every hour. Started on Flomax 0.4 mg on 11/25/20.      Patient is on ASA and Plavix.        Interval History     1/20/21: Patient remains with a olguin catheter in place. Here for voiding trial. Has remained on Flomax 0.4 mg PO daily.     1/21/22: Underwent unsuccessful voiding trial in 2/2021. Subsequently admitted in 9/2021. He was evaluated by Dr. Daniels. On review of record, his Flomax was increased to 0.8 mg and he was started on Finasteride 5 mg PO daily. Plan was for voiding trial at his nursing home which has not been done.     Per his home health nurse, he has not been compliant with his medication. Remains with a olguin in place.  CT chest abdomen pelvis on 9/1/21 with mineral densities in the bladder.      Denies any fever, chills, bone pain, unintentional weight loss,  trauma or history of  malignancy.         PSA  0.44                 3/23/20  0.48                 4/27/18  0.37                 7/23/10  0.3                   1/28/08    Urine culture  Pseudomonas 11/5/21    Past Medical History:   Diagnosis Date    Anticoagulant long-term use     Arthritis     Colon polyp     Diabetes mellitus     Diabetes mellitus, type 2     Fatty liver     Hypertension     Major depressive disorder 11/17/2020    -Continue Wellbutrin    PEG (percutaneous endoscopic gastrostomy)  "adjustment/replacement/removal 1/6/2021    Stroke 11/2020    left sided weakness    Urinary retention 9/7/2021       Past Surgical History:   Procedure Laterality Date    BACK SURGERY      COLONOSCOPY  07/18/2014    Dr. Crane: 22 colon polyps removed, hemorrhoids, erythema to sigmoid, repeat in 1 year for surveillance; biopsy: sigmoid erythema- showed unremarkable colonic mucosa, tubular adenomas and hyperplastic polyps    COLONOSCOPY N/A 5/3/2019    Procedure: COLONOSCOPY;  Surgeon: Guero Crane MD;  Location: [facility] ENDO;  Service: Endoscopy;  Laterality: N/A;    COLONOSCOPY N/A 5/6/2019    Procedure: COLONOSCOPY;  Surgeon: Guero Crane MD;  Location: [facility] ENDO;  Service: Endoscopy;  Laterality: N/A;    EPIDURAL STEROID INJECTION INTO THORACIC SPINE N/A 8/30/2019    Procedure: Injection-steroid-epidural-thoracic;  Surgeon: Frantz Green MD;  Location: [facility] OR;  Service: Pain Management;  Laterality: N/A;  T6-7    ESOPHAGOGASTRODUODENOSCOPY N/A 4/26/2019    Procedure: EGD (ESOPHAGOGASTRODUODENOSCOPY);  Surgeon: Guero Crane MD;  Location: [facility];  Service: Endoscopy;  Laterality: N/A;    ESOPHAGOGASTRODUODENOSCOPY N/A 11/23/2020    Procedure: EGD (ESOPHAGOGASTRODUODENOSCOPY);  Surgeon: Guero Crane MD;  Location: [facility];  Service: Endoscopy;  Laterality: N/A;    ESOPHAGOGASTRODUODENOSCOPY N/A 1/6/2021    Procedure: EGD (ESOPHAGOGASTRODUODENOSCOPY);  Surgeon: Guero Crane MD;  Location: [facility];  Service: Endoscopy;  Laterality: N/A;    HERNIA REPAIR         Review of patient's allergies indicates:  No Known Allergies    Medications Reviewed: see MAR    FOCUSED PHYSICAL EXAM:    Vitals:    01/21/22 1504   BP: 130/80   Pulse: 67     Body mass index is 26.09 kg/m². Weight: 99.8 kg (220 lb 0.3 oz) Height: 6' 5" (195.6 cm)       General: Alert, cooperative, no distress, appears stated age  Abdomen: Soft, non-tender, no CVA tenderness, non-distended  : Gutierrez in place with " cloudy urine.         LABS:    No results found for this or any previous visit (from the past 336 hour(s)).      Assessment/Diagnosis:    1. Urine retention  Case Request Operating Room: CYSTOSCOPY, ULTRASOUND, RECTAL APPROACH    Urine culture   2. Lower urinary tract symptoms (LUTS)  Case Request Operating Room: CYSTOSCOPY, ULTRASOUND, RECTAL APPROACH    Urine culture   3. Pre-op testing  COVID-19 Routine Screening       Plans:    - I spent 30 minutes of the day of this encounter preparing for, treating and managing this patient. Extensive discussion with patient regarding the etiology and management of his lower urinary tract symptoms. Explained that LUTS are multifactorial and can be secondary to an enlarged prostate, PO intake of bladder irritants, overactive bladder, constipation, malignancy, trauma, infection, stones or medications.  - Continue Flomax 0.8 mg and Finasteride 5 mg PO daily.   - Upsize olguin to an 18 Chinese today. Irrigated with NS.   - Scheduled for cystoscopy and TRUS on 2/2/22. COVID screening prior.   - Urine culture today.

## 2022-01-22 LAB — BACTERIA UR CULT: NO GROWTH

## 2022-01-30 ENCOUNTER — LAB VISIT (OUTPATIENT)
Dept: PRIMARY CARE CLINIC | Facility: CLINIC | Age: 67
End: 2022-01-30
Payer: MEDICARE

## 2022-01-30 DIAGNOSIS — Z01.818 PRE-OP TESTING: ICD-10-CM

## 2022-01-30 NOTE — PROGRESS NOTES
Pt presented for Pre-procedure drive thru testing. Patient identified using two patient identifiers prior to specimen collection. Specimen collected pt tolerated well.  Questions answered prior to departure and education given

## 2022-01-31 ENCOUNTER — TELEPHONE (OUTPATIENT)
Dept: UROLOGY | Facility: CLINIC | Age: 67
End: 2022-01-31
Payer: MEDICARE

## 2022-01-31 DIAGNOSIS — Z01.818 PRE-OP TESTING: ICD-10-CM

## 2022-01-31 NOTE — TELEPHONE ENCOUNTER
----- Message from Lucretia Hillman LPN sent at 1/31/2022  5:08 PM CST -----    ----- Message -----  From: Sita Miles  Sent: 1/31/2022   3:33 PM CST  To: [facility] Staff    Type: Needs Medical Advice  Who Called:  Anne pearson [facility]  Symptoms (please be specific):  Need to reschedule his cystoscope due to Covid test not being done.    Best Call Back Number: [phone]  Additional Information: Please call and advise

## 2022-02-03 ENCOUNTER — EXTERNAL HOME HEALTH (OUTPATIENT)
Dept: HOME HEALTH SERVICES | Facility: HOSPITAL | Age: 67
End: 2022-02-03
Payer: MEDICARE

## 2022-02-03 PROCEDURE — G0179 PR HOME HEALTH MD RECERTIFICATION: ICD-10-PCS | Mod: ,,, | Performed by: FAMILY MEDICINE

## 2022-02-03 PROCEDURE — G0179 MD RECERTIFICATION HHA PT: HCPCS | Mod: ,,, | Performed by: FAMILY MEDICINE

## 2022-02-06 ENCOUNTER — LAB VISIT (OUTPATIENT)
Dept: PRIMARY CARE CLINIC | Facility: CLINIC | Age: 67
End: 2022-02-06
Payer: MEDICARE

## 2022-02-06 DIAGNOSIS — Z01.818 PRE-OP TESTING: ICD-10-CM

## 2022-02-06 PROCEDURE — U0005 INFEC AGEN DETEC AMPLI PROBE: HCPCS | Performed by: UROLOGY

## 2022-02-06 PROCEDURE — U0003 INFECTIOUS AGENT DETECTION BY NUCLEIC ACID (DNA OR RNA); SEVERE ACUTE RESPIRATORY SYNDROME CORONAVIRUS 2 (SARS-COV-2) (CORONAVIRUS DISEASE [COVID-19]), AMPLIFIED PROBE TECHNIQUE, MAKING USE OF HIGH THROUGHPUT TECHNOLOGIES AS DESCRIBED BY CMS-2020-01-R: HCPCS | Performed by: UROLOGY

## 2022-02-07 LAB
SARS-COV-2 RNA RESP QL NAA+PROBE: NOT DETECTED
SARS-COV-2- CYCLE NUMBER: NORMAL

## 2022-02-09 ENCOUNTER — HOSPITAL ENCOUNTER (OUTPATIENT)
Facility: AMBULARY SURGERY CENTER | Age: 67
Discharge: HOME | End: 2022-02-09
Attending: UROLOGY | Admitting: UROLOGY
Payer: MEDICARE

## 2022-02-09 VITALS
WEIGHT: 220 LBS | HEIGHT: 77 IN | TEMPERATURE: 97 F | OXYGEN SATURATION: 99 % | BODY MASS INDEX: 25.98 KG/M2 | HEART RATE: 79 BPM | DIASTOLIC BLOOD PRESSURE: 83 MMHG | RESPIRATION RATE: 20 BRPM | SYSTOLIC BLOOD PRESSURE: 153 MMHG

## 2022-02-09 DIAGNOSIS — N31.9 NEUROGENIC BLADDER: Primary | ICD-10-CM

## 2022-02-09 DIAGNOSIS — R39.9 LOWER URINARY TRACT SYMPTOMS (LUTS): ICD-10-CM

## 2022-02-09 DIAGNOSIS — R33.9 URINE RETENTION: ICD-10-CM

## 2022-02-09 PROCEDURE — 52000 CYSTOURETHROSCOPY: CPT | Mod: ,,, | Performed by: UROLOGY

## 2022-02-09 PROCEDURE — 87086 URINE CULTURE/COLONY COUNT: CPT | Performed by: UROLOGY

## 2022-02-09 PROCEDURE — 52000 PR CYSTOURETHROSCOPY: ICD-10-PCS | Mod: ,,, | Performed by: UROLOGY

## 2022-02-09 PROCEDURE — 87088 URINE BACTERIA CULTURE: CPT | Performed by: UROLOGY

## 2022-02-09 PROCEDURE — 76872 US TRANSRECTAL: CPT | Mod: 26,,, | Performed by: UROLOGY

## 2022-02-09 PROCEDURE — 87077 CULTURE AEROBIC IDENTIFY: CPT | Performed by: UROLOGY

## 2022-02-09 PROCEDURE — 52000 CYSTOURETHROSCOPY: CPT | Performed by: UROLOGY

## 2022-02-09 PROCEDURE — 76872 PR US TRANSRECTAL: ICD-10-PCS | Mod: 26,,, | Performed by: UROLOGY

## 2022-02-09 PROCEDURE — 76872 US TRANSRECTAL: CPT | Performed by: UROLOGY

## 2022-02-09 PROCEDURE — 87186 SC STD MICRODIL/AGAR DIL: CPT | Performed by: UROLOGY

## 2022-02-09 RX ORDER — LEVOFLOXACIN 750 MG/1
750 TABLET ORAL DAILY
Qty: 3 TABLET | Refills: 0 | Status: SHIPPED | OUTPATIENT
Start: 2022-02-09 | End: 2022-02-12

## 2022-02-09 RX ORDER — LIDOCAINE HYDROCHLORIDE 20 MG/ML
JELLY TOPICAL
Status: DISCONTINUED | OUTPATIENT
Start: 2022-02-09 | End: 2022-02-09 | Stop reason: HOSPADM

## 2022-02-09 NOTE — PLAN OF CARE
Discharge instructions given to pt, verbalized understanding.  Refused PO fluids.  Gutierrez leg bag on.  Wheeled out to family member  per RN in no distress.

## 2022-02-09 NOTE — DISCHARGE INSTRUCTIONS
After the procedure    · Drink plenty of fluids.  · You may have burning or light bleeding when you urinate--this is normal.  · Medications may be prescribed to ease any discomfort or prevent infection. Take these as directed.  · Call your doctor if you have heavy bleeding or blood clots, burning that lasts more than a day, a fever over 100°F  (38° C), or trouble urinating.    After Surgery:  Always be aware that any surgery can cause these symptoms:    Pain- Medication can be prescribed for pain to decrease your pain but may not completely take your pain away.  Over the Counter pain medicine my be enough and you can always use Ice and rest to help ease pain.    Bleeding- a little bleeding after a surgery is usually within normal.  If there is a lot of blood you need to notify your MD.  Emergency treatments of bleeding are cold application, elevation of the bleeding site and compression.    Infection- Infection after surgery is NOT a normal occurrence.  Signs of infection are fever, swelling, hot to touch the incision.  If this occurs notify your MD immediately.    Nausea- this can be common after a surgery especially if you have had anesthesia medicine or are taking pain medicine.  Staying on clear liquids, bland foods, gingerale, or over the counter anti nausea medicines can help.  If you vomit more than once, notify your MD.  Anti Nausea medicines can be prescribed.            Transrectal Ultrasound   A transrectal ultrasound is an imaging test. It uses sound waves to create pictures of a mans prostate gland to determine its size.Your prostate gland is in front of your rectum and if too large may become inflamed and impede the flow of urine. For this test, a special probe (transducer) is placed directly into your rectum.     Before leaving, you may need to wait for a short time while the images are reviewed. In most cases, you can go back to your normal routine after the test.

## 2022-02-09 NOTE — OP NOTE
[facility]  Operative/Discharge Note    Date: 02/09/2022    Pre-Op Diagnosis: Urinary retention, neurogenic bladder    Post-Op Diagnosis: Same    Procedure(s) Performed:   1.  Cystoscopy  2.  Transrectal ultrasound     Specimen(s): Urine culture    Staff Surgeon: Jaylen Antonio MD    Assistant Surgeon: Jaylen Antonio Jr, MD    Anesthesia: Local anesthesia topical 2% lidocaine gel    Indications: Jhonny Almazan is a 66 y.o. male with acute urinary retention after stroke.     Findings: 40 cc prostate with no significant prostate obstruction identified. Mild trabeculations. Gutierrez replaced.    Estimated Blood Loss: min    Drains: None    MAYUR: 30 - 40 grams, smooth, anodular    Procedure in Detail:  After risks, benefits and possible complications of cystoscopy and TRUS were explained, the patient elected to undergo the procedure and informed consent was obtained. All questions were answered in the valeria-operative area. The patient was transferred to the cystoscopy suite and placed in the lateral position.     TRUS probe was inserted into the rectum and the prostate measurement was 40 cc.     The patient was placed in the lithotomy position and then prepped and draped in the usual sterile fashion. Lidocaine jelly was administered for local anesthesia. Time out was performed.    A flexible cystoscope was introduced into the bladder per urethra. This passed easily.  The entire urethra was visualized which showed no masses or strictures.  The prostate was 2 cm in length and did not appear to have any significant obstruction. The right and left ureteral orifices were identified in the normal anatomic position and were seen effluxing clear urine.  Formal cystoscopy was performed which revealed no masses or lesions suspicious for malignancy, mild trabeculations, no bladder stones and no bladder diverticula.      The patient tolerated the procedure well and was transferred to recovery in stable  condition.    Disposition: Home    Discharge home today status post uncomplicated procedure as above  Diet - resume home diet  Follow up: We discussed that his prostate did not appear obstructing today. His retention is likely due to neurogenic bladder given that it occurred after his stroke. Recommend long-term olguin management given that he will likely not void after any surgical procedures. RTC in 6 months.   Instructions: Home with Levaquin.   Meds:     Medication List      START taking these medications    levoFLOXacin 750 MG tablet  Commonly known as: LEVAQUIN  Take 1 tablet (750 mg total) by mouth once daily. for 3 days        CONTINUE taking these medications    aspirin 81 MG EC tablet  Commonly known as: ECOTRIN  Take 1 tablet (81 mg total) by mouth once daily.     atorvastatin 40 MG tablet  Commonly known as: LIPITOR  Take 1 tablet (40 mg total) by mouth once daily.     blood sugar diagnostic Strp  To check BG 2 times daily, to use with insurance preferred meter. One touch Ultra.     buPROPion 300 MG 24 hr tablet  Commonly known as: WELLBUTRIN XL  Take 1 tablet (300 mg total) by mouth once daily.     ciprofloxacin HCl 500 MG tablet  Commonly known as: CIPRO  Take 1 tablet (500 mg total) by mouth 2 (two) times daily.     clopidogreL 75 mg tablet  Commonly known as: PLAVIX  1 tablet (75 mg total) by Per G Tube route once daily.     finasteride 5 mg tablet  Commonly known as: PROSCAR  Take 1 tablet (5 mg total) by mouth once daily. Take 1 tablet by mouth in am to keep prostate from enlarging     insulin NPH-insulin regular (70/30) 100 unit/mL (70-30) injection  Commonly known as: NovoLIN 70/30  Inject 20 Units into the skin 2 (two) times daily. #3 10 ml syringes with 32 gauge sergo needles     lancets Misc  To check BG 2 times daily, to use with insurance preferred meter.One Touch Ultra     lisinopriL-hydrochlorothiazide 20-12.5 mg per tablet  Commonly known as: PRINZIDE,ZESTORETIC  TAKE ONE TABLET BY MOUTH  "TWICE DAILY     losartan 25 MG tablet  Commonly known as: COZAAR     meclizine 25 mg tablet  Commonly known as: ANTIVERT  Take 1 tablet (25 mg total) by mouth 3 (three) times daily as needed for Dizziness.     melatonin 3 mg tablet  Commonly known as: MELATIN  Take 2 tablets (6 mg total) by mouth nightly as needed for Insomnia.     omeprazole 40 MG capsule  Commonly known as: PRILOSEC  TAKE ONE CAPSULE (40 MG) BY MOUTH EVERY DAY     pen needle, diabetic 32 gauge x 5/32" Ndle  Use AC meals 2 a day     pioglitazone 30 MG tablet  Commonly known as: ACTOS     QUEtiapine 25 MG Tab  Commonly known as: SEROQUEL  Take 1 tablet (25 mg total) by mouth every evening.     tetrahydrozoline 0.05% 0.05 % Drop  Commonly known as: Vision Clear  Place 2 drops into both eyes 4 (four) times daily as needed (eye irritation).           Where to Get Your Medications      These medications were sent to [facility] [id number] [facility]  [address] [address] [address]    Phone: [phone]   · levoFLOXacin 750 MG tablet         Jaylen Antonio Jr, MD    "

## 2022-02-12 LAB — BACTERIA UR CULT: ABNORMAL

## 2022-02-16 ENCOUNTER — PATIENT MESSAGE (OUTPATIENT)
Dept: UROLOGY | Facility: CLINIC | Age: 67
End: 2022-02-16
Payer: MEDICARE

## 2022-02-16 RX ORDER — NITROFURANTOIN 25; 75 MG/1; MG/1
100 CAPSULE ORAL 2 TIMES DAILY
Qty: 10 CAPSULE | Refills: 0 | Status: SHIPPED | OUTPATIENT
Start: 2022-02-16 | End: 2022-02-21

## 2022-02-24 ENCOUNTER — TELEPHONE (OUTPATIENT)
Dept: FAMILY MEDICINE | Facility: CLINIC | Age: 67
End: 2022-02-24
Payer: MEDICARE

## 2022-02-24 NOTE — TELEPHONE ENCOUNTER
----- Message from Lyn Darden MA sent at 2/24/2022  1:45 PM CST -----  Type: Needs Medical Advice  Who Called:  Serena WHITE   Best Call Back Number: [phone]  Additional Information: nurse would like to schedule an appointment for patient within the next two weeks.  Patient is reporting neuropathy like symptoms in hands and feet and he needs medication refills.  Please call to discuss options

## 2022-03-04 ENCOUNTER — TELEPHONE (OUTPATIENT)
Dept: FAMILY MEDICINE | Facility: CLINIC | Age: 67
End: 2022-03-04
Payer: MEDICARE

## 2022-03-04 NOTE — TELEPHONE ENCOUNTER
Attempted to contact patient to advise that Evangelist will not be in the office for his scheduled appointment. No answer, voicemail not set up

## 2022-03-04 NOTE — TELEPHONE ENCOUNTER
Attempted to contact patient to advised that Evangelist will not be in the office for todays scheduled visit. No answer, voicemail not set up

## 2022-03-04 NOTE — TELEPHONE ENCOUNTER
Attempted to contact patient to advise that Evangelist will not be in the office today for scheduled jappointment.

## 2022-03-21 ENCOUNTER — HOSPITAL ENCOUNTER (EMERGENCY)
Facility: HOSPITAL | Age: 67
Discharge: HOME | End: 2022-03-22
Attending: EMERGENCY MEDICINE
Payer: MEDICARE

## 2022-03-21 DIAGNOSIS — R33.9 URINARY RETENTION: Primary | ICD-10-CM

## 2022-03-21 PROCEDURE — 51702 INSERT TEMP BLADDER CATH: CPT

## 2022-03-21 PROCEDURE — 99283 EMERGENCY DEPT VISIT LOW MDM: CPT

## 2022-03-22 VITALS
HEART RATE: 60 BPM | DIASTOLIC BLOOD PRESSURE: 64 MMHG | TEMPERATURE: 98 F | BODY MASS INDEX: 26.09 KG/M2 | SYSTOLIC BLOOD PRESSURE: 137 MMHG | OXYGEN SATURATION: 99 % | WEIGHT: 220 LBS | RESPIRATION RATE: 16 BRPM

## 2022-03-22 NOTE — ED PROVIDER NOTES
Encounter Date: 3/21/2022       History     Chief Complaint   Patient presents with    Urinary Retention     Needs catheter. Home health unable to place     Chief complaint is urinary retention.  The patient normally has a Gutierrez catheter.  It came out 24 hours ago.  He is here for replacement.  No complaints of fever chills.  Patient was alert cooperative no distress.        Review of patient's allergies indicates:  No Known Allergies  Past Medical History:   Diagnosis Date    Anticoagulant long-term use     Arthritis     Colon polyp     Diabetes mellitus     Diabetes mellitus, type 2     Fatty liver     Hypertension     Major depressive disorder 11/17/2020    -Continue Wellbutrin    PEG (percutaneous endoscopic gastrostomy) adjustment/replacement/removal 1/6/2021    Stroke 11/2020    left sided weakness    Urinary retention 9/7/2021     Past Surgical History:   Procedure Laterality Date    BACK SURGERY      COLONOSCOPY  07/18/2014    Dr. Crane: 22 colon polyps removed, hemorrhoids, erythema to sigmoid, repeat in 1 year for surveillance; biopsy: sigmoid erythema- showed unremarkable colonic mucosa, tubular adenomas and hyperplastic polyps    COLONOSCOPY N/A 5/3/2019    Procedure: COLONOSCOPY;  Surgeon: Guero Crane MD;  Location: [facility];  Service: Endoscopy;  Laterality: N/A;    COLONOSCOPY N/A 5/6/2019    Procedure: COLONOSCOPY;  Surgeon: Guero Crane MD;  Location: [facility];  Service: Endoscopy;  Laterality: N/A;    CYSTOSCOPY N/A 2/9/2022    Procedure: CYSTOSCOPY;  Surgeon: Jaylen Antonio Jr., MD;  Location: [facility];  Service: Urology;  Laterality: N/A;    EPIDURAL STEROID INJECTION INTO THORACIC SPINE N/A 8/30/2019    Procedure: Injection-steroid-epidural-thoracic;  Surgeon: Frantz Green MD;  Location: [facility] OR;  Service: Pain Management;  Laterality: N/A;  T6-7    ESOPHAGOGASTRODUODENOSCOPY N/A 4/26/2019    Procedure: EGD (ESOPHAGOGASTRODUODENOSCOPY);  Surgeon: Guero Crane,  MD;  Location: [facility];  Service: Endoscopy;  Laterality: N/A;    ESOPHAGOGASTRODUODENOSCOPY N/A 11/23/2020    Procedure: EGD (ESOPHAGOGASTRODUODENOSCOPY);  Surgeon: Guero Crane MD;  Location: [facility];  Service: Endoscopy;  Laterality: N/A;    ESOPHAGOGASTRODUODENOSCOPY N/A 1/6/2021    Procedure: EGD (ESOPHAGOGASTRODUODENOSCOPY);  Surgeon: Guero Crane MD;  Location: [facility];  Service: Endoscopy;  Laterality: N/A;    HERNIA REPAIR      TRANSRECTAL ULTRASOUND EXAMINATION N/A 2/9/2022    Procedure: ULTRASOUND, RECTAL APPROACH;  Surgeon: Jaylen Antonio Jr., MD;  Location: [facility] OR;  Service: Urology;  Laterality: N/A;  must text son miroslava, times and instructions given leave at 130     Family History   Problem Relation Age of Onset    Heart disease Mother     Heart disease Father     Diabetes Brother     Suicide Brother     Brain cancer Brother     Colon cancer Neg Hx     Crohn's disease Neg Hx     Ulcerative colitis Neg Hx     Stomach cancer Neg Hx     Esophageal cancer Neg Hx     Glaucoma Neg Hx     Retinal detachment Neg Hx     Macular degeneration Neg Hx      Social History     Tobacco Use    Smoking status: Current Every Day Smoker     Packs/day: 1.00     Years: 50.00     Pack years: 50.00     Types: Cigarettes    Smokeless tobacco: Never Used   Substance Use Topics    Alcohol use: Yes     Alcohol/week: 14.0 standard drinks     Types: 14 Cans of beer per week     Comment: 2-3 beers daily    Drug use: No     Review of Systems   Constitutional: Negative for chills and fever.   HENT: Negative for ear pain, rhinorrhea and sore throat.    Eyes: Negative for pain and visual disturbance.   Respiratory: Negative for cough and shortness of breath.    Cardiovascular: Negative for chest pain and palpitations.   Gastrointestinal: Negative for abdominal pain, constipation, diarrhea, nausea and vomiting.   Genitourinary: Negative for dysuria, frequency, hematuria and urgency.        Urinary  retention   Musculoskeletal: Negative for back pain, joint swelling and myalgias.   Skin: Negative for rash.   Neurological: Negative for dizziness, seizures, weakness and headaches.   Psychiatric/Behavioral: Negative for dysphoric mood. The patient is not nervous/anxious.        Physical Exam     Initial Vitals   BP Pulse Resp Temp SpO2   -- -- -- -- --      MAP       --         Physical Exam    Nursing note and vitals reviewed.  Constitutional: He appears well-developed and well-nourished.   HENT:   Head: Normocephalic and atraumatic.   Nose: Nose normal.   Eyes: Conjunctivae, EOM and lids are normal. Pupils are equal, round, and reactive to light.   Neck: Trachea normal. Neck supple. No thyroid mass present.   Cardiovascular: Normal rate, regular rhythm and normal heart sounds.   Pulmonary/Chest: Breath sounds normal. No respiratory distress.   Abdominal: Abdomen is soft. There is no abdominal tenderness.   Musculoskeletal:         General: Normal range of motion.      Cervical back: Neck supple.     Neurological: He is alert and oriented to person, place, and time. He has normal strength and normal reflexes. No cranial nerve deficit or sensory deficit.   Skin: Skin is warm and dry.   Psychiatric: He has a normal mood and affect. His speech is normal and behavior is normal. Judgment and thought content normal.         ED Course   Procedures  Labs Reviewed - No data to display       Imaging Results    None          Medications - No data to display  Medical Decision Making:   ED Management:  Recheck after placement of Gutierrez abdomen soft nontender patient wake alert cooperative will discharge home                      Clinical Impression:   Final diagnoses:  [R33.9] Urinary retention (Primary)          ED Disposition Condition    Discharge Stable        ED Prescriptions     None        Follow-up Information    None          Candice Khanna MD  03/21/22 [id number]

## 2022-04-04 PROCEDURE — G0179 PR HOME HEALTH MD RECERTIFICATION: ICD-10-PCS | Mod: ,,, | Performed by: FAMILY MEDICINE

## 2022-04-04 PROCEDURE — G0179 MD RECERTIFICATION HHA PT: HCPCS | Mod: ,,, | Performed by: FAMILY MEDICINE

## 2022-04-05 ENCOUNTER — EXTERNAL HOME HEALTH (OUTPATIENT)
Dept: HOME HEALTH SERVICES | Facility: HOSPITAL | Age: 67
End: 2022-04-05
Payer: MEDICARE

## 2022-05-12 ENCOUNTER — PATIENT MESSAGE (OUTPATIENT)
Dept: SMOKING CESSATION | Facility: CLINIC | Age: 67
End: 2022-05-12
Payer: MEDICARE

## 2022-05-16 ENCOUNTER — HOSPITAL ENCOUNTER (EMERGENCY)
Facility: HOSPITAL | Age: 67
Discharge: HOME | End: 2022-05-17
Attending: EMERGENCY MEDICINE
Payer: MEDICARE

## 2022-05-16 VITALS
RESPIRATION RATE: 18 BRPM | DIASTOLIC BLOOD PRESSURE: 72 MMHG | OXYGEN SATURATION: 96 % | BODY MASS INDEX: 28.01 KG/M2 | TEMPERATURE: 98 F | HEIGHT: 76 IN | SYSTOLIC BLOOD PRESSURE: 129 MMHG | HEART RATE: 57 BPM | WEIGHT: 230 LBS

## 2022-05-16 DIAGNOSIS — T83.511A URINARY TRACT INFECTION ASSOCIATED WITH INDWELLING URETHRAL CATHETER, INITIAL ENCOUNTER: ICD-10-CM

## 2022-05-16 DIAGNOSIS — N39.0 URINARY TRACT INFECTION ASSOCIATED WITH INDWELLING URETHRAL CATHETER, INITIAL ENCOUNTER: ICD-10-CM

## 2022-05-16 DIAGNOSIS — T83.9XXA COMPLICATION OF FOLEY CATHETER, INITIAL ENCOUNTER: Primary | ICD-10-CM

## 2022-05-16 LAB
BACTERIA #/AREA URNS HPF: ABNORMAL /HPF
BILIRUB UR QL STRIP: NEGATIVE
CLARITY UR: ABNORMAL
COLOR UR: YELLOW
GLUCOSE UR QL STRIP: NEGATIVE
HGB UR QL STRIP: ABNORMAL
KETONES UR QL STRIP: NEGATIVE
LEUKOCYTE ESTERASE UR QL STRIP: ABNORMAL
MICROSCOPIC COMMENT: ABNORMAL
NITRITE UR QL STRIP: POSITIVE
PH UR STRIP: 5 [PH] (ref 5–8)
PROT UR QL STRIP: ABNORMAL
RBC #/AREA URNS HPF: 17 /HPF (ref 0–4)
SP GR UR STRIP: >=1.03 (ref 1–1.03)
SQUAMOUS #/AREA URNS HPF: 2 /HPF
URN SPEC COLLECT METH UR: ABNORMAL
UROBILINOGEN UR STRIP-ACNC: NEGATIVE EU/DL
WBC #/AREA URNS HPF: >100 /HPF (ref 0–5)

## 2022-05-16 PROCEDURE — 87088 URINE BACTERIA CULTURE: CPT | Performed by: EMERGENCY MEDICINE

## 2022-05-16 PROCEDURE — 81000 URINALYSIS NONAUTO W/SCOPE: CPT | Performed by: EMERGENCY MEDICINE

## 2022-05-16 PROCEDURE — 99284 EMERGENCY DEPT VISIT MOD MDM: CPT | Mod: 25

## 2022-05-16 PROCEDURE — 87086 URINE CULTURE/COLONY COUNT: CPT | Performed by: EMERGENCY MEDICINE

## 2022-05-16 PROCEDURE — 51702 INSERT TEMP BLADDER CATH: CPT

## 2022-05-16 PROCEDURE — 87186 SC STD MICRODIL/AGAR DIL: CPT | Performed by: EMERGENCY MEDICINE

## 2022-05-16 PROCEDURE — 87077 CULTURE AEROBIC IDENTIFY: CPT | Performed by: EMERGENCY MEDICINE

## 2022-05-16 RX ORDER — NITROFURANTOIN 25; 75 MG/1; MG/1
100 CAPSULE ORAL ONCE
Status: DISCONTINUED | OUTPATIENT
Start: 2022-05-16 | End: 2022-05-17 | Stop reason: HOSPADM

## 2022-05-16 RX ORDER — NITROFURANTOIN 25; 75 MG/1; MG/1
100 CAPSULE ORAL 2 TIMES DAILY
Qty: 10 CAPSULE | Refills: 0 | Status: SHIPPED | OUTPATIENT
Start: 2022-05-16 | End: 2022-05-21

## 2022-05-16 RX ORDER — POLYETHYLENE GLYCOL 3350, SODIUM SULFATE ANHYDROUS, SODIUM BICARBONATE, SODIUM CHLORIDE, POTASSIUM CHLORIDE 236; 22.74; 6.74; 5.86; 2.97 G/4L; G/4L; G/4L; G/4L; G/4L
4 POWDER, FOR SOLUTION ORAL ONCE
Qty: 4000 ML | Refills: 0 | Status: SHIPPED | OUTPATIENT
Start: 2022-05-16 | End: 2022-05-16

## 2022-05-16 NOTE — ED TRIAGE NOTES
Jhonny Almazan is here with olguin catheter that home health could not remove.  Has had present one for a month or so.

## 2022-05-17 NOTE — ED PROVIDER NOTES
Encounter Date: 5/16/2022       History     Chief Complaint   Patient presents with    Catheter Problem     Home health unable to remove old catheter      This patient is 66-year-old male with Past Medical History:  No date: Anticoagulant long-term use  No date: Arthritis  No date: Colon polyp  No date: Diabetes mellitus  No date: Diabetes mellitus, type 2  No date: Fatty liver  No date: Hypertension  11/17/2020: Major depressive disorder      Comment:  -Continue Wellbutrin  1/6/2021: PEG (percutaneous endoscopic gastrostomy) adjustment/  replacement/removal  11/2020: Stroke      Comment:  left sided weakness  9/7/2021: Urinary retention     presenting complaints of difficulty removing a Gutierrez catheter today during a changed by home health.  He reports this has been in for 1 month and he has had an indwelling Gutierrez catheter for over a year secondary to problems related to a previous stroke.  He reports he sees Dr. Antonio who wanted to perform Cytoxan green the past but he has been unable to receive this.  He reports having a catheter associated UTI in the past but currently is not had shortness of breath, back pain, chest pain, fever, abdominal pain, nausea, chills.  He notes there is a lot of sediment in his catheter and that the home health nurse was unable to pull this loose after deflating the balloon at home.  He reports continuing yellow urine drainage despite sediment and denies blood in the Gutierrez.        Review of patient's allergies indicates:  No Known Allergies  Past Medical History:   Diagnosis Date    Anticoagulant long-term use     Arthritis     Colon polyp     Diabetes mellitus     Diabetes mellitus, type 2     Fatty liver     Hypertension     Major depressive disorder 11/17/2020    -Continue Wellbutrin    PEG (percutaneous endoscopic gastrostomy) adjustment/replacement/removal 1/6/2021    Stroke 11/2020    left sided weakness    Urinary retention 9/7/2021     Past Surgical History:    Procedure Laterality Date    BACK SURGERY      COLONOSCOPY  07/18/2014    Dr. Crane: 22 colon polyps removed, hemorrhoids, erythema to sigmoid, repeat in 1 year for surveillance; biopsy: sigmoid erythema- showed unremarkable colonic mucosa, tubular adenomas and hyperplastic polyps    COLONOSCOPY N/A 5/3/2019    Procedure: COLONOSCOPY;  Surgeon: Guero Crane MD;  Location: [facility] ENDO;  Service: Endoscopy;  Laterality: N/A;    COLONOSCOPY N/A 5/6/2019    Procedure: COLONOSCOPY;  Surgeon: Guero Crane MD;  Location: [facility] ENDO;  Service: Endoscopy;  Laterality: N/A;    CYSTOSCOPY N/A 2/9/2022    Procedure: CYSTOSCOPY;  Surgeon: Jaylen Antonio Jr., MD;  Location: [facility];  Service: Urology;  Laterality: N/A;    EPIDURAL STEROID INJECTION INTO THORACIC SPINE N/A 8/30/2019    Procedure: Injection-steroid-epidural-thoracic;  Surgeon: Frantz Green MD;  Location: [facility] OR;  Service: Pain Management;  Laterality: N/A;  T6-7    ESOPHAGOGASTRODUODENOSCOPY N/A 4/26/2019    Procedure: EGD (ESOPHAGOGASTRODUODENOSCOPY);  Surgeon: Guero Crane MD;  Location: [facility];  Service: Endoscopy;  Laterality: N/A;    ESOPHAGOGASTRODUODENOSCOPY N/A 11/23/2020    Procedure: EGD (ESOPHAGOGASTRODUODENOSCOPY);  Surgeon: Guero Crane MD;  Location: [facility];  Service: Endoscopy;  Laterality: N/A;    ESOPHAGOGASTRODUODENOSCOPY N/A 1/6/2021    Procedure: EGD (ESOPHAGOGASTRODUODENOSCOPY);  Surgeon: Guero Crane MD;  Location: [facility];  Service: Endoscopy;  Laterality: N/A;    HERNIA REPAIR      TRANSRECTAL ULTRASOUND EXAMINATION N/A 2/9/2022    Procedure: ULTRASOUND, RECTAL APPROACH;  Surgeon: Jaylen Antonio Jr., MD;  Location: [facility] OR;  Service: Urology;  Laterality: N/A;  must text son miroslava, times and instructions given leave at 130     Family History   Problem Relation Age of Onset    Heart disease Mother     Heart disease Father     Diabetes Brother     Suicide Brother     Brain cancer Brother      Colon cancer Neg Hx     Crohn's disease Neg Hx     Ulcerative colitis Neg Hx     Stomach cancer Neg Hx     Esophageal cancer Neg Hx     Glaucoma Neg Hx     Retinal detachment Neg Hx     Macular degeneration Neg Hx      Social History     Tobacco Use    Smoking status: Current Every Day Smoker     Packs/day: 1.00     Years: 50.00     Pack years: 50.00     Types: Cigarettes    Smokeless tobacco: Never Used   Substance Use Topics    Alcohol use: Yes     Alcohol/week: 14.0 standard drinks     Types: 14 Cans of beer per week     Comment: 2-3 beers daily    Drug use: No     Review of Systems   Constitutional: Negative for fever.   Respiratory: Negative for shortness of breath.    Cardiovascular: Negative for chest pain.   Gastrointestinal: Negative for abdominal pain.   Genitourinary: Positive for difficulty urinating.   Musculoskeletal: Negative for back pain.   Skin: Negative for rash.   Neurological: Negative for headaches.   Psychiatric/Behavioral: Negative for confusion.       Physical Exam     Initial Vitals [05/16/22 1817]   BP Pulse Resp Temp SpO2   129/72 (!) 57 18 98.1 °F (36.7 °C) 96 %      MAP       --         Physical Exam    Nursing note and vitals reviewed.  Constitutional: He appears well-developed.   HENT:   Head: Normocephalic and atraumatic.   Eyes: Conjunctivae and EOM are normal.   Neck: Neck supple.   Normal range of motion.  Cardiovascular: Normal rate and regular rhythm.   Pulmonary/Chest: Breath sounds normal. No respiratory distress.   Abdominal: He exhibits no distension.   Genitourinary:    Genitourinary Comments: Gutierrez in place with a line full of heavy sediment, adhesion of the Gutierrez catheter to inside of the urethra, mild difficulty but the Gutierrez came loose without complication     Musculoskeletal:      Cervical back: Normal range of motion and neck supple.     Neurological: He is alert and oriented to person, place, and time. GCS score is 15. GCS eye subscore is 4. GCS verbal  subscore is 5. GCS motor subscore is 6.   Skin: Skin is warm and dry.   Psychiatric: He has a normal mood and affect. Thought content normal.         ED Course   Procedures  Labs Reviewed   URINALYSIS, REFLEX TO URINE CULTURE - Abnormal; Notable for the following components:       Result Value    Appearance, UA Hazy (*)     Specific Gravity, UA >=1.030 (*)     Protein, UA Trace (*)     Occult Blood UA 2+ (*)     Nitrite, UA Positive (*)     Leukocytes, UA Trace (*)     All other components within normal limits    Narrative:     Specimen Source->Urine   URINALYSIS MICROSCOPIC - Abnormal; Notable for the following components:    RBC, UA 17 (*)     WBC, UA >100 (*)     Bacteria Moderate (*)     All other components within normal limits    Narrative:     Specimen Source->Urine   CULTURE, URINE          Imaging Results    None          Medications   nitrofurantoin (macrocrystal-monohydrate) 100 MG capsule 100 mg (has no administration in time range)     Medical Decision Making:   ED Management:   This patient was emergently social after arrival.  Initial vital signs are stable.  I suspect there was difficulty removing the Gutierrez secondary to an adhesion in the urethra.  New Gutierrez was placed without complication and allowed to drain for some time then UA was obtained which indicates evidence of urinary tract infection.  Urine culture is pending but past culture reveals Klebsiella pneumoniae only sensitive to a few agents, 1 of them being Macrobid.  Will be  Initiated on this medication but is asked to follow up with his urologist, Dr. Antonio, as soon as possible or to return to the ER immediately for any new, concerning, or worsening symptoms.  Patient was agreeable and was discharged in stable condition.                      Clinical Impression:   Final diagnoses:  [T83.9XXA] Complication of Gutierrez catheter, initial encounter (Primary)  [T83.511A, N39.0] Urinary tract infection associated with indwelling urethral catheter,  initial encounter          ED Disposition Condition    Discharge Stable        ED Prescriptions     Medication Sig Dispense Start Date End Date Auth. Provider    nitrofurantoin, macrocrystal-monohydrate, (MACROBID) 100 MG capsule Take 1 capsule (100 mg total) by mouth 2 (two) times daily. for 5 days 10 capsule 5/16/2022 5/21/2022 Andre Conde MD        Follow-up Information     Follow up With Specialties Details Why Contact Info    Joey Whitehead MD Family Medicine Schedule an appointment as soon as possible for a visit   [address] [address]  [phone]      Marielle Narayan MD Urology Schedule an appointment as soon as possible for a visit   [address] [place] [address] [address]IVE  [address]  [facility] [address]  [phone]             Andre Conde MD  05/16/22 [id number]

## 2022-05-19 LAB — BACTERIA UR CULT: ABNORMAL

## 2022-06-03 PROCEDURE — G0179 PR HOME HEALTH MD RECERTIFICATION: ICD-10-PCS | Mod: ,,, | Performed by: FAMILY MEDICINE

## 2022-06-03 PROCEDURE — G0179 MD RECERTIFICATION HHA PT: HCPCS | Mod: ,,, | Performed by: FAMILY MEDICINE

## 2022-06-17 ENCOUNTER — EXTERNAL HOME HEALTH (OUTPATIENT)
Dept: HOME HEALTH SERVICES | Facility: HOSPITAL | Age: 67
End: 2022-06-17
Payer: MEDICARE

## 2022-06-23 ENCOUNTER — TELEPHONE (OUTPATIENT)
Dept: FAMILY MEDICINE | Facility: CLINIC | Age: 67
End: 2022-06-23
Payer: MEDICARE

## 2022-06-27 ENCOUNTER — HOSPITAL ENCOUNTER (EMERGENCY)
Facility: HOSPITAL | Age: 67
Discharge: PSYCHIATRIC OR SUBSTANCE USE FACILITY | End: 2022-06-27
Attending: EMERGENCY MEDICINE
Payer: MEDICARE

## 2022-06-27 VITALS
SYSTOLIC BLOOD PRESSURE: 132 MMHG | TEMPERATURE: 98 F | HEIGHT: 76 IN | DIASTOLIC BLOOD PRESSURE: 76 MMHG | WEIGHT: 230 LBS | RESPIRATION RATE: 18 BRPM | OXYGEN SATURATION: 94 % | BODY MASS INDEX: 28.01 KG/M2 | HEART RATE: 59 BPM

## 2022-06-27 DIAGNOSIS — R45.851 SUICIDAL IDEATION: ICD-10-CM

## 2022-06-27 DIAGNOSIS — Z00.8 MEDICAL CLEARANCE FOR PSYCHIATRIC ADMISSION: Primary | ICD-10-CM

## 2022-06-27 LAB
ALBUMIN SERPL BCP-MCNC: 3.5 G/DL (ref 3.5–5.2)
ALP SERPL-CCNC: 72 U/L (ref 55–135)
ALT SERPL W/O P-5'-P-CCNC: 14 U/L (ref 10–44)
AMPHET+METHAMPHET UR QL: NEGATIVE
ANION GAP SERPL CALC-SCNC: 10 MMOL/L (ref 8–16)
APAP SERPL-MCNC: <3 UG/ML (ref 10–20)
AST SERPL-CCNC: 9 U/L (ref 10–40)
BACTERIA #/AREA URNS HPF: ABNORMAL /HPF
BARBITURATES UR QL SCN>200 NG/ML: NEGATIVE
BASOPHILS # BLD AUTO: 0.06 K/UL (ref 0–0.2)
BASOPHILS NFR BLD: 1.1 % (ref 0–1.9)
BENZODIAZ UR QL SCN>200 NG/ML: NEGATIVE
BILIRUB SERPL-MCNC: 0.7 MG/DL (ref 0.1–1)
BILIRUB UR QL STRIP: NEGATIVE
BUN SERPL-MCNC: 16 MG/DL (ref 8–23)
BZE UR QL SCN: NEGATIVE
CALCIUM SERPL-MCNC: 9.4 MG/DL (ref 8.7–10.5)
CANNABINOIDS UR QL SCN: NEGATIVE
CHLORIDE SERPL-SCNC: 105 MMOL/L (ref 95–110)
CLARITY UR: ABNORMAL
CO2 SERPL-SCNC: 24 MMOL/L (ref 23–29)
COLOR UR: YELLOW
CREAT SERPL-MCNC: 0.9 MG/DL (ref 0.5–1.4)
CREAT UR-MCNC: 203.4 MG/DL (ref 23–375)
DIFFERENTIAL METHOD: ABNORMAL
EOSINOPHIL # BLD AUTO: 0.1 K/UL (ref 0–0.5)
EOSINOPHIL NFR BLD: 1.8 % (ref 0–8)
ERYTHROCYTE [DISTWIDTH] IN BLOOD BY AUTOMATED COUNT: 13.8 % (ref 11.5–14.5)
EST. GFR  (AFRICAN AMERICAN): >60 ML/MIN/1.73 M^2
EST. GFR  (NON AFRICAN AMERICAN): >60 ML/MIN/1.73 M^2
ETHANOL SERPL-MCNC: <10 MG/DL
GLUCOSE SERPL-MCNC: 196 MG/DL (ref 70–110)
GLUCOSE UR QL STRIP: ABNORMAL
HCT VFR BLD AUTO: 40.9 % (ref 40–54)
HGB BLD-MCNC: 13.5 G/DL (ref 14–18)
HGB UR QL STRIP: ABNORMAL
IMM GRANULOCYTES # BLD AUTO: 0.02 K/UL (ref 0–0.04)
IMM GRANULOCYTES NFR BLD AUTO: 0.4 % (ref 0–0.5)
KETONES UR QL STRIP: NEGATIVE
LEUKOCYTE ESTERASE UR QL STRIP: ABNORMAL
LYMPHOCYTES # BLD AUTO: 1.7 K/UL (ref 1–4.8)
LYMPHOCYTES NFR BLD: 29.6 % (ref 18–48)
MCH RBC QN AUTO: 27.6 PG (ref 27–31)
MCHC RBC AUTO-ENTMCNC: 33 G/DL (ref 32–36)
MCV RBC AUTO: 84 FL (ref 82–98)
METHADONE UR QL SCN>300 NG/ML: NEGATIVE
MICROSCOPIC COMMENT: ABNORMAL
MONOCYTES # BLD AUTO: 0.3 K/UL (ref 0.3–1)
MONOCYTES NFR BLD: 5.6 % (ref 4–15)
NEUTROPHILS # BLD AUTO: 3.4 K/UL (ref 1.8–7.7)
NEUTROPHILS NFR BLD: 61.5 % (ref 38–73)
NITRITE UR QL STRIP: POSITIVE
NRBC BLD-RTO: 0 /100 WBC
OPIATES UR QL SCN: NEGATIVE
PCP UR QL SCN>25 NG/ML: NEGATIVE
PH UR STRIP: 6 [PH] (ref 5–8)
PLATELET # BLD AUTO: 186 K/UL (ref 150–450)
PMV BLD AUTO: 8.7 FL (ref 9.2–12.9)
POTASSIUM SERPL-SCNC: 4 MMOL/L (ref 3.5–5.1)
PROT SERPL-MCNC: 6.5 G/DL (ref 6–8.4)
PROT UR QL STRIP: NEGATIVE
RBC # BLD AUTO: 4.89 M/UL (ref 4.6–6.2)
RBC #/AREA URNS HPF: 5 /HPF (ref 0–4)
SARS-COV-2 RDRP RESP QL NAA+PROBE: NEGATIVE
SODIUM SERPL-SCNC: 139 MMOL/L (ref 136–145)
SP GR UR STRIP: >=1.03 (ref 1–1.03)
SQUAMOUS #/AREA URNS HPF: 1 /HPF
T4 FREE SERPL-MCNC: 1.04 NG/DL (ref 0.71–1.51)
TOXICOLOGY INFORMATION: NORMAL
TSH SERPL DL<=0.005 MIU/L-ACNC: 0.33 UIU/ML (ref 0.4–4)
URN SPEC COLLECT METH UR: ABNORMAL
UROBILINOGEN UR STRIP-ACNC: 1 EU/DL
WBC # BLD AUTO: 5.58 K/UL (ref 3.9–12.7)
WBC #/AREA URNS HPF: 19 /HPF (ref 0–5)

## 2022-06-27 PROCEDURE — 82077 ASSAY SPEC XCP UR&BREATH IA: CPT | Performed by: EMERGENCY MEDICINE

## 2022-06-27 PROCEDURE — 81000 URINALYSIS NONAUTO W/SCOPE: CPT | Mod: 59 | Performed by: EMERGENCY MEDICINE

## 2022-06-27 PROCEDURE — G0427 PR INPT TELEHEALTH CON 70/>M: ICD-10-PCS | Mod: 95,,, | Performed by: PSYCHIATRY & NEUROLOGY

## 2022-06-27 PROCEDURE — 36415 COLL VENOUS BLD VENIPUNCTURE: CPT | Performed by: EMERGENCY MEDICINE

## 2022-06-27 PROCEDURE — 84443 ASSAY THYROID STIM HORMONE: CPT | Performed by: EMERGENCY MEDICINE

## 2022-06-27 PROCEDURE — 87086 URINE CULTURE/COLONY COUNT: CPT | Performed by: EMERGENCY MEDICINE

## 2022-06-27 PROCEDURE — 87088 URINE BACTERIA CULTURE: CPT | Performed by: EMERGENCY MEDICINE

## 2022-06-27 PROCEDURE — U0002 COVID-19 LAB TEST NON-CDC: HCPCS | Performed by: EMERGENCY MEDICINE

## 2022-06-27 PROCEDURE — 84439 ASSAY OF FREE THYROXINE: CPT | Performed by: EMERGENCY MEDICINE

## 2022-06-27 PROCEDURE — 80307 DRUG TEST PRSMV CHEM ANLYZR: CPT | Performed by: EMERGENCY MEDICINE

## 2022-06-27 PROCEDURE — G0427 INPT/ED TELECONSULT70: HCPCS | Mod: 95,,, | Performed by: PSYCHIATRY & NEUROLOGY

## 2022-06-27 PROCEDURE — 87077 CULTURE AEROBIC IDENTIFY: CPT | Performed by: EMERGENCY MEDICINE

## 2022-06-27 PROCEDURE — 87186 SC STD MICRODIL/AGAR DIL: CPT | Performed by: EMERGENCY MEDICINE

## 2022-06-27 PROCEDURE — 99285 EMERGENCY DEPT VISIT HI MDM: CPT | Mod: 25

## 2022-06-27 PROCEDURE — 25000003 PHARM REV CODE 250: Performed by: EMERGENCY MEDICINE

## 2022-06-27 PROCEDURE — 80143 DRUG ASSAY ACETAMINOPHEN: CPT | Performed by: EMERGENCY MEDICINE

## 2022-06-27 PROCEDURE — 80053 COMPREHEN METABOLIC PANEL: CPT | Performed by: EMERGENCY MEDICINE

## 2022-06-27 PROCEDURE — 85025 COMPLETE CBC W/AUTO DIFF WBC: CPT | Performed by: EMERGENCY MEDICINE

## 2022-06-27 RX ORDER — SULFAMETHOXAZOLE AND TRIMETHOPRIM 800; 160 MG/1; MG/1
1 TABLET ORAL
Status: COMPLETED | OUTPATIENT
Start: 2022-06-27 | End: 2022-06-27

## 2022-06-27 RX ORDER — SULFAMETHOXAZOLE AND TRIMETHOPRIM 800; 160 MG/1; MG/1
1 TABLET ORAL 2 TIMES DAILY
Qty: 14 TABLET | Refills: 0 | Status: SHIPPED | OUTPATIENT
Start: 2022-06-27 | End: 2022-07-04

## 2022-06-27 RX ORDER — TRAZODONE HYDROCHLORIDE 50 MG/1
50 TABLET ORAL NIGHTLY PRN
Status: DISCONTINUED | OUTPATIENT
Start: 2022-06-27 | End: 2022-06-28 | Stop reason: HOSPADM

## 2022-06-27 RX ORDER — SERTRALINE HYDROCHLORIDE 25 MG/1
25 TABLET, FILM COATED ORAL DAILY
Status: DISCONTINUED | OUTPATIENT
Start: 2022-06-28 | End: 2022-06-28 | Stop reason: HOSPADM

## 2022-06-27 RX ADMIN — SULFAMETHOXAZOLE AND TRIMETHOPRIM 1 TABLET: 800; 160 TABLET ORAL at 07:06

## 2022-06-27 NOTE — ED NOTES
Jhonny Almazan presents to the ED with c/o SI. Patient arrives to the ED with superficial abrasion to right lateral wrist. Patient reports that he attempted to cut himself with a blade. No bleeding noted. Skin is scratched. Patient states that he wants to hurt himself because of health issues and is tired of arguing with his son. Patient uses a walker for ambulation. RAHEEM VSS  Verified patient's name and date of birth.

## 2022-06-27 NOTE — CONSULTS
[facility]  Psychiatry  Telepsychiatry Consult Note        Patient agreeable to consultation via telepsychiatry.    Tele-Consultation from Psychiatry started: 6/27/2022 at 5:45 PM   The chief complaint leading to psychiatric consultation is: SI  This consultation was requested by Gerber Leach MD, the Emergency Department attending physician.  The location of the consulting psychiatrist is [place], [place].    The patient location is  [facility] EMERGENCY DEPARTMENT   The patient arrived at the ED at: unknown   Also present with the patient at the time of the consultation: nurse/tech     Patient Identification:   Jhonny Almazan is a 66 y.o. male.    Patient information was obtained from patient, past medical records, and ED MD.  Patient presented voluntarily to the Emergency Department.      Inpatient consult to Telemedicine - Psyc  Consult performed by: Stephan Ward MD  Consult ordered by: Gerber Leach MD        Consult Start Time: 06/27/2022 17:45 CDT  Consult End Time: 06/27/2022 19:01 CDT        HISTORY    Per ED MD:  Chief Complaint   Patient presents with    Psychiatric Evaluation       Suicidal ideation  small cut to rt. wrist   66-year-old male history of diabetes, major depression, stroke urinary retention presents for suicidal ideation.  Patient reports worsening depression over the last several weeks.  He states his son is less helpful at home than usual.  EMS also reports the house is undergoing some renovations and there are holes in the floor making it difficult for the patient who is wheelchair-bound to move around the house.  Patient reports that if he could successfully commit suicide he probably would.  Superficial abrasion to the right wrist today.  No recent illness.  The history is provided by the patient and the EMS personnel.       Chief Complaint / Reason for Psychiatry Consult: Suicidal Ideation       HPI   Jhonny Almazan is a 66 y.o. male with a past medical  history as noted above/below, and a past psychiatric history of Depression and Insomnia, currently in the ED as noted above.  Psychiatry was originally consulted as noted above.  The patient was seen and examined.  The chart was reviewed.  On examination today, the patient was alert and oriented to person, place, city, state, month, year, and situation.  He was CAM-ICU negative for delirium.  He endorses a multi-year hx of intermittent depression s/s and a prior failed trial of Wellbutrin XL.  He endorses not taking any psychiatric medication over the past few months and endorses worsening depression over the past 1-2 months.  He endorses passive SI for the past 3-4 days with active SI today and thoughts of cutting his wrists today (superficial cut on R wrist observed).  He endorses feelings of hopelessness and worthlessness (see detailed psych ROS below for current symptoms of depression and insomnia).  He endorses reduced appetite and poor sleep initiation for the past several weeks.  He denies any current or prior s/s consistent with acacia, psychosis, panic, OCD, PTSD, eating disorders, or substance abuse.  He denies any current/prior passive/active HI.  He denies any AH, VH, TH, delusions, paranoia, or DIGFAST (acacia) s/s.  He denies any adverse effects to his current medication regimen.  Regarding current medical/physical complaints, he endorses fatigue and chronic L-sided weakness is BUEs and BLEs.  He denies any other medical complaints at this time.  NAD was observed during the examination.  Psychotherapy was implemented as noted below with a focus on improving depression with SI, self-care, and sleep.  See detailed psych ROS below.  See A/P below.        Psychiatric Review Of Systems - Currently, the patient is endorsing and/or denying the following:  (patient's endorsements are BOLDED below; if not BOLDED, then patient denied):    Endorses Symptoms of Depression: diminished mood, low motivation, loss of  interest/anhedonia, reduced attention to ADLs, irritability, diminished energy, change in sleep (reduced), change in appetite (reduced), diminished concentration or cognition or indecisiveness, PMA/R, excessive feelings of guilt, hopelessness, worthlessness, and active suicidal ideations    Endorses trouble with Sleep: initiation, maintenance, early morning awakening with inability to return to sleep    Denies Homicidal ideations: active/passive ideations, organized plans, future intentions    Denies Symptoms of psychosis: hallucinations, delusions, disorganized thinking, disorganized behavior or abnormal motor behavior, or negative symptoms (diminshed emotional expression, avolition, anhedonia, alogia, asociality     Denies Symptoms of acacia or hypomania: elevated, expansive, or irritable mood with increased energy or activity; with inflated self-esteem or grandiosity, decreased need for sleep, increased rate of speech, FOI or racing thoughts, distractibility, increased goal directed activity or PMA, risky/disinhibited behavior    Denies Symptoms of Anxiety: excessive anxiety/worry/fear, more days than not, about numerous issues, difficult to control, with restlessness, fatigue, poor concentration, irritability, muscle tension, sleep disturbance; causes functionally impairing distress     Denies Symptoms of Panic Disorder: recurrent panic attacks, precipitated or un-precipitated, source of worry and/or behavioral changes secondary; with or without agoraphobia    Denies Symptoms of PTSD: h/o trauma; re-experiencing/intrusive symptoms, avoidant behavior, negative alterations in cognition or mood, or hyperarousal symptoms; with or without dissociative symptoms     Denies Symptoms of OCD: obsessions or compulsions     Denies Symptoms of Eating Disorders: anorexia, bulimia or binging    Denies Substance Use: intoxication, withdrawal, tolerance, used in larger amounts or duration than intended, unsuccessful attempts to  limit or quit, increased time engaging in or seeking out, cravings or strong desire to use, failure to fulfill obligations, negative consequences in social/interpersonal/occupational,/recreational areas, use in dangerous situations, medical or psychological consequences       PSYCHOTHERAPY ADD-ON [id number]   30 (16-37*) minutes    Time: 16 minutes  Participants: Met with patient    Therapeutic Intervention Type: behavior modifying psychotherapy, supportive psychotherapy  Why chosen therapy is appropriate versus another modality: relevant to diagnosis, patient responds to this modality, evidence based practice    Target symptoms: depression, SI, poor attention to ADLs, and insomnia   Primary focus:  improving depression with SI, self-care, and sleep  Psychotherapeutic techniques: supportive and psychodynamic techniques; psycho-education; deep breathing exercises; CBT; problem solving techniques and managing life stressors    Outcome monitoring methods: self-report, observation    Patient's response to intervention:  The patient's response to intervention is accepting.    Progress toward goals:  The patient's progress toward goals is fair .      ROS:  General ROS: negative for - chills, fever or night sweats; positive for fatigue  Ophthalmic ROS: negative for - blurry vision, double vision or eye pain  ENT ROS: negative for - sinus pain, headaches, sore throat or visual changes  Allergy and Immunology ROS: negative for - hives, itchy/watery eyes or nasal congestion  Hematological and Lymphatic ROS: negative for - bleeding problems, bruising, jaundice or pallor  Endocrine ROS: negative for - galactorrhea, hot flashes, mood swings, palpitations or temperature intolerance  Respiratory ROS: negative for - cough, hemoptysis, shortness of breath, tachypnea or wheezing  Cardiovascular ROS: negative for - chest pain, dyspnea on exertion, loss of consciousness, palpitations, rapid heart rate or shortness of  "breath  Gastrointestinal ROS: negative for - appetite loss, nausea, abdominal pain, blood in stools, change in bowel habits, constipation or diarrhea  Genito-Urinary ROS: negative for - incontinence, nocturia or pelvic pain  Musculoskeletal ROS: negative for - joint stiffness, joint swelling, joint pain or muscle pain   Neurological ROS: negative for - behavioral changes, confusion, dizziness, memory loss, numbness/tingling or seizures; positive for chronic L-sided BUE and BLE weakness   Dermatological ROS: negative for dry skin, hair changes, pruritus or rash  Psychiatric ROS: see detailed psychiatric ROS above in history section       Past Psychiatric History:  Previous Medication Trials: Wellbutrin XL and low dose Seroquel   Previous Psychiatric Hospitalizations: denies   Previous Suicide Attempts: denies   History of Violence: denies   Current / Recent Outpatient Psychiatrist: denies   Hx of Depression: yes  Hx of Anxiety: denies   Hx of Luciana: denies   Hx of Psychosis: denies   Hx of PTSD: denies     Social History:  Marital Status: single  Children: 3 adult sons   Employment Status/Info: retired  at [employer]   Education: high school diploma/GED  Special Ed: denies   : denies   Rastafarian: Christianity   Housing Status: lives in [place], [place] with adult son   Hobbies/Leisure time: "nothing lately"  History of phys/sexual abuse: denies   Access to gun: yes; owns hunting firearm; counseled patient on risk reduction methods such as firearm removal, locking, and separation of ammunition.    Family Psychiatric History: denies     Substance Abuse History:  Recreational Drugs: denies   Use of Alcohol: denies   Rehab History: denies   Tobacco Use: 20 pack year hx; down to 3-4 cigarettes per day (counseled on cessation)   Use of Caffeine: denies   Use of OTC: denies   Legal consequences of chemical use: denies     Legal History:  Past Charges/Incarcerations: denies   Pending charges: denies "     Psychosocial Stressors: family and health.   Functioning Relationships: friction with his son.   Strengths AND Liabilities  Strength: Patient accepts guidance/feedback, Strength: Patient is expressive/articulate., Liability: Patient has poor health., Liability: Patient has poor judgment, Liability: Patient is unstable., Liability: Patient lacks coping skills.      PAST MEDICAL & SURGICAL HISTORY   Past Medical History:   Diagnosis Date    Anticoagulant long-term use     Arthritis     Colon polyp     Diabetes mellitus     Diabetes mellitus, type 2     Fatty liver     Hypertension     Major depressive disorder 11/17/2020    -Continue Wellbutrin    PEG (percutaneous endoscopic gastrostomy) adjustment/replacement/removal 1/6/2021    Stroke 11/2020    left sided weakness    Urinary retention 9/7/2021     Past Surgical History:   Procedure Laterality Date    BACK SURGERY      COLONOSCOPY  07/18/2014    Dr. Crane: 22 colon polyps removed, hemorrhoids, erythema to sigmoid, repeat in 1 year for surveillance; biopsy: sigmoid erythema- showed unremarkable colonic mucosa, tubular adenomas and hyperplastic polyps    COLONOSCOPY N/A 5/3/2019    Procedure: COLONOSCOPY;  Surgeon: Guero Crane MD;  Location: [facility];  Service: Endoscopy;  Laterality: N/A;    COLONOSCOPY N/A 5/6/2019    Procedure: COLONOSCOPY;  Surgeon: Guero Crane MD;  Location: [facility];  Service: Endoscopy;  Laterality: N/A;    CYSTOSCOPY N/A 2/9/2022    Procedure: CYSTOSCOPY;  Surgeon: Jaylen Antonio Jr., MD;  Location: [facility] OR;  Service: Urology;  Laterality: N/A;    EPIDURAL STEROID INJECTION INTO THORACIC SPINE N/A 8/30/2019    Procedure: Injection-steroid-epidural-thoracic;  Surgeon: Frantz Green MD;  Location: [facility] OR;  Service: Pain Management;  Laterality: N/A;  T6-7    ESOPHAGOGASTRODUODENOSCOPY N/A 4/26/2019    Procedure: EGD (ESOPHAGOGASTRODUODENOSCOPY);  Surgeon: Guero Crane MD;  Location: [facility];  Service:  Endoscopy;  Laterality: N/A;    ESOPHAGOGASTRODUODENOSCOPY N/A 11/23/2020    Procedure: EGD (ESOPHAGOGASTRODUODENOSCOPY);  Surgeon: Guero Crane MD;  Location: [facility] ENDO;  Service: Endoscopy;  Laterality: N/A;    ESOPHAGOGASTRODUODENOSCOPY N/A 1/6/2021    Procedure: EGD (ESOPHAGOGASTRODUODENOSCOPY);  Surgeon: Guero Crane MD;  Location: [facility] ENDO;  Service: Endoscopy;  Laterality: N/A;    HERNIA REPAIR      TRANSRECTAL ULTRASOUND EXAMINATION N/A 2/9/2022    Procedure: ULTRASOUND, RECTAL APPROACH;  Surgeon: Jaylen Antonio Jr., MD;  Location: [facility] OR;  Service: Urology;  Laterality: N/A;  must text son miroslava, times and instructions given leave at 130       NEUROLOGIC HISTORY  Seizures: denies    Head trauma: denies    CVA: yes      FAMILY HISTORY   Family History   Problem Relation Age of Onset    Heart disease Mother     Heart disease Father     Diabetes Brother     Suicide Brother     Brain cancer Brother     Colon cancer Neg Hx     Crohn's disease Neg Hx     Ulcerative colitis Neg Hx     Stomach cancer Neg Hx     Esophageal cancer Neg Hx     Glaucoma Neg Hx     Retinal detachment Neg Hx     Macular degeneration Neg Hx        ALLERGIES   Review of patient's allergies indicates:  No Known Allergies    CURRENT MEDICATION REGIMEN   Home Meds:   Prior to Admission medications    Medication Sig Start Date End Date Taking? Authorizing Provider   aspirin (ECOTRIN) 81 MG EC tablet Take 1 tablet (81 mg total) by mouth once daily. 11/25/20 11/25/21  Neil Rosas MD   atorvastatin (LIPITOR) 40 MG tablet Take 1 tablet (40 mg total) by mouth once daily. 1/14/21 1/14/22  Joey Whitehead MD   blood sugar diagnostic Strp To check BG 2 times daily, to use with insurance preferred meter. One touch Ultra. 3/10/21   Joey Whitehead MD   buPROPion (WELLBUTRIN XL) 300 MG 24 hr tablet Take 1 tablet (300 mg total) by mouth once daily. 1/14/21   Joey Whitehead MD   clopidogreL (PLAVIX) 75 mg tablet 1 tablet (75  "mg total) by Per G Tube route once daily. 1/14/21 1/14/22  Joey Whitehead MD   finasteride (PROSCAR) 5 mg tablet Take 1 tablet (5 mg total) by mouth once daily. Take 1 tablet by mouth in am to keep prostate from enlarging 9/8/21 9/8/22  Kati Daniels MD   insulin NPH-insulin regular, 70/30, (NOVOLIN 70/30) 100 unit/mL (70-30) injection Inject 20 Units into the skin 2 (two) times daily. #3 10 ml syringes with 32 gauge esrgo needles 10/6/21 10/6/22  Joey Whitehead MD   lancets Misc To check BG 2 times daily, to use with insurance preferred meter.One Touch Ultra 3/10/21   Joey Whitehead MD   lisinopriL-hydrochlorothiazide (PRINZIDE,ZESTORETIC) 20-12.5 mg per tablet TAKE ONE TABLET BY MOUTH TWICE DAILY 10/29/21   Joey Whitehead MD   losartan (COZAAR) 25 MG tablet 1 tablet DAILY (route: oral) 10/18/21   Historical Provider   meclizine (ANTIVERT) 25 mg tablet Take 1 tablet (25 mg total) by mouth 3 (three) times daily as needed for Dizziness. 11/24/20   Neil Rosas MD   melatonin (MELATIN) 3 mg tablet Take 2 tablets (6 mg total) by mouth nightly as needed for Insomnia. 1/14/21   Joey Whitehead MD   omeprazole (PRILOSEC) 40 MG capsule TAKE ONE CAPSULE (40 MG) BY MOUTH EVERY DAY 11/18/21   Joey Whitehead MD   pen needle, diabetic 32 gauge x 5/32" Ndle Use AC meals 2 a day 10/6/21   Joey Whitehead MD   pioglitazone (ACTOS) 30 MG tablet Take 30 mg by mouth once daily. 11/2/21   Historical Provider   QUEtiapine (SEROQUEL) 25 MG Tab Take 1 tablet (25 mg total) by mouth every evening. 1/14/21 1/14/22  Joey Whitehead MD   sulfamethoxazole-trimethoprim 800-160mg (BACTRIM DS) 800-160 mg Tab Take 1 tablet by mouth 2 (two) times daily. for 7 days 6/27/22 7/4/22  Gerber Leach MD   tetrahydrozoline 0.05% (VISION CLEAR) 0.05 % Drop Place 2 drops into both eyes 4 (four) times daily as needed (eye irritation). 11/24/20   Neil Rosas MD       OTC Meds: denies     Scheduled Meds:    PRN Meds:    Psychotherapeutics " "(From admission, onward)            None          LABORATORY DATA   Recent Results (from the past 72 hour(s))   CBC auto differential    Collection Time: 06/27/22  5:52 PM   Result Value Ref Range    WBC 5.58 3.90 - 12.70 K/uL    RBC 4.89 4.60 - 6.20 M/uL    Hemoglobin 13.5 (L) 14.0 - 18.0 g/dL    Hematocrit 40.9 40.0 - 54.0 %    MCV 84 82 - 98 fL    MCH 27.6 27.0 - 31.0 pg    MCHC 33.0 32.0 - 36.0 g/dL    RDW 13.8 11.5 - 14.5 %    Platelets 186 150 - 450 K/uL    MPV 8.7 (L) 9.2 - 12.9 fL    Immature Granulocytes 0.4 0.0 - 0.5 %    Gran # (ANC) 3.4 1.8 - 7.7 K/uL    Immature Grans (Abs) 0.02 0.00 - 0.04 K/uL    Lymph # 1.7 1.0 - 4.8 K/uL    Mono # 0.3 0.3 - 1.0 K/uL    Eos # 0.1 0.0 - 0.5 K/uL    Baso # 0.06 0.00 - 0.20 K/uL    nRBC 0 0 /100 WBC    Gran % 61.5 38.0 - 73.0 %    Lymph % 29.6 18.0 - 48.0 %    Mono % 5.6 4.0 - 15.0 %    Eosinophil % 1.8 0.0 - 8.0 %    Basophil % 1.1 0.0 - 1.9 %    Differential Method Automated       No results found for: PHENYTOIN, PHENOBARB, VALPROATE, CBMZ      EXAMINATION    VITALS   Vitals:    06/27/22 1722   BP: 137/65   Pulse: 63   Resp: 18   Temp: 98.3 °F (36.8 °C)   TempSrc: Oral   SpO2: 95%   Weight: 104.3 kg (230 lb)   Height: 6' 4" (1.93 m)        CONSTITUTIONAL  General Appearance: NAD, unremarkable, age appropriate, lying in bed, overweight, calm     MUSCULOSKELETAL  Muscle Strength and Tone: chronic L-sided weakness noted in BUE and BLE    Abnormal Involuntary Movements: none observed   Gait and Station: ambulatory with a walker     PSYCHIATRIC   Behavior/Cooperation:  cooperative, psychomotor retardation, calm, intermittent eye contact   Speech:  normal rate, normal pitch, normal volume, monotone  Language: grossly intact, able to name, able to repeat with spontaneous speech  Mood: "depressed"  Affect:  Constricted   Associations: intact; no AXEL  Thought Process: Linear   Thought Content: + SI ; denies HI, AH, VH, TH, delusions, or paranoia (no RIS observed)  Sensorium:  " Awake  Alert and Oriented: to person, place, situation, month of year, year  Memory: 3/3 immediate, 1/3 at 5 minutes    Recent: Intact; able to report recent events   Remote: Limited ; Named 2/4 past presidents   Attention/concentration: Intact. Appropriate for age/education. Able to spell [id number]-r-[id number] & d-l-r-o-w.   Similarities: Intact (difference between apple and orange?)  Abstract reasoning: Limited   Fund of Knowledge: Named 2/4 past presidents   Insight: Impaired / Limited  Judgment: Impaired / Limited     CAM ICU Delirium Assessment - NEGATIVE      Is the patient aware of the biomedical complications associated with substance abuse and mental illness? yes        MEDICAL DECISION MAKING    ASSESSMENT        Unspecified Depressive Disorder with Suicidal Ideation   Unspecified Insomnia   (rule out MDD)       RECOMMENDATIONS       - Patient currently meets PEC criteria due to being an imminent threat to self 2/2 mental illness at this time.    - Once medically cleared, seek inpatient [facility]-psychiatric admission for further treatment / stabilization.       - While seeking inpatient psychiatric admission, can begin Zoloft 25 mg PO daily for depression (with a plan to titrate if no AEs) (discussed risks/benefits/alt vs no treatment with patient).    - Can use Trazodone 50 mg PO QHS PRN for insomnia (discussed risks/benefits/alt vs no treatment with patient).    - Defer non-psychiatric medications / management to the ED MD.       - Psychotherapy was performed with patient as noted above with a focus on improving depression with SI, self-care, and sleep.    - Patient's most recent resulted labs, imaging, and EKG were reviewed today.       - Continue suicide / violence precautions and continue to monitor the patient with a sitter while on a PEC / CEC.       - Thank you for this consult         Total time spent with patient and/or managing/coordinating patient's care today (excluding the time spent on psychotherapy): 60  minutes   Time spent on psychotherapy today (as noted above): 16 minutes   Total time for encounter today including psychotherapy: 76 minutes      More than 50% of the time was spent counseling/coordinating care.     Consulting clinician was informed of the encounter and consult note.     Consultation ended: 6/27/2022 at 7:01 PM       STAFF:  Stephan Ward MD  [facility]   6/27/2022

## 2022-06-27 NOTE — ED PROVIDER NOTES
Encounter Date: 6/27/2022       History     Chief Complaint   Patient presents with    Psychiatric Evaluation     Suicidal ideation  small cut to rt. wrist     66-year-old male history of diabetes, major depression, stroke urinary retention presents for suicidal ideation.  Patient reports worsening depression over the last several weeks.  He states his son is less helpful at home than usual.  EMS also reports the house is undergoing some renovations and there are holes in the floor making it difficult for the patient who is wheelchair-bound to move around the house.  Patient reports that if he could successfully commit suicide he probably would.  Superficial abrasion to the right wrist today.  No recent illness per    The history is provided by the patient and the EMS personnel.     Review of patient's allergies indicates:  No Known Allergies  Past Medical History:   Diagnosis Date    Anticoagulant long-term use     Arthritis     Colon polyp     Diabetes mellitus     Diabetes mellitus, type 2     Fatty liver     Hypertension     Major depressive disorder 11/17/2020    -Continue Wellbutrin    PEG (percutaneous endoscopic gastrostomy) adjustment/replacement/removal 1/6/2021    Stroke 11/2020    left sided weakness    Urinary retention 9/7/2021     Past Surgical History:   Procedure Laterality Date    BACK SURGERY      COLONOSCOPY  07/18/2014    Dr. Crane: 22 colon polyps removed, hemorrhoids, erythema to sigmoid, repeat in 1 year for surveillance; biopsy: sigmoid erythema- showed unremarkable colonic mucosa, tubular adenomas and hyperplastic polyps    COLONOSCOPY N/A 5/3/2019    Procedure: COLONOSCOPY;  Surgeon: Guero Crane MD;  Location: [facility];  Service: Endoscopy;  Laterality: N/A;    COLONOSCOPY N/A 5/6/2019    Procedure: COLONOSCOPY;  Surgeon: Guero Crane MD;  Location: [facility];  Service: Endoscopy;  Laterality: N/A;    CYSTOSCOPY N/A 2/9/2022    Procedure: CYSTOSCOPY;   Surgeon: Jaylen Antonio Jr., MD;  Location: [facility] OR;  Service: Urology;  Laterality: N/A;    EPIDURAL STEROID INJECTION INTO THORACIC SPINE N/A 8/30/2019    Procedure: Injection-steroid-epidural-thoracic;  Surgeon: Frantz Green MD;  Location: [facility] OR;  Service: Pain Management;  Laterality: N/A;  T6-7    ESOPHAGOGASTRODUODENOSCOPY N/A 4/26/2019    Procedure: EGD (ESOPHAGOGASTRODUODENOSCOPY);  Surgeon: Guero Crane MD;  Location: [facility];  Service: Endoscopy;  Laterality: N/A;    ESOPHAGOGASTRODUODENOSCOPY N/A 11/23/2020    Procedure: EGD (ESOPHAGOGASTRODUODENOSCOPY);  Surgeon: Guero Crane MD;  Location: [facility];  Service: Endoscopy;  Laterality: N/A;    ESOPHAGOGASTRODUODENOSCOPY N/A 1/6/2021    Procedure: EGD (ESOPHAGOGASTRODUODENOSCOPY);  Surgeon: Guero Crane MD;  Location: [facility];  Service: Endoscopy;  Laterality: N/A;    HERNIA REPAIR      TRANSRECTAL ULTRASOUND EXAMINATION N/A 2/9/2022    Procedure: ULTRASOUND, RECTAL APPROACH;  Surgeon: Jaylen Antonio Jr., MD;  Location: [facility] OR;  Service: Urology;  Laterality: N/A;  must text son miroslava, times and instructions given leave at 130     Family History   Problem Relation Age of Onset    Heart disease Mother     Heart disease Father     Diabetes Brother     Suicide Brother     Brain cancer Brother     Colon cancer Neg Hx     Crohn's disease Neg Hx     Ulcerative colitis Neg Hx     Stomach cancer Neg Hx     Esophageal cancer Neg Hx     Glaucoma Neg Hx     Retinal detachment Neg Hx     Macular degeneration Neg Hx      Social History     Tobacco Use    Smoking status: Current Every Day Smoker     Packs/day: 1.00     Years: 50.00     Pack years: 50.00     Types: Cigarettes    Smokeless tobacco: Never Used   Substance Use Topics    Alcohol use: Yes     Alcohol/week: 14.0 standard drinks     Types: 14 Cans of beer per week     Comment: 2-3 beers daily    Drug use: No     Review of Systems   Constitutional: Negative.     Respiratory: Negative for shortness of breath.    Cardiovascular: Negative for chest pain.   Genitourinary:        Indwelling Gutierrez   Neurological: Positive for weakness.   Psychiatric/Behavioral: Positive for dysphoric mood, self-injury, sleep disturbance and suicidal ideas.   All other systems reviewed and are negative.      Physical Exam     Initial Vitals [06/27/22 1722]   BP Pulse Resp Temp SpO2   137/65 63 18 98.3 °F (36.8 °C) 95 %      MAP       --         Physical Exam    Nursing note and vitals reviewed.  Constitutional: He appears well-developed and well-nourished. He is not diaphoretic.  Non-toxic appearance. He does not have a sickly appearance. He does not appear ill. No distress.   HENT:   Head: Normocephalic and atraumatic.   Eyes: EOM are normal.   Neck: Neck supple.   Normal range of motion.  Cardiovascular: Normal rate, regular rhythm and normal heart sounds. Exam reveals no gallop and no friction rub.    No murmur heard.  Pulmonary/Chest: Breath sounds normal. No respiratory distress. He has no wheezes. He has no rhonchi. He has no rales.   Abdominal: Abdomen is soft. He exhibits no distension. There is no abdominal tenderness.   Genitourinary:    Genitourinary Comments: Indwelling Gutierrez     Musculoskeletal:         General: Normal range of motion.      Cervical back: Normal range of motion and neck supple. No rigidity. Normal range of motion.     Neurological: He is alert and oriented to person, place, and time.   Left-sided weakness   Skin: Skin is warm and dry. No rash noted.   Psychiatric: He has a normal mood and affect. His behavior is normal. Judgment normal. He expresses suicidal ideation.         ED Course   Procedures  Labs Reviewed   CBC W/ AUTO DIFFERENTIAL - Abnormal; Notable for the following components:       Result Value    Hemoglobin 13.5 (*)     MPV 8.7 (*)     All other components within normal limits   COMPREHENSIVE METABOLIC PANEL - Abnormal; Notable for the following  components:    Glucose 196 (*)     AST 9 (*)     All other components within normal limits   TSH - Abnormal; Notable for the following components:    TSH 0.329 (*)     All other components within normal limits   ACETAMINOPHEN LEVEL - Abnormal; Notable for the following components:    Acetaminophen (Tylenol), Serum <3.0 (*)     All other components within normal limits   URINALYSIS, REFLEX TO URINE CULTURE - Abnormal; Notable for the following components:    Appearance, UA Hazy (*)     Specific Gravity, UA >=1.030 (*)     Glucose, UA 2+ (*)     Occult Blood UA Trace (*)     Nitrite, UA Positive (*)     Leukocytes, UA 1+ (*)     All other components within normal limits    Narrative:     Specimen Source->Urine   URINALYSIS MICROSCOPIC - Abnormal; Notable for the following components:    RBC, UA 5 (*)     WBC, UA 19 (*)     Bacteria Many (*)     All other components within normal limits    Narrative:     Specimen Source->Urine   CULTURE, URINE   DRUG SCREEN PANEL, URINE EMERGENCY    Narrative:     Specimen Source->Urine   ALCOHOL,MEDICAL (ETHANOL)   SARS-COV-2 RNA AMPLIFICATION, QUAL   T4, FREE          Imaging Results    None          Medications   sulfamethoxazole-trimethoprim 800-160mg per tablet 1 tablet (1 tablet Oral Given 6/27/22 1947)     Medical Decision Making:   History:   Old Medical Records: I decided to obtain old medical records.  Clinical Tests:   Lab Tests: Ordered and Reviewed             ED Course as of 06/28/22 1234   Mon Jun 27, 2022   1728 Temp: 98.3 °F (36.8 °C) [EF]   1728 Pulse: 63 [EF]   1728 Resp: 18 [EF]   1728 SpO2: 95 % [EF]   1934 Patient is medically cleared for psychiatric of placement.  He does have a UTI and started on Bactrim in the ED.  Prescription has been placed for Bactrim. [AS]      ED Course User Index  [AS] Jacky Richard MD  [EF] Gerber Leach MD             Clinical Impression:   Final diagnoses:  [Z00.8] Medical clearance for psychiatric admission  (Primary)  [R46.070] Suicidal ideation          ED Disposition Condition    Transfer to Psych Facility         ED Prescriptions     Medication Sig Dispense Start Date End Date Auth. Provider    sulfamethoxazole-trimethoprim 800-160mg (BACTRIM DS) 800-160 mg Tab Take 1 tablet by mouth 2 (two) times daily. for 7 days 14 tablet 6/27/2022 7/4/2022 Gerber Leach MD        Follow-up Information    None         66-year-old male presents with suicidal ideation.  Very superficial abrasion to the right wrist.  Patient does state however that he would commit suicide if he thought that he could successfully do it.  Medically clear for inpatient psych.     Gerber Leach MD  06/28/22 3606

## 2022-06-28 NOTE — ED NOTES
Patient accepted at [facility] ( [address] [place], [place])for  the service of Dr. Ornelas .Report to be called to [phone].  [facility] will arrange transport.

## 2022-06-28 NOTE — ED NOTES
Upon transfer to [facility] in [place], patient is AAOx4, no cardiac or respiratory complications. No needs or questions at this time. Patient is calm and cooperative.

## 2022-06-28 NOTE — ED NOTES
Physician's Emergency Certificate for Jhonny Almazan faxed to and called into [facility] office.  PEC scanned into chart for [facility].

## 2022-06-30 LAB — BACTERIA UR CULT: ABNORMAL

## 2022-07-12 ENCOUNTER — DOCUMENT SCAN (OUTPATIENT)
Dept: HOME HEALTH SERVICES | Facility: HOSPITAL | Age: 67
End: 2022-07-12
Payer: MEDICARE

## 2022-07-25 ENCOUNTER — DOCUMENT SCAN (OUTPATIENT)
Dept: HOME HEALTH SERVICES | Facility: HOSPITAL | Age: 67
End: 2022-07-25
Payer: MEDICARE

## 2022-08-03 ENCOUNTER — HOSPITAL ENCOUNTER (OUTPATIENT)
Facility: HOSPITAL | Age: 67
Discharge: HOME HEALTH | End: 2022-08-09
Attending: EMERGENCY MEDICINE | Admitting: INTERNAL MEDICINE
Payer: MEDICARE

## 2022-08-03 DIAGNOSIS — R07.9 CHEST PAIN: ICD-10-CM

## 2022-08-03 DIAGNOSIS — Z79.4 TYPE 2 DIABETES MELLITUS WITH HYPERGLYCEMIA, WITH LONG-TERM CURRENT USE OF INSULIN: Chronic | ICD-10-CM

## 2022-08-03 DIAGNOSIS — E11.40 NEUROPATHY DUE TO TYPE 2 DIABETES MELLITUS: ICD-10-CM

## 2022-08-03 DIAGNOSIS — E11.65 TYPE 2 DIABETES MELLITUS WITH HYPERGLYCEMIA, WITH LONG-TERM CURRENT USE OF INSULIN: Chronic | ICD-10-CM

## 2022-08-03 DIAGNOSIS — E11.65 UNCONTROLLED TYPE 2 DIABETES MELLITUS WITH HYPERGLYCEMIA: ICD-10-CM

## 2022-08-03 DIAGNOSIS — T83.9XXA PROBLEM WITH FOLEY CATHETER, INITIAL ENCOUNTER: ICD-10-CM

## 2022-08-03 DIAGNOSIS — N39.0 URINARY TRACT INFECTION WITHOUT HEMATURIA, SITE UNSPECIFIED: Primary | ICD-10-CM

## 2022-08-03 DIAGNOSIS — R53.83 FATIGUE: ICD-10-CM

## 2022-08-03 DIAGNOSIS — N39.0 COMPLICATED UTI (URINARY TRACT INFECTION): ICD-10-CM

## 2022-08-03 DIAGNOSIS — R53.81 DEBILITY: ICD-10-CM

## 2022-08-03 LAB
ALBUMIN SERPL BCP-MCNC: 3.4 G/DL (ref 3.5–5.2)
ALP SERPL-CCNC: 95 U/L (ref 55–135)
ALT SERPL W/O P-5'-P-CCNC: 13 U/L (ref 10–44)
AMPHET+METHAMPHET UR QL: NEGATIVE
ANION GAP SERPL CALC-SCNC: 14 MMOL/L (ref 8–16)
AST SERPL-CCNC: 13 U/L (ref 10–40)
BACTERIA #/AREA URNS HPF: ABNORMAL /HPF
BARBITURATES UR QL SCN>200 NG/ML: NEGATIVE
BASOPHILS # BLD AUTO: 0.05 K/UL (ref 0–0.2)
BASOPHILS NFR BLD: 0.7 % (ref 0–1.9)
BENZODIAZ UR QL SCN>200 NG/ML: NEGATIVE
BILIRUB SERPL-MCNC: 0.9 MG/DL (ref 0.1–1)
BILIRUB UR QL STRIP: ABNORMAL
BUN SERPL-MCNC: 11 MG/DL (ref 8–23)
BZE UR QL SCN: NEGATIVE
CALCIUM SERPL-MCNC: 9.9 MG/DL (ref 8.7–10.5)
CANNABINOIDS UR QL SCN: NEGATIVE
CHLORIDE SERPL-SCNC: 100 MMOL/L (ref 95–110)
CLARITY UR: ABNORMAL
CO2 SERPL-SCNC: 24 MMOL/L (ref 23–29)
COLOR UR: YELLOW
CREAT SERPL-MCNC: 0.8 MG/DL (ref 0.5–1.4)
CREAT UR-MCNC: 265.4 MG/DL (ref 23–375)
DIFFERENTIAL METHOD: ABNORMAL
EOSINOPHIL # BLD AUTO: 0.2 K/UL (ref 0–0.5)
EOSINOPHIL NFR BLD: 2.7 % (ref 0–8)
ERYTHROCYTE [DISTWIDTH] IN BLOOD BY AUTOMATED COUNT: 13.6 % (ref 11.5–14.5)
EST. GFR  (NO RACE VARIABLE): >60 ML/MIN/1.73 M^2
ETHANOL SERPL-MCNC: <10 MG/DL
GLUCOSE SERPL-MCNC: 136 MG/DL (ref 70–110)
GLUCOSE UR QL STRIP: NEGATIVE
HCT VFR BLD AUTO: 40.1 % (ref 40–54)
HCV AB SERPL QL IA: NEGATIVE
HGB BLD-MCNC: 13.7 G/DL (ref 14–18)
HGB UR QL STRIP: ABNORMAL
HIV 1+2 AB+HIV1 P24 AG SERPL QL IA: NEGATIVE
IMM GRANULOCYTES # BLD AUTO: 0.02 K/UL (ref 0–0.04)
IMM GRANULOCYTES NFR BLD AUTO: 0.3 % (ref 0–0.5)
KETONES UR QL STRIP: ABNORMAL
LEUKOCYTE ESTERASE UR QL STRIP: ABNORMAL
LIPASE SERPL-CCNC: 6 U/L (ref 4–60)
LYMPHOCYTES # BLD AUTO: 1.8 K/UL (ref 1–4.8)
LYMPHOCYTES NFR BLD: 25.9 % (ref 18–48)
MCH RBC QN AUTO: 27.8 PG (ref 27–31)
MCHC RBC AUTO-ENTMCNC: 34.2 G/DL (ref 32–36)
MCV RBC AUTO: 82 FL (ref 82–98)
METHADONE UR QL SCN>300 NG/ML: NEGATIVE
MICROSCOPIC COMMENT: ABNORMAL
MONOCYTES # BLD AUTO: 0.6 K/UL (ref 0.3–1)
MONOCYTES NFR BLD: 8.1 % (ref 4–15)
NEUTROPHILS # BLD AUTO: 4.3 K/UL (ref 1.8–7.7)
NEUTROPHILS NFR BLD: 62.3 % (ref 38–73)
NITRITE UR QL STRIP: NEGATIVE
NRBC BLD-RTO: 0 /100 WBC
OPIATES UR QL SCN: NEGATIVE
PCP UR QL SCN>25 NG/ML: NEGATIVE
PH UR STRIP: 6 [PH] (ref 5–8)
PLATELET # BLD AUTO: 217 K/UL (ref 150–450)
PMV BLD AUTO: 9.7 FL (ref 9.2–12.9)
POCT GLUCOSE: 132 MG/DL (ref 70–110)
POCT GLUCOSE: 97 MG/DL (ref 70–110)
POTASSIUM SERPL-SCNC: 3.1 MMOL/L (ref 3.5–5.1)
PROT SERPL-MCNC: 7 G/DL (ref 6–8.4)
PROT UR QL STRIP: NEGATIVE
RBC # BLD AUTO: 4.92 M/UL (ref 4.6–6.2)
RBC #/AREA URNS HPF: 4 /HPF (ref 0–4)
SARS-COV-2 RDRP RESP QL NAA+PROBE: NEGATIVE
SODIUM SERPL-SCNC: 138 MMOL/L (ref 136–145)
SP GR UR STRIP: 1.02 (ref 1–1.03)
SQUAMOUS #/AREA URNS HPF: 0 /HPF
T4 FREE SERPL-MCNC: 1.05 NG/DL (ref 0.71–1.51)
TOXICOLOGY INFORMATION: NORMAL
TROPONIN I SERPL DL<=0.01 NG/ML-MCNC: 0.01 NG/ML (ref 0–0.03)
TSH SERPL DL<=0.005 MIU/L-ACNC: 0.24 UIU/ML (ref 0.4–4)
URN SPEC COLLECT METH UR: ABNORMAL
UROBILINOGEN UR STRIP-ACNC: 1 EU/DL
WBC # BLD AUTO: 6.91 K/UL (ref 3.9–12.7)
WBC #/AREA URNS HPF: 46 /HPF (ref 0–5)

## 2022-08-03 PROCEDURE — 87077 CULTURE AEROBIC IDENTIFY: CPT | Performed by: EMERGENCY MEDICINE

## 2022-08-03 PROCEDURE — C9399 UNCLASSIFIED DRUGS OR BIOLOG: HCPCS | Performed by: NURSE PRACTITIONER

## 2022-08-03 PROCEDURE — 87389 HIV-1 AG W/HIV-1&-2 AB AG IA: CPT | Performed by: EMERGENCY MEDICINE

## 2022-08-03 PROCEDURE — 93005 ELECTROCARDIOGRAM TRACING: CPT

## 2022-08-03 PROCEDURE — 86803 HEPATITIS C AB TEST: CPT | Performed by: EMERGENCY MEDICINE

## 2022-08-03 PROCEDURE — 93010 ELECTROCARDIOGRAM REPORT: CPT | Mod: ,,, | Performed by: INTERNAL MEDICINE

## 2022-08-03 PROCEDURE — 99285 EMERGENCY DEPT VISIT HI MDM: CPT | Mod: 25

## 2022-08-03 PROCEDURE — 25000003 PHARM REV CODE 250: Performed by: EMERGENCY MEDICINE

## 2022-08-03 PROCEDURE — G0378 HOSPITAL OBSERVATION PER HR: HCPCS

## 2022-08-03 PROCEDURE — 93010 EKG 12-LEAD: ICD-10-PCS | Mod: ,,, | Performed by: INTERNAL MEDICINE

## 2022-08-03 PROCEDURE — 87186 SC STD MICRODIL/AGAR DIL: CPT | Performed by: EMERGENCY MEDICINE

## 2022-08-03 PROCEDURE — 96372 THER/PROPH/DIAG INJ SC/IM: CPT | Mod: 59 | Performed by: NURSE PRACTITIONER

## 2022-08-03 PROCEDURE — 63600175 PHARM REV CODE 636 W HCPCS: Performed by: NURSE PRACTITIONER

## 2022-08-03 PROCEDURE — 25000003 PHARM REV CODE 250: Performed by: NURSE PRACTITIONER

## 2022-08-03 PROCEDURE — 85025 COMPLETE CBC W/AUTO DIFF WBC: CPT | Performed by: EMERGENCY MEDICINE

## 2022-08-03 PROCEDURE — U0002 COVID-19 LAB TEST NON-CDC: HCPCS | Performed by: EMERGENCY MEDICINE

## 2022-08-03 PROCEDURE — 80053 COMPREHEN METABOLIC PANEL: CPT | Performed by: EMERGENCY MEDICINE

## 2022-08-03 PROCEDURE — 84443 ASSAY THYROID STIM HORMONE: CPT | Performed by: EMERGENCY MEDICINE

## 2022-08-03 PROCEDURE — 83690 ASSAY OF LIPASE: CPT | Performed by: EMERGENCY MEDICINE

## 2022-08-03 PROCEDURE — 63600175 PHARM REV CODE 636 W HCPCS: Performed by: EMERGENCY MEDICINE

## 2022-08-03 PROCEDURE — 84484 ASSAY OF TROPONIN QUANT: CPT | Performed by: EMERGENCY MEDICINE

## 2022-08-03 PROCEDURE — 84439 ASSAY OF FREE THYROXINE: CPT | Performed by: EMERGENCY MEDICINE

## 2022-08-03 PROCEDURE — 87086 URINE CULTURE/COLONY COUNT: CPT | Performed by: EMERGENCY MEDICINE

## 2022-08-03 PROCEDURE — 36415 COLL VENOUS BLD VENIPUNCTURE: CPT | Performed by: EMERGENCY MEDICINE

## 2022-08-03 PROCEDURE — 87088 URINE BACTERIA CULTURE: CPT | Performed by: EMERGENCY MEDICINE

## 2022-08-03 PROCEDURE — 96365 THER/PROPH/DIAG IV INF INIT: CPT

## 2022-08-03 PROCEDURE — 82077 ASSAY SPEC XCP UR&BREATH IA: CPT | Performed by: EMERGENCY MEDICINE

## 2022-08-03 PROCEDURE — 96366 THER/PROPH/DIAG IV INF ADDON: CPT

## 2022-08-03 PROCEDURE — 81000 URINALYSIS NONAUTO W/SCOPE: CPT | Mod: 59 | Performed by: EMERGENCY MEDICINE

## 2022-08-03 PROCEDURE — 80307 DRUG TEST PRSMV CHEM ANLYZR: CPT | Performed by: EMERGENCY MEDICINE

## 2022-08-03 RX ORDER — NALOXONE HCL 0.4 MG/ML
0.02 VIAL (ML) INJECTION
Status: DISCONTINUED | OUTPATIENT
Start: 2022-08-03 | End: 2022-08-09 | Stop reason: HOSPADM

## 2022-08-03 RX ORDER — POTASSIUM CHLORIDE 20 MEQ/1
40 TABLET, EXTENDED RELEASE ORAL ONCE
Status: COMPLETED | OUTPATIENT
Start: 2022-08-03 | End: 2022-08-03

## 2022-08-03 RX ORDER — SODIUM,POTASSIUM PHOSPHATES 280-250MG
2 POWDER IN PACKET (EA) ORAL
Status: DISCONTINUED | OUTPATIENT
Start: 2022-08-03 | End: 2022-08-09 | Stop reason: HOSPADM

## 2022-08-03 RX ORDER — TALC
9 POWDER (GRAM) TOPICAL NIGHTLY PRN
Status: DISCONTINUED | OUTPATIENT
Start: 2022-08-03 | End: 2022-08-09 | Stop reason: HOSPADM

## 2022-08-03 RX ORDER — ACETAMINOPHEN 325 MG/1
650 TABLET ORAL EVERY 8 HOURS PRN
Status: DISCONTINUED | OUTPATIENT
Start: 2022-08-03 | End: 2022-08-09 | Stop reason: HOSPADM

## 2022-08-03 RX ORDER — BUPROPION HYDROCHLORIDE 150 MG/1
300 TABLET ORAL DAILY
Status: DISCONTINUED | OUTPATIENT
Start: 2022-08-04 | End: 2022-08-09 | Stop reason: HOSPADM

## 2022-08-03 RX ORDER — ASPIRIN 81 MG/1
81 TABLET ORAL DAILY
Status: DISCONTINUED | OUTPATIENT
Start: 2022-08-04 | End: 2022-08-09 | Stop reason: HOSPADM

## 2022-08-03 RX ORDER — ATORVASTATIN CALCIUM 40 MG/1
40 TABLET, FILM COATED ORAL DAILY
Status: DISCONTINUED | OUTPATIENT
Start: 2022-08-04 | End: 2022-08-09 | Stop reason: HOSPADM

## 2022-08-03 RX ORDER — LANOLIN ALCOHOL/MO/W.PET/CERES
800 CREAM (GRAM) TOPICAL
Status: DISCONTINUED | OUTPATIENT
Start: 2022-08-03 | End: 2022-08-09 | Stop reason: HOSPADM

## 2022-08-03 RX ORDER — IBUPROFEN 200 MG
24 TABLET ORAL
Status: DISCONTINUED | OUTPATIENT
Start: 2022-08-03 | End: 2022-08-09 | Stop reason: HOSPADM

## 2022-08-03 RX ORDER — MAG HYDROX/ALUMINUM HYD/SIMETH 200-200-20
30 SUSPENSION, ORAL (FINAL DOSE FORM) ORAL 4 TIMES DAILY PRN
Status: DISCONTINUED | OUTPATIENT
Start: 2022-08-03 | End: 2022-08-09 | Stop reason: HOSPADM

## 2022-08-03 RX ORDER — MEROPENEM AND SODIUM CHLORIDE 1 G/50ML
1 INJECTION, SOLUTION INTRAVENOUS
Status: COMPLETED | OUTPATIENT
Start: 2022-08-03 | End: 2022-08-03

## 2022-08-03 RX ORDER — ENOXAPARIN SODIUM 100 MG/ML
40 INJECTION SUBCUTANEOUS EVERY 24 HOURS
Status: DISCONTINUED | OUTPATIENT
Start: 2022-08-03 | End: 2022-08-09 | Stop reason: HOSPADM

## 2022-08-03 RX ORDER — GLUCAGON 1 MG
1 KIT INJECTION
Status: DISCONTINUED | OUTPATIENT
Start: 2022-08-03 | End: 2022-08-09 | Stop reason: HOSPADM

## 2022-08-03 RX ORDER — AMOXICILLIN 250 MG
1 CAPSULE ORAL 2 TIMES DAILY PRN
Status: DISCONTINUED | OUTPATIENT
Start: 2022-08-03 | End: 2022-08-04

## 2022-08-03 RX ORDER — SODIUM CHLORIDE 0.9 % (FLUSH) 0.9 %
5 SYRINGE (ML) INJECTION
Status: DISCONTINUED | OUTPATIENT
Start: 2022-08-03 | End: 2022-08-09 | Stop reason: HOSPADM

## 2022-08-03 RX ORDER — CLOPIDOGREL BISULFATE 75 MG/1
75 TABLET ORAL DAILY
Status: DISCONTINUED | OUTPATIENT
Start: 2022-08-04 | End: 2022-08-09 | Stop reason: HOSPADM

## 2022-08-03 RX ORDER — FINASTERIDE 5 MG/1
5 TABLET, FILM COATED ORAL DAILY
Status: DISCONTINUED | OUTPATIENT
Start: 2022-08-04 | End: 2022-08-09 | Stop reason: HOSPADM

## 2022-08-03 RX ORDER — MEROPENEM AND SODIUM CHLORIDE 1 G/50ML
1 INJECTION, SOLUTION INTRAVENOUS
Status: DISCONTINUED | OUTPATIENT
Start: 2022-08-04 | End: 2022-08-09 | Stop reason: HOSPADM

## 2022-08-03 RX ORDER — LOSARTAN POTASSIUM 25 MG/1
25 TABLET ORAL DAILY
Status: DISCONTINUED | OUTPATIENT
Start: 2022-08-04 | End: 2022-08-09 | Stop reason: HOSPADM

## 2022-08-03 RX ORDER — IBUPROFEN 200 MG
16 TABLET ORAL
Status: DISCONTINUED | OUTPATIENT
Start: 2022-08-03 | End: 2022-08-09 | Stop reason: HOSPADM

## 2022-08-03 RX ORDER — PANTOPRAZOLE SODIUM 40 MG/1
40 TABLET, DELAYED RELEASE ORAL DAILY
Status: DISCONTINUED | OUTPATIENT
Start: 2022-08-04 | End: 2022-08-09 | Stop reason: HOSPADM

## 2022-08-03 RX ORDER — ONDANSETRON 2 MG/ML
8 INJECTION INTRAMUSCULAR; INTRAVENOUS EVERY 8 HOURS PRN
Status: DISCONTINUED | OUTPATIENT
Start: 2022-08-03 | End: 2022-08-09 | Stop reason: HOSPADM

## 2022-08-03 RX ORDER — INSULIN ASPART 100 [IU]/ML
1-10 INJECTION, SOLUTION INTRAVENOUS; SUBCUTANEOUS
Status: DISCONTINUED | OUTPATIENT
Start: 2022-08-03 | End: 2022-08-09 | Stop reason: HOSPADM

## 2022-08-03 RX ADMIN — INSULIN DETEMIR 10 UNITS: 100 INJECTION, SOLUTION SUBCUTANEOUS at 10:08

## 2022-08-03 RX ADMIN — MEROPENEM AND SODIUM CHLORIDE 1 G: 1 INJECTION, SOLUTION INTRAVENOUS at 11:08

## 2022-08-03 RX ADMIN — MEROPENEM AND SODIUM CHLORIDE 1 G: 1 INJECTION, SOLUTION INTRAVENOUS at 04:08

## 2022-08-03 RX ADMIN — POTASSIUM CHLORIDE 40 MEQ: 1500 TABLET, EXTENDED RELEASE ORAL at 06:08

## 2022-08-03 RX ADMIN — ENOXAPARIN SODIUM 40 MG: 100 INJECTION SUBCUTANEOUS at 10:08

## 2022-08-03 NOTE — MEDICAL/APP STUDENT
History and Physical  [facility]    Admit Date: 8/3/2022    Subjective   SUBJECTIVE:   67 y/o male with hx of indwelling urinary catheter placement, PEG tube placement, stroke, HTN, and MDD presented to the ED with a 3-4 day history of worsening generalized weakness, fatigue and mild abdominal pain. Pt is wheelchair bound due to generalized weakness following a stroke in 11/2020. He has worsening weakness over the past few days with fatigue, a loss of appetite and mild abdominal pain. Pt has indwelling olguin catheter that pt reports was last changed around 2-3 weeks ago. Pt reports less urine output than normal. He has experienced a cough but denies F/chills/N/V/D, SOB, chest pain, edema, or flank pain. While in the ED, olguin was unable to be removed due to resistance and UA was positive for bacteria, leukocytes and WBC. Recent urine culture was positive for ESBL and MDRO. Pt will be admitted to the services of hospital medicine and urology consult in order to change indwelling olguin catheter    Past Medical History:   Diagnosis Date    Anticoagulant long-term use     Arthritis     Colon polyp     Diabetes mellitus     Diabetes mellitus, type 2     Fatty liver     Hypertension     Major depressive disorder 11/17/2020    -Continue Wellbutrin    PEG (percutaneous endoscopic gastrostomy) adjustment/replacement/removal 1/6/2021    Stroke 11/2020    left sided weakness    Urinary retention 9/7/2021       Past Surgical History:   Procedure Laterality Date    BACK SURGERY      COLONOSCOPY  07/18/2014    Dr. Crane: 22 colon polyps removed, hemorrhoids, erythema to sigmoid, repeat in 1 year for surveillance; biopsy: sigmoid erythema- showed unremarkable colonic mucosa, tubular adenomas and hyperplastic polyps    COLONOSCOPY N/A 5/3/2019    Procedure: COLONOSCOPY;  Surgeon: Guero Crane MD;  Location: [facility];  Service: Endoscopy;  Laterality: N/A;    COLONOSCOPY N/A 5/6/2019     Procedure: COLONOSCOPY;  Surgeon: Guero Crane MD;  Location: [facility] ENDO;  Service: Endoscopy;  Laterality: N/A;    CYSTOSCOPY N/A 2/9/2022    Procedure: CYSTOSCOPY;  Surgeon: Jaylen Antonio Jr., MD;  Location: [facility] OR;  Service: Urology;  Laterality: N/A;    EPIDURAL STEROID INJECTION INTO THORACIC SPINE N/A 8/30/2019    Procedure: Injection-steroid-epidural-thoracic;  Surgeon: Frantz Green MD;  Location: [facility] OR;  Service: Pain Management;  Laterality: N/A;  T6-7    ESOPHAGOGASTRODUODENOSCOPY N/A 4/26/2019    Procedure: EGD (ESOPHAGOGASTRODUODENOSCOPY);  Surgeon: Guero Crane MD;  Location: [facility] ENDO;  Service: Endoscopy;  Laterality: N/A;    ESOPHAGOGASTRODUODENOSCOPY N/A 11/23/2020    Procedure: EGD (ESOPHAGOGASTRODUODENOSCOPY);  Surgeon: Geuro Crane MD;  Location: [facility] ENDO;  Service: Endoscopy;  Laterality: N/A;    ESOPHAGOGASTRODUODENOSCOPY N/A 1/6/2021    Procedure: EGD (ESOPHAGOGASTRODUODENOSCOPY);  Surgeon: Guero Crane MD;  Location: [facility];  Service: Endoscopy;  Laterality: N/A;    HERNIA REPAIR      TRANSRECTAL ULTRASOUND EXAMINATION N/A 2/9/2022    Procedure: ULTRASOUND, RECTAL APPROACH;  Surgeon: Jaylen Antonio Jr., MD;  Location: [facility] OR;  Service: Urology;  Laterality: N/A;  must text son miroslava, times and instructions given leave at 130       Family History   Problem Relation Age of Onset    Heart disease Mother     Heart disease Father     Diabetes Brother     Suicide Brother     Brain cancer Brother     Colon cancer Neg Hx     Crohn's disease Neg Hx     Ulcerative colitis Neg Hx     Stomach cancer Neg Hx     Esophageal cancer Neg Hx     Glaucoma Neg Hx     Retinal detachment Neg Hx     Macular degeneration Neg Hx        Social History     Socioeconomic History    Marital status:    Tobacco Use    Smoking status: Current Every Day Smoker     Packs/day: 1.00     Years: 50.00     Pack years: 50.00     Types: Cigarettes    Smokeless tobacco: Never  Used   Substance and Sexual Activity    Alcohol use: Yes     Alcohol/week: 14.0 standard drinks     Types: 14 Cans of beer per week     Comment: 2-3 beers daily    Drug use: No    Sexual activity: Yes     Partners: Female       Current Facility-Administered Medications   Medication Dose Route Frequency Provider Last Rate Last Admin    meropenem-0.9% sodium chloride 1 g/50 mL IVPB  1 g Intravenous ED 1 Time Roger Stroud MD         Current Outpatient Medications   Medication Sig Dispense Refill    aspirin (ECOTRIN) 81 MG EC tablet Take 1 tablet (81 mg total) by mouth once daily. 360 tablet 0    atorvastatin (LIPITOR) 40 MG tablet Take 1 tablet (40 mg total) by mouth once daily. 90 tablet 3    blood sugar diagnostic Strp To check BG 2 times daily, to use with insurance preferred meter. One touch Ultra. 100 strip 11    buPROPion (WELLBUTRIN XL) 300 MG 24 hr tablet Take 1 tablet (300 mg total) by mouth once daily. 90 tablet 3    clopidogreL (PLAVIX) 75 mg tablet 1 tablet (75 mg total) by Per G Tube route once daily. 30 tablet 11    finasteride (PROSCAR) 5 mg tablet Take 1 tablet (5 mg total) by mouth once daily. Take 1 tablet by mouth in am to keep prostate from enlarging 90 tablet 3    insulin NPH-insulin regular, 70/30, (NOVOLIN 70/30) 100 unit/mL (70-30) injection Inject 20 Units into the skin 2 (two) times daily. #3 10 ml syringes with 32 gauge sergo needles 30 mL 0    lancets Misc To check BG 2 times daily, to use with insurance preferred meter.One Touch Ultra 100 each 11    lisinopriL-hydrochlorothiazide (PRINZIDE,ZESTORETIC) 20-12.5 mg per tablet TAKE ONE TABLET BY MOUTH TWICE DAILY 180 tablet 0    losartan (COZAAR) 25 MG tablet 1 tablet DAILY (route: oral)      meclizine (ANTIVERT) 25 mg tablet Take 1 tablet (25 mg total) by mouth 3 (three) times daily as needed for Dizziness.  0    melatonin (MELATIN) 3 mg tablet Take 2 tablets (6 mg total) by mouth nightly as needed for Insomnia.  0     "omeprazole (PRILOSEC) 40 MG capsule TAKE ONE CAPSULE (40 MG) BY MOUTH EVERY DAY 30 capsule 3    pen needle, diabetic 32 gauge x 5/32" Ndle Use AC meals 2 a day 100 each 11    pioglitazone (ACTOS) 30 MG tablet Take 30 mg by mouth once daily.      QUEtiapine (SEROQUEL) 25 MG Tab Take 1 tablet (25 mg total) by mouth every evening. 30 tablet 11    tetrahydrozoline 0.05% (VISION CLEAR) 0.05 % Drop Place 2 drops into both eyes 4 (four) times daily as needed (eye irritation).  0       Review of patient's allergies indicates:  No Known Allergies      Review of Systems: Systems were reviewed and otherwise negative unless indicated below.  Review of Systems   Constitutional: Positive for malaise/fatigue. Negative for chills, diaphoresis and fever.   HENT: Negative.  Negative for congestion, hearing loss, sinus pain and sore throat.    Eyes: Negative.  Negative for blurred vision, double vision and photophobia.   Respiratory: Positive for cough. Negative for hemoptysis, shortness of breath and wheezing.    Cardiovascular: Negative.  Negative for chest pain and palpitations.   Gastrointestinal: Positive for abdominal pain and constipation. Negative for diarrhea, heartburn, nausea and vomiting.   Genitourinary: Positive for frequency. Negative for flank pain and hematuria.   Musculoskeletal: Negative.    Skin: Negative.    Neurological: Positive for weakness. Negative for dizziness, focal weakness and headaches.   Endo/Heme/Allergies: Negative.    Psychiatric/Behavioral: Negative.          Objective   OBJECTIVE:     Vital Signs Range (Last 24H):  Temp:  [98 °F (36.7 °C)]   Pulse:  [50-74]   Resp:  [18]   BP: (118-146)/(65-74)   SpO2:  [95 %-99 %]       Physical Exam:  Physical Exam  Constitutional:       General: He is not in acute distress.     Appearance: Normal appearance. He is normal weight. He is not toxic-appearing.   HENT:      Head: Normocephalic and atraumatic.      Nose: Nose normal. No congestion.      " Mouth/Throat:      Mouth: Mucous membranes are dry.      Pharynx: Oropharynx is clear. No oropharyngeal exudate or posterior oropharyngeal erythema.   Eyes:      Extraocular Movements: Extraocular movements intact.      Pupils: Pupils are equal, round, and reactive to light.   Cardiovascular:      Rate and Rhythm: Normal rate and regular rhythm.      Pulses: Normal pulses.      Heart sounds: Normal heart sounds.   Pulmonary:      Effort: Pulmonary effort is normal.      Breath sounds: Normal breath sounds. No wheezing or rhonchi.   Abdominal:      General: Abdomen is flat. Bowel sounds are normal. There is no distension.      Palpations: Abdomen is soft. There is no mass.      Tenderness: There is generalized abdominal tenderness. There is no guarding or rebound. Negative signs include Tomlin's sign.   Genitourinary:     Comments: Indwelling catheter in place  Musculoskeletal:         General: Normal range of motion.      Cervical back: Normal range of motion.      Right lower leg: No edema.      Left lower leg: No edema.   Lymphadenopathy:      Cervical: No cervical adenopathy.   Skin:     General: Skin is warm.      Capillary Refill: Capillary refill takes less than 2 seconds.      Findings: No erythema or rash.   Neurological:      General: No focal deficit present.      Mental Status: He is alert and oriented to person, place, and time. Mental status is at baseline.      Cranial Nerves: No cranial nerve deficit.      Sensory: No sensory deficit.   Psychiatric:         Mood and Affect: Mood normal.         Behavior: Behavior normal.         Thought Content: Thought content normal.       Laboratory/Diagnostic Data:  Recent Labs   Lab 08/03/22  0902   WBC 6.91   RBC 4.92   HGB 13.7*   HCT 40.1      MCV 82   MCH 27.8   MCHC 34.2     CMP  Sodium   Date Value Ref Range Status   08/03/2022 138 136 - 145 mmol/L Final     Potassium   Date Value Ref Range Status   08/03/2022 3.1 (L) 3.5 - 5.1 mmol/L Final      Chloride   Date Value Ref Range Status   08/03/2022 100 95 - 110 mmol/L Final     CO2   Date Value Ref Range Status   08/03/2022 24 23 - 29 mmol/L Final     Glucose   Date Value Ref Range Status   08/03/2022 136 (H) 70 - 110 mg/dL Final     BUN   Date Value Ref Range Status   08/03/2022 11 8 - 23 mg/dL Final     Creatinine   Date Value Ref Range Status   08/03/2022 0.8 0.5 - 1.4 mg/dL Final     Calcium   Date Value Ref Range Status   08/03/2022 9.9 8.7 - 10.5 mg/dL Final     Total Protein   Date Value Ref Range Status   08/03/2022 7.0 6.0 - 8.4 g/dL Final     Albumin   Date Value Ref Range Status   08/03/2022 3.4 (L) 3.5 - 5.2 g/dL Final     Total Bilirubin   Date Value Ref Range Status   08/03/2022 0.9 0.1 - 1.0 mg/dL Final     Comment:     For infants and newborns, interpretation of results should be based  on gestational age, weight and in agreement with clinical  observations.    Premature Infant recommended reference ranges:  Up to 24 hours.............<8.0 mg/dL  Up to 48 hours............<12.0 mg/dL  3-5 days..................<15.0 mg/dL  6-29 days.................<15.0 mg/dL       Alkaline Phosphatase   Date Value Ref Range Status   08/03/2022 95 55 - 135 U/L Final     AST   Date Value Ref Range Status   08/03/2022 13 10 - 40 U/L Final     ALT   Date Value Ref Range Status   08/03/2022 13 10 - 44 U/L Final     Anion Gap   Date Value Ref Range Status   08/03/2022 14 8 - 16 mmol/L Final     eGFR if    Date Value Ref Range Status   06/27/2022 >60 >60 mL/min/1.73 m^2 Final     eGFR if non    Date Value Ref Range Status   06/27/2022 >60 >60 mL/min/1.73 m^2 Final     Comment:     Calculation used to obtain the estimated glomerular filtration  rate (eGFR) is the CKD-EPI equation.        Urinalysis  Recent Labs   Lab 08/03/22  1334   COLORU Yellow   SPECGRAV 1.025   PHUR 6.0   PROTEINUA Negative   BACTERIA Many*   NITRITE Negative   LEUKOCYTESUR 2+*   UROBILINOGEN 1.0        Assessment & Plan   ASSESSMENT/PLAN:     Active Problems:  1. Complicated UTI  -Pt has an indwelling catheter and the UA results indicates a UTI.   -Recent urinary culture indicates pt grows ESBL, MDRO. Start IV Meropenum 1 g q8hs.   -Monitor vitals for any signs of sepsis.    2. Chronic Indwelling Gutierrez Catheter   -Catheter was last changed 2-3 weeks ago. It could not be removed because of resistance in the ED.   -Consult Urology to change catheter.    3. Diabetes, Type 2   -Pt has hx of D2M.  -Hold at home medications and start sliding scale insulin while in the hospital.     4. Hyperlipidemia   -Pt has a history of HLP that is controlled with atorvastatin   -Continue home medications     5. Hypertension  -Pt has a history of hypertension.   -Continue home medications    6. Major Depressive Disorder   -Pt has a history of MDD that he takes welbutrion to control.  -Continue home medications.         VTE Risk Mitigation (From admission, onward)    None        Humberto WILLIAMSON

## 2022-08-03 NOTE — ASSESSMENT & PLAN NOTE
Inability to be changed despite multiple attempts in ED.  Consultation to urology-appreciate recommendations

## 2022-08-03 NOTE — H&P
"[facility] Medicine  History & Physical    Patient Name: Jhonny Almazan  MRN: [id number]  Patient Class: Emergency  Admission Date: 8/3/2022  Attending Physician: Neil Rosas MD  Primary Care Provider: Joey Whitehead MD         Patient information was obtained from patient, past medical records and ER records.     Subjective:     Principal Problem:Complicated UTI (urinary tract infection)    Chief Complaint:   Chief Complaint   Patient presents with    Weakness     Patient reports increasing weakness, stated "I cant even get the strength to get up and empty my cath bag"         HPI: 67 y/o male with hx of indwelling urinary catheter placement, PEG tube placement, stroke, HTN, and MDD presented to the ED with a 3-4 day history of worsening generalized weakness, fatigue and mild abdominal pain. Pt is wheelchair bound due to generalized weakness following a stroke in 11/2020. He has worsening weakness over the past few days with fatigue, a loss of appetite and mild abdominal pain. Pt has indwelling olguin catheter that pt reports was last changed around 2-3 weeks ago. Pt reports less urine output than normal. He has experienced some chills and cough but denies F/N/V/D, SOB, chest pain, edema, or flank pain. While in the ED, olguin was unable to be removed due to resistance and UA was positive for bacteria, leukocytes and WBC. Recent urine culture was positive for ESBL and MDRO. Pt will be admitted to the services of hospital medicine and urology consult in order to change indwelling olguin catheter.       Past Medical History:   Diagnosis Date    Anticoagulant long-term use     Arthritis     Colon polyp     Diabetes mellitus     Diabetes mellitus, type 2     Fatty liver     Hypertension     Major depressive disorder 11/17/2020    -Continue Wellbutrin    PEG (percutaneous endoscopic gastrostomy) adjustment/replacement/removal 1/6/2021    Stroke 11/2020    left sided " weakness    Urinary retention 9/7/2021       Past Surgical History:   Procedure Laterality Date    BACK SURGERY      COLONOSCOPY  07/18/2014    Dr. Crane: 22 colon polyps removed, hemorrhoids, erythema to sigmoid, repeat in 1 year for surveillance; biopsy: sigmoid erythema- showed unremarkable colonic mucosa, tubular adenomas and hyperplastic polyps    COLONOSCOPY N/A 5/3/2019    Procedure: COLONOSCOPY;  Surgeon: Guero Crane MD;  Location: [facility] ENDO;  Service: Endoscopy;  Laterality: N/A;    COLONOSCOPY N/A 5/6/2019    Procedure: COLONOSCOPY;  Surgeon: Guero Crane MD;  Location: [facility] ENDO;  Service: Endoscopy;  Laterality: N/A;    CYSTOSCOPY N/A 2/9/2022    Procedure: CYSTOSCOPY;  Surgeon: Jaylen Antonio Jr., MD;  Location: [facility];  Service: Urology;  Laterality: N/A;    EPIDURAL STEROID INJECTION INTO THORACIC SPINE N/A 8/30/2019    Procedure: Injection-steroid-epidural-thoracic;  Surgeon: Frantz Green MD;  Location: [facility] OR;  Service: Pain Management;  Laterality: N/A;  T6-7    ESOPHAGOGASTRODUODENOSCOPY N/A 4/26/2019    Procedure: EGD (ESOPHAGOGASTRODUODENOSCOPY);  Surgeon: Guero Crane MD;  Location: [facility];  Service: Endoscopy;  Laterality: N/A;    ESOPHAGOGASTRODUODENOSCOPY N/A 11/23/2020    Procedure: EGD (ESOPHAGOGASTRODUODENOSCOPY);  Surgeon: Guero Crane MD;  Location: [facility];  Service: Endoscopy;  Laterality: N/A;    ESOPHAGOGASTRODUODENOSCOPY N/A 1/6/2021    Procedure: EGD (ESOPHAGOGASTRODUODENOSCOPY);  Surgeon: Guero Crane MD;  Location: [facility] ENDO;  Service: Endoscopy;  Laterality: N/A;    HERNIA REPAIR      TRANSRECTAL ULTRASOUND EXAMINATION N/A 2/9/2022    Procedure: ULTRASOUND, RECTAL APPROACH;  Surgeon: Jaylen Antonio Jr., MD;  Location: [facility] OR;  Service: Urology;  Laterality: N/A;  must text son miroslava, times and instructions given leave at 130       Review of patient's allergies indicates:  No Known Allergies    No current facility-administered  "medications on file prior to encounter.     Current Outpatient Medications on File Prior to Encounter   Medication Sig    aspirin (ECOTRIN) 81 MG EC tablet Take 1 tablet (81 mg total) by mouth once daily.    atorvastatin (LIPITOR) 40 MG tablet Take 1 tablet (40 mg total) by mouth once daily.    blood sugar diagnostic Strp To check BG 2 times daily, to use with insurance preferred meter. One touch Ultra.    buPROPion (WELLBUTRIN XL) 300 MG 24 hr tablet Take 1 tablet (300 mg total) by mouth once daily.    clopidogreL (PLAVIX) 75 mg tablet 1 tablet (75 mg total) by Per G Tube route once daily.    finasteride (PROSCAR) 5 mg tablet Take 1 tablet (5 mg total) by mouth once daily. Take 1 tablet by mouth in am to keep prostate from enlarging    insulin NPH-insulin regular, 70/30, (NOVOLIN 70/30) 100 unit/mL (70-30) injection Inject 20 Units into the skin 2 (two) times daily. #3 10 ml syringes with 32 gauge sergo needles    lancets Misc To check BG 2 times daily, to use with insurance preferred meter.One Touch Ultra    lisinopriL-hydrochlorothiazide (PRINZIDE,ZESTORETIC) 20-12.5 mg per tablet TAKE ONE TABLET BY MOUTH TWICE DAILY    losartan (COZAAR) 25 MG tablet 1 tablet DAILY (route: oral)    meclizine (ANTIVERT) 25 mg tablet Take 1 tablet (25 mg total) by mouth 3 (three) times daily as needed for Dizziness.    melatonin (MELATIN) 3 mg tablet Take 2 tablets (6 mg total) by mouth nightly as needed for Insomnia.    omeprazole (PRILOSEC) 40 MG capsule TAKE ONE CAPSULE (40 MG) BY MOUTH EVERY DAY    pen needle, diabetic 32 gauge x 5/32" Ndle Use AC meals 2 a day    pioglitazone (ACTOS) 30 MG tablet Take 30 mg by mouth once daily.    QUEtiapine (SEROQUEL) 25 MG Tab Take 1 tablet (25 mg total) by mouth every evening.    tetrahydrozoline 0.05% (VISION CLEAR) 0.05 % Drop Place 2 drops into both eyes 4 (four) times daily as needed (eye irritation).     Family History       Problem Relation (Age of Onset)    Brain " cancer Brother    Diabetes Brother    Heart disease Mother, Father    Suicide Brother          Tobacco Use    Smoking status: Current Every Day Smoker     Packs/day: 1.00     Years: 50.00     Pack years: 50.00     Types: Cigarettes    Smokeless tobacco: Never Used   Substance and Sexual Activity    Alcohol use: Yes     Alcohol/week: 14.0 standard drinks     Types: 14 Cans of beer per week     Comment: 2-3 beers daily    Drug use: No    Sexual activity: Yes     Partners: Female     Review of Systems   Constitutional:  Positive for activity change, appetite change and fatigue. Negative for chills and fever.   HENT:  Positive for congestion. Negative for trouble swallowing.    Eyes:  Negative for photophobia and visual disturbance.   Respiratory:  Positive for cough. Negative for shortness of breath and wheezing.    Cardiovascular:  Negative for chest pain, palpitations and leg swelling.   Gastrointestinal:  Positive for abdominal pain (generalized) and constipation. Negative for nausea and vomiting.   Endocrine: Negative for polydipsia, polyphagia and polyuria.   Genitourinary:  Positive for decreased urine volume and difficulty urinating. Negative for hematuria.        + chronic indwelling olguin   Musculoskeletal:  Positive for gait problem (at baseline, W/C bound). Negative for arthralgias and myalgias.   Skin:  Negative for color change and pallor.   Neurological:  Positive for weakness (generalized). Negative for dizziness, numbness and headaches.   Psychiatric/Behavioral:  Negative for confusion. The patient is not nervous/anxious.    Objective:     Vital Signs (Most Recent):  Temp: 98 °F (36.7 °C) (08/03/22 0815)  Pulse: (!) 51 (08/03/22 1430)  Resp: 18 (08/03/22 1430)  BP: 129/67 (08/03/22 1430)  SpO2: 96 % (08/03/22 1430)   Vital Signs (24h Range):  Temp:  [98 °F (36.7 °C)] 98 °F (36.7 °C)  Pulse:  [50-74] 51  Resp:  [18] 18  SpO2:  [95 %-99 %] 96 %  BP: (118-146)/(65-74) 129/67     Weight: 102.1 kg (225  lb)  Body mass index is 26.68 kg/m².    Physical Exam  Constitutional:       General: He is not in acute distress.     Appearance: He is not ill-appearing or toxic-appearing.   HENT:      Head: Normocephalic and atraumatic.      Mouth/Throat:      Mouth: Mucous membranes are moist.      Pharynx: Oropharynx is clear.   Eyes:      Extraocular Movements: Extraocular movements intact.      Conjunctiva/sclera: Conjunctivae normal.   Cardiovascular:      Rate and Rhythm: Normal rate and regular rhythm.      Pulses: Normal pulses.      Heart sounds: Normal heart sounds.   Pulmonary:      Effort: Pulmonary effort is normal.      Breath sounds: Normal breath sounds.   Abdominal:      General: Bowel sounds are normal. There is no distension.      Palpations: Abdomen is soft.      Tenderness: There is abdominal tenderness (mild generalized ttp). There is no guarding or rebound.   Musculoskeletal:         General: Normal range of motion.      Cervical back: Normal range of motion and neck supple.      Right lower leg: No edema.      Left lower leg: No edema.   Skin:     General: Skin is warm and dry.      Capillary Refill: Capillary refill takes less than 2 seconds.      Coloration: Skin is not jaundiced.   Neurological:      Mental Status: He is alert and oriented to person, place, and time. Mental status is at baseline.   Psychiatric:         Mood and Affect: Mood normal.         Behavior: Behavior normal.           Significant Labs: All pertinent labs within the past 24 hours have been reviewed.  CBC:   Recent Labs   Lab 08/03/22  0902   WBC 6.91   HGB 13.7*   HCT 40.1        CMP:   Recent Labs   Lab 08/03/22  0902      K 3.1*      CO2 24   *   BUN 11   CREATININE 0.8   CALCIUM 9.9   PROT 7.0   ALBUMIN 3.4*   BILITOT 0.9   ALKPHOS 95   AST 13   ALT 13   ANIONGAP 14     Urine Studies:   Recent Labs   Lab 08/03/22  1334   COLORU Yellow   APPEARANCEUA Cloudy*   PHUR 6.0   SPECGRAV 1.025   PROTEINUA  Negative   GLUCUA Negative   KETONESU Trace*   BILIRUBINUA 1+*   OCCULTUA Trace*   NITRITE Negative   UROBILINOGEN 1.0   LEUKOCYTESUR 2+*   RBCUA 4   WBCUA 46*   BACTERIA Many*   SQUAMEPITHEL 0       Significant Imaging: I have reviewed all pertinent imaging results/findings within the past 24 hours.    XR CHEST AP PORTABLE     CLINICAL HISTORY:   Chest Pain;     TECHNIQUE:   Single frontal view of the chest was performed.     COMPARISON:   10/04/2021     FINDINGS:   The cardiomediastinal silhouette is within normal limits.  The lungs are well expanded without consolidation or pleural effusion.    Impression:       Negative chest.       Electronically signed by: Oracio Le MD   Date: 08/03/2022   Time: 09:22     Assessment/Plan:     * Complicated UTI (urinary tract infection)  Patient with chronic indwelling Gutierrez catheter.  History of ESBL.  UA concerning for urinary tract infection.    Continue IV Merrem.  Monitor culture.        Chronic indwelling Gutierrez catheter  Inability to be changed despite multiple attempts in ED.  Consultation to urology-appreciate recommendations      Hypertension  Chronic, controlled.  Latest blood pressure and vitals reviewed-   Temp:  [98 °F (36.7 °C)]   Pulse:  [50-74]   Resp:  [18]   BP: (118-146)/(65-74)   SpO2:  [95 %-99 %] .   Home meds for hypertension were reviewed and noted below.   Hypertension Medications             lisinopriL-hydrochlorothiazide (PRINZIDE,ZESTORETIC) 20-12.5 mg per tablet TAKE ONE TABLET BY MOUTH TWICE DAILY    losartan (COZAAR) 25 MG tablet 1 tablet DAILY (route: oral)          While in the hospital, will manage blood pressure as follows; Continue home antihypertensive regimen    Will utilize p.r.n. blood pressure medication only if patient's blood pressure greater than  180/110 and he develops symptoms such as worsening chest pain or shortness of breath.        Type 2 diabetes mellitus with hyperglycemia  Patient's FSGs are uncontrolled due to  hyperglycemia on current medication regimen.  Last A1c reviewed-   Lab Results   Component Value Date    HGBA1C 8.8 (H) 09/30/2021     Most recent fingerstick glucose reviewed-   Recent Labs   Lab 08/03/22  0940   POCTGLUCOSE 132*     Current correctional scale  Medium  Maintain anti-hyperglycemic dose as follows-   Antihyperglycemics (From admission, onward)            Medium dose SSI  Levemir 10 units nightly        Hold Oral hypoglycemics while patient is in the hospital.    VTE Risk Mitigation (From admission, onward)    Lovenox 40mg sc daily             Rosalind Neff NP  Department of Hospital Medicine   [facility] - Emergency Dept

## 2022-08-03 NOTE — ASSESSMENT & PLAN NOTE
Patient's FSGs are uncontrolled due to hyperglycemia on current medication regimen.  Last A1c reviewed-   Lab Results   Component Value Date    HGBA1C 8.8 (H) 09/30/2021     Most recent fingerstick glucose reviewed-   Recent Labs   Lab 08/03/22  0940   POCTGLUCOSE 132*     Current correctional scale  Medium  Maintain anti-hyperglycemic dose as follows-   Antihyperglycemics (From admission, onward)            Medium dose SSI  Levemir 10 units nightly        Hold Oral hypoglycemics while patient is in the hospital.

## 2022-08-03 NOTE — ED NOTES
Pt wheelchair bound at home unable to ambulate due to quadralateral weakness secondary previous stroke.

## 2022-08-03 NOTE — MED STUDENT ASSESSMENT & PLAN
1. Complicated UTI  -Pt has an indwelling catheter and the UA results indicates a UTI.   -Recent urinary culture indicates pt grows ESBL, MDRO. Transition from IV Meropenum 1 g q8hs to Oral Bactrum 800-160mg BID. Monitor Culture.   -Monitor vitals for any signs of sepsis.    2. Chronic Indwelling Gutierrez Catheter   -Catheter was last changed 2-3 weeks ago. It could not be removed because of resistance in the ED.   -Urology changed catheter which relived suprapubic pain.     3. Constipation:  -Pt has been complaining of constipation and says that his last BM was 4 days ago.   -CT was unremarkable and pt had a BM this morning to relief of his symptoms.    4. Diabetes, Type 2   -Pt has hx of D2M.  -Hold at home medications and start sliding scale insulin while in the hospital.     5. Hyperlipidemia   -Pt has a history of HLP that is controlled with atorvastatin   -Continue home medications     6. Hypertension  -Pt has a history of hypertension.   -Continue home medications    7. Major Depressive Disorder   -Pt has a history of MDD that he takes welbutrion to control.  -Continue home medications.

## 2022-08-03 NOTE — ASSESSMENT & PLAN NOTE
Chronic, controlled.  Latest blood pressure and vitals reviewed-   Temp:  [98 °F (36.7 °C)]   Pulse:  [50-74]   Resp:  [18]   BP: (118-146)/(65-74)   SpO2:  [95 %-99 %] .   Home meds for hypertension were reviewed and noted below.   Hypertension Medications             lisinopriL-hydrochlorothiazide (PRINZIDE,ZESTORETIC) 20-12.5 mg per tablet TAKE ONE TABLET BY MOUTH TWICE DAILY    losartan (COZAAR) 25 MG tablet 1 tablet DAILY (route: oral)          While in the hospital, will manage blood pressure as follows; Continue home antihypertensive regimen    Will utilize p.r.n. blood pressure medication only if patient's blood pressure greater than  180/110 and he develops symptoms such as worsening chest pain or shortness of breath.

## 2022-08-03 NOTE — ED PROVIDER NOTES
"Encounter Date: 8/3/2022    SCRIBE #1 NOTE: I, Glenna Edgar, am scribing for, and in the presence of, Roger Stroud MD.       History     Chief Complaint   Patient presents with    Weakness     Patient reports increasing weakness, stated "I cant even get the strength to get up and empty my cath bag"      Time seen by provider: 8:43 AM on 08/03/2022    Jhonny Almazan is a 66 y.o. male with a PMHx of anticoagulant therapy, HTN, DM II, fatty liver, stroke (2 years ago with residual L hemiparesis, per patient), major depressive disorder, PEG and urinary retention who presents to the ED via EMS with a friend for evaluation of generalized weakness and fatigue over the past few weeks.  Patient's friend reports the patient lives with his second oldest son and notes that the patient's brother requested that he checked on his brother since he is a  and was nearby.  The friend called the police to do a well check and they activated EMS after their arrival on scene.  Patient was then brought to the ED for further Tx.  The patient reports he "can hardly move" and lays in bed most of the time.  He presents with a Gutierrez catheter that hasn't been changed in 4 weeks, per patient.  Patient communicates a recent stay at [facility] secondary to SI and attempting to "slit his wrist" on 06-27-22.  He expresses a slight cough, CP that started a few days ago, constipation and notes he hasn't eaten over the past couple of days.  The patient denies current SI, HI, hallucinations, fever, N/V, SOB, abdominal pain or any other symptoms at this time.  He reports no drug or EtOH abuse.  PSHx includes EGD.      The history is provided by the patient, a friend and the EMS personnel.     Review of patient's allergies indicates:  No Known Allergies  Past Medical History:   Diagnosis Date    Anticoagulant long-term use     Arthritis     Colon polyp     Diabetes mellitus     Diabetes mellitus, type 2     " Fatty liver     Hypertension     Major depressive disorder 11/17/2020    -Continue Wellbutrin    PEG (percutaneous endoscopic gastrostomy) adjustment/replacement/removal 1/6/2021    Stroke 11/2020    left sided weakness    Urinary retention 9/7/2021     Past Surgical History:   Procedure Laterality Date    BACK SURGERY      COLONOSCOPY  07/18/2014    Dr. Crane: 22 colon polyps removed, hemorrhoids, erythema to sigmoid, repeat in 1 year for surveillance; biopsy: sigmoid erythema- showed unremarkable colonic mucosa, tubular adenomas and hyperplastic polyps    COLONOSCOPY N/A 5/3/2019    Procedure: COLONOSCOPY;  Surgeon: Guero Crane MD;  Location: [facility];  Service: Endoscopy;  Laterality: N/A;    COLONOSCOPY N/A 5/6/2019    Procedure: COLONOSCOPY;  Surgeon: Guero Crane MD;  Location: [facility];  Service: Endoscopy;  Laterality: N/A;    CYSTOSCOPY N/A 2/9/2022    Procedure: CYSTOSCOPY;  Surgeon: Jaylen Antonio Jr., MD;  Location: [facility] OR;  Service: Urology;  Laterality: N/A;    EPIDURAL STEROID INJECTION INTO THORACIC SPINE N/A 8/30/2019    Procedure: Injection-steroid-epidural-thoracic;  Surgeon: Frantz Green MD;  Location: [facility] OR;  Service: Pain Management;  Laterality: N/A;  T6-7    ESOPHAGOGASTRODUODENOSCOPY N/A 4/26/2019    Procedure: EGD (ESOPHAGOGASTRODUODENOSCOPY);  Surgeon: Guero Crane MD;  Location: [facility];  Service: Endoscopy;  Laterality: N/A;    ESOPHAGOGASTRODUODENOSCOPY N/A 11/23/2020    Procedure: EGD (ESOPHAGOGASTRODUODENOSCOPY);  Surgeon: Guero Crane MD;  Location: [facility];  Service: Endoscopy;  Laterality: N/A;    ESOPHAGOGASTRODUODENOSCOPY N/A 1/6/2021    Procedure: EGD (ESOPHAGOGASTRODUODENOSCOPY);  Surgeon: Guero Crane MD;  Location: [facility];  Service: Endoscopy;  Laterality: N/A;    HERNIA REPAIR      TRANSRECTAL ULTRASOUND EXAMINATION N/A 2/9/2022    Procedure: ULTRASOUND, RECTAL APPROACH;  Surgeon: Jaylen Antonio Jr., MD;  Location: [facility]  OR;  Service: Urology;  Laterality: N/A;  must text son miroslava, times and instructions given leave at 130     Family History   Problem Relation Age of Onset    Heart disease Mother     Heart disease Father     Diabetes Brother     Suicide Brother     Brain cancer Brother     Colon cancer Neg Hx     Crohn's disease Neg Hx     Ulcerative colitis Neg Hx     Stomach cancer Neg Hx     Esophageal cancer Neg Hx     Glaucoma Neg Hx     Retinal detachment Neg Hx     Macular degeneration Neg Hx      Social History     Tobacco Use    Smoking status: Current Every Day Smoker     Packs/day: 1.00     Years: 50.00     Pack years: 50.00     Types: Cigarettes    Smokeless tobacco: Never Used   Substance Use Topics    Alcohol use: Yes     Alcohol/week: 14.0 standard drinks     Types: 14 Cans of beer per week     Comment: 2-3 beers daily    Drug use: No     Review of Systems   Constitutional: Positive for activity change and fatigue. Negative for diaphoresis and fever.   HENT: Negative for drooling, rhinorrhea, sore throat and trouble swallowing.    Eyes: Negative for pain and visual disturbance.   Respiratory: Positive for cough. Negative for shortness of breath and stridor.    Cardiovascular: Positive for chest pain. Negative for leg swelling.   Gastrointestinal: Positive for constipation. Negative for abdominal distention, abdominal pain, nausea and vomiting.   Genitourinary: Negative for dysuria, hematuria and penile discharge.   Musculoskeletal: Negative for gait problem.   Skin: Negative for rash.   Neurological: Positive for weakness. Negative for seizures, facial asymmetry and headaches.   Psychiatric/Behavioral: Negative for hallucinations and suicidal ideas.       Physical Exam     Initial Vitals [08/03/22 0815]   BP Pulse Resp Temp SpO2   118/74 74 18 98 °F (36.7 °C) 97 %      MAP       --         Physical Exam    Nursing note and vitals reviewed.  Constitutional: He appears ill (chronically). No distress.    Elderly and chronically ill appearing.    HENT:   Head: Normocephalic and atraumatic.   Nose: Nose normal.   No facial droop or asymmetry.     Eyes: EOM are normal.   Neck: Neck supple. No tracheal deviation present. No JVD present.   Cardiovascular: Normal rate, regular rhythm, normal heart sounds and intact distal pulses. Exam reveals no gallop and no friction rub.    No murmur heard.  Pulmonary/Chest: Breath sounds normal. No respiratory distress. He has no wheezes. He has no rhonchi. He has no rales.   Abdominal: Abdomen is soft. Bowel sounds are normal. He exhibits no distension. There is no abdominal tenderness. There is no rebound and no guarding.   Genitourinary:    Genitourinary Comments: Gutierrez catheter in place with malodorous urine.       Musculoskeletal:         General: Normal range of motion.      Cervical back: Neck supple.     Neurological: He is alert and oriented to person, place, and time. He has normal strength. No cranial nerve deficit or sensory deficit.   Cranial nerves II-XII grossly intact. Finger-to-nose normal. Tone normal. Sensation intact to light touch. No drift. No disdiadochokinesia. 5/5 RUE and RLE strength. 4/5 LUE and LLE strength at baseline.  Gait and Romberg deferred. Speech and cognition is normal. No focal neurologic deficit.     Skin: Skin is warm and dry. Capillary refill takes less than 2 seconds. No rash noted.   Psychiatric: He has a normal mood and affect.         ED Course   Procedures  Labs Reviewed   CBC W/ AUTO DIFFERENTIAL - Abnormal; Notable for the following components:       Result Value    Hemoglobin 13.7 (*)     All other components within normal limits   COMPREHENSIVE METABOLIC PANEL - Abnormal; Notable for the following components:    Potassium 3.1 (*)     Glucose 136 (*)     Albumin 3.4 (*)     All other components within normal limits   URINALYSIS, REFLEX TO URINE CULTURE - Abnormal; Notable for the following components:    Appearance, UA Cloudy (*)      Ketones, UA Trace (*)     Bilirubin (UA) 1+ (*)     Occult Blood UA Trace (*)     Leukocytes, UA 2+ (*)     All other components within normal limits    Narrative:     Specimen Source->Urine   TSH - Abnormal; Notable for the following components:    TSH 0.240 (*)     All other components within normal limits   URINALYSIS MICROSCOPIC - Abnormal; Notable for the following components:    WBC, UA 46 (*)     Bacteria Many (*)     All other components within normal limits    Narrative:     Specimen Source->Urine   POCT GLUCOSE - Abnormal; Notable for the following components:    POCT Glucose 132 (*)     All other components within normal limits   CULTURE, URINE   HIV 1 / 2 ANTIBODY    Narrative:     Release to patient->Immediate   HEPATITIS C ANTIBODY    Narrative:     Release to patient->Immediate   DRUG SCREEN PANEL, URINE EMERGENCY    Narrative:     Specimen Source->Urine   ALCOHOL,MEDICAL (ETHANOL)   TROPONIN I   LIPASE   T4, FREE   SARS-COV-2 RNA AMPLIFICATION, QUAL   POCT GLUCOSE MONITORING CONTINUOUS     EKG Readings: (Independently Interpreted)   Initial Reading: No STEMI.   Normal sinus rhythm at a rate of 61 bpm with septal infart (age undetermined), prolonged QT at 498 ms.  When compared to EKG on 09- septal infarct is now present with nonspecific T wave abnormality (worse in anterior leads) and QT has lengthened.       ECG Results          EKG 12-lead (In process)  Result time 08/03/22 12:33:32    In process by Interface, Lab In [facility] (08/03/22 12:33:32)                 Narrative:    Test Reason : R53.83,    Vent. Rate : 061 BPM     Atrial Rate : 061 BPM     P-R Int : 180 ms          QRS Dur : 102 ms      QT Int : 498 ms       P-R-T Axes : 085 -12 036 degrees     QTc Int : 501 ms    Normal sinus rhythm  Septal infarct ,age undetermined  Prolonged QT  Abnormal ECG  When compared with ECG of 08-SEP-2021 16:12,  Septal infarct is now Present  Nonspecific T wave abnormality, worse in Anterior leads  QT  has lengthened    Referred By: [email or web address]   SELF           Confirmed By:                             Imaging Results          X-Ray Chest AP Portable (Final result)  Result time 08/03/22 09:22:04    Final result by Oracio Le MD (08/03/22 09:22:04)                 Impression:      Negative chest.      Electronically signed by: Oracio Le MD  Date:    08/03/2022  Time:    09:22             Narrative:    EXAMINATION:  XR CHEST AP PORTABLE    CLINICAL HISTORY:  Chest Pain;    TECHNIQUE:  Single frontal view of the chest was performed.    COMPARISON:  10/04/2021    FINDINGS:  The cardiomediastinal silhouette is within normal limits.  The lungs are well expanded without consolidation or pleural effusion.                                 Medications   meropenem-0.9% sodium chloride 1 g/50 mL IVPB (0 g Intravenous Stopped 8/3/22 1723)     Medical Decision Making:   History:   Old Medical Records: I decided to obtain old medical records.  Independently Interpreted Test(s):   I have ordered and independently interpreted X-rays - see prior notes.  I have ordered and independently interpreted EKG Reading(s) - see prior notes  Clinical Tests:   Lab Tests: Ordered and Reviewed  Radiological Study: Ordered and Reviewed  Medical Tests: Ordered and Reviewed  ED Management:  65 yo M pmhx of previous CVA w residual left sided weakness, diabetes mellitus, hypertension, depression, chronic indwelling Gutierrez catheter, obesity pw generalized weakness and inability to ambulate. Labs reassuring. UA pos for UTI. Prior Ucx ESBL therefore given meropenem.  Unfortunately while attempting to get a clean UA specimen we were unable to remove the Gutierrez catheter despite multiple attempts.  Attempted cutting the balloon port and threading a guidewire per Dr. Antonio, urology recommendation but all unsuccessful.  Discussed the case with Dr. Antonio who agrees with plan for observation and treating for UTI which is potential cause of  patients worsening symptoms.           Scribe Attestation:   Scribe #1: I performed the above scribed service and the documentation accurately describes the services I performed. I attest to the accuracy of the note.        ED Course as of 08/03/22 1647   Wed Aug 03, 2022   1109 X-Ray Chest AP Portable [BD]   1110 Impression:     Negative chest.        Electronically signed by: Oracio Le MD  Date:                                            08/03/2022  Time:                                           09:22 [BD]   1123  [BD]      ED Course User Index  [BD] Roger Stroud MD             Attending Attestation:     Physician Attestation for Scribe:    I, Dr. Roger Stroud, personally performed the services described in this documentation.   All medical record entries made by the scribe were at my direction and in my presence.   I have reviewed the chart and agree that the record is accurate and complete.   Roger Stroud MD  4:47 PM 08/03/2022     DISCLAIMER: This note was prepared with [employer] Naturally Speaking voice recognition transcription software. Garbled syntax, mangled pronouns, and other bizarre constructions may be attributed to that software system.      Clinical Impression:   Final diagnoses:  [R53.83] Fatigue  [N39.0] Urinary tract infection without hematuria, site unspecified (Primary)  [T83.9XXA] Problem with Gutierrez catheter, initial encounter          ED Disposition Condition    Observation               Roger Stroud MD  08/03/22 [id number]

## 2022-08-03 NOTE — NURSING
Arrived to room 303. Report taken from BURAK Cerna. Oriented to unit Call light in reach Side rails x 3.

## 2022-08-03 NOTE — HPI
65 y/o male with hx of indwelling urinary catheter placement, PEG tube placement, stroke, HTN, and MDD presented to the ED with a 3-4 day history of worsening generalized weakness, fatigue and mild abdominal pain. Pt is wheelchair bound due to generalized weakness following a stroke in 11/2020. He has worsening weakness over the past few days with fatigue, a loss of appetite and mild abdominal pain. Pt has indwelling olguin catheter that pt reports was last changed around 2-3 weeks ago. Pt reports less urine output than normal. He has experienced some chills and cough but denies F/N/V/D, SOB, chest pain, edema, or flank pain. While in the ED, olguin was unable to be removed due to resistance and UA was positive for bacteria, leukocytes and WBC. Recent urine culture was positive for ESBL and MDRO. Pt will be admitted to the services of hospital medicine and urology consult in order to change indwelling olguin catheter.

## 2022-08-03 NOTE — SUBJECTIVE & OBJECTIVE
Past Medical History:   Diagnosis Date    Anticoagulant long-term use     Arthritis     Colon polyp     Diabetes mellitus     Diabetes mellitus, type 2     Fatty liver     Hypertension     Major depressive disorder 11/17/2020    -Continue Wellbutrin    PEG (percutaneous endoscopic gastrostomy) adjustment/replacement/removal 1/6/2021    Stroke 11/2020    left sided weakness    Urinary retention 9/7/2021       Past Surgical History:   Procedure Laterality Date    BACK SURGERY      COLONOSCOPY  07/18/2014    Dr. Crane: 22 colon polyps removed, hemorrhoids, erythema to sigmoid, repeat in 1 year for surveillance; biopsy: sigmoid erythema- showed unremarkable colonic mucosa, tubular adenomas and hyperplastic polyps    COLONOSCOPY N/A 5/3/2019    Procedure: COLONOSCOPY;  Surgeon: Guero Crane MD;  Location: [facility] ENDO;  Service: Endoscopy;  Laterality: N/A;    COLONOSCOPY N/A 5/6/2019    Procedure: COLONOSCOPY;  Surgeon: Guero Crnae MD;  Location: [facility] ENDO;  Service: Endoscopy;  Laterality: N/A;    CYSTOSCOPY N/A 2/9/2022    Procedure: CYSTOSCOPY;  Surgeon: Jaylen Antonio Jr., MD;  Location: [facility] OR;  Service: Urology;  Laterality: N/A;    EPIDURAL STEROID INJECTION INTO THORACIC SPINE N/A 8/30/2019    Procedure: Injection-steroid-epidural-thoracic;  Surgeon: Frantz Green MD;  Location: [facility] OR;  Service: Pain Management;  Laterality: N/A;  T6-7    ESOPHAGOGASTRODUODENOSCOPY N/A 4/26/2019    Procedure: EGD (ESOPHAGOGASTRODUODENOSCOPY);  Surgeon: Guero Crane MD;  Location: [facility] ENDO;  Service: Endoscopy;  Laterality: N/A;    ESOPHAGOGASTRODUODENOSCOPY N/A 11/23/2020    Procedure: EGD (ESOPHAGOGASTRODUODENOSCOPY);  Surgeon: Guero Crane MD;  Location: [facility] ENDO;  Service: Endoscopy;  Laterality: N/A;    ESOPHAGOGASTRODUODENOSCOPY N/A 1/6/2021    Procedure: EGD (ESOPHAGOGASTRODUODENOSCOPY);  Surgeon: Guero Crane MD;  Location: [facility] ENDO;  Service: Endoscopy;  Laterality: N/A;    HERNIA REPAIR       "TRANSRECTAL ULTRASOUND EXAMINATION N/A 2/9/2022    Procedure: ULTRASOUND, RECTAL APPROACH;  Surgeon: Jaylen Antonio Jr., MD;  Location: [facility] OR;  Service: Urology;  Laterality: N/A;  must text son miroslava, times and instructions given leave at 130       Review of patient's allergies indicates:  No Known Allergies    No current facility-administered medications on file prior to encounter.     Current Outpatient Medications on File Prior to Encounter   Medication Sig    aspirin (ECOTRIN) 81 MG EC tablet Take 1 tablet (81 mg total) by mouth once daily.    atorvastatin (LIPITOR) 40 MG tablet Take 1 tablet (40 mg total) by mouth once daily.    blood sugar diagnostic Strp To check BG 2 times daily, to use with insurance preferred meter. One touch Ultra.    buPROPion (WELLBUTRIN XL) 300 MG 24 hr tablet Take 1 tablet (300 mg total) by mouth once daily.    clopidogreL (PLAVIX) 75 mg tablet 1 tablet (75 mg total) by Per G Tube route once daily.    finasteride (PROSCAR) 5 mg tablet Take 1 tablet (5 mg total) by mouth once daily. Take 1 tablet by mouth in am to keep prostate from enlarging    insulin NPH-insulin regular, 70/30, (NOVOLIN 70/30) 100 unit/mL (70-30) injection Inject 20 Units into the skin 2 (two) times daily. #3 10 ml syringes with 32 gauge sergo needles    lancets Misc To check BG 2 times daily, to use with insurance preferred meter.One Touch Ultra    lisinopriL-hydrochlorothiazide (PRINZIDE,ZESTORETIC) 20-12.5 mg per tablet TAKE ONE TABLET BY MOUTH TWICE DAILY    losartan (COZAAR) 25 MG tablet 1 tablet DAILY (route: oral)    meclizine (ANTIVERT) 25 mg tablet Take 1 tablet (25 mg total) by mouth 3 (three) times daily as needed for Dizziness.    melatonin (MELATIN) 3 mg tablet Take 2 tablets (6 mg total) by mouth nightly as needed for Insomnia.    omeprazole (PRILOSEC) 40 MG capsule TAKE ONE CAPSULE (40 MG) BY MOUTH EVERY DAY    pen needle, diabetic 32 gauge x 5/32" Ndle Use AC meals 2 a day    pioglitazone (ACTOS) 30 " MG tablet Take 30 mg by mouth once daily.    QUEtiapine (SEROQUEL) 25 MG Tab Take 1 tablet (25 mg total) by mouth every evening.    tetrahydrozoline 0.05% (VISION CLEAR) 0.05 % Drop Place 2 drops into both eyes 4 (four) times daily as needed (eye irritation).     Family History       Problem Relation (Age of Onset)    Brain cancer Brother    Diabetes Brother    Heart disease Mother, Father    Suicide Brother          Tobacco Use    Smoking status: Current Every Day Smoker     Packs/day: 1.00     Years: 50.00     Pack years: 50.00     Types: Cigarettes    Smokeless tobacco: Never Used   Substance and Sexual Activity    Alcohol use: Yes     Alcohol/week: 14.0 standard drinks     Types: 14 Cans of beer per week     Comment: 2-3 beers daily    Drug use: No    Sexual activity: Yes     Partners: Female     Review of Systems   Constitutional:  Positive for activity change, appetite change and fatigue. Negative for chills and fever.   HENT:  Positive for congestion. Negative for trouble swallowing.    Eyes:  Negative for photophobia and visual disturbance.   Respiratory:  Positive for cough. Negative for shortness of breath and wheezing.    Cardiovascular:  Negative for chest pain, palpitations and leg swelling.   Gastrointestinal:  Positive for abdominal pain (generalized) and constipation. Negative for nausea and vomiting.   Endocrine: Negative for polydipsia, polyphagia and polyuria.   Genitourinary:  Positive for decreased urine volume and difficulty urinating. Negative for hematuria.        + chronic indwelling olguin   Musculoskeletal:  Positive for gait problem (at baseline, W/C bound). Negative for arthralgias and myalgias.   Skin:  Negative for color change and pallor.   Neurological:  Positive for weakness (generalized). Negative for dizziness, numbness and headaches.   Psychiatric/Behavioral:  Negative for confusion. The patient is not nervous/anxious.    Objective:     Vital Signs (Most Recent):  Temp: 98 °F  (36.7 °C) (08/03/22 0815)  Pulse: (!) 51 (08/03/22 1430)  Resp: 18 (08/03/22 1430)  BP: 129/67 (08/03/22 1430)  SpO2: 96 % (08/03/22 1430)   Vital Signs (24h Range):  Temp:  [98 °F (36.7 °C)] 98 °F (36.7 °C)  Pulse:  [50-74] 51  Resp:  [18] 18  SpO2:  [95 %-99 %] 96 %  BP: (118-146)/(65-74) 129/67     Weight: 102.1 kg (225 lb)  Body mass index is 26.68 kg/m².    Physical Exam  Constitutional:       General: He is not in acute distress.     Appearance: He is not ill-appearing or toxic-appearing.   HENT:      Head: Normocephalic and atraumatic.      Mouth/Throat:      Mouth: Mucous membranes are moist.      Pharynx: Oropharynx is clear.   Eyes:      Extraocular Movements: Extraocular movements intact.      Conjunctiva/sclera: Conjunctivae normal.   Cardiovascular:      Rate and Rhythm: Normal rate and regular rhythm.      Pulses: Normal pulses.      Heart sounds: Normal heart sounds.   Pulmonary:      Effort: Pulmonary effort is normal.      Breath sounds: Normal breath sounds.   Abdominal:      General: Bowel sounds are normal. There is no distension.      Palpations: Abdomen is soft.      Tenderness: There is abdominal tenderness (mild generalized ttp). There is no guarding or rebound.   Musculoskeletal:         General: Normal range of motion.      Cervical back: Normal range of motion and neck supple.      Right lower leg: No edema.      Left lower leg: No edema.   Skin:     General: Skin is warm and dry.      Capillary Refill: Capillary refill takes less than 2 seconds.      Coloration: Skin is not jaundiced.   Neurological:      Mental Status: He is alert and oriented to person, place, and time. Mental status is at baseline.   Psychiatric:         Mood and Affect: Mood normal.         Behavior: Behavior normal.           Significant Labs: All pertinent labs within the past 24 hours have been reviewed.  CBC:   Recent Labs   Lab 08/03/22  0902   WBC 6.91   HGB 13.7*   HCT 40.1        CMP:   Recent Labs    Lab 08/03/22  0902      K 3.1*      CO2 24   *   BUN 11   CREATININE 0.8   CALCIUM 9.9   PROT 7.0   ALBUMIN 3.4*   BILITOT 0.9   ALKPHOS 95   AST 13   ALT 13   ANIONGAP 14     Urine Studies:   Recent Labs   Lab 08/03/22  1334   COLORU Yellow   APPEARANCEUA Cloudy*   PHUR 6.0   SPECGRAV 1.025   PROTEINUA Negative   GLUCUA Negative   KETONESU Trace*   BILIRUBINUA 1+*   OCCULTUA Trace*   NITRITE Negative   UROBILINOGEN 1.0   LEUKOCYTESUR 2+*   RBCUA 4   WBCUA 46*   BACTERIA Many*   SQUAMEPITHEL 0       Significant Imaging: I have reviewed all pertinent imaging results/findings within the past 24 hours.    XR CHEST AP PORTABLE     CLINICAL HISTORY:   Chest Pain;     TECHNIQUE:   Single frontal view of the chest was performed.     COMPARISON:   10/04/2021     FINDINGS:   The cardiomediastinal silhouette is within normal limits.  The lungs are well expanded without consolidation or pleural effusion.    Impression:       Negative chest.       Electronically signed by: Oracio Le MD   Date: 08/03/2022   Time: 09:22

## 2022-08-03 NOTE — ED NOTES
Patient has been updated on plan of care, results and admission. No needs or questions at this time.

## 2022-08-03 NOTE — ASSESSMENT & PLAN NOTE
Patient with chronic indwelling Gutierrez catheter.  History of ESBL.  UA concerning for urinary tract infection.    Continue IV Merrem.  Monitor culture.

## 2022-08-04 PROBLEM — K59.00 CONSTIPATION: Status: ACTIVE | Noted: 2022-08-04

## 2022-08-04 LAB
ANION GAP SERPL CALC-SCNC: 13 MMOL/L (ref 8–16)
BUN SERPL-MCNC: 10 MG/DL (ref 8–23)
CALCIUM SERPL-MCNC: 9.9 MG/DL (ref 8.7–10.5)
CHLORIDE SERPL-SCNC: 100 MMOL/L (ref 95–110)
CO2 SERPL-SCNC: 26 MMOL/L (ref 23–29)
CREAT SERPL-MCNC: 0.8 MG/DL (ref 0.5–1.4)
ERYTHROCYTE [DISTWIDTH] IN BLOOD BY AUTOMATED COUNT: 13.7 % (ref 11.5–14.5)
EST. GFR  (NO RACE VARIABLE): >60 ML/MIN/1.73 M^2
GLUCOSE SERPL-MCNC: 176 MG/DL (ref 70–110)
HCT VFR BLD AUTO: 43.2 % (ref 40–54)
HGB BLD-MCNC: 14 G/DL (ref 14–18)
MCH RBC QN AUTO: 27.2 PG (ref 27–31)
MCHC RBC AUTO-ENTMCNC: 32.4 G/DL (ref 32–36)
MCV RBC AUTO: 84 FL (ref 82–98)
PLATELET # BLD AUTO: 192 K/UL (ref 150–450)
PMV BLD AUTO: 9.5 FL (ref 9.2–12.9)
POCT GLUCOSE: 141 MG/DL (ref 70–110)
POCT GLUCOSE: 149 MG/DL (ref 70–110)
POCT GLUCOSE: 151 MG/DL (ref 70–110)
POCT GLUCOSE: 198 MG/DL (ref 70–110)
POTASSIUM SERPL-SCNC: 3.6 MMOL/L (ref 3.5–5.1)
RBC # BLD AUTO: 5.15 M/UL (ref 4.6–6.2)
SODIUM SERPL-SCNC: 139 MMOL/L (ref 136–145)
WBC # BLD AUTO: 8.44 K/UL (ref 3.9–12.7)

## 2022-08-04 PROCEDURE — 99214 PR OFFICE/OUTPT VISIT, EST, LEVL IV, 30-39 MIN: ICD-10-PCS | Mod: 25,,, | Performed by: UROLOGY

## 2022-08-04 PROCEDURE — 63600175 PHARM REV CODE 636 W HCPCS: Performed by: NURSE PRACTITIONER

## 2022-08-04 PROCEDURE — G0378 HOSPITAL OBSERVATION PER HR: HCPCS

## 2022-08-04 PROCEDURE — 51701 INSERT BLADDER CATHETER: CPT

## 2022-08-04 PROCEDURE — 36415 COLL VENOUS BLD VENIPUNCTURE: CPT | Performed by: NURSE PRACTITIONER

## 2022-08-04 PROCEDURE — 80048 BASIC METABOLIC PNL TOTAL CA: CPT | Performed by: NURSE PRACTITIONER

## 2022-08-04 PROCEDURE — 96372 THER/PROPH/DIAG INJ SC/IM: CPT | Mod: 59 | Performed by: NURSE PRACTITIONER

## 2022-08-04 PROCEDURE — 96366 THER/PROPH/DIAG IV INF ADDON: CPT

## 2022-08-04 PROCEDURE — 51703 INSERT BLADDER CATH COMPLEX: CPT | Mod: ,,, | Performed by: UROLOGY

## 2022-08-04 PROCEDURE — 51703 PR INSERTION OF TEMPORARY INDWELLING BLADDER CATHETERIZATION, COMPLICA: ICD-10-PCS | Mod: ,,, | Performed by: UROLOGY

## 2022-08-04 PROCEDURE — 85027 COMPLETE CBC AUTOMATED: CPT | Performed by: NURSE PRACTITIONER

## 2022-08-04 PROCEDURE — 51702 INSERT TEMP BLADDER CATH: CPT

## 2022-08-04 PROCEDURE — 25000003 PHARM REV CODE 250: Performed by: NURSE PRACTITIONER

## 2022-08-04 PROCEDURE — 99214 OFFICE O/P EST MOD 30 MIN: CPT | Mod: 25,,, | Performed by: UROLOGY

## 2022-08-04 RX ORDER — SULFAMETHOXAZOLE AND TRIMETHOPRIM 800; 160 MG/1; MG/1
1 TABLET ORAL 2 TIMES DAILY
Qty: 20 TABLET | Refills: 0 | Status: SHIPPED | OUTPATIENT
Start: 2022-08-04 | End: 2022-08-08 | Stop reason: HOSPADM

## 2022-08-04 RX ORDER — AMOXICILLIN 250 MG
1 CAPSULE ORAL 2 TIMES DAILY
Status: DISCONTINUED | OUTPATIENT
Start: 2022-08-04 | End: 2022-08-09 | Stop reason: HOSPADM

## 2022-08-04 RX ORDER — DEXTROMETHORPHAN POLISTIREX 30 MG/5 ML
1 SUSPENSION, EXTENDED RELEASE 12 HR ORAL ONCE
Status: COMPLETED | OUTPATIENT
Start: 2022-08-04 | End: 2022-08-04

## 2022-08-04 RX ORDER — ACETAMINOPHEN 325 MG/1
650 TABLET ORAL EVERY 6 HOURS PRN
Status: DISCONTINUED | OUTPATIENT
Start: 2022-08-04 | End: 2022-08-09 | Stop reason: HOSPADM

## 2022-08-04 RX ADMIN — SENNOSIDES AND DOCUSATE SODIUM 1 TABLET: 50; 8.6 TABLET ORAL at 08:08

## 2022-08-04 RX ADMIN — ATORVASTATIN CALCIUM 40 MG: 40 TABLET, FILM COATED ORAL at 08:08

## 2022-08-04 RX ADMIN — ACETAMINOPHEN 650 MG: 325 TABLET ORAL at 05:08

## 2022-08-04 RX ADMIN — MEROPENEM AND SODIUM CHLORIDE 1 G: 1 INJECTION, SOLUTION INTRAVENOUS at 08:08

## 2022-08-04 RX ADMIN — MEROPENEM AND SODIUM CHLORIDE 1 G: 1 INJECTION, SOLUTION INTRAVENOUS at 04:08

## 2022-08-04 RX ADMIN — CLOPIDOGREL BISULFATE 75 MG: 75 TABLET, FILM COATED ORAL at 08:08

## 2022-08-04 RX ADMIN — ASPIRIN 81 MG: 81 TABLET, COATED ORAL at 08:08

## 2022-08-04 RX ADMIN — LOSARTAN POTASSIUM 25 MG: 25 TABLET, FILM COATED ORAL at 08:08

## 2022-08-04 RX ADMIN — POTASSIUM BICARBONATE 50 MEQ: 977.5 TABLET, EFFERVESCENT ORAL at 12:08

## 2022-08-04 RX ADMIN — MINERAL OIL 1 ENEMA: 100 ENEMA RECTAL at 11:08

## 2022-08-04 RX ADMIN — INSULIN DETEMIR 10 UNITS: 100 INJECTION, SOLUTION SUBCUTANEOUS at 09:08

## 2022-08-04 RX ADMIN — FINASTERIDE 5 MG: 5 TABLET, FILM COATED ORAL at 08:08

## 2022-08-04 RX ADMIN — INSULIN ASPART 2 UNITS: 100 INJECTION, SOLUTION INTRAVENOUS; SUBCUTANEOUS at 12:08

## 2022-08-04 RX ADMIN — MEROPENEM AND SODIUM CHLORIDE 1 G: 1 INJECTION, SOLUTION INTRAVENOUS at 11:08

## 2022-08-04 RX ADMIN — BUPROPION HYDROCHLORIDE 300 MG: 150 TABLET, FILM COATED, EXTENDED RELEASE ORAL at 08:08

## 2022-08-04 RX ADMIN — PANTOPRAZOLE SODIUM 40 MG: 40 TABLET, DELAYED RELEASE ORAL at 08:08

## 2022-08-04 RX ADMIN — ENOXAPARIN SODIUM 40 MG: 100 INJECTION SUBCUTANEOUS at 05:08

## 2022-08-04 NOTE — PT/OT/SLP PROGRESS
Physical Therapy      Patient Name:  Jhonny Almazan   MRN:  [id number]    Patient not seen today secondary to Patient unwilling to participate due to pain and concern re catheter.  Nurse (Farzaneh) present.  Spent 10 minutes in room speaking with patient re history, etc.  Will follow-up 8/05.

## 2022-08-04 NOTE — PLAN OF CARE
POC discussed with patient, verbalized understanding. Patient with uneventful night, slept off and on between care. VS stable. Brief changed X 2 from leakage of urine from cut olguin. Denies pain. ABX received. NSR on telemetry. Lovenox. Call light at bedside. HS insulin received along with HS snack.

## 2022-08-04 NOTE — PLAN OF CARE
orders sent to [facility]  for resumption of services       08/04/22 1156   Post-Acute Status   Post-Acute Authorization Home Health   Home Health Status Referrals Sent

## 2022-08-04 NOTE — NURSING
Patient resting easily on rounds. Brief checked for urine, dry. Gutierrez catheter noted to be cut at balloon port. Bladder scanned for around 100-150cc. No discomfort noted.

## 2022-08-04 NOTE — HOSPITAL COURSE
Patient was admitted with complicated urinary tract infection secondary to chronic indwelling urine catheter.  Urinary catheter was unable to be changed in the ED and Urology was consulted who recommended admission.  Patient was placed on IV antibiotics.  Urology was consulted and Gutierrez catheter was exchanged.  Patient was noted to be constipated.  Abdominal x-ray was obtained and patient was given enema.  He continued to complain of abdominal pain. CT abdomen and pelvis was obtained and noted to be negative for acute process.  He was evaluated by PT and OT.  Physical therapy recommends skilled nursing facility placement.  Case Management was consulted for discharge planning.  Pt was accepted to [facility]; however, SNF was denied by his insurance company.  A peer to peer was conducted in an effort to gain insurance approval, but SNF was still denied, the the insurance physician citing that patient was at his baseline and had no additional needs to qualify him for SNF. Discharge instructions were reviewed with the patient and his son.  Return precautions were discussed.  Home health was arranged.  His was discharged home in stable condition.

## 2022-08-04 NOTE — PROGRESS NOTES
[facility] Hospital Medicine  Progress Note    Patient Name: Jhonny Almazan  MRN: [id number]  Patient Class: OP- Observation   Admission Date: 8/3/2022  Length of Stay: 0 days  Attending Physician: Neil Rosas MD  Primary Care Provider: Joey Whitehead MD        Subjective:     Principal Problem:Complicated UTI (urinary tract infection)        HPI:  65 y/o male with hx of indwelling urinary catheter placement, PEG tube placement, stroke, HTN, and MDD presented to the ED with a 3-4 day history of worsening generalized weakness, fatigue and mild abdominal pain. Pt is wheelchair bound due to generalized weakness following a stroke in 11/2020. He has worsening weakness over the past few days with fatigue, a loss of appetite and mild abdominal pain. Pt has indwelling olguin catheter that pt reports was last changed around 2-3 weeks ago. Pt reports less urine output than normal. He has experienced some chills and cough but denies F/N/V/D, SOB, chest pain, edema, or flank pain. While in the ED, olguin was unable to be removed due to resistance and UA was positive for bacteria, leukocytes and WBC. Recent urine culture was positive for ESBL and MDRO. Pt will be admitted to the services of hospital medicine and urology consult in order to change indwelling olguin catheter.       Overview/Hospital Course:            Interval History:  Patient seen and examined.  No acute events overnight.  Patient still feeling constipated this morning.  Reports that he has not had a bowel movement overnight and has not had a BM in 3 days.  Abdominal x-ray has been ordered.  Discussed plan of care with patient and answered all questions.    Review of Systems   Constitutional:  Positive for fatigue. Negative for appetite change, chills and fever.   Respiratory:  Positive for cough. Negative for shortness of breath and wheezing.    Cardiovascular:  Negative for chest pain.   Gastrointestinal:  Positive for abdominal  pain (suprapubic) and constipation. Negative for nausea and vomiting.   Genitourinary:  Positive for difficulty urinating (Chronic). Negative for hematuria.   Neurological:  Positive for weakness. Negative for dizziness and light-headedness.   Objective:     Vital Signs (Most Recent):  Temp: 97.7 °F (36.5 °C) (08/04/22 1128)  Pulse: (!) 59 (08/04/22 1128)  Resp: 17 (08/04/22 1128)  BP: (!) 143/69 (08/04/22 1128)  SpO2: 98 % (08/04/22 1128)   Vital Signs (24h Range):  Temp:  [96.1 °F (35.6 °C)-98.1 °F (36.7 °C)] 97.7 °F (36.5 °C)  Pulse:  [50-59] 59  Resp:  [16-18] 17  SpO2:  [95 %-98 %] 98 %  BP: (128-157)/(65-83) 143/69     Weight: 99.8 kg (220 lb)  Body mass index is 26.09 kg/m².    Intake/Output Summary (Last 24 hours) at 8/4/2022 1358  Last data filed at 8/4/2022 1200  Gross per 24 hour   Intake 219.21 ml   Output 200 ml   Net 19.21 ml      Physical Exam  Constitutional:       General: He is not in acute distress.     Appearance: He is not ill-appearing or toxic-appearing.   Cardiovascular:      Rate and Rhythm: Normal rate and regular rhythm.      Heart sounds: Normal heart sounds.   Pulmonary:      Effort: Pulmonary effort is normal. No respiratory distress.      Breath sounds: Normal breath sounds.   Abdominal:      General: Bowel sounds are normal. There is no distension.      Palpations: Abdomen is soft.      Tenderness: There is abdominal tenderness (Mild suprapubic tenderness). There is no guarding.   Musculoskeletal:         General: Normal range of motion.      Right lower leg: No edema.      Left lower leg: No edema.   Neurological:      Mental Status: He is alert and oriented to person, place, and time. Mental status is at baseline.       Significant Labs: All pertinent labs within the past 24 hours have been reviewed.  CBC:   Recent Labs   Lab 08/03/22  0902 08/04/22  0451   WBC 6.91 8.44   HGB 13.7* 14.0   HCT 40.1 43.2    192     CMP:   Recent Labs   Lab 08/03/22  0902 08/04/22  0451   NA  138 139   K 3.1* 3.6    100   CO2 24 26   * 176*   BUN 11 10   CREATININE 0.8 0.8   CALCIUM 9.9 9.9   PROT 7.0  --    ALBUMIN 3.4*  --    BILITOT 0.9  --    ALKPHOS 95  --    AST 13  --    ALT 13  --    ANIONGAP 14 13       Significant Imaging: I have reviewed all pertinent imaging results/findings within the past 24 hours.    XR ABDOMEN PORTABLE     CLINICAL HISTORY:   Constipation, abdominal pain;     TECHNIQUE:   AP View(s) of the abdomen was performed.     COMPARISON:   None     FINDINGS:   There is no evidence bowel obstruction.  There are degenerative changes of the spine and bilateral SI joints and hips.    Impression:       There is no evidence bowel obstruction.       Electronically signed by: Jacque Palumbo MD   Date: 08/04/2022   Time: 09:57       Assessment/Plan:      * Complicated UTI (urinary tract infection)  Patient with chronic indwelling Gutierrez catheter.  History of ESBL.  UA concerning for urinary tract infection.    Continue IV Merrem.  Monitor culture.        Constipation  Abdominal xray - negative for SBO  Fleets enema  Senna BID      Chronic indwelling Gutierrez catheter  Inability to be changed despite multiple attempts in ED.  Consultation to urology        Hypertension  Chronic, controlled.  Latest blood pressure and vitals reviewed-   Temp:  [98 °F (36.7 °C)]   Pulse:  [50-74]   Resp:  [18]   BP: (118-146)/(65-74)   SpO2:  [95 %-99 %] .   Home meds for hypertension were reviewed and noted below.   Hypertension Medications             lisinopriL-hydrochlorothiazide (PRINZIDE,ZESTORETIC) 20-12.5 mg per tablet TAKE ONE TABLET BY MOUTH TWICE DAILY    losartan (COZAAR) 25 MG tablet 1 tablet DAILY (route: oral)          While in the hospital, will manage blood pressure as follows; Continue home antihypertensive regimen    Will utilize p.r.n. blood pressure medication only if patient's blood pressure greater than  180/110 and he develops symptoms such as worsening chest pain or  shortness of breath.        Type 2 diabetes mellitus with hyperglycemia  Patient's FSGs are uncontrolled due to hyperglycemia on current medication regimen.  Last A1c reviewed-   Lab Results   Component Value Date    HGBA1C 8.8 (H) 09/30/2021     Most recent fingerstick glucose reviewed-   Recent Labs   Lab 08/03/22  0940   POCTGLUCOSE 132*     Current correctional scale  Medium  Maintain anti-hyperglycemic dose as follows-   Antihyperglycemics (From admission, onward)            Medium dose SSI  Levemir 10 units nightly        Hold Oral hypoglycemics while patient is in the hospital.      VTE Risk Mitigation (From admission, onward)         Ordered     enoxaparin injection 40 mg  Daily         08/03/22 1857     IP VTE LOW RISK PATIENT  Once         08/03/22 1857     Place sequential compression device  Until discontinued         08/03/22 1857                Discharge Planning   YUDITH: 8/4/2022     Code Status: Full Code   Is the patient medically ready for discharge?:     Reason for patient still in hospital (select all that apply): Patient trending condition, Laboratory test, Treatment, Imaging and Consult recommendations  Discharge Plan A: Home Health                  Rosalind Neff NP  Department of Hospital Medicine   [facility]

## 2022-08-04 NOTE — PT/OT/SLP PROGRESS
Occupational Therapy      Patient Name:  Jhonny Almazan   MRN:  [id number]    Patient not seen today secondary to Patient unwilling to participate secondary to pain. Will follow-up.    8/4/2022

## 2022-08-04 NOTE — NURSING
"Attempted to reconcile home medications, patient not aware of home medications, " home health nurse sets them up for me". " I have not seen a home health nurse in weeks since I  left [facility] ". Noted new medications on Outside Medication Reconciliation: Fluoxetine,Gabapentin,Mirtazapine and Lisinopril.   Not ordered for this hospital admission.   "

## 2022-08-04 NOTE — MED STUDENT SUBJECTIVE & OBJECTIVE
"Medical Student Subjective & Objective     Principal Problem: Complicated UTI (urinary tract infection)    Chief Complaint:   Chief Complaint   Patient presents with    Weakness     Patient reports increasing weakness, stated "I cant even get the strength to get up and empty my cath bag"        HPI: 67 y/o male with hx of indwelling urinary catheter placement, PEG tube placement, stroke, HTN, and MDD presented to the ED with a 3-4 day history of worsening generalized weakness, fatigue and mild abdominal pain. Pt is wheelchair bound due to generalized weakness following a stroke in 11/2020. He has worsening weakness over the past few days with fatigue, a loss of appetite and mild abdominal pain. Pt has indwelling olguin catheter that pt reports was last changed around 2-3 weeks ago. Pt reports less urine output than normal. He has experienced some chills and cough but denies F/N/V/D, SOB, chest pain, edema, or flank pain. While in the ED, olguin was unable to be removed due to resistance and UA was positive for bacteria, leukocytes and WBC. Recent urine culture was positive for ESBL and MDRO. Pt will be admitted to the services of hospital medicine and urology consult in order to change indwelling olguin catheter.       Hospital Course: No notes on file    Interval History: There was no acute events overnight. Pt reports continuation of suprapubic abdominal discomfort that disrupted his sleep. Pt does complain of constipation and said last BM was around 4 days ago. Urology has been consulted to change current indwelling catheter. Denies fever,chills, SOB, N/V.     Past Medical History:   Diagnosis Date    Anticoagulant long-term use     Arthritis     Colon polyp     Diabetes mellitus     Diabetes mellitus, type 2     Fatty liver     Hypertension     Major depressive disorder 11/17/2020    -Continue Wellbutrin    PEG (percutaneous endoscopic gastrostomy) adjustment/replacement/removal 1/6/2021    Stroke " 11/2020    left sided weakness    Urinary retention 9/7/2021       Past Surgical History:   Procedure Laterality Date    BACK SURGERY      COLONOSCOPY  07/18/2014    Dr. Crane: 22 colon polyps removed, hemorrhoids, erythema to sigmoid, repeat in 1 year for surveillance; biopsy: sigmoid erythema- showed unremarkable colonic mucosa, tubular adenomas and hyperplastic polyps    COLONOSCOPY N/A 5/3/2019    Procedure: COLONOSCOPY;  Surgeon: Guero Crane MD;  Location: [facility];  Service: Endoscopy;  Laterality: N/A;    COLONOSCOPY N/A 5/6/2019    Procedure: COLONOSCOPY;  Surgeon: Guero Crane MD;  Location: [facility];  Service: Endoscopy;  Laterality: N/A;    CYSTOSCOPY N/A 2/9/2022    Procedure: CYSTOSCOPY;  Surgeon: Jaylen Antonio Jr., MD;  Location: [facility];  Service: Urology;  Laterality: N/A;    EPIDURAL STEROID INJECTION INTO THORACIC SPINE N/A 8/30/2019    Procedure: Injection-steroid-epidural-thoracic;  Surgeon: Frantz Green MD;  Location: [facility];  Service: Pain Management;  Laterality: N/A;  T6-7    ESOPHAGOGASTRODUODENOSCOPY N/A 4/26/2019    Procedure: EGD (ESOPHAGOGASTRODUODENOSCOPY);  Surgeon: Guero Crane MD;  Location: [facility];  Service: Endoscopy;  Laterality: N/A;    ESOPHAGOGASTRODUODENOSCOPY N/A 11/23/2020    Procedure: EGD (ESOPHAGOGASTRODUODENOSCOPY);  Surgeon: Guero Crane MD;  Location: [facility];  Service: Endoscopy;  Laterality: N/A;    ESOPHAGOGASTRODUODENOSCOPY N/A 1/6/2021    Procedure: EGD (ESOPHAGOGASTRODUODENOSCOPY);  Surgeon: Guero Crane MD;  Location: [facility];  Service: Endoscopy;  Laterality: N/A;    HERNIA REPAIR      TRANSRECTAL ULTRASOUND EXAMINATION N/A 2/9/2022    Procedure: ULTRASOUND, RECTAL APPROACH;  Surgeon: Jaylen Antonio Jr., MD;  Location: [facility];  Service: Urology;  Laterality: N/A;  must text son miroslava, times and instructions given leave at 130       Review of patient's allergies indicates:  No Known Allergies    No current  "facility-administered medications on file prior to encounter.     Current Outpatient Medications on File Prior to Encounter   Medication Sig    atorvastatin (LIPITOR) 40 MG tablet Take 1 tablet (40 mg total) by mouth once daily.    aspirin (ECOTRIN) 81 MG EC tablet Take 1 tablet (81 mg total) by mouth once daily.    blood sugar diagnostic Strp To check BG 2 times daily, to use with insurance preferred meter. One touch Ultra.    buPROPion (WELLBUTRIN XL) 300 MG 24 hr tablet Take 1 tablet (300 mg total) by mouth once daily.    clopidogreL (PLAVIX) 75 mg tablet 1 tablet (75 mg total) by Per G Tube route once daily.    finasteride (PROSCAR) 5 mg tablet Take 1 tablet (5 mg total) by mouth once daily. Take 1 tablet by mouth in am to keep prostate from enlarging    insulin NPH-insulin regular, 70/30, (NOVOLIN 70/30) 100 unit/mL (70-30) injection Inject 20 Units into the skin 2 (two) times daily. #3 10 ml syringes with 32 gauge sergo needles    lancets Misc To check BG 2 times daily, to use with insurance preferred meter.One Touch Ultra    lisinopriL-hydrochlorothiazide (PRINZIDE,ZESTORETIC) 20-12.5 mg per tablet TAKE ONE TABLET BY MOUTH TWICE DAILY    losartan (COZAAR) 25 MG tablet 1 tablet DAILY (route: oral)    meclizine (ANTIVERT) 25 mg tablet Take 1 tablet (25 mg total) by mouth 3 (three) times daily as needed for Dizziness.    melatonin (MELATIN) 3 mg tablet Take 2 tablets (6 mg total) by mouth nightly as needed for Insomnia.    omeprazole (PRILOSEC) 40 MG capsule TAKE ONE CAPSULE (40 MG) BY MOUTH EVERY DAY    pen needle, diabetic 32 gauge x 5/32" Ndle Use AC meals 2 a day    pioglitazone (ACTOS) 30 MG tablet Take 30 mg by mouth once daily.    QUEtiapine (SEROQUEL) 25 MG Tab Take 1 tablet (25 mg total) by mouth every evening.    tetrahydrozoline 0.05% (VISION CLEAR) 0.05 % Drop Place 2 drops into both eyes 4 (four) times daily as needed (eye irritation).     Family History     Problem Relation (Age of " Onset)    Brain cancer Brother    Diabetes Brother    Heart disease Mother, Father    Suicide Brother        Tobacco Use    Smoking status: Current Every Day Smoker     Packs/day: 1.00     Years: 50.00     Pack years: 50.00     Types: Cigarettes    Smokeless tobacco: Never Used   Substance and Sexual Activity    Alcohol use: Yes     Alcohol/week: 14.0 standard drinks     Types: 14 Cans of beer per week     Comment: 2-3 beers daily    Drug use: No    Sexual activity: Yes     Partners: Female     Review of Systems   Constitutional: Positive for fatigue. Negative for chills, diaphoresis and fever.   HENT: Negative.  Negative for congestion, postnasal drip, sinus pressure and sore throat.    Eyes: Negative.  Negative for photophobia, pain and discharge.   Respiratory: Positive for cough. Negative for choking, chest tightness and shortness of breath.    Cardiovascular: Negative.  Negative for chest pain.   Gastrointestinal: Positive for abdominal pain and constipation. Negative for abdominal distention, diarrhea, nausea and vomiting.   Endocrine: Negative.    Genitourinary: Positive for frequency.   Musculoskeletal: Negative.    Skin: Negative.    Allergic/Immunologic: Negative.    Neurological: Positive for weakness. Negative for dizziness and headaches.   Hematological: Negative.    Psychiatric/Behavioral: Negative.      Objective:     Vital Signs (Most Recent):  Temp: 97.7 °F (36.5 °C) (08/04/22 0753)  Pulse: (!) 58 (08/04/22 0753)  Resp: 17 (08/04/22 0753)  BP: (!) 140/75 (08/04/22 0753)  SpO2: 95 % (08/04/22 0753) Vital Signs (24h Range):  Temp:  [96.1 °F (35.6 °C)-98.1 °F (36.7 °C)] 97.7 °F (36.5 °C)  Pulse:  [50-59] 58  Resp:  [16-18] 17  SpO2:  [95 %-99 %] 95 %  BP: (128-157)/(65-83) 140/75     Weight: 99.8 kg (220 lb)  Body mass index is 26.09 kg/m².    Intake/Output Summary (Last 24 hours) at 8/4/2022 0844  Last data filed at 8/4/2022 0400  Gross per 24 hour   Intake 219.21 ml   Output --   Net 219.21 ml       Physical Exam  Constitutional:       General: He is not in acute distress.     Appearance: Normal appearance. He is normal weight. He is not ill-appearing.   HENT:      Head: Normocephalic and atraumatic.      Mouth/Throat:      Mouth: Mucous membranes are dry.   Eyes:      Extraocular Movements: Extraocular movements intact.      Pupils: Pupils are equal, round, and reactive to light.   Cardiovascular:      Rate and Rhythm: Normal rate and regular rhythm.      Pulses: Normal pulses.      Heart sounds: Normal heart sounds.   Pulmonary:      Effort: Pulmonary effort is normal.      Breath sounds: Normal breath sounds.   Abdominal:      General: Bowel sounds are normal. There is no distension.      Palpations: Abdomen is soft.      Tenderness: There is generalized abdominal tenderness and tenderness in the suprapubic area. Negative signs include Tomlin's sign and Rovsing's sign.   Genitourinary:     Comments: Indwelling Catheter in place  Musculoskeletal:      Cervical back: Normal range of motion.   Skin:     General: Skin is warm.      Capillary Refill: Capillary refill takes less than 2 seconds.   Neurological:      General: No focal deficit present.      Mental Status: He is alert and oriented to person, place, and time. Mental status is at baseline.   Psychiatric:         Mood and Affect: Mood normal.         Behavior: Behavior normal.         Thought Content: Thought content normal.         Significant Labs:   All pertinent labs within the past 24 hours have been reviewed.  CBC:   Recent Labs   Lab 08/03/22  0902 08/04/22  0451   WBC 6.91 8.44   HGB 13.7* 14.0   HCT 40.1 43.2    192     CMP:   Recent Labs   Lab 08/03/22  0902 08/04/22  0451    139   K 3.1* 3.6    100   CO2 24 26   * 176*   BUN 11 10   CREATININE 0.8 0.8   CALCIUM 9.9 9.9   PROT 7.0  --    ALBUMIN 3.4*  --    BILITOT 0.9  --    ALKPHOS 95  --    AST 13  --    ALT 13  --    ANIONGAP 14 13     Urine Studies:   Recent  Labs   Lab 08/03/22  1334   COLORU Yellow   APPEARANCEUA Cloudy*   PHUR 6.0   SPECGRAV 1.025   PROTEINUA Negative   GLUCUA Negative   KETONESU Trace*   BILIRUBINUA 1+*   OCCULTUA Trace*   NITRITE Negative   UROBILINOGEN 1.0   LEUKOCYTESUR 2+*   RBCUA 4   WBCUA 46*   BACTERIA Many*   SQUAMEPITHEL 0       Significant Imaging:   Narrative & Impression  EXAMINATION:  XR CHEST AP PORTABLE     CLINICAL HISTORY:  Chest Pain;     TECHNIQUE:  Single frontal view of the chest was performed.     COMPARISON:  10/04/2021     FINDINGS:  The cardiomediastinal silhouette is within normal limits.  The lungs are well expanded without consolidation or pleural effusion.     Impression:     Negative chest.        Electronically signed by: Oracio Le MD  Date:                                            08/03/2022  Time:                                           09:22    Medical Student Subjective & Objective

## 2022-08-04 NOTE — MEDICAL/APP STUDENT
"[facility]  Progress Note    Patient Name: Jhonny Almazan  MRN: [id number]  Patient Class: OP- Observation   Admission Date: 8/3/2022  Length of Stay: 0 days  Attending Physician: Neil Rosas MD  Primary Care Provider: Joey Whitehead MD    Subjective:     Principal Problem: Complicated UTI (urinary tract infection)    Chief Complaint:   Chief Complaint   Patient presents with    Weakness     Patient reports increasing weakness, stated "I cant even get the strength to get up and empty my cath bag"        HPI: 67 y/o male with hx of indwelling urinary catheter placement, PEG tube placement, stroke, HTN, and MDD presented to the ED with a 3-4 day history of worsening generalized weakness, fatigue and mild abdominal pain. Pt is wheelchair bound due to generalized weakness following a stroke in 11/2020. He has worsening weakness over the past few days with fatigue, a loss of appetite and mild abdominal pain. Pt has indwelling olguin catheter that pt reports was last changed around 2-3 weeks ago. Pt reports less urine output than normal. He has experienced some chills and cough but denies F/N/V/D, SOB, chest pain, edema, or flank pain. While in the ED, olguin was unable to be removed due to resistance and UA was positive for bacteria, leukocytes and WBC. Recent urine culture was positive for ESBL and MDRO. Pt will be admitted to the services of hospital medicine and urology consult in order to change indwelling olguin catheter.       Hospital Course: No notes on file    Interval History: There was no acute events overnight. Pt reports continuation of suprapubic abdominal discomfort that disrupted his sleep. Pt does complain of constipation and said last BM was around 4 days ago. Urology has been consulted to change current indwelling catheter. Denies fever,chills, SOB, N/V.     Past Medical History:   Diagnosis Date    Anticoagulant long-term use     Arthritis     Colon polyp     " Diabetes mellitus     Diabetes mellitus, type 2     Fatty liver     Hypertension     Major depressive disorder 11/17/2020    -Continue Wellbutrin    PEG (percutaneous endoscopic gastrostomy) adjustment/replacement/removal 1/6/2021    Stroke 11/2020    left sided weakness    Urinary retention 9/7/2021       Past Surgical History:   Procedure Laterality Date    BACK SURGERY      COLONOSCOPY  07/18/2014    Dr. Crane: 22 colon polyps removed, hemorrhoids, erythema to sigmoid, repeat in 1 year for surveillance; biopsy: sigmoid erythema- showed unremarkable colonic mucosa, tubular adenomas and hyperplastic polyps    COLONOSCOPY N/A 5/3/2019    Procedure: COLONOSCOPY;  Surgeon: Guero Crane MD;  Location: [facility] ENDO;  Service: Endoscopy;  Laterality: N/A;    COLONOSCOPY N/A 5/6/2019    Procedure: COLONOSCOPY;  Surgeon: Guero Crane MD;  Location: [facility] ENDO;  Service: Endoscopy;  Laterality: N/A;    CYSTOSCOPY N/A 2/9/2022    Procedure: CYSTOSCOPY;  Surgeon: Jaylen Antonio Jr., MD;  Location: [facility] OR;  Service: Urology;  Laterality: N/A;    EPIDURAL STEROID INJECTION INTO THORACIC SPINE N/A 8/30/2019    Procedure: Injection-steroid-epidural-thoracic;  Surgeon: Frantz Green MD;  Location: [facility] OR;  Service: Pain Management;  Laterality: N/A;  T6-7    ESOPHAGOGASTRODUODENOSCOPY N/A 4/26/2019    Procedure: EGD (ESOPHAGOGASTRODUODENOSCOPY);  Surgeon: Guero Crane MD;  Location: [facility] ENDO;  Service: Endoscopy;  Laterality: N/A;    ESOPHAGOGASTRODUODENOSCOPY N/A 11/23/2020    Procedure: EGD (ESOPHAGOGASTRODUODENOSCOPY);  Surgeon: Guero Crane MD;  Location: [facility] ENDO;  Service: Endoscopy;  Laterality: N/A;    ESOPHAGOGASTRODUODENOSCOPY N/A 1/6/2021    Procedure: EGD (ESOPHAGOGASTRODUODENOSCOPY);  Surgeon: Guero Crane MD;  Location: [facility] ENDO;  Service: Endoscopy;  Laterality: N/A;    HERNIA REPAIR      TRANSRECTAL ULTRASOUND EXAMINATION N/A 2/9/2022    Procedure: ULTRASOUND, RECTAL  "APPROACH;  Surgeon: Jaylen Antonio Jr., MD;  Location: [facility] OR;  Service: Urology;  Laterality: N/A;  must text son miroslava, times and instructions given leave at 130       Review of patient's allergies indicates:  No Known Allergies    No current facility-administered medications on file prior to encounter.     Current Outpatient Medications on File Prior to Encounter   Medication Sig    atorvastatin (LIPITOR) 40 MG tablet Take 1 tablet (40 mg total) by mouth once daily.    aspirin (ECOTRIN) 81 MG EC tablet Take 1 tablet (81 mg total) by mouth once daily.    blood sugar diagnostic Strp To check BG 2 times daily, to use with insurance preferred meter. One touch Ultra.    buPROPion (WELLBUTRIN XL) 300 MG 24 hr tablet Take 1 tablet (300 mg total) by mouth once daily.    clopidogreL (PLAVIX) 75 mg tablet 1 tablet (75 mg total) by Per G Tube route once daily.    finasteride (PROSCAR) 5 mg tablet Take 1 tablet (5 mg total) by mouth once daily. Take 1 tablet by mouth in am to keep prostate from enlarging    insulin NPH-insulin regular, 70/30, (NOVOLIN 70/30) 100 unit/mL (70-30) injection Inject 20 Units into the skin 2 (two) times daily. #3 10 ml syringes with 32 gauge sergo needles    lancets Misc To check BG 2 times daily, to use with insurance preferred meter.One Touch Ultra    lisinopriL-hydrochlorothiazide (PRINZIDE,ZESTORETIC) 20-12.5 mg per tablet TAKE ONE TABLET BY MOUTH TWICE DAILY    losartan (COZAAR) 25 MG tablet 1 tablet DAILY (route: oral)    meclizine (ANTIVERT) 25 mg tablet Take 1 tablet (25 mg total) by mouth 3 (three) times daily as needed for Dizziness.    melatonin (MELATIN) 3 mg tablet Take 2 tablets (6 mg total) by mouth nightly as needed for Insomnia.    omeprazole (PRILOSEC) 40 MG capsule TAKE ONE CAPSULE (40 MG) BY MOUTH EVERY DAY    pen needle, diabetic 32 gauge x 5/32" Ndle Use AC meals 2 a day    pioglitazone (ACTOS) 30 MG tablet Take 30 mg by mouth once daily.    QUEtiapine " (SEROQUEL) 25 MG Tab Take 1 tablet (25 mg total) by mouth every evening.    tetrahydrozoline 0.05% (VISION CLEAR) 0.05 % Drop Place 2 drops into both eyes 4 (four) times daily as needed (eye irritation).     Family History     Problem Relation (Age of Onset)    Brain cancer Brother    Diabetes Brother    Heart disease Mother, Father    Suicide Brother        Tobacco Use    Smoking status: Current Every Day Smoker     Packs/day: 1.00     Years: 50.00     Pack years: 50.00     Types: Cigarettes    Smokeless tobacco: Never Used   Substance and Sexual Activity    Alcohol use: Yes     Alcohol/week: 14.0 standard drinks     Types: 14 Cans of beer per week     Comment: 2-3 beers daily    Drug use: No    Sexual activity: Yes     Partners: Female     Review of Systems   Constitutional: Positive for fatigue. Negative for chills, diaphoresis and fever.   HENT: Negative.  Negative for congestion, postnasal drip, sinus pressure and sore throat.    Eyes: Negative.  Negative for photophobia, pain and discharge.   Respiratory: Positive for cough. Negative for choking, chest tightness and shortness of breath.    Cardiovascular: Negative.  Negative for chest pain.   Gastrointestinal: Positive for abdominal pain and constipation. Negative for abdominal distention, diarrhea, nausea and vomiting.   Endocrine: Negative.    Genitourinary: Positive for frequency.   Musculoskeletal: Negative.    Skin: Negative.    Allergic/Immunologic: Negative.    Neurological: Positive for weakness. Negative for dizziness and headaches.   Hematological: Negative.    Psychiatric/Behavioral: Negative.      Objective:     Vital Signs (Most Recent):  Temp: 97.7 °F (36.5 °C) (08/04/22 0753)  Pulse: (!) 58 (08/04/22 0753)  Resp: 17 (08/04/22 0753)  BP: (!) 140/75 (08/04/22 0753)  SpO2: 95 % (08/04/22 0753) Vital Signs (24h Range):  Temp:  [96.1 °F (35.6 °C)-98.1 °F (36.7 °C)] 97.7 °F (36.5 °C)  Pulse:  [50-59] 58  Resp:  [16-18] 17  SpO2:  [95 %-99 %] 95  %  BP: (128-157)/(65-83) 140/75     Weight: 99.8 kg (220 lb)  Body mass index is 26.09 kg/m².    Intake/Output Summary (Last 24 hours) at 8/4/2022 0844  Last data filed at 8/4/2022 0400  Gross per 24 hour   Intake 219.21 ml   Output --   Net 219.21 ml      Physical Exam  Constitutional:       General: He is not in acute distress.     Appearance: Normal appearance. He is normal weight. He is not ill-appearing.   HENT:      Head: Normocephalic and atraumatic.      Mouth/Throat:      Mouth: Mucous membranes are dry.   Eyes:      Extraocular Movements: Extraocular movements intact.      Pupils: Pupils are equal, round, and reactive to light.   Cardiovascular:      Rate and Rhythm: Normal rate and regular rhythm.      Pulses: Normal pulses.      Heart sounds: Normal heart sounds.   Pulmonary:      Effort: Pulmonary effort is normal.      Breath sounds: Normal breath sounds.   Abdominal:      General: Bowel sounds are normal. There is no distension.      Palpations: Abdomen is soft.      Tenderness: There is generalized abdominal tenderness and tenderness in the suprapubic area. Negative signs include Tomlin's sign and Rovsing's sign.   Genitourinary:     Comments: Indwelling Catheter in place  Musculoskeletal:      Cervical back: Normal range of motion.   Skin:     General: Skin is warm.      Capillary Refill: Capillary refill takes less than 2 seconds.   Neurological:      General: No focal deficit present.      Mental Status: He is alert and oriented to person, place, and time. Mental status is at baseline.   Psychiatric:         Mood and Affect: Mood normal.         Behavior: Behavior normal.         Thought Content: Thought content normal.         Significant Labs:   All pertinent labs within the past 24 hours have been reviewed.  CBC:   Recent Labs   Lab 08/03/22  0902 08/04/22 0451   WBC 6.91 8.44   HGB 13.7* 14.0   HCT 40.1 43.2    192     CMP:   Recent Labs   Lab 08/03/22  0902 08/04/22  0451    139    K 3.1* 3.6    100   CO2 24 26   * 176*   BUN 11 10   CREATININE 0.8 0.8   CALCIUM 9.9 9.9   PROT 7.0  --    ALBUMIN 3.4*  --    BILITOT 0.9  --    ALKPHOS 95  --    AST 13  --    ALT 13  --    ANIONGAP 14 13     Urine Studies:   Recent Labs   Lab 08/03/22  1334   COLORU Yellow   APPEARANCEUA Cloudy*   PHUR 6.0   SPECGRAV 1.025   PROTEINUA Negative   GLUCUA Negative   KETONESU Trace*   BILIRUBINUA 1+*   OCCULTUA Trace*   NITRITE Negative   UROBILINOGEN 1.0   LEUKOCYTESUR 2+*   RBCUA 4   WBCUA 46*   BACTERIA Many*   SQUAMEPITHEL 0       Significant Imaging:   Narrative & Impression  EXAMINATION:  XR CHEST AP PORTABLE     CLINICAL HISTORY:  Chest Pain;     TECHNIQUE:  Single frontal view of the chest was performed.     COMPARISON:  10/04/2021     FINDINGS:  The cardiomediastinal silhouette is within normal limits.  The lungs are well expanded without consolidation or pleural effusion.     Impression:     Negative chest.        Electronically signed by: Oracio Le MD  Date:                                            08/03/2022  Time:                                           09:22        Assessment/Plan:      1. Complicated UTI  -Pt has an indwelling catheter and the UA results indicates a UTI.   -Recent urinary culture indicates pt grows ESBL, MDRO. Transition from IV Meropenum 1 g q8hs to Oral Bactrum 800-160mg BID. Monitor Culture.   -Monitor vitals for any signs of sepsis.    2. Chronic Indwelling Gutierrez Catheter   -Catheter was last changed 2-3 weeks ago. It could not be removed because of resistance in the ED.   -Consult Urology to change catheter.    3. Constipation:  -Pt has been complaining of constipation and says that his last BM was 4 days ago.  -Abdominal X-Ray ordered to rule out SBO.  -Enema ordered if Xray is normal.    4. Diabetes, Type 2   -Pt has hx of D2M.  -Hold at home medications and start sliding scale insulin while in the hospital.     5. Hyperlipidemia   -Pt has a history  of HLP that is controlled with atorvastatin   -Continue home medications     6. Hypertension  -Pt has a history of hypertension.   -Continue home medications    7. Major Depressive Disorder   -Pt has a history of MDD that he takes welbutrion to control.  -Continue home medications.       Active Diagnoses:    Diagnosis Date Noted POA    PRINCIPAL PROBLEM:  Complicated UTI (urinary tract infection) [N39.0] 08/03/2022 Yes    Chronic indwelling Gutierrez catheter [Z97.8] 09/10/2021 Not Applicable     Chronic    Hypertension [I10] 11/16/2020 Yes     Chronic    Type 2 diabetes mellitus with hyperglycemia [E11.65] 04/25/2019 Yes     Chronic      Problems Resolved During this Admission:     VTE Risk Mitigation (From admission, onward)         Ordered     enoxaparin injection 40 mg  Daily         08/03/22 1857     IP VTE LOW RISK PATIENT  Once         08/03/22 1857     Place sequential compression device  Until discontinued         08/03/22 1857                   Humberto Pacheco  [facility]   no

## 2022-08-04 NOTE — PLAN OF CARE
08/04/22 1125   RUBIO Message   Medicare Outpatient and Observation Notification regarding financial responsibility Given to patient/caregiver;Signed/date by patient/caregiver;Explained to patient/caregiver   Date RUBIO was signed 08/04/22   Time RUBIO was signed [id number]

## 2022-08-04 NOTE — PLAN OF CARE
Requested apt with PCP from [facility]- unable to schedule timely follow up myself    Pt already has apt with Dr. Antonio for 8/12. Updated AVS with apt    1155: received apt with PCP. Updated AVS

## 2022-08-04 NOTE — PLAN OF CARE
[facility]  Initial Discharge Assessment       Primary Care Provider: Joey Whitehead MD    Admission Diagnosis: Fatigue [R53.83]  Problem with Gutierrez catheter, initial encounter [T83.9XXA]  Urinary tract infection without hematuria, site unspecified [N39.0]    Admission Date: 8/3/2022  Expected Discharge Date: 8/4/2022    Discharge Barriers Identified: Social    Payor: [employer]ET[employer] MANAGED MEDICARE / Plan: [employer]ET[employer] MEDICARE PLAN PPO / Product Type: Medicare Advantage /     Extended Emergency Contact Information  Primary Emergency Contact: mayda Almazan  Mobile Phone: [phone]  Relation: Son  Secondary Emergency Contact: Gurdeep almazan  Mobile Phone: [phone]  Relation: Son    Discharge Plan A: Home Health  Discharge Plan B: Home with family      [facility] - [place], [place] - [address]  [address]  [place] [address]  Phone: [phone] Fax: [phone]    [facility]  [address]  [facility] [address]  Phone: [phone] Fax: [phone]      Completed DC assessment with pt at bedside. Verified information on facesheet. Pt reports living at listed address with sonGurdeep. Verified PCP as Dr. Whitehead and pharmacy as [facility]. Denies POA. NOK 3 children.  States he is active with HH but unsure of company. DME- WC, cane, raised toilet, and bsc. Reports that he is mostly WC bound but can transfer himself independently from bed to chair and etc. Reports activities of daily living are getting more challenging and would like to be placed in nursing home. Reports that his son provides transportation to [facility] and should provide transportation home upon DC. DC plan is home with HH. CM following.       ** CM discussed NH placement with pt. CM explained that we cannot do penitentiary placement from hospital and currently does not meet criteria for SNF/rehab since he is refusing pt/ot and also seems to be at functional baseline. I explained that we can place  referrals to outpt CM and [facility] for extra resources and also to help with group home placement from home. I explained that he will need to start getting bank statements and other documents for the long term medicaid. Pt verbalized understanding.     Referrals placed to outpt cm and [facility]    Initial Assessment (most recent)     Adult Discharge Assessment - 08/04/22 1125        Discharge Assessment    Assessment Type Discharge Planning Assessment     Confirmed/corrected address, phone number and insurance Yes     Confirmed Demographics Correct on Facesheet     Source of Information patient     Does patient/caregiver understand observation status Yes     Communicated YUDITH with patient/caregiver Yes     Reason For Admission UTI     Lives With child(rajeev), adult     Facility Arrived From: home     Do you expect to return to your current living situation? Yes     Do you have help at home or someone to help you manage your care at home? No     Prior to hospitilization cognitive status: Alert/Oriented     Current cognitive status: Alert/Oriented     Walking or Climbing Stairs Difficulty ambulation difficulty, requires equipment;ambulation difficulty, assistance 1 person     Dressing/Bathing Difficulty bathing difficulty, requires equipment     Equipment Currently Used at Home wheelchair;bedside commode;walker, rolling;cane, straight;glucometer     Readmission within 30 days? No     Patient currently being followed by outpatient case management? No     Do you currently have service(s) that help you manage your care at home? Yes     Name and Contact number of agency [facility]     Is the pt/caregiver preference to resume services with current agency Yes     Do you take prescription medications? Yes     Do you have prescription coverage? Yes     Coverage [employer]na mgd medicare     Do you have any problems affording any of your prescribed medications? No     Is the patient taking medications as  prescribed? yes     Who is going to help you get home at discharge? son     How do you get to doctors appointments? family or friend will provide     Are you on dialysis? No     Do you take coumadin? No     Discharge Plan A Home Health     Discharge Plan B Home with family     DME Needed Upon Discharge  none     Discharge Plan discussed with: Patient     Discharge Barriers Identified Social

## 2022-08-04 NOTE — SUBJECTIVE & OBJECTIVE
Interval History:  Patient seen and examined.  No acute events overnight.  Patient still feeling constipated this morning.  Reports that he has not had a bowel movement overnight and has not had a BM in 3 days.  Abdominal x-ray has been ordered.  Discussed plan of care with patient and answered all questions.    Review of Systems   Constitutional:  Positive for fatigue. Negative for appetite change, chills and fever.   Respiratory:  Positive for cough. Negative for shortness of breath and wheezing.    Cardiovascular:  Negative for chest pain.   Gastrointestinal:  Positive for abdominal pain (suprapubic) and constipation. Negative for nausea and vomiting.   Genitourinary:  Positive for difficulty urinating (Chronic). Negative for hematuria.   Neurological:  Positive for weakness. Negative for dizziness and light-headedness.   Objective:     Vital Signs (Most Recent):  Temp: 97.7 °F (36.5 °C) (08/04/22 1128)  Pulse: (!) 59 (08/04/22 1128)  Resp: 17 (08/04/22 1128)  BP: (!) 143/69 (08/04/22 1128)  SpO2: 98 % (08/04/22 1128)   Vital Signs (24h Range):  Temp:  [96.1 °F (35.6 °C)-98.1 °F (36.7 °C)] 97.7 °F (36.5 °C)  Pulse:  [50-59] 59  Resp:  [16-18] 17  SpO2:  [95 %-98 %] 98 %  BP: (128-157)/(65-83) 143/69     Weight: 99.8 kg (220 lb)  Body mass index is 26.09 kg/m².    Intake/Output Summary (Last 24 hours) at 8/4/2022 1358  Last data filed at 8/4/2022 1200  Gross per 24 hour   Intake 219.21 ml   Output 200 ml   Net 19.21 ml      Physical Exam  Constitutional:       General: He is not in acute distress.     Appearance: He is not ill-appearing or toxic-appearing.   Cardiovascular:      Rate and Rhythm: Normal rate and regular rhythm.      Heart sounds: Normal heart sounds.   Pulmonary:      Effort: Pulmonary effort is normal. No respiratory distress.      Breath sounds: Normal breath sounds.   Abdominal:      General: Bowel sounds are normal. There is no distension.      Palpations: Abdomen is soft.      Tenderness:  There is abdominal tenderness (Mild suprapubic tenderness). There is no guarding.   Musculoskeletal:         General: Normal range of motion.      Right lower leg: No edema.      Left lower leg: No edema.   Neurological:      Mental Status: He is alert and oriented to person, place, and time. Mental status is at baseline.       Significant Labs: All pertinent labs within the past 24 hours have been reviewed.  CBC:   Recent Labs   Lab 08/03/22  0902 08/04/22  0451   WBC 6.91 8.44   HGB 13.7* 14.0   HCT 40.1 43.2    192     CMP:   Recent Labs   Lab 08/03/22  0902 08/04/22  0451    139   K 3.1* 3.6    100   CO2 24 26   * 176*   BUN 11 10   CREATININE 0.8 0.8   CALCIUM 9.9 9.9   PROT 7.0  --    ALBUMIN 3.4*  --    BILITOT 0.9  --    ALKPHOS 95  --    AST 13  --    ALT 13  --    ANIONGAP 14 13       Significant Imaging: I have reviewed all pertinent imaging results/findings within the past 24 hours.    XR ABDOMEN PORTABLE     CLINICAL HISTORY:   Constipation, abdominal pain;     TECHNIQUE:   AP View(s) of the abdomen was performed.     COMPARISON:   None     FINDINGS:   There is no evidence bowel obstruction.  There are degenerative changes of the spine and bilateral SI joints and hips.    Impression:       There is no evidence bowel obstruction.       Electronically signed by: Jacque Palumbo MD   Date: 08/04/2022   Time: 09:57

## 2022-08-04 NOTE — PLAN OF CARE
Problem: Adult Inpatient Plan of Care  Goal: Plan of Care Review  Outcome: Ongoing, Progressing   Supine with HOB up 30. POC and medications reviewed with patient who verbalized complete understanding. Tele [id number] intact. HL to Right fa with DDI and no redness or swelling from site. Indwelling olguin cath in place and draining in brief small amount of av urine. SR up x 2. Call light in reach. Bed in lowest position with brakes locked.   Problem: Infection  Goal: Absence of Infection Signs and Symptoms  Outcome: Ongoing, Progressing   Patient seen by Urology and new indwelling  16fr cath palced by urology and  cloudy av urine in drainage bag with sediment.   Problem: Adult Inpatient Plan of Care  Goal: Optimal Comfort and Wellbeing  Outcome: Ongoing, Progressing   Pain monitored through out shift   Problem: Diabetes Comorbidity  Goal: Blood Glucose Level Within Targeted Range  Outcome: Ongoing, Progressing   CBG monitored through out shift and insulin given per sliding scale.

## 2022-08-05 PROBLEM — R53.81 DEBILITY: Status: ACTIVE | Noted: 2022-08-05

## 2022-08-05 LAB
ANION GAP SERPL CALC-SCNC: 11 MMOL/L (ref 8–16)
BUN SERPL-MCNC: 11 MG/DL (ref 8–23)
CALCIUM SERPL-MCNC: 9.4 MG/DL (ref 8.7–10.5)
CHLORIDE SERPL-SCNC: 103 MMOL/L (ref 95–110)
CO2 SERPL-SCNC: 24 MMOL/L (ref 23–29)
CREAT SERPL-MCNC: 0.7 MG/DL (ref 0.5–1.4)
ERYTHROCYTE [DISTWIDTH] IN BLOOD BY AUTOMATED COUNT: 14.2 % (ref 11.5–14.5)
EST. GFR  (NO RACE VARIABLE): >60 ML/MIN/1.73 M^2
GLUCOSE SERPL-MCNC: 177 MG/DL (ref 70–110)
HCT VFR BLD AUTO: 38.1 % (ref 40–54)
HGB BLD-MCNC: 12.5 G/DL (ref 14–18)
MCH RBC QN AUTO: 27.4 PG (ref 27–31)
MCHC RBC AUTO-ENTMCNC: 32.8 G/DL (ref 32–36)
MCV RBC AUTO: 83 FL (ref 82–98)
PLATELET # BLD AUTO: 177 K/UL (ref 150–450)
PMV BLD AUTO: 10 FL (ref 9.2–12.9)
POCT GLUCOSE: 120 MG/DL (ref 70–110)
POCT GLUCOSE: 149 MG/DL (ref 70–110)
POCT GLUCOSE: 156 MG/DL (ref 70–110)
POCT GLUCOSE: 163 MG/DL (ref 70–110)
POTASSIUM SERPL-SCNC: 3.7 MMOL/L (ref 3.5–5.1)
RBC # BLD AUTO: 4.57 M/UL (ref 4.6–6.2)
SODIUM SERPL-SCNC: 138 MMOL/L (ref 136–145)
WBC # BLD AUTO: 7.18 K/UL (ref 3.9–12.7)

## 2022-08-05 PROCEDURE — 97161 PT EVAL LOW COMPLEX 20 MIN: CPT

## 2022-08-05 PROCEDURE — 85027 COMPLETE CBC AUTOMATED: CPT | Performed by: NURSE PRACTITIONER

## 2022-08-05 PROCEDURE — 30200315 PPD INTRADERMAL TEST REV CODE 302: Performed by: NURSE PRACTITIONER

## 2022-08-05 PROCEDURE — 96366 THER/PROPH/DIAG IV INF ADDON: CPT

## 2022-08-05 PROCEDURE — G0378 HOSPITAL OBSERVATION PER HR: HCPCS

## 2022-08-05 PROCEDURE — 80048 BASIC METABOLIC PNL TOTAL CA: CPT | Performed by: NURSE PRACTITIONER

## 2022-08-05 PROCEDURE — 86580 TB INTRADERMAL TEST: CPT | Performed by: NURSE PRACTITIONER

## 2022-08-05 PROCEDURE — 97530 THERAPEUTIC ACTIVITIES: CPT

## 2022-08-05 PROCEDURE — 25000003 PHARM REV CODE 250: Performed by: INTERNAL MEDICINE

## 2022-08-05 PROCEDURE — 97165 OT EVAL LOW COMPLEX 30 MIN: CPT

## 2022-08-05 PROCEDURE — 96372 THER/PROPH/DIAG INJ SC/IM: CPT | Performed by: NURSE PRACTITIONER

## 2022-08-05 PROCEDURE — 36415 COLL VENOUS BLD VENIPUNCTURE: CPT | Performed by: NURSE PRACTITIONER

## 2022-08-05 PROCEDURE — 63600175 PHARM REV CODE 636 W HCPCS: Performed by: NURSE PRACTITIONER

## 2022-08-05 PROCEDURE — 25000003 PHARM REV CODE 250: Performed by: NURSE PRACTITIONER

## 2022-08-05 RX ADMIN — MEROPENEM AND SODIUM CHLORIDE 1 G: 1 INJECTION, SOLUTION INTRAVENOUS at 05:08

## 2022-08-05 RX ADMIN — MEROPENEM AND SODIUM CHLORIDE 1 G: 1 INJECTION, SOLUTION INTRAVENOUS at 11:08

## 2022-08-05 RX ADMIN — MEROPENEM AND SODIUM CHLORIDE 1 G: 1 INJECTION, SOLUTION INTRAVENOUS at 08:08

## 2022-08-05 RX ADMIN — MELATONIN TAB 3 MG 9 MG: 3 TAB at 09:08

## 2022-08-05 RX ADMIN — CLOPIDOGREL BISULFATE 75 MG: 75 TABLET, FILM COATED ORAL at 08:08

## 2022-08-05 RX ADMIN — FINASTERIDE 5 MG: 5 TABLET, FILM COATED ORAL at 08:08

## 2022-08-05 RX ADMIN — ASPIRIN 81 MG: 81 TABLET, COATED ORAL at 08:08

## 2022-08-05 RX ADMIN — BUPROPION HYDROCHLORIDE 300 MG: 150 TABLET, FILM COATED, EXTENDED RELEASE ORAL at 08:08

## 2022-08-05 RX ADMIN — ATORVASTATIN CALCIUM 40 MG: 40 TABLET, FILM COATED ORAL at 08:08

## 2022-08-05 RX ADMIN — INSULIN DETEMIR 10 UNITS: 100 INJECTION, SOLUTION SUBCUTANEOUS at 08:08

## 2022-08-05 RX ADMIN — LOSARTAN POTASSIUM 25 MG: 25 TABLET, FILM COATED ORAL at 08:08

## 2022-08-05 RX ADMIN — ACETAMINOPHEN 650 MG: 325 TABLET ORAL at 08:08

## 2022-08-05 RX ADMIN — PANTOPRAZOLE SODIUM 40 MG: 40 TABLET, DELAYED RELEASE ORAL at 08:08

## 2022-08-05 RX ADMIN — POTASSIUM BICARBONATE 50 MEQ: 977.5 TABLET, EFFERVESCENT ORAL at 06:08

## 2022-08-05 RX ADMIN — SENNOSIDES AND DOCUSATE SODIUM 1 TABLET: 50; 8.6 TABLET ORAL at 08:08

## 2022-08-05 RX ADMIN — TUBERCULIN PURIFIED PROTEIN DERIVATIVE 5 UNITS: 5 INJECTION, SOLUTION INTRADERMAL at 10:08

## 2022-08-05 RX ADMIN — ENOXAPARIN SODIUM 40 MG: 100 INJECTION SUBCUTANEOUS at 05:08

## 2022-08-05 RX ADMIN — INSULIN ASPART 2 UNITS: 100 INJECTION, SOLUTION INTRAVENOUS; SUBCUTANEOUS at 08:08

## 2022-08-05 NOTE — ASSESSMENT & PLAN NOTE
Patient with chronic indwelling Gutierrez catheter.  History of ESBL.  UA concerning for urinary tract infection.    Continue IV Merrem.  Monitor culture.  Microbiology Results (last 7 days)     Procedure Component Value Units Date/Time    Urine culture [[id number]]  (Abnormal) Collected: 08/03/22 1334    Order Status: Completed Specimen: Urine Updated: 08/04/22 1642     Urine Culture, Routine GRAM NEGATIVE RICHARD  >100,000 cfu/ml  Identification and susceptibility pending      Narrative:      Specimen Source->Urine

## 2022-08-05 NOTE — PLAN OF CARE
Patient progressing towards discharge.    Patient had no acute events noted. Urine output improved from midnight this morning.  Blood pressure improved this morning from midnight.  Patient does not complain of pain.  Gutierrez catheter remains in place.  Discussed POC with patient and family with verbalization of understanding.    Will continue to monitor.

## 2022-08-05 NOTE — PT/OT/SLP EVAL
Occupational Therapy   Evaluation    Name: Jhonny Almazan  MRN: [id number]  Admitting Diagnosis:  Complicated UTI (urinary tract infection)  Recent Surgery: * No surgery found *    Recommendations:     Discharge Recommendations: nursing facility, skilled  Discharge Equipment Recommendations:  none    Assessment:     Jhonny Almazan is a 66 y.o. male with a medical diagnosis of Complicated UTI (urinary tract infection).  He presents with performance deficits affecting function: weakness, impaired endurance, impaired self care skills, impaired functional mobility, gait instability and impaired balance.      Rehab Prognosis: Fair; patient would benefit from acute skilled OT services to address these deficits and reach maximum level of function.       Plan:     Patient to be seen 5 x/week to address the above listed problems via self-care/home management, therapeutic activities, therapeutic exercises  · Plan of Care Expires: 08/19/22  · Plan of Care Reviewed with: patient    Subjective     Chief Complaint: Fatigue  Patient/Family Comments/goals: To rest    Occupational Profile:  Living Environment: Pt lives with his son in a house.  Previous level of function: Mod I with ADLs  Equipment Used at Home:  rolling walker, wheelchair  Assistance upon Discharge: TBD    Pain/Comfort:  Pain Rating 1: 0/10    Patients cultural, spiritual, Advent conflicts given the current situation: yes    Objective:     Communicated with: nurse prior to session.  Patient found supine with peripheral IV, olguin catheter and telemetry upon OT entry to room.    General Precautions: Standard, fall   Orthopedic Precautions:N/A   Braces: N/A  Respiratory Status: Room air    Occupational Performance:    Bed Mobility:    · Patient completed Rolling/Turning to Left with stand by assistance  · Patient completed Scooting/Bridging with stand by assistance  · Patient completed Supine to Sit with stand by assistance  · Patient completed Sit  to Supine with stand by assistance    Functional Mobility/Transfers:  · Sit <> Stand Transfer with minimum assistance with rolling walker     Activities of Daily Living:  · Grooming: stand by assistance  · Upper Body Dressing: stand by assistance  · Lower Body Dressing: minimum assistance  · Toileting: dependence    Cognitive/Visual Perceptual:  Cognitive/Psychosocial Skills:  -       Oriented to: Person, Place, Time and Situation   -       Follows Commands/attention: Follows two-step commands  -       Communication: clear/fluent    Physical Exam:  Upper Extremity Range of Motion:  -       Right Upper Extremity: WFL  -       Left Upper Extremity: WFL  Upper Extremity Strength: -       Right Upper Extremity: WFL  -       Left Upper Extremity: WFL    AMPAC 6 Click ADL:  AMPAC Total Score: 16    Treatment & Education:  Pt was given education on role of OT and POC. Pt verbalized understanding.   Education:    Patient left supine with all lines intact, call button in reach and bed alarm on    GOALS:   Multidisciplinary Problems     Occupational Therapy Goals        Problem: Occupational Therapy    Goal Priority Disciplines Outcome Interventions   Occupational Therapy Goal     OT, PT/OT Ongoing, Progressing    Description: Goals to be met by: 8/19/2022     Patient will increase functional independence with ADLs by performing:    UE Dressing with Set-up Assistance.  LE Dressing with Supervision.  Grooming while seated with Set-up Assistance.  Sitting at edge of bed x 15 minutes with Supervision.  Supine to sit with Modified Garrett.                     History:     Past Medical History:   Diagnosis Date    Anticoagulant long-term use     Arthritis     Colon polyp     Diabetes mellitus     Diabetes mellitus, type 2     Fatty liver     Hypertension     Major depressive disorder 11/17/2020    -Continue Wellbutrin    PEG (percutaneous endoscopic gastrostomy) adjustment/replacement/removal 1/6/2021    Stroke  11/2020    left sided weakness    Urinary retention 9/7/2021         Past Surgical History:   Procedure Laterality Date    BACK SURGERY      COLONOSCOPY  07/18/2014    Dr. Crane: 22 colon polyps removed, hemorrhoids, erythema to sigmoid, repeat in 1 year for surveillance; biopsy: sigmoid erythema- showed unremarkable colonic mucosa, tubular adenomas and hyperplastic polyps    COLONOSCOPY N/A 5/3/2019    Procedure: COLONOSCOPY;  Surgeon: Guero Crane MD;  Location: [facility] ENDO;  Service: Endoscopy;  Laterality: N/A;    COLONOSCOPY N/A 5/6/2019    Procedure: COLONOSCOPY;  Surgeon: Guero Crane MD;  Location: [facility] ENDO;  Service: Endoscopy;  Laterality: N/A;    CYSTOSCOPY N/A 2/9/2022    Procedure: CYSTOSCOPY;  Surgeon: Jaylen Antonio Jr., MD;  Location: [facility];  Service: Urology;  Laterality: N/A;    EPIDURAL STEROID INJECTION INTO THORACIC SPINE N/A 8/30/2019    Procedure: Injection-steroid-epidural-thoracic;  Surgeon: rFantz Green MD;  [facility];  Service: Pain Management;  Laterality: N/A;  T6-7    ESOPHAGOGASTRODUODENOSCOPY N/A 4/26/2019    Procedure: EGD (ESOPHAGOGASTRODUODENOSCOPY);  Surgeon: Guero Crane MD;  Location: [facility];  Service: Endoscopy;  Laterality: N/A;    ESOPHAGOGASTRODUODENOSCOPY N/A 11/23/2020    Procedure: EGD (ESOPHAGOGASTRODUODENOSCOPY);  Surgeon: Guero Crane MD;  Location: [facility];  Service: Endoscopy;  Laterality: N/A;    ESOPHAGOGASTRODUODENOSCOPY N/A 1/6/2021    Procedure: EGD (ESOPHAGOGASTRODUODENOSCOPY);  Surgeon: Guero Crane MD;  Location: [facility] ENDO;  Service: Endoscopy;  Laterality: N/A;    HERNIA REPAIR      TRANSRECTAL ULTRASOUND EXAMINATION N/A 2/9/2022    Procedure: ULTRASOUND, RECTAL APPROACH;  Surgeon: Jaylen Antonio Jr., MD;  [facility];  Service: Urology;  Laterality: N/A;  must text son miroslava, times and instructions given leave at 130       Time Tracking:     OT Date of Treatment: 08/05/22  OT Start Time: 1017  OT Stop  Time: 1030  OT Total Time (min): 13 min    Billable Minutes:Evaluation 13    8/5/2022

## 2022-08-05 NOTE — ASSESSMENT & PLAN NOTE
Inability to be changed despite multiple attempts in ED.  Consultation to urology  Gutierrez catheter replaced per Urology  Monitor urine output closely

## 2022-08-05 NOTE — PLAN OF CARE
Pt has been denied [facility] the following facilities:    [facility]    Pt is under review at [place], [facility], and [facility] accepted and submitted for auth     08/05/22 1614   Post-Acute Status   Post-Acute Authorization Placement

## 2022-08-05 NOTE — MEDICAL/APP STUDENT
"[facility]  Progress Note    Patient Name: Jhonny Almazan  MRN: [id number]  Patient Class: OP- Observation   Admission Date: 8/3/2022  Length of Stay: 0 days  Attending Physician: Neil Rosas MD  Primary Care Provider: Joey Whitehead MD    Subjective:     Principal Problem: Complicated UTI (urinary tract infection)    Chief Complaint:   Chief Complaint   Patient presents with    Weakness     Patient reports increasing weakness, stated "I cant even get the strength to get up and empty my cath bag"        HPI: 67 y/o male with hx of indwelling urinary catheter placement, PEG tube placement, stroke, HTN, and MDD presented to the ED with a 3-4 day history of worsening generalized weakness, fatigue and mild abdominal pain. Pt is wheelchair bound due to generalized weakness following a stroke in 11/2020. He has worsening weakness over the past few days with fatigue, a loss of appetite and mild abdominal pain. Pt has indwelling olguin catheter that pt reports was last changed around 2-3 weeks ago. Pt reports less urine output than normal. He has experienced some chills and cough but denies F/N/V/D, SOB, chest pain, edema, or flank pain. While in the ED, olguin was unable to be removed due to resistance and UA was positive for bacteria, leukocytes and WBC. Recent urine culture was positive for ESBL and MDRO. Pt will be admitted to the services of hospital medicine and urology consult in order to change indwelling olguin catheter.       Hospital Course: Patient was admitted with complicated urinary tract infection secondary to chronic indwelling urine catheter.  Urinary catheter was unable to be changed in the ED and Urology was consulted who recommended admission.  Patient was placed on IV antibiotics.  Urology was consulted and Olguin catheter was exchanged.  Patient was noted to be constipated.  Abdominal x-ray was obtained and patient was given enema.  He continued to complain of abdominal " pain. CT abdomen and pelvis was obtained and noted to be negative for acute process.  He was evaluated by PT and OT.  Physical therapy recommends skilled nursing facility placement.  Case Management was consulted for discharge planning.      Interval History:   Pt had no acute events overnight. Pt was experiencing abdominal discomfort and bloating which was relieved when he had a BM this morning. A CT of abdomen was obtained overnight before symptoms were relieved. Gutierrez was changed yesterday by urology which relieved suprapubic abdominal pain he was experiencing upon admission.     Past Medical History:   Diagnosis Date    Anticoagulant long-term use     Arthritis     Colon polyp     Diabetes mellitus     Diabetes mellitus, type 2     Fatty liver     Hypertension     Major depressive disorder 11/17/2020    -Continue Wellbutrin    PEG (percutaneous endoscopic gastrostomy) adjustment/replacement/removal 1/6/2021    Stroke 11/2020    left sided weakness    Urinary retention 9/7/2021       Past Surgical History:   Procedure Laterality Date    BACK SURGERY      COLONOSCOPY  07/18/2014    Dr. Crane: 22 colon polyps removed, hemorrhoids, erythema to sigmoid, repeat in 1 year for surveillance; biopsy: sigmoid erythema- showed unremarkable colonic mucosa, tubular adenomas and hyperplastic polyps    COLONOSCOPY N/A 5/3/2019    Procedure: COLONOSCOPY;  Surgeon: Guero Crane MD;  Location: [facility];  Service: Endoscopy;  Laterality: N/A;    COLONOSCOPY N/A 5/6/2019    Procedure: COLONOSCOPY;  Surgeon: Guero Crane MD;  Location: [facility];  Service: Endoscopy;  Laterality: N/A;    CYSTOSCOPY N/A 2/9/2022    Procedure: CYSTOSCOPY;  Surgeon: Jaylen Antonio Jr., MD;  Location: [facility] OR;  Service: Urology;  Laterality: N/A;    EPIDURAL STEROID INJECTION INTO THORACIC SPINE N/A 8/30/2019    Procedure: Injection-steroid-epidural-thoracic;  Surgeon: Frantz Green MD;  Location: [facility] OR;  Service: Pain  Management;  Laterality: N/A;  T6-7    ESOPHAGOGASTRODUODENOSCOPY N/A 4/26/2019    Procedure: EGD (ESOPHAGOGASTRODUODENOSCOPY);  Surgeon: Guero Crane MD;  Location: [facility] ENDO;  Service: Endoscopy;  Laterality: N/A;    ESOPHAGOGASTRODUODENOSCOPY N/A 11/23/2020    Procedure: EGD (ESOPHAGOGASTRODUODENOSCOPY);  Surgeon: Guero Crane MD;  Location: [facility] ENDO;  Service: Endoscopy;  Laterality: N/A;    ESOPHAGOGASTRODUODENOSCOPY N/A 1/6/2021    Procedure: EGD (ESOPHAGOGASTRODUODENOSCOPY);  Surgeon: Guero Crane MD;  Location: [facility] ENDO;  Service: Endoscopy;  Laterality: N/A;    HERNIA REPAIR      TRANSRECTAL ULTRASOUND EXAMINATION N/A 2/9/2022    Procedure: ULTRASOUND, RECTAL APPROACH;  Surgeon: Jaylen Antonio Jr., MD;  Location: [facility] OR;  Service: Urology;  Laterality: N/A;  must text son miroslava, times and instructions given leave at 130       Review of patient's allergies indicates:  No Known Allergies    No current facility-administered medications on file prior to encounter.     Current Outpatient Medications on File Prior to Encounter   Medication Sig    atorvastatin (LIPITOR) 40 MG tablet Take 1 tablet (40 mg total) by mouth once daily.    finasteride (PROSCAR) 5 mg tablet Take 1 tablet (5 mg total) by mouth once daily. Take 1 tablet by mouth in am to keep prostate from enlarging    omeprazole (PRILOSEC) 40 MG capsule TAKE ONE CAPSULE (40 MG) BY MOUTH EVERY DAY    aspirin (ECOTRIN) 81 MG EC tablet Take 1 tablet (81 mg total) by mouth once daily.    blood sugar diagnostic Strp To check BG 2 times daily, to use with insurance preferred meter. One touch Ultra.    buPROPion (WELLBUTRIN XL) 300 MG 24 hr tablet Take 1 tablet (300 mg total) by mouth once daily.    clopidogreL (PLAVIX) 75 mg tablet 1 tablet (75 mg total) by Per G Tube route once daily.    insulin NPH-insulin regular, 70/30, (NOVOLIN 70/30) 100 unit/mL (70-30) injection Inject 20 Units into the skin 2 (two) times daily. #3 10 ml  "syringes with 32 gauge sergo needles    lancets Misc To check BG 2 times daily, to use with insurance preferred meter.One Touch Ultra    lisinopriL-hydrochlorothiazide (PRINZIDE,ZESTORETIC) 20-12.5 mg per tablet TAKE ONE TABLET BY MOUTH TWICE DAILY    losartan (COZAAR) 25 MG tablet 1 tablet DAILY (route: oral)    meclizine (ANTIVERT) 25 mg tablet Take 1 tablet (25 mg total) by mouth 3 (three) times daily as needed for Dizziness.    melatonin (MELATIN) 3 mg tablet Take 2 tablets (6 mg total) by mouth nightly as needed for Insomnia.    pen needle, diabetic 32 gauge x 5/32" Ndle Use AC meals 2 a day    pioglitazone (ACTOS) 30 MG tablet Take 30 mg by mouth once daily.    QUEtiapine (SEROQUEL) 25 MG Tab Take 1 tablet (25 mg total) by mouth every evening.    tetrahydrozoline 0.05% (VISION CLEAR) 0.05 % Drop Place 2 drops into both eyes 4 (four) times daily as needed (eye irritation).     Family History     Problem Relation (Age of Onset)    Brain cancer Brother    Diabetes Brother    Heart disease Mother, Father    Suicide Brother        Tobacco Use    Smoking status: Current Every Day Smoker     Packs/day: 1.00     Years: 50.00     Pack years: 50.00     Types: Cigarettes    Smokeless tobacco: Never Used   Substance and Sexual Activity    Alcohol use: Yes     Alcohol/week: 14.0 standard drinks     Types: 14 Cans of beer per week     Comment: 2-3 beers daily    Drug use: No    Sexual activity: Yes     Partners: Female     Review of Systems   Constitutional: Positive for fatigue. Negative for chills, diaphoresis and fever.   HENT: Negative.  Negative for congestion, postnasal drip, sinus pressure and sore throat.    Eyes: Negative.  Negative for photophobia, pain and discharge.   Respiratory: Positive for cough. Negative for choking, chest tightness and shortness of breath.    Cardiovascular: Negative.  Negative for chest pain.   Gastrointestinal: Negative for abdominal distention, abdominal pain, constipation, " diarrhea, nausea and vomiting.   Endocrine: Negative.    Genitourinary: Negative for frequency.   Musculoskeletal: Negative.    Skin: Negative.    Allergic/Immunologic: Negative.    Neurological: Positive for weakness. Negative for dizziness and headaches.   Hematological: Negative.    Psychiatric/Behavioral: Negative.      Objective:     Vital Signs (Most Recent):  Temp: 97.5 °F (36.4 °C) (08/05/22 0730)  Pulse: 61 (08/05/22 0730)  Resp: 18 (08/05/22 0730)  BP: 130/63 (08/05/22 0730)  SpO2: 99 % (08/05/22 0730) Vital Signs (24h Range):  Temp:  [97.3 °F (36.3 °C)-98 °F (36.7 °C)] 97.5 °F (36.4 °C)  Pulse:  [56-68] 61  Resp:  [15-18] 18  SpO2:  [92 %-99 %] 99 %  BP: ()/(54-72) 130/63     Weight: 99.8 kg (220 lb)  Body mass index is 26.09 kg/m².    Intake/Output Summary (Last 24 hours) at 8/5/2022 1108  Last data filed at 8/5/2022 0800  Gross per 24 hour   Intake 678.52 ml   Output 470 ml   Net 208.52 ml      Physical Exam  Constitutional:       General: He is not in acute distress.     Appearance: Normal appearance. He is normal weight. He is not ill-appearing.   HENT:      Head: Normocephalic and atraumatic.      Mouth/Throat:      Mouth: Mucous membranes are moist.   Eyes:      Extraocular Movements: Extraocular movements intact.      Pupils: Pupils are equal, round, and reactive to light.   Cardiovascular:      Rate and Rhythm: Normal rate and regular rhythm.      Pulses: Normal pulses.      Heart sounds: Normal heart sounds.   Pulmonary:      Effort: Pulmonary effort is normal.      Breath sounds: Normal breath sounds.   Abdominal:      General: Bowel sounds are normal. There is no distension.      Palpations: Abdomen is soft.      Tenderness: There is no abdominal tenderness. Negative signs include Tomlin's sign and Rovsing's sign.   Genitourinary:     Comments: Indwelling Catheter in place  Musculoskeletal:      Cervical back: Normal range of motion.   Skin:     General: Skin is warm.      Capillary  Refill: Capillary refill takes less than 2 seconds.   Neurological:      General: No focal deficit present.      Mental Status: He is alert and oriented to person, place, and time. Mental status is at baseline.   Psychiatric:         Mood and Affect: Mood normal.         Behavior: Behavior normal.         Thought Content: Thought content normal.         Significant Labs:   All pertinent labs within the past 24 hours have been reviewed.  CBC:   Recent Labs   Lab 08/04/22  0451 08/05/22  0434   WBC 8.44 7.18   HGB 14.0 12.5*   HCT 43.2 38.1*    177     CMP:   Recent Labs   Lab 08/04/22  0451 08/05/22  0434    138   K 3.6 3.7    103   CO2 26 24   * 177*   BUN 10 11   CREATININE 0.8 0.7   CALCIUM 9.9 9.4   ANIONGAP 13 11     Urine Studies:   Recent Labs   Lab 08/03/22  1334   COLORU Yellow   APPEARANCEUA Cloudy*   PHUR 6.0   SPECGRAV 1.025   PROTEINUA Negative   GLUCUA Negative   KETONESU Trace*   BILIRUBINUA 1+*   OCCULTUA Trace*   NITRITE Negative   UROBILINOGEN 1.0   LEUKOCYTESUR 2+*   RBCUA 4   WBCUA 46*   BACTERIA Many*   SQUAMEPITHEL 0       Significant Imaging:   Results for orders placed or performed during the hospital encounter of 08/03/22 (from the past 2160 hour(s))   CT Renal Stone Study Abd Pelvis WO    Impression    mild infrarenal abdominal aortic aneurysm measuring 3.7 cm maximum diameter.  Otherwise negative CT of the abdomen and pelvis.      Electronically signed by: Josse Brunner MD  Date:    08/05/2022  Time:    08:15           Assessment/Plan:      1. Complicated UTI  -Pt has an indwelling catheter and the UA results indicates a UTI.   -Recent urinary culture indicates pt grows ESBL, MDRO. Transition from IV Meropenum 1 g q8hs to Oral Bactrum 800-160mg BID. Monitor Culture.   -Monitor vitals for any signs of sepsis.    2. Chronic Indwelling Gutierrez Catheter   -Catheter was last changed 2-3 weeks ago. It could not be removed because of resistance in the ED.   -Urology  changed catheter which relived suprapubic pain.     3. Constipation:  -Pt has been complaining of constipation and says that his last BM was 4 days ago.   -CT was unremarkable and pt had a BM this morning to relief of his symptoms.    4. Diabetes, Type 2   -Pt has hx of D2M.  -Hold at home medications and start sliding scale insulin while in the hospital.     5. Hyperlipidemia   -Pt has a history of HLP that is controlled with atorvastatin   -Continue home medications     6. Hypertension  -Pt has a history of hypertension.   -Continue home medications    7. Major Depressive Disorder   -Pt has a history of MDD that he takes welbutrion to control.  -Continue home medications.       Active Diagnoses:    Diagnosis Date Noted POA    PRINCIPAL PROBLEM:  Complicated UTI (urinary tract infection) [N39.0] 08/03/2022 Yes    Debility [R53.81] 08/05/2022 Yes    Constipation [K59.00] 08/04/2022 Yes    Chronic indwelling Gutierrez catheter [Z97.8] 09/10/2021 Not Applicable     Chronic    Hypertension [I10] 11/16/2020 Yes     Chronic    Type 2 diabetes mellitus with hyperglycemia [E11.65] 04/25/2019 Yes     Chronic      Problems Resolved During this Admission:     VTE Risk Mitigation (From admission, onward)         Ordered     enoxaparin injection 40 mg  Daily         08/03/22 1857     IP VTE LOW RISK PATIENT  Once         08/03/22 1857     Place sequential compression device  Until discontinued         08/03/22 1857                   Humberto Pacheco  [facility]

## 2022-08-05 NOTE — NURSING
Kati Santiago NP made aware of urine output of 120 ml since 1900 and current vital signs.  Noted that stat CT renal stone study ordered.

## 2022-08-05 NOTE — PROGRESS NOTES
[facility] Hospital Medicine  Progress Note    Patient Name: Jhonny Almazan  MRN: [id number]  Patient Class: OP- Observation   Admission Date: 8/3/2022  Length of Stay: 0 days  Attending Physician: Neil Rosas MD  Primary Care Provider: Joey Whitehead MD        Subjective:     Principal Problem:Complicated UTI (urinary tract infection)        HPI:  67 y/o male with hx of indwelling urinary catheter placement, PEG tube placement, stroke, HTN, and MDD presented to the ED with a 3-4 day history of worsening generalized weakness, fatigue and mild abdominal pain. Pt is wheelchair bound due to generalized weakness following a stroke in 11/2020. He has worsening weakness over the past few days with fatigue, a loss of appetite and mild abdominal pain. Pt has indwelling olguin catheter that pt reports was last changed around 2-3 weeks ago. Pt reports less urine output than normal. He has experienced some chills and cough but denies F/N/V/D, SOB, chest pain, edema, or flank pain. While in the ED, olguin was unable to be removed due to resistance and UA was positive for bacteria, leukocytes and WBC. Recent urine culture was positive for ESBL and MDRO. Pt will be admitted to the services of hospital medicine and urology consult in order to change indwelling olguin catheter.       Overview/Hospital Course:  Patient was admitted with complicated urinary tract infection secondary to chronic indwelling urine catheter.  Urinary catheter was unable to be changed in the ED and Urology was consulted who recommended admission.  Patient was placed on IV antibiotics.  Urology was consulted and Olguin catheter was exchanged.  Patient was noted to be constipated.  Abdominal x-ray was obtained and patient was given enema.  He continued to complain of abdominal pain. CT abdomen and pelvis was obtained and noted to be negative for acute process.  He was evaluated by PT and OT.  Physical therapy recommends skilled  nursing facility placement.  Case Management was consulted for discharge planning.      Interval History:  Patient seen and examined.  CT abdomen pelvis obtained overnight for abdominal pain.  Has just had BM.  No new complaints.  Discussed plan of care with patient and answered all questions.    Review of Systems   Constitutional:  Positive for fatigue. Negative for appetite change, chills and fever.   Respiratory:  Positive for cough. Negative for shortness of breath and wheezing.    Cardiovascular:  Negative for chest pain.   Gastrointestinal:  Positive for abdominal pain (suprapubic) and constipation. Negative for nausea and vomiting.   Genitourinary:  Positive for difficulty urinating (Chronic). Negative for hematuria.   Neurological:  Positive for weakness. Negative for dizziness and light-headedness.   Objective:     Vital Signs (Most Recent):  Temp: 97.5 °F (36.4 °C) (08/05/22 0730)  Pulse: 61 (08/05/22 0730)  Resp: 18 (08/05/22 0730)  BP: 130/63 (08/05/22 0730)  SpO2: 99 % (08/05/22 0730)   Vital Signs (24h Range):  Temp:  [97.3 °F (36.3 °C)-98 °F (36.7 °C)] 97.5 °F (36.4 °C)  Pulse:  [56-68] 61  Resp:  [15-18] 18  SpO2:  [92 %-99 %] 99 %  BP: ()/(54-72) 130/63     Weight: 99.8 kg (220 lb)  Body mass index is 26.09 kg/m².    Intake/Output Summary (Last 24 hours) at 8/5/2022 1024  Last data filed at 8/5/2022 0600  Gross per 24 hour   Intake 578.52 ml   Output 370 ml   Net 208.52 ml        Physical Exam  Constitutional:       General: He is not in acute distress.     Appearance: He is not ill-appearing or toxic-appearing.   Cardiovascular:      Rate and Rhythm: Normal rate and regular rhythm.      Heart sounds: Normal heart sounds.   Pulmonary:      Effort: Pulmonary effort is normal. No respiratory distress.      Breath sounds: Normal breath sounds.   Abdominal:      General: Bowel sounds are normal. There is no distension.      Palpations: Abdomen is soft.      Tenderness: There is abdominal  tenderness (Mild suprapubic tenderness). There is no guarding.   Musculoskeletal:         General: Normal range of motion.      Right lower leg: No edema.      Left lower leg: No edema.   Neurological:      Mental Status: He is alert and oriented to person, place, and time. Mental status is at baseline.       Significant Labs: All pertinent labs within the past 24 hours have been reviewed.  CBC:   Recent Labs   Lab 08/04/22 0451 08/05/22  0434   WBC 8.44 7.18   HGB 14.0 12.5*   HCT 43.2 38.1*    177       CMP:   Recent Labs   Lab 08/04/22 0451 08/05/22  0434    138   K 3.6 3.7    103   CO2 26 24   * 177*   BUN 10 11   CREATININE 0.8 0.7   CALCIUM 9.9 9.4   ANIONGAP 13 11         Significant Imaging: I have reviewed all pertinent imaging results/findings within the past 24 hours.    XR ABDOMEN PORTABLE     CLINICAL HISTORY:   Constipation, abdominal pain;     TECHNIQUE:   AP View(s) of the abdomen was performed.     COMPARISON:   None     FINDINGS:   There is no evidence bowel obstruction.  There are degenerative changes of the spine and bilateral SI joints and hips.    Impression:       There is no evidence bowel obstruction.       Electronically signed by: Jacque Palumbo MD   Date: 08/04/2022   Time: 09:57       Assessment/Plan:      * Complicated UTI (urinary tract infection)  Patient with chronic indwelling Gutierrez catheter.  History of ESBL.  UA concerning for urinary tract infection.    Continue IV Merrem.  Monitor culture.  Microbiology Results (last 7 days)     Procedure Component Value Units Date/Time    Urine culture [[id number]]  (Abnormal) Collected: 08/03/22 1334    Order Status: Completed Specimen: Urine Updated: 08/04/22 1642     Urine Culture, Routine GRAM NEGATIVE RICHARD  >100,000 cfu/ml  Identification and susceptibility pending      Narrative:      Specimen Source->Urine                Debility  PT/OT eval and treat  Case management consultation for discharge  planning      Constipation  Abdominal xray - negative for SBO  Fleets enema  Senna BID      Chronic indwelling Gutierrez catheter  Inability to be changed despite multiple attempts in ED.  Consultation to urology  Gutierrez catheter replaced per Urology  Monitor urine output closely      Hypertension  Chronic, controlled.  Latest blood pressure and vitals reviewed-   Temp:  [98 °F (36.7 °C)]   Pulse:  [50-74]   Resp:  [18]   BP: (118-146)/(65-74)   SpO2:  [95 %-99 %] .   Home meds for hypertension were reviewed and noted below.   Hypertension Medications             lisinopriL-hydrochlorothiazide (PRINZIDE,ZESTORETIC) 20-12.5 mg per tablet TAKE ONE TABLET BY MOUTH TWICE DAILY    losartan (COZAAR) 25 MG tablet 1 tablet DAILY (route: oral)          While in the hospital, will manage blood pressure as follows; Continue home antihypertensive regimen    Will utilize p.r.n. blood pressure medication only if patient's blood pressure greater than  180/110 and he develops symptoms such as worsening chest pain or shortness of breath.        Type 2 diabetes mellitus with hyperglycemia  Patient's FSGs are uncontrolled due to hyperglycemia on current medication regimen.  Last A1c reviewed-   Lab Results   Component Value Date    HGBA1C 8.8 (H) 09/30/2021     Most recent fingerstick glucose reviewed-   Recent Labs   Lab 08/03/22  0940   POCTGLUCOSE 132*     Current correctional scale  Medium  Maintain anti-hyperglycemic dose as follows-   Antihyperglycemics (From admission, onward)            Medium dose SSI  Levemir 10 units nightly        Hold Oral hypoglycemics while patient is in the hospital.      VTE Risk Mitigation (From admission, onward)         Ordered     enoxaparin injection 40 mg  Daily         08/03/22 1857     IP VTE LOW RISK PATIENT  Once         08/03/22 1857     Place sequential compression device  Until discontinued         08/03/22 1857                Discharge Planning   YUDITH: 8/5/2022     Code Status: Full Code   Is  the patient medically ready for discharge?:     Reason for patient still in hospital (select all that apply): Patient trending condition, Laboratory test, Treatment, Consult recommendations and PT / OT recommendations  Discharge Plan A: Home Health                  Rosalind Neff NP  Department of Hospital Medicine   [facility]

## 2022-08-05 NOTE — PLAN OF CARE
Problem: Physical Therapy  Goal: Physical Therapy Goal  Description: Goals to be met by: 8/15/2022     Patient will increase functional independence with mobility by performin). Supine to sit with Stand-by Assistance  2). Sit to supine with Stand-by Assistance  3). Sit to stand transfer with Contact Guard Assistance  4). Bed to chair transfer with Contact Guard Assistance using Rolling Walker  5). Gait  x > 10 feet with Contact Guard Assistance using Rolling Walker.     Outcome: Ongoing, Progressing

## 2022-08-05 NOTE — PLAN OF CARE
[facility] denied pt for  services    Referral sent to [facility]       08/05/22 0756   Post-Acute Status   Post-Acute Authorization Home Health   Home Health Status Referrals Sent

## 2022-08-05 NOTE — PLAN OF CARE
1330-Per Fela Reed, pt accepted- can't promise they can admit today but will submit for auth today.       1420 CM called [employer] managed medicare- states they have not received any SNF request for this pt at this time   CM reached out to Fela with [facility] again- states her person that does requests [facility] could not do it electronically but is going to do manually and is next on the list      08/05/22 1532   Post-Acute Status   Post-Acute Authorization Placement   Post-Acute Placement Status Pending post-acute provider review/more information requested

## 2022-08-05 NOTE — PLAN OF CARE
[facility] with first available facility even if its on the [facility].     LOCET called in; awaiting 142         08/05/22 1051   Post-Acute Status   Post-Acute Authorization Placement   Post-Acute Placement Status Referrals Sent

## 2022-08-05 NOTE — PLAN OF CARE
Problem: Occupational Therapy  Goal: Occupational Therapy Goal  Description: Goals to be met by: 8/19/2022     Patient will increase functional independence with ADLs by performing:    UE Dressing with Set-up Assistance.  LE Dressing with Supervision.  Grooming while seated with Set-up Assistance.  Sitting at edge of bed x 15 minutes with Supervision.  Supine to sit with Modified [place].    Outcome: Ongoing, Progressing     OT evaluation completed. POC established.

## 2022-08-05 NOTE — SUBJECTIVE & OBJECTIVE
Interval History:  Patient seen and examined.  CT abdomen pelvis obtained overnight for abdominal pain.  Has just had BM.  No new complaints.  Discussed plan of care with patient and answered all questions.    Review of Systems   Constitutional:  Positive for fatigue. Negative for appetite change, chills and fever.   Respiratory:  Positive for cough. Negative for shortness of breath and wheezing.    Cardiovascular:  Negative for chest pain.   Gastrointestinal:  Positive for abdominal pain (suprapubic) and constipation. Negative for nausea and vomiting.   Genitourinary:  Positive for difficulty urinating (Chronic). Negative for hematuria.   Neurological:  Positive for weakness. Negative for dizziness and light-headedness.   Objective:     Vital Signs (Most Recent):  Temp: 97.5 °F (36.4 °C) (08/05/22 0730)  Pulse: 61 (08/05/22 0730)  Resp: 18 (08/05/22 0730)  BP: 130/63 (08/05/22 0730)  SpO2: 99 % (08/05/22 0730)   Vital Signs (24h Range):  Temp:  [97.3 °F (36.3 °C)-98 °F (36.7 °C)] 97.5 °F (36.4 °C)  Pulse:  [56-68] 61  Resp:  [15-18] 18  SpO2:  [92 %-99 %] 99 %  BP: ()/(54-72) 130/63     Weight: 99.8 kg (220 lb)  Body mass index is 26.09 kg/m².    Intake/Output Summary (Last 24 hours) at 8/5/2022 1024  Last data filed at 8/5/2022 0600  Gross per 24 hour   Intake 578.52 ml   Output 370 ml   Net 208.52 ml        Physical Exam  Constitutional:       General: He is not in acute distress.     Appearance: He is not ill-appearing or toxic-appearing.   Cardiovascular:      Rate and Rhythm: Normal rate and regular rhythm.      Heart sounds: Normal heart sounds.   Pulmonary:      Effort: Pulmonary effort is normal. No respiratory distress.      Breath sounds: Normal breath sounds.   Abdominal:      General: Bowel sounds are normal. There is no distension.      Palpations: Abdomen is soft.      Tenderness: There is abdominal tenderness (Mild suprapubic tenderness). There is no guarding.   Musculoskeletal:          General: Normal range of motion.      Right lower leg: No edema.      Left lower leg: No edema.   Neurological:      Mental Status: He is alert and oriented to person, place, and time. Mental status is at baseline.       Significant Labs: All pertinent labs within the past 24 hours have been reviewed.  CBC:   Recent Labs   Lab 08/04/22 0451 08/05/22  0434   WBC 8.44 7.18   HGB 14.0 12.5*   HCT 43.2 38.1*    177       CMP:   Recent Labs   Lab 08/04/22 0451 08/05/22  0434    138   K 3.6 3.7    103   CO2 26 24   * 177*   BUN 10 11   CREATININE 0.8 0.7   CALCIUM 9.9 9.4   ANIONGAP 13 11         Significant Imaging: I have reviewed all pertinent imaging results/findings within the past 24 hours.    XR ABDOMEN PORTABLE     CLINICAL HISTORY:   Constipation, abdominal pain;     TECHNIQUE:   AP View(s) of the abdomen was performed.     COMPARISON:   None     FINDINGS:   There is no evidence bowel obstruction.  There are degenerative changes of the spine and bilateral SI joints and hips.    Impression:       There is no evidence bowel obstruction.       Electronically signed by: Jacque Palumbo MD   Date: 08/04/2022   Time: 09:57      No

## 2022-08-05 NOTE — MED STUDENT SUBJECTIVE & OBJECTIVE
"Medical Student Subjective & Objective     Principal Problem: Complicated UTI (urinary tract infection)    Chief Complaint:   Chief Complaint   Patient presents with    Weakness     Patient reports increasing weakness, stated "I cant even get the strength to get up and empty my cath bag"        HPI: 65 y/o male with hx of indwelling urinary catheter placement, PEG tube placement, stroke, HTN, and MDD presented to the ED with a 3-4 day history of worsening generalized weakness, fatigue and mild abdominal pain. Pt is wheelchair bound due to generalized weakness following a stroke in 11/2020. He has worsening weakness over the past few days with fatigue, a loss of appetite and mild abdominal pain. Pt has indwelling olguin catheter that pt reports was last changed around 2-3 weeks ago. Pt reports less urine output than normal. He has experienced some chills and cough but denies F/N/V/D, SOB, chest pain, edema, or flank pain. While in the ED, olguin was unable to be removed due to resistance and UA was positive for bacteria, leukocytes and WBC. Recent urine culture was positive for ESBL and MDRO. Pt will be admitted to the services of hospital medicine and urology consult in order to change indwelling olguin catheter.       Hospital Course: Patient was admitted with complicated urinary tract infection secondary to chronic indwelling urine catheter.  Urinary catheter was unable to be changed in the ED and Urology was consulted who recommended admission.  Patient was placed on IV antibiotics.  Urology was consulted and Olguin catheter was exchanged.  Patient was noted to be constipated.  Abdominal x-ray was obtained and patient was given enema.  He continued to complain of abdominal pain. CT abdomen and pelvis was obtained and noted to be negative for acute process.  He was evaluated by PT and OT.  Physical therapy recommends skilled nursing facility placement.  Case Management was consulted for discharge " planning.      Interval History:   Pt had no acute events overnight. Pt was experiencing abdominal discomfort and bloating which was relieved when he had a BM this morning. A CT of abdomen was obtained overnight before symptoms were relieved. Gutierrez was changed yesterday by urology which relieved suprapubic abdominal pain he was experiencing upon admission.     Past Medical History:   Diagnosis Date    Anticoagulant long-term use     Arthritis     Colon polyp     Diabetes mellitus     Diabetes mellitus, type 2     Fatty liver     Hypertension     Major depressive disorder 11/17/2020    -Continue Wellbutrin    PEG (percutaneous endoscopic gastrostomy) adjustment/replacement/removal 1/6/2021    Stroke 11/2020    left sided weakness    Urinary retention 9/7/2021       Past Surgical History:   Procedure Laterality Date    BACK SURGERY      COLONOSCOPY  07/18/2014    Dr. Crane: 22 colon polyps removed, hemorrhoids, erythema to sigmoid, repeat in 1 year for surveillance; biopsy: sigmoid erythema- showed unremarkable colonic mucosa, tubular adenomas and hyperplastic polyps    COLONOSCOPY N/A 5/3/2019    Procedure: COLONOSCOPY;  Surgeon: Guero Crane MD;  Location: [facility];  Service: Endoscopy;  Laterality: N/A;    COLONOSCOPY N/A 5/6/2019    Procedure: COLONOSCOPY;  Surgeon: Guero Crane MD;  Location: [facility];  Service: Endoscopy;  Laterality: N/A;    CYSTOSCOPY N/A 2/9/2022    Procedure: CYSTOSCOPY;  Surgeon: Jaylen Antonio Jr., MD;  Location: [facility] OR;  Service: Urology;  Laterality: N/A;    EPIDURAL STEROID INJECTION INTO THORACIC SPINE N/A 8/30/2019    Procedure: Injection-steroid-epidural-thoracic;  Surgeon: Frantz Green MD;  Location: [facility] OR;  Service: Pain Management;  Laterality: N/A;  T6-7    ESOPHAGOGASTRODUODENOSCOPY N/A 4/26/2019    Procedure: EGD (ESOPHAGOGASTRODUODENOSCOPY);  Surgeon: Guero Crane MD;  Location: [facility];  Service: Endoscopy;  Laterality: N/A;     ESOPHAGOGASTRODUODENOSCOPY N/A 11/23/2020    Procedure: EGD (ESOPHAGOGASTRODUODENOSCOPY);  Surgeon: Guero Crane MD;  Location: [facility] ENDO;  Service: Endoscopy;  Laterality: N/A;    ESOPHAGOGASTRODUODENOSCOPY N/A 1/6/2021    Procedure: EGD (ESOPHAGOGASTRODUODENOSCOPY);  Surgeon: Guero Crane MD;  Location: [facility] ENDO;  Service: Endoscopy;  Laterality: N/A;    HERNIA REPAIR      TRANSRECTAL ULTRASOUND EXAMINATION N/A 2/9/2022    Procedure: ULTRASOUND, RECTAL APPROACH;  Surgeon: Jaylen Antonio Jr., MD;  Location: [facility] OR;  Service: Urology;  Laterality: N/A;  must text son miroslava, times and instructions given leave at 130       Review of patient's allergies indicates:  No Known Allergies    No current facility-administered medications on file prior to encounter.     Current Outpatient Medications on File Prior to Encounter   Medication Sig    atorvastatin (LIPITOR) 40 MG tablet Take 1 tablet (40 mg total) by mouth once daily.    finasteride (PROSCAR) 5 mg tablet Take 1 tablet (5 mg total) by mouth once daily. Take 1 tablet by mouth in am to keep prostate from enlarging    omeprazole (PRILOSEC) 40 MG capsule TAKE ONE CAPSULE (40 MG) BY MOUTH EVERY DAY    aspirin (ECOTRIN) 81 MG EC tablet Take 1 tablet (81 mg total) by mouth once daily.    blood sugar diagnostic Strp To check BG 2 times daily, to use with insurance preferred meter. One touch Ultra.    buPROPion (WELLBUTRIN XL) 300 MG 24 hr tablet Take 1 tablet (300 mg total) by mouth once daily.    clopidogreL (PLAVIX) 75 mg tablet 1 tablet (75 mg total) by Per G Tube route once daily.    insulin NPH-insulin regular, 70/30, (NOVOLIN 70/30) 100 unit/mL (70-30) injection Inject 20 Units into the skin 2 (two) times daily. #3 10 ml syringes with 32 gauge sergo needles    lancets Misc To check BG 2 times daily, to use with insurance preferred meter.One Touch Ultra    lisinopriL-hydrochlorothiazide (PRINZIDE,ZESTORETIC) 20-12.5 mg per tablet TAKE ONE TABLET  "BY MOUTH TWICE DAILY    losartan (COZAAR) 25 MG tablet 1 tablet DAILY (route: oral)    meclizine (ANTIVERT) 25 mg tablet Take 1 tablet (25 mg total) by mouth 3 (three) times daily as needed for Dizziness.    melatonin (MELATIN) 3 mg tablet Take 2 tablets (6 mg total) by mouth nightly as needed for Insomnia.    pen needle, diabetic 32 gauge x 5/32" Ndle Use AC meals 2 a day    pioglitazone (ACTOS) 30 MG tablet Take 30 mg by mouth once daily.    QUEtiapine (SEROQUEL) 25 MG Tab Take 1 tablet (25 mg total) by mouth every evening.    tetrahydrozoline 0.05% (VISION CLEAR) 0.05 % Drop Place 2 drops into both eyes 4 (four) times daily as needed (eye irritation).     Family History     Problem Relation (Age of Onset)    Brain cancer Brother    Diabetes Brother    Heart disease Mother, Father    Suicide Brother        Tobacco Use    Smoking status: Current Every Day Smoker     Packs/day: 1.00     Years: 50.00     Pack years: 50.00     Types: Cigarettes    Smokeless tobacco: Never Used   Substance and Sexual Activity    Alcohol use: Yes     Alcohol/week: 14.0 standard drinks     Types: 14 Cans of beer per week     Comment: 2-3 beers daily    Drug use: No    Sexual activity: Yes     Partners: Female     Review of Systems   Constitutional: Positive for fatigue. Negative for chills, diaphoresis and fever.   HENT: Negative.  Negative for congestion, postnasal drip, sinus pressure and sore throat.    Eyes: Negative.  Negative for photophobia, pain and discharge.   Respiratory: Positive for cough. Negative for choking, chest tightness and shortness of breath.    Cardiovascular: Negative.  Negative for chest pain.   Gastrointestinal: Negative for abdominal distention, abdominal pain, constipation, diarrhea, nausea and vomiting.   Endocrine: Negative.    Genitourinary: Negative for frequency.   Musculoskeletal: Negative.    Skin: Negative.    Allergic/Immunologic: Negative.    Neurological: Positive for weakness. Negative " for dizziness and headaches.   Hematological: Negative.    Psychiatric/Behavioral: Negative.      Objective:     Vital Signs (Most Recent):  Temp: 97.5 °F (36.4 °C) (08/05/22 0730)  Pulse: 61 (08/05/22 0730)  Resp: 18 (08/05/22 0730)  BP: 130/63 (08/05/22 0730)  SpO2: 99 % (08/05/22 0730) Vital Signs (24h Range):  Temp:  [97.3 °F (36.3 °C)-98 °F (36.7 °C)] 97.5 °F (36.4 °C)  Pulse:  [56-68] 61  Resp:  [15-18] 18  SpO2:  [92 %-99 %] 99 %  BP: ()/(54-72) 130/63     Weight: 99.8 kg (220 lb)  Body mass index is 26.09 kg/m².    Intake/Output Summary (Last 24 hours) at 8/5/2022 1108  Last data filed at 8/5/2022 0800  Gross per 24 hour   Intake 678.52 ml   Output 470 ml   Net 208.52 ml      Physical Exam  Constitutional:       General: He is not in acute distress.     Appearance: Normal appearance. He is normal weight. He is not ill-appearing.   HENT:      Head: Normocephalic and atraumatic.      Mouth/Throat:      Mouth: Mucous membranes are moist.   Eyes:      Extraocular Movements: Extraocular movements intact.      Pupils: Pupils are equal, round, and reactive to light.   Cardiovascular:      Rate and Rhythm: Normal rate and regular rhythm.      Pulses: Normal pulses.      Heart sounds: Normal heart sounds.   Pulmonary:      Effort: Pulmonary effort is normal.      Breath sounds: Normal breath sounds.   Abdominal:      General: Bowel sounds are normal. There is no distension.      Palpations: Abdomen is soft.      Tenderness: There is no abdominal tenderness. Negative signs include Tomlin's sign and Rovsing's sign.   Genitourinary:     Comments: Indwelling Catheter in place  Musculoskeletal:      Cervical back: Normal range of motion.   Skin:     General: Skin is warm.      Capillary Refill: Capillary refill takes less than 2 seconds.   Neurological:      General: No focal deficit present.      Mental Status: He is alert and oriented to person, place, and time. Mental status is at baseline.   Psychiatric:          Mood and Affect: Mood normal.         Behavior: Behavior normal.         Thought Content: Thought content normal.         Significant Labs:   All pertinent labs within the past 24 hours have been reviewed.  CBC:   Recent Labs   Lab 08/04/22  0451 08/05/22  0434   WBC 8.44 7.18   HGB 14.0 12.5*   HCT 43.2 38.1*    177     CMP:   Recent Labs   Lab 08/04/22  0451 08/05/22  0434    138   K 3.6 3.7    103   CO2 26 24   * 177*   BUN 10 11   CREATININE 0.8 0.7   CALCIUM 9.9 9.4   ANIONGAP 13 11     Urine Studies:   Recent Labs   Lab 08/03/22  1334   COLORU Yellow   APPEARANCEUA Cloudy*   PHUR 6.0   SPECGRAV 1.025   PROTEINUA Negative   GLUCUA Negative   KETONESU Trace*   BILIRUBINUA 1+*   OCCULTUA Trace*   NITRITE Negative   UROBILINOGEN 1.0   LEUKOCYTESUR 2+*   RBCUA 4   WBCUA 46*   BACTERIA Many*   SQUAMEPITHEL 0       Significant Imaging:   Results for orders placed or performed during the hospital encounter of 08/03/22 (from the past 2160 hour(s))   CT Renal Stone Study Abd Pelvis WO    Impression    mild infrarenal abdominal aortic aneurysm measuring 3.7 cm maximum diameter.  Otherwise negative CT of the abdomen and pelvis.      Electronically signed by: Josse Brunner MD  Date:    08/05/2022  Time:    08:15       Medical Student Subjective & Objective

## 2022-08-05 NOTE — PT/OT/SLP EVAL
Physical Therapy Evaluation    Patient Name:  Jhonny Almazan   MRN:  [id number]    Recommendations:     Discharge Recommendations:   (TBD)   Discharge Equipment Recommendations: none     Assessment:     Jhonny Almazan is a 66 y.o. male admitted with a medical diagnosis of Complicated UTI (urinary tract infection).  He presents with the following impairments/functional limitations:  weakness, impaired endurance, impaired balance, gait instability, impaired functional mobility, decreased lower extremity function  .    Rehab Prognosis: Fair; patient would benefit from acute skilled PT services to address these deficits and reach maximum level of function.    Recent Surgery: * No surgery found *      Plan:     During this hospitalization, patient to be seen 6 x/week to address the identified rehab impairments via gait training, therapeutic activities, therapeutic exercises and progress toward the following goals:    · Plan of Care Expires:  08/15/22    Subjective     Patient/Family Comments/goals: agrees to work with PT (after much encouragement), declines to sit for > 5 minutes    Living Environment:  House with son, 2 steps (with rail) to enter  Prior to admission, patients level of function was transferred to wheelchair independently since CVA 11/20.  Equipment used at home: wheelchair.    Upon discharge, patient will have assistance from son.    Objective:     Communicated with nurse (Farzaneh) prior to session.  Patient found supine with peripheral IV, olguin catheter, telemetry  upon PT entry to room.    General Precautions: Standard, contact, fall   Orthopedic Precautions:N/A   Braces: N/A  Respiratory Status: Room air    Functional Mobility training:  · Bed Mobility:     · Rolling Left:  stand by assistance  · Scooting: stand by assistance  · Supine to Sit: stand by assistance  · Sit to Supine: stand by assistance  · Transfers:     · Sit to Stand:  contact guard assistance and minimum assistance with  rolling walker  · Bed to Chair: N/T due to patient refusal  · Gait: forward 1', sidestepped 2' with RW with CGA    Therapeutic Activities and Exercises:  mobility training as above with cues for technique  Stand balance training with RW with CGA and cues  Sit balance training at eob with SBA and cues  Rx somewhat limited due to patient refusal.    AM-PAC 6 CLICK MOBILITY  Total Score:14     Patient left supine with all lines intact, call button in reach, bed alarm on, nurse (Edson Moy) notified re soiled brief and wet pad discarded, possible catheter leak.    GOALS:   Multidisciplinary Problems     Physical Therapy Goals        Problem: Physical Therapy    Goal Priority Disciplines Outcome Goal Variances Interventions   Physical Therapy Goal     PT, PT/OT Ongoing, Progressing     Description: Goals to be met by: 8/15/2022     Patient will increase functional independence with mobility by performin). Supine to sit with Stand-by Assistance  2). Sit to supine with Stand-by Assistance  3). Sit to stand transfer with Contact Guard Assistance  4). Bed to chair transfer with Contact Guard Assistance using Rolling Walker  5). Gait  x > 10 feet with Contact Guard Assistance using Rolling Walker.                      History:     Past Medical History:   Diagnosis Date    Anticoagulant long-term use     Arthritis     Colon polyp     Diabetes mellitus     Diabetes mellitus, type 2     Fatty liver     Hypertension     Major depressive disorder 2020    -Continue Wellbutrin    PEG (percutaneous endoscopic gastrostomy) adjustment/replacement/removal 2021    Stroke 2020    left sided weakness    Urinary retention 2021       Past Surgical History:   Procedure Laterality Date    BACK SURGERY      COLONOSCOPY  2014    Dr. Miles: 22 colon polyps removed, hemorrhoids, erythema to sigmoid, repeat in 1 year for surveillance; biopsy: sigmoid erythema- showed unremarkable colonic mucosa, tubular  adenomas and hyperplastic polyps    COLONOSCOPY N/A 5/3/2019    Procedure: COLONOSCOPY;  Surgeon: Guero Crane MD;  Location: [facility] ENDO;  Service: Endoscopy;  Laterality: N/A;    COLONOSCOPY N/A 5/6/2019    Procedure: COLONOSCOPY;  Surgeon: Guero Crane MD;  Location: [facility] ENDO;  Service: Endoscopy;  Laterality: N/A;    CYSTOSCOPY N/A 2/9/2022    Procedure: CYSTOSCOPY;  Surgeon: Jaylen Antonio Jr., MD;  Location: [facility];  Service: Urology;  Laterality: N/A;    EPIDURAL STEROID INJECTION INTO THORACIC SPINE N/A 8/30/2019    Procedure: Injection-steroid-epidural-thoracic;  Surgeon: Frantz Green MD;  Location: [facility];  Service: Pain Management;  Laterality: N/A;  T6-7    ESOPHAGOGASTRODUODENOSCOPY N/A 4/26/2019    Procedure: EGD (ESOPHAGOGASTRODUODENOSCOPY);  Surgeon: Guero Crane MD;  Location: [facility];  Service: Endoscopy;  Laterality: N/A;    ESOPHAGOGASTRODUODENOSCOPY N/A 11/23/2020    Procedure: EGD (ESOPHAGOGASTRODUODENOSCOPY);  Surgeon: Guero Crane MD;  Location: [facility];  Service: Endoscopy;  Laterality: N/A;    ESOPHAGOGASTRODUODENOSCOPY N/A 1/6/2021    Procedure: EGD (ESOPHAGOGASTRODUODENOSCOPY);  Surgeon: Guero Crane MD;  Location: [facility];  Service: Endoscopy;  Laterality: N/A;    HERNIA REPAIR      TRANSRECTAL ULTRASOUND EXAMINATION N/A 2/9/2022    Procedure: ULTRASOUND, RECTAL APPROACH;  Surgeon: Jaylen Antonio Jr., MD;  Location: [facility] OR;  Service: Urology;  Laterality: N/A;  must text son miroslava, times and instructions given leave at 130       Time Tracking:     PT Received On: 08/05/22  PT Start Time: 0903     PT Stop Time: 0927  PT Total Time (min): 24 min     Billable Minutes: Evaluation 12 and Therapeutic Activity 12      08/05/2022

## 2022-08-06 PROBLEM — E11.40 NEUROPATHY DUE TO TYPE 2 DIABETES MELLITUS: Status: ACTIVE | Noted: 2022-08-06

## 2022-08-06 LAB
ANION GAP SERPL CALC-SCNC: 7 MMOL/L (ref 8–16)
BACTERIA UR CULT: ABNORMAL
BUN SERPL-MCNC: 9 MG/DL (ref 8–23)
CALCIUM SERPL-MCNC: 9.5 MG/DL (ref 8.7–10.5)
CHLORIDE SERPL-SCNC: 104 MMOL/L (ref 95–110)
CO2 SERPL-SCNC: 26 MMOL/L (ref 23–29)
CREAT SERPL-MCNC: 0.7 MG/DL (ref 0.5–1.4)
ERYTHROCYTE [DISTWIDTH] IN BLOOD BY AUTOMATED COUNT: 14.2 % (ref 11.5–14.5)
EST. GFR  (NO RACE VARIABLE): >60 ML/MIN/1.73 M^2
GLUCOSE SERPL-MCNC: 145 MG/DL (ref 70–110)
HCT VFR BLD AUTO: 37.8 % (ref 40–54)
HGB BLD-MCNC: 12.3 G/DL (ref 14–18)
MCH RBC QN AUTO: 27.8 PG (ref 27–31)
MCHC RBC AUTO-ENTMCNC: 32.5 G/DL (ref 32–36)
MCV RBC AUTO: 85 FL (ref 82–98)
PLATELET # BLD AUTO: 169 K/UL (ref 150–450)
PMV BLD AUTO: 9.5 FL (ref 9.2–12.9)
POCT GLUCOSE: 122 MG/DL (ref 70–110)
POCT GLUCOSE: 143 MG/DL (ref 70–110)
POCT GLUCOSE: 161 MG/DL (ref 70–110)
POCT GLUCOSE: 165 MG/DL (ref 70–110)
POTASSIUM SERPL-SCNC: 4.2 MMOL/L (ref 3.5–5.1)
RBC # BLD AUTO: 4.43 M/UL (ref 4.6–6.2)
SODIUM SERPL-SCNC: 137 MMOL/L (ref 136–145)
WBC # BLD AUTO: 6.81 K/UL (ref 3.9–12.7)

## 2022-08-06 PROCEDURE — 63600175 PHARM REV CODE 636 W HCPCS: Performed by: NURSE PRACTITIONER

## 2022-08-06 PROCEDURE — 97110 THERAPEUTIC EXERCISES: CPT

## 2022-08-06 PROCEDURE — G0378 HOSPITAL OBSERVATION PER HR: HCPCS

## 2022-08-06 PROCEDURE — 97530 THERAPEUTIC ACTIVITIES: CPT

## 2022-08-06 PROCEDURE — 96372 THER/PROPH/DIAG INJ SC/IM: CPT | Performed by: NURSE PRACTITIONER

## 2022-08-06 PROCEDURE — 80048 BASIC METABOLIC PNL TOTAL CA: CPT | Performed by: NURSE PRACTITIONER

## 2022-08-06 PROCEDURE — 25000003 PHARM REV CODE 250: Performed by: NURSE PRACTITIONER

## 2022-08-06 PROCEDURE — 36415 COLL VENOUS BLD VENIPUNCTURE: CPT | Performed by: NURSE PRACTITIONER

## 2022-08-06 PROCEDURE — 85027 COMPLETE CBC AUTOMATED: CPT | Performed by: NURSE PRACTITIONER

## 2022-08-06 PROCEDURE — 96366 THER/PROPH/DIAG IV INF ADDON: CPT

## 2022-08-06 RX ORDER — GABAPENTIN 100 MG/1
300 CAPSULE ORAL 2 TIMES DAILY
Status: DISCONTINUED | OUTPATIENT
Start: 2022-08-06 | End: 2022-08-09 | Stop reason: HOSPADM

## 2022-08-06 RX ADMIN — MEROPENEM AND SODIUM CHLORIDE 1 G: 1 INJECTION, SOLUTION INTRAVENOUS at 05:08

## 2022-08-06 RX ADMIN — CLOPIDOGREL BISULFATE 75 MG: 75 TABLET, FILM COATED ORAL at 09:08

## 2022-08-06 RX ADMIN — ENOXAPARIN SODIUM 40 MG: 100 INJECTION SUBCUTANEOUS at 05:08

## 2022-08-06 RX ADMIN — GABAPENTIN 300 MG: 100 CAPSULE ORAL at 08:08

## 2022-08-06 RX ADMIN — BUPROPION HYDROCHLORIDE 300 MG: 150 TABLET, FILM COATED, EXTENDED RELEASE ORAL at 09:08

## 2022-08-06 RX ADMIN — SENNOSIDES AND DOCUSATE SODIUM 1 TABLET: 50; 8.6 TABLET ORAL at 08:08

## 2022-08-06 RX ADMIN — LOSARTAN POTASSIUM 25 MG: 25 TABLET, FILM COATED ORAL at 09:08

## 2022-08-06 RX ADMIN — ATORVASTATIN CALCIUM 40 MG: 40 TABLET, FILM COATED ORAL at 09:08

## 2022-08-06 RX ADMIN — INSULIN DETEMIR 10 UNITS: 100 INJECTION, SOLUTION SUBCUTANEOUS at 08:08

## 2022-08-06 RX ADMIN — INSULIN ASPART 1 UNITS: 100 INJECTION, SOLUTION INTRAVENOUS; SUBCUTANEOUS at 08:08

## 2022-08-06 RX ADMIN — INSULIN ASPART 2 UNITS: 100 INJECTION, SOLUTION INTRAVENOUS; SUBCUTANEOUS at 11:08

## 2022-08-06 RX ADMIN — SENNOSIDES AND DOCUSATE SODIUM 1 TABLET: 50; 8.6 TABLET ORAL at 09:08

## 2022-08-06 RX ADMIN — MEROPENEM AND SODIUM CHLORIDE 1 G: 1 INJECTION, SOLUTION INTRAVENOUS at 08:08

## 2022-08-06 RX ADMIN — PANTOPRAZOLE SODIUM 40 MG: 40 TABLET, DELAYED RELEASE ORAL at 09:08

## 2022-08-06 RX ADMIN — GABAPENTIN 300 MG: 100 CAPSULE ORAL at 10:08

## 2022-08-06 RX ADMIN — FINASTERIDE 5 MG: 5 TABLET, FILM COATED ORAL at 09:08

## 2022-08-06 RX ADMIN — ASPIRIN 81 MG: 81 TABLET, COATED ORAL at 09:08

## 2022-08-06 NOTE — ASSESSMENT & PLAN NOTE
Patient with chronic indwelling Gutierrez catheter.  History of ESBL.  UA concerning for urinary tract infection.    Continue IV Merrem.  Monitor culture.  Microbiology Results (last 7 days)     Procedure Component Value Units Date/Time    Urine culture [[id number]]  (Abnormal)  (Susceptibility) Collected: 08/03/22 1334    Order Status: Completed Specimen: Urine Updated: 08/06/22 0048     Urine Culture, Routine KLEBSIELLA PNEUMONIAE ESBL  >100,000 cfu/ml      Narrative:      Specimen Source->Urine

## 2022-08-06 NOTE — PLAN OF CARE
Patient progressing towards discharge.    Patient had no acute events noted. Gutierrez catheter remains in place.  IV antibiotics given per MD order.  Patient neuropathy pain controlled slightly from tylenol.  Patient was able to sleep overnight.  Discussed POC with patient and family with verbalization of understanding.    Will continue to monitor.

## 2022-08-06 NOTE — PROGRESS NOTES
[facility] Hospital Medicine  Progress Note    Patient Name: Jhonny Almazan  MRN: [id number]  Patient Class: OP- Observation   Admission Date: 8/3/2022  Length of Stay: 0 days  Attending Physician: Neil Rosas MD  Primary Care Provider: Joey Whitehead MD        Subjective:     Principal Problem:Complicated UTI (urinary tract infection)        HPI:  65 y/o male with hx of indwelling urinary catheter placement, PEG tube placement, stroke, HTN, and MDD presented to the ED with a 3-4 day history of worsening generalized weakness, fatigue and mild abdominal pain. Pt is wheelchair bound due to generalized weakness following a stroke in 11/2020. He has worsening weakness over the past few days with fatigue, a loss of appetite and mild abdominal pain. Pt has indwelling olguin catheter that pt reports was last changed around 2-3 weeks ago. Pt reports less urine output than normal. He has experienced some chills and cough but denies F/N/V/D, SOB, chest pain, edema, or flank pain. While in the ED, olguin was unable to be removed due to resistance and UA was positive for bacteria, leukocytes and WBC. Recent urine culture was positive for ESBL and MDRO. Pt will be admitted to the services of hospital medicine and urology consult in order to change indwelling olguin catheter.       Overview/Hospital Course:  Patient was admitted with complicated urinary tract infection secondary to chronic indwelling urine catheter.  Urinary catheter was unable to be changed in the ED and Urology was consulted who recommended admission.  Patient was placed on IV antibiotics.  Urology was consulted and Olguin catheter was exchanged.  Patient was noted to be constipated.  Abdominal x-ray was obtained and patient was given enema.  He continued to complain of abdominal pain. CT abdomen and pelvis was obtained and noted to be negative for acute process.  He was evaluated by PT and OT.  Physical therapy recommends skilled  nursing facility placement.  Case Management was consulted for discharge planning.      Interval History: no acute events overnight.  Requesting that his home med dose of gabapentin be resumed.  He does not know his home dose.  Per med rec, no gabapentin is listed; however, upon review of outside records it appears he takes gabapentin 300mg twice daily, so this will be ordered.      Review of Systems   Constitutional:  Positive for fatigue. Negative for appetite change, chills and fever.   HENT:  Negative for congestion and sore throat.    Respiratory:  Negative for cough and shortness of breath.    Cardiovascular:  Negative for chest pain and palpitations.   Gastrointestinal:  Positive for constipation. Negative for abdominal pain, diarrhea, nausea and vomiting.   Genitourinary:  Positive for difficulty urinating (Chronic). Negative for hematuria.   Musculoskeletal:  Positive for arthralgias and myalgias.   Skin:  Positive for pallor. Negative for rash.   Neurological:  Positive for weakness (generalized). Negative for dizziness and light-headedness.   Psychiatric/Behavioral:  Negative for agitation. The patient is not nervous/anxious.    All other systems reviewed and are negative.  Objective:     Vital Signs (Most Recent):  Temp: 98.1 °F (36.7 °C) (08/06/22 0756)  Pulse: 69 (08/06/22 0756)  Resp: 17 (08/06/22 0756)  BP: 128/63 (08/06/22 0756)  SpO2: 96 % (08/06/22 0756) Vital Signs (24h Range):  Temp:  [96.5 °F (35.8 °C)-98.2 °F (36.8 °C)] 98.1 °F (36.7 °C)  Pulse:  [55-69] 69  Resp:  [17-20] 17  SpO2:  [95 %-98 %] 96 %  BP: ()/(54-66) 128/63     Weight: 99.8 kg (220 lb)  Body mass index is 26.09 kg/m².    Intake/Output Summary (Last 24 hours) at 8/6/2022 0931  Last data filed at 8/6/2022 0500  Gross per 24 hour   Intake 329.3 ml   Output 1025 ml   Net -695.7 ml      Physical Exam  Constitutional:       General: He is not in acute distress.     Appearance: He is not ill-appearing or toxic-appearing.    Cardiovascular:      Rate and Rhythm: Normal rate and regular rhythm.      Heart sounds: Normal heart sounds.   Pulmonary:      Effort: Pulmonary effort is normal. No respiratory distress.      Breath sounds: Normal breath sounds.   Abdominal:      General: Bowel sounds are normal. There is no distension.      Palpations: Abdomen is soft.      Tenderness: There is abdominal tenderness (Mild suprapubic tenderness). There is no guarding.   Musculoskeletal:         General: Normal range of motion.      Right lower leg: No edema.      Left lower leg: No edema.   Neurological:      Mental Status: He is alert and oriented to person, place, and time. Mental status is at baseline.       Significant Labs: All pertinent labs within the past 24 hours have been reviewed.  CBC:   Recent Labs   Lab 08/05/22 0434 08/06/22  0451   WBC 7.18 6.81   HGB 12.5* 12.3*   HCT 38.1* 37.8*    169     CMP:   Recent Labs   Lab 08/05/22 0434 08/06/22  0451    137   K 3.7 4.2    104   CO2 24 26   * 145*   BUN 11 9   CREATININE 0.7 0.7   CALCIUM 9.4 9.5   ANIONGAP 11 7*       Significant Imaging: I have reviewed all pertinent imaging results/findings within the past 24 hours.      Assessment/Plan:      * Complicated UTI (urinary tract infection)  Patient with chronic indwelling Gutierrez catheter.  History of ESBL.  UA concerning for urinary tract infection.    Continue IV Merrem.  Monitor culture.  Microbiology Results (last 7 days)     Procedure Component Value Units Date/Time    Urine culture [[id number]]  (Abnormal)  (Susceptibility) Collected: 08/03/22 1334    Order Status: Completed Specimen: Urine Updated: 08/06/22 0048     Urine Culture, Routine KLEBSIELLA PNEUMONIAE ESBL  >100,000 cfu/ml      Narrative:      Specimen Source->Urine                Neuropathy due to type 2 diabetes mellitus  Resume chronic gabepentin    Debility  PT/OT eval and treat  Case management consultation for discharge  planning      Constipation  Abdominal xray - negative for SBO  Fleets enema-stool x 2 recorded yesterday  Senna BID      Chronic indwelling Gutierrez catheter  Inability to be changed despite multiple attempts in ED.  Consultation to urology  Gutierrez catheter replaced per Urology  Monitor urine output closely      Hypertension  Chronic, controlled.  Latest blood pressure and vitals reviewed-   Temp:  [96.5 °F (35.8 °C)-98.2 °F (36.8 °C)]   Pulse:  [55-69]   Resp:  [17-20]   BP: ()/(54-66)   SpO2:  [95 %-98 %] .   Home meds for hypertension were reviewed and noted below.   Hypertension Medications             lisinopriL-hydrochlorothiazide (PRINZIDE,ZESTORETIC) 20-12.5 mg per tablet TAKE ONE TABLET BY MOUTH TWICE DAILY    losartan (COZAAR) 25 MG tablet 1 tablet DAILY (route: oral)          While in the hospital, will manage blood pressure as follows; Continue home antihypertensive regimen    Will utilize p.r.n. blood pressure medication only if patient's blood pressure greater than  180/110 and he develops symptoms such as worsening chest pain or shortness of breath.        Type 2 diabetes mellitus with hyperglycemia  Patient's FSGs are controlled on current medication regimen.  Last A1c reviewed-   Lab Results   Component Value Date    HGBA1C 8.8 (H) 09/30/2021     Most recent fingerstick glucose reviewed-   Recent Labs   Lab 08/05/22  1143 08/05/22  1656 08/05/22  2054 08/06/22  0745   POCTGLUCOSE 149* 120* 163* 143*     Current correctional scale  Medium  Maintain anti-hyperglycemic dose as follows-   Antihyperglycemics (From admission, onward)            Medium dose SSI  Levemir 10 units nightly        Hold Oral hypoglycemics while patient is in the hospital.      VTE Risk Mitigation (From admission, onward)         Ordered     enoxaparin injection 40 mg  Daily         08/03/22 1857     IP VTE LOW RISK PATIENT  Once         08/03/22 1857     Place sequential compression device  Until discontinued         08/03/22  1857                Discharge Planning   YUDITH: 8/8/2022     Code Status: Full Code   Is the patient medically ready for discharge?:     Reason for patient still in hospital (select all that apply): Patient trending condition, Laboratory test, Treatment, Consult recommendations and Pending disposition  Discharge Plan A: Home Health                  EMILY Morales  Department of Hospital Medicine   [facility]

## 2022-08-06 NOTE — PLAN OF CARE
Problem: Adult Inpatient Plan of Care  Goal: Plan of Care Review  Outcome: Ongoing, Progressing     Problem: Diabetes Comorbidity  Goal: Blood Glucose Level Within Targeted Range  Outcome: Ongoing, Progressing     Problem: Fall Injury Risk  Goal: Absence of Fall and Fall-Related Injury  Outcome: Ongoing, Progressing

## 2022-08-06 NOTE — PT/OT/SLP PROGRESS
Physical Therapy Treatment    Patient Name:  Jhonny Almazan   MRN:  [id number]    Recommendations:     Discharge Recommendations:   (TBD)   Discharge Equipment Recommendations: none   Barriers to discharge: None    Assessment:     Jhonny Almazan is a 66 y.o. male admitted with a medical diagnosis of Complicated UTI (urinary tract infection).  He presents with the following impairments/functional limitations:  weakness, impaired endurance, gait instability, impaired balance, impaired functional mobility, decreased lower extremity function  .    Rehab Prognosis: Fair; patient would benefit from acute skilled PT services to address these deficits and reach maximum level of function.    Recent Surgery: * No surgery found *      Plan:     During this hospitalization, patient to be seen 6 x/week to address the identified rehab impairments via gait training, therapeutic activities, therapeutic exercises and progress toward the following goals:    · Plan of Care Expires:  08/15/22    Subjective     Patient/Family Comments/goals: agrees to work with PT, denies having fallen at home.    Objective:     Communicated with nurse (Aneta) prior to session.  Patient found supine with olguin catheter, peripheral IV upon PT entry to room.     General Precautions: Standard, contact, fall   Orthopedic Precautions:N/A   Braces: N/A  Respiratory Status: Room air     Functional Mobility training:  · Bed Mobility:     · Rolling Right: stand by assistance  · Supine to Sit: stand by assistance  · Sit to Supine: stand by assistance  · Transfers:     · Sit to Stand:  contact guard assistance with rolling walker and  x 2 reps  · Gait: 15' (slow) with RW with CGA      AM-PAC 6 CLICK MOBILITY  Turning over in bed (including adjusting bedclothes, sheets and blankets)?: 3  Sitting down on and standing up from a chair with arms (e.g., wheelchair, bedside commode, etc.): 3  Moving from lying on back to sitting on the side of the  bed?: 3  Moving to and from a bed to a chair (including a wheelchair)?: 3  Need to walk in hospital room?: 3  Climbing 3-5 steps with a railing?: 1  Basic Mobility Total Score: 16       Therapeutic Activities and Exercises:   mobility training as above with cues for technique  Lateral scoot in sitting with SBA  SUPINE: SLR, ankle DF/PF, x 10 reps x 2 sets each  Sit balance training at eob with SBA and cues    Patient left supine with all lines intact, call button in reach and bed alarm on.    GOALS:   Multidisciplinary Problems     Physical Therapy Goals        Problem: Physical Therapy    Goal Priority Disciplines Outcome Goal Variances Interventions   Physical Therapy Goal     PT, PT/OT Ongoing, Progressing     Description: Goals to be met by: 8/15/2022     Patient will increase functional independence with mobility by performin). Supine to sit with Stand-by Assistance  2). Sit to supine with Stand-by Assistance  3). Sit to stand transfer with Contact Guard Assistance  4). Bed to chair transfer with Contact Guard Assistance using Rolling Walker  5). Gait  x > 10 feet with Contact Guard Assistance using Rolling Walker.                      Time Tracking:     PT Received On: 22  PT Start Time: 1015     PT Stop Time: 1042  PT Total Time (min): 27 min     Billable Minutes: Therapeutic Activity 17 and Therapeutic Exercise 10    Treatment Type: Treatment  PT/PTA: PT     PTA Visit Number: 0     2022

## 2022-08-06 NOTE — ASSESSMENT & PLAN NOTE
Chronic, controlled.  Latest blood pressure and vitals reviewed-   Temp:  [96.5 °F (35.8 °C)-98.2 °F (36.8 °C)]   Pulse:  [55-69]   Resp:  [17-20]   BP: ()/(54-66)   SpO2:  [95 %-98 %] .   Home meds for hypertension were reviewed and noted below.   Hypertension Medications             lisinopriL-hydrochlorothiazide (PRINZIDE,ZESTORETIC) 20-12.5 mg per tablet TAKE ONE TABLET BY MOUTH TWICE DAILY    losartan (COZAAR) 25 MG tablet 1 tablet DAILY (route: oral)          While in the hospital, will manage blood pressure as follows; Continue home antihypertensive regimen    Will utilize p.r.n. blood pressure medication only if patient's blood pressure greater than  180/110 and he develops symptoms such as worsening chest pain or shortness of breath.

## 2022-08-06 NOTE — SUBJECTIVE & OBJECTIVE
Interval History: no acute events overnight.  Requesting that his home med dose of gabapentin be resumed.  He does not know his home dose.  Per med rec, no gabapentin is listed; however, upon review of outside records it appears he takes gabapentin 300mg twice daily, so this will be ordered.      Review of Systems   Constitutional:  Positive for fatigue. Negative for appetite change, chills and fever.   HENT:  Negative for congestion and sore throat.    Respiratory:  Negative for cough and shortness of breath.    Cardiovascular:  Negative for chest pain and palpitations.   Gastrointestinal:  Positive for constipation. Negative for abdominal pain, diarrhea, nausea and vomiting.   Genitourinary:  Positive for difficulty urinating (Chronic). Negative for hematuria.   Musculoskeletal:  Positive for arthralgias and myalgias.   Skin:  Positive for pallor. Negative for rash.   Neurological:  Positive for weakness (generalized). Negative for dizziness and light-headedness.   Psychiatric/Behavioral:  Negative for agitation. The patient is not nervous/anxious.    All other systems reviewed and are negative.  Objective:     Vital Signs (Most Recent):  Temp: 98.1 °F (36.7 °C) (08/06/22 0756)  Pulse: 69 (08/06/22 0756)  Resp: 17 (08/06/22 0756)  BP: 128/63 (08/06/22 0756)  SpO2: 96 % (08/06/22 0756) Vital Signs (24h Range):  Temp:  [96.5 °F (35.8 °C)-98.2 °F (36.8 °C)] 98.1 °F (36.7 °C)  Pulse:  [55-69] 69  Resp:  [17-20] 17  SpO2:  [95 %-98 %] 96 %  BP: ()/(54-66) 128/63     Weight: 99.8 kg (220 lb)  Body mass index is 26.09 kg/m².    Intake/Output Summary (Last 24 hours) at 8/6/2022 0931  Last data filed at 8/6/2022 0500  Gross per 24 hour   Intake 329.3 ml   Output 1025 ml   Net -695.7 ml      Physical Exam  Constitutional:       General: He is not in acute distress.     Appearance: He is not ill-appearing or toxic-appearing.   Cardiovascular:      Rate and Rhythm: Normal rate and regular rhythm.      Heart sounds:  Normal heart sounds.   Pulmonary:      Effort: Pulmonary effort is normal. No respiratory distress.      Breath sounds: Normal breath sounds.   Abdominal:      General: Bowel sounds are normal. There is no distension.      Palpations: Abdomen is soft.      Tenderness: There is abdominal tenderness (Mild suprapubic tenderness). There is no guarding.   Musculoskeletal:         General: Normal range of motion.      Right lower leg: No edema.      Left lower leg: No edema.   Neurological:      Mental Status: He is alert and oriented to person, place, and time. Mental status is at baseline.       Significant Labs: All pertinent labs within the past 24 hours have been reviewed.  CBC:   Recent Labs   Lab 08/05/22 0434 08/06/22  0451   WBC 7.18 6.81   HGB 12.5* 12.3*   HCT 38.1* 37.8*    169     CMP:   Recent Labs   Lab 08/05/22  0434 08/06/22  0451    137   K 3.7 4.2    104   CO2 24 26   * 145*   BUN 11 9   CREATININE 0.7 0.7   CALCIUM 9.4 9.5   ANIONGAP 11 7*       Significant Imaging: I have reviewed all pertinent imaging results/findings within the past 24 hours.

## 2022-08-06 NOTE — ASSESSMENT & PLAN NOTE
Patient's FSGs are controlled on current medication regimen.  Last A1c reviewed-   Lab Results   Component Value Date    HGBA1C 8.8 (H) 09/30/2021     Most recent fingerstick glucose reviewed-   Recent Labs   Lab 08/05/22  1143 08/05/22  1656 08/05/22 2054 08/06/22  0745   POCTGLUCOSE 149* 120* 163* 143*     Current correctional scale  Medium  Maintain anti-hyperglycemic dose as follows-   Antihyperglycemics (From admission, onward)            Medium dose SSI  Levemir 10 units nightly        Hold Oral hypoglycemics while patient is in the hospital.

## 2022-08-06 NOTE — PLAN OF CARE
Problem: Adult Inpatient Plan of Care  Goal: Plan of Care Review  Outcome: Ongoing, Progressing   Supine with HOB up 45. POC and medications reviewed and verbalized complete understanding. HL in right fa with DDI. No redness or swelling at site. SR up x 2 Call light in reach. Bed in lowest position with brakes locked.   Problem: Diabetes Comorbidity  Goal: Blood Glucose Level Within Targeted Range  Outcome: Ongoing, Progressing   CBGs monitored throuh out shift .   Problem: Infection  Goal: Absence of Infection Signs and Symptoms  Outcome: Ongoing, Progressing   Gutierrez cath intact and draining av urine in drainage bag. Gutierrez care provided

## 2022-08-07 LAB
POCT GLUCOSE: 139 MG/DL (ref 70–110)
POCT GLUCOSE: 144 MG/DL (ref 70–110)
POCT GLUCOSE: 154 MG/DL (ref 70–110)
POCT GLUCOSE: 155 MG/DL (ref 70–110)
TB INDURATION 48 - 72 HR READ: 0 MM

## 2022-08-07 PROCEDURE — 25000003 PHARM REV CODE 250: Performed by: NURSE PRACTITIONER

## 2022-08-07 PROCEDURE — G0378 HOSPITAL OBSERVATION PER HR: HCPCS

## 2022-08-07 PROCEDURE — 63600175 PHARM REV CODE 636 W HCPCS: Performed by: NURSE PRACTITIONER

## 2022-08-07 PROCEDURE — 96366 THER/PROPH/DIAG IV INF ADDON: CPT

## 2022-08-07 PROCEDURE — 96372 THER/PROPH/DIAG INJ SC/IM: CPT | Performed by: NURSE PRACTITIONER

## 2022-08-07 RX ORDER — BISACODYL 5 MG
10 TABLET, DELAYED RELEASE (ENTERIC COATED) ORAL ONCE
Status: COMPLETED | OUTPATIENT
Start: 2022-08-07 | End: 2022-08-07

## 2022-08-07 RX ADMIN — BUPROPION HYDROCHLORIDE 300 MG: 150 TABLET, FILM COATED, EXTENDED RELEASE ORAL at 09:08

## 2022-08-07 RX ADMIN — INSULIN ASPART 1 UNITS: 100 INJECTION, SOLUTION INTRAVENOUS; SUBCUTANEOUS at 08:08

## 2022-08-07 RX ADMIN — MEROPENEM AND SODIUM CHLORIDE 1 G: 1 INJECTION, SOLUTION INTRAVENOUS at 09:08

## 2022-08-07 RX ADMIN — ENOXAPARIN SODIUM 40 MG: 100 INJECTION SUBCUTANEOUS at 04:08

## 2022-08-07 RX ADMIN — MEROPENEM AND SODIUM CHLORIDE 1 G: 1 INJECTION, SOLUTION INTRAVENOUS at 12:08

## 2022-08-07 RX ADMIN — INSULIN DETEMIR 10 UNITS: 100 INJECTION, SOLUTION SUBCUTANEOUS at 08:08

## 2022-08-07 RX ADMIN — MELATONIN TAB 3 MG 9 MG: 3 TAB at 08:08

## 2022-08-07 RX ADMIN — GABAPENTIN 300 MG: 100 CAPSULE ORAL at 09:08

## 2022-08-07 RX ADMIN — PANTOPRAZOLE SODIUM 40 MG: 40 TABLET, DELAYED RELEASE ORAL at 09:08

## 2022-08-07 RX ADMIN — MEROPENEM AND SODIUM CHLORIDE 1 G: 1 INJECTION, SOLUTION INTRAVENOUS at 03:08

## 2022-08-07 RX ADMIN — ASPIRIN 81 MG: 81 TABLET, COATED ORAL at 09:08

## 2022-08-07 RX ADMIN — CLOPIDOGREL BISULFATE 75 MG: 75 TABLET, FILM COATED ORAL at 09:08

## 2022-08-07 RX ADMIN — FINASTERIDE 5 MG: 5 TABLET, FILM COATED ORAL at 09:08

## 2022-08-07 RX ADMIN — INSULIN ASPART 2 UNITS: 100 INJECTION, SOLUTION INTRAVENOUS; SUBCUTANEOUS at 12:08

## 2022-08-07 RX ADMIN — GABAPENTIN 300 MG: 100 CAPSULE ORAL at 08:08

## 2022-08-07 RX ADMIN — LOSARTAN POTASSIUM 25 MG: 25 TABLET, FILM COATED ORAL at 09:08

## 2022-08-07 RX ADMIN — ATORVASTATIN CALCIUM 40 MG: 40 TABLET, FILM COATED ORAL at 09:08

## 2022-08-07 RX ADMIN — BISACODYL 10 MG: 5 TABLET, COATED ORAL at 02:08

## 2022-08-07 NOTE — PLAN OF CARE
POC and meds reviewed with pt, verbalized understanding.Contact isolation and cardiac monitoring in progress. TB skin test read - negative, results entered into Epic. Chronic olguin in place. Hourly safety rounds completed, safety measures maintained.

## 2022-08-07 NOTE — ASSESSMENT & PLAN NOTE
Abdominal xray - negative for SBO  Fleets enema-stool x 2 recorded yesterday  Senna BID  Dulcolax today

## 2022-08-07 NOTE — PROGRESS NOTES
[facility] Hospital Medicine  Progress Note    Patient Name: Jhonny Almazan  MRN: [id number]  Patient Class: OP- Observation   Admission Date: 8/3/2022  Length of Stay: 0 days  Attending Physician: Neil Rosas MD  Primary Care Provider: Joey Whitehead MD        Subjective:     Principal Problem:Complicated UTI (urinary tract infection)        HPI:  65 y/o male with hx of indwelling urinary catheter placement, PEG tube placement, stroke, HTN, and MDD presented to the ED with a 3-4 day history of worsening generalized weakness, fatigue and mild abdominal pain. Pt is wheelchair bound due to generalized weakness following a stroke in 11/2020. He has worsening weakness over the past few days with fatigue, a loss of appetite and mild abdominal pain. Pt has indwelling olguin catheter that pt reports was last changed around 2-3 weeks ago. Pt reports less urine output than normal. He has experienced some chills and cough but denies F/N/V/D, SOB, chest pain, edema, or flank pain. While in the ED, olguin was unable to be removed due to resistance and UA was positive for bacteria, leukocytes and WBC. Recent urine culture was positive for ESBL and MDRO. Pt will be admitted to the services of hospital medicine and urology consult in order to change indwelling olguin catheter.       Overview/Hospital Course:  Patient was admitted with complicated urinary tract infection secondary to chronic indwelling urine catheter.  Urinary catheter was unable to be changed in the ED and Urology was consulted who recommended admission.  Patient was placed on IV antibiotics.  Urology was consulted and Olguin catheter was exchanged.  Patient was noted to be constipated.  Abdominal x-ray was obtained and patient was given enema.  He continued to complain of abdominal pain. CT abdomen and pelvis was obtained and noted to be negative for acute process.  He was evaluated by PT and OT.  Physical therapy recommends skilled  nursing facility placement.  Case Management was consulted for discharge planning.      Interval History: improvement in pain since gabapentin resumed.  Awaiting SNF placement.    Review of Systems   Constitutional:  Positive for fatigue. Negative for appetite change, chills and fever.   HENT:  Negative for congestion and sore throat.    Respiratory:  Negative for cough and shortness of breath.    Cardiovascular:  Negative for chest pain and palpitations.   Gastrointestinal:  Positive for constipation. Negative for abdominal pain, diarrhea, nausea and vomiting.   Genitourinary:  Positive for difficulty urinating (Chronic). Negative for hematuria.   Musculoskeletal:  Positive for arthralgias and myalgias.   Skin:  Positive for pallor. Negative for rash.   Neurological:  Positive for weakness (generalized). Negative for dizziness and light-headedness.   Psychiatric/Behavioral:  Negative for agitation. The patient is not nervous/anxious.    All other systems reviewed and are negative.  Objective:     Vital Signs (Most Recent):  Temp: 98.1 °F (36.7 °C) (08/07/22 1205)  Pulse: 65 (08/07/22 1205)  Resp: 19 (08/07/22 1205)  BP: 125/69 (08/07/22 1205)  SpO2: (!) 93 % (08/07/22 1205)   Vital Signs (24h Range):  Temp:  [97.4 °F (36.3 °C)-98.8 °F (37.1 °C)] 98.1 °F (36.7 °C)  Pulse:  [55-65] 65  Resp:  [17-20] 19  SpO2:  [93 %-99 %] 93 %  BP: (113-131)/(61-69) 125/69     Weight: 99.8 kg (220 lb)  Body mass index is 26.09 kg/m².    Intake/Output Summary (Last 24 hours) at 8/7/2022 1333  Last data filed at 8/7/2022 0600  Gross per 24 hour   Intake 191.09 ml   Output 1250 ml   Net -1058.91 ml      Physical Exam  Constitutional:       General: He is not in acute distress.     Appearance: He is not ill-appearing or toxic-appearing.   Cardiovascular:      Rate and Rhythm: Normal rate and regular rhythm.      Heart sounds: Normal heart sounds.   Pulmonary:      Effort: Pulmonary effort is normal. No respiratory distress.      Breath  sounds: Normal breath sounds.   Abdominal:      General: Bowel sounds are normal. There is no distension.      Palpations: Abdomen is soft.      Tenderness: There is abdominal tenderness (Mild suprapubic tenderness). There is no guarding.   Musculoskeletal:         General: Normal range of motion.      Right lower leg: No edema.      Left lower leg: No edema.   Neurological:      Mental Status: He is alert and oriented to person, place, and time. Mental status is at baseline.       Significant Labs: All pertinent labs within the past 24 hours have been reviewed.  CBC:   Recent Labs   Lab 08/06/22  0451   WBC 6.81   HGB 12.3*   HCT 37.8*        CMP:   Recent Labs   Lab 08/06/22 0451      K 4.2      CO2 26   *   BUN 9   CREATININE 0.7   CALCIUM 9.5   ANIONGAP 7*       Significant Imaging: I have reviewed all pertinent imaging results/findings within the past 24 hours.      Assessment/Plan:      * Complicated UTI (urinary tract infection)  Patient with chronic indwelling Gutierrez catheter.  History of ESBL.  UA concerning for urinary tract infection.    Continue IV Merrem.  Monitor culture.  Microbiology Results (last 7 days)     Procedure Component Value Units Date/Time    Urine culture [[id number]]  (Abnormal)  (Susceptibility) Collected: 08/03/22 1334    Order Status: Completed Specimen: Urine Updated: 08/06/22 0048     Urine Culture, Routine KLEBSIELLA PNEUMONIAE ESBL  >100,000 cfu/ml      Narrative:      Specimen Source->Urine                Neuropathy due to type 2 diabetes mellitus  Resume chronic gabepentin    Debility  PT/OT eval and treat  Case management consultation for discharge planning      Constipation  Abdominal xray - negative for SBO  Fleets enema-stool x 2 recorded yesterday  Senna BID  Dulcolax today      Chronic indwelling Gutierrez catheter  Inability to be changed despite multiple attempts in ED.  Consultation to urology  Gutierrez catheter replaced per Urology  Monitor urine  output closely      Hypertension  Chronic, controlled.  Latest blood pressure and vitals reviewed-   Temp:  [97.4 °F (36.3 °C)-98.8 °F (37.1 °C)]   Pulse:  [55-65]   Resp:  [17-20]   BP: (113-131)/(61-69)   SpO2:  [93 %-99 %] .   Home meds for hypertension were reviewed and noted below.   Hypertension Medications             lisinopriL-hydrochlorothiazide (PRINZIDE,ZESTORETIC) 20-12.5 mg per tablet TAKE ONE TABLET BY MOUTH TWICE DAILY    losartan (COZAAR) 25 MG tablet 1 tablet DAILY (route: oral)          While in the hospital, will manage blood pressure as follows; Continue home antihypertensive regimen    Will utilize p.r.n. blood pressure medication only if patient's blood pressure greater than  180/110 and he develops symptoms such as worsening chest pain or shortness of breath.        Type 2 diabetes mellitus with hyperglycemia  Patient's FSGs are controlled on current medication regimen.  Last A1c reviewed-   Lab Results   Component Value Date    HGBA1C 8.8 (H) 09/30/2021     Most recent fingerstick glucose reviewed-   Recent Labs   Lab 08/06/22  1629 08/06/22  1949 08/07/22  0745 08/07/22  1143   POCTGLUCOSE 122* 161* 144* 155*     Current correctional scale  Medium  Maintain anti-hyperglycemic dose as follows-   Antihyperglycemics (From admission, onward)            Medium dose SSI  Levemir 10 units nightly        Hold Oral hypoglycemics while patient is in the hospital.      VTE Risk Mitigation (From admission, onward)         Ordered     enoxaparin injection 40 mg  Daily         08/03/22 1857     IP VTE LOW RISK PATIENT  Once         08/03/22 1857     Place sequential compression device  Until discontinued         08/03/22 1857                Discharge Planning   YUDITH: 8/8/2022     Code Status: Full Code   Is the patient medically ready for discharge?:     Reason for patient still in hospital (select all that apply): PT / OT recommendations and Pending disposition  Discharge Plan A: Home Health                   Lyn Anne, ALP  Department of Hospital Medicine   [facility]

## 2022-08-07 NOTE — ASSESSMENT & PLAN NOTE
Chronic, controlled.  Latest blood pressure and vitals reviewed-   Temp:  [97.4 °F (36.3 °C)-98.8 °F (37.1 °C)]   Pulse:  [55-65]   Resp:  [17-20]   BP: (113-131)/(61-69)   SpO2:  [93 %-99 %] .   Home meds for hypertension were reviewed and noted below.   Hypertension Medications             lisinopriL-hydrochlorothiazide (PRINZIDE,ZESTORETIC) 20-12.5 mg per tablet TAKE ONE TABLET BY MOUTH TWICE DAILY    losartan (COZAAR) 25 MG tablet 1 tablet DAILY (route: oral)          While in the hospital, will manage blood pressure as follows; Continue home antihypertensive regimen    Will utilize p.r.n. blood pressure medication only if patient's blood pressure greater than  180/110 and he develops symptoms such as worsening chest pain or shortness of breath.

## 2022-08-07 NOTE — PLAN OF CARE
Problem: Adult Inpatient Plan of Care  Goal: Plan of Care Review  Outcome: Ongoing, Progressing   Safety maintained, call light within reach, bed locked and in low position, and side rails up. Patient remains free from injury.    Monitoring and following care plan.       Contact isolation continues.  TB test to LFA to be read 8/7.  Chronic olguin.

## 2022-08-07 NOTE — SUBJECTIVE & OBJECTIVE
Interval History: improvement in pain since gabapentin resumed.  Awaiting SNF placement.    Review of Systems   Constitutional:  Positive for fatigue. Negative for appetite change, chills and fever.   HENT:  Negative for congestion and sore throat.    Respiratory:  Negative for cough and shortness of breath.    Cardiovascular:  Negative for chest pain and palpitations.   Gastrointestinal:  Positive for constipation. Negative for abdominal pain, diarrhea, nausea and vomiting.   Genitourinary:  Positive for difficulty urinating (Chronic). Negative for hematuria.   Musculoskeletal:  Positive for arthralgias and myalgias.   Skin:  Positive for pallor. Negative for rash.   Neurological:  Positive for weakness (generalized). Negative for dizziness and light-headedness.   Psychiatric/Behavioral:  Negative for agitation. The patient is not nervous/anxious.    All other systems reviewed and are negative.  Objective:     Vital Signs (Most Recent):  Temp: 98.1 °F (36.7 °C) (08/07/22 1205)  Pulse: 65 (08/07/22 1205)  Resp: 19 (08/07/22 1205)  BP: 125/69 (08/07/22 1205)  SpO2: (!) 93 % (08/07/22 1205)   Vital Signs (24h Range):  Temp:  [97.4 °F (36.3 °C)-98.8 °F (37.1 °C)] 98.1 °F (36.7 °C)  Pulse:  [55-65] 65  Resp:  [17-20] 19  SpO2:  [93 %-99 %] 93 %  BP: (113-131)/(61-69) 125/69     Weight: 99.8 kg (220 lb)  Body mass index is 26.09 kg/m².    Intake/Output Summary (Last 24 hours) at 8/7/2022 1333  Last data filed at 8/7/2022 0600  Gross per 24 hour   Intake 191.09 ml   Output 1250 ml   Net -1058.91 ml      Physical Exam  Constitutional:       General: He is not in acute distress.     Appearance: He is not ill-appearing or toxic-appearing.   Cardiovascular:      Rate and Rhythm: Normal rate and regular rhythm.      Heart sounds: Normal heart sounds.   Pulmonary:      Effort: Pulmonary effort is normal. No respiratory distress.      Breath sounds: Normal breath sounds.   Abdominal:      General: Bowel sounds are normal. There  is no distension.      Palpations: Abdomen is soft.      Tenderness: There is abdominal tenderness (Mild suprapubic tenderness). There is no guarding.   Musculoskeletal:         General: Normal range of motion.      Right lower leg: No edema.      Left lower leg: No edema.   Neurological:      Mental Status: He is alert and oriented to person, place, and time. Mental status is at baseline.       Significant Labs: All pertinent labs within the past 24 hours have been reviewed.  CBC:   Recent Labs   Lab 08/06/22  0451   WBC 6.81   HGB 12.3*   HCT 37.8*        CMP:   Recent Labs   Lab 08/06/22  0451      K 4.2      CO2 26   *   BUN 9   CREATININE 0.7   CALCIUM 9.5   ANIONGAP 7*       Significant Imaging: I have reviewed all pertinent imaging results/findings within the past 24 hours.

## 2022-08-07 NOTE — ASSESSMENT & PLAN NOTE
Patient's FSGs are controlled on current medication regimen.  Last A1c reviewed-   Lab Results   Component Value Date    HGBA1C 8.8 (H) 09/30/2021     Most recent fingerstick glucose reviewed-   Recent Labs   Lab 08/06/22  1629 08/06/22  1949 08/07/22  0745 08/07/22  1143   POCTGLUCOSE 122* 161* 144* 155*     Current correctional scale  Medium  Maintain anti-hyperglycemic dose as follows-   Antihyperglycemics (From admission, onward)            Medium dose SSI  Levemir 10 units nightly        Hold Oral hypoglycemics while patient is in the hospital.

## 2022-08-08 LAB
POCT GLUCOSE: 112 MG/DL (ref 70–110)
POCT GLUCOSE: 139 MG/DL (ref 70–110)
POCT GLUCOSE: 165 MG/DL (ref 70–110)
POCT GLUCOSE: 181 MG/DL (ref 70–110)

## 2022-08-08 PROCEDURE — 63600175 PHARM REV CODE 636 W HCPCS: Performed by: NURSE PRACTITIONER

## 2022-08-08 PROCEDURE — G0378 HOSPITAL OBSERVATION PER HR: HCPCS

## 2022-08-08 PROCEDURE — 97110 THERAPEUTIC EXERCISES: CPT

## 2022-08-08 PROCEDURE — 96375 TX/PRO/DX INJ NEW DRUG ADDON: CPT | Mod: 59

## 2022-08-08 PROCEDURE — 96372 THER/PROPH/DIAG INJ SC/IM: CPT | Mod: 59 | Performed by: NURSE PRACTITIONER

## 2022-08-08 PROCEDURE — 97535 SELF CARE MNGMENT TRAINING: CPT

## 2022-08-08 PROCEDURE — 25000003 PHARM REV CODE 250: Performed by: NURSE PRACTITIONER

## 2022-08-08 PROCEDURE — 96366 THER/PROPH/DIAG IV INF ADDON: CPT | Mod: 59

## 2022-08-08 PROCEDURE — 51702 INSERT TEMP BLADDER CATH: CPT

## 2022-08-08 RX ORDER — GABAPENTIN 300 MG/1
300 CAPSULE ORAL 2 TIMES DAILY
Qty: 180 CAPSULE | Refills: 0 | Status: SHIPPED | OUTPATIENT
Start: 2022-08-08 | End: 2022-08-09 | Stop reason: SDUPTHER

## 2022-08-08 RX ORDER — AMOXICILLIN 250 MG
1 CAPSULE ORAL 2 TIMES DAILY
Start: 2022-08-08

## 2022-08-08 RX ORDER — INSULIN ASPART 100 [IU]/ML
1-10 INJECTION, SOLUTION INTRAVENOUS; SUBCUTANEOUS
Refills: 0
Start: 2022-08-08 | End: 2022-08-09 | Stop reason: SDUPTHER

## 2022-08-08 RX ADMIN — INSULIN ASPART 2 UNITS: 100 INJECTION, SOLUTION INTRAVENOUS; SUBCUTANEOUS at 12:08

## 2022-08-08 RX ADMIN — GABAPENTIN 300 MG: 100 CAPSULE ORAL at 09:08

## 2022-08-08 RX ADMIN — BUPROPION HYDROCHLORIDE 300 MG: 150 TABLET, FILM COATED, EXTENDED RELEASE ORAL at 08:08

## 2022-08-08 RX ADMIN — ONDANSETRON 8 MG: 2 INJECTION INTRAMUSCULAR; INTRAVENOUS at 09:08

## 2022-08-08 RX ADMIN — LOSARTAN POTASSIUM 25 MG: 25 TABLET, FILM COATED ORAL at 08:08

## 2022-08-08 RX ADMIN — PANTOPRAZOLE SODIUM 40 MG: 40 TABLET, DELAYED RELEASE ORAL at 08:08

## 2022-08-08 RX ADMIN — INSULIN DETEMIR 10 UNITS: 100 INJECTION, SOLUTION SUBCUTANEOUS at 09:08

## 2022-08-08 RX ADMIN — CLOPIDOGREL BISULFATE 75 MG: 75 TABLET, FILM COATED ORAL at 08:08

## 2022-08-08 RX ADMIN — MEROPENEM AND SODIUM CHLORIDE 1 G: 1 INJECTION, SOLUTION INTRAVENOUS at 12:08

## 2022-08-08 RX ADMIN — MEROPENEM AND SODIUM CHLORIDE 1 G: 1 INJECTION, SOLUTION INTRAVENOUS at 08:08

## 2022-08-08 RX ADMIN — SENNOSIDES AND DOCUSATE SODIUM 1 TABLET: 50; 8.6 TABLET ORAL at 09:08

## 2022-08-08 RX ADMIN — MEROPENEM AND SODIUM CHLORIDE 1 G: 1 INJECTION, SOLUTION INTRAVENOUS at 04:08

## 2022-08-08 RX ADMIN — SENNOSIDES AND DOCUSATE SODIUM 1 TABLET: 50; 8.6 TABLET ORAL at 08:08

## 2022-08-08 RX ADMIN — ATORVASTATIN CALCIUM 40 MG: 40 TABLET, FILM COATED ORAL at 08:08

## 2022-08-08 RX ADMIN — ENOXAPARIN SODIUM 40 MG: 100 INJECTION SUBCUTANEOUS at 04:08

## 2022-08-08 RX ADMIN — ASPIRIN 81 MG: 81 TABLET, COATED ORAL at 08:08

## 2022-08-08 RX ADMIN — GABAPENTIN 300 MG: 100 CAPSULE ORAL at 08:08

## 2022-08-08 RX ADMIN — FINASTERIDE 5 MG: 5 TABLET, FILM COATED ORAL at 08:08

## 2022-08-08 NOTE — PLAN OF CARE
Let VM for Molly Massey [phone] with [employer]- requested call back regarding SNF request        08/08/22 1309   Post-Acute Status   Post-Acute Authorization Placement   Post-Acute Placement Status Pending payor review/awaiting authorization (if required)

## 2022-08-08 NOTE — PLAN OF CARE
Problem: Adult Inpatient Plan of Care  Goal: Plan of Care Review  Outcome: Ongoing, Progressing   Supine with HOB up 35. POC amd medications reviewed with patient. Verbalized complete understanding. Tele [id number] in use. Hl to Right fa with DDI. No redness or swelling at site. Gutierrez cath intact and draining av urine in drainage bag. SR up x 2. Call light in reach. Bed in lowest position with brakes locked.  Problem: Adult Inpatient Plan of Care  Goal: Optimal Comfort and Wellbeing  Outcome: Ongoing, Progressing   Pain monitored through out shift and medications given per MD order.   Problem: Diabetes Comorbidity  Goal: Blood Glucose Level Within Targeted Range  Outcome: Ongoing, Progressing   CBG monitored through out shift and insulin given per sliding scale order.   Problem: Infection  Goal: Absence of Infection Signs and Symptoms  Outcome: Ongoing, Progressing   Gutierrez cath intact and draining av urine in drainage bag without difficulty.

## 2022-08-08 NOTE — PLAN OF CARE
Notified by [facility] that they are not in network with pt's payor and cannot accept pt       08/08/22 0746   Post-Acute Status   Post-Acute Authorization Placement

## 2022-08-08 NOTE — PLAN OF CARE
Left VM for [facility]  Left VM for [facility]   Left message for admissions at [facility]    Spoke with Cristian from [facility]- states her DON is reviewing, CM updated on long obs and ready to DC; states she will call me back ASAP      [facility]  [facility]   [facility]     08/08/22 0841   Post-Acute Status   Post-Acute Authorization Placement   Post-Acute Placement Status Pending post-acute provider review/more information requested

## 2022-08-08 NOTE — CARE UPDATE
08/08/22 0920   Tobacco Cessation Intervention   Do you use any type of tobacco product? No   Patient stated that he quit smoking 2 mos ago.  Information changed in pt's History

## 2022-08-08 NOTE — PLAN OF CARE
Received call back from Molly with [facility] SNF request was called in about 2 hours ago and has been assigned to Christine ( [phone]) who is currently working request. States changing request to expedited does not speed up anything regarding request- just that it would shorten our peer to peer time if request is denied     08/08/22 4707   Post-Acute Status   Post-Acute Authorization Placement   Post-Acute Placement Status Pending payor review/awaiting authorization (if required)

## 2022-08-08 NOTE — PLAN OF CARE
[facility] accepted and submitted for auth       08/08/22 0944   Post-Acute Status   Post-Acute Authorization Placement   Post-Acute Placement Status Pending payor review/awaiting authorization (if required)

## 2022-08-08 NOTE — PT/OT/SLP PROGRESS
"Occupational Therapy   Treatment    Name: Jhonny Almazan  MRN: [id number]  Admitting Diagnosis:  Complicated UTI (urinary tract infection)       Recommendations:     Discharge Recommendations: nursing facility, skilled  Discharge Equipment Recommendations:  other (see comments) (TBD next level of care)  Barriers to discharge:  Other (Comment) (Decreased caregiver support but does live with a son.)    Assessment:     Jhonny Almazan is a 66 y.o. male with a medical diagnosis of Complicated UTI (urinary tract infection).      Patient agreeable to participate in OT treatment session and very pleasant throughout. Patient demonstrates progress towards OT goals as patient performing the following: Supervision sit<>supine, CGA bridging x 2 to reposition in bed once supine, Min/SBA to manage/tie hospital gown from EOB, and Set-up/Supervision to perform grooming tasks from EOB. Patient tolerated sitting EOB > 10 minutes to complete tasks. He presents with the following performance deficits affecting function are weakness, impaired endurance, impaired self care skills, impaired functional mobility, gait instability, impaired balance, decreased upper extremity function, decreased lower extremity function.     Patient will benefit from placement upon discharge to increase functional independence and associated functional mobility in order to facilitate patient's return to [facility] and his SAFE return home.     Rehab Prognosis:  Good; patient would benefit from acute skilled OT services to address these deficits and reach maximum level of function.       Plan:     Patient to be seen 5 x/week to address the above listed problems via self-care/home management, therapeutic activities, therapeutic exercises  · Plan of Care Expires: 08/19/22  · Plan of Care Reviewed with: patient    Subjective     "Sure, I'm feeling nauseated but all I can do is try." - regarding participating in OT treatment session  During session, " "patient states, "It's getting harder and harder for me to shower at home and my son gets mad at me. It is just really tough for me. I just feel like I can't do anything anymore."    Pain/Comfort:  · Pain Rating 1: other (see comments) (no pain reported but patient does report nausea)  · Pain Addressed 1: Distraction, Other (see comments) (Nurse providing anti-nausea medication midway through session.)    Objective:     Communicated with: Farzaneh Patrick prior to/post session.  Patient found HOB elevated with olguin catheter, peripheral IV, telemetry (no bed alarm) upon OT entry to room.    General Precautions: Standard, contact, fall   Orthopedic Precautions:N/A   Braces: N/A  Respiratory Status: Room air     Occupational Performance:     Bed Mobility:    · Patient completed Scooting/Bridging with contact guard assistance with verbal instruction for positioning of LEs and technique for bridging; patient able to perform full bridge x 2 to reposition in bed once supine   · Patient completed Supine to Sit with supervision  · Patient completed Sit to Supine with supervision - assistance to manage olguin catheter only    Functional Mobility/Transfers:  · Patient completed Sit <> Stand Transfer with minimum assistance  with  rolling walker and verbal instruction for proper hand placement and correct technique to stand (ie leaning forward) - bed height elevated to facilitate    · Functional Mobility: With Min (A)/CGA, patient performing side steps x 4 with step by step verbal instruction; slight loss of balance posteriorly after completing final R side step causing immediate, unexpected sit on EOB    Activities of Daily Living:  · Grooming: From EOB, patient performing grooming tasks with Set-up/Supervision - performing oral hygiene, including rinsing with mouthwash, washing face/neck, and managing hair; patient tolerated sitting EOB > 10 minutes to complete tasks   · Upper Body Dressing: Min (A) to manage and tie hospital gown " "while seated EOB    [facility] 6 Click ADL: 16    Treatment & Education:  - OTR providing reinforcement of education/instruction regarding OT role/POC - patient verbalizes understanding "the stuff we take for granted" and then inquired as to "what is the other therapy" referring to physical therapy  - OTR providing education/instruction regarding correct transfer sequence/technique regarding sit<>stand including proper hand placement and R side stepping. Patient verbalizes/demonstrates understanding  - OTR reinforcing importance of OOB mobility - patient verbalizes understanding and states "all I can do is try - I walked a little bit"  - OTR providing frequent encouragement throughout session for which patient responds well    Patient left HOB elevated with all lines intact, call button in reach, bed alarm on and NurseFarzaneh notifiedEducation:      GOALS:   Multidisciplinary Problems     Occupational Therapy Goals        Problem: Occupational Therapy    Goal Priority Disciplines Outcome Interventions   Occupational Therapy Goal     OT, PT/OT Ongoing, Progressing    Description: Goals to be met by: 8/19/2022     Patient will increase functional independence with ADLs by performing:    UE Dressing with Set-up Assistance.  LE Dressing with Supervision.  Grooming while seated with Set-up Assistance.  Sitting at edge of bed x 15 minutes with Supervision.  Supine to sit with Modified [place].                     Time Tracking:     OT Date of Treatment: 08/08/22  OT Start Time: 0947  OT Stop Time: 1012  OT Total Time (min): 25 min    Billable Minutes:Self Care/Home Management 25    OT/JULISA: OT     JULISA Visit Number: 0    8/8/2022    "

## 2022-08-08 NOTE — PLAN OF CARE
Received notification that SNF request has been denied and peer to peer being offered. Deadline to schedule peer to peer is noon tomorrow. CM called peer to peer line [phone] and scheduled peer to peer for tomorrow after 8 AM. JAIRO Leo updated       08/08/22 6946   Post-Acute Status   Post-Acute Authorization Placement   Post-Acute Placement Status Pending payor medical review/second level review

## 2022-08-08 NOTE — PLAN OF CARE
Problem: Adult Inpatient Plan of Care  Goal: Plan of Care Review  Outcome: Ongoing, Progressing   Safety maintained, call light within reach, bed locked and in low position, and side rails up. Patient remains free from injury.    Monitoring and following care plan.

## 2022-08-08 NOTE — PT/OT/SLP PROGRESS
"Physical Therapy Treatment    Patient Name:  Jhonny Almazan   MRN:  [id number]    Recommendations:     Discharge Recommendations:  nursing facility, skilled   Discharge Equipment Recommendations:  (TBD at next level of care)   Barriers to discharge: Decreased caregiver support    Assessment:     Jhonny Almazan is a 66 y.o. male admitted with a medical diagnosis of Complicated UTI (urinary tract infection).  He presents with the following impairments/functional limitations:  weakness, impaired endurance, impaired functional mobility, gait instability, impaired balance, decreased upper extremity function, decreased lower extremity function. Patient is agreeable to participation with PT treatment after finishing with OT treatment. He endorses nausea in which he already received medication for. He denies pain at rest. He requires SBA for supine to sit with HOB elevated (patient request) and VC to use bed rails to assist. He requires Yumiko for sit to stand transfer with RW, bed elevated, and VC for hand placement with patient initially requesting PT's hand to assist with transfer and education provided for patient to use left bed rail to push down through to assist with transfer. He performed a step transfer from EOB to chair with RW, CGA-Yumiko, and patient reporting feeling "a little wobbly". He declines further ambulation, but agrees to therex in chair. He performed 20 reps of bilateral LE therex including ankle pumps, long arc quads, and seated marches. He reports feeling "pooped" after participation and is agreeable to remain sitting up in chair with chair alarm on, RN notified, and PCT present to change bed linens.     Rehab Prognosis: Good; patient would benefit from acute skilled PT services to address these deficits and reach maximum level of function.    Recent Surgery: * No surgery found *      Plan:     During this hospitalization, patient to be seen 6 x/week to address the identified rehab " "impairments via gait training, therapeutic activities, therapeutic exercises and progress toward the following goals:    · Plan of Care Expires:  08/15/22    Subjective     Chief Complaint: "pooped" after treatment   Patient/Family Comments/goals: to Mark   Pain/Comfort:  · Pain Rating 1: 0/10      Objective:     Communicated with BURAK Moy prior to session.  Patient found HOB elevated with bed alarm, olguin catheter, peripheral IV, telemetry upon PT entry to room.     General Precautions: Standard, contact, fall   Orthopedic Precautions:N/A   Braces: N/A  Respiratory Status: Room air     Functional Mobility:  · Bed Mobility:     · Supine to Sit: stand by assistance  · Transfers:     · Sit to Stand:  minimum assistance with rolling walker  · Bed to Chair: minimum assistance with  rolling walker  using  Step Transfer      AM-PAC 6 CLICK MOBILITY  Turning over in bed (including adjusting bedclothes, sheets and blankets)?: 4  Sitting down on and standing up from a chair with arms (e.g., wheelchair, bedside commode, etc.): 3  Moving from lying on back to sitting on the side of the bed?: 4  Moving to and from a bed to a chair (including a wheelchair)?: 3  Need to walk in hospital room?: 3  Climbing 3-5 steps with a railing?: 1  Basic Mobility Total Score: 18       Therapeutic Activities and Exercises:   Patient was educated on the importance of OOB activity and functional mobility to negate negative effects of prolonged bed rest during hospitalization, safe transfers and ambulation, and D/C planning     Patient performed 20 reps of bilateral LE therapeutic exercise including ankle pumps, long arc quads, and seated marches     Patient left up in chair with all lines intact, call button in reach, chair alarm on, RN notified and PCT present..    GOALS:   Multidisciplinary Problems     Physical Therapy Goals        Problem: Physical Therapy    Goal Priority Disciplines Outcome Goal Variances Interventions   Physical " Therapy Goal     PT, PT/OT Ongoing, Progressing     Description: Goals to be met by: 8/15/2022     Patient will increase functional independence with mobility by performin). Supine to sit with Stand-by Assistance  2). Sit to supine with Stand-by Assistance  3). Sit to stand transfer with Contact Guard Assistance  4). Bed to chair transfer with Contact Guard Assistance using Rolling Walker  5). Gait  x > 10 feet with Contact Guard Assistance using Rolling Walker.                      Time Tracking:     PT Received On: 22  PT Start Time: 1010     PT Stop Time: 1033  PT Total Time (min): 23 min     Billable Minutes: Therapeutic Exercise 23    Treatment Type: Treatment  PT/PTA: PT     PTA Visit Number: 0     2022

## 2022-08-08 NOTE — PLAN OF CARE
Problem: Occupational Therapy  Goal: Occupational Therapy Goal  Description: Goals to be met by: 8/19/2022     Patient will increase functional independence with ADLs by performing:    UE Dressing with Set-up Assistance.  LE Dressing with Supervision.  Grooming while seated with Set-up Assistance.  Sitting at edge of bed x 15 minutes with Supervision.  Supine to sit with Modified [place].    Outcome: Ongoing, Progressing  Patient performing supine<>sit with Supervision, grooming EOB with Set-up/Supervision, sit<>stand with Min (A) using RW (with bed height elevated), and Min (A)/CGA side steps x 4 with RW.

## 2022-08-09 VITALS
HEART RATE: 72 BPM | RESPIRATION RATE: 17 BRPM | WEIGHT: 220 LBS | DIASTOLIC BLOOD PRESSURE: 66 MMHG | TEMPERATURE: 98 F | BODY MASS INDEX: 25.98 KG/M2 | OXYGEN SATURATION: 92 % | SYSTOLIC BLOOD PRESSURE: 115 MMHG | HEIGHT: 77 IN

## 2022-08-09 LAB — POCT GLUCOSE: 133 MG/DL (ref 70–110)

## 2022-08-09 PROCEDURE — 25000003 PHARM REV CODE 250: Performed by: NURSE PRACTITIONER

## 2022-08-09 PROCEDURE — 96366 THER/PROPH/DIAG IV INF ADDON: CPT

## 2022-08-09 PROCEDURE — 97530 THERAPEUTIC ACTIVITIES: CPT

## 2022-08-09 PROCEDURE — 63600175 PHARM REV CODE 636 W HCPCS: Performed by: NURSE PRACTITIONER

## 2022-08-09 PROCEDURE — G0378 HOSPITAL OBSERVATION PER HR: HCPCS

## 2022-08-09 PROCEDURE — 97110 THERAPEUTIC EXERCISES: CPT

## 2022-08-09 RX ORDER — INSULIN ASPART 100 [IU]/ML
1-10 INJECTION, SOLUTION INTRAVENOUS; SUBCUTANEOUS
Qty: 15 ML | Refills: 1 | Status: SHIPPED | OUTPATIENT
Start: 2022-08-09 | End: 2023-08-09

## 2022-08-09 RX ORDER — PEN NEEDLE, DIABETIC 30 GX3/16"
NEEDLE, DISPOSABLE MISCELLANEOUS
Qty: 100 EACH | Refills: 11 | Status: SHIPPED | OUTPATIENT
Start: 2022-08-09

## 2022-08-09 RX ORDER — INSULIN ASPART 100 [IU]/ML
1-10 INJECTION, SOLUTION INTRAVENOUS; SUBCUTANEOUS
Refills: 0
Start: 2022-08-09 | End: 2022-08-09 | Stop reason: SDUPTHER

## 2022-08-09 RX ORDER — GABAPENTIN 300 MG/1
300 CAPSULE ORAL 2 TIMES DAILY
Qty: 180 CAPSULE | Refills: 0 | Status: SHIPPED | OUTPATIENT
Start: 2022-08-09 | End: 2023-11-22

## 2022-08-09 RX ORDER — INSULIN ASPART 100 [IU]/ML
1-10 INJECTION, SOLUTION INTRAVENOUS; SUBCUTANEOUS
Qty: 3 EACH | Refills: 0 | Status: SHIPPED | OUTPATIENT
Start: 2022-08-09 | End: 2022-08-09 | Stop reason: SDUPTHER

## 2022-08-09 RX ADMIN — ATORVASTATIN CALCIUM 40 MG: 40 TABLET, FILM COATED ORAL at 08:08

## 2022-08-09 RX ADMIN — PANTOPRAZOLE SODIUM 40 MG: 40 TABLET, DELAYED RELEASE ORAL at 08:08

## 2022-08-09 RX ADMIN — GABAPENTIN 300 MG: 100 CAPSULE ORAL at 08:08

## 2022-08-09 RX ADMIN — SENNOSIDES AND DOCUSATE SODIUM 1 TABLET: 50; 8.6 TABLET ORAL at 08:08

## 2022-08-09 RX ADMIN — MEROPENEM AND SODIUM CHLORIDE 1 G: 1 INJECTION, SOLUTION INTRAVENOUS at 12:08

## 2022-08-09 RX ADMIN — ASPIRIN 81 MG: 81 TABLET, COATED ORAL at 08:08

## 2022-08-09 RX ADMIN — MEROPENEM AND SODIUM CHLORIDE 1 G: 1 INJECTION, SOLUTION INTRAVENOUS at 08:08

## 2022-08-09 RX ADMIN — BUPROPION HYDROCHLORIDE 300 MG: 150 TABLET, FILM COATED, EXTENDED RELEASE ORAL at 08:08

## 2022-08-09 RX ADMIN — CLOPIDOGREL BISULFATE 75 MG: 75 TABLET, FILM COATED ORAL at 08:08

## 2022-08-09 RX ADMIN — LOSARTAN POTASSIUM 25 MG: 25 TABLET, FILM COATED ORAL at 08:08

## 2022-08-09 RX ADMIN — ALUMINUM HYDROXIDE, MAGNESIUM HYDROXIDE, AND SIMETHICONE 30 ML: 200; 200; 20 SUSPENSION ORAL at 08:08

## 2022-08-09 RX ADMIN — FINASTERIDE 5 MG: 5 TABLET, FILM COATED ORAL at 08:08

## 2022-08-09 NOTE — SUBJECTIVE & OBJECTIVE
Interval History: pending [facility]    Review of Systems   Constitutional:  Positive for fatigue. Negative for appetite change, chills and fever.   HENT:  Negative for congestion and sore throat.    Respiratory:  Negative for cough and shortness of breath.    Cardiovascular:  Negative for chest pain and palpitations.   Gastrointestinal:  Positive for constipation. Negative for abdominal pain, diarrhea, nausea and vomiting.   Genitourinary:  Positive for difficulty urinating (Chronic). Negative for hematuria.   Musculoskeletal:  Positive for arthralgias and myalgias.   Skin:  Positive for pallor. Negative for rash.   Neurological:  Positive for weakness (generalized). Negative for dizziness and light-headedness.   Psychiatric/Behavioral:  Negative for agitation. The patient is not nervous/anxious.    All other systems reviewed and are negative.  Objective:     Vital Signs (Most Recent):  Temp: 97.7 °F (36.5 °C) (08/09/22 0735)  Pulse: 72 (08/09/22 0735)  Resp: 17 (08/09/22 0735)  BP: 115/66 (08/09/22 0735)  SpO2: (!) 92 % (08/09/22 0735)   Vital Signs (24h Range):  Temp:  [97 °F (36.1 °C)-98.8 °F (37.1 °C)] 97.7 °F (36.5 °C)  Pulse:  [66-87] 72  Resp:  [16-20] 17  SpO2:  [92 %-97 %] 92 %  BP: ()/(59-73) 115/66     Weight: 99.8 kg (220 lb)  Body mass index is 26.09 kg/m².    Intake/Output Summary (Last 24 hours) at 8/9/2022 0824  Last data filed at 8/9/2022 0631  Gross per 24 hour   Intake 798.5 ml   Output 2200 ml   Net -1401.5 ml      Physical Exam  Constitutional:       General: He is not in acute distress.     Appearance: He is not ill-appearing or toxic-appearing.   Cardiovascular:      Rate and Rhythm: Normal rate and regular rhythm.      Heart sounds: Normal heart sounds.   Pulmonary:      Effort: Pulmonary effort is normal. No respiratory distress.      Breath sounds: Normal breath sounds.   Abdominal:      General: Bowel sounds are normal. There is no distension.      Palpations: Abdomen is soft.       Tenderness: There is abdominal tenderness (Mild suprapubic tenderness). There is no guarding.   Musculoskeletal:         General: Normal range of motion.      Right lower leg: No edema.      Left lower leg: No edema.   Neurological:      Mental Status: He is alert and oriented to person, place, and time. Mental status is at baseline.       Significant Labs: All pertinent labs within the past 24 hours have been reviewed.  CBC: No results for input(s): WBC, HGB, HCT, PLT in the last 48 hours.  CMP: No results for input(s): NA, K, CL, CO2, GLU, BUN, CREATININE, CALCIUM, PROT, ALBUMIN, BILITOT, ALKPHOS, AST, ALT, ANIONGAP, EGFRNONAA in the last 48 hours.    Invalid input(s): ESTGFAFRICA    Significant Imaging: I have reviewed all pertinent imaging results/findings within the past 24 hours.

## 2022-08-09 NOTE — PLAN OF CARE
[facility] - [place] - [address] [address]  [address]  [place] [address]  Phone: [phone] Fax: [phone]    [facility]  [address]  [place] [facility] [address]  Phone: [phone] Fax: [phone]    CM contacted pt's pharm ([facility]) to check coverage of insulin- per pharmacist, insulins are covered but have $150 copay for each. Suggested pt apply for assistance through [employer] and Medicare SLI program.   CM reached out to [facility]- able to get 1 month free of both insulins with emergency coupon, only charge will be for insuline pen needles ($20)    CM met with pt and son again. Discussed above with him. Provided both with instructions to how to apply for assistance under [employer] and medicare SLI program. Both verbalized understanding. CM also placed referral for [facility] assistance. Provided senior resource guide with [employer] and transportation services.     Also both verbalized understanding that insulins are at [facility] at [facility]

## 2022-08-09 NOTE — NURSING
Patient is cleared for discharge per CM. Patients son at bedside.. Discharge instructions given and reviewed with patient and patients son. Patient verbalized understanding. Tele [id number] removed and placed at [facility] station.  HL to right wrist removed with cath intact and gauze applied and secured with coban. No bleeding at site. Patient discharged to home with son in personal car.

## 2022-08-09 NOTE — PLAN OF CARE
The patient is alert and oriented X4. Skin warm, pink and dry. Heart sounds S1 S2. Lungs clear. Abdomen round and soft with active bowel sounds all quadrants. Gutierrez draining clear yellow urine. Pleasant and cooperative.

## 2022-08-09 NOTE — PLAN OF CARE
[facility]  accepted pt. Will admit pt onto their services Thurs.    Pt denied by several  companies: [facility], [facility] [place], and [employer]    Attempted to notify pt- no answer and unable to leave          08/09/22 1331   Post-Acute Status   Post-Acute Authorization Home Health   Home Health Status Set-up Complete/Auth obtained

## 2022-08-09 NOTE — PROGRESS NOTES
[facility] Hospital Medicine  Progress Note    Patient Name: Jhonny Almazan  MRN: [id number]  Patient Class: OP- Observation   Admission Date: 8/3/2022  Length of Stay: 0 days  Attending Physician: Neil Rosas MD  Primary Care Provider: Joey Whiteehad MD        Subjective:     Principal Problem:Complicated UTI (urinary tract infection)        HPI:  65 y/o male with hx of indwelling urinary catheter placement, PEG tube placement, stroke, HTN, and MDD presented to the ED with a 3-4 day history of worsening generalized weakness, fatigue and mild abdominal pain. Pt is wheelchair bound due to generalized weakness following a stroke in 11/2020. He has worsening weakness over the past few days with fatigue, a loss of appetite and mild abdominal pain. Pt has indwelling olguin catheter that pt reports was last changed around 2-3 weeks ago. Pt reports less urine output than normal. He has experienced some chills and cough but denies F/N/V/D, SOB, chest pain, edema, or flank pain. While in the ED, olguin was unable to be removed due to resistance and UA was positive for bacteria, leukocytes and WBC. Recent urine culture was positive for ESBL and MDRO. Pt will be admitted to the services of hospital medicine and urology consult in order to change indwelling olguin catheter.       Overview/Hospital Course:  Patient was admitted with complicated urinary tract infection secondary to chronic indwelling urine catheter.  Urinary catheter was unable to be changed in the ED and Urology was consulted who recommended admission.  Patient was placed on IV antibiotics.  Urology was consulted and Olguin catheter was exchanged.  Patient was noted to be constipated.  Abdominal x-ray was obtained and patient was given enema.  He continued to complain of abdominal pain. CT abdomen and pelvis was obtained and noted to be negative for acute process.  He was evaluated by PT and OT.  Physical therapy recommends skilled  nursing facility placement.  Case Management was consulted for discharge planning.      Interval History: pending SNF    Review of Systems   Constitutional:  Positive for fatigue. Negative for appetite change, chills and fever.   HENT:  Negative for congestion and sore throat.    Respiratory:  Negative for cough and shortness of breath.    Cardiovascular:  Negative for chest pain and palpitations.   Gastrointestinal:  Positive for constipation. Negative for abdominal pain, diarrhea, nausea and vomiting.   Genitourinary:  Positive for difficulty urinating (Chronic). Negative for hematuria.   Musculoskeletal:  Positive for arthralgias and myalgias.   Skin:  Positive for pallor. Negative for rash.   Neurological:  Positive for weakness (generalized). Negative for dizziness and light-headedness.   Psychiatric/Behavioral:  Negative for agitation. The patient is not nervous/anxious.    All other systems reviewed and are negative.  Objective:     Vital Signs (Most Recent):  Temp: 97.7 °F (36.5 °C) (08/09/22 0735)  Pulse: 72 (08/09/22 0735)  Resp: 17 (08/09/22 0735)  BP: 115/66 (08/09/22 0735)  SpO2: (!) 92 % (08/09/22 0735)   Vital Signs (24h Range):  Temp:  [97 °F (36.1 °C)-98.8 °F (37.1 °C)] 97.7 °F (36.5 °C)  Pulse:  [66-87] 72  Resp:  [16-20] 17  SpO2:  [92 %-97 %] 92 %  BP: ()/(59-73) 115/66     Weight: 99.8 kg (220 lb)  Body mass index is 26.09 kg/m².    Intake/Output Summary (Last 24 hours) at 8/9/2022 0824  Last data filed at 8/9/2022 0631  Gross per 24 hour   Intake 798.5 ml   Output 2200 ml   Net -1401.5 ml      Physical Exam  Constitutional:       General: He is not in acute distress.     Appearance: He is not ill-appearing or toxic-appearing.   Cardiovascular:      Rate and Rhythm: Normal rate and regular rhythm.      Heart sounds: Normal heart sounds.   Pulmonary:      Effort: Pulmonary effort is normal. No respiratory distress.      Breath sounds: Normal breath sounds.   Abdominal:      General: Bowel  sounds are normal. There is no distension.      Palpations: Abdomen is soft.      Tenderness: There is abdominal tenderness (Mild suprapubic tenderness). There is no guarding.   Musculoskeletal:         General: Normal range of motion.      Right lower leg: No edema.      Left lower leg: No edema.   Neurological:      Mental Status: He is alert and oriented to person, place, and time. Mental status is at baseline.       Significant Labs: All pertinent labs within the past 24 hours have been reviewed.  CBC: No results for input(s): WBC, HGB, HCT, PLT in the last 48 hours.  CMP: No results for input(s): NA, K, CL, CO2, GLU, BUN, CREATININE, CALCIUM, PROT, ALBUMIN, BILITOT, ALKPHOS, AST, ALT, ANIONGAP, EGFRNONAA in the last 48 hours.    Invalid input(s): ESTGFAFRICA    Significant Imaging: I have reviewed all pertinent imaging results/findings within the past 24 hours.      Assessment/Plan:      * Complicated UTI (urinary tract infection)  Patient with chronic indwelling Gutierrez catheter.  History of ESBL.  UA concerning for urinary tract infection.    Continue IV Merrem.  Monitor culture.  Microbiology Results (last 7 days)     Procedure Component Value Units Date/Time    Urine culture [[id number]]  (Abnormal)  (Susceptibility) Collected: 08/03/22 1334    Order Status: Completed Specimen: Urine Updated: 08/06/22 0048     Urine Culture, Routine KLEBSIELLA PNEUMONIAE ESBL  >100,000 cfu/ml      Narrative:      Specimen Source->Urine                Neuropathy due to type 2 diabetes mellitus  Resume chronic gabepentin    Debility  PT/OT eval and treat  Case management consultation for discharge planning      Constipation  Abdominal xray - negative for SBO  Fleets enema-stool x 2 recorded yesterday  Senna BID  Dulcolax today      Chronic indwelling Gutierrez catheter  Inability to be changed despite multiple attempts in ED.  Consultation to urology  Gutierrez catheter replaced per Urology  Monitor urine output  closely      Hypertension  Chronic, controlled.  Latest blood pressure and vitals reviewed-   Temp:  [97.4 °F (36.3 °C)-98.8 °F (37.1 °C)]   Pulse:  [55-65]   Resp:  [17-20]   BP: (113-131)/(61-69)   SpO2:  [93 %-99 %] .   Home meds for hypertension were reviewed and noted below.   Hypertension Medications             lisinopriL-hydrochlorothiazide (PRINZIDE,ZESTORETIC) 20-12.5 mg per tablet TAKE ONE TABLET BY MOUTH TWICE DAILY    losartan (COZAAR) 25 MG tablet 1 tablet DAILY (route: oral)          While in the hospital, will manage blood pressure as follows; Continue home antihypertensive regimen    Will utilize p.r.n. blood pressure medication only if patient's blood pressure greater than  180/110 and he develops symptoms such as worsening chest pain or shortness of breath.        Type 2 diabetes mellitus with hyperglycemia  Patient's FSGs are controlled on current medication regimen.  Last A1c reviewed-   Lab Results   Component Value Date    HGBA1C 8.8 (H) 09/30/2021     Most recent fingerstick glucose reviewed-   Recent Labs   Lab 08/06/22  1629 08/06/22  1949 08/07/22  0745 08/07/22  1143   POCTGLUCOSE 122* 161* 144* 155*     Current correctional scale  Medium  Maintain anti-hyperglycemic dose as follows-   Antihyperglycemics (From admission, onward)            Medium dose SSI  Levemir 10 units nightly        Hold Oral hypoglycemics while patient is in the hospital.      VTE Risk Mitigation (From admission, onward)         Ordered     enoxaparin injection 40 mg  Daily         08/03/22 1857     IP VTE LOW RISK PATIENT  Once         08/03/22 1857     Place sequential compression device  Until discontinued         08/03/22 1857                Discharge Planning   YUDITH: 8/8/2022     Code Status: Full Code   Is the patient medically ready for discharge?:     Reason for patient still in hospital (select all that apply): PT / OT recommendations and Pending disposition  Discharge Plan A: Home Health                   Lyn Anne, ALP  Department of Hospital Medicine   [facility]

## 2022-08-09 NOTE — PLAN OF CARE
Peer to peer completed by JAIRO Leo. SNF denial upheld. HH referrals sent- pt has no preference for company    Met with pt and son at bedside along with JAIRO Leo. Explained that insurance denied SNF even after NP discussing case with insurance MD. Explained that we have to discharge to home with home health due to being medically cleared for discharge. Explained that we cannot place from hospital to halfway because long term medicaid is needed. I explained again the steps needed for halfway placement at home (long term medicaid shikha & documents) and suggested for son to help pt complete ASAP. Explained that HH will be arranged along with outpt cm and [facility]. I stated they will need to discuss together steps going forward until halfway placement is achieved with either the son stepping up and helping pt or hiring sitters. Both verbalized understanding.        08/09/22 1027   Post-Acute Status   Post-Acute Authorization Placement   Post-Acute Placement Status Discharge Plan Changed   Home Health Status Referrals Sent

## 2022-08-09 NOTE — PLAN OF CARE
Pt clear for DC from case management standpoint. Discharging to  Home with HH    No accepted  company at this time. CM continuing to reach out to  for accepting company- pt eager to discharge and does not want to wait until HH is finalized. Will notify once  accepts       08/09/22 4089   Final Note   Assessment Type Final Discharge Note   Anticipated Discharge Disposition [facility] Resources/Appts/Education Provided Community resources provided;Appointments scheduled and added to AVS

## 2022-08-09 NOTE — PLAN OF CARE
Problem: Adult Inpatient Plan of Care  Goal: Plan of Care Review  Outcome: Met  Goal: Patient-Specific Goal (Individualized)  Outcome: Met  Goal: Absence of Hospital-Acquired Illness or Injury  Outcome: Met  Goal: Optimal Comfort and Wellbeing  Outcome: Met  Goal: Readiness for Transition of Care  Outcome: Met     Problem: Diabetes Comorbidity  Goal: Blood Glucose Level Within Targeted Range  Outcome: Met     Problem: Infection  Goal: Absence of Infection Signs and Symptoms  Outcome: Met     Problem: Skin Injury Risk Increased  Goal: Skin Health and Integrity  Outcome: Met     Problem: Fall Injury Risk  Goal: Absence of Fall and Fall-Related Injury  Outcome: Met

## 2022-08-12 ENCOUNTER — TELEPHONE (OUTPATIENT)
Dept: FAMILY MEDICINE | Facility: CLINIC | Age: 67
End: 2022-08-12
Payer: MEDICARE

## 2022-08-13 NOTE — DISCHARGE SUMMARY
[facility] Hospital Medicine  Discharge Summary      Patient Name: Jhonny Almazan  MRN: [id number]  Patient Class: OP- Observation  Admission Date: 8/3/2022  Hospital Length of Stay: 0 days  Discharge Date and Time: 8/9/2022  1:10 PM  Attending Physician: No att. providers found   Discharging Provider: EMILY Morales  Primary Care Provider: Joey Whitehead MD      HPI:   65 y/o male with hx of indwelling urinary catheter placement, PEG tube placement, stroke, HTN, and MDD presented to the ED with a 3-4 day history of worsening generalized weakness, fatigue and mild abdominal pain. Pt is wheelchair bound due to generalized weakness following a stroke in 11/2020. He has worsening weakness over the past few days with fatigue, a loss of appetite and mild abdominal pain. Pt has indwelling olguin catheter that pt reports was last changed around 2-3 weeks ago. Pt reports less urine output than normal. He has experienced some chills and cough but denies F/N/V/D, SOB, chest pain, edema, or flank pain. While in the ED, olguin was unable to be removed due to resistance and UA was positive for bacteria, leukocytes and WBC. Recent urine culture was positive for ESBL and MDRO. Pt will be admitted to the services of hospital medicine and urology consult in order to change indwelling olguin catheter.       * No surgery found *      Hospital Course:   Patient was admitted with complicated urinary tract infection secondary to chronic indwelling urine catheter.  Urinary catheter was unable to be changed in the ED and Urology was consulted who recommended admission.  Patient was placed on IV antibiotics.  Urology was consulted and Olguin catheter was exchanged.  Patient was noted to be constipated.  Abdominal x-ray was obtained and patient was given enema.  He continued to complain of abdominal pain. CT abdomen and pelvis was obtained and noted to be negative for acute process.  He was evaluated by PT and  OT.  Physical therapy recommends skilled nursing facility placement.  Case Management was consulted for discharge planning.  Pt was accepted to [facility]; however, SNF was denied by his insurance company.  A peer to peer was conducted in an effort to gain insurance approval, but SNF was still denied, the the insurance physician citing that patient was at his baseline and had no additional needs to qualify him for SNF. Discharge instructions were reviewed with the patient and his son.  Return precautions were discussed.  Home health was arranged.  His was discharged home in stable condition.       Goals of Care Treatment Preferences:  Code Status: Full Code      Consults:   Consults (From admission, onward)        Status Ordering Provider     IP consult to case management  Once        Provider:  (Not yet assigned)    Completed SHIRA BROCK     Inpatient consult to Urology  Once        Provider:  Jaylen Antonio Jr., MD    Completed BIANCA ANGULO          No new Assessment & Plan notes have been filed under this hospital service since the last note was generated.  Service: Hospital Medicine    Final Active Diagnoses:    Diagnosis Date Noted POA    PRINCIPAL PROBLEM:  Complicated UTI (urinary tract infection) [N39.0] 08/03/2022 Yes    Neuropathy due to type 2 diabetes mellitus [E11.40] 08/06/2022 Yes    Debility [R53.81] 08/05/2022 Yes    Constipation [K59.00] 08/04/2022 Yes    Chronic indwelling Gutierrez catheter [Z97.8] 09/10/2021 Not Applicable     Chronic    Hypertension [I10] 11/16/2020 Yes     Chronic    Type 2 diabetes mellitus with hyperglycemia [E11.65] 04/25/2019 Yes     Chronic      Problems Resolved During this Admission:       Discharged Condition: stable    Disposition: Home-Health Care [facility]    Follow Up:   Follow-up Information     Joey Whitehead MD Follow up on 8/8/2022.    Specialty: Family Medicine  Why: at 3:20PM [facility] hospital follow up. will see NP for this follow up visit  Contact  information:  [address]  [facility] [address]  [phone]             Jaylen Antonio Jr, MD Follow up on 8/25/2022.    Specialty: Urology  Why: at 10:15 AM  Contact information:  [address] [place] [address] [address]  [facility] [address]  [phone]             [facility] Follow up.    Specialty: Pharmacy  Why:  INSULINS FROM HERE. 1ST MONTH IS FREE. PLEASE APPLY FOR [facility] AND MEDICARE SLI PROGRAM ASAP TO ASSIST FOR NEXT MONTH  Contact information:  [address] [facility]  [address]  [facility] [id number] [phone]  Additional information:  [address]                     Patient Instructions:      Ambulatory referral/consult to [facility]   Standing Status: Future   Referral Priority: Routine Referral Type: Consultation   Referral Reason: Specialty Services Required   Number of Visits Requested: 1     Ambulatory referral/consult to Outpatient Case Management   Referral Priority: Routine Referral Type: Consultation   Referral Reason: Specialty Services Required   Number of Visits Requested: 1     Ambulatory referral/consult to Home Health   Standing Status: Future   Referral Priority: Routine Referral Type: Home Health   Referral Reason: Specialty Services Required   Requested Specialty: Home Health Services   Number of Visits Requested: 1     Ambulatory referral/consult to Pharmacy Assistance   Standing Status: Future   Referral Priority: Routine Referral Type: Consultation   Referral Reason: Specialty Services Required   Number of Visits Requested: 1     Diet diabetic     Diet diabetic     Notify your health care provider if you experience any of the following:  temperature >100.4     Notify your health care provider if you experience any of the following:  persistent nausea and vomiting or diarrhea     Notify your health care provider if you experience any of the following:  increased confusion or weakness     SUBSEQUENT HOME HEALTH ORDERS   Order Comments: Subsequent Home  Health Orders    Current Medications:  Current Facility-Administered Medications:  acetaminophen tablet 650 mg, 650 mg, Oral, Q8H PRN, Rosalind Brenner NP  aluminum-magnesium hydroxide-simethicone 200-200-20 mg/5 mL suspension 30 mL, 30 mL, Oral, QID PRN, Rosalind Brenner NP  aspirin EC tablet 81 mg, 81 mg, Oral, Daily, Rosalind Brenner NP  atorvastatin tablet 40 mg, 40 mg, Oral, Daily, Rosalind Brenner NP  buPROPion TB24 tablet 300 mg, 300 mg, Oral, Daily, Rosalind Brenner NP  clopidogreL tablet 75 mg, 75 mg, Per G Tube, Daily, Rosalind Brenner NP  dextrose 10% bolus 125 mL, 12.5 g, Intravenous, PRN, Rosalind Brenner NP  dextrose 10% bolus 250 mL, 25 g, Intravenous, PRN, Rosalind Brenner NP  enoxaparin injection 40 mg, 40 mg, Subcutaneous, Daily, Rosalind Brenner NP, 40 mg at 08/03/22 2214  finasteride tablet 5 mg, 5 mg, Oral, Daily, Rosalind Brenner NP  glucagon (human recombinant) injection 1 mg, 1 mg, Intramuscular, PRN, Rosalind Brenner NP  glucose chewable tablet 16 g, 16 g, Oral, PRN, Rosalind S. Jordin, NP  glucose chewable tablet 24 g, 24 g, Oral, PRN, Rosalind Brenner NP  insulin aspart U-100 pen 1-10 Units, 1-10 Units, Subcutaneous, QID (AC + HS) PRN, Rosalind Brenner NP  insulin detemir U-100 pen 10 Units, 10 Units, Subcutaneous, QHS, Rosalind Brenner NP, 10 Units at 08/03/22 2214  losartan tablet 25 mg, 25 mg, Oral, Daily, Rosalind Brenner NP  magnesium oxide tablet 800 mg, 800 mg, Oral, PRN, Rosalind Brenner NP  magnesium oxide tablet 800 mg, 800 mg, Oral, PRN, Rosalind Brenner NP  melatonin tablet 9 mg, 9 mg, Oral, Nightly PRN, Rosalind Brenner NP  meropenem-0.9% sodium chloride 1 g/50 mL IVPB, 1 g, Intravenous, Q8H, Rosalind Brenner NP, Stopped at 08/04/22 0038  naloxone 0.4 mg/mL injection 0.02 mg, 0.02 mg, Intravenous, PRN, Rosalind Brenner NP  ondansetron injection 8 mg, 8 mg, Intravenous, Q8H PRN, Rosalind Brenner NP  pantoprazole EC tablet 40 mg, 40 mg,  Oral, Daily, Rosalind Brenner NP  potassium bicarbonate disintegrating tablet 35 mEq, 35 mEq, Oral, PRN, Rosalind Brenner NP  potassium bicarbonate disintegrating tablet 50 mEq, 50 mEq, Oral, PRN, Rosalind Brenner NP  potassium bicarbonate disintegrating tablet 60 mEq, 60 mEq, Oral, PRN, Rosalind Brenner NP  potassium, sodium phosphates 280-160-250 mg packet 2 packet, 2 packet, Oral, PRN, Rosalind Brenner NP  potassium, sodium phosphates 280-160-250 mg packet 2 packet, 2 packet, Oral, PRN, Rosalind Brenner NP  potassium, sodium phosphates 280-160-250 mg packet 2 packet, 2 packet, Oral, PRN, Rosalind Brenner NP  senna-docusate 8.6-50 mg per tablet 1 tablet, 1 tablet, Oral, BID PRN, Rosalind Brenner NP  sodium chloride 0.9% flush 5 mL, 5 mL, Intravenous, PRN, Rosalind Brenner NP        Nursing:   Gutierrez Care: Instruct patient/caregiver to empty Gutierrez bag.  Change Gutierrez every month.  Diabetic Care:   SN to perform and educate Diabetic management with blood glucose monitoring:    Diet:   diabetic diet 1800 calorie    Activities:   activity as tolerated    Labs:  none    Referrals/ Consults  Physical Therapy to evaluate and treat. Evaluate for home safety and equipment needs; Establish/upgrade home exercise program. Perform / instruct on therapeutic exercises, gait training, transfer training, and Range of Motion.  Occupational Therapy to evaluate and treat. Evaluate home environment for safety and equipment needs. Perform/Instruct on transfers, ADL training, ROM, and therapeutic exercises.    Home Health Aide:  Nursing Three times weekly, Physical Therapy Three times weekly, and Occupational Therapy Three times weekly     Order Specific Question Answer Comments   What Home Health Agency is the patient currently using? Other/External      Notify your health care provider if you experience any of the following:  temperature >100.4     Notify your health care provider if you experience any of the  following:  persistent nausea and vomiting or diarrhea     Notify your health care provider if you experience any of the following:  severe uncontrolled pain     Notify your health care provider if you experience any of the following:  difficulty breathing or increased cough     Notify your health care provider if you experience any of the following:  severe persistent headache     Notify your health care provider if you experience any of the following:  persistent dizziness, light-headedness, or visual disturbances     Notify your health care provider if you experience any of the following:  increased confusion or weakness     Activity as tolerated       Significant Diagnostic Studies: Labs: All labs within the past 24 hours have been reviewed    Pending Diagnostic Studies:     None         Medications:  Reconciled Home Medications:      Medication List      START taking these medications    gabapentin 300 MG capsule  Commonly known as: NEURONTIN  Take 1 capsule (300 mg total) by mouth 2 (two) times daily.     insulin aspart U-100 100 unit/mL (3 mL) Inpn pen  Commonly known as: NovoLOG  Inject 1-10 Units into the skin as needed (high blood sugar). Inject per sliding scale.     insulin detemir U-100 100 unit/mL (3 mL) Inpn pen  Commonly known as: Levemir FLEXTOUCH  Inject 10 Units into the skin every evening.     senna-docusate 8.6-50 mg 8.6-50 mg per tablet  Commonly known as: PERICOLACE  Take 1 tablet by mouth 2 (two) times daily.        CONTINUE taking these medications    aspirin 81 MG EC tablet  Commonly known as: ECOTRIN  Take 1 tablet (81 mg total) by mouth once daily.     atorvastatin 40 MG tablet  Commonly known as: LIPITOR  Take 1 tablet (40 mg total) by mouth once daily.     blood sugar diagnostic Strp  To check BG 2 times daily, to use with insurance preferred meter. One touch Ultra.     buPROPion 300 MG 24 hr tablet  Commonly known as: WELLBUTRIN XL  Take 1 tablet (300 mg total) by mouth once daily.    "  clopidogreL 75 mg tablet  Commonly known as: PLAVIX  1 tablet (75 mg total) by Per G Tube route once daily.     finasteride 5 mg tablet  Commonly known as: PROSCAR  Take 1 tablet (5 mg total) by mouth once daily. Take 1 tablet by mouth in am to keep prostate from enlarging     lancets Misc  To check BG 2 times daily, to use with insurance preferred meter.One Touch Ultra     losartan 25 MG tablet  Commonly known as: COZAAR  1 tablet DAILY (route: oral)     meclizine 25 mg tablet  Commonly known as: ANTIVERT  Take 1 tablet (25 mg total) by mouth 3 (three) times daily as needed for Dizziness.     melatonin 3 mg tablet  Commonly known as: MELATIN  Take 2 tablets (6 mg total) by mouth nightly as needed for Insomnia.     omeprazole 40 MG capsule  Commonly known as: PRILOSEC  TAKE ONE CAPSULE (40 MG) BY MOUTH EVERY DAY     pen needle, diabetic 32 gauge x 5/32" Ndle  Use AC meals 2 a day     QUEtiapine 25 MG Tab  Commonly known as: SEROQUEL  Take 1 tablet (25 mg total) by mouth every evening.     tetrahydrozoline 0.05% 0.05 % Drop  Commonly known as: Vision Clear  Place 2 drops into both eyes 4 (four) times daily as needed (eye irritation).        STOP taking these medications    insulin NPH-insulin regular (70/30) 100 unit/mL (70-30) injection     lisinopriL-hydrochlorothiazide 20-12.5 mg per tablet  Commonly known as: PRINZIDE,ZESTORETIC     pioglitazone 30 MG tablet  Commonly known as: ACTOS            Indwelling Lines/Drains at time of discharge:   Lines/Drains/Airways     Drain  Duration                Urethral Catheter 08/04/22 1228 Latex 16 Fr. 9 days                Time spent on the discharge of patient: 42 minutes         EMILY Morales  Department of Hospital Medicine  [facility]  "

## 2022-08-13 NOTE — TELEPHONE ENCOUNTER
----- Message from Omar Fitzgerald Patient Care Assistant sent at 8/12/2022  4:21 PM CDT -----  Contact: Isis [facility]/Stephanie  Type: Needs Medical Advice    Who Called: Isaiah [facility]/Stephanie  Best Call Back Number: [phone]  Inquiry/Question: Nurse is calling due to they are admitting the patient to home health but the patient has fallen 3 times while being in the home. Pt is refusing to go to another facility but the nurse recommends the patient goes to SNF or rehab until stable to be at home. Please call back and advise. Thank you~

## 2022-08-15 ENCOUNTER — TELEPHONE (OUTPATIENT)
Dept: MEDSURG UNIT | Facility: HOSPITAL | Age: 67
End: 2022-08-15
Payer: MEDICARE

## 2022-08-24 ENCOUNTER — DOCUMENT SCAN (OUTPATIENT)
Dept: HOME HEALTH SERVICES | Facility: HOSPITAL | Age: 67
End: 2022-08-24
Payer: MEDICARE

## 2022-08-24 ENCOUNTER — EXTERNAL HOME HEALTH (OUTPATIENT)
Dept: HOME HEALTH SERVICES | Facility: HOSPITAL | Age: 67
End: 2022-08-24
Payer: MEDICARE

## 2022-09-02 ENCOUNTER — HOSPITAL ENCOUNTER (INPATIENT)
Facility: HOSPITAL | Age: 67
LOS: 6 days | Discharge: NURSING, REHAB OR LONG-TERM CARE | DRG: 699 | End: 2022-09-08
Attending: EMERGENCY MEDICINE | Admitting: STUDENT IN AN ORGANIZED HEALTH CARE EDUCATION/TRAINING PROGRAM
Payer: MEDICARE

## 2022-09-02 DIAGNOSIS — R33.9 URINARY RETENTION: ICD-10-CM

## 2022-09-02 DIAGNOSIS — A49.9 ESBL (EXTENDED SPECTRUM BETA-LACTAMASE) PRODUCING BACTERIA INFECTION: ICD-10-CM

## 2022-09-02 DIAGNOSIS — N39.0 URINARY TRACT INFECTION ASSOCIATED WITH INDWELLING URETHRAL CATHETER, INITIAL ENCOUNTER: Primary | ICD-10-CM

## 2022-09-02 DIAGNOSIS — N39.0 COMPLICATED UTI (URINARY TRACT INFECTION): ICD-10-CM

## 2022-09-02 DIAGNOSIS — T83.511A URINARY TRACT INFECTION ASSOCIATED WITH INDWELLING URETHRAL CATHETER, INITIAL ENCOUNTER: Primary | ICD-10-CM

## 2022-09-02 DIAGNOSIS — T83.9XXA PROBLEM WITH FOLEY CATHETER, INITIAL ENCOUNTER: ICD-10-CM

## 2022-09-02 DIAGNOSIS — E08.65 DIABETES MELLITUS DUE TO UNDERLYING CONDITION WITH HYPERGLYCEMIA: ICD-10-CM

## 2022-09-02 DIAGNOSIS — Z16.12 ESBL (EXTENDED SPECTRUM BETA-LACTAMASE) PRODUCING BACTERIA INFECTION: ICD-10-CM

## 2022-09-02 PROBLEM — Z97.8 CHRONIC INDWELLING FOLEY CATHETER: Status: ACTIVE | Noted: 2021-09-10

## 2022-09-02 LAB
ANION GAP SERPL CALC-SCNC: 6 MMOL/L (ref 8–16)
BACTERIA #/AREA URNS HPF: ABNORMAL /HPF
BASOPHILS # BLD AUTO: 0.04 K/UL (ref 0–0.2)
BASOPHILS NFR BLD: 0.5 % (ref 0–1.9)
BILIRUB UR QL STRIP: ABNORMAL
BUN SERPL-MCNC: 12 MG/DL (ref 8–23)
CALCIUM SERPL-MCNC: 9.7 MG/DL (ref 8.7–10.5)
CHLORIDE SERPL-SCNC: 105 MMOL/L (ref 95–110)
CLARITY UR: ABNORMAL
CO2 SERPL-SCNC: 27 MMOL/L (ref 23–29)
COLOR UR: YELLOW
CREAT SERPL-MCNC: 0.7 MG/DL (ref 0.5–1.4)
DIFFERENTIAL METHOD: ABNORMAL
EOSINOPHIL # BLD AUTO: 0.1 K/UL (ref 0–0.5)
EOSINOPHIL NFR BLD: 1.7 % (ref 0–8)
ERYTHROCYTE [DISTWIDTH] IN BLOOD BY AUTOMATED COUNT: 14.4 % (ref 11.5–14.5)
EST. GFR  (NO RACE VARIABLE): >60 ML/MIN/1.73 M^2
ESTIMATED AVG GLUCOSE: 120 MG/DL (ref 68–131)
GLUCOSE SERPL-MCNC: 152 MG/DL (ref 70–110)
GLUCOSE UR QL STRIP: NEGATIVE
HBA1C MFR BLD: 5.8 % (ref 4–5.6)
HCT VFR BLD AUTO: 35.2 % (ref 40–54)
HGB BLD-MCNC: 12.1 G/DL (ref 14–18)
HGB UR QL STRIP: ABNORMAL
HYALINE CASTS #/AREA URNS LPF: 0 /LPF
IMM GRANULOCYTES # BLD AUTO: 0.03 K/UL (ref 0–0.04)
IMM GRANULOCYTES NFR BLD AUTO: 0.4 % (ref 0–0.5)
KETONES UR QL STRIP: ABNORMAL
LEUKOCYTE ESTERASE UR QL STRIP: ABNORMAL
LYMPHOCYTES # BLD AUTO: 1.4 K/UL (ref 1–4.8)
LYMPHOCYTES NFR BLD: 16.8 % (ref 18–48)
MCH RBC QN AUTO: 28.1 PG (ref 27–31)
MCHC RBC AUTO-ENTMCNC: 34.4 G/DL (ref 32–36)
MCV RBC AUTO: 82 FL (ref 82–98)
MICROSCOPIC COMMENT: ABNORMAL
MONOCYTES # BLD AUTO: 0.7 K/UL (ref 0.3–1)
MONOCYTES NFR BLD: 8.3 % (ref 4–15)
NEUTROPHILS # BLD AUTO: 5.9 K/UL (ref 1.8–7.7)
NEUTROPHILS NFR BLD: 72.3 % (ref 38–73)
NITRITE UR QL STRIP: POSITIVE
NRBC BLD-RTO: 0 /100 WBC
PH UR STRIP: 6 [PH] (ref 5–8)
PLATELET # BLD AUTO: 182 K/UL (ref 150–450)
PMV BLD AUTO: 9.5 FL (ref 9.2–12.9)
POCT GLUCOSE: 119 MG/DL (ref 70–110)
POCT GLUCOSE: 119 MG/DL (ref 70–110)
POCT GLUCOSE: 144 MG/DL (ref 70–110)
POTASSIUM SERPL-SCNC: 3.2 MMOL/L (ref 3.5–5.1)
PROT UR QL STRIP: ABNORMAL
RBC # BLD AUTO: 4.31 M/UL (ref 4.6–6.2)
RBC #/AREA URNS HPF: 15 /HPF (ref 0–4)
SARS-COV-2 RDRP RESP QL NAA+PROBE: NEGATIVE
SODIUM SERPL-SCNC: 138 MMOL/L (ref 136–145)
SP GR UR STRIP: 1.02 (ref 1–1.03)
URN SPEC COLLECT METH UR: ABNORMAL
UROBILINOGEN UR STRIP-ACNC: ABNORMAL EU/DL
WBC # BLD AUTO: 8.11 K/UL (ref 3.9–12.7)
WBC #/AREA URNS HPF: 80 /HPF (ref 0–5)

## 2022-09-02 PROCEDURE — 80048 BASIC METABOLIC PNL TOTAL CA: CPT | Performed by: EMERGENCY MEDICINE

## 2022-09-02 PROCEDURE — 83036 HEMOGLOBIN GLYCOSYLATED A1C: CPT | Performed by: NURSE PRACTITIONER

## 2022-09-02 PROCEDURE — 36415 COLL VENOUS BLD VENIPUNCTURE: CPT | Performed by: NURSE PRACTITIONER

## 2022-09-02 PROCEDURE — 63600175 PHARM REV CODE 636 W HCPCS: Performed by: NURSE PRACTITIONER

## 2022-09-02 PROCEDURE — 25000003 PHARM REV CODE 250: Performed by: NURSE PRACTITIONER

## 2022-09-02 PROCEDURE — 25000003 PHARM REV CODE 250: Performed by: EMERGENCY MEDICINE

## 2022-09-02 PROCEDURE — 81000 URINALYSIS NONAUTO W/SCOPE: CPT | Performed by: EMERGENCY MEDICINE

## 2022-09-02 PROCEDURE — 85025 COMPLETE CBC W/AUTO DIFF WBC: CPT | Performed by: EMERGENCY MEDICINE

## 2022-09-02 PROCEDURE — 87088 URINE BACTERIA CULTURE: CPT | Performed by: EMERGENCY MEDICINE

## 2022-09-02 PROCEDURE — 51798 US URINE CAPACITY MEASURE: CPT

## 2022-09-02 PROCEDURE — 36415 COLL VENOUS BLD VENIPUNCTURE: CPT | Performed by: EMERGENCY MEDICINE

## 2022-09-02 PROCEDURE — 11000001 HC ACUTE MED/SURG PRIVATE ROOM

## 2022-09-02 PROCEDURE — 87086 URINE CULTURE/COLONY COUNT: CPT | Performed by: EMERGENCY MEDICINE

## 2022-09-02 PROCEDURE — 87186 SC STD MICRODIL/AGAR DIL: CPT | Performed by: EMERGENCY MEDICINE

## 2022-09-02 PROCEDURE — 99285 EMERGENCY DEPT VISIT HI MDM: CPT | Mod: 25

## 2022-09-02 PROCEDURE — 51702 INSERT TEMP BLADDER CATH: CPT

## 2022-09-02 PROCEDURE — U0002 COVID-19 LAB TEST NON-CDC: HCPCS | Performed by: EMERGENCY MEDICINE

## 2022-09-02 PROCEDURE — 87077 CULTURE AEROBIC IDENTIFY: CPT | Mod: 59 | Performed by: EMERGENCY MEDICINE

## 2022-09-02 RX ORDER — FINASTERIDE 5 MG/1
5 TABLET, FILM COATED ORAL DAILY
Status: DISCONTINUED | OUTPATIENT
Start: 2022-09-02 | End: 2022-09-02

## 2022-09-02 RX ORDER — FINASTERIDE 5 MG/1
5 TABLET, FILM COATED ORAL DAILY
Status: DISCONTINUED | OUTPATIENT
Start: 2022-09-02 | End: 2022-09-08 | Stop reason: HOSPADM

## 2022-09-02 RX ORDER — GLUCAGON 1 MG
1 KIT INJECTION
Status: DISCONTINUED | OUTPATIENT
Start: 2022-09-02 | End: 2022-09-08 | Stop reason: HOSPADM

## 2022-09-02 RX ORDER — SODIUM CHLORIDE 0.9 % (FLUSH) 0.9 %
10 SYRINGE (ML) INJECTION
Status: DISCONTINUED | OUTPATIENT
Start: 2022-09-02 | End: 2022-09-08 | Stop reason: HOSPADM

## 2022-09-02 RX ORDER — ATORVASTATIN CALCIUM 40 MG/1
40 TABLET, FILM COATED ORAL DAILY
Status: DISCONTINUED | OUTPATIENT
Start: 2022-09-02 | End: 2022-09-08 | Stop reason: HOSPADM

## 2022-09-02 RX ORDER — MEROPENEM AND SODIUM CHLORIDE 1 G/50ML
1 INJECTION, SOLUTION INTRAVENOUS
Status: DISCONTINUED | OUTPATIENT
Start: 2022-09-02 | End: 2022-09-05

## 2022-09-02 RX ORDER — GABAPENTIN 300 MG/1
300 CAPSULE ORAL 2 TIMES DAILY
Status: DISCONTINUED | OUTPATIENT
Start: 2022-09-02 | End: 2022-09-08 | Stop reason: HOSPADM

## 2022-09-02 RX ORDER — SENNOSIDES 8.6 MG/1
8.6 TABLET ORAL DAILY PRN
Status: DISCONTINUED | OUTPATIENT
Start: 2022-09-02 | End: 2022-09-08 | Stop reason: HOSPADM

## 2022-09-02 RX ORDER — INSULIN ASPART 100 [IU]/ML
0-5 INJECTION, SOLUTION INTRAVENOUS; SUBCUTANEOUS EVERY 6 HOURS PRN
Status: DISCONTINUED | OUTPATIENT
Start: 2022-09-02 | End: 2022-09-08 | Stop reason: HOSPADM

## 2022-09-02 RX ORDER — MUPIROCIN 20 MG/G
OINTMENT TOPICAL 2 TIMES DAILY
Status: COMPLETED | OUTPATIENT
Start: 2022-09-02 | End: 2022-09-07

## 2022-09-02 RX ORDER — CLOPIDOGREL BISULFATE 75 MG/1
75 TABLET ORAL DAILY
Status: DISCONTINUED | OUTPATIENT
Start: 2022-09-02 | End: 2022-09-08 | Stop reason: HOSPADM

## 2022-09-02 RX ORDER — BUPROPION HYDROCHLORIDE 150 MG/1
300 TABLET ORAL DAILY
Status: DISCONTINUED | OUTPATIENT
Start: 2022-09-02 | End: 2022-09-08 | Stop reason: HOSPADM

## 2022-09-02 RX ORDER — POTASSIUM CHLORIDE 20 MEQ/1
40 TABLET, EXTENDED RELEASE ORAL
Status: COMPLETED | OUTPATIENT
Start: 2022-09-02 | End: 2022-09-02

## 2022-09-02 RX ADMIN — ATORVASTATIN CALCIUM 40 MG: 40 TABLET, FILM COATED ORAL at 03:09

## 2022-09-02 RX ADMIN — MEROPENEM AND SODIUM CHLORIDE 1 G: 1 INJECTION, SOLUTION INTRAVENOUS at 03:09

## 2022-09-02 RX ADMIN — Medication 1 ENEMA: at 06:09

## 2022-09-02 RX ADMIN — MUPIROCIN: 20 OINTMENT TOPICAL at 08:09

## 2022-09-02 RX ADMIN — INSULIN DETEMIR 10 UNITS: 100 INJECTION, SOLUTION SUBCUTANEOUS at 08:09

## 2022-09-02 RX ADMIN — FINASTERIDE 5 MG: 5 TABLET, FILM COATED ORAL at 08:09

## 2022-09-02 RX ADMIN — GABAPENTIN 300 MG: 300 CAPSULE ORAL at 08:09

## 2022-09-02 RX ADMIN — BUPROPION HYDROCHLORIDE 300 MG: 150 TABLET, FILM COATED, EXTENDED RELEASE ORAL at 05:09

## 2022-09-02 RX ADMIN — MEROPENEM AND SODIUM CHLORIDE 1 G: 1 INJECTION, SOLUTION INTRAVENOUS at 11:09

## 2022-09-02 RX ADMIN — POTASSIUM CHLORIDE 40 MEQ: 1500 TABLET, EXTENDED RELEASE ORAL at 11:09

## 2022-09-02 RX ADMIN — CLOPIDOGREL BISULFATE 75 MG: 75 TABLET, FILM COATED ORAL at 03:09

## 2022-09-02 NOTE — HPI
"Jhonny Almazan is a 66 year old  male who presented to the ED with CC of having problems with his olguin not draining properly. He reported he has olguin for around 2 years because he has difficulty urinating. He also reported that he has not had a BM for 1-1/2 weeks. ED MD changed out olguin and cath is draining well now. Pt denies fever, chills, sweats, sediment in cath/urine, blood in urine. Denies dec fluid intake. Denies any other problems. Has PMH DM, HTN, major depression, cva. Denies any problems with his chronic illnesses at this time. Denies dizziness, thirst, increasing weakness. Reported his mobility is "not very good:. Lives with son in a house and doesn't get cared for very well and doesn't eat much or follow a diabetic diet. Recently got "Meals on Wheels" arranged. Doesn't know current BG or BP. No longer has g-tube. Doesn't know specifics about medications, takes insulin twice a day.     UA in ED showed Nitrite Pos, Leuk 3+,Bld 2+. Recently admitted with MDRO- urine cs (8/3/) showed Kleibsiella >100,000 only sensitive to ertapenemem and meropenem. Will give Meropenem 1gm q 8hrs. K+ 3.2 on adm give po K+ in ED. Will recheck in am.  Will give Fleets enema and begin senokot. Explore placement.  "

## 2022-09-02 NOTE — CONSULTS
Patient admitted with non blanchable redness to the sacral area and a skin tag to the left inner thigh. Plan of care for Triad to the sacrum daily and leave open to air. Notify wound care nurse and MD if worsening.

## 2022-09-02 NOTE — ED PROVIDER NOTES
Encounter Date: 9/2/2022    SCRIBE #1 NOTE: IYassine am scribing for, and in the presence of,  Roger Stroud MD.     History     Chief Complaint   Patient presents with    Gutierrez Problems     Gutierrez not draining    Urinary Tract Infection     Time seen by provider: 9:50 AM on 09/02/2022    Jhonny Almazan is a 66 y.o. male with a PMHx of urinary retention, HTN, DM II and PEG who presents to the ED for evaluation of his Gutierrez catheter secondary to it not draining properly since this morning.  Per medical records, the patient was seen last month in the ED for similar Sx, which was resolved prior to discharge due to catheter replacement. He states only the tube was recently changed this week.  Patient expresses decreased urine output this morning which is abnormal for him since he emptied his bag last night.  He confirms constipation, but denies fever, general malaise or any other symptoms at this time. He is requesting an enema on exam due to abdominal bloating.  PSHx includes cystoscopy.      The history is provided by the patient and medical records.   Review of patient's allergies indicates:  No Known Allergies  Past Medical History:   Diagnosis Date    Anticoagulant long-term use     Arthritis     Colon polyp     Diabetes mellitus     Diabetes mellitus, type 2     Fatty liver     Hypertension     Major depressive disorder 11/17/2020    -Continue Wellbutrin    PEG (percutaneous endoscopic gastrostomy) adjustment/replacement/removal 1/6/2021    Stroke 11/2020    left sided weakness    Urinary retention 9/7/2021     Past Surgical History:   Procedure Laterality Date    BACK SURGERY      COLONOSCOPY  07/18/2014    Dr. Crane: 22 colon polyps removed, hemorrhoids, erythema to sigmoid, repeat in 1 year for surveillance; biopsy: sigmoid erythema- showed unremarkable colonic mucosa, tubular adenomas and hyperplastic polyps    COLONOSCOPY N/A 5/3/2019    Procedure: COLONOSCOPY;  Surgeon: Guero Crane,  MD;  Location: [facility];  Service: Endoscopy;  Laterality: N/A;    COLONOSCOPY N/A 5/6/2019    Procedure: COLONOSCOPY;  Surgeon: Guero Crane MD;  Location: [facility] ENDO;  Service: Endoscopy;  Laterality: N/A;    CYSTOSCOPY N/A 2/9/2022    Procedure: CYSTOSCOPY;  Surgeon: Jaylen Antonio Jr., MD;  Location: [facility] OR;  Service: Urology;  Laterality: N/A;    EPIDURAL STEROID INJECTION INTO THORACIC SPINE N/A 8/30/2019    Procedure: Injection-steroid-epidural-thoracic;  Surgeon: rFantz Green MD;  Location: [facility] OR;  Service: Pain Management;  Laterality: N/A;  T6-7    ESOPHAGOGASTRODUODENOSCOPY N/A 4/26/2019    Procedure: EGD (ESOPHAGOGASTRODUODENOSCOPY);  Surgeon: Guero Crane MD;  Location: [facility];  Service: Endoscopy;  Laterality: N/A;    ESOPHAGOGASTRODUODENOSCOPY N/A 11/23/2020    Procedure: EGD (ESOPHAGOGASTRODUODENOSCOPY);  Surgeon: Guero Crane MD;  Location: [facility];  Service: Endoscopy;  Laterality: N/A;    ESOPHAGOGASTRODUODENOSCOPY N/A 1/6/2021    Procedure: EGD (ESOPHAGOGASTRODUODENOSCOPY);  Surgeon: Guero Crane MD;  Location: [facility];  Service: Endoscopy;  Laterality: N/A;    HERNIA REPAIR      TRANSRECTAL ULTRASOUND EXAMINATION N/A 2/9/2022    Procedure: ULTRASOUND, RECTAL APPROACH;  Surgeon: Jaylen Antonio Jr., MD;  Location: [facility] OR;  Service: Urology;  Laterality: N/A;  must text son miroslava, times and instructions given leave at 130     Family History   Problem Relation Age of Onset    Heart disease Mother     Heart disease Father     Diabetes Brother     Suicide Brother     Brain cancer Brother     Colon cancer Neg Hx     Crohn's disease Neg Hx     Ulcerative colitis Neg Hx     Stomach cancer Neg Hx     Esophageal cancer Neg Hx     Glaucoma Neg Hx     Retinal detachment Neg Hx     Macular degeneration Neg Hx      Social History     Tobacco Use    Smoking status: Former     Packs/day: 1.00     Years: 50.00     Pack years: 50.00     Types: Cigarettes    Smokeless tobacco: Never    Substance Use Topics    Alcohol use: Yes     Alcohol/week: 14.0 standard drinks     Types: 14 Cans of beer per week     Comment: 2-3 beers daily    Drug use: No     Review of Systems   Constitutional:  Negative for activity change, diaphoresis and fever.   HENT:  Negative for drooling, rhinorrhea, sore throat and trouble swallowing.    Eyes:  Negative for pain and visual disturbance.   Respiratory:  Negative for cough, shortness of breath and stridor.    Cardiovascular:  Negative for chest pain and leg swelling.   Gastrointestinal:  Positive for abdominal distention (bloating) and constipation. Negative for abdominal pain and vomiting.   Genitourinary:  Positive for difficulty urinating. Negative for decreased urine volume, dysuria, hematuria and penile discharge.   Musculoskeletal:  Negative for gait problem.   Skin:  Negative for rash.   Neurological:  Negative for seizures, facial asymmetry and headaches.   Psychiatric/Behavioral:  Negative for hallucinations and suicidal ideas.      Physical Exam     Initial Vitals [09/02/22 0904]   BP Pulse Resp Temp SpO2   133/84 82 20 97.8 °F (36.6 °C) 96 %      MAP       --         Physical Exam    Nursing note and vitals reviewed.  Constitutional: He appears well-developed. No distress.   HENT:   Head: Normocephalic and atraumatic.   Nose: Nose normal.   Eyes: EOM are normal.   Neck: Neck supple. No tracheal deviation present. No JVD present.   Cardiovascular:  Normal rate, regular rhythm, normal heart sounds and intact distal pulses.     Exam reveals no gallop and no friction rub.       No murmur heard.  Pulmonary/Chest: Breath sounds normal. No respiratory distress. He has no wheezes. He has no rhonchi. He has no rales.   Abdominal: Abdomen is soft. Bowel sounds are normal. He exhibits no distension. There is abdominal tenderness in the suprapubic area.   Mild to abdominal tenderness.  There is no rebound and no guarding.   Genitourinary:    Genitourinary Comments:  Gutierrez catheter in place.     Musculoskeletal:         General: Normal range of motion.      Cervical back: Neck supple.     Neurological: He is alert and oriented to person, place, and time. He has normal strength. No cranial nerve deficit or sensory deficit.   Cranial nerves II-XII grossly intact. Finger-to-nose normal. Tone normal. Sensation intact to light touch. No drift. No disdiadochokinesia. 5/5 BUE and BLE strength. Normal gait. Negative Romberg. Speech and cognition is normal. No focal neurologic deficit.    Skin: Skin is warm and dry. Capillary refill takes less than 2 seconds. No rash noted.   Psychiatric: He has a normal mood and affect.       ED Course   Procedures  Labs Reviewed   CBC W/ AUTO DIFFERENTIAL - Abnormal; Notable for the following components:       Result Value    RBC 4.31 (*)     Hemoglobin 12.1 (*)     Hematocrit 35.2 (*)     Lymph % 16.8 (*)     All other components within normal limits   BASIC METABOLIC PANEL - Abnormal; Notable for the following components:    Potassium 3.2 (*)     Glucose 152 (*)     Anion Gap 6 (*)     All other components within normal limits   URINALYSIS, REFLEX TO URINE CULTURE - Abnormal; Notable for the following components:    Appearance, UA Cloudy (*)     Protein, UA 1+ (*)     Ketones, UA Trace (*)     Bilirubin (UA) 1+ (*)     Occult Blood UA 2+ (*)     Nitrite, UA Positive (*)     Urobilinogen, UA 4.0-6.0 (*)     Leukocytes, UA 3+ (*)     All other components within normal limits    Narrative:     Specimen Source->Urine   URINALYSIS MICROSCOPIC - Abnormal; Notable for the following components:    RBC, UA 15 (*)     WBC, UA 80 (*)     Bacteria Many (*)     All other components within normal limits    Narrative:     Specimen Source->Urine   POCT GLUCOSE - Abnormal; Notable for the following components:    POCT Glucose 119 (*)     All other components within normal limits   CULTURE, URINE   SARS-COV-2 RNA AMPLIFICATION, QUAL   HEMOGLOBIN A1C   POCT GLUCOSE  MONITORING CONTINUOUS          Imaging Results    None          Medications   atorvastatin tablet 40 mg (40 mg Oral Given 9/2/22 1516)   buPROPion TB24 tablet 300 mg (300 mg Oral Given 9/2/22 1736)   clopidogreL tablet 75 mg (75 mg Per G Tube Given 9/2/22 1516)   gabapentin capsule 300 mg (has no administration in time range)   insulin detemir U-100 pen 10 Units (has no administration in time range)   sodium chloride 0.9% flush 10 mL (has no administration in time range)   meropenem-0.9% sodium chloride 1 g/50 mL IVPB (0 g Intravenous Stopped 9/2/22 1615)   glucagon (human recombinant) injection 1 mg (has no administration in time range)   dextrose 10% bolus 125 mL (has no administration in time range)   dextrose 10% bolus 250 mL (has no administration in time range)   insulin aspart U-100 pen 0-5 Units (has no administration in time range)   mupirocin 2 % ointment (has no administration in time range)   finasteride tablet 5 mg (has no administration in time range)   senna tablet 8.6 mg (has no administration in time range)   potassium chloride SA CR tablet 40 mEq (40 mEq Oral Given 9/2/22 1154)   sodium phosphates 19-7 gram/118 mL enema 1 enema (1 enema Rectal Given 9/2/22 1815)     Medical Decision Making:   History:   Old Medical Records: I decided to obtain old medical records.  Clinical Tests:   Lab Tests: Ordered and Reviewed  ED Management:  65 y/o male with hx of indwelling urinary catheter placement, PEG tube placement, stroke, HTN, and MDD presents with one day of acute urinary retention and suprapubic pain.  Well appearing/nonseptic. Tolerating PO.    ED Workup: UA  ED Interventions: Catheter placement    Patient has no neurogenic bladder.  Patient exam and history not consistent with cauda equina, infectious etiology, constipation based retention/intraabdominal mass/AAA, trauma, nephro/urolithiasis, drug reaction, cancer.    UA concerning for UTI after olguin exchange. Given hx of ESBL sensitive to only  ertapenem and meropenem will admit to Hospital Medicine for further IV antibiotics.          Scribe Attestation:   Scribe #1: I performed the above scribed service and the documentation accurately describes the services I performed. I attest to the accuracy of the note.      ED Course as of 09/02/22 1819   Fri Sep 02, 2022   1112 Potassium(!): 3.2 [BD]   1141  [BD]   1250 NITRITE UA(!): Positive [BD]   1250 Leukocytes, UA(!): 3+ [BD]   1251 Bacteria, UA(!): Many [BD]   1251 WBC, UA(!): 80 [BD]      ED Course User Index  [BD] Roger Stroud MD             Attending Attestation:     Physician Attestation for Scribe:    I, Dr. Roger Stroud, personally performed the services described in this documentation.   All medical record entries made by the scribe were at my direction and in my presence.   I have reviewed the chart and agree that the record is accurate and complete.   Roger Stroud MD  6:19 PM 09/02/2022     DISCLAIMER: This note was prepared with [employer] voice recognition transcription software. Garbled syntax, mangled pronouns, and other bizarre constructions may be attributed to that software system.      Clinical Impression:   Final diagnoses:  [T83.511A, N39.0] Urinary tract infection associated with indwelling urethral catheter, initial encounter (Primary)  [T83.9XXA] Problem with Gutierrez catheter, initial encounter  [R33.9] Urinary retention  [A49.9, Z16.12] ESBL (extended spectrum beta-lactamase) producing bacteria infection      ED Disposition Condition    Admit                 Roger Stroud MD  09/02/22 1821

## 2022-09-02 NOTE — ASSESSMENT & PLAN NOTE
Patient has persistent depression which is mild and is currently controlled. Will Continue anti-depressant medications. We will not consult psychiatry at this time. Patient does display psychosis at this time. Continue to monitor closely and adjust plan of care as needed.

## 2022-09-02 NOTE — H&P
"[facility] Medicine  History & Physical    Patient Name: Jhonny Almazan  MRN: [id number]  Patient Class: IP- Inpatient  Admission Date: 9/2/2022  Attending Physician: Ander Anderson MD   Primary Care Provider: Joey Whitehead MD         Patient information was obtained from patient, past medical records and ER records.     Subjective:     Principal Problem:Complicated UTI (urinary tract infection)    Chief Complaint:   Chief Complaint   Patient presents with    Olguin Problems     Olguin not draining    Urinary Tract Infection        HPI: Jhonny Almazan is a 66 year old  male who presented to the ED with CC of having problems with his olguin not draining properly. He reported he has olguin for around 2 years because he has difficulty urinating. He also reported that he has not had a BM for 1-1/2 weeks. ED MD changed out olguin and cath is draining well now. Pt denies fever, chills, sweats, sediment in cath/urine, blood in urine. Denies dec fluid intake. Denies any other problems. Has PMH DM, HTN, major depression, cva. Denies any problems with his chronic illnesses at this time. Denies dizziness, thirst, increasing weakness. Reported his mobility is "not very good:. Lives with son in a house and doesn't get cared for very well and doesn't eat much or follow a diabetic diet. Recently got "Meals on Wheels" arranged. Doesn't know current BG or BP. No longer has g-tube. Doesn't know specifics about medications, takes insulin twice a day.     UA in ED showed Nitrite Pos, Leuk 3+,Bld 2+. Recently admitted with MDRO- urine cs (8/3/) showed Kleibsiella >100,000 only sensitive to ertapenemem and meropenem. Will give Meropenem 1gm q 8hrs. K+ 3.2 on adm give po K+ in ED. Will recheck in am.  Will give Fleets enema and begin senokot. Explore placement.      Past Medical History:   Diagnosis Date    Anticoagulant long-term use     Arthritis     Colon polyp     Diabetes mellitus     " Diabetes mellitus, type 2     Fatty liver     Hypertension     Major depressive disorder 11/17/2020    -Continue Wellbutrin    PEG (percutaneous endoscopic gastrostomy) adjustment/replacement/removal 1/6/2021    Stroke 11/2020    left sided weakness    Urinary retention 9/7/2021       Past Surgical History:   Procedure Laterality Date    BACK SURGERY      COLONOSCOPY  07/18/2014    Dr. Crane: 22 colon polyps removed, hemorrhoids, erythema to sigmoid, repeat in 1 year for surveillance; biopsy: sigmoid erythema- showed unremarkable colonic mucosa, tubular adenomas and hyperplastic polyps    COLONOSCOPY N/A 5/3/2019    Procedure: COLONOSCOPY;  Surgeon: Guero Crane MD;  Location: [facility] ENDO;  Service: Endoscopy;  Laterality: N/A;    COLONOSCOPY N/A 5/6/2019    Procedure: COLONOSCOPY;  Surgeon: Guero Crane MD;  Location: [facility] ENDO;  Service: Endoscopy;  Laterality: N/A;    CYSTOSCOPY N/A 2/9/2022    Procedure: CYSTOSCOPY;  Surgeon: Jaylen Antonio Jr., MD;  Location: [facility] OR;  Service: Urology;  Laterality: N/A;    EPIDURAL STEROID INJECTION INTO THORACIC SPINE N/A 8/30/2019    Procedure: Injection-steroid-epidural-thoracic;  Surgeon: Frantz Green MD;  Location: [facility] OR;  Service: Pain Management;  Laterality: N/A;  T6-7    ESOPHAGOGASTRODUODENOSCOPY N/A 4/26/2019    Procedure: EGD (ESOPHAGOGASTRODUODENOSCOPY);  Surgeon: Guero Crane MD;  Location: [facility] ENDO;  Service: Endoscopy;  Laterality: N/A;    ESOPHAGOGASTRODUODENOSCOPY N/A 11/23/2020    Procedure: EGD (ESOPHAGOGASTRODUODENOSCOPY);  Surgeon: Guero Crane MD;  Location: [facility] ENDO;  Service: Endoscopy;  Laterality: N/A;    ESOPHAGOGASTRODUODENOSCOPY N/A 1/6/2021    Procedure: EGD (ESOPHAGOGASTRODUODENOSCOPY);  Surgeon: Guero Crane MD;  Location: [facility] ENDO;  Service: Endoscopy;  Laterality: N/A;    HERNIA REPAIR      TRANSRECTAL ULTRASOUND EXAMINATION N/A 2/9/2022    Procedure: ULTRASOUND, RECTAL APPROACH;  Surgeon: Jaylen  "KWAME Antonio Jr., MD;  Location: [facility] OR;  Service: Urology;  Laterality: N/A;  must text son miroslava, times and instructions given leave at 130       Review of patient's allergies indicates:  No Known Allergies    No current facility-administered medications on file prior to encounter.     Current Outpatient Medications on File Prior to Encounter   Medication Sig    aspirin (ECOTRIN) 81 MG EC tablet Take 1 tablet (81 mg total) by mouth once daily.    atorvastatin (LIPITOR) 40 MG tablet Take 1 tablet (40 mg total) by mouth once daily.    blood sugar diagnostic Strp To check BG 2 times daily, to use with insurance preferred meter. One touch Ultra.    buPROPion (WELLBUTRIN XL) 300 MG 24 hr tablet Take 1 tablet (300 mg total) by mouth once daily.    clopidogreL (PLAVIX) 75 mg tablet 1 tablet (75 mg total) by Per G Tube route once daily.    finasteride (PROSCAR) 5 mg tablet Take 1 tablet (5 mg total) by mouth once daily. Take 1 tablet by mouth in am to keep prostate from enlarging    gabapentin (NEURONTIN) 300 MG capsule Take 1 capsule (300 mg total) by mouth 2 (two) times daily.    insulin aspart U-100 (NOVOLOG) 100 unit/mL (3 mL) InPn pen Inject 1-10 Units into the skin as needed (high blood sugar). Inject per sliding scale.    insulin detemir U-100 (LEVEMIR FLEXTOUCH) 100 unit/mL (3 mL) SubQ InPn pen Inject 10 Units into the skin every evening.    lancets Misc To check BG 2 times daily, to use with insurance preferred meter.One Touch Ultra    losartan (COZAAR) 25 MG tablet 1 tablet DAILY (route: oral)    meclizine (ANTIVERT) 25 mg tablet Take 1 tablet (25 mg total) by mouth 3 (three) times daily as needed for Dizziness.    melatonin (MELATIN) 3 mg tablet Take 2 tablets (6 mg total) by mouth nightly as needed for Insomnia.    omeprazole (PRILOSEC) 40 MG capsule TAKE ONE CAPSULE (40 MG) BY MOUTH EVERY DAY    pen needle, diabetic 32 gauge x 5/32" Ndle Use AC meals 2 a day    QUEtiapine (SEROQUEL) 25 MG Tab Take " 1 tablet (25 mg total) by mouth every evening.    senna-docusate 8.6-50 mg (PERICOLACE) 8.6-50 mg per tablet Take 1 tablet by mouth 2 (two) times daily.    tetrahydrozoline 0.05% (VISION CLEAR) 0.05 % Drop Place 2 drops into both eyes 4 (four) times daily as needed (eye irritation).    [DISCONTINUED] insulin NPH-insulin regular, 70/30, (NOVOLIN 70/30) 100 unit/mL (70-30) injection Inject 20 Units into the skin 2 (two) times daily. #3 10 ml syringes with 32 gauge sergo needles    [DISCONTINUED] lisinopriL-hydrochlorothiazide (PRINZIDE,ZESTORETIC) 20-12.5 mg per tablet TAKE ONE TABLET BY MOUTH TWICE DAILY     Family History       Problem Relation (Age of Onset)    Brain cancer Brother    Diabetes Brother    Heart disease Mother, Father    Suicide Brother          Tobacco Use    Smoking status: Former     Packs/day: 1.00     Years: 50.00     Pack years: 50.00     Types: Cigarettes    Smokeless tobacco: Never   Substance and Sexual Activity    Alcohol use: Yes     Alcohol/week: 14.0 standard drinks     Types: 14 Cans of beer per week     Comment: 2-3 beers daily    Drug use: No    Sexual activity: Yes     Partners: Female     Review of Systems   Constitutional:  Positive for activity change and appetite change. Negative for chills, diaphoresis, fatigue and fever.   HENT:  Negative for congestion, postnasal drip and sore throat.    Respiratory:  Negative for cough and shortness of breath.    Cardiovascular:  Negative for chest pain, palpitations and leg swelling.   Gastrointestinal:  Positive for abdominal pain and constipation. Negative for diarrhea, nausea and vomiting.        Reported no BM x 1-1/2 weeks   Genitourinary:  Positive for dysuria.        Indwelling olguin   Musculoskeletal:  Negative for arthralgias, back pain and myalgias.   Neurological:  Positive for weakness. Negative for dizziness.        Poor mobility    Psychiatric/Behavioral:  Negative for agitation, behavioral problems, confusion and  decreased concentration.    Objective:     Vital Signs (Most Recent):  Temp: 97.8 °F (36.6 °C) (09/02/22 0904)  Pulse: 61 (09/02/22 1354)  Resp: 18 (09/02/22 1354)  BP: 133/84 (09/02/22 0904)  SpO2: 98 % (09/02/22 1354) Vital Signs (24h Range):  Temp:  [97.8 °F (36.6 °C)] 97.8 °F (36.6 °C)  Pulse:  [60-82] 61  Resp:  [18-20] 18  SpO2:  [96 %-99 %] 98 %  BP: (133)/(84) 133/84     Weight: 102.1 kg (225 lb)  Body mass index is 26.68 kg/m².    Physical Exam  Vitals and nursing note reviewed.   Constitutional:       Appearance: He is ill-appearing.      Comments: disheveled   HENT:      Head: Normocephalic and atraumatic.      Nose: Nose normal.      Mouth/Throat:      Pharynx: Oropharynx is clear.   Eyes:      Conjunctiva/sclera: Conjunctivae normal.   Cardiovascular:      Rate and Rhythm: Normal rate and regular rhythm.      Pulses: Normal pulses.      Heart sounds: Normal heart sounds.   Pulmonary:      Effort: Pulmonary effort is normal.      Breath sounds: Normal breath sounds.   Abdominal:      General: Bowel sounds are normal.      Tenderness: There is abdominal tenderness.      Comments: Mild tenderness with deep palpation   Musculoskeletal:      Cervical back: Normal range of motion.      Right lower leg: No edema.      Left lower leg: No edema.      Comments: Decreased strength   Skin:     General: Skin is warm and dry.      Capillary Refill: Capillary refill takes less than 2 seconds.      Comments: Toenails thickened and greenish yellow   Neurological:      Mental Status: He is alert and oriented to person, place, and time.   Psychiatric:         Mood and Affect: Mood normal.         Behavior: Behavior normal.           Significant Labs: All pertinent labs within the past 24 hours have been reviewed.  BMP:   Recent Labs   Lab 09/02/22  1037   *      K 3.2*      CO2 27   BUN 12   CREATININE 0.7   CALCIUM 9.7     CBC:   Recent Labs   Lab 09/02/22  1037   WBC 8.11   HGB 12.1*   HCT 35.2*   PLT  182     Urine Studies:   Recent Labs   Lab 09/02/22  1151   COLORU Yellow   APPEARANCEUA Cloudy*   PHUR 6.0   SPECGRAV 1.025   PROTEINUA 1+*   GLUCUA Negative   KETONESU Trace*   BILIRUBINUA 1+*   OCCULTUA 2+*   NITRITE Positive*   UROBILINOGEN 4.0-6.0*   LEUKOCYTESUR 3+*   RBCUA 15*   WBCUA 80*   BACTERIA Many*   HYALINECASTS 0       Significant Imaging: I have reviewed all pertinent imaging results/findings within the past 24 hours.    Assessment/Plan:     * Complicated UTI (urinary tract infection)  Monitor labs  IV abx  Await urine cs      Chronic indwelling Gutierrez catheter  Gutierrez care  Monitor urine output  Meropenem q8hrs  Urine culture      Major depressive disorder  Patient has persistent depression which is mild and is currently controlled. Will Continue anti-depressant medications. We will not consult psychiatry at this time. Patient does display psychosis at this time. Continue to monitor closely and adjust plan of care as needed.        Hypertension    Chronic, controlled.  Latest blood pressure and vitals reviewed-   Temp:  [97.8 °F (36.6 °C)]   Pulse:  [60-82]   Resp:  [18-20]   BP: (133)/(84)   SpO2:  [96 %-99 %] .   Home meds for hypertension were reviewed and noted below.   Hypertension Medications             losartan (COZAAR) 25 MG tablet 1 tablet DAILY (route: oral)          While in the hospital, will manage blood pressure as follows; Continue home antihypertensive regimen    Will utilize p.r.n. blood pressure medication only if patient's blood pressure greater than  180/110 and he develops symptoms such as worsening chest pain or shortness of breath.    Hyperlipidemia associated with type 2 diabetes mellitus  Patient's FSGs are controlled on current medication regimen.  Last A1c reviewed-   Lab Results   Component Value Date    HGBA1C 8.8 (H) 09/30/2021     Most recent fingerstick glucose reviewed-   Recent Labs   Lab 09/02/22  1506   POCTGLUCOSE 119*     Current correctional scale   Low  Maintain anti-hyperglycemic dose as follows-   Antihyperglycemics (From admission, onward)    Start     Stop Route Frequency Ordered    09/02/22 2100  insulin detemir U-100 pen 10 Units         -- SubQ Nightly 09/02/22 1434    09/02/22 1532  insulin aspart U-100 pen 0-5 Units         -- SubQ Every 6 hours PRN 09/02/22 1434        Hold Oral hypoglycemics while patient is in the hospital.      VTE Risk Mitigation (From admission, onward)         Ordered     Place sequential compression device  Until discontinued         09/02/22 1434     IP VTE LOW RISK PATIENT  Once         09/02/22 1434                   Lori Perez NP  Department of Hospital Medicine   [facility] - Emergency Dept

## 2022-09-02 NOTE — SUBJECTIVE & OBJECTIVE
Past Medical History:   Diagnosis Date    Anticoagulant long-term use     Arthritis     Colon polyp     Diabetes mellitus     Diabetes mellitus, type 2     Fatty liver     Hypertension     Major depressive disorder 11/17/2020    -Continue Wellbutrin    PEG (percutaneous endoscopic gastrostomy) adjustment/replacement/removal 1/6/2021    Stroke 11/2020    left sided weakness    Urinary retention 9/7/2021       Past Surgical History:   Procedure Laterality Date    BACK SURGERY      COLONOSCOPY  07/18/2014    Dr. Crane: 22 colon polyps removed, hemorrhoids, erythema to sigmoid, repeat in 1 year for surveillance; biopsy: sigmoid erythema- showed unremarkable colonic mucosa, tubular adenomas and hyperplastic polyps    COLONOSCOPY N/A 5/3/2019    Procedure: COLONOSCOPY;  Surgeon: Guero Crane MD;  Location: [facility] ENDO;  Service: Endoscopy;  Laterality: N/A;    COLONOSCOPY N/A 5/6/2019    Procedure: COLONOSCOPY;  Surgeon: Guero Crane MD;  Location: [facility] ENDO;  Service: Endoscopy;  Laterality: N/A;    CYSTOSCOPY N/A 2/9/2022    Procedure: CYSTOSCOPY;  Surgeon: Jaylen Antonio Jr., MD;  Location: [facility] OR;  Service: Urology;  Laterality: N/A;    EPIDURAL STEROID INJECTION INTO THORACIC SPINE N/A 8/30/2019    Procedure: Injection-steroid-epidural-thoracic;  Surgeon: Frantz Green MD;  Location: [facility] OR;  Service: Pain Management;  Laterality: N/A;  T6-7    ESOPHAGOGASTRODUODENOSCOPY N/A 4/26/2019    Procedure: EGD (ESOPHAGOGASTRODUODENOSCOPY);  Surgeon: Guero Crane MD;  Location: [facility] ENDO;  Service: Endoscopy;  Laterality: N/A;    ESOPHAGOGASTRODUODENOSCOPY N/A 11/23/2020    Procedure: EGD (ESOPHAGOGASTRODUODENOSCOPY);  Surgeon: Guero Crane MD;  Location: [facility] ENDO;  Service: Endoscopy;  Laterality: N/A;    ESOPHAGOGASTRODUODENOSCOPY N/A 1/6/2021    Procedure: EGD (ESOPHAGOGASTRODUODENOSCOPY);  Surgeon: Guero Crane MD;  Location: [facility] ENDO;  Service: Endoscopy;  Laterality: N/A;    HERNIA REPAIR       TRANSRECTAL ULTRASOUND EXAMINATION N/A 2/9/2022    Procedure: ULTRASOUND, RECTAL APPROACH;  Surgeon: Jaylen Antonio Jr., MD;  Location: [facility] OR;  Service: Urology;  Laterality: N/A;  must text son miroslava, times and instructions given leave at 130       Review of patient's allergies indicates:  No Known Allergies    No current facility-administered medications on file prior to encounter.     Current Outpatient Medications on File Prior to Encounter   Medication Sig    aspirin (ECOTRIN) 81 MG EC tablet Take 1 tablet (81 mg total) by mouth once daily.    atorvastatin (LIPITOR) 40 MG tablet Take 1 tablet (40 mg total) by mouth once daily.    blood sugar diagnostic Strp To check BG 2 times daily, to use with insurance preferred meter. One touch Ultra.    buPROPion (WELLBUTRIN XL) 300 MG 24 hr tablet Take 1 tablet (300 mg total) by mouth once daily.    clopidogreL (PLAVIX) 75 mg tablet 1 tablet (75 mg total) by Per G Tube route once daily.    finasteride (PROSCAR) 5 mg tablet Take 1 tablet (5 mg total) by mouth once daily. Take 1 tablet by mouth in am to keep prostate from enlarging    gabapentin (NEURONTIN) 300 MG capsule Take 1 capsule (300 mg total) by mouth 2 (two) times daily.    insulin aspart U-100 (NOVOLOG) 100 unit/mL (3 mL) InPn pen Inject 1-10 Units into the skin as needed (high blood sugar). Inject per sliding scale.    insulin detemir U-100 (LEVEMIR FLEXTOUCH) 100 unit/mL (3 mL) SubQ InPn pen Inject 10 Units into the skin every evening.    lancets Misc To check BG 2 times daily, to use with insurance preferred meter.One Touch Ultra    losartan (COZAAR) 25 MG tablet 1 tablet DAILY (route: oral)    meclizine (ANTIVERT) 25 mg tablet Take 1 tablet (25 mg total) by mouth 3 (three) times daily as needed for Dizziness.    melatonin (MELATIN) 3 mg tablet Take 2 tablets (6 mg total) by mouth nightly as needed for Insomnia.    omeprazole (PRILOSEC) 40 MG capsule TAKE ONE CAPSULE (40 MG) BY MOUTH EVERY DAY    pen needle,  "diabetic 32 gauge x 5/32" Ndle Use AC meals 2 a day    QUEtiapine (SEROQUEL) 25 MG Tab Take 1 tablet (25 mg total) by mouth every evening.    senna-docusate 8.6-50 mg (PERICOLACE) 8.6-50 mg per tablet Take 1 tablet by mouth 2 (two) times daily.    tetrahydrozoline 0.05% (VISION CLEAR) 0.05 % Drop Place 2 drops into both eyes 4 (four) times daily as needed (eye irritation).    [DISCONTINUED] insulin NPH-insulin regular, 70/30, (NOVOLIN 70/30) 100 unit/mL (70-30) injection Inject 20 Units into the skin 2 (two) times daily. #3 10 ml syringes with 32 gauge sergo needles    [DISCONTINUED] lisinopriL-hydrochlorothiazide (PRINZIDE,ZESTORETIC) 20-12.5 mg per tablet TAKE ONE TABLET BY MOUTH TWICE DAILY     Family History       Problem Relation (Age of Onset)    Brain cancer Brother    Diabetes Brother    Heart disease Mother, Father    Suicide Brother          Tobacco Use    Smoking status: Former     Packs/day: 1.00     Years: 50.00     Pack years: 50.00     Types: Cigarettes    Smokeless tobacco: Never   Substance and Sexual Activity    Alcohol use: Yes     Alcohol/week: 14.0 standard drinks     Types: 14 Cans of beer per week     Comment: 2-3 beers daily    Drug use: No    Sexual activity: Yes     Partners: Female     Review of Systems   Constitutional:  Positive for activity change and appetite change. Negative for chills, diaphoresis, fatigue and fever.   HENT:  Negative for congestion, postnasal drip and sore throat.    Respiratory:  Negative for cough and shortness of breath.    Cardiovascular:  Negative for chest pain, palpitations and leg swelling.   Gastrointestinal:  Positive for abdominal pain and constipation. Negative for diarrhea, nausea and vomiting.        Reported no BM x 1-1/2 weeks   Genitourinary:  Positive for dysuria.        Indwelling olguin   Musculoskeletal:  Negative for arthralgias, back pain and myalgias.   Neurological:  Positive for weakness. Negative for dizziness.        Poor mobility  "   Psychiatric/Behavioral:  Negative for agitation, behavioral problems, confusion and decreased concentration.    Objective:     Vital Signs (Most Recent):  Temp: 97.8 °F (36.6 °C) (09/02/22 0904)  Pulse: 61 (09/02/22 1354)  Resp: 18 (09/02/22 1354)  BP: 133/84 (09/02/22 0904)  SpO2: 98 % (09/02/22 1354) Vital Signs (24h Range):  Temp:  [97.8 °F (36.6 °C)] 97.8 °F (36.6 °C)  Pulse:  [60-82] 61  Resp:  [18-20] 18  SpO2:  [96 %-99 %] 98 %  BP: (133)/(84) 133/84     Weight: 102.1 kg (225 lb)  Body mass index is 26.68 kg/m².    Physical Exam  Vitals and nursing note reviewed.   Constitutional:       Appearance: He is ill-appearing.      Comments: disheveled   HENT:      Head: Normocephalic and atraumatic.      Nose: Nose normal.      Mouth/Throat:      Pharynx: Oropharynx is clear.   Eyes:      Conjunctiva/sclera: Conjunctivae normal.   Cardiovascular:      Rate and Rhythm: Normal rate and regular rhythm.      Pulses: Normal pulses.      Heart sounds: Normal heart sounds.   Pulmonary:      Effort: Pulmonary effort is normal.      Breath sounds: Normal breath sounds.   Abdominal:      General: Bowel sounds are normal.      Tenderness: There is abdominal tenderness.      Comments: Mild tenderness with deep palpation   Musculoskeletal:      Cervical back: Normal range of motion.      Right lower leg: No edema.      Left lower leg: No edema.      Comments: Decreased strength   Skin:     General: Skin is warm and dry.      Capillary Refill: Capillary refill takes less than 2 seconds.      Comments: Toenails thickened and greenish yellow   Neurological:      Mental Status: He is alert and oriented to person, place, and time.   Psychiatric:         Mood and Affect: Mood normal.         Behavior: Behavior normal.           Significant Labs: All pertinent labs within the past 24 hours have been reviewed.  BMP:   Recent Labs   Lab 09/02/22  1037   *      K 3.2*      CO2 27   BUN 12   CREATININE 0.7   CALCIUM  9.7     CBC:   Recent Labs   Lab 09/02/22  1037   WBC 8.11   HGB 12.1*   HCT 35.2*        Urine Studies:   Recent Labs   Lab 09/02/22  1151   COLORU Yellow   APPEARANCEUA Cloudy*   PHUR 6.0   SPECGRAV 1.025   PROTEINUA 1+*   GLUCUA Negative   KETONESU Trace*   BILIRUBINUA 1+*   OCCULTUA 2+*   NITRITE Positive*   UROBILINOGEN 4.0-6.0*   LEUKOCYTESUR 3+*   RBCUA 15*   WBCUA 80*   BACTERIA Many*   HYALINECASTS 0       Significant Imaging: I have reviewed all pertinent imaging results/findings within the past 24 hours.

## 2022-09-02 NOTE — ASSESSMENT & PLAN NOTE
Patient's FSGs are controlled on current medication regimen.  Last A1c reviewed-   Lab Results   Component Value Date    HGBA1C 8.8 (H) 09/30/2021     Most recent fingerstick glucose reviewed-   Recent Labs   Lab 09/02/22  1506   POCTGLUCOSE 119*     Current correctional scale  Low  Maintain anti-hyperglycemic dose as follows-   Antihyperglycemics (From admission, onward)    Start     Stop Route Frequency Ordered    09/02/22 2100  insulin detemir U-100 pen 10 Units         -- SubQ Nightly 09/02/22 1434    09/02/22 1532  insulin aspart U-100 pen 0-5 Units         -- SubQ Every 6 hours PRN 09/02/22 1434        Hold Oral hypoglycemics while patient is in the hospital.

## 2022-09-02 NOTE — ASSESSMENT & PLAN NOTE
Chronic, controlled.  Latest blood pressure and vitals reviewed-   Temp:  [97.8 °F (36.6 °C)]   Pulse:  [60-82]   Resp:  [18-20]   BP: (133)/(84)   SpO2:  [96 %-99 %] .   Home meds for hypertension were reviewed and noted below.   Hypertension Medications             losartan (COZAAR) 25 MG tablet 1 tablet DAILY (route: oral)          While in the hospital, will manage blood pressure as follows; Continue home antihypertensive regimen    Will utilize p.r.n. blood pressure medication only if patient's blood pressure greater than  180/110 and he develops symptoms such as worsening chest pain or shortness of breath.

## 2022-09-03 ENCOUNTER — OUTSIDE PLACE OF SERVICE (OUTPATIENT)
Dept: INFECTIOUS DISEASES | Facility: CLINIC | Age: 67
End: 2022-09-03
Payer: MEDICARE

## 2022-09-03 DIAGNOSIS — N39.0 UTI (URINARY TRACT INFECTION) DUE TO URINARY INDWELLING CATHETER: Primary | ICD-10-CM

## 2022-09-03 DIAGNOSIS — T83.511A UTI (URINARY TRACT INFECTION) DUE TO URINARY INDWELLING CATHETER: Primary | ICD-10-CM

## 2022-09-03 PROBLEM — E43 SEVERE MALNUTRITION: Status: ACTIVE | Noted: 2022-09-03

## 2022-09-03 PROBLEM — E44.0 MODERATE MALNUTRITION: Status: ACTIVE | Noted: 2022-09-03

## 2022-09-03 LAB
ANION GAP SERPL CALC-SCNC: 9 MMOL/L (ref 8–16)
BASOPHILS # BLD AUTO: 0.04 K/UL (ref 0–0.2)
BASOPHILS NFR BLD: 0.8 % (ref 0–1.9)
BUN SERPL-MCNC: 13 MG/DL (ref 8–23)
CALCIUM SERPL-MCNC: 9.2 MG/DL (ref 8.7–10.5)
CHLORIDE SERPL-SCNC: 105 MMOL/L (ref 95–110)
CO2 SERPL-SCNC: 24 MMOL/L (ref 23–29)
CREAT SERPL-MCNC: 0.7 MG/DL (ref 0.5–1.4)
DIFFERENTIAL METHOD: ABNORMAL
EOSINOPHIL # BLD AUTO: 0.2 K/UL (ref 0–0.5)
EOSINOPHIL NFR BLD: 3.8 % (ref 0–8)
ERYTHROCYTE [DISTWIDTH] IN BLOOD BY AUTOMATED COUNT: 14.6 % (ref 11.5–14.5)
EST. GFR  (NO RACE VARIABLE): >60 ML/MIN/1.73 M^2
GLUCOSE SERPL-MCNC: 133 MG/DL (ref 70–110)
HCT VFR BLD AUTO: 35.9 % (ref 40–54)
HGB BLD-MCNC: 11.8 G/DL (ref 14–18)
IMM GRANULOCYTES # BLD AUTO: 0.01 K/UL (ref 0–0.04)
IMM GRANULOCYTES NFR BLD AUTO: 0.2 % (ref 0–0.5)
LYMPHOCYTES # BLD AUTO: 1 K/UL (ref 1–4.8)
LYMPHOCYTES NFR BLD: 21.3 % (ref 18–48)
MCH RBC QN AUTO: 27.8 PG (ref 27–31)
MCHC RBC AUTO-ENTMCNC: 32.9 G/DL (ref 32–36)
MCV RBC AUTO: 85 FL (ref 82–98)
MONOCYTES # BLD AUTO: 0.5 K/UL (ref 0.3–1)
MONOCYTES NFR BLD: 9.6 % (ref 4–15)
NEUTROPHILS # BLD AUTO: 3.1 K/UL (ref 1.8–7.7)
NEUTROPHILS NFR BLD: 64.3 % (ref 38–73)
NRBC BLD-RTO: 0 /100 WBC
PLATELET # BLD AUTO: 158 K/UL (ref 150–450)
PMV BLD AUTO: 9.4 FL (ref 9.2–12.9)
POCT GLUCOSE: 148 MG/DL (ref 70–110)
POCT GLUCOSE: 149 MG/DL (ref 70–110)
POCT GLUCOSE: 153 MG/DL (ref 70–110)
POCT GLUCOSE: 161 MG/DL (ref 70–110)
POCT GLUCOSE: 219 MG/DL (ref 70–110)
POTASSIUM SERPL-SCNC: 3.2 MMOL/L (ref 3.5–5.1)
RBC # BLD AUTO: 4.24 M/UL (ref 4.6–6.2)
SODIUM SERPL-SCNC: 138 MMOL/L (ref 136–145)
WBC # BLD AUTO: 4.79 K/UL (ref 3.9–12.7)

## 2022-09-03 PROCEDURE — 80048 BASIC METABOLIC PNL TOTAL CA: CPT | Performed by: NURSE PRACTITIONER

## 2022-09-03 PROCEDURE — 27000207 HC ISOLATION

## 2022-09-03 PROCEDURE — 99223 PR INITIAL HOSPITAL CARE,LEVL III: ICD-10-PCS | Mod: S$GLB,,, | Performed by: INTERNAL MEDICINE

## 2022-09-03 PROCEDURE — 85025 COMPLETE CBC W/AUTO DIFF WBC: CPT | Performed by: NURSE PRACTITIONER

## 2022-09-03 PROCEDURE — 36415 COLL VENOUS BLD VENIPUNCTURE: CPT | Performed by: INTERNAL MEDICINE

## 2022-09-03 PROCEDURE — 36415 COLL VENOUS BLD VENIPUNCTURE: CPT | Performed by: NURSE PRACTITIONER

## 2022-09-03 PROCEDURE — 25000003 PHARM REV CODE 250: Performed by: NURSE PRACTITIONER

## 2022-09-03 PROCEDURE — 99223 1ST HOSP IP/OBS HIGH 75: CPT | Mod: S$GLB,,, | Performed by: INTERNAL MEDICINE

## 2022-09-03 PROCEDURE — 11000001 HC ACUTE MED/SURG PRIVATE ROOM

## 2022-09-03 PROCEDURE — 25000003 PHARM REV CODE 250

## 2022-09-03 PROCEDURE — 92610 EVALUATE SWALLOWING FUNCTION: CPT

## 2022-09-03 PROCEDURE — 63600175 PHARM REV CODE 636 W HCPCS: Performed by: NURSE PRACTITIONER

## 2022-09-03 PROCEDURE — 87040 BLOOD CULTURE FOR BACTERIA: CPT | Performed by: INTERNAL MEDICINE

## 2022-09-03 RX ORDER — POTASSIUM CHLORIDE 20 MEQ/1
40 TABLET, EXTENDED RELEASE ORAL 2 TIMES DAILY
Status: DISCONTINUED | OUTPATIENT
Start: 2022-09-03 | End: 2022-09-08

## 2022-09-03 RX ADMIN — GABAPENTIN 300 MG: 300 CAPSULE ORAL at 08:09

## 2022-09-03 RX ADMIN — MEROPENEM AND SODIUM CHLORIDE 1 G: 1 INJECTION, SOLUTION INTRAVENOUS at 10:09

## 2022-09-03 RX ADMIN — MEROPENEM AND SODIUM CHLORIDE 1 G: 1 INJECTION, SOLUTION INTRAVENOUS at 03:09

## 2022-09-03 RX ADMIN — MEROPENEM AND SODIUM CHLORIDE 1 G: 1 INJECTION, SOLUTION INTRAVENOUS at 08:09

## 2022-09-03 RX ADMIN — MUPIROCIN: 20 OINTMENT TOPICAL at 08:09

## 2022-09-03 RX ADMIN — POTASSIUM CHLORIDE 40 MEQ: 1500 TABLET, EXTENDED RELEASE ORAL at 10:09

## 2022-09-03 RX ADMIN — POTASSIUM CHLORIDE 40 MEQ: 1500 TABLET, EXTENDED RELEASE ORAL at 08:09

## 2022-09-03 RX ADMIN — INSULIN DETEMIR 10 UNITS: 100 INJECTION, SOLUTION SUBCUTANEOUS at 08:09

## 2022-09-03 RX ADMIN — FINASTERIDE 5 MG: 5 TABLET, FILM COATED ORAL at 08:09

## 2022-09-03 RX ADMIN — CLOPIDOGREL BISULFATE 75 MG: 75 TABLET, FILM COATED ORAL at 08:09

## 2022-09-03 RX ADMIN — INSULIN ASPART 2 UNITS: 100 INJECTION, SOLUTION INTRAVENOUS; SUBCUTANEOUS at 08:09

## 2022-09-03 RX ADMIN — ATORVASTATIN CALCIUM 40 MG: 40 TABLET, FILM COATED ORAL at 08:09

## 2022-09-03 RX ADMIN — BUPROPION HYDROCHLORIDE 300 MG: 150 TABLET, FILM COATED, EXTENDED RELEASE ORAL at 08:09

## 2022-09-03 NOTE — PT/OT/SLP PROGRESS
"Occupational Therapy      Patient Name:  Jhonny Almazan   MRN:  [id number]    Patient not seen today secondary to Patient unwilling to participate. "Not today".Will follow-up.    9/3/2022  "

## 2022-09-03 NOTE — ASSESSMENT & PLAN NOTE
Contributing Nutrition Diagnosis  Moderate chronic illness related malnutrition    Related to (etiology):   Limited access to food and trouble swallowing    Signs and Symptoms (as evidenced by):   1) PO intakes < 75% of needs x at least 4 weeks  2) mild-moderate wasting as charted below  3) altered skin integrity  4) 20% wt loss x 1 year    Interventions:  above    Nutrition Diagnosis Status:   new

## 2022-09-03 NOTE — PLAN OF CARE
Pt appears to be resting, eyes are closed, breaths are deep and even. VSS, NAD noted at this time. Discussed PoC with pt, stated they understood and would help as able. No c/o pain, will continue to monitor.

## 2022-09-03 NOTE — ASSESSMENT & PLAN NOTE
Nutrition consulted. Most recent weight and BMI monitored-     Malnutrition Type and Energy Intake  Malnutrition Type: chronic illness    Malnutrition (Moderate to Severe)  Weight Loss (Malnutrition): 20% in 1 year  Energy Intake (Malnutrition): less than 75% for greater than or equal to 1 month    Final Summary  Subcutaneous Fat Loss (Final Summary): moderate protein-calorie malnutrition  Muscle Loss Evaluation (Final Summary): moderate protein-calorie malnutrition         Measurements:  Wt Readings from Last 1 Encounters:   09/03/22 79.5 kg (175 lb 4.3 oz)   Body mass index is 20.78 kg/m².    Recommendations: Recommendation/Intervention: 1) Chane diet to DM 2000 kcal, cardiac diet- textuer per SLP 2) SLP eval 3) weigh weekly 4) Boost glcose control chocolate BID 5) Nutrition education given  Goals: 1) PO intakes > 75% of meals and supplements at f/u    Patient has been screened and assessed by RD. RD will follow patient.

## 2022-09-03 NOTE — ASSESSMENT & PLAN NOTE
Chronic, controlled.  Latest blood pressure and vitals reviewed-   Temp:  [96.9 °F (36.1 °C)-97.9 °F (36.6 °C)]   Pulse:  [59-71]   Resp:  [16-18]   BP: (110-140)/(58-65)   SpO2:  [93 %-99 %] .   Home meds for hypertension were reviewed and noted below.   Hypertension Medications             losartan (COZAAR) 25 MG tablet 1 tablet DAILY (route: oral)          While in the hospital, will manage blood pressure as follows; Continue home antihypertensive regimen    Will utilize p.r.n. blood pressure medication only if patient's blood pressure greater than  180/110 and he develops symptoms such as worsening chest pain or shortness of breath.

## 2022-09-03 NOTE — PLAN OF CARE
Recommendations  1) Chane diet to DM 2000 kcal, cardiac diet- textuer per SLP   2) SLP eval   3) weigh weekly   4) Boost glcose control chocolate BID   5) Nutrition education given    Goals: 1) PO intakes > 75% of meals and supplements at f/u  Nutrition Goal Status: new  Communication of RD Recs: reviewed with physician (POC, sticky note)

## 2022-09-03 NOTE — CONSULTS
[facility]  Adult Nutrition  Consult Note    SUMMARY     Recommendations  1) Chane diet to DM 2000 kcal, cardiac diet- texture per SLP   2) SLP eval   3) weigh weekly   4) Boost glcose control chocolate BID   5) Nutrition education given    Goals: 1) PO intakes > 75% of meals and supplements at f/u  Nutrition Goal Status: new  Communication of RD Recs: reviewed with physician (POC, yoav note)    Assessment and Plan    Moderate malnutrition  Contributing Nutrition Diagnosis  Moderate chronic illness related malnutrition    Related to (etiology):   Limited access to food and trouble swallowing    Signs and Symptoms (as evidenced by):   1) PO intakes < 75% of needs x at least 4 weeks  2) mild-moderate wasting as charted below  3) altered skin integrity  4) 20% wt loss x 1 year    Interventions:  above    Nutrition Diagnosis Status:   new           Malnutrition Assessment  Malnutrition Type: chronic illness  Skin (Micronutrient):  (amrita = 19, spinal redness, thigh skin tag)   Micronutrient Evaluation: suspected deficiency  Micronutrient Evaluation Comments: K, check iron   Weight Loss (Malnutrition): 20% in 1 year  Energy Intake (Malnutrition): less than 75% for greater than or equal to 1 month   Orbital Region (Subcutaneous Fat Loss): mild depletion  Upper Arm Region (Subcutaneous Fat Loss): moderate depletion  Thoracic and Lumbar Region: mild depletion   Anglican Region (Muscle Loss): mild depletion  Clavicle Bone Region (Muscle Loss): mild depletion  Clavicle and Acromion Bone Region (Muscle Loss): mild depletion  Scapular Bone Region (Muscle Loss): mild depletion  Dorsal Hand (Muscle Loss): moderate depletion  Patellar Region (Muscle Loss): moderate depletion  Anterior Thigh Region (Muscle Loss): mild depletion  Posterior Calf Region (Muscle Loss): mild depletion   Edema (Fluid Accumulation): 0-->no edema present   Subcutaneous Fat Loss (Final Summary): moderate protein-calorie  "malnutrition  Muscle Loss Evaluation (Final Summary): moderate protein-calorie malnutrition         Reason for Assessment    Reason For Assessment: consult  Diagnosis:  (complicated UTI)  Relevant Medical History: HTN, DM2, depression, CVA, fatty liver, PEG ( removed > 1 yr ago per pt)  Interdisciplinary Rounds: did not attend    General Information Comments: 67 y/o male admitted with UTI s/p constipation x 1-2 weeks. Lives with son at home but doesn't get great care, son does not cook many meals for pt though he asks him to and pt just started getting meals on wheels. Pt with dysphagia and PEG placement in the past, worked up to eating regular textures, but in the past 3-4 weeks has had more trouble swallowing- needing soft texture/moist meats. NFPE 9/3/22 mild-moderate wasting seen. Agreeable to Boost. I also gave pt instructions on carb counting, though he doesn't make his own meals, but he does give himself insulin BID, typically eats breakfast and then sleeps all day eating his next meal at 7-8 PM. I encouraged pt to eat 3 x daily and set reminders on his phone to wake up for lunch.    Nutrition Discharge Planning: To be determined- DM 96300 kcal, cardiac diet + Boost glucose control BID    Nutrition Risk Screen    Nutrition Risk Screen: dysphagia or difficulty swallowing, large or nonhealing wound, burn or pressure injury    Nutrition/Diet History    Spiritual, Cultural Beliefs, Jehovah's witness Practices, Values that Affect Care: no  Food Allergies: NKFA  Factors Affecting Nutritional Intake: difficulty/impaired swallowing, decreased appetite    Anthropometrics    Temp: 97.4 °F (36.3 °C)  Height Method: Stated  Height: 6' 5"  Height (inches): 77 in  Weight Method: Bed Scale  Weight: 79.5 kg (175 lb 4.3 oz)  Weight (lb): 175.27 lb  Ideal Body Weight (IBW), Male: 208 lb  % Ideal Body Weight, Male (lb): 84.26 %  BMI (Calculated): 20.8  BMI Grade: 18.5-24.9 - normal  Weight Loss: unintentional  Usual Body Weight (UBW), " k.6 kg (21)  % Usual Body Weight: 79.99  % Weight Change From Usual Weight: -20.18 %       Lab/Procedures/Meds    Pertinent Labs Reviewed: reviewed  BMP  Lab Results   Component Value Date     2022    K 3.2 (L) 2022     2022    CO2 24 2022    BUN 13 2022    CREATININE 0.7 2022    CALCIUM 9.2 2022    ANIONGAP 9 2022    ESTGFRAFRICA >60 2022    EGFRNONAA >60 2022     POCT Glucose   Date Value Ref Range Status   2022 148 (H) 70 - 110 mg/dL Final   2022 149 (H) 70 - 110 mg/dL Final   2022 144 (H) 70 - 110 mg/dL Final   2022 119 (H) 70 - 110 mg/dL Final   2022 119 (H) 70 - 110 mg/dL Final     Lab Results   Component Value Date    HGBA1C 5.8 (H) 2022     Lab Results   Component Value Date    ALBUMIN 3.4 (L) 2022       Pertinent Medications Reviewed: reviewed  Pertinent Medications Comments: insulin, KCl, statin, senna    Estimated/Assessed Needs    Weight Used For Calorie Calculations: 79.5 kg (175 lb 4.3 oz)  Energy Calorie Requirements (kcal): MSj ( x 1.2-1.4) = 1358-6032 kcal  Energy Need Method: [place]  Protein Requirements: 1.2-1.5 g protein/kg ( wound/age) =  g  Weight Used For Protein Calculations: 79.5 kg (175 lb 4.3 oz)  Fluid Requirements (mL): 0-235-0 ml or per MD  Estimated Fluid Requirement Method: RDA Method  CHO Requirement: 227-277 g      Nutrition Prescription Ordered    Current Diet Order: DM 2000 kcal    Evaluation of Received Nutrient/Fluid Intake    Energy Calories Required: meeting needs  Protein Required: meeting needs  Fluid Required: not meeting needs  Tolerance: tolerating     Intake/Output Summary (Last 24 hours) at 9/3/2022 1130  Last data filed at 9/3/2022 0700  Gross per 24 hour   Intake 480 ml   Output 1726 ml   Net -1246 ml       % Intake of Estimated Energy Needs: %  % Meal Intake: %    Nutrition Risk    Level of Risk/Frequency of  Follow-up: moderate 2 x weekly      Monitor and Evaluation    Food and Nutrient Intake: energy intake, food and beverage intake  Food and Nutrient Adminstration: diet order  Anthropometric Measurements: weight  Biochemical Data, Medical Tests and Procedures: electrolyte and renal panel, gastrointestinal profile, glucose/endocrine profile  Nutrition-Focused Physical Findings: overall appearance, skin       Nutrition Follow-Up    RD Follow-up?: Yes

## 2022-09-03 NOTE — PT/OT/SLP EVAL
Speech Language Pathology Evaluation  Bedside Swallow    Patient Name:  Jhonny Almazan   MRN:  [id number]  Admitting Diagnosis: Complicated UTI (urinary tract infection)    Recommendations:                 General Recommendations:  Follow-up not indicated  Diet recommendations:  Minced & Moist Diet - IDDSI Level 5, Thin   Aspiration Precautions: Standard aspiration precautions   General Precautions: Standard, fall (minced-moist IDDSI 5)  Communication strategies:  none    History:     Past Medical History:   Diagnosis Date    Anticoagulant long-term use     Arthritis     Colon polyp     Diabetes mellitus     Diabetes mellitus, type 2     Fatty liver     Hypertension     Major depressive disorder 11/17/2020    -Continue Wellbutrin    PEG (percutaneous endoscopic gastrostomy) adjustment/replacement/removal 1/6/2021    Stroke 11/2020    left sided weakness    Urinary retention 9/7/2021       Past Surgical History:   Procedure Laterality Date    BACK SURGERY      COLONOSCOPY  07/18/2014    Dr. Crane: 22 colon polyps removed, hemorrhoids, erythema to sigmoid, repeat in 1 year for surveillance; biopsy: sigmoid erythema- showed unremarkable colonic mucosa, tubular adenomas and hyperplastic polyps    COLONOSCOPY N/A 5/3/2019    Procedure: COLONOSCOPY;  Surgeon: Guero Crane MD;  Location: [facility];  Service: Endoscopy;  Laterality: N/A;    COLONOSCOPY N/A 5/6/2019    Procedure: COLONOSCOPY;  Surgeon: Guero Crane MD;  Location: [facility];  Service: Endoscopy;  Laterality: N/A;    CYSTOSCOPY N/A 2/9/2022    Procedure: CYSTOSCOPY;  Surgeon: Jaylen Antonio Jr., MD;  Location: [facility];  Service: Urology;  Laterality: N/A;    EPIDURAL STEROID INJECTION INTO THORACIC SPINE N/A 8/30/2019    Procedure: Injection-steroid-epidural-thoracic;  Surgeon: Frantz Green MD;  Location: [facility] OR;  Service: Pain Management;  Laterality: N/A;  T6-7    ESOPHAGOGASTRODUODENOSCOPY N/A 4/26/2019    Procedure: EGD  (ESOPHAGOGASTRODUODENOSCOPY);  Surgeon: Guero Crane MD;  Location: [facility] ENDO;  Service: Endoscopy;  Laterality: N/A;    ESOPHAGOGASTRODUODENOSCOPY N/A 11/23/2020    Procedure: EGD (ESOPHAGOGASTRODUODENOSCOPY);  Surgeon: Guero Crane MD;  Location: [facility] ENDO;  Service: Endoscopy;  Laterality: N/A;    ESOPHAGOGASTRODUODENOSCOPY N/A 1/6/2021    Procedure: EGD (ESOPHAGOGASTRODUODENOSCOPY);  Surgeon: Guero Crane MD;  Location: [facility] ENDO;  Service: Endoscopy;  Laterality: N/A;    HERNIA REPAIR      TRANSRECTAL ULTRASOUND EXAMINATION N/A 2/9/2022    Procedure: ULTRASOUND, RECTAL APPROACH;  Surgeon: Jaylen Antonio Jr., MD;  Location: [facility] OR;  Service: Urology;  Laterality: N/A;  must text son miroslava, times and instructions given leave at 130       Social History: Patient lives with son.    Prior Intubation HX:  none this admission    Modified Barium Swallow: none in Epic    Chest X-Rays: none recent    Prior diet: soft solids, shredded meat, thin liquids  .    Subjective     I can eat it if I can chew it.  Meat is a problem,.  Not bread.  Patient goals: home     Objective:     Oral Musculature Evaluation  Dentition: scattered dentition, teeth in poor condition  Secretion Management: adequate  Mucosal Quality: adequate  Mandibular Strength and Mobility: WFL  Oral Labial Strength and Mobility: WFL  Lingual Strength and Mobility: WFL  Velar Elevation: WFL  Buccal Strength and Mobility: WFL  Volitional Cough: strong  Voice Prior to PO Intake: clear, mild dysarthria with imprecision    Bedside Swallow Eval:   Consistencies Assessed:  Thin liquids via cup & straw sips & 3 oz water challenge  Puree via spoon  Mixed consistencies orange slice  Solids bread      Oral Phase:   Prolonged mastication    Pharyngeal Phase:   no overt clinical signs/symptoms of aspiration  no overt clinical signs/symptoms of pharyngeal dysphagia    Compensatory Strategies  Add liquid as gravy before, or sip of liquid into mouth while  masticating.     Treatment: Re education re impressions & recommendations    Assessment:     Jhonny Almazan is a 66 y.o. male with an SLP diagnosis of Dysphagia.  He was previously seen 10/1/2021 for BSS following c/o dry mouth and unable to swallow food, with recommendation for dental soft textures and thin liquids, extra gravy on meat, add sips of liquid before swallowing.  Today, he was referred with the same complaints, and found to do well when utilizing these techniques.  Teeth appear to be in worse condition, and pt did better on minced-moist foods, altho he was able to tear off bites of bread and masticate adequately before swallowing with sip of liquid added.  Pt eats rapidly, adding food before swallowing the previous bite, and was educated re slowing rate. Recommend IDDSI 5 Minced-Moist textures, allow bread, thin liquids.  No tx.      Goals:   Multidisciplinary Problems       SLP Goals       Not on file                    Plan:     Patient to be seen:      Plan of Care expires:     Plan of Care reviewed with:  patient   SLP Follow-Up:  No       Discharge recommendations:      Barriers to Discharge:  None    Time Tracking:     SLP Treatment Date:   09/03/22  Speech Start Time:  1331  Speech Stop Time:  1350     Speech Total Time (min):  19 min    Billable Minutes: Eval Swallow and Oral Function 19 09/03/2022

## 2022-09-03 NOTE — PROGRESS NOTES
"[facility] Medicine  Progress Note    Patient Name: Jhonny Almazan  MRN: [id number]  Patient Class: IP- Inpatient   Admission Date: 9/2/2022  Length of Stay: 1 days  Attending Physician: Homa Steele MD  Primary Care Provider: Joey Whitehead MD        Subjective:     Principal Problem:Complicated UTI (urinary tract infection)        HPI:  Jhonny Almazan is a 66 year old  male who presented to the ED with CC of having problems with his olguin not draining properly. He reported he has olguin for around 2 years because he has difficulty urinating. He also reported that he has not had a BM for 1-1/2 weeks. ED MD changed out olguin and cath is draining well now. Pt denies fever, chills, sweats, sediment in cath/urine, blood in urine. Denies dec fluid intake. Denies any other problems. Has PMH DM, HTN, major depression, cva. Denies any problems with his chronic illnesses at this time. Denies dizziness, thirst, increasing weakness. Reported his mobility is "not very good:. Lives with son in a house and doesn't get cared for very well and doesn't eat much or follow a diabetic diet. Recently got "Meals on Wheels" arranged. Doesn't know current BG or BP. No longer has g-tube. Doesn't know specifics about medications, takes insulin twice a day.     UA in ED showed Nitrite Pos, Leuk 3+,Bld 2+. Recently admitted with MDRO- urine cs (8/3/) showed Kleibsiella >100,000 only sensitive to ertapenemem and meropenem. Will give Meropenem 1gm q 8hrs. K+ 3.2 on adm give po K+ in ED. Will recheck in am.  Will give Fleets enema and begin senokot. Explore placement.      Overview/Hospital Course:  No notes on file    Interval History: Notes reviewed, no acute events overnight. Patient seen resting in bed with no acute distress. He reports feeling better this morning. He denies any dysuria, hematuria, urinary frequency, N/V, or fever/chills at this time. ID consulted due to history of ESBL with " MDR. Continue IV meropenem. Dietitian recommended speech evalution given history of dysphagia. SLP consult pending. Pt/OT consulted for further evaluation and treatment. Discussed with case management regarding possible SNF placement. Disposition pending at this time. Will continue to monitor.     Review of Systems   Constitutional:  Positive for activity change and appetite change. Negative for fatigue and fever.   Respiratory:  Negative for cough, shortness of breath and wheezing.    Cardiovascular:  Negative for chest pain and palpitations.   Gastrointestinal:  Negative for abdominal distention, abdominal pain, nausea and vomiting.   Endocrine: Negative for polydipsia and polyuria.   Genitourinary:  Positive for difficulty urinating. Negative for dysuria, frequency and hematuria.   Musculoskeletal:  Negative for arthralgias and myalgias.   Neurological:  Positive for weakness. Negative for seizures and headaches.   Psychiatric/Behavioral:  Negative for behavioral problems and confusion.    All other systems reviewed and are negative.  Objective:     Vital Signs (Most Recent):  Temp: 97.5 °F (36.4 °C) (09/03/22 1134)  Pulse: 61 (09/03/22 1134)  Resp: 16 (09/03/22 1134)  BP: (!) 110/58 (09/03/22 1134)  SpO2: 98 % (09/03/22 1134)   Vital Signs (24h Range):  Temp:  [96.9 °F (36.1 °C)-97.9 °F (36.6 °C)] 97.5 °F (36.4 °C)  Pulse:  [59-71] 61  Resp:  [16-18] 16  SpO2:  [93 %-99 %] 98 %  BP: (110-140)/(58-65) 110/58     Weight: 79.5 kg (175 lb 4.3 oz)  Body mass index is 20.78 kg/m².    Intake/Output Summary (Last 24 hours) at 9/3/2022 1320  Last data filed at 9/3/2022 0700  Gross per 24 hour   Intake 480 ml   Output 1726 ml   Net -1246 ml      Physical Exam  Vitals and nursing note reviewed.   Constitutional:       General: He is not in acute distress.     Appearance: He is ill-appearing.      Comments: Disheveled, poorly groomed   HENT:      Head: Normocephalic and atraumatic.      Right Ear: External ear normal.       Left Ear: External ear normal.   Cardiovascular:      Rate and Rhythm: Normal rate and regular rhythm.      Pulses: Normal pulses.      Heart sounds: No murmur heard.    No gallop.   Pulmonary:      Effort: Pulmonary effort is normal. No respiratory distress.      Breath sounds: Normal breath sounds. No wheezing or rales.   Abdominal:      General: Abdomen is flat. There is no distension.      Palpations: Abdomen is soft.      Tenderness: There is no abdominal tenderness.   Genitourinary:     Comments: Catheter in place with dark yellow urine output  Musculoskeletal:         General: No swelling or tenderness. Normal range of motion.   Skin:     General: Skin is warm.      Coloration: Skin is not jaundiced.   Neurological:      General: No focal deficit present.      Mental Status: He is alert and oriented to person, place, and time.      Cranial Nerves: No cranial nerve deficit.      Sensory: No sensory deficit.   Psychiatric:         Mood and Affect: Mood normal.       Significant Labs: All pertinent labs within the past 24 hours have been reviewed.  CBC:   Recent Labs   Lab 09/02/22  1037 09/03/22  0429   WBC 8.11 4.79   HGB 12.1* 11.8*   HCT 35.2* 35.9*    158     CMP:   Recent Labs   Lab 09/02/22  1037 09/03/22  0429    138   K 3.2* 3.2*    105   CO2 27 24   * 133*   BUN 12 13   CREATININE 0.7 0.7   CALCIUM 9.7 9.2   ANIONGAP 6* 9       Significant Imaging: I have reviewed all pertinent imaging results/findings within the past 24 hours.      Assessment/Plan:      * Complicated UTI (urinary tract infection)  Monitor labs  IV abx  Await urine cs  ID consult pending     Moderate malnutrition  Nutrition consulted. Most recent weight and BMI monitored-     Malnutrition Type and Energy Intake  Malnutrition Type: chronic illness    Malnutrition (Moderate to Severe)  Weight Loss (Malnutrition): 20% in 1 year  Energy Intake (Malnutrition): less than 75% for greater than or equal to 1  month    Final Summary  Subcutaneous Fat Loss (Final Summary): moderate protein-calorie malnutrition  Muscle Loss Evaluation (Final Summary): moderate protein-calorie malnutrition         Measurements:  Wt Readings from Last 1 Encounters:   09/03/22 79.5 kg (175 lb 4.3 oz)   Body mass index is 20.78 kg/m².    Recommendations: Recommendation/Intervention: 1) Chane diet to DM 2000 kcal, cardiac diet- textuer per SLP 2) SLP eval 3) weigh weekly 4) Boost glcose control chocolate BID 5) Nutrition education given  Goals: 1) PO intakes > 75% of meals and supplements at f/u    Patient has been screened and assessed by RD. RD will follow patient.      Debility  Pt/OT consulted      Chronic indwelling Gutierrez catheter  Gutierrez care  Monitor urine output  Meropenem q8hrs  Urine culture pending      Major depressive disorder  Patient has persistent depression which is mild and is currently controlled. Will Continue anti-depressant medications. We will not consult psychiatry at this time. Patient does display psychosis at this time. Continue to monitor closely and adjust plan of care as needed.        Hypertension    Chronic, controlled.  Latest blood pressure and vitals reviewed-   Temp:  [96.9 °F (36.1 °C)-97.9 °F (36.6 °C)]   Pulse:  [59-71]   Resp:  [16-18]   BP: (110-140)/(58-65)   SpO2:  [93 %-99 %] .   Home meds for hypertension were reviewed and noted below.   Hypertension Medications             losartan (COZAAR) 25 MG tablet 1 tablet DAILY (route: oral)          While in the hospital, will manage blood pressure as follows; Continue home antihypertensive regimen    Will utilize p.r.n. blood pressure medication only if patient's blood pressure greater than  180/110 and he develops symptoms such as worsening chest pain or shortness of breath.    Hyperlipidemia associated with type 2 diabetes mellitus  Patient's FSGs are controlled on current medication regimen.  Last A1c reviewed-   Lab Results   Component Value Date     HGBA1C 5.8 (H) 09/02/2022     Most recent fingerstick glucose reviewed-   Recent Labs   Lab 09/02/22  2353 09/03/22  0601 09/03/22  0745 09/03/22  1122   POCTGLUCOSE 144* 149* 148* 161*     Current correctional scale  Low  Maintain anti-hyperglycemic dose as follows-   Antihyperglycemics (From admission, onward)    Start     Stop Route Frequency Ordered    09/02/22 2100  insulin detemir U-100 pen 10 Units         -- SubQ Nightly 09/02/22 1434    09/02/22 1532  insulin aspart U-100 pen 0-5 Units         -- SubQ Every 6 hours PRN 09/02/22 1434        Hold Oral hypoglycemics while patient is in the hospital.      VTE Risk Mitigation (From admission, onward)         Ordered     Place sequential compression device  Until discontinued         09/02/22 1434     IP VTE LOW RISK PATIENT  Once         09/02/22 1434                Discharge Planning   YUDITH:      Code Status: Full Code   Is the patient medically ready for discharge?:     Reason for patient still in hospital (select all that apply): Patient trending condition, Consult recommendations and Pending disposition                     Claus Dumont PA-C  Department of Hospital Medicine   [facility]

## 2022-09-03 NOTE — PT/OT/SLP PROGRESS
Physical Therapy      Patient Name:  Jhonny Almazan   MRN:  [id number]    Patient not seen today secondary to Patient unwilling to participate, Pain. Will follow-up 9/04.

## 2022-09-03 NOTE — SUBJECTIVE & OBJECTIVE
Interval History: Notes reviewed, no acute events overnight. Patient seen resting in bed with no acute distress. He reports feeling better this morning. He denies any dysuria, hematuria, urinary frequency, N/V, or fever/chills at this time. ID consulted due to history of ESBL with MDR. Continue IV meropenem. Dietitian recommended speech evalution giving history of dysphagia. SLP consult pending. Pt/OT consulted for further evaluation and treatment. Discussed with case management regarding possible SNF placement. Disposition pending at this time. Will continue to monitor.     Review of Systems   Constitutional:  Positive for activity change and appetite change. Negative for fatigue and fever.   Respiratory:  Negative for cough, shortness of breath and wheezing.    Cardiovascular:  Negative for chest pain and palpitations.   Gastrointestinal:  Negative for abdominal distention, abdominal pain, nausea and vomiting.   Endocrine: Negative for polydipsia and polyuria.   Genitourinary:  Positive for difficulty urinating. Negative for dysuria, frequency and hematuria.   Musculoskeletal:  Negative for arthralgias and myalgias.   Neurological:  Positive for weakness. Negative for seizures and headaches.   Psychiatric/Behavioral:  Negative for behavioral problems and confusion.    All other systems reviewed and are negative.  Objective:     Vital Signs (Most Recent):  Temp: 97.5 °F (36.4 °C) (09/03/22 1134)  Pulse: 61 (09/03/22 1134)  Resp: 16 (09/03/22 1134)  BP: (!) 110/58 (09/03/22 1134)  SpO2: 98 % (09/03/22 1134)   Vital Signs (24h Range):  Temp:  [96.9 °F (36.1 °C)-97.9 °F (36.6 °C)] 97.5 °F (36.4 °C)  Pulse:  [59-71] 61  Resp:  [16-18] 16  SpO2:  [93 %-99 %] 98 %  BP: (110-140)/(58-65) 110/58     Weight: 79.5 kg (175 lb 4.3 oz)  Body mass index is 20.78 kg/m².    Intake/Output Summary (Last 24 hours) at 9/3/2022 1320  Last data filed at 9/3/2022 0700  Gross per 24 hour   Intake 480 ml   Output 1726 ml   Net -1246 ml       Physical Exam  Vitals and nursing note reviewed.   Constitutional:       General: He is not in acute distress.     Appearance: He is ill-appearing.      Comments: Disheveled, poorly groomed   HENT:      Head: Normocephalic and atraumatic.      Right Ear: External ear normal.      Left Ear: External ear normal.   Cardiovascular:      Rate and Rhythm: Normal rate and regular rhythm.      Pulses: Normal pulses.      Heart sounds: No murmur heard.    No gallop.   Pulmonary:      Effort: Pulmonary effort is normal. No respiratory distress.      Breath sounds: Normal breath sounds. No wheezing or rales.   Abdominal:      General: Abdomen is flat. There is no distension.      Palpations: Abdomen is soft.      Tenderness: There is no abdominal tenderness.   Genitourinary:     Comments: Catheter in place with dark yellow urine output  Musculoskeletal:         General: No swelling or tenderness. Normal range of motion.   Skin:     General: Skin is warm.      Coloration: Skin is not jaundiced.   Neurological:      General: No focal deficit present.      Mental Status: He is alert and oriented to person, place, and time.      Cranial Nerves: No cranial nerve deficit.      Sensory: No sensory deficit.   Psychiatric:         Mood and Affect: Mood normal.       Significant Labs: All pertinent labs within the past 24 hours have been reviewed.  CBC:   Recent Labs   Lab 09/02/22  1037 09/03/22  0429   WBC 8.11 4.79   HGB 12.1* 11.8*   HCT 35.2* 35.9*    158     CMP:   Recent Labs   Lab 09/02/22  1037 09/03/22  0429    138   K 3.2* 3.2*    105   CO2 27 24   * 133*   BUN 12 13   CREATININE 0.7 0.7   CALCIUM 9.7 9.2   ANIONGAP 6* 9       Significant Imaging: I have reviewed all pertinent imaging results/findings within the past 24 hours.

## 2022-09-03 NOTE — ASSESSMENT & PLAN NOTE
Patient's FSGs are controlled on current medication regimen.  Last A1c reviewed-   Lab Results   Component Value Date    HGBA1C 5.8 (H) 09/02/2022     Most recent fingerstick glucose reviewed-   Recent Labs   Lab 09/02/22  2353 09/03/22  0601 09/03/22  0745 09/03/22  1122   POCTGLUCOSE 144* 149* 148* 161*     Current correctional scale  Low  Maintain anti-hyperglycemic dose as follows-   Antihyperglycemics (From admission, onward)    Start     Stop Route Frequency Ordered    09/02/22 2100  insulin detemir U-100 pen 10 Units         -- SubQ Nightly 09/02/22 1434    09/02/22 1532  insulin aspart U-100 pen 0-5 Units         -- SubQ Every 6 hours PRN 09/02/22 1434        Hold Oral hypoglycemics while patient is in the hospital.

## 2022-09-04 PROBLEM — I10 HYPERTENSION: Chronic | Status: RESOLVED | Noted: 2020-11-16 | Resolved: 2022-09-04

## 2022-09-04 LAB
ANION GAP SERPL CALC-SCNC: 9 MMOL/L (ref 8–16)
BASOPHILS # BLD AUTO: 0.05 K/UL (ref 0–0.2)
BASOPHILS NFR BLD: 1 % (ref 0–1.9)
BUN SERPL-MCNC: 12 MG/DL (ref 8–23)
CALCIUM SERPL-MCNC: 9 MG/DL (ref 8.7–10.5)
CHLORIDE SERPL-SCNC: 108 MMOL/L (ref 95–110)
CO2 SERPL-SCNC: 22 MMOL/L (ref 23–29)
CREAT SERPL-MCNC: 0.7 MG/DL (ref 0.5–1.4)
DIFFERENTIAL METHOD: ABNORMAL
EOSINOPHIL # BLD AUTO: 0.2 K/UL (ref 0–0.5)
EOSINOPHIL NFR BLD: 4.2 % (ref 0–8)
ERYTHROCYTE [DISTWIDTH] IN BLOOD BY AUTOMATED COUNT: 14.6 % (ref 11.5–14.5)
EST. GFR  (NO RACE VARIABLE): >60 ML/MIN/1.73 M^2
GLUCOSE SERPL-MCNC: 179 MG/DL (ref 70–110)
HCT VFR BLD AUTO: 34.2 % (ref 40–54)
HGB BLD-MCNC: 11.2 G/DL (ref 14–18)
IMM GRANULOCYTES # BLD AUTO: 0.01 K/UL (ref 0–0.04)
IMM GRANULOCYTES NFR BLD AUTO: 0.2 % (ref 0–0.5)
LYMPHOCYTES # BLD AUTO: 1.3 K/UL (ref 1–4.8)
LYMPHOCYTES NFR BLD: 25.1 % (ref 18–48)
MCH RBC QN AUTO: 27.6 PG (ref 27–31)
MCHC RBC AUTO-ENTMCNC: 32.7 G/DL (ref 32–36)
MCV RBC AUTO: 84 FL (ref 82–98)
MONOCYTES # BLD AUTO: 0.6 K/UL (ref 0.3–1)
MONOCYTES NFR BLD: 11.8 % (ref 4–15)
NEUTROPHILS # BLD AUTO: 2.9 K/UL (ref 1.8–7.7)
NEUTROPHILS NFR BLD: 57.7 % (ref 38–73)
NRBC BLD-RTO: 0 /100 WBC
PLATELET # BLD AUTO: 175 K/UL (ref 150–450)
PMV BLD AUTO: 9.7 FL (ref 9.2–12.9)
POCT GLUCOSE: 131 MG/DL (ref 70–110)
POCT GLUCOSE: 149 MG/DL (ref 70–110)
POCT GLUCOSE: 158 MG/DL (ref 70–110)
POCT GLUCOSE: 162 MG/DL (ref 70–110)
POTASSIUM SERPL-SCNC: 3.6 MMOL/L (ref 3.5–5.1)
RBC # BLD AUTO: 4.06 M/UL (ref 4.6–6.2)
SODIUM SERPL-SCNC: 139 MMOL/L (ref 136–145)
WBC # BLD AUTO: 4.98 K/UL (ref 3.9–12.7)

## 2022-09-04 PROCEDURE — 97530 THERAPEUTIC ACTIVITIES: CPT

## 2022-09-04 PROCEDURE — 80048 BASIC METABOLIC PNL TOTAL CA: CPT | Performed by: NURSE PRACTITIONER

## 2022-09-04 PROCEDURE — 97161 PT EVAL LOW COMPLEX 20 MIN: CPT

## 2022-09-04 PROCEDURE — 11000001 HC ACUTE MED/SURG PRIVATE ROOM

## 2022-09-04 PROCEDURE — 86580 TB INTRADERMAL TEST: CPT

## 2022-09-04 PROCEDURE — 25000003 PHARM REV CODE 250: Performed by: NURSE PRACTITIONER

## 2022-09-04 PROCEDURE — 97116 GAIT TRAINING THERAPY: CPT

## 2022-09-04 PROCEDURE — 85025 COMPLETE CBC W/AUTO DIFF WBC: CPT | Performed by: NURSE PRACTITIONER

## 2022-09-04 PROCEDURE — 27000207 HC ISOLATION

## 2022-09-04 PROCEDURE — 36415 COLL VENOUS BLD VENIPUNCTURE: CPT | Performed by: NURSE PRACTITIONER

## 2022-09-04 PROCEDURE — 99231 PR SUBSEQUENT HOSPITAL CARE,LEVL I: ICD-10-PCS | Mod: S$GLB,,, | Performed by: INTERNAL MEDICINE

## 2022-09-04 PROCEDURE — 30200315 PPD INTRADERMAL TEST REV CODE 302

## 2022-09-04 PROCEDURE — 99231 SBSQ HOSP IP/OBS SF/LOW 25: CPT | Mod: S$GLB,,, | Performed by: INTERNAL MEDICINE

## 2022-09-04 PROCEDURE — 25000003 PHARM REV CODE 250

## 2022-09-04 PROCEDURE — 25000003 PHARM REV CODE 250: Performed by: EMERGENCY MEDICINE

## 2022-09-04 PROCEDURE — 63600175 PHARM REV CODE 636 W HCPCS: Performed by: NURSE PRACTITIONER

## 2022-09-04 RX ADMIN — POTASSIUM CHLORIDE 40 MEQ: 1500 TABLET, EXTENDED RELEASE ORAL at 08:09

## 2022-09-04 RX ADMIN — BUPROPION HYDROCHLORIDE 300 MG: 150 TABLET, FILM COATED, EXTENDED RELEASE ORAL at 09:09

## 2022-09-04 RX ADMIN — GABAPENTIN 300 MG: 300 CAPSULE ORAL at 09:09

## 2022-09-04 RX ADMIN — MUPIROCIN: 20 OINTMENT TOPICAL at 08:09

## 2022-09-04 RX ADMIN — MEROPENEM AND SODIUM CHLORIDE 1 G: 1 INJECTION, SOLUTION INTRAVENOUS at 09:09

## 2022-09-04 RX ADMIN — TUBERCULIN PURIFIED PROTEIN DERIVATIVE 5 UNITS: 5 INJECTION, SOLUTION INTRADERMAL at 08:09

## 2022-09-04 RX ADMIN — GABAPENTIN 300 MG: 300 CAPSULE ORAL at 08:09

## 2022-09-04 RX ADMIN — MEROPENEM AND SODIUM CHLORIDE 1 G: 1 INJECTION, SOLUTION INTRAVENOUS at 10:09

## 2022-09-04 RX ADMIN — POTASSIUM CHLORIDE 40 MEQ: 1500 TABLET, EXTENDED RELEASE ORAL at 09:09

## 2022-09-04 RX ADMIN — INSULIN DETEMIR 10 UNITS: 100 INJECTION, SOLUTION SUBCUTANEOUS at 08:09

## 2022-09-04 RX ADMIN — MUPIROCIN: 20 OINTMENT TOPICAL at 09:09

## 2022-09-04 RX ADMIN — ATORVASTATIN CALCIUM 40 MG: 40 TABLET, FILM COATED ORAL at 09:09

## 2022-09-04 RX ADMIN — MEROPENEM AND SODIUM CHLORIDE 1 G: 1 INJECTION, SOLUTION INTRAVENOUS at 03:09

## 2022-09-04 RX ADMIN — CLOPIDOGREL BISULFATE 75 MG: 75 TABLET, FILM COATED ORAL at 09:09

## 2022-09-04 RX ADMIN — FINASTERIDE 5 MG: 5 TABLET, FILM COATED ORAL at 09:09

## 2022-09-04 NOTE — PROGRESS NOTES
"[facility] [place] [facility]  Progress Note    Patient Name: Jhonny Almazan  MRN: [id number]  Patient Class: IP- Inpatient   Admission Date: 9/2/2022  Length of Stay: 2 days  Attending Physician: Homa Steele MD  Primary Care Provider: Joey Whitehead MD        Subjective:     Principal Problem:Complicated UTI (urinary tract infection)        HPI:  Jhonny Almazan is a 66 year old  male who presented to the ED with CC of having problems with his olguin not draining properly. He reported he has olguin for around 2 years because he has difficulty urinating. He also reported that he has not had a BM for 1-1/2 weeks. ED MD changed out olguin and cath is draining well now. Pt denies fever, chills, sweats, sediment in cath/urine, blood in urine. Denies dec fluid intake. Denies any other problems. Has PMH DM, HTN, major depression, cva. Denies any problems with his chronic illnesses at this time. Denies dizziness, thirst, increasing weakness. Reported his mobility is "not very good:. Lives with son in a house and doesn't get cared for very well and doesn't eat much or follow a diabetic diet. Recently got "Meals on Wheels" arranged. Doesn't know current BG or BP. No longer has g-tube. Doesn't know specifics about medications, takes insulin twice a day.     UA in ED showed Nitrite Pos, Leuk 3+,Bld 2+. Recently admitted with MDRO- urine cs (8/3/) showed Kleibsiella >100,000 only sensitive to ertapenemem and meropenem. Will give Meropenem 1gm q 8hrs. K+ 3.2 on adm give po K+ in ED. Will recheck in am.  Will give Fleets enema and begin senokot. Explore placement.      Overview/Hospital Course:  No notes on file    Interval History: Notes reviewed, no acute events overnight. Patient seen this morning resting in bed with no acute distress. He reports feeling fine this morning. UA preliminary results in GNR. Susceptibility pending. Blood cultures pending. ID recommends continuing IV meropenem. " SNF disposition pending insurance acceptance. Instructed patient to work with Pt/Ot to facilitate SNF placement. Will continue to monitor and follow ID recs.     Review of Systems   Constitutional:  Positive for fatigue. Negative for chills and fever.   Respiratory:  Negative for cough, shortness of breath and wheezing.    Cardiovascular:  Negative for chest pain and palpitations.   Gastrointestinal:  Negative for abdominal distention, abdominal pain, nausea and vomiting.   Genitourinary:  Negative for difficulty urinating, frequency and hematuria.   Musculoskeletal:  Negative for arthralgias and myalgias.   Neurological:  Positive for weakness. Negative for seizures and headaches.   Psychiatric/Behavioral:  Negative for behavioral problems and confusion.    All other systems reviewed and are negative.  Objective:     Vital Signs (Most Recent):  Temp: 97.9 °F (36.6 °C) (09/04/22 0749)  Pulse: (!) 59 (09/04/22 0749)  Resp: 16 (09/04/22 0749)  BP: 131/69 (09/04/22 0749)  SpO2: 96 % (09/04/22 0749)   Vital Signs (24h Range):  Temp:  [97.2 °F (36.2 °C)-98.1 °F (36.7 °C)] 97.9 °F (36.6 °C)  Pulse:  [59-76] 59  Resp:  [16-19] 16  SpO2:  [96 %-98 %] 96 %  BP: (103-131)/(57-69) 131/69     Weight: 79.5 kg (175 lb 4.3 oz)  Body mass index is 20.78 kg/m².    Intake/Output Summary (Last 24 hours) at 9/4/2022 1040  Last data filed at 9/3/2022 1800  Gross per 24 hour   Intake --   Output 1200 ml   Net -1200 ml      Physical Exam  Vitals and nursing note reviewed.   Constitutional:       General: He is not in acute distress.     Appearance: Normal appearance. He is not ill-appearing.      Comments: Disheveled, poorly groomed    HENT:      Head: Normocephalic and atraumatic.      Right Ear: External ear normal.      Left Ear: External ear normal.   Cardiovascular:      Rate and Rhythm: Normal rate and regular rhythm.      Pulses: Normal pulses.      Heart sounds: Normal heart sounds. No murmur heard.    No gallop.   Pulmonary:       Effort: Pulmonary effort is normal. No respiratory distress.      Breath sounds: Normal breath sounds. No wheezing or rales.   Abdominal:      General: Abdomen is flat. There is no distension.      Palpations: Abdomen is soft.      Tenderness: There is abdominal tenderness. There is no guarding.      Comments: Mild tenderness to palpation of suprapubic area   Genitourinary:     Comments: Catheter in place with dark yellow urine output  Musculoskeletal:         General: No swelling or tenderness. Normal range of motion.   Skin:     General: Skin is warm.      Coloration: Skin is not jaundiced.      Findings: No bruising.   Neurological:      General: No focal deficit present.      Mental Status: He is alert and oriented to person, place, and time.      Cranial Nerves: No cranial nerve deficit.      Sensory: No sensory deficit.   Psychiatric:         Mood and Affect: Mood normal.         Behavior: Behavior normal.       Significant Labs: All pertinent labs within the past 24 hours have been reviewed.  CBC:   Recent Labs   Lab 09/03/22 0429 09/04/22 0422   WBC 4.79 4.98   HGB 11.8* 11.2*   HCT 35.9* 34.2*    175     CMP:   Recent Labs   Lab 09/03/22 0429 09/04/22 0422    139   K 3.2* 3.6    108   CO2 24 22*   * 179*   BUN 13 12   CREATININE 0.7 0.7   CALCIUM 9.2 9.0   ANIONGAP 9 9       Significant Imaging: I have reviewed all pertinent imaging results/findings within the past 24 hours.      Assessment/Plan:      * Complicated UTI (urinary tract infection)  Monitor labs  IV abx  Await urine cs- preliminary results of GNR  ID consulted: appreciate recs  Blood cultures pending    Moderate malnutrition  Nutrition consulted. Most recent weight and BMI monitored-     Malnutrition Type and Energy Intake  Malnutrition Type: chronic illness    Malnutrition (Moderate to Severe)  Weight Loss (Malnutrition): 20% in 1 year  Energy Intake (Malnutrition): less than 75% for greater than or equal to 1  month    Final Summary  Subcutaneous Fat Loss (Final Summary): moderate protein-calorie malnutrition  Muscle Loss Evaluation (Final Summary): moderate protein-calorie malnutrition         Measurements:  Wt Readings from Last 1 Encounters:   09/03/22 79.5 kg (175 lb 4.3 oz)   Body mass index is 20.78 kg/m².    Recommendations: Recommendation/Intervention: 1) Chane diet to DM 2000 kcal, cardiac diet- textuer per SLP 2) SLP eval 3) weigh weekly 4) Boost glcose control chocolate BID 5) Nutrition education given  Goals: 1) PO intakes > 75% of meals and supplements at f/u    Patient has been screened and assessed by RD. RD will follow patient.      Debility  Pt/OT consulted  Awaiting SNF disposition     Chronic indwelling Gutierrez catheter  Gutierrez care  Monitor urine output  Meropenem q8hrs  Urine culture pending: preliminary results - GNR      Major depressive disorder  Patient has persistent depression which is mild and is currently controlled. Will Continue anti-depressant medications. We will not consult psychiatry at this time. Patient does display psychosis at this time. Continue to monitor closely and adjust plan of care as needed.        Hyperlipidemia associated with type 2 diabetes mellitus  Patient's FSGs are controlled on current medication regimen.  Last A1c reviewed-   Lab Results   Component Value Date    HGBA1C 5.8 (H) 09/02/2022     Most recent fingerstick glucose reviewed-   Recent Labs   Lab 09/03/22  1558 09/03/22 2016 09/04/22  0104 09/04/22  0728   POCTGLUCOSE 153* 219* 158* 149*     Current correctional scale  Low  Maintain anti-hyperglycemic dose as follows-   Antihyperglycemics (From admission, onward)    Start     Stop Route Frequency Ordered    09/02/22 2100  insulin detemir U-100 pen 10 Units         -- SubQ Nightly 09/02/22 1434    09/02/22 1532  insulin aspart U-100 pen 0-5 Units         -- SubQ Every 6 hours PRN 09/02/22 1434        Hold Oral hypoglycemics while patient is in the  hospital.    Hypertension associated with diabetes  Chronic, controlled.  Latest blood pressure and vitals reviewed-   Temp:  [97.2 °F (36.2 °C)-98.1 °F (36.7 °C)]   Pulse:  [59-76]   Resp:  [16-19]   BP: (103-131)/(57-69)   SpO2:  [96 %-98 %] .   Home meds for hypertension were reviewed and noted below.   Hypertension Medications             losartan (COZAAR) 25 MG tablet 1 tablet DAILY (route: oral)          While in the hospital, will manage blood pressure as follows; Continue home antihypertensive regimen    Will utilize p.r.n. blood pressure medication only if patient's blood pressure greater than  180/110 and he develops symptoms such as worsening chest pain or shortness of breath.      VTE Risk Mitigation (From admission, onward)         Ordered     Place sequential compression device  Until discontinued         09/02/22 1434     IP VTE LOW RISK PATIENT  Once         09/02/22 1434                Discharge Planning   YUDITH:      Code Status: Full Code   Is the patient medically ready for discharge?:     Reason for patient still in hospital (select all that apply): Treatment and Pending disposition  Discharge Plan A: Skilled Nursing Facility                  Claus Dumont PA-C  Department of Hospital Medicine   [facility]

## 2022-09-04 NOTE — ASSESSMENT & PLAN NOTE
Gutierrez care  Monitor urine output  Meropenem q8hrs  Urine culture pending: preliminary results - GNR

## 2022-09-04 NOTE — ASSESSMENT & PLAN NOTE
Chronic, controlled.  Latest blood pressure and vitals reviewed-   Temp:  [97.2 °F (36.2 °C)-98.1 °F (36.7 °C)]   Pulse:  [59-76]   Resp:  [16-19]   BP: (103-131)/(57-69)   SpO2:  [96 %-98 %] .   Home meds for hypertension were reviewed and noted below.   Hypertension Medications             losartan (COZAAR) 25 MG tablet 1 tablet DAILY (route: oral)          While in the hospital, will manage blood pressure as follows; Continue home antihypertensive regimen    Will utilize p.r.n. blood pressure medication only if patient's blood pressure greater than  180/110 and he develops symptoms such as worsening chest pain or shortness of breath.

## 2022-09-04 NOTE — PROGRESS NOTES
Consult Note  Infectious Disease    Reason for Consult:  UTI    HPI: Jhonny Almazan is a 66 y.o. 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  Gutierrez catheter was replaced, and 600 ml of urine came out.  Urine is flowing well.  Urine culture are showing GNR.  Blood cultures are not sent.  Patient was given meropenem and feels great.  09/04/2022.  No new complaints.  No fever.  Urine info is flowing well.  WBC 4.9.    Antibiotics (From admission, onward)      Start     Stop Route Frequency Ordered    09/02/22 2100  mupirocin 2 % ointment         09/07 2059 Nasl 2 times daily 09/02/22 1439    09/02/22 1530  meropenem-0.9% sodium chloride 1 g/50 mL IVPB         -- IV Every 8 hours (non-standard times) 09/02/22 1434          Antifungals (From admission, onward)      None          Antivirals (From admission, onward)      None              Review of patient's allergies indicates:  No Known Allergies  Past Medical History:   Diagnosis Date    Anticoagulant long-term use     Arthritis     Colon polyp     Diabetes mellitus     Diabetes mellitus, type 2     Fatty liver     Hypertension     Major depressive disorder 11/17/2020    -Continue Wellbutrin    PEG (percutaneous endoscopic gastrostomy) adjustment/replacement/removal 1/6/2021    Stroke 11/2020    left sided weakness    Urinary retention 9/7/2021     Past Surgical History:   Procedure Laterality Date    BACK SURGERY      COLONOSCOPY  07/18/2014    Dr. Miles: 22 colon polyps removed, hemorrhoids, erythema to sigmoid, repeat in 1 year for surveillance; biopsy: sigmoid erythema- showed unremarkable colonic mucosa, tubular adenomas and hyperplastic polyps    COLONOSCOPY N/A  5/3/2019    Procedure: COLONOSCOPY;  Surgeon: Guero Crane MD;  Location: [facility] ENDO;  Service: Endoscopy;  Laterality: N/A;    COLONOSCOPY N/A 5/6/2019    Procedure: COLONOSCOPY;  Surgeon: Guero Crane MD;  Location: [facility] ENDO;  Service: Endoscopy;  Laterality: N/A;    CYSTOSCOPY N/A 2/9/2022    Procedure: CYSTOSCOPY;  Surgeon: Jaylen Antonio Jr., MD;  Location: [facility] OR;  Service: Urology;  Laterality: N/A;    EPIDURAL STEROID INJECTION INTO THORACIC SPINE N/A 8/30/2019    Procedure: Injection-steroid-epidural-thoracic;  Surgeon: Frantz Green MD;  Location: [facility] OR;  Service: Pain Management;  Laterality: N/A;  T6-7    ESOPHAGOGASTRODUODENOSCOPY N/A 4/26/2019    Procedure: EGD (ESOPHAGOGASTRODUODENOSCOPY);  Surgeon: Guero Crane MD;  Location: [facility] ENDO;  Service: Endoscopy;  Laterality: N/A;    ESOPHAGOGASTRODUODENOSCOPY N/A 11/23/2020    Procedure: EGD (ESOPHAGOGASTRODUODENOSCOPY);  Surgeon: Guero Crane MD;  Location: [facility] ENDO;  Service: Endoscopy;  Laterality: N/A;    ESOPHAGOGASTRODUODENOSCOPY N/A 1/6/2021    Procedure: EGD (ESOPHAGOGASTRODUODENOSCOPY);  Surgeon: Guero Crane MD;  Location: [facility] ENDO;  Service: Endoscopy;  Laterality: N/A;    HERNIA REPAIR      TRANSRECTAL ULTRASOUND EXAMINATION N/A 2/9/2022    Procedure: ULTRASOUND, RECTAL APPROACH;  Surgeon: Jaylen Antonio Jr., MD;  Location: [facility] OR;  Service: Urology;  Laterality: N/A;  must text son miroslava, times and instructions given leave at 130     Social History     Socioeconomic History    Marital status:    Tobacco Use    Smoking status: Former     Packs/day: 1.00     Years: 50.00     Pack years: 50.00     Types: Cigarettes    Smokeless tobacco: Never   Substance and Sexual Activity    Alcohol use: Yes     Alcohol/week: 14.0 standard drinks     Types: 14 Cans of beer per week     Comment: 2-3 beers daily    Drug use: No    Sexual activity: Yes     Partners: Female     Social Determinants of Health     Financial Resource  Strain: Low Risk     Difficulty of Paying Living Expenses: Not hard at all   Food Insecurity: Food Insecurity Present    Worried About Running Out of Food in the Last Year: Sometimes true    Ran Out of Food in the Last Year: Sometimes true   Transportation Needs: Unmet Transportation Needs    Lack of Transportation (Medical): Yes    Lack of Transportation (Non-Medical): Yes   Physical Activity: Insufficiently Active    Days of Exercise per Week: 1 day    Minutes of Exercise per Session: 10 min   Stress: No Stress Concern Present    Feeling of Stress : Only a little   Social Connections: Socially Isolated    Frequency of Communication with Friends and Family: Never    Frequency of Social Gatherings with Friends and Family: Never    Attends Yarsani Services: Never    Active Member of Clubs or Organizations: No    Attends Club or Organization Meetings: Never    Marital Status:    Housing Stability: Low Risk     Unable to Pay for Housing in the Last Year: No    Number of Places Lived in the Last Year: 1    Unstable Housing in the Last Year: No     Family History   Problem Relation Age of Onset    Heart disease Mother     Heart disease Father     Diabetes Brother     Suicide Brother     Brain cancer Brother     Colon cancer Neg Hx     Crohn's disease Neg Hx     Ulcerative colitis Neg Hx     Stomach cancer Neg Hx     Esophageal cancer Neg Hx     Glaucoma Neg Hx     Retinal detachment Neg Hx     Macular degeneration Neg Hx          Review of Systems:   No chills, fever, sweats, weight loss, but feeling poorly in general, generalized weakness  No change in vision, loss of vision or diplopia  No sinus congestion, purulent nasal discharge, post nasal drip or facial pain  No pain in mouth or throat. No problems with teeth, gums.  No chest pain, palpitations, syncope  No cough, sputum production, shortness of breath, dyspnea on exertion, pleurisy, hemoptysis  No nausea, vomiting,  blood in stool, or focal abd pain,     +  constipation when he came in , now he has diarrhea  Kinked olguin    strangury, retention, incontinence, nocturia, prostatism,   No penile discharge, genital ulcers, risk for STD, Some bleeding with urination  No swelling of joints, redness of joints, injuries, or new focal pain  No unusual headaches, dizziness, vertigo, numbness, paresthesias, neuropathy, falls  No anxiety,   +depression, substance abuse, sleep disturbance  + diabetes, No bleeding, lymphadenopathy, anemia, malignancy, unusual bruising  No new rashes, lesions, or wounds  No TB exposure  No recent/prior steroids  Outdoor activities:no  Travel: no  Implants:   Antibiotic History:     EXAM & DIAGNOSTICS REVIEWED:   Vitals:     Temp:  [97.2 °F (36.2 °C)-98.1 °F (36.7 °C)]   Temp: 97.5 °F (36.4 °C) (09/04/22 1138)  Pulse: 64 (09/04/22 1138)  Resp: 16 (09/04/22 1138)  BP: 130/70 (09/04/22 1138)  SpO2: 98 % (09/04/22 1138)    Intake/Output Summary (Last 24 hours) at 9/4/2022 1244  Last data filed at 9/3/2022 1800  Gross per 24 hour   Intake --   Output 1200 ml   Net -1200 ml       General:  In NAD. Alert and attentive, cooperative, comfortable,disheveled   Eyes:  Anicteric, EOMI  ENT:  No ulcers, exudates, thrush, nares patent, dentition is poor  Neck:  supple, no masses or adenopathy appreciated  Lungs: Clear, no consolidation, rales, wheezes, rub  Heart:  RRR, no gallop/murmur/rub noted  Abd:  Soft, mild abd discomfort with deep palpation on to  lower abd, improved. ND, normal BS, no masses or organomegaly appreciated.  :  Olguin, urine clear, no more dry blood over  glans penis; no flank tenderness  Musc:  Joints without effusion, swelling, erythema, synovitis, muscle wasting.   Skin:  No rashes. No palmar or plantar lesions. No subungual petechiae  Neuro:             Alert, attentive, speech fluent, face symmetric, moves all extremities, no focal weakness. Ambulatory with walker but he prefers to lay in bed and defecate in his  diaper  Psych:  Calm, cooperative  Lymphatic:     No cervical, supraclavicular, axillary, or inguinal nodes  Extrem: No edema, erythema, phlebitis, cellulitis, warm and well perfused, dry skin, thickened toenails  VAD:       Isolation:    Wound:            Lines/Tubes/Drains:    General Labs reviewed:  Recent Labs   Lab 09/02/22  1037 09/03/22  0429 09/04/22  0422   WBC 8.11 4.79 4.98   HGB 12.1* 11.8* 11.2*   HCT 35.2* 35.9* 34.2*    158 175       Recent Labs   Lab 09/02/22  1037 09/03/22  0429 09/04/22  0422    138 139   K 3.2* 3.2* 3.6    105 108   CO2 27 24 22*   BUN 12 13 12   CREATININE 0.7 0.7 0.7   CALCIUM 9.7 9.2 9.0     No results for input(s): CRP in the last 168 hours.      Micro:  Microbiology Results (last 7 days)       Procedure Component Value Units Date/Time    Blood culture [[id number]] Collected: 09/03/22 2346    Order Status: Sent Specimen: Blood from Antecubital, Right Arm Updated: 09/03/22 2346    Urine culture [[id number]]  (Abnormal) Collected: 09/02/22 1151    Order Status: Completed Specimen: Urine Updated: 09/03/22 2054     Urine Culture, Routine GRAM NEGATIVE RICHARD  > 100,000 cfu/ml  Identification and susceptibility pending      Narrative:      Specimen Source->Urine          08/03/2022 Klebsiella pneumoniae esbl    CULTURE, URINE   Amikacin <=16 mcg/mL Resistant   Amox/K Clav'ate <=8/4 mcg/mL Resistant   Amp/Sulbactam 16/8 mcg/mL Resistant   Ampicillin >16 mcg/mL Resistant   Cefazolin >16 mcg/mL Resistant   Cefepime >16 mcg/mL Resistant   Ceftriaxone >32 mcg/mL Resistant   Ciprofloxacin >2 mcg/mL Resistant   Ertapenem <=0.5 mcg/mL Sensitive   Gentamicin >8 mcg/mL Resistant   Levofloxacin >4 mcg/mL Resistant   Meropenem <=1 mcg/mL Sensitive   Nitrofurantoin >64 mcg/mL Resistant   Piperacillin/Tazo <=16 mcg/mL Resistant   Tobramycin 8 mcg/mL Intermediate   Trimeth/Sulfa >2/38 mcg/mL Resistant          Imaging Reviewed recent imaging   CT 08/05/2022:  mild infrarenal  abdominal aortic aneurysm measuring 3.7 cm maximum diameter.  Otherwise negative CT of the abdomen and pelvis.    Cardiology:    IMPRESSION & PLAN     Malfunctioning Olguin catheter, replace 09/02  UTI due to GNR, in setting of malfunctioning olguin cath; h/o UTI  PMHx DM 2, hb a1c 5.8, fatty liver, HTN, D LP, CVA with left-sided weakness, anticoagulation, PEG, chronic Olguin catheter x2 years, due to urinary retention depression, multiple surgeries    Recommendations:  Continue meropenem-- d3.    Follow current urine cultures   Blood cultures are negative today  He needs placement as he seems to be home less, he states he cannot go back to his son's home    Medical Decision Making during this encounter was  [_] Low Complexity  [_] Moderate Complexity  [ xx ] High Complexity

## 2022-09-04 NOTE — PLAN OF CARE
Problem: Adult Inpatient Plan of Care  Goal: Optimal Comfort and Wellbeing  Outcome: Ongoing, Progressing     Problem: Diabetes Comorbidity  Goal: Blood Glucose Level Within Targeted Range  Outcome: Ongoing, Progressing     Problem: Skin Injury Risk Increased  Goal: Skin Health and Integrity  Outcome: Ongoing, Progressing

## 2022-09-04 NOTE — PLAN OF CARE
[facility]  Initial Discharge Assessment       Primary Care Provider: Joey Whitehead MD    Admission Diagnosis: Urinary retention [R33.9]  ESBL (extended spectrum beta-lactamase) producing bacteria infection [A49.9, Z16.12]  Diabetes mellitus due to underlying condition with hyperglycemia [E08.65]  Complicated UTI (urinary tract infection) [N39.0]  Problem with Gutierrez catheter, initial encounter [T83.9XXA]  Urinary tract infection associated with indwelling urethral catheter, initial encounter [T83.511A, N39.0]    Admission Date: 9/2/2022  Expected Discharge Date:     Discharge Barriers Identified: None    Payor: [employer]ET[employer] MANAGED MEDICARE / Plan: [employer]ET[employer] MEDICARE PLAN PPO / Product Type: Medicare Advantage /     Extended Emergency Contact Information  Primary Emergency Contact: Rhysciear swartzson  Mobile Phone: [phone]  Relation: Son  Secondary Emergency Contact: kristiGurdeep  Mobile Phone: [phone]  Relation: Son    Discharge Plan A: Skilled Nursing Facility  Discharge Plan B: Home Health      [facility] - [place] - [address] [address]  [address]  [place] [address]  Phone: [phone] Fax: [phone]    [facility]  [address]  [place] [place] [address]  Phone: [phone] Fax: [phone]         Completed DC assessment with pt at bedside. Verified information on facesheet. Pt reports living at listed address with sonGurdeep. Verified PCP as Dr. Whitehead and pharmacy as [facility]. Denies POA. NOK 3 children.  active with [facility] . DME- WC, cane, raised toilet, and bsc. Reports that he is mostly WC bound but can transfer himself independently from bed to chair and etc. Reports activities of daily living are getting more challenging and would like to be placed in nursing home. Reports that his son provides transportation to [facility] and should provide transportation home upon DC. DC plan is SNF VS HH.        Initial Assessment (most recent)        Adult Discharge Assessment - 09/03/22 1600          Discharge Assessment    Assessment Type Discharge Planning Assessment     Confirmed/corrected address, phone number and insurance Yes     Confirmed Demographics Correct on Facesheet     Source of Information patient     Communicated YUDITH with patient/caregiver Yes     Reason For Admission UTI     Lives With child(rajeev), adult     Facility Arrived From: home     Do you expect to return to your current living situation? No     Do you have help at home or someone to help you manage your care at home? Yes     Prior to hospitilization cognitive status: Alert/Oriented     Current cognitive status: Alert/Oriented     Walking or Climbing Stairs Difficulty ambulation difficulty, requires equipment     Dressing/Bathing Difficulty bathing difficulty, requires equipment     Equipment Currently Used at Home wheelchair;walker, rolling;shower chair     Readmission within 30 days? No     Patient currently being followed by outpatient case management? No     Do you currently have service(s) that help you manage your care at home? Yes     Name and Contact number of agency [facility]     Is the pt/caregiver preference to resume services with current agency Yes     Do you take prescription medications? Yes     Do you have prescription coverage? Yes     Coverage [employer] mgd medicare     Do you have any problems affording any of your prescribed medications? TBD     Is the patient taking medications as prescribed? yes     Who is going to help you get home at discharge? son     How do you get to doctors appointments? family or friend will provide     Are you on dialysis? No     Do you take coumadin? No     Discharge Plan A Skilled Nursing Facility     Discharge Plan B Home Health     DME Needed Upon Discharge  none     Discharge Plan discussed with: Patient     Discharge Barriers Identified None        Physical Activity    On average, how many days per week do you engage in moderate to  strenuous exercise (like a brisk walk)? 1 day     On average, how many minutes do you engage in exercise at this level? 10 min        Financial Resource Strain    How hard is it for you to pay for the very basics like food, housing, medical care, and heating? Not hard at all        Housing Stability    In the last 12 months, was there a time when you were not able to pay the mortgage or rent on time? No     In the last 12 months, how many places have you lived? 1     In the last 12 months, was there a time when you did not have a steady place to sleep or slept in a shelter (including now)? No        Transportation Needs    In the past 12 months, has lack of transportation kept you from medical appointments or from getting medications? Yes     In the past 12 months, has lack of transportation kept you from meetings, work, or from getting things needed for daily living? Yes        Food Insecurity    Within the past 12 months, you worried that your food would run out before you got the money to buy more. Sometimes true     Within the past 12 months, the food you bought just didn't last and you didn't have money to get more. Sometimes true        Stress    Do you feel stress - tense, restless, nervous, or anxious, or unable to sleep at night because your mind is troubled all the time - these days? Only a little        Social Connections    In a typical week, how many times do you talk on the phone with family, friends, or neighbors? Never     How often do you get together with friends or relatives? Never     How often do you attend Buddhist or Tenriism services? Never     Do you belong to any clubs or organizations such as Buddhist groups, unions, fraternal or athletic groups, or school groups? No     How often do you attend meetings of the clubs or organizations you belong to? Never     Are you , , , , never , or living with a partner?         Alcohol Use    Q1: How often do  you have a drink containing alcohol? Never     Q2: How many drinks containing alcohol do you have on a typical day when you are drinking? Patient does not drink     Q3: How often do you have six or more drinks on one occasion? Never

## 2022-09-04 NOTE — PT/OT/SLP EVAL
Physical Therapy Evaluation    Patient Name:  Jhonny lAmazan   MRN:  [id number]    Recommendations:     Discharge Recommendations:  nursing facility, skilled   Discharge Equipment Recommendations: none   Barriers to discharge: Decreased caregiver support    Assessment:     Jhonny Almazan is a 66 y.o. male admitted with a medical diagnosis of Complicated UTI (urinary tract infection).  He presents with the following impairments/functional limitations:  weakness, impaired balance, decreased safety awareness, impaired endurance, impaired sensation, decreased lower extremity function, gait instability, impaired functional mobility, decreased coordination, impaired cardiopulmonary response to activity, pain; pt is pleasant with good motivation for Tx; pt is in contact isolation, so pt performed gait training inside the room x 60 ft with a RW min A with Gutierrez Cath and IV pole in tow, min unsteady, min kyphotic, rounded shldrs & fwd head posture; pt will benefit from skilled acute PT services to improve current level of functional mob and improve overall capacity to perform activities.    Rehab Prognosis: Good; patient would benefit from acute skilled PT services to address these deficits and reach maximum level of function.    Recent Surgery: * No surgery found *      Plan:     During this hospitalization, patient to be seen 6 x/week to address the identified rehab impairments via gait training, therapeutic activities, therapeutic exercises and progress toward the following goals:    Plan of Care Expires:  10/02/22    Subjective     Chief Complaint: c/o LBP and stiffness due to lying in bed x a while now  Patient/Family Comments/goals: get moving, and get better  Pain/Comfort:  Pain Rating 1: 5/10  Location - Side 1: Bilateral  Location - Orientation 1: lower  Location 1: back  Pain Addressed 1: Reposition, Nurse notified, Distraction  Pain Rating Post-Intervention 1: 0/10    Patients cultural, spiritual,  Voodoo conflicts given the current situation: no    Living Environment:  Lives with his son in a 1-river home, pt states his son is afraid always that pt is going to the hosp, that pt will contract Covid -19  Prior to admission, patients level of function was mod (I) / SBA with a RW.  Equipment used at home: wheelchair, walker, rolling, shower chair.  DME owned (not currently used): none.  Upon discharge, patient will have assistance from son.    Objective:     Communicated with BURAK Johansen prior to session.  Patient found HOB elevated with bed alarm, telemetry, peripheral IV, olguin catheter  upon PT entry to room.    General Precautions: Standard, fall, other (see comments)   Orthopedic Precautions:N/A   Braces: N/A  Respiratory Status: Room air    Exams:  Cognitive Exam:  Patient is oriented to Person, Place, Time, and Situation  Gross Motor Coordination:  WFL  Postural Exam:  Patient presented with the following abnormalities:    -       Rounded shoulders  -       Forward head  -       Kyphosis  Sensation:    -       Intact  Skin Integrity/Edema:      -       Skin integrity: Visible skin intact  RLE ROM: WFL  RLE Strength: grade 3-/5 to 4-/5  LLE ROM: WFL  LLE Strength: grade 3-/5 to 4-/5    Functional Mobility:  Bed Mobility:     Rolling Left:  contact guard assistance  Rolling Right: contact guard assistance  Supine to Sit: moderate assistance  Sit to Supine: moderate assistance  Transfers:     Sit to Stand:  moderate assistance with rolling walker  Gait: 60 ft with a RW min A with Olguin Cath and IV pole in tow, min unsteady, min kyphotic, rounded shldrs & fwd head posture  Balance: Sitting: G/G; Standfing: F+/F    Therapeutic Activities and Exercises:   Functional mobility training as above; pt educated on safety precautions and mobility techniques, as well as the role and benefits of PT and mobilization.    AM-PAC 6 CLICK MOBILITY  Total Score:19     Patient left HOB elevated with all lines intact, call  button in reach, bed alarm on, and RN notified.    GOALS:   Multidisciplinary Problems       Physical Therapy Goals          Problem: Physical Therapy    Goal Priority Disciplines Outcome Goal Variances Interventions   Physical Therapy Goal     PT, PT/OT Ongoing, Progressing     Description: Goals to be met by: 10/3/22     Patient will increase functional independence with mobility by performin. Supine to sit with Contact Guard Assistance  2. Sit to stand transfer with Contact Guard Assistance  3. Bed to chair transfer with Contact Guard Assistance using Rolling Walker  4. Gait  x 200 feet with Contact Guard Assistance using Rolling Walker.   5. Lower extremity exercise program x30 reps per handout, with supervision                         History:     Past Medical History:   Diagnosis Date    Anticoagulant long-term use     Arthritis     Colon polyp     Diabetes mellitus     Diabetes mellitus, type 2     Fatty liver     Hypertension     Major depressive disorder 2020    -Continue Wellbutrin    PEG (percutaneous endoscopic gastrostomy) adjustment/replacement/removal 2021    Stroke 2020    left sided weakness    Urinary retention 2021       Past Surgical History:   Procedure Laterality Date    BACK SURGERY      COLONOSCOPY  2014    Dr. Crane: 22 colon polyps removed, hemorrhoids, erythema to sigmoid, repeat in 1 year for surveillance; biopsy: sigmoid erythema- showed unremarkable colonic mucosa, tubular adenomas and hyperplastic polyps    COLONOSCOPY N/A 5/3/2019    Procedure: COLONOSCOPY;  Surgeon: Guero Crane MD;  Location: [facility];  Service: Endoscopy;  Laterality: N/A;    COLONOSCOPY N/A 2019    Procedure: COLONOSCOPY;  Surgeon: Guero Crane MD;  Location: [facility];  Service: Endoscopy;  Laterality: N/A;    CYSTOSCOPY N/A 2022    Procedure: CYSTOSCOPY;  Surgeon: Jaylen Antonio Jr., MD;  Location: [facility];  Service: Urology;  Laterality: N/A;    EPIDURAL  STEROID INJECTION INTO THORACIC SPINE N/A 8/30/2019    Procedure: Injection-steroid-epidural-thoracic;  Surgeon: Frantz Green MD;  [facility]  Service: Pain Management;  Laterality: N/A;  T6-7    ESOPHAGOGASTRODUODENOSCOPY N/A 4/26/2019    Procedure: EGD (ESOPHAGOGASTRODUODENOSCOPY);  Surgeon: Guero Crane MD;  Location: [facility];  Service: Endoscopy;  Laterality: N/A;    ESOPHAGOGASTRODUODENOSCOPY N/A 11/23/2020    Procedure: EGD (ESOPHAGOGASTRODUODENOSCOPY);  Surgeon: Guero Crane MD;  Location: [facility] ENDO;  Service: Endoscopy;  Laterality: N/A;    ESOPHAGOGASTRODUODENOSCOPY N/A 1/6/2021    Procedure: EGD (ESOPHAGOGASTRODUODENOSCOPY);  Surgeon: Guero Crane MD;  Location: [facility];  Service: Endoscopy;  Laterality: N/A;    HERNIA REPAIR      TRANSRECTAL ULTRASOUND EXAMINATION N/A 2/9/2022    Procedure: ULTRASOUND, RECTAL APPROACH;  Surgeon: Jaylen Antonio Jr., MD;  [facility];  Service: Urology;  Laterality: N/A;  must text son miroslava, times and instructions given leave at 130       Time Tracking:     PT Received On: 09/04/22  PT Start Time: 1031     PT Stop Time: 1115  PT Total Time (min): 44 min     Billable Minutes: Evaluation 10, Gait Training 14, and Therapeutic Activity 17      09/04/2022

## 2022-09-04 NOTE — CONSULTS
Consult Note  Infectious Disease    Reason for Consult:  UTI    HPI: Jhonny Almazan is a 66 y.o. 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  Gutierrez catheter was replaced, and 600 ml of urine came out.  Urine is flowing well.  Urine culture are showing GNR.  Blood cultures are not sent.  Patient was given meropenem and feels great.    Antibiotics (From admission, onward)      Start     Stop Route Frequency Ordered    09/02/22 2100  mupirocin 2 % ointment         09/07 2059 Nasl 2 times daily 09/02/22 1439    09/02/22 1530  meropenem-0.9% sodium chloride 1 g/50 mL IVPB         -- IV Every 8 hours (non-standard times) 09/02/22 1434          Antifungals (From admission, onward)      None          Antivirals (From admission, onward)      None              Review of patient's allergies indicates:  No Known Allergies  Past Medical History:   Diagnosis Date    Anticoagulant long-term use     Arthritis     Colon polyp     Diabetes mellitus     Diabetes mellitus, type 2     Fatty liver     Hypertension     Major depressive disorder 11/17/2020    -Continue Wellbutrin    PEG (percutaneous endoscopic gastrostomy) adjustment/replacement/removal 1/6/2021    Stroke 11/2020    left sided weakness    Urinary retention 9/7/2021     Past Surgical History:   Procedure Laterality Date    BACK SURGERY      COLONOSCOPY  07/18/2014    Dr. Crane: 22 colon polyps removed, hemorrhoids, erythema to sigmoid, repeat in 1 year for surveillance; biopsy: sigmoid erythema- showed unremarkable colonic mucosa, tubular adenomas and hyperplastic polyps    COLONOSCOPY N/A 5/3/2019    Procedure: COLONOSCOPY;  Surgeon: Guero Crane MD;  Location: [facility]  ENDO;  Service: Endoscopy;  Laterality: N/A;    COLONOSCOPY N/A 5/6/2019    Procedure: COLONOSCOPY;  Surgeon: Guero Crane MD;  Location: [facility] ENDO;  Service: Endoscopy;  Laterality: N/A;    CYSTOSCOPY N/A 2/9/2022    Procedure: CYSTOSCOPY;  Surgeon: Jaylen Antonio Jr., MD;  Location: [facility] OR;  Service: Urology;  Laterality: N/A;    EPIDURAL STEROID INJECTION INTO THORACIC SPINE N/A 8/30/2019    Procedure: Injection-steroid-epidural-thoracic;  Surgeon: Frantz Green MD;  Location: [facility];  Service: Pain Management;  Laterality: N/A;  T6-7    ESOPHAGOGASTRODUODENOSCOPY N/A 4/26/2019    Procedure: EGD (ESOPHAGOGASTRODUODENOSCOPY);  Surgeon: Guero Crane MD;  Location: [facility];  Service: Endoscopy;  Laterality: N/A;    ESOPHAGOGASTRODUODENOSCOPY N/A 11/23/2020    Procedure: EGD (ESOPHAGOGASTRODUODENOSCOPY);  Surgeon: Guero Crane MD;  Location: [facility];  Service: Endoscopy;  Laterality: N/A;    ESOPHAGOGASTRODUODENOSCOPY N/A 1/6/2021    Procedure: EGD (ESOPHAGOGASTRODUODENOSCOPY);  Surgeon: Guero Crane MD;  Location: [facility];  Service: Endoscopy;  Laterality: N/A;    HERNIA REPAIR      TRANSRECTAL ULTRASOUND EXAMINATION N/A 2/9/2022    Procedure: ULTRASOUND, RECTAL APPROACH;  Surgeon: Jaylen Antonio Jr., MD;  Location: [facility] OR;  Service: Urology;  Laterality: N/A;  must text son miroslava, times and instructions given leave at 130     Social History     Socioeconomic History    Marital status:    Tobacco Use    Smoking status: Former     Packs/day: 1.00     Years: 50.00     Pack years: 50.00     Types: Cigarettes    Smokeless tobacco: Never   Substance and Sexual Activity    Alcohol use: Yes     Alcohol/week: 14.0 standard drinks     Types: 14 Cans of beer per week     Comment: 2-3 beers daily    Drug use: No    Sexual activity: Yes     Partners: Female     Family History   Problem Relation Age of Onset    Heart disease Mother     Heart disease Father     Diabetes Brother     Suicide Brother      Brain cancer Brother     Colon cancer Neg Hx     Crohn's disease Neg Hx     Ulcerative colitis Neg Hx     Stomach cancer Neg Hx     Esophageal cancer Neg Hx     Glaucoma Neg Hx     Retinal detachment Neg Hx     Macular degeneration Neg Hx          Review of Systems:   No chills, fever, sweats, weight loss, but feeling poorly in general, generalized weakness  No change in vision, loss of vision or diplopia  No sinus congestion, purulent nasal discharge, post nasal drip or facial pain  No pain in mouth or throat. No problems with teeth, gums.  No chest pain, palpitations, syncope  No cough, sputum production, shortness of breath, dyspnea on exertion, pleurisy, hemoptysis  No nausea, vomiting,  blood in stool, or focal abd pain,    +  constipation when he came in , now he has diarrhea  Kinked olguin    strangury, retention, incontinence, nocturia, prostatism,   No penile discharge, genital ulcers, risk for STD, Some bleeding with urination  No swelling of joints, redness of joints, injuries, or new focal pain  No unusual headaches, dizziness, vertigo, numbness, paresthesias, neuropathy, falls  No anxiety,   +depression, substance abuse, sleep disturbance  + diabetes, No bleeding, lymphadenopathy, anemia, malignancy, unusual bruising  No new rashes, lesions, or wounds  No TB exposure  No recent/prior steroids  Outdoor activities:no  Travel: no  Implants:   Antibiotic History:     EXAM & DIAGNOSTICS REVIEWED:   Vitals:     Temp:  [97.2 °F (36.2 °C)-97.9 °F (36.6 °C)]   Temp: 97.9 °F (36.6 °C) (09/03/22 1918)  Pulse: 76 (09/03/22 1918)  Resp: 18 (09/03/22 1918)  BP: (!) 115/59 (09/03/22 1918)  SpO2: 96 % (09/03/22 1918)    Intake/Output Summary (Last 24 hours) at 9/3/2022 2241  Last data filed at 9/3/2022 1800  Gross per 24 hour   Intake 240 ml   Output 1375 ml   Net -1135 ml       General:  In NAD. Alert and attentive, cooperative, comfortable,disheveled   Eyes:  Anicteric, PERRL, EOMI  ENT:  No ulcers, exudates,  thrush, nares patent, dentition is  Neck:  supple, no masses or adenopathy appreciated  Lungs: Clear, no consolidation, rales, wheezes, rub  Heart:  RRR, no gallop/murmur/rub noted  Abd:  Soft, mild abd discomfort with deep palpation on to  lower abd. ND, normal BS, no masses or organomegaly appreciated.  :  Gutierrez, urine clear, some dry  blood over glans penis; no flank tenderness  Musc:  Joints without effusion, swelling, erythema, synovitis, muscle wasting.   Skin:  No rashes. No palmar or plantar lesions. No subungual petechiae  Neuro:             Alert, attentive, speech fluent, face symmetric, moves all extremities, no focal weakness. Ambulatory with walker  Psych:  Calm, cooperative  Lymphatic:     No cervical, supraclavicular, axillary, or inguinal nodes  Extrem: No edema, erythema, phlebitis, cellulitis, warm and well perfused, dry skin, thickened toenails  VAD:       Isolation:    Wound:             Lines/Tubes/Drains:    General Labs reviewed:  Recent Labs   Lab 09/02/22  1037 09/03/22  0429   WBC 8.11 4.79   HGB 12.1* 11.8*   HCT 35.2* 35.9*    158       Recent Labs   Lab 09/02/22  1037 09/03/22  0429    138   K 3.2* 3.2*    105   CO2 27 24   BUN 12 13   CREATININE 0.7 0.7   CALCIUM 9.7 9.2     No results for input(s): CRP in the last 168 hours.      Micro:  Microbiology Results (last 7 days)       Procedure Component Value Units Date/Time    Urine culture [[id number]]  (Abnormal) Collected: 09/02/22 1151    Order Status: Completed Specimen: Urine Updated: 09/03/22 2054     Urine Culture, Routine GRAM NEGATIVE RICHARD  > 100,000 cfu/ml  Identification and susceptibility pending      Narrative:      Specimen Source->Urine          08/03/2022 Klebsiella pneumoniae esbl    CULTURE, URINE   Amikacin <=16 mcg/mL Resistant   Amox/K Clav'ate <=8/4 mcg/mL Resistant   Amp/Sulbactam 16/8 mcg/mL Resistant   Ampicillin >16 mcg/mL Resistant   Cefazolin >16 mcg/mL Resistant   Cefepime >16 mcg/mL  Resistant   Ceftriaxone >32 mcg/mL Resistant   Ciprofloxacin >2 mcg/mL Resistant   Ertapenem <=0.5 mcg/mL Sensitive   Gentamicin >8 mcg/mL Resistant   Levofloxacin >4 mcg/mL Resistant   Meropenem <=1 mcg/mL Sensitive   Nitrofurantoin >64 mcg/mL Resistant   Piperacillin/Tazo <=16 mcg/mL Resistant   Tobramycin 8 mcg/mL Intermediate   Trimeth/Sulfa >2/38 mcg/mL Resistant          Imaging Reviewed recent imaging   CT 08/05/2022:  mild infrarenal abdominal aortic aneurysm measuring 3.7 cm maximum diameter.  Otherwise negative CT of the abdomen and pelvis.    Cardiology:    IMPRESSION & PLAN     Malfunctioning Olguin catheter, replace 09/02  UTI due to GNR, in setting of malfunctioning olguin cath; h/o UTI  PMHx DM 2, hb a1c 5.8, fatty liver, HTN, D LP, CVA with left-sided weakness, anticoagulation, PEG, chronic Olguin catheter x2 years, due to urinary retention depression, multiple surgeries    Recommendations:  Continue meropenem  Follow current urine cultures   Send blood cx   Discussed with charge nurse about having patient on contact isolation, because he has history of ESBL.    Medical Decision Making during this encounter was  [_] Low Complexity  [_] Moderate Complexity  [ xx ] High Complexity

## 2022-09-04 NOTE — ASSESSMENT & PLAN NOTE
Monitor labs  IV abx  Await urine cs- preliminary results of GNR  ID consulted: appreciate recs  Blood cultures pending

## 2022-09-04 NOTE — SUBJECTIVE & OBJECTIVE
Interval History: Notes reviewed, no acute events overnight. Patient seen this morning resting in bed with no acute distress. He reports feeling fine this morning. UA preliminary results in GNR. Susceptibility pending. Blood cultures pending. ID recommends continuing IV meropenem. SNF disposition pending insurance acceptance. Instructed patient to work with Pt/Ot to facilitate SNF placement. Will continue to monitor and follow ID recs.     Review of Systems   Constitutional:  Positive for fatigue. Negative for chills and fever.   Respiratory:  Negative for cough, shortness of breath and wheezing.    Cardiovascular:  Negative for chest pain and palpitations.   Gastrointestinal:  Negative for abdominal distention, abdominal pain, nausea and vomiting.   Genitourinary:  Negative for difficulty urinating, frequency and hematuria.   Musculoskeletal:  Negative for arthralgias and myalgias.   Neurological:  Positive for weakness. Negative for seizures and headaches.   Psychiatric/Behavioral:  Negative for behavioral problems and confusion.    All other systems reviewed and are negative.  Objective:     Vital Signs (Most Recent):  Temp: 97.9 °F (36.6 °C) (09/04/22 0749)  Pulse: (!) 59 (09/04/22 0749)  Resp: 16 (09/04/22 0749)  BP: 131/69 (09/04/22 0749)  SpO2: 96 % (09/04/22 0749)   Vital Signs (24h Range):  Temp:  [97.2 °F (36.2 °C)-98.1 °F (36.7 °C)] 97.9 °F (36.6 °C)  Pulse:  [59-76] 59  Resp:  [16-19] 16  SpO2:  [96 %-98 %] 96 %  BP: (103-131)/(57-69) 131/69     Weight: 79.5 kg (175 lb 4.3 oz)  Body mass index is 20.78 kg/m².    Intake/Output Summary (Last 24 hours) at 9/4/2022 1040  Last data filed at 9/3/2022 1800  Gross per 24 hour   Intake --   Output 1200 ml   Net -1200 ml      Physical Exam  Vitals and nursing note reviewed.   Constitutional:       General: He is not in acute distress.     Appearance: Normal appearance. He is not ill-appearing.      Comments: Disheveled, poorly groomed    HENT:      Head:  Normocephalic and atraumatic.      Right Ear: External ear normal.      Left Ear: External ear normal.   Cardiovascular:      Rate and Rhythm: Normal rate and regular rhythm.      Pulses: Normal pulses.      Heart sounds: Normal heart sounds. No murmur heard.    No gallop.   Pulmonary:      Effort: Pulmonary effort is normal. No respiratory distress.      Breath sounds: Normal breath sounds. No wheezing or rales.   Abdominal:      General: Abdomen is flat. There is no distension.      Palpations: Abdomen is soft.      Tenderness: There is abdominal tenderness. There is no guarding.      Comments: Mild tenderness to palpation of suprapubic area   Genitourinary:     Comments: Catheter in place with dark yellow urine output  Musculoskeletal:         General: No swelling or tenderness. Normal range of motion.   Skin:     General: Skin is warm.      Coloration: Skin is not jaundiced.      Findings: No bruising.   Neurological:      General: No focal deficit present.      Mental Status: He is alert and oriented to person, place, and time.      Cranial Nerves: No cranial nerve deficit.      Sensory: No sensory deficit.   Psychiatric:         Mood and Affect: Mood normal.         Behavior: Behavior normal.       Significant Labs: All pertinent labs within the past 24 hours have been reviewed.  CBC:   Recent Labs   Lab 09/03/22 0429 09/04/22 0422   WBC 4.79 4.98   HGB 11.8* 11.2*   HCT 35.9* 34.2*    175     CMP:   Recent Labs   Lab 09/03/22 0429 09/04/22 0422    139   K 3.2* 3.6    108   CO2 24 22*   * 179*   BUN 13 12   CREATININE 0.7 0.7   CALCIUM 9.2 9.0   ANIONGAP 9 9       Significant Imaging: I have reviewed all pertinent imaging results/findings within the past 24 hours.

## 2022-09-04 NOTE — PLAN OF CARE
Problem: Physical Therapy  Goal: Physical Therapy Goal  Description: Goals to be met by: 10/3/22     Patient will increase functional independence with mobility by performin. Supine to sit with Contact Guard Assistance  2. Sit to stand transfer with Contact Guard Assistance  3. Bed to chair transfer with Contact Guard Assistance using Rolling Walker  4. Gait  x 200 feet with Contact Guard Assistance using Rolling Walker.   5. Lower extremity exercise program x30 reps per handout, with supervision    Outcome: Ongoing, Progressing   PT for functional mob training and/ or ex; pt educated on safety precautions

## 2022-09-04 NOTE — ASSESSMENT & PLAN NOTE
Patient's FSGs are controlled on current medication regimen.  Last A1c reviewed-   Lab Results   Component Value Date    HGBA1C 5.8 (H) 09/02/2022     Most recent fingerstick glucose reviewed-   Recent Labs   Lab 09/03/22  1558 09/03/22 2016 09/04/22  0104 09/04/22  0728   POCTGLUCOSE 153* 219* 158* 149*     Current correctional scale  Low  Maintain anti-hyperglycemic dose as follows-   Antihyperglycemics (From admission, onward)    Start     Stop Route Frequency Ordered    09/02/22 2100  insulin detemir U-100 pen 10 Units         -- SubQ Nightly 09/02/22 1434    09/02/22 1532  insulin aspart U-100 pen 0-5 Units         -- SubQ Every 6 hours PRN 09/02/22 1434        Hold Oral hypoglycemics while patient is in the hospital.

## 2022-09-04 NOTE — PLAN OF CARE
Discussed SNF at bedside with pt. Pt in agreement- states he can no longer take care of himself at home. CM reminded him of issues last admission that we tried SNF and it was denied by insurance. Again explained that if insurance denies will have to DC home with continued HH. CM asked where he was with long term process (CM initiated last admission) and pt states he is not sure. I again explained that we cannot do long term from hospital so pt needs to complete long term medicaid shikha and provide all documentation needed for nursing or will not be able to be placed. I also discussed him refusing therapy- explained that if he would like to go to SNF from hospital and possibly transition to long term then he cannot refuse therapy. Pt verbalized understanding.      09/03/22 1630   Post-Acute Status   Post-Acute Authorization Placement   Post-Acute Placement Status Referrals Sent

## 2022-09-05 LAB
ANION GAP SERPL CALC-SCNC: 8 MMOL/L (ref 8–16)
BACTERIA UR CULT: ABNORMAL
BACTERIA UR CULT: ABNORMAL
BASOPHILS # BLD AUTO: 0.04 K/UL (ref 0–0.2)
BASOPHILS NFR BLD: 0.7 % (ref 0–1.9)
BUN SERPL-MCNC: 13 MG/DL (ref 8–23)
CALCIUM SERPL-MCNC: 9.8 MG/DL (ref 8.7–10.5)
CHLORIDE SERPL-SCNC: 107 MMOL/L (ref 95–110)
CO2 SERPL-SCNC: 23 MMOL/L (ref 23–29)
CREAT SERPL-MCNC: 0.8 MG/DL (ref 0.5–1.4)
DIFFERENTIAL METHOD: ABNORMAL
EOSINOPHIL # BLD AUTO: 0.3 K/UL (ref 0–0.5)
EOSINOPHIL NFR BLD: 5.9 % (ref 0–8)
ERYTHROCYTE [DISTWIDTH] IN BLOOD BY AUTOMATED COUNT: 14.7 % (ref 11.5–14.5)
EST. GFR  (NO RACE VARIABLE): >60 ML/MIN/1.73 M^2
GLUCOSE SERPL-MCNC: 168 MG/DL (ref 70–110)
HCT VFR BLD AUTO: 35.3 % (ref 40–54)
HGB BLD-MCNC: 11.7 G/DL (ref 14–18)
IMM GRANULOCYTES # BLD AUTO: 0.02 K/UL (ref 0–0.04)
IMM GRANULOCYTES NFR BLD AUTO: 0.4 % (ref 0–0.5)
LACTATE SERPL-SCNC: 1.5 MMOL/L (ref 0.5–2.2)
LYMPHOCYTES # BLD AUTO: 1.7 K/UL (ref 1–4.8)
LYMPHOCYTES NFR BLD: 30.3 % (ref 18–48)
MCH RBC QN AUTO: 27.8 PG (ref 27–31)
MCHC RBC AUTO-ENTMCNC: 33.1 G/DL (ref 32–36)
MCV RBC AUTO: 84 FL (ref 82–98)
MONOCYTES # BLD AUTO: 0.6 K/UL (ref 0.3–1)
MONOCYTES NFR BLD: 10.4 % (ref 4–15)
NEUTROPHILS # BLD AUTO: 2.9 K/UL (ref 1.8–7.7)
NEUTROPHILS NFR BLD: 52.3 % (ref 38–73)
NRBC BLD-RTO: 0 /100 WBC
PLATELET # BLD AUTO: 193 K/UL (ref 150–450)
PMV BLD AUTO: 9.5 FL (ref 9.2–12.9)
POCT GLUCOSE: 173 MG/DL (ref 70–110)
POCT GLUCOSE: 218 MG/DL (ref 70–110)
POCT GLUCOSE: 280 MG/DL (ref 70–110)
POTASSIUM SERPL-SCNC: 4.9 MMOL/L (ref 3.5–5.1)
RBC # BLD AUTO: 4.21 M/UL (ref 4.6–6.2)
SODIUM SERPL-SCNC: 138 MMOL/L (ref 136–145)
WBC # BLD AUTO: 5.57 K/UL (ref 3.9–12.7)

## 2022-09-05 PROCEDURE — 97110 THERAPEUTIC EXERCISES: CPT

## 2022-09-05 PROCEDURE — 99233 PR SUBSEQUENT HOSPITAL CARE,LEVL III: ICD-10-PCS | Mod: S$GLB,,, | Performed by: INTERNAL MEDICINE

## 2022-09-05 PROCEDURE — 25000003 PHARM REV CODE 250: Performed by: NURSE PRACTITIONER

## 2022-09-05 PROCEDURE — 85025 COMPLETE CBC W/AUTO DIFF WBC: CPT | Performed by: NURSE PRACTITIONER

## 2022-09-05 PROCEDURE — 80048 BASIC METABOLIC PNL TOTAL CA: CPT | Performed by: NURSE PRACTITIONER

## 2022-09-05 PROCEDURE — 97166 OT EVAL MOD COMPLEX 45 MIN: CPT

## 2022-09-05 PROCEDURE — 97535 SELF CARE MNGMENT TRAINING: CPT

## 2022-09-05 PROCEDURE — 63600175 PHARM REV CODE 636 W HCPCS: Performed by: NURSE PRACTITIONER

## 2022-09-05 PROCEDURE — 63600175 PHARM REV CODE 636 W HCPCS

## 2022-09-05 PROCEDURE — 25000003 PHARM REV CODE 250

## 2022-09-05 PROCEDURE — 36415 COLL VENOUS BLD VENIPUNCTURE: CPT

## 2022-09-05 PROCEDURE — 97116 GAIT TRAINING THERAPY: CPT

## 2022-09-05 PROCEDURE — 83605 ASSAY OF LACTIC ACID: CPT

## 2022-09-05 PROCEDURE — 99233 SBSQ HOSP IP/OBS HIGH 50: CPT | Mod: S$GLB,,, | Performed by: INTERNAL MEDICINE

## 2022-09-05 PROCEDURE — 25000003 PHARM REV CODE 250: Performed by: INTERNAL MEDICINE

## 2022-09-05 PROCEDURE — 36415 COLL VENOUS BLD VENIPUNCTURE: CPT | Performed by: NURSE PRACTITIONER

## 2022-09-05 PROCEDURE — 11000001 HC ACUTE MED/SURG PRIVATE ROOM

## 2022-09-05 PROCEDURE — 27000207 HC ISOLATION

## 2022-09-05 RX ORDER — CEPHALEXIN 250 MG/1
500 CAPSULE ORAL EVERY 8 HOURS
Status: DISCONTINUED | OUTPATIENT
Start: 2022-09-05 | End: 2022-09-06

## 2022-09-05 RX ORDER — ONDANSETRON 2 MG/ML
4 INJECTION INTRAMUSCULAR; INTRAVENOUS EVERY 6 HOURS PRN
Status: DISCONTINUED | OUTPATIENT
Start: 2022-09-05 | End: 2022-09-08 | Stop reason: HOSPADM

## 2022-09-05 RX ORDER — NITROFURANTOIN 25; 75 MG/1; MG/1
100 CAPSULE ORAL EVERY 12 HOURS
Status: DISCONTINUED | OUTPATIENT
Start: 2022-09-05 | End: 2022-09-06

## 2022-09-05 RX ORDER — ACETAMINOPHEN 325 MG/1
650 TABLET ORAL EVERY 6 HOURS PRN
Status: DISCONTINUED | OUTPATIENT
Start: 2022-09-05 | End: 2022-09-08 | Stop reason: HOSPADM

## 2022-09-05 RX ADMIN — ATORVASTATIN CALCIUM 40 MG: 40 TABLET, FILM COATED ORAL at 08:09

## 2022-09-05 RX ADMIN — BUPROPION HYDROCHLORIDE 300 MG: 150 TABLET, FILM COATED, EXTENDED RELEASE ORAL at 08:09

## 2022-09-05 RX ADMIN — FINASTERIDE 5 MG: 5 TABLET, FILM COATED ORAL at 08:09

## 2022-09-05 RX ADMIN — CEPHALEXIN 500 MG: 250 CAPSULE ORAL at 09:09

## 2022-09-05 RX ADMIN — GABAPENTIN 300 MG: 300 CAPSULE ORAL at 08:09

## 2022-09-05 RX ADMIN — NITROFURANTOIN (MONOHYDRATE/MACROCRYSTALS) 100 MG: 75; 25 CAPSULE ORAL at 10:09

## 2022-09-05 RX ADMIN — POTASSIUM CHLORIDE 40 MEQ: 1500 TABLET, EXTENDED RELEASE ORAL at 08:09

## 2022-09-05 RX ADMIN — GABAPENTIN 300 MG: 300 CAPSULE ORAL at 09:09

## 2022-09-05 RX ADMIN — MUPIROCIN: 20 OINTMENT TOPICAL at 09:09

## 2022-09-05 RX ADMIN — ACETAMINOPHEN 650 MG: 325 TABLET ORAL at 06:09

## 2022-09-05 RX ADMIN — ONDANSETRON 4 MG: 2 INJECTION INTRAMUSCULAR; INTRAVENOUS at 06:09

## 2022-09-05 RX ADMIN — INSULIN DETEMIR 10 UNITS: 100 INJECTION, SOLUTION SUBCUTANEOUS at 09:09

## 2022-09-05 RX ADMIN — NITROFURANTOIN (MONOHYDRATE/MACROCRYSTALS) 100 MG: 75; 25 CAPSULE ORAL at 09:09

## 2022-09-05 RX ADMIN — CEPHALEXIN 500 MG: 250 CAPSULE ORAL at 02:09

## 2022-09-05 RX ADMIN — INSULIN ASPART 1 UNITS: 100 INJECTION, SOLUTION INTRAVENOUS; SUBCUTANEOUS at 01:09

## 2022-09-05 RX ADMIN — MUPIROCIN: 20 OINTMENT TOPICAL at 08:09

## 2022-09-05 RX ADMIN — CLOPIDOGREL BISULFATE 75 MG: 75 TABLET, FILM COATED ORAL at 08:09

## 2022-09-05 RX ADMIN — INSULIN ASPART 1 UNITS: 100 INJECTION, SOLUTION INTRAVENOUS; SUBCUTANEOUS at 09:09

## 2022-09-05 RX ADMIN — POTASSIUM CHLORIDE 40 MEQ: 1500 TABLET, EXTENDED RELEASE ORAL at 09:09

## 2022-09-05 NOTE — PROGRESS NOTES
"[facility] Hospital Medicine  Progress Note    Patient Name: Jhonny Almazan  MRN: [id number]  Patient Class: IP- Inpatient   Admission Date: 9/2/2022  Length of Stay: 3 days  Attending Physician: Homa Steele MD  Primary Care Provider: Joey Whitehead MD        Subjective:     Principal Problem:Complicated UTI (urinary tract infection)        HPI:  Jhonny Almazan is a 66 year old  male who presented to the ED with CC of having problems with his olguin not draining properly. He reported he has olguin for around 2 years because he has difficulty urinating. He also reported that he has not had a BM for 1-1/2 weeks. ED MD changed out olguin and cath is draining well now. Pt denies fever, chills, sweats, sediment in cath/urine, blood in urine. Denies dec fluid intake. Denies any other problems. Has PMH DM, HTN, major depression, cva. Denies any problems with his chronic illnesses at this time. Denies dizziness, thirst, increasing weakness. Reported his mobility is "not very good:. Lives with son in a house and doesn't get cared for very well and doesn't eat much or follow a diabetic diet. Recently got "Meals on Wheels" arranged. Doesn't know current BG or BP. No longer has g-tube. Doesn't know specifics about medications, takes insulin twice a day.     UA in ED showed Nitrite Pos, Leuk 3+,Bld 2+. Recently admitted with MDRO- urine cs (8/3/) showed Kleibsiella >100,000 only sensitive to ertapenemem and meropenem. Will give Meropenem 1gm q 8hrs. K+ 3.2 on adm give po K+ in ED. Will recheck in am.  Will give Fleets enema and begin senokot. Explore placement.      Overview/Hospital Course:  No notes on file    Interval History: Notes reviewed, no acute events overnight, patient seen this morning resting in bed with no acute distress. OT at bedside. He reports feeling better this morning. Complains of mild lower abdominal pain this morning. Denies any urinary symptoms, N/V, fever/chills, or " SOB. Urine culture results in Klebsiella Pneumoniae and E Coli. Both sensitive to meropenem. SNF placement pending. Will continue to monitor and continue IV anitbiotics    Review of Systems   Constitutional:  Negative for chills and fever.   Respiratory:  Negative for cough, shortness of breath and wheezing.    Gastrointestinal:  Positive for abdominal pain. Negative for abdominal distention, nausea and vomiting.   Genitourinary:  Negative for difficulty urinating, frequency and hematuria.   Musculoskeletal:  Negative for arthralgias and back pain.   Neurological:  Negative for dizziness, weakness and headaches.   Psychiatric/Behavioral:  Negative for behavioral problems and confusion.    All other systems reviewed and are negative.  Objective:     Vital Signs (Most Recent):  Temp: 98.3 °F (36.8 °C) (09/05/22 0723)  Pulse: 64 (09/05/22 0723)  Resp: 16 (09/05/22 0723)  BP: 121/67 (09/05/22 0723)  SpO2: 96 % (09/05/22 0723) Vital Signs (24h Range):  Temp:  [96.5 °F (35.8 °C)-98.3 °F (36.8 °C)] 98.3 °F (36.8 °C)  Pulse:  [60-80] 64  Resp:  [16-18] 16  SpO2:  [95 %-99 %] 96 %  BP: (105-160)/(65-73) 121/67     Weight: 79.5 kg (175 lb 4.3 oz)  Body mass index is 20.78 kg/m².    Intake/Output Summary (Last 24 hours) at 9/5/2022 1050  Last data filed at 9/5/2022 0800  Gross per 24 hour   Intake 240 ml   Output 3200 ml   Net -2960 ml      Physical Exam  Vitals and nursing note reviewed.   Constitutional:       General: He is not in acute distress.     Comments: Disheveled appearance   HENT:      Head: Normocephalic and atraumatic.      Right Ear: External ear normal.      Left Ear: External ear normal.   Cardiovascular:      Rate and Rhythm: Normal rate and regular rhythm.      Pulses: Normal pulses.      Heart sounds: Normal heart sounds. No murmur heard.    No gallop.   Pulmonary:      Effort: Pulmonary effort is normal. No respiratory distress.      Breath sounds: No wheezing or rales.   Abdominal:      General: Abdomen  is flat. There is no distension.      Palpations: Abdomen is soft.      Tenderness: There is abdominal tenderness.      Comments: Mild suprapubic tenderness with deep palpation    Genitourinary:     Comments: Catheter in place with clear yellow urine output  Musculoskeletal:         General: No swelling or tenderness. Normal range of motion.   Skin:     General: Skin is warm.      Coloration: Skin is not jaundiced or pale.   Neurological:      General: No focal deficit present.      Mental Status: He is alert and oriented to person, place, and time.      Cranial Nerves: No cranial nerve deficit.      Sensory: No sensory deficit.   Psychiatric:         Mood and Affect: Mood normal.       Significant Labs: All pertinent labs within the past 24 hours have been reviewed.  CBC:   Recent Labs   Lab 09/04/22 0422 09/05/22  0435   WBC 4.98 5.57   HGB 11.2* 11.7*   HCT 34.2* 35.3*    193     CMP:   Recent Labs   Lab 09/04/22 0422 09/05/22  0435    138   K 3.6 4.9    107   CO2 22* 23   * 168*   BUN 12 13   CREATININE 0.7 0.8   CALCIUM 9.0 9.8   ANIONGAP 9 8         Significant Imaging: I have reviewed all pertinent imaging results/findings within the past 24 hours.      Assessment/Plan:      * Complicated UTI (urinary tract infection)  Monitor labs  IV abx  Await urine cs- preliminary results of GNR  ID consulted: rachel arellano  Urine Culture and Blood culture results:  Microbiology Results (last 7 days)     Procedure Component Value Units Date/Time    Urine culture [[id number]]  (Abnormal)  (Susceptibility) Collected: 09/02/22 1151    Order Status: Completed Specimen: Urine Updated: 09/05/22 0343     Urine Culture, Routine KLEBSIELLA PNEUMONIAE  > 100,000 cfu/ml        ESCHERICHIA COLI  > 100,000 cfu/ml      Narrative:      Specimen Source->Urine    Blood culture [[id number]] Collected: 09/03/22 2346    Order Status: Completed Specimen: Blood from Antecubital, Right Arm Updated: 09/05/22 0115      Blood Culture, Routine No Growth to date            Moderate malnutrition  Nutrition consulted. Most recent weight and BMI monitored-     Malnutrition Type and Energy Intake  Malnutrition Type: chronic illness    Malnutrition (Moderate to Severe)  Weight Loss (Malnutrition): 20% in 1 year  Energy Intake (Malnutrition): less than 75% for greater than or equal to 1 month    Final Summary  Subcutaneous Fat Loss (Final Summary): moderate protein-calorie malnutrition  Muscle Loss Evaluation (Final Summary): moderate protein-calorie malnutrition         Measurements:  Wt Readings from Last 1 Encounters:   09/03/22 79.5 kg (175 lb 4.3 oz)   Body mass index is 20.78 kg/m².    Recommendations: Recommendation/Intervention: 1) Chane diet to DM 2000 kcal, cardiac diet- textuer per SLP 2) SLP eval 3) weigh weekly 4) Boost glcose control chocolate BID 5) Nutrition education given  Goals: 1) PO intakes > 75% of meals and supplements at f/u    Patient has been screened and assessed by RD. RD will follow patient.      Debility  Pt/OT consulted  Awaiting SNF disposition     Chronic indwelling Gutierrez catheter  Gutierrez care  Monitor urine output  Meropenem q8hrs  Urine culture results:  Microbiology Results (last 7 days)     Procedure Component Value Units Date/Time    Urine culture [[id number]]  (Abnormal)  (Susceptibility) Collected: 09/02/22 1151    Order Status: Completed Specimen: Urine Updated: 09/05/22 0343     Urine Culture, Routine KLEBSIELLA PNEUMONIAE  > 100,000 cfu/ml        ESCHERICHIA COLI  > 100,000 cfu/ml      Narrative:      Specimen Source->Urine    Blood culture [[id number]] Collected: 09/03/22 2346    Order Status: Completed Specimen: Blood from Antecubital, Right Arm Updated: 09/05/22 0115     Blood Culture, Routine No Growth to date              Major depressive disorder  Patient has persistent depression which is mild and is currently controlled. Will Continue anti-depressant medications. We will not consult  psychiatry at this time. Patient does display psychosis at this time. Continue to monitor closely and adjust plan of care as needed.        Hyperlipidemia associated with type 2 diabetes mellitus  Patient's FSGs are controlled on current medication regimen.  Last A1c reviewed-   Lab Results   Component Value Date    HGBA1C 5.8 (H) 09/02/2022     Most recent fingerstick glucose reviewed-   Recent Labs   Lab 09/04/22  1139 09/04/22  1628 09/05/22  0140   POCTGLUCOSE 162* 131* 280*     Current correctional scale  Low  Maintain anti-hyperglycemic dose as follows-   Antihyperglycemics (From admission, onward)    Start     Stop Route Frequency Ordered    09/02/22 2100  insulin detemir U-100 pen 10 Units         -- SubQ Nightly 09/02/22 1434    09/02/22 1532  insulin aspart U-100 pen 0-5 Units         -- SubQ Every 6 hours PRN 09/02/22 1434        Hold Oral hypoglycemics while patient is in the hospital.    Hypertension associated with diabetes  Chronic, controlled.  Latest blood pressure and vitals reviewed-   Temp:  [97.2 °F (36.2 °C)-98.1 °F (36.7 °C)]   Pulse:  [59-76]   Resp:  [16-19]   BP: (103-131)/(57-69)   SpO2:  [96 %-98 %] .   Home meds for hypertension were reviewed and noted below.   Hypertension Medications             losartan (COZAAR) 25 MG tablet 1 tablet DAILY (route: oral)          While in the hospital, will manage blood pressure as follows; Continue home antihypertensive regimen    Will utilize p.r.n. blood pressure medication only if patient's blood pressure greater than  180/110 and he develops symptoms such as worsening chest pain or shortness of breath.      VTE Risk Mitigation (From admission, onward)         Ordered     Place sequential compression device  Until discontinued         09/02/22 1434     IP VTE LOW RISK PATIENT  Once         09/02/22 1434                Discharge Planning   YUDITH:      Code Status: Full Code   Is the patient medically ready for discharge?:     Reason for patient still  in hospital (select all that apply): Treatment and Pending disposition  Discharge Plan A: Skilled Nursing Facility                  Claus Dumont PA-C  Department of Hospital Medicine   [facility]

## 2022-09-05 NOTE — NURSING
In to check pt vitals. Pt shivering and asked to warm up room. Pt temp 99.8. will continue to monitor.

## 2022-09-05 NOTE — PT/OT/SLP PROGRESS
Physical Therapy Treatment    Patient Name:  Jhonny Almazan   MRN:  [id number]    Recommendations:     Discharge Recommendations:  nursing facility, skilled   Discharge Equipment Recommendations: none   Barriers to discharge: None    Assessment:     Jhonny Almazan is a 66 y.o. male admitted with a medical diagnosis of Complicated UTI (urinary tract infection).  He presents with the following impairments/functional limitations:  weakness, impaired endurance, impaired self care skills, impaired functional mobility, gait instability, impaired balance, decreased lower extremity function, pain. Patient agreeable to strength/mobility training.    Rehab Prognosis: Fair; patient would benefit from acute skilled PT services to address these deficits and reach maximum level of function.    Recent Surgery: * No surgery found *      Plan:     During this hospitalization, patient to be seen 6 x/week to address the identified rehab impairments via gait training, therapeutic activities, therapeutic exercises and progress toward the following goals:    Plan of Care Expires:  10/02/22    Subjective     Chief Complaint: generalized weakness  Patient/Family Comments/goals: improve overall strength/mobility  Pain/Comfort:  Pain Rating 1: 7/10  Location - Side 1: Bilateral  Location 1: knee  Pain Addressed 1: Cessation of Activity  Pain Rating Post-Intervention 1: 6/10  Pain Rating 2: 0/10      Objective:     Communicated with nurse prior to session.  Patient found supine with bed alarm, olguin catheter, peripheral IV, telemetry upon PT entry to room.     General Precautions: Standard, contact   Orthopedic Precautions:N/A   Braces: N/A  Respiratory Status: Room air     Functional Mobility:  Bed Mobility:     Supine to Sit: stand by assistance  Sit to Supine: stand by assistance  Transfers:     Sit to Stand:  minimum assistance with rolling walker  Gait: 35 feet with rolling-walker and contact guard assist       AM-PAC 6  CLICK MOBILITY  Turning over in bed (including adjusting bedclothes, sheets and blankets)?: 3  Sitting down on and standing up from a chair with arms (e.g., wheelchair, bedside commode, etc.): 3  Moving from lying on back to sitting on the side of the bed?: 3  Moving to and from a bed to a chair (including a wheelchair)?: 3  Need to walk in hospital room?: 3  Climbing 3-5 steps with a railing?: 1  Basic Mobility Total Score: 16       Therapeutic Activities and Exercises:   X 10 seated bilateral lower extremity therapeutic exercise = marching, long arc quad, hip abduction/ adduction, heel raises/ toe raises     Patient left supine with all lines intact and call button in reach..    GOALS:   Multidisciplinary Problems       Physical Therapy Goals          Problem: Physical Therapy    Goal Priority Disciplines Outcome Goal Variances Interventions   Physical Therapy Goal     PT, PT/OT Ongoing, Progressing     Description: Goals to be met by: 10/3/22     Patient will increase functional independence with mobility by performin. Supine to sit with Contact Guard Assistance  2. Sit to stand transfer with Contact Guard Assistance  3. Bed to chair transfer with Contact Guard Assistance using Rolling Walker  4. Gait  x 200 feet with Contact Guard Assistance using Rolling Walker.   5. Lower extremity exercise program x30 reps per handout, with supervision                         Time Tracking:     PT Received On: 22  PT Start Time: 1055     PT Stop Time: 1120  PT Total Time (min): 25 min     Billable Minutes: Gait Training 15 and Therapeutic Exercise 10    Treatment Type: Treatment  PT/PTA: PT     PTA Visit Number: 0     2022

## 2022-09-05 NOTE — PLAN OF CARE
Problem: Occupational Therapy  Goal: Occupational Therapy Goal  Description: Goals to be met by: 9/26/2022     Patient will increase functional independence with ADLs by performing:    LE Dressing with Stand-by Assistance and Assistive Devices as needed.  Grooming while seated at sink with Supervision.  Toileting from toilet with Supervision for hygiene and clothing management.   Supine to sit with Supervision.  Toilet transfer to toilet with Stand-by Assistance.    Outcome: Ongoing, Progressing

## 2022-09-05 NOTE — SUBJECTIVE & OBJECTIVE
Interval History: Notes reviewed, no acute events overnight, patient seen this morning resting in bed with no acute distress. OT at bedside. He reports feeling better this morning. Complains of mild lower abdominal pain this morning. Denies any urinary symptoms, N/V, fever/chills, or SOB. Urine culture results in Klebsiella Pneumoniae and E Coli. Both sensitive to meropenem. SNF placement pending. Will continue to monitor and continue IV anitbiotics    Review of Systems   Constitutional:  Negative for chills and fever.   Respiratory:  Negative for cough, shortness of breath and wheezing.    Gastrointestinal:  Positive for abdominal pain. Negative for abdominal distention, nausea and vomiting.   Genitourinary:  Negative for difficulty urinating, frequency and hematuria.   Musculoskeletal:  Negative for arthralgias and back pain.   Neurological:  Negative for dizziness, weakness and headaches.   Psychiatric/Behavioral:  Negative for behavioral problems and confusion.    All other systems reviewed and are negative.  Objective:     Vital Signs (Most Recent):  Temp: 98.3 °F (36.8 °C) (09/05/22 0723)  Pulse: 64 (09/05/22 0723)  Resp: 16 (09/05/22 0723)  BP: 121/67 (09/05/22 0723)  SpO2: 96 % (09/05/22 0723) Vital Signs (24h Range):  Temp:  [96.5 °F (35.8 °C)-98.3 °F (36.8 °C)] 98.3 °F (36.8 °C)  Pulse:  [60-80] 64  Resp:  [16-18] 16  SpO2:  [95 %-99 %] 96 %  BP: (105-160)/(65-73) 121/67     Weight: 79.5 kg (175 lb 4.3 oz)  Body mass index is 20.78 kg/m².    Intake/Output Summary (Last 24 hours) at 9/5/2022 1050  Last data filed at 9/5/2022 0800  Gross per 24 hour   Intake 240 ml   Output 3200 ml   Net -2960 ml      Physical Exam  Vitals and nursing note reviewed.   Constitutional:       General: He is not in acute distress.     Comments: Disheveled appearance   HENT:      Head: Normocephalic and atraumatic.      Right Ear: External ear normal.      Left Ear: External ear normal.   Cardiovascular:      Rate and Rhythm:  Normal rate and regular rhythm.      Pulses: Normal pulses.      Heart sounds: Normal heart sounds. No murmur heard.    No gallop.   Pulmonary:      Effort: Pulmonary effort is normal. No respiratory distress.      Breath sounds: No wheezing or rales.   Abdominal:      General: Abdomen is flat. There is no distension.      Palpations: Abdomen is soft.      Tenderness: There is abdominal tenderness.      Comments: Mild suprapubic tenderness with deep palpation    Genitourinary:     Comments: Catheter in place with clear yellow urine output  Musculoskeletal:         General: No swelling or tenderness. Normal range of motion.   Skin:     General: Skin is warm.      Coloration: Skin is not jaundiced or pale.   Neurological:      General: No focal deficit present.      Mental Status: He is alert and oriented to person, place, and time.      Cranial Nerves: No cranial nerve deficit.      Sensory: No sensory deficit.   Psychiatric:         Mood and Affect: Mood normal.       Significant Labs: All pertinent labs within the past 24 hours have been reviewed.  CBC:   Recent Labs   Lab 09/04/22  0422 09/05/22  0435   WBC 4.98 5.57   HGB 11.2* 11.7*   HCT 34.2* 35.3*    193     CMP:   Recent Labs   Lab 09/04/22  0422 09/05/22  0435    138   K 3.6 4.9    107   CO2 22* 23   * 168*   BUN 12 13   CREATININE 0.7 0.8   CALCIUM 9.0 9.8   ANIONGAP 9 8         Significant Imaging: I have reviewed all pertinent imaging results/findings within the past 24 hours.

## 2022-09-05 NOTE — ASSESSMENT & PLAN NOTE
Monitor labs  IV abx  Await urine cs- preliminary results of GNR  ID consulted: appreciate recs  Urine Culture and Blood culture results:  Microbiology Results (last 7 days)     Procedure Component Value Units Date/Time    Urine culture [[id number]]  (Abnormal)  (Susceptibility) Collected: 09/02/22 1151    Order Status: Completed Specimen: Urine Updated: 09/05/22 0343     Urine Culture, Routine KLEBSIELLA PNEUMONIAE  > 100,000 cfu/ml        ESCHERICHIA COLI  > 100,000 cfu/ml      Narrative:      Specimen Source->Urine    Blood culture [[id number]] Collected: 09/03/22 2346    Order Status: Completed Specimen: Blood from Antecubital, Right Arm Updated: 09/05/22 0115     Blood Culture, Routine No Growth to date

## 2022-09-05 NOTE — NURSING
Pt yelling from room.pt head down and pt vomited all over. Temp checked. 101.5 NP notified. Meds given as ordered.

## 2022-09-05 NOTE — PROGRESS NOTES
Consult Note  Infectious Disease    Reason for Consult:  UTI    HPI: Jhonny Almazan is a 66 y.o. 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  Olguin catheter was replaced, and 600 ml of urine came out.  Urine is flowing well.  Urine culture are showing GNR.  Blood cultures are not sent.  Patient was given meropenem and feels great.  09/04/2022.  No new complaints.  No fever.  Urine info is flowing well.  WBC 4.9.  09/05/2022 He spiked a temp earlie. Nurse tells me the olguin is functioning well      Antibiotics (From admission, onward)      Start     Stop Route Frequency Ordered    09/02/22 2100  mupirocin 2 % ointment         09/07 2059 Nasl 2 times daily 09/02/22 1439    09/02/22 1530  meropenem-0.9% sodium chloride 1 g/50 mL IVPB         -- IV Every 8 hours (non-standard times) 09/02/22 1434          Antifungals (From admission, onward)      None          Antivirals (From admission, onward)      None              Review of patient's allergies indicates:  No Known Allergies  Past Medical History:   Diagnosis Date    Anticoagulant long-term use     Arthritis     Colon polyp     Diabetes mellitus     Diabetes mellitus, type 2     Fatty liver     Hypertension     Major depressive disorder 11/17/2020    -Continue Wellbutrin    PEG (percutaneous endoscopic gastrostomy) adjustment/replacement/removal 1/6/2021    Stroke 11/2020    left sided weakness    Urinary retention 9/7/2021     Past Surgical History:   Procedure Laterality Date    BACK SURGERY      COLONOSCOPY  07/18/2014    Dr. Miles: 22 colon polyps removed, hemorrhoids, erythema to sigmoid, repeat in 1 year for surveillance; biopsy: sigmoid erythema- showed  unremarkable colonic mucosa, tubular adenomas and hyperplastic polyps    COLONOSCOPY N/A 5/3/2019    Procedure: COLONOSCOPY;  Surgeon: Guero Crane MD;  Location: [facility] ENDO;  Service: Endoscopy;  Laterality: N/A;    COLONOSCOPY N/A 5/6/2019    Procedure: COLONOSCOPY;  Surgeon: Guero Crane MD;  Location: [facility] ENDO;  Service: Endoscopy;  Laterality: N/A;    CYSTOSCOPY N/A 2/9/2022    Procedure: CYSTOSCOPY;  Surgeon: Jaylen Antonio Jr., MD;  Location: [facility];  Service: Urology;  Laterality: N/A;    EPIDURAL STEROID INJECTION INTO THORACIC SPINE N/A 8/30/2019    Procedure: Injection-steroid-epidural-thoracic;  Surgeon: Frantz Green MD;  Location: [facility];  Service: Pain Management;  Laterality: N/A;  T6-7    ESOPHAGOGASTRODUODENOSCOPY N/A 4/26/2019    Procedure: EGD (ESOPHAGOGASTRODUODENOSCOPY);  Surgeon: Guero Crane MD;  Location: [facility];  Service: Endoscopy;  Laterality: N/A;    ESOPHAGOGASTRODUODENOSCOPY N/A 11/23/2020    Procedure: EGD (ESOPHAGOGASTRODUODENOSCOPY);  Surgeon: Guero Crane MD;  Location: [facility];  Service: Endoscopy;  Laterality: N/A;    ESOPHAGOGASTRODUODENOSCOPY N/A 1/6/2021    Procedure: EGD (ESOPHAGOGASTRODUODENOSCOPY);  Surgeon: Guero Crane MD;  Location: [facility];  Service: Endoscopy;  Laterality: N/A;    HERNIA REPAIR      TRANSRECTAL ULTRASOUND EXAMINATION N/A 2/9/2022    Procedure: ULTRASOUND, RECTAL APPROACH;  Surgeon: Jaylen Antonio Jr., MD;  Location: [facility] OR;  Service: Urology;  Laterality: N/A;  must text son miroslava, times and instructions given leave at 130     Social History     Socioeconomic History    Marital status:    Tobacco Use    Smoking status: Former     Packs/day: 1.00     Years: 50.00     Pack years: 50.00     Types: Cigarettes    Smokeless tobacco: Never   Substance and Sexual Activity    Alcohol use: Yes     Alcohol/week: 14.0 standard drinks     Types: 14 Cans of beer per week     Comment: 2-3 beers daily    Drug use: No    Sexual  activity: Yes     Partners: Female     Social Determinants of Health     Financial Resource Strain: Low Risk     Difficulty of Paying Living Expenses: Not hard at all   Food Insecurity: Food Insecurity Present    Worried About Running Out of Food in the Last Year: Sometimes true    Ran Out of Food in the Last Year: Sometimes true   Transportation Needs: Unmet Transportation Needs    Lack of Transportation (Medical): Yes    Lack of Transportation (Non-Medical): Yes   Physical Activity: Insufficiently Active    Days of Exercise per Week: 1 day    Minutes of Exercise per Session: 10 min   Stress: No Stress Concern Present    Feeling of Stress : Only a little   Social Connections: Socially Isolated    Frequency of Communication with Friends and Family: Never    Frequency of Social Gatherings with Friends and Family: Never    Attends Restorationist Services: Never    Active Member of Clubs or Organizations: No    Attends Club or Organization Meetings: Never    Marital Status:    Housing Stability: Low Risk     Unable to Pay for Housing in the Last Year: No    Number of Places Lived in the Last Year: 1    Unstable Housing in the Last Year: No     Family History   Problem Relation Age of Onset    Heart disease Mother     Heart disease Father     Diabetes Brother     Suicide Brother     Brain cancer Brother     Colon cancer Neg Hx     Crohn's disease Neg Hx     Ulcerative colitis Neg Hx     Stomach cancer Neg Hx     Esophageal cancer Neg Hx     Glaucoma Neg Hx     Retinal detachment Neg Hx     Macular degeneration Neg Hx          Review of Systems:   No chills, fever, sweats, weight loss, but feeling poorly in general, generalized weakness  No change in vision, loss of vision or diplopia  No sinus congestion, purulent nasal discharge, post nasal drip or facial pain  No pain in mouth or throat. No problems with teeth, gums.  No chest pain, palpitations, syncope  No cough, sputum production, shortness of breath, dyspnea on  exertion, pleurisy, hemoptysis  No nausea, vomiting,  blood in stool, or focal abd pain,    +  constipation when he came in , now he has diarrhea  Kinked olguin    strangury, retention, incontinence, nocturia, prostatism,   No penile discharge, genital ulcers, risk for STD, Some bleeding with urination  No swelling of joints, redness of joints, injuries, or new focal pain  No unusual headaches, dizziness, vertigo, numbness, paresthesias, neuropathy, falls  No anxiety,   +depression, substance abuse, sleep disturbance  + diabetes, No bleeding, lymphadenopathy, anemia, malignancy, unusual bruising  No new rashes, lesions, or wounds  No TB exposure  No recent/prior steroids  Outdoor activities:no  Travel: no  Implants:   Antibiotic History:     EXAM & DIAGNOSTICS REVIEWED:   Vitals:     Temp:  [96.5 °F (35.8 °C)-98.3 °F (36.8 °C)]   Temp: 98.3 °F (36.8 °C) (09/05/22 0723)  Pulse: 64 (09/05/22 0723)  Resp: 16 (09/05/22 0723)  BP: 121/67 (09/05/22 0723)  SpO2: 96 % (09/05/22 0723)    Intake/Output Summary (Last 24 hours) at 9/5/2022 0848  Last data filed at 9/5/2022 0800  Gross per 24 hour   Intake 240 ml   Output 3200 ml   Net -2960 ml       General:  In NAD. Alert and attentive, cooperative, comfortable,disheveled   Eyes:  Anicteric, EOMI  ENT:  No ulcers, exudates, thrush, nares patent, dentition is poor  Neck:  supple, no masses or adenopathy appreciated  Lungs: Clear, no consolidation, rales, wheezes, rub  Heart:  RRR, no gallop/murmur/rub noted  Abd:  Soft, mild abd discomfort with deep palpation on to  lower abd, improved. ND, normal BS, no masses or organomegaly appreciated.  :  Olguin, urine clear, no more dry blood over  glans penis; no flank tenderness  Musc:  Joints without effusion, swelling, erythema, synovitis, muscle wasting.   Skin:  No rashes. No palmar or plantar lesions. No subungual petechiae  Neuro:             Alert, attentive, speech fluent, face symmetric, moves all extremities, no focal  weakness. Ambulatory with walker but he prefers to lay in bed and defecate in his diaper  Psych:  Calm, cooperative  Lymphatic:     No cervical, supraclavicular, axillary, or inguinal nodes  Extrem: No edema, erythema, phlebitis, cellulitis, warm and well perfused, dry skin, thickened toenails  VAD:       Isolation:    Wound:            Lines/Tubes/Drains:    General Labs reviewed:  Recent Labs   Lab 09/03/22 0429 09/04/22 0422 09/05/22 0435   WBC 4.79 4.98 5.57   HGB 11.8* 11.2* 11.7*   HCT 35.9* 34.2* 35.3*    175 193       Recent Labs   Lab 09/03/22 0429 09/04/22 0422 09/05/22 0435    139 138   K 3.2* 3.6 4.9    108 107   CO2 24 22* 23   BUN 13 12 13   CREATININE 0.7 0.7 0.8   CALCIUM 9.2 9.0 9.8     No results for input(s): CRP in the last 168 hours.      Micro:  Microbiology Results (last 7 days)       Procedure Component Value Units Date/Time    Urine culture [[id number]]  (Abnormal)  (Susceptibility) Collected: 09/02/22 1151    Order Status: Completed Specimen: Urine Updated: 09/05/22 0343     Urine Culture, Routine KLEBSIELLA PNEUMONIAE  > 100,000 cfu/ml        ESCHERICHIA COLI  > 100,000 cfu/ml      Narrative:      Specimen Source->Urine    Blood culture [[id number]] Collected: 09/03/22 2346    Order Status: Completed Specimen: Blood from Antecubital, Right Arm Updated: 09/05/22 0115     Blood Culture, Routine No Growth to date           Klebsiella pneumoniae Escherichia coli     CULTURE, URINE CULTURE, URINE    Amox/K Clav'ate <=8/4 mcg/mL Sensitive <=8/4 mcg/mL Sensitive    Amp/Sulbactam <=8/4 mcg/mL Sensitive 16/8 mcg/mL Intermediate    Ampicillin   >16 mcg/mL Resistant    Cefazolin <=2 mcg/mL Sensitive 4 mcg/mL Sensitive    Cefepime <=2 mcg/mL Sensitive <=2 mcg/mL Sensitive    Ceftriaxone <=1 mcg/mL Sensitive <=1 mcg/mL Sensitive    Ciprofloxacin <=1 mcg/mL Sensitive >2 mcg/mL Resistant    Ertapenem <=0.5 mcg/mL Sensitive <=0.5 mcg/mL Sensitive    Gentamicin <=4 mcg/mL Sensitive  <=4 mcg/mL Sensitive    Levofloxacin <=2 mcg/mL Sensitive >4 mcg/mL Resistant    Meropenem <=1 mcg/mL Sensitive <=1 mcg/mL Sensitive    Nitrofurantoin 64 mcg/mL Intermediate <=32 mcg/mL Sensitive    Piperacillin/Tazo <=16 mcg/mL Sensitive <=16 mcg/mL Sensitive    Tobramycin <=4 mcg/mL Sensitive <=4 mcg/mL Sensitive    Trimeth/Sulfa >2/38 mcg/mL Resistant <=2/38 mcg/mL Sensitive            08/03/2022 Klebsiella pneumoniae esbl    CULTURE, URINE   Amikacin <=16 mcg/mL Resistant   Amox/K Clav'ate <=8/4 mcg/mL Resistant   Amp/Sulbactam 16/8 mcg/mL Resistant   Ampicillin >16 mcg/mL Resistant   Cefazolin >16 mcg/mL Resistant   Cefepime >16 mcg/mL Resistant   Ceftriaxone >32 mcg/mL Resistant   Ciprofloxacin >2 mcg/mL Resistant   Ertapenem <=0.5 mcg/mL Sensitive   Gentamicin >8 mcg/mL Resistant   Levofloxacin >4 mcg/mL Resistant   Meropenem <=1 mcg/mL Sensitive   Nitrofurantoin >64 mcg/mL Resistant   Piperacillin/Tazo <=16 mcg/mL Resistant   Tobramycin 8 mcg/mL Intermediate   Trimeth/Sulfa >2/38 mcg/mL Resistant          Imaging Reviewed recent imaging   CT 08/05/2022:  mild infrarenal abdominal aortic aneurysm measuring 3.7 cm maximum diameter.  Otherwise negative CT of the abdomen and pelvis.    Cardiology:    IMPRESSION & PLAN     Malfunctioning Olguin catheter, replaced 09/02  Ucx E.coli, and K peumoniae  Ho ESBL UTI   Bcx neg 09/03  UTI due to GNR, in setting of malfunctioning olguin cath; h/o UTI  PMHx DM 2, hb a1c 5.8, fatty liver, HTN, D LP, CVA with left-sided weakness, anticoagulation, PEG, chronic Olguin catheter x2 years, due to urinary retention depression, multiple surgeries    Recommendations:   ---  In am he was afebrile, I had dced meropenem and gave PO  Abx Unfortunately he spiked a fever tonight and I have to broaden coverage again  --- renal US  --  Bcx  --  CRp  --- Procal  --- CXR  --- KUB  --- COvid    He needs placement as he seems to be home less, he states he cannot go back to his son's  home        Medical Decision Making during this encounter was  [_] Low Complexity  [_] Moderate Complexity  [ xx ] High Complexity

## 2022-09-05 NOTE — ASSESSMENT & PLAN NOTE
Patient's FSGs are controlled on current medication regimen.  Last A1c reviewed-   Lab Results   Component Value Date    HGBA1C 5.8 (H) 09/02/2022     Most recent fingerstick glucose reviewed-   Recent Labs   Lab 09/04/22  1139 09/04/22  1628 09/05/22  0140   POCTGLUCOSE 162* 131* 280*     Current correctional scale  Low  Maintain anti-hyperglycemic dose as follows-   Antihyperglycemics (From admission, onward)    Start     Stop Route Frequency Ordered    09/02/22 2100  insulin detemir U-100 pen 10 Units         -- SubQ Nightly 09/02/22 1434    09/02/22 1532  insulin aspart U-100 pen 0-5 Units         -- SubQ Every 6 hours PRN 09/02/22 1434        Hold Oral hypoglycemics while patient is in the hospital.

## 2022-09-05 NOTE — PLAN OF CARE
Problem: Adult Inpatient Plan of Care  Goal: Absence of Hospital-Acquired Illness or Injury  Outcome: Ongoing, Progressing     Problem: Diabetes Comorbidity  Goal: Blood Glucose Level Within Targeted Range  Outcome: Ongoing, Progressing     Problem: Skin Injury Risk Increased  Goal: Skin Health and Integrity  Outcome: Ongoing, Progressing

## 2022-09-05 NOTE — ASSESSMENT & PLAN NOTE
[facility]ier[facility] care  Monitor urine output  Meropenem q8hrs  Urine culture results:  Microbiology Results (last 7 days)     Procedure Component Value Units Date/Time    Urine culture [[id number]]  (Abnormal)  (Susceptibility) Collected: 09/02/22 1151    Order Status: Completed Specimen: Urine Updated: 09/05/22 0343     Urine Culture, Routine KLEBSIELLA PNEUMONIAE  > 100,000 cfu/ml        ESCHERICHIA COLI  > 100,000 cfu/ml      Narrative:      Specimen Source->Urine    Blood culture [[id number]] Collected: 09/03/22 2346    Order Status: Completed Specimen: Blood from Antecubital, Right Arm Updated: 09/05/22 0115     Blood Culture, Routine No Growth to date

## 2022-09-05 NOTE — PLAN OF CARE
Problem: Physical Therapy  Goal: Physical Therapy Goal  Description: Goals to be met by: 10/3/22     Patient will increase functional independence with mobility by performin. Supine to sit with Contact Guard Assistance  2. Sit to stand transfer with Contact Guard Assistance  3. Bed to chair transfer with Contact Guard Assistance using Rolling Walker  4. Gait  x 200 feet with Contact Guard Assistance using Rolling Walker.   5. Lower extremity exercise program x30 reps per handout, with supervision    Outcome: Ongoing, Progressing

## 2022-09-05 NOTE — PT/OT/SLP EVAL
Occupational Therapy   Evaluation    Name: Jhonny Almazan  MRN: [id number]  Admitting Diagnosis:  Complicated UTI (urinary tract infection)  Recent Surgery: * No surgery found *      Recommendations:     Discharge Recommendations: nursing facility, skilled  Discharge Equipment Recommendations:  none  Barriers to discharge:  None    Assessment:     Jhonny Almazan is a 66 y.o. male with a medical diagnosis of Complicated UTI (urinary tract infection).  He presents with c/o BLE weakness especially when ambulating in room with RW. Patient limited with performing grooming tasks while standing. Patient requested to perform grooming tasks at EOB instead due to BLE weakness with standing. Patient would benefit from SNF placement to maximize independence with ADLs and mobility. Performance deficits affecting function: weakness, impaired endurance, impaired self care skills, impaired functional mobility, gait instability, decreased lower extremity function, impaired balance.      Rehab Prognosis: Good; patient would benefit from acute skilled OT services to address these deficits and reach maximum level of function.       Plan:     Patient to be seen 4 x/week to address the above listed problems via self-care/home management, therapeutic activities, therapeutic exercises  Plan of Care Expires: 09/26/22  Plan of Care Reviewed with: patient    Subjective     Chief Complaint: none  Patient/Family Comments/goals: none    Occupational Profile:  Living Environment: Patient lives with son (who is deaf) in a [facility].   Previous level of function: Patient was Mod I with dressing, but noted progressive weakness which limited independence with bathing and general mobility. Patient ambulated mostly in w/c, but also ambulated short distances with RW.   Equipment Used at Home:  wheelchair, walker, rolling, shower chair  Assistance upon Discharge: Patient will receive assistance from family.     Pain/Comfort:  Pain Rating 1:  0/10  Pain Rating Post-Intervention 1: 0/10    Patients cultural, spiritual, Protestant conflicts given the current situation:      Objective:     Communicated with: nurse José Miguel prior to session.  Patient found HOB elevated with peripheral IV, olguin catheter, bed alarm upon OT entry to room.    General Precautions: Standard, contact   Orthopedic Precautions:N/A   Braces: N/A  Respiratory Status: Room air    Occupational Performance:    Bed Mobility:    Patient completed Scooting/Bridging with stand by assistance  Patient completed Supine to Sit with stand by assistance  Patient completed Sit to Supine with stand by assistance    Functional Mobility/Transfers:  Patient completed Sit <> Stand Transfer with moderate assistance  with  rolling walker   Patient completed Toilet Transfer Stand Pivot technique with moderate assistance with  rolling walker    Activities of Daily Living:  Grooming: stand by assistance with all grooming tasks while seated EOB  Upper Body Dressing: stand by assistance   Lower Body Dressing: moderate assistance to don/doff socks while seated EOB  Toileting: minimum assistance     Cognitive/Visual Perceptual:  Cognitive/Psychosocial Skills:     -       Oriented to: x4   -       Follows Commands/attention:Follows multistep  commands  -       Communication: clear/fluent  -       Safety awareness/insight to disability: intact   -       Mood/Affect/Coping skills/emotional control: Appropriate to situation and Cooperative  Visual/Perceptual:      -Intact     Physical Exam:  Postural examination/scapula alignment:    -       Rounded shoulders  -       Forward head  Upper Extremity Range of Motion:     -       Right Upper Extremity: WFL  -       Left Upper Extremity: WFL  Upper Extremity Strength:    -       Right Upper Extremity: WFL  -       Left Upper Extremity: WFL   Strength:    -       Right Upper Extremity: WFL  -       Left Upper Extremity: WFL  Fine Motor Coordination:    -        Intact  Gross motor coordination:   WFL in BUE    AMPA 6 Click ADL:  AMPA Total Score: 19    Treatment & Education:  OT ed pt on OT role & POC as well as discharge recommendations.  OT ed patient on safety with walker use for functional mobility with cues for hand placement & sequencing.       Patient left HOB elevated with all lines intact and call button in reach    GOALS:   Multidisciplinary Problems       Occupational Therapy Goals          Problem: Occupational Therapy    Goal Priority Disciplines Outcome Interventions   Occupational Therapy Goal     OT, PT/OT Ongoing, Progressing    Description: Goals to be met by: 9/26/2022     Patient will increase functional independence with ADLs by performing:    LE Dressing with Stand-by Assistance and Assistive Devices as needed.  Grooming while seated at sink with Supervision.  Toileting from toilet with Supervision for hygiene and clothing management.   Supine to sit with Supervision.  Toilet transfer to toilet with Stand-by Assistance.                         History:     Past Medical History:   Diagnosis Date    Anticoagulant long-term use     Arthritis     Colon polyp     Diabetes mellitus     Diabetes mellitus, type 2     Fatty liver     Hypertension     Major depressive disorder 11/17/2020    -Continue Wellbutrin    PEG (percutaneous endoscopic gastrostomy) adjustment/replacement/removal 1/6/2021    Stroke 11/2020    left sided weakness    Urinary retention 9/7/2021         Past Surgical History:   Procedure Laterality Date    BACK SURGERY      COLONOSCOPY  07/18/2014    Dr. Crane: 22 colon polyps removed, hemorrhoids, erythema to sigmoid, repeat in 1 year for surveillance; biopsy: sigmoid erythema- showed unremarkable colonic mucosa, tubular adenomas and hyperplastic polyps    COLONOSCOPY N/A 5/3/2019    Procedure: COLONOSCOPY;  Surgeon: Guero Crane MD;  Location: [facility];  Service: Endoscopy;  Laterality: N/A;    COLONOSCOPY N/A 5/6/2019     Procedure: COLONOSCOPY;  Surgeon: Guero Crane MD;  Location: [facility] ENDO;  Service: Endoscopy;  Laterality: N/A;    CYSTOSCOPY N/A 2/9/2022    Procedure: CYSTOSCOPY;  Surgeon: Jaylen Antonio Jr., MD;  Location: [facility] OR;  Service: Urology;  Laterality: N/A;    EPIDURAL STEROID INJECTION INTO THORACIC SPINE N/A 8/30/2019    Procedure: Injection-steroid-epidural-thoracic;  Surgeon: Frantz Green MD;  Location: [facility] OR;  Service: Pain Management;  Laterality: N/A;  T6-7    ESOPHAGOGASTRODUODENOSCOPY N/A 4/26/2019    Procedure: EGD (ESOPHAGOGASTRODUODENOSCOPY);  Surgeon: Guero Crane MD;  Location: [facility] ENDO;  Service: Endoscopy;  Laterality: N/A;    ESOPHAGOGASTRODUODENOSCOPY N/A 11/23/2020    Procedure: EGD (ESOPHAGOGASTRODUODENOSCOPY);  Surgeon: Guero Crane MD;  Location: [facility] ENDO;  Service: Endoscopy;  Laterality: N/A;    ESOPHAGOGASTRODUODENOSCOPY N/A 1/6/2021    Procedure: EGD (ESOPHAGOGASTRODUODENOSCOPY);  Surgeon: Guero Crane MD;  Location: [facility];  Service: Endoscopy;  Laterality: N/A;    HERNIA REPAIR      TRANSRECTAL ULTRASOUND EXAMINATION N/A 2/9/2022    Procedure: ULTRASOUND, RECTAL APPROACH;  Surgeon: Jaylen Antonio Jr., MD;  Location: [facility] OR;  Service: Urology;  Laterality: N/A;  must text timothy colorado, times and instructions given leave at 130       Time Tracking:     OT Date of Treatment: 09/05/22  OT Start Time: 0906  OT Stop Time: 0938  OT Total Time (min): 32 min    Billable Minutes:Evaluation 8  Self Care/Home Management 24    9/5/2022

## 2022-09-06 ENCOUNTER — OUTSIDE PLACE OF SERVICE (OUTPATIENT)
Dept: INFECTIOUS DISEASES | Facility: CLINIC | Age: 67
End: 2022-09-06
Payer: MEDICARE

## 2022-09-06 DIAGNOSIS — N39.0 UTI (URINARY TRACT INFECTION) DUE TO URINARY INDWELLING CATHETER: Primary | ICD-10-CM

## 2022-09-06 DIAGNOSIS — T83.511A UTI (URINARY TRACT INFECTION) DUE TO URINARY INDWELLING CATHETER: Primary | ICD-10-CM

## 2022-09-06 LAB
ANION GAP SERPL CALC-SCNC: 6 MMOL/L (ref 8–16)
BASOPHILS # BLD AUTO: 0.05 K/UL (ref 0–0.2)
BASOPHILS NFR BLD: 0.3 % (ref 0–1.9)
BUN SERPL-MCNC: 16 MG/DL (ref 8–23)
CALCIUM SERPL-MCNC: 9.8 MG/DL (ref 8.7–10.5)
CHLORIDE SERPL-SCNC: 103 MMOL/L (ref 95–110)
CO2 SERPL-SCNC: 24 MMOL/L (ref 23–29)
CREAT SERPL-MCNC: 0.8 MG/DL (ref 0.5–1.4)
CRP SERPL-MCNC: 28 MG/L (ref 0–8.2)
DIFFERENTIAL METHOD: ABNORMAL
EOSINOPHIL # BLD AUTO: 0.1 K/UL (ref 0–0.5)
EOSINOPHIL NFR BLD: 0.5 % (ref 0–8)
ERYTHROCYTE [DISTWIDTH] IN BLOOD BY AUTOMATED COUNT: 14.6 % (ref 11.5–14.5)
EST. GFR  (NO RACE VARIABLE): >60 ML/MIN/1.73 M^2
GLUCOSE SERPL-MCNC: 175 MG/DL (ref 70–110)
HCT VFR BLD AUTO: 34.5 % (ref 40–54)
HGB BLD-MCNC: 11.4 G/DL (ref 14–18)
IMM GRANULOCYTES # BLD AUTO: 0.09 K/UL (ref 0–0.04)
IMM GRANULOCYTES NFR BLD AUTO: 0.5 % (ref 0–0.5)
LYMPHOCYTES # BLD AUTO: 1.4 K/UL (ref 1–4.8)
LYMPHOCYTES NFR BLD: 8.1 % (ref 18–48)
MCH RBC QN AUTO: 27.6 PG (ref 27–31)
MCHC RBC AUTO-ENTMCNC: 33 G/DL (ref 32–36)
MCV RBC AUTO: 84 FL (ref 82–98)
MONOCYTES # BLD AUTO: 0.7 K/UL (ref 0.3–1)
MONOCYTES NFR BLD: 4.3 % (ref 4–15)
NEUTROPHILS # BLD AUTO: 14.7 K/UL (ref 1.8–7.7)
NEUTROPHILS NFR BLD: 86.3 % (ref 38–73)
NRBC BLD-RTO: 0 /100 WBC
PLATELET # BLD AUTO: 204 K/UL (ref 150–450)
PMV BLD AUTO: 9.5 FL (ref 9.2–12.9)
POCT GLUCOSE: 151 MG/DL (ref 70–110)
POCT GLUCOSE: 164 MG/DL (ref 70–110)
POCT GLUCOSE: 235 MG/DL (ref 70–110)
POTASSIUM SERPL-SCNC: 5.2 MMOL/L (ref 3.5–5.1)
PROCALCITONIN SERPL IA-MCNC: 2.2 NG/ML
RBC # BLD AUTO: 4.13 M/UL (ref 4.6–6.2)
SODIUM SERPL-SCNC: 133 MMOL/L (ref 136–145)
TB INDURATION 48 - 72 HR READ: 0 MM
WBC # BLD AUTO: 17 K/UL (ref 3.9–12.7)

## 2022-09-06 PROCEDURE — 86140 C-REACTIVE PROTEIN: CPT | Performed by: INTERNAL MEDICINE

## 2022-09-06 PROCEDURE — 11000001 HC ACUTE MED/SURG PRIVATE ROOM

## 2022-09-06 PROCEDURE — A9698 NON-RAD CONTRAST MATERIALNOC: HCPCS

## 2022-09-06 PROCEDURE — 27000207 HC ISOLATION

## 2022-09-06 PROCEDURE — 84145 PROCALCITONIN (PCT): CPT | Performed by: INTERNAL MEDICINE

## 2022-09-06 PROCEDURE — 94760 N-INVAS EAR/PLS OXIMETRY 1: CPT

## 2022-09-06 PROCEDURE — 25000003 PHARM REV CODE 250: Performed by: NURSE PRACTITIONER

## 2022-09-06 PROCEDURE — 97535 SELF CARE MNGMENT TRAINING: CPT

## 2022-09-06 PROCEDURE — 99900035 HC TECH TIME PER 15 MIN (STAT)

## 2022-09-06 PROCEDURE — 36415 COLL VENOUS BLD VENIPUNCTURE: CPT | Performed by: INTERNAL MEDICINE

## 2022-09-06 PROCEDURE — 99232 SBSQ HOSP IP/OBS MODERATE 35: CPT | Mod: S$GLB,,, | Performed by: STUDENT IN AN ORGANIZED HEALTH CARE EDUCATION/TRAINING PROGRAM

## 2022-09-06 PROCEDURE — 63600175 PHARM REV CODE 636 W HCPCS: Performed by: INTERNAL MEDICINE

## 2022-09-06 PROCEDURE — 80048 BASIC METABOLIC PNL TOTAL CA: CPT | Performed by: NURSE PRACTITIONER

## 2022-09-06 PROCEDURE — 85025 COMPLETE CBC W/AUTO DIFF WBC: CPT | Performed by: NURSE PRACTITIONER

## 2022-09-06 PROCEDURE — 25500020 PHARM REV CODE 255

## 2022-09-06 PROCEDURE — 87040 BLOOD CULTURE FOR BACTERIA: CPT | Performed by: INTERNAL MEDICINE

## 2022-09-06 PROCEDURE — U0005 INFEC AGEN DETEC AMPLI PROBE: HCPCS | Performed by: INTERNAL MEDICINE

## 2022-09-06 PROCEDURE — 99232 PR SUBSEQUENT HOSPITAL CARE,LEVL II: ICD-10-PCS | Mod: S$GLB,,, | Performed by: STUDENT IN AN ORGANIZED HEALTH CARE EDUCATION/TRAINING PROGRAM

## 2022-09-06 PROCEDURE — 97530 THERAPEUTIC ACTIVITIES: CPT | Mod: CQ

## 2022-09-06 PROCEDURE — 25000003 PHARM REV CODE 250

## 2022-09-06 PROCEDURE — U0003 INFECTIOUS AGENT DETECTION BY NUCLEIC ACID (DNA OR RNA); SEVERE ACUTE RESPIRATORY SYNDROME CORONAVIRUS 2 (SARS-COV-2) (CORONAVIRUS DISEASE [COVID-19]), AMPLIFIED PROBE TECHNIQUE, MAKING USE OF HIGH THROUGHPUT TECHNOLOGIES AS DESCRIBED BY CMS-2020-01-R: HCPCS | Performed by: INTERNAL MEDICINE

## 2022-09-06 RX ORDER — MEROPENEM AND SODIUM CHLORIDE 1 G/50ML
1 INJECTION, SOLUTION INTRAVENOUS
Status: DISCONTINUED | OUTPATIENT
Start: 2022-09-06 | End: 2022-09-07

## 2022-09-06 RX ORDER — POLYETHYLENE GLYCOL 3350 17 G/17G
17 POWDER, FOR SOLUTION ORAL 2 TIMES DAILY
Status: DISCONTINUED | OUTPATIENT
Start: 2022-09-06 | End: 2022-09-08 | Stop reason: HOSPADM

## 2022-09-06 RX ADMIN — MEROPENEM AND SODIUM CHLORIDE 1 G: 1 INJECTION, SOLUTION INTRAVENOUS at 01:09

## 2022-09-06 RX ADMIN — ATORVASTATIN CALCIUM 40 MG: 40 TABLET, FILM COATED ORAL at 08:09

## 2022-09-06 RX ADMIN — MUPIROCIN: 20 OINTMENT TOPICAL at 08:09

## 2022-09-06 RX ADMIN — FINASTERIDE 5 MG: 5 TABLET, FILM COATED ORAL at 08:09

## 2022-09-06 RX ADMIN — IOHEXOL 75 ML: 350 INJECTION, SOLUTION INTRAVENOUS at 03:09

## 2022-09-06 RX ADMIN — POLYETHYLENE GLYCOL 3350 17 G: 17 POWDER, FOR SOLUTION ORAL at 10:09

## 2022-09-06 RX ADMIN — POTASSIUM CHLORIDE 40 MEQ: 1500 TABLET, EXTENDED RELEASE ORAL at 08:09

## 2022-09-06 RX ADMIN — INSULIN DETEMIR 10 UNITS: 100 INJECTION, SOLUTION SUBCUTANEOUS at 08:09

## 2022-09-06 RX ADMIN — POLYETHYLENE GLYCOL 3350 17 G: 17 POWDER, FOR SOLUTION ORAL at 08:09

## 2022-09-06 RX ADMIN — GABAPENTIN 300 MG: 300 CAPSULE ORAL at 08:09

## 2022-09-06 RX ADMIN — MEROPENEM AND SODIUM CHLORIDE 1 G: 1 INJECTION, SOLUTION INTRAVENOUS at 08:09

## 2022-09-06 RX ADMIN — CLOPIDOGREL BISULFATE 75 MG: 75 TABLET, FILM COATED ORAL at 08:09

## 2022-09-06 RX ADMIN — IOHEXOL 700 ML: 9 SOLUTION ORAL at 02:09

## 2022-09-06 RX ADMIN — BUPROPION HYDROCHLORIDE 300 MG: 150 TABLET, FILM COATED, EXTENDED RELEASE ORAL at 08:09

## 2022-09-06 RX ADMIN — MEROPENEM AND SODIUM CHLORIDE 1 G: 1 INJECTION, SOLUTION INTRAVENOUS at 06:09

## 2022-09-06 NOTE — PLAN OF CARE
Problem: Physical Therapy  Goal: Physical Therapy Goal  Description: Goals to be met by: 10/3/22     Patient will increase functional independence with mobility by performin. Supine to sit with Contact Guard Assistance  2. Sit to stand transfer with Contact Guard Assistance  3. Bed to chair transfer with Contact Guard Assistance using Rolling Walker  4. Gait  x 200 feet with Contact Guard Assistance using Rolling Walker.   5. Lower extremity exercise program x30 reps per handout, with supervision    Outcome: Ongoing, Progressing   Therapeutic activity : bed mobility, transfers, gait with rw and assistance for safety.    B/L rib cage pain/no injury

## 2022-09-06 NOTE — ASSESSMENT & PLAN NOTE
Patient's FSGs are controlled on current medication regimen.  Last A1c reviewed-   Lab Results   Component Value Date    HGBA1C 5.8 (H) 09/02/2022     Most recent fingerstick glucose reviewed-   Recent Labs   Lab 09/05/22  2147   POCTGLUCOSE 218*     Current correctional scale  Low  Maintain anti-hyperglycemic dose as follows-   Antihyperglycemics (From admission, onward)    Start     Stop Route Frequency Ordered    09/02/22 2100  insulin detemir U-100 pen 10 Units         -- SubQ Nightly 09/02/22 1434    09/02/22 1532  insulin aspart U-100 pen 0-5 Units         -- SubQ Every 6 hours PRN 09/02/22 1434        Hold Oral hypoglycemics while patient is in the hospital.

## 2022-09-06 NOTE — CONSULTS
Consulted for impaired skin integrity to buttock. Patient is noted with a skin tag to his left anterior upper thigh which is intact without evidence of any problems at this assessment. Wound care to follow.

## 2022-09-06 NOTE — PLAN OF CARE
10:22am - SW called SNFs to f/u on referral:    [facility], unable to accept.  [facility], [phone], left message for Taty  [facility], [phone], left message w/ [facility] for admissions   [facility], [phone], spoke to Radha, will review   [facility], [phone], left message w/ Radha for admission dept  [facility], [phone], spoke to Fela, have not reviewed clinicals yet, will review.     09/06/22 1035   Post-Acute Status   Post-Acute Authorization Placement   Post-Acute Placement Status Pending post-acute provider review/more information requested   Discharge Plan   Discharge Plan A Skilled Nursing Facility     3:03pm - SW spoke to Fela at [facility], pt accepted and she will submit to [employer] for auth.

## 2022-09-06 NOTE — PLAN OF CARE
Problem: Adult Inpatient Plan of Care  Goal: Plan of Care Review  Outcome: Ongoing, Progressing     Problem: Infection  Goal: Absence of Infection Signs and Symptoms  Outcome: Ongoing, Progressing     Problem: Diabetes Comorbidity  Goal: Blood Glucose Level Within Targeted Range  Outcome: Ongoing, Progressing     Problem: Skin Injury Risk Increased  Goal: Skin Health and Integrity  Outcome: Ongoing, Progressing     Problem: Malnutrition  Goal: Improved Nutritional Intake  Outcome: Ongoing, Progressing     Problem: Fall Injury Risk  Goal: Absence of Fall and Fall-Related Injury  Outcome: Ongoing, Progressing
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
## 2022-09-06 NOTE — PT/OT/SLP PROGRESS
Occupational Therapy   Treatment    Name: Jhonny Almazan  MRN: [id number]  Admitting Diagnosis:  UTI (urinary tract infection) due to urinary indwelling catheter       Recommendations:     Discharge Recommendations: nursing facility, skilled  Discharge Equipment Recommendations:  none  Barriers to discharge:  None    Assessment:     Jhonny Almazan is a 66 y.o. male with a medical diagnosis of UTI (urinary tract infection) due to urinary indwelling catheter.  He presents with unsteadiness initially when moving supine>sit, noting progressive improvement to balance the longer patient sat up. Patient required extra time with transferring bed>chair requiring Mod A using RW.  Performance deficits affecting function are weakness, impaired endurance, impaired self care skills, impaired functional mobility, gait instability, impaired balance, decreased lower extremity function, decreased safety awareness.     Rehab Prognosis:  Good; patient would benefit from acute skilled OT services to address these deficits and reach maximum level of function.       Plan:     Patient to be seen 4 x/week to address the above listed problems via self-care/home management, therapeutic activities, therapeutic exercises  Plan of Care Expires: 09/26/22  Plan of Care Reviewed with: patient    Subjective     Pain/Comfort:  Pain Rating 1: 0/10  Pain Rating Post-Intervention 1: 0/10    Objective:     Communicated with: nurse Valdez prior to session.  Patient found HOB elevated with bed alarm, peripheral IV, olguin catheter upon OT entry to room.    General Precautions: Standard, contact   Orthopedic Precautions:N/A   Braces: N/A  Respiratory Status: Room air     Occupational Performance:     Bed Mobility:    Patient completed Scooting/Bridging with moderate assistance  Patient completed Supine to Sit with moderate assistance  Patient completed Sit to Supine with moderate assistance     Functional Mobility/Transfers:  Patient completed  Sit <> Stand Transfer with moderate assistance  with  rolling walker   Patient completed Bed <> Chair Transfer using Stand Pivot technique with moderate assistance with rolling walker  Functional Mobility: Noted unsteadiness initially with sitting EOB requiring Min A, then progressing to SBA. Noted increased unsteadiness when standing and transferring bed>chair requiring Mod A and RW with verbal cues for safety.    Activities of Daily Living:  Lower Body Dressing: moderate assistance to don/doff socks while seated EOB  Toileting: dependence with olguin cath in place      [facility] 6 Click ADL: 19    Treatment & Education:  OT ed patient on safety with walker use for functional mobility with cues for hand placement & sequencing.       Patient left up in chair with all lines intact, call button in reach, chair alarm on, and nurse notified    GOALS:   Multidisciplinary Problems       Occupational Therapy Goals          Problem: Occupational Therapy    Goal Priority Disciplines Outcome Interventions   Occupational Therapy Goal     OT, PT/OT Ongoing, Progressing    Description: Goals to be met by: 9/26/2022     Patient will increase functional independence with ADLs by performing:    LE Dressing with Stand-by Assistance and Assistive Devices as needed.  Grooming while seated at sink with Supervision.  Toileting from toilet with Supervision for hygiene and clothing management.   Supine to sit with Supervision.  Toilet transfer to toilet with Stand-by Assistance.                         Time Tracking:     OT Date of Treatment: 09/06/22  OT Start Time: 1102  OT Stop Time: 1140  OT Total Time (min): 38 min    Billable Minutes:Self Care/Home Management 38    OT/JULISA: OT          9/6/2022

## 2022-09-06 NOTE — ASSESSMENT & PLAN NOTE
Gutierrez care  Monitor urine output  Meropenem q8hrs  Urine culture results:  Microbiology Results (last 7 days)     Procedure Component Value Units Date/Time    Blood culture [[id number]] Collected: 09/06/22 0047    Order Status: Sent Specimen: Blood from Antecubital, Right Arm Updated: 09/06/22 1004    Blood culture [[id number]] Collected: 09/03/22 2346    Order Status: Completed Specimen: Blood from Antecubital, Right Arm Updated: 09/05/22 1812     Blood Culture, Routine No Growth to date      No Growth to date    Urine culture [[id number]]  (Abnormal)  (Susceptibility) Collected: 09/02/22 1151    Order Status: Completed Specimen: Urine Updated: 09/05/22 0343     Urine Culture, Routine KLEBSIELLA PNEUMONIAE  > 100,000 cfu/ml        ESCHERICHIA COLI  > 100,000 cfu/ml      Narrative:      Specimen Source->Urine

## 2022-09-06 NOTE — SUBJECTIVE & OBJECTIVE
Interval History: Notes reviewed, patient febrile yesterday evening with temp of 101.5 with an episode of vomiting. He reports feeling better this morning. Denies any N/V, abdominal pain, urinary symptoms or chills at this time. WBC elevated at 17 this morning. Elevated Procal and crp of 28. Repeat CXR showing improving airspace disease. Abdominal xray showing Nonspecific stomach dilation. Gastroparesis or outlet obstruction are possible. CT abdomen ordered per ID. Repeat blood cultures ordered. Patient was started on oral antibiotics yesterday per ID. Switched back to IV meropenem following fever. SNF placement pending. Will continue to monitor and follow ID recs.     Review of Systems   Constitutional:  Positive for fatigue. Negative for chills and fever.   Respiratory:  Negative for cough, shortness of breath and wheezing.    Cardiovascular:  Negative for chest pain and palpitations.   Gastrointestinal:  Negative for abdominal distention, abdominal pain, nausea and vomiting.   Endocrine: Negative for polydipsia and polyuria.   Genitourinary:  Negative for dysuria, flank pain and frequency.   Musculoskeletal:  Negative for arthralgias and myalgias.   Neurological:  Negative for weakness, numbness and headaches.   Psychiatric/Behavioral:  Negative for behavioral problems and confusion.    All other systems reviewed and are negative.  Objective:     Vital Signs (Most Recent):  Temp: 98.3 °F (36.8 °C) (09/06/22 1123)  Pulse: 85 (09/06/22 1123)  Resp: 16 (09/06/22 1123)  BP: (!) 115/56 (09/06/22 1123)  SpO2: (!) 93 % (09/06/22 1123)   Vital Signs (24h Range):  Temp:  [97.2 °F (36.2 °C)-101.5 °F (38.6 °C)] 98.3 °F (36.8 °C)  Pulse:  [] 85  Resp:  [16-18] 16  SpO2:  [92 %-97 %] 93 %  BP: ()/(56-86) 115/56     Weight: 79.5 kg (175 lb 4.3 oz)  Body mass index is 20.78 kg/m².    Intake/Output Summary (Last 24 hours) at 9/6/2022 1153  Last data filed at 9/6/2022 0800  Gross per 24 hour   Intake 840 ml   Output  902 ml   Net -62 ml      Physical Exam  Vitals and nursing note reviewed.   Constitutional:       General: He is not in acute distress.     Appearance: He is ill-appearing.      Comments: Disheveled appearance   HENT:      Head: Normocephalic and atraumatic.      Right Ear: External ear normal.      Left Ear: External ear normal.   Cardiovascular:      Rate and Rhythm: Normal rate and regular rhythm.      Pulses: Normal pulses.      Heart sounds: Normal heart sounds. No murmur heard.    No gallop.   Pulmonary:      Effort: Pulmonary effort is normal. No respiratory distress.      Breath sounds: Normal breath sounds. No wheezing or rales.   Abdominal:      General: Abdomen is flat. There is no distension.      Palpations: Abdomen is soft.      Tenderness: There is abdominal tenderness.      Comments: Mild suprapubic tenderness   Musculoskeletal:         General: No swelling or tenderness. Normal range of motion.   Skin:     General: Skin is warm.      Coloration: Skin is not jaundiced.      Findings: No bruising.   Neurological:      General: No focal deficit present.      Mental Status: He is alert and oriented to person, place, and time.   Psychiatric:         Mood and Affect: Mood normal.       Significant Labs: All pertinent labs within the past 24 hours have been reviewed.  CBC:   Recent Labs   Lab 09/05/22  0435 09/06/22  0507   WBC 5.57 17.00*   HGB 11.7* 11.4*   HCT 35.3* 34.5*    204     CMP:   Recent Labs   Lab 09/05/22  0435 09/06/22  0507    133*   K 4.9 5.2*    103   CO2 23 24   * 175*   BUN 13 16   CREATININE 0.8 0.8   CALCIUM 9.8 9.8   ANIONGAP 8 6*       Significant Imaging: I have reviewed all pertinent imaging results/findings within the past 24 hours.

## 2022-09-06 NOTE — PROGRESS NOTES
"[facility] Hospital Medicine  Progress Note    Patient Name: Jhonny Almazan  MRN: [id number]  Patient Class: IP- Inpatient   Admission Date: 9/2/2022  Length of Stay: 4 days  Attending Physician: Homa Steele MD  Primary Care Provider: Joey Whitehead MD        Subjective:     Principal Problem:UTI (urinary tract infection) due to urinary indwelling catheter        HPI:  Jhonny Almazan is a 66 year old  male who presented to the ED with CC of having problems with his olguin not draining properly. He reported he has olguin for around 2 years because he has difficulty urinating. He also reported that he has not had a BM for 1-1/2 weeks. ED MD changed out olguin and cath is draining well now. Pt denies fever, chills, sweats, sediment in cath/urine, blood in urine. Denies dec fluid intake. Denies any other problems. Has PMH DM, HTN, major depression, cva. Denies any problems with his chronic illnesses at this time. Denies dizziness, thirst, increasing weakness. Reported his mobility is "not very good:. Lives with son in a house and doesn't get cared for very well and doesn't eat much or follow a diabetic diet. Recently got "Meals on Wheels" arranged. Doesn't know current BG or BP. No longer has g-tube. Doesn't know specifics about medications, takes insulin twice a day.     UA in ED showed Nitrite Pos, Leuk 3+,Bld 2+. Recently admitted with MDRO- urine cs (8/3/) showed Kleibsiella >100,000 only sensitive to ertapenemem and meropenem. Will give Meropenem 1gm q 8hrs. K+ 3.2 on adm give po K+ in ED. Will recheck in am.  Will give Fleets enema and begin senokot. Explore placement.      Overview/Hospital Course:  No notes on file    Interval History: Notes reviewed, patient febrile yesterday evening with temp of 101.5 with an episode of vomiting. He reports feeling better this morning. Denies any N/V, abdominal pain, urinary symptoms or chills at this time. WBC elevated at 17 this morning. " Elevated Procal and crp of 28. Repeat CXR showing improving airspace disease. Abdominal xray showing Nonspecific stomach dilation. Gastroparesis or outlet obstruction are possible. CT abdomen ordered per ID. Repeat blood cultures ordered. Patient was started on oral antibiotics yesterday per ID. Switched back to IV meropenem following fever. SNF placement pending. Will continue to monitor and follow ID recs.     Review of Systems   Constitutional:  Positive for fatigue. Negative for chills and fever.   Respiratory:  Negative for cough, shortness of breath and wheezing.    Cardiovascular:  Negative for chest pain and palpitations.   Gastrointestinal:  Negative for abdominal distention, abdominal pain, nausea and vomiting.   Endocrine: Negative for polydipsia and polyuria.   Genitourinary:  Negative for dysuria, flank pain and frequency.   Musculoskeletal:  Negative for arthralgias and myalgias.   Neurological:  Negative for weakness, numbness and headaches.   Psychiatric/Behavioral:  Negative for behavioral problems and confusion.    All other systems reviewed and are negative.  Objective:     Vital Signs (Most Recent):  Temp: 98.3 °F (36.8 °C) (09/06/22 1123)  Pulse: 85 (09/06/22 1123)  Resp: 16 (09/06/22 1123)  BP: (!) 115/56 (09/06/22 1123)  SpO2: (!) 93 % (09/06/22 1123)   Vital Signs (24h Range):  Temp:  [97.2 °F (36.2 °C)-101.5 °F (38.6 °C)] 98.3 °F (36.8 °C)  Pulse:  [] 85  Resp:  [16-18] 16  SpO2:  [92 %-97 %] 93 %  BP: ()/(56-86) 115/56     Weight: 79.5 kg (175 lb 4.3 oz)  Body mass index is 20.78 kg/m².    Intake/Output Summary (Last 24 hours) at 9/6/2022 1153  Last data filed at 9/6/2022 0800  Gross per 24 hour   Intake 840 ml   Output 902 ml   Net -62 ml      Physical Exam  Vitals and nursing note reviewed.   Constitutional:       General: He is not in acute distress.     Appearance: He is ill-appearing.      Comments: Disheveled appearance   HENT:      Head: Normocephalic and atraumatic.       Right Ear: External ear normal.      Left Ear: External ear normal.   Cardiovascular:      Rate and Rhythm: Normal rate and regular rhythm.      Pulses: Normal pulses.      Heart sounds: Normal heart sounds. No murmur heard.    No gallop.   Pulmonary:      Effort: Pulmonary effort is normal. No respiratory distress.      Breath sounds: Normal breath sounds. No wheezing or rales.   Abdominal:      General: Abdomen is flat. There is no distension.      Palpations: Abdomen is soft.      Tenderness: There is abdominal tenderness.      Comments: Mild suprapubic tenderness   Musculoskeletal:         General: No swelling or tenderness. Normal range of motion.   Skin:     General: Skin is warm.      Coloration: Skin is not jaundiced.      Findings: No bruising.   Neurological:      General: No focal deficit present.      Mental Status: He is alert and oriented to person, place, and time.   Psychiatric:         Mood and Affect: Mood normal.       Significant Labs: All pertinent labs within the past 24 hours have been reviewed.  CBC:   Recent Labs   Lab 09/05/22  0435 09/06/22  0507   WBC 5.57 17.00*   HGB 11.7* 11.4*   HCT 35.3* 34.5*    204     CMP:   Recent Labs   Lab 09/05/22  0435 09/06/22  0507    133*   K 4.9 5.2*    103   CO2 23 24   * 175*   BUN 13 16   CREATININE 0.8 0.8   CALCIUM 9.8 9.8   ANIONGAP 8 6*       Significant Imaging: I have reviewed all pertinent imaging results/findings within the past 24 hours.      Assessment/Plan:      * UTI (urinary tract infection) due to urinary indwelling catheter  Monitor labs  IV abx  Await urine cs- preliminary results of GNR  ID consulted: appreciate recs  Elevated Procal and CRP  Urine Culture and Blood culture results:  Microbiology Results (last 7 days)     Procedure Component Value Units Date/Time    Blood culture [[id number]] Collected: 09/06/22 0047    Order Status: Sent Specimen: Blood from Antecubital, Right Arm Updated: 09/06/22 1004     Blood culture [[id number]] Collected: 09/03/22 2346    Order Status: Completed Specimen: Blood from Antecubital, Right Arm Updated: 09/05/22 1812     Blood Culture, Routine No Growth to date      No Growth to date    Urine culture [[id number]]  (Abnormal)  (Susceptibility) Collected: 09/02/22 1151    Order Status: Completed Specimen: Urine Updated: 09/05/22 0343     Urine Culture, Routine KLEBSIELLA PNEUMONIAE  > 100,000 cfu/ml        ESCHERICHIA COLI  > 100,000 cfu/ml      Narrative:      Specimen Source->Urine            Moderate malnutrition  Nutrition consulted. Most recent weight and BMI monitored-     Malnutrition Type and Energy Intake  Malnutrition Type: chronic illness    Malnutrition (Moderate to Severe)  Weight Loss (Malnutrition): 20% in 1 year  Energy Intake (Malnutrition): less than 75% for greater than or equal to 1 month    Final Summary  Subcutaneous Fat Loss (Final Summary): moderate protein-calorie malnutrition  Muscle Loss Evaluation (Final Summary): moderate protein-calorie malnutrition         Measurements:  Wt Readings from Last 1 Encounters:   09/03/22 79.5 kg (175 lb 4.3 oz)   Body mass index is 20.78 kg/m².    Recommendations: Recommendation/Intervention: 1) Chane diet to DM 2000 kcal, cardiac diet- textuer per SLP 2) SLP eval 3) weigh weekly 4) Boost glcose control chocolate BID 5) Nutrition education given  Goals: 1) PO intakes > 75% of meals and supplements at f/u    Patient has been screened and assessed by RD. RD will follow patient.      Debility  Pt/OT consulted  Awaiting SNF disposition     Chronic indwelling Gutierrez catheter  Gutierrez care  Monitor urine output  Meropenem q8hrs  Urine culture results:  Microbiology Results (last 7 days)     Procedure Component Value Units Date/Time    Blood culture [[id number]] Collected: 09/06/22 0047    Order Status: Sent Specimen: Blood from Antecubital, Right Arm Updated: 09/06/22 1004    Blood culture [[id number]] Collected: 09/03/22 2346    Order  Status: Completed Specimen: Blood from Antecubital, Right Arm Updated: 09/05/22 1812     Blood Culture, Routine No Growth to date      No Growth to date    Urine culture [[id number]]  (Abnormal)  (Susceptibility) Collected: 09/02/22 1151    Order Status: Completed Specimen: Urine Updated: 09/05/22 0343     Urine Culture, Routine KLEBSIELLA PNEUMONIAE  > 100,000 cfu/ml        ESCHERICHIA COLI  > 100,000 cfu/ml      Narrative:      Specimen Source->Urine              Major depressive disorder  Patient has persistent depression which is mild and is currently controlled. Will Continue anti-depressant medications. We will not consult psychiatry at this time. Patient does display psychosis at this time. Continue to monitor closely and adjust plan of care as needed.        Hyperlipidemia associated with type 2 diabetes mellitus  Patient's FSGs are controlled on current medication regimen.  Last A1c reviewed-   Lab Results   Component Value Date    HGBA1C 5.8 (H) 09/02/2022     Most recent fingerstick glucose reviewed-   Recent Labs   Lab 09/05/22  2147   POCTGLUCOSE 218*     Current correctional scale  Low  Maintain anti-hyperglycemic dose as follows-   Antihyperglycemics (From admission, onward)    Start     Stop Route Frequency Ordered    09/02/22 2100  insulin detemir U-100 pen 10 Units         -- SubQ Nightly 09/02/22 1434    09/02/22 1532  insulin aspart U-100 pen 0-5 Units         -- SubQ Every 6 hours PRN 09/02/22 1434        Hold Oral hypoglycemics while patient is in the hospital.    Hypertension associated with diabetes  Chronic, controlled.  Latest blood pressure and vitals reviewed-   Temp:  [97.2 °F (36.2 °C)-98.1 °F (36.7 °C)]   Pulse:  [59-76]   Resp:  [16-19]   BP: (103-131)/(57-69)   SpO2:  [96 %-98 %] .   Home meds for hypertension were reviewed and noted below.   Hypertension Medications             losartan (COZAAR) 25 MG tablet 1 tablet DAILY (route: oral)          While in the hospital, will manage  blood pressure as follows; Continue home antihypertensive regimen    Will utilize p.r.n. blood pressure medication only if patient's blood pressure greater than  180/110 and he develops symptoms such as worsening chest pain or shortness of breath.      VTE Risk Mitigation (From admission, onward)         Ordered     Place sequential compression device  Until discontinued         09/02/22 1434     IP VTE LOW RISK PATIENT  Once         09/02/22 1434                Discharge Planning   YUDITH:      Code Status: Full Code   Is the patient medically ready for discharge?:     Reason for patient still in hospital (select all that apply): Patient trending condition and Imaging  Discharge Plan A: Skilled Nursing Facility                  Claus Dumont PA-C  Department of Hospital Medicine   [facility]

## 2022-09-06 NOTE — PT/OT/SLP PROGRESS
Physical Therapy Treatment    Patient Name:  Jhonny Almazan   MRN:  [id number]    Recommendations:     Discharge Recommendations:  nursing facility, skilled   Discharge Equipment Recommendations: none   Barriers to discharge: None    Assessment:     Jhonny Almazan is a 66 y.o. male admitted with a medical diagnosis of UTI (urinary tract infection) due to urinary indwelling catheter.  He presents with the following impairments/functional limitations:  weakness, impaired endurance, impaired self care skills, impaired functional mobility, impaired balance, decreased upper extremity function, decreased lower extremity function . Awake,alert ,agreeable to participate in therapy.  Transferred to sitting EOB with Max A. Required Mod A to maintain upright sitting balance with BUE support. Difficulty keeping feet planted on floor. Reported feeling increased weakness , nurse informed. Unable to perform sit > stand. 3 R side scoots to HOB with Max A and verbal cues to lean forward and lift.     Rehab Prognosis: Fair; patient would benefit from acute skilled PT services to address these deficits and reach maximum level of function.    Recent Surgery: * No surgery found *      Plan:     During this hospitalization, patient to be seen 6 x/week to address the identified rehab impairments via gait training, therapeutic activities, therapeutic exercises and progress toward the following goals:    Plan of Care Expires:  10/02/22    Subjective     Chief Complaint: weakness  Patient/Family Comments/goals: none stated  Pain/Comfort:  Pain Rating 1: 0/10      Objective:     Communicated with nurse Valdez prior to session.  Patient found supine with bed alarm, peripheral IV, olguin catheter upon PT entry to room.     General Precautions: Standard, contact   Orthopedic Precautions:N/A   Braces: N/A  Respiratory Status: Room air     Functional Mobility:  Bed Mobility:     Rolling Left:  minimum assistance  Rolling Right:  minimum assistance  Supine to Sit: maximal assistance  Sit to Supine: maximal assistance      AM-PAC 6 CLICK MOBILITY          Therapeutic Activities and Exercises:   Transferred to sitting EOB with Max A.   Unable to perform sit <> stand due to poor sitting balance ( leaning backward).   Scoot to R side with Max A x 3 attempts with increased verbal cues for technique.   Scoot top HOB with CGA , using bed rails and LE's to assist.     Patient left HOB elevated with all lines intact, call button in reach, bed alarm on, and nurse José Miguel notified..    GOALS:   Multidisciplinary Problems       Physical Therapy Goals          Problem: Physical Therapy    Goal Priority Disciplines Outcome Goal Variances Interventions   Physical Therapy Goal     PT, PT/OT Ongoing, Progressing     Description: Goals to be met by: 10/3/22     Patient will increase functional independence with mobility by performin. Supine to sit with Contact Guard Assistance  2. Sit to stand transfer with Contact Guard Assistance  3. Bed to chair transfer with Contact Guard Assistance using Rolling Walker  4. Gait  x 200 feet with Contact Guard Assistance using Rolling Walker.   5. Lower extremity exercise program x30 reps per handout, with supervision                         Time Tracking:     PT Received On: 22  PT Start Time: 955     PT Stop Time: 1018  PT Total Time (min): 23 min     Billable Minutes: Therapeutic Activity 23min    Treatment Type: Treatment  PT/PTA: PTA     PTA Visit Number: 1     2022

## 2022-09-06 NOTE — ASSESSMENT & PLAN NOTE
Monitor labs  IV abx  Await urine cs- preliminary results of GNR  ID consulted: appreciate recs  Elevated Procal and CRP  Urine Culture and Blood culture results:  Microbiology Results (last 7 days)     Procedure Component Value Units Date/Time    Blood culture [[id number]] Collected: 09/06/22 0047    Order Status: Sent Specimen: Blood from Antecubital, Right Arm Updated: 09/06/22 1004    Blood culture [[id number]] Collected: 09/03/22 2346    Order Status: Completed Specimen: Blood from Antecubital, Right Arm Updated: 09/05/22 1812     Blood Culture, Routine No Growth to date      No Growth to date    Urine culture [[id number]]  (Abnormal)  (Susceptibility) Collected: 09/02/22 1151    Order Status: Completed Specimen: Urine Updated: 09/05/22 0343     Urine Culture, Routine KLEBSIELLA PNEUMONIAE  > 100,000 cfu/ml        ESCHERICHIA COLI  > 100,000 cfu/ml      Narrative:      Specimen Source->Urine

## 2022-09-06 NOTE — PROGRESS NOTES
Consult Note  Infectious Disease    Reason for Consult:  UTI    HPI: Jhonny Almazan is a 66 y.o. 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  Olguin catheter was replaced, and 600 ml of urine came out.  Urine is flowing well.  Urine culture are showing GNR.  Blood cultures are not sent.  Patient was given meropenem and feels great.  09/04/2022.  No new complaints.  No fever.  Urine info is flowing well.  WBC 4.9.  09/05/2022 He spiked a temp earlie. Nurse tells me the olguin is functioning well    9/6 (Nahum):  Interim reviewed, discussed with Dr. Hannah.  Patient seen and examined at bedside, hemodynamically stable, febrile 101.5 overnight, currently afebrile.  Patient states he is feeling weak, fatigued, with diffuse abdominal pain.  States his appetite is decreased, tolerated physical therapy today.  Labs reviewed, worsening leukocytosis 17, PMN 86.3%, H&H 11.4/34.5, platelet count 204.  Hyperkalemia 5.2, stable kidney function.  Chest x-ray reviewed, improving left side atelectasis.  KUB, questionable constipation; gastroparesis versus bowel obstruction possible.      Antibiotics (From admission, onward)      Start     Stop Route Frequency Ordered    09/06/22 0130  meropenem-0.9% sodium chloride 1 g/50 mL IVPB         -- IV Every 8 hours (non-standard times) 09/06/22 0027    09/02/22 2100  mupirocin 2 % ointment         09/07 2059 Nasl 2 times daily 09/02/22 1439            Review of patient's allergies indicates:  No Known Allergies  Past Medical History:   Diagnosis Date    Anticoagulant long-term use     Arthritis     Colon polyp     Diabetes mellitus     Diabetes mellitus, type 2     Fatty liver     Hypertension      Major depressive disorder 11/17/2020    -Continue Wellbutrin    PEG (percutaneous endoscopic gastrostomy) adjustment/replacement/removal 1/6/2021    Stroke 11/2020    left sided weakness    Urinary retention 9/7/2021     Past Surgical History:   Procedure Laterality Date    BACK SURGERY      COLONOSCOPY  07/18/2014    Dr. Crane: 22 colon polyps removed, hemorrhoids, erythema to sigmoid, repeat in 1 year for surveillance; biopsy: sigmoid erythema- showed unremarkable colonic mucosa, tubular adenomas and hyperplastic polyps    COLONOSCOPY N/A 5/3/2019    Procedure: COLONOSCOPY;  Surgeon: Guero Crane MD;  Location: [facility];  Service: Endoscopy;  Laterality: N/A;    COLONOSCOPY N/A 5/6/2019    Procedure: COLONOSCOPY;  Surgeon: Guero Crane MD;  Location: [facility];  Service: Endoscopy;  Laterality: N/A;    CYSTOSCOPY N/A 2/9/2022    Procedure: CYSTOSCOPY;  Surgeon: Jaylen Antonio Jr., MD;  Location: [facility] OR;  Service: Urology;  Laterality: N/A;    EPIDURAL STEROID INJECTION INTO THORACIC SPINE N/A 8/30/2019    Procedure: Injection-steroid-epidural-thoracic;  Surgeon: Frantz Green MD;  Location: [facility] OR;  Service: Pain Management;  Laterality: N/A;  T6-7    ESOPHAGOGASTRODUODENOSCOPY N/A 4/26/2019    Procedure: EGD (ESOPHAGOGASTRODUODENOSCOPY);  Surgeon: Guero Crane MD;  Location: [facility];  Service: Endoscopy;  Laterality: N/A;    ESOPHAGOGASTRODUODENOSCOPY N/A 11/23/2020    Procedure: EGD (ESOPHAGOGASTRODUODENOSCOPY);  Surgeon: Guero Crane MD;  Location: [facility];  Service: Endoscopy;  Laterality: N/A;    ESOPHAGOGASTRODUODENOSCOPY N/A 1/6/2021    Procedure: EGD (ESOPHAGOGASTRODUODENOSCOPY);  Surgeon: Guero Crane MD;  Location: [facility];  Service: Endoscopy;  Laterality: N/A;    HERNIA REPAIR      TRANSRECTAL ULTRASOUND EXAMINATION N/A 2/9/2022    Procedure: ULTRASOUND, RECTAL APPROACH;  Surgeon: Jaylen Antonio Jr., MD;  Location: [facility] OR;  Service: Urology;  Laterality: N/A;  must  text son miroslava, times and instructions given leave at 130     Social History     Socioeconomic History    Marital status:    Tobacco Use    Smoking status: Former     Packs/day: 1.00     Years: 50.00     Pack years: 50.00     Types: Cigarettes    Smokeless tobacco: Never   Substance and Sexual Activity    Alcohol use: Yes     Alcohol/week: 14.0 standard drinks     Types: 14 Cans of beer per week     Comment: 2-3 beers daily    Drug use: No    Sexual activity: Yes     Partners: Female     Social Determinants of Health     Financial Resource Strain: Low Risk     Difficulty of Paying Living Expenses: Not hard at all   Food Insecurity: Food Insecurity Present    Worried About Running Out of Food in the Last Year: Sometimes true    Ran Out of Food in the Last Year: Sometimes true   Transportation Needs: Unmet Transportation Needs    Lack of Transportation (Medical): Yes    Lack of Transportation (Non-Medical): Yes   Physical Activity: Insufficiently Active    Days of Exercise per Week: 1 day    Minutes of Exercise per Session: 10 min   Stress: No Stress Concern Present    Feeling of Stress : Only a little   Social Connections: Socially Isolated    Frequency of Communication with Friends and Family: Never    Frequency of Social Gatherings with Friends and Family: Never    Attends Church Services: Never    Active Member of Clubs or Organizations: No    Attends Club or Organization Meetings: Never    Marital Status:    Housing Stability: Low Risk     Unable to Pay for Housing in the Last Year: No    Number of Places Lived in the Last Year: 1    Unstable Housing in the Last Year: No     Family History   Problem Relation Age of Onset    Heart disease Mother     Heart disease Father     Diabetes Brother     Suicide Brother     Brain cancer Brother     Colon cancer Neg Hx     Crohn's disease Neg Hx     Ulcerative colitis Neg Hx     Stomach cancer Neg Hx     Esophageal cancer Neg Hx     Glaucoma Neg Hx      Retinal detachment Neg Hx     Macular degeneration Neg Hx          Review of Systems:   No chills, fever, sweats, weight loss, but feeling poorly in general, generalized weakness  No change in vision, loss of vision or diplopia  No sinus congestion, purulent nasal discharge, post nasal drip or facial pain  No pain in mouth or throat. No problems with teeth, gums.  No chest pain, palpitations, syncope  No cough, sputum production, shortness of breath, dyspnea on exertion, pleurisy, hemoptysis  No nausea, vomiting,  blood in stool, or focal abd pain,    Diffuse abdominal pain, decreased appetite  Kinked olguin    strangury, retention, incontinence, nocturia, prostatism,   No penile discharge, genital ulcers, risk for STD, Some bleeding with urination  No swelling of joints, redness of joints, injuries, or new focal pain  No unusual headaches, dizziness, vertigo, numbness, paresthesias, neuropathy, falls  No anxiety,   +depression, substance abuse, sleep disturbance  + diabetes, No bleeding, lymphadenopathy, anemia, malignancy, unusual bruising  No new rashes, lesions, or wounds  No TB exposure  No recent/prior steroids  Outdoor activities:no  Travel: no      EXAM & DIAGNOSTICS REVIEWED:   Vitals:     Temp:  [97.2 °F (36.2 °C)-101.5 °F (38.6 °C)]   Temp: 98.1 °F (36.7 °C) (09/06/22 0733)  Pulse: 88 (09/06/22 0733)  Resp: 16 (09/06/22 0733)  BP: (!) 137/56 (09/06/22 0733)  SpO2: (!) 92 % (09/06/22 0733)    Intake/Output Summary (Last 24 hours) at 9/6/2022 1013  Last data filed at 9/6/2022 0800  Gross per 24 hour   Intake 840 ml   Output 1702 ml   Net -862 ml       General:  In NAD. Alert and attentive, cooperative, comfortable,disheveled   Eyes:  Anicteric, EOMI  ENT:  No ulcers, exudates, thrush, nares patent, dentition is poor  Neck:  supple  Lungs: Clear, no consolidation, rales, wheezes, rub  Heart:  RRR, no gallop/murmur/rub noted  Abd:  Soft, mild abd discomfort with deep palpation, no rebound, normal BS, no masses  or organomegaly appreciated.  :  Gutierrez, urine clear, no more dry blood over  glans penis; no flank tenderness  Musc:  Joints without effusion, swelling, erythema, synovitis, muscle wasting.   Skin:  No rashes. No palmar or plantar lesions. No subungual petechiae  Neuro:             Alert, attentive, speech fluent, face symmetric, moves all extremities, no focal weakness.   Psych:  Calm, cooperative  Lymphatic:       Extrem: No edema, erythema, phlebitis, cellulitis, warm and well perfused, dry skin, thickened toenails  VAD:       Isolation:    Wound:            Lines/Tubes/Drains:    General Labs reviewed:  Recent Labs   Lab 09/04/22 0422 09/05/22  0435 09/06/22  0507   WBC 4.98 5.57 17.00*   HGB 11.2* 11.7* 11.4*   HCT 34.2* 35.3* 34.5*    193 204       Recent Labs   Lab 09/04/22 0422 09/05/22  0435 09/06/22  0507    138 133*   K 3.6 4.9 5.2*    107 103   CO2 22* 23 24   BUN 12 13 16   CREATININE 0.7 0.8 0.8   CALCIUM 9.0 9.8 9.8     Recent Labs   Lab 09/06/22  0047   CRP 28.0*     Estimated Creatinine Clearance: 99.4 mL/min (based on SCr of 0.8 mg/dL).      Micro:  Microbiology Results (last 7 days)       Procedure Component Value Units Date/Time    Blood culture [[id number]] Collected: 09/06/22 0047    Order Status: Sent Specimen: Blood from Antecubital, Right Arm Updated: 09/06/22 1004    Blood culture [[id number]] Collected: 09/03/22 2346    Order Status: Completed Specimen: Blood from Antecubital, Right Arm Updated: 09/05/22 1812     Blood Culture, Routine No Growth to date      No Growth to date    Urine culture [[id number]]  (Abnormal)  (Susceptibility) Collected: 09/02/22 1151    Order Status: Completed Specimen: Urine Updated: 09/05/22 0343     Urine Culture, Routine KLEBSIELLA PNEUMONIAE  > 100,000 cfu/ml        ESCHERICHIA COLI  > 100,000 cfu/ml      Narrative:      Specimen Source->Urine           Klebsiella pneumoniae Escherichia coli     CULTURE, URINE CULTURE, URINE    Amox/K  Clav'ate <=8/4 mcg/mL Sensitive <=8/4 mcg/mL Sensitive    Amp/Sulbactam <=8/4 mcg/mL Sensitive 16/8 mcg/mL Intermediate    Ampicillin   >16 mcg/mL Resistant    Cefazolin <=2 mcg/mL Sensitive 4 mcg/mL Sensitive    Cefepime <=2 mcg/mL Sensitive <=2 mcg/mL Sensitive    Ceftriaxone <=1 mcg/mL Sensitive <=1 mcg/mL Sensitive    Ciprofloxacin <=1 mcg/mL Sensitive >2 mcg/mL Resistant    Ertapenem <=0.5 mcg/mL Sensitive <=0.5 mcg/mL Sensitive    Gentamicin <=4 mcg/mL Sensitive <=4 mcg/mL Sensitive    Levofloxacin <=2 mcg/mL Sensitive >4 mcg/mL Resistant    Meropenem <=1 mcg/mL Sensitive <=1 mcg/mL Sensitive    Nitrofurantoin 64 mcg/mL Intermediate <=32 mcg/mL Sensitive    Piperacillin/Tazo <=16 mcg/mL Sensitive <=16 mcg/mL Sensitive    Tobramycin <=4 mcg/mL Sensitive <=4 mcg/mL Sensitive    Trimeth/Sulfa >2/38 mcg/mL Resistant <=2/38 mcg/mL Sensitive            08/03/2022 Klebsiella pneumoniae esbl    CULTURE, URINE   Amikacin <=16 mcg/mL Resistant   Amox/K Clav'ate <=8/4 mcg/mL Resistant   Amp/Sulbactam 16/8 mcg/mL Resistant   Ampicillin >16 mcg/mL Resistant   Cefazolin >16 mcg/mL Resistant   Cefepime >16 mcg/mL Resistant   Ceftriaxone >32 mcg/mL Resistant   Ciprofloxacin >2 mcg/mL Resistant   Ertapenem <=0.5 mcg/mL Sensitive   Gentamicin >8 mcg/mL Resistant   Levofloxacin >4 mcg/mL Resistant   Meropenem <=1 mcg/mL Sensitive   Nitrofurantoin >64 mcg/mL Resistant   Piperacillin/Tazo <=16 mcg/mL Resistant   Tobramycin 8 mcg/mL Intermediate   Trimeth/Sulfa >2/38 mcg/mL Resistant          Imaging Reviewed recent imaging   CT 08/05/2022:  mild infrarenal abdominal aortic aneurysm measuring 3.7 cm maximum diameter.  Otherwise negative CT of the abdomen and pelvis.    Cardiology:    IMPRESSION & PLAN     Sepsis, likely secondary to polymicrobial UTI Klebsiella and E coli and malfunctioning Gutierrez replaced 9/2 Ucx E.coli, and K peumoniae  Ho ESBL UTI   Bcx neg 09/03  Procalcitonin 2.2    PMHx DM 2, hb a1c 5.8, fatty liver, HTN, D  LP, CVA with left-sided weakness, anticoagulation, PEG, chronic Gutierrez catheter x2 years, due to urinary retention depression, multiple surgeries    Recommendations:  Repeat blood cultures x2 sets   CT abdomen/pelvis to rule out acute pathology   Continue meropenem 1 g IV q.8  Follow cultures  Incentive spirometry   Aspiration precautions  PT/OT as tolerated     D/w patient, nursing    Medical Decision Making during this encounter was  [_] Low Complexity  [_] Moderate Complexity  [ xx ] High Complexity

## 2022-09-07 LAB
ANION GAP SERPL CALC-SCNC: 7 MMOL/L (ref 8–16)
BASOPHILS # BLD AUTO: 0.06 K/UL (ref 0–0.2)
BASOPHILS NFR BLD: 0.6 % (ref 0–1.9)
BUN SERPL-MCNC: 18 MG/DL (ref 8–23)
CALCIUM SERPL-MCNC: 10.3 MG/DL (ref 8.7–10.5)
CHLORIDE SERPL-SCNC: 105 MMOL/L (ref 95–110)
CO2 SERPL-SCNC: 25 MMOL/L (ref 23–29)
CREAT SERPL-MCNC: 0.8 MG/DL (ref 0.5–1.4)
DIFFERENTIAL METHOD: ABNORMAL
EOSINOPHIL # BLD AUTO: 0.4 K/UL (ref 0–0.5)
EOSINOPHIL NFR BLD: 3.4 % (ref 0–8)
ERYTHROCYTE [DISTWIDTH] IN BLOOD BY AUTOMATED COUNT: 15 % (ref 11.5–14.5)
EST. GFR  (NO RACE VARIABLE): >60 ML/MIN/1.73 M^2
GLUCOSE SERPL-MCNC: 145 MG/DL (ref 70–110)
HCT VFR BLD AUTO: 33.2 % (ref 40–54)
HGB BLD-MCNC: 10.7 G/DL (ref 14–18)
IMM GRANULOCYTES # BLD AUTO: 0.03 K/UL (ref 0–0.04)
IMM GRANULOCYTES NFR BLD AUTO: 0.3 % (ref 0–0.5)
LYMPHOCYTES # BLD AUTO: 1.7 K/UL (ref 1–4.8)
LYMPHOCYTES NFR BLD: 16.1 % (ref 18–48)
MCH RBC QN AUTO: 27.5 PG (ref 27–31)
MCHC RBC AUTO-ENTMCNC: 32.2 G/DL (ref 32–36)
MCV RBC AUTO: 85 FL (ref 82–98)
MONOCYTES # BLD AUTO: 0.7 K/UL (ref 0.3–1)
MONOCYTES NFR BLD: 6.7 % (ref 4–15)
NEUTROPHILS # BLD AUTO: 7.8 K/UL (ref 1.8–7.7)
NEUTROPHILS NFR BLD: 72.9 % (ref 38–73)
NRBC BLD-RTO: 0 /100 WBC
PLATELET # BLD AUTO: 190 K/UL (ref 150–450)
PMV BLD AUTO: 9.8 FL (ref 9.2–12.9)
POCT GLUCOSE: 131 MG/DL (ref 70–110)
POCT GLUCOSE: 153 MG/DL (ref 70–110)
POCT GLUCOSE: 197 MG/DL (ref 70–110)
POCT GLUCOSE: 215 MG/DL (ref 70–110)
POTASSIUM SERPL-SCNC: 5.1 MMOL/L (ref 3.5–5.1)
RBC # BLD AUTO: 3.89 M/UL (ref 4.6–6.2)
SARS-COV-2 RNA RESP QL NAA+PROBE: NOT DETECTED
SARS-COV-2- CYCLE NUMBER: NORMAL
SODIUM SERPL-SCNC: 137 MMOL/L (ref 136–145)
WBC # BLD AUTO: 10.67 K/UL (ref 3.9–12.7)

## 2022-09-07 PROCEDURE — 25000003 PHARM REV CODE 250

## 2022-09-07 PROCEDURE — 85025 COMPLETE CBC W/AUTO DIFF WBC: CPT | Performed by: NURSE PRACTITIONER

## 2022-09-07 PROCEDURE — 99232 SBSQ HOSP IP/OBS MODERATE 35: CPT | Mod: S$GLB,,, | Performed by: STUDENT IN AN ORGANIZED HEALTH CARE EDUCATION/TRAINING PROGRAM

## 2022-09-07 PROCEDURE — 99232 PR SUBSEQUENT HOSPITAL CARE,LEVL II: ICD-10-PCS | Mod: S$GLB,,, | Performed by: STUDENT IN AN ORGANIZED HEALTH CARE EDUCATION/TRAINING PROGRAM

## 2022-09-07 PROCEDURE — 97530 THERAPEUTIC ACTIVITIES: CPT | Mod: CQ

## 2022-09-07 PROCEDURE — 36415 COLL VENOUS BLD VENIPUNCTURE: CPT | Performed by: NURSE PRACTITIONER

## 2022-09-07 PROCEDURE — 11000001 HC ACUTE MED/SURG PRIVATE ROOM

## 2022-09-07 PROCEDURE — 63600175 PHARM REV CODE 636 W HCPCS: Performed by: INTERNAL MEDICINE

## 2022-09-07 PROCEDURE — 94799 UNLISTED PULMONARY SVC/PX: CPT

## 2022-09-07 PROCEDURE — 97116 GAIT TRAINING THERAPY: CPT | Mod: CQ

## 2022-09-07 PROCEDURE — 27000207 HC ISOLATION

## 2022-09-07 PROCEDURE — 63600175 PHARM REV CODE 636 W HCPCS: Performed by: STUDENT IN AN ORGANIZED HEALTH CARE EDUCATION/TRAINING PROGRAM

## 2022-09-07 PROCEDURE — 25000003 PHARM REV CODE 250: Performed by: NURSE PRACTITIONER

## 2022-09-07 PROCEDURE — 94761 N-INVAS EAR/PLS OXIMETRY MLT: CPT

## 2022-09-07 PROCEDURE — 99900035 HC TECH TIME PER 15 MIN (STAT)

## 2022-09-07 PROCEDURE — 80048 BASIC METABOLIC PNL TOTAL CA: CPT | Performed by: NURSE PRACTITIONER

## 2022-09-07 RX ADMIN — INSULIN ASPART 2 UNITS: 100 INJECTION, SOLUTION INTRAVENOUS; SUBCUTANEOUS at 12:09

## 2022-09-07 RX ADMIN — POTASSIUM CHLORIDE 40 MEQ: 1500 TABLET, EXTENDED RELEASE ORAL at 08:09

## 2022-09-07 RX ADMIN — GABAPENTIN 300 MG: 300 CAPSULE ORAL at 08:09

## 2022-09-07 RX ADMIN — ATORVASTATIN CALCIUM 40 MG: 40 TABLET, FILM COATED ORAL at 08:09

## 2022-09-07 RX ADMIN — FINASTERIDE 5 MG: 5 TABLET, FILM COATED ORAL at 08:09

## 2022-09-07 RX ADMIN — POLYETHYLENE GLYCOL 3350 17 G: 17 POWDER, FOR SOLUTION ORAL at 08:09

## 2022-09-07 RX ADMIN — INSULIN DETEMIR 10 UNITS: 100 INJECTION, SOLUTION SUBCUTANEOUS at 08:09

## 2022-09-07 RX ADMIN — CLOPIDOGREL BISULFATE 75 MG: 75 TABLET, FILM COATED ORAL at 08:09

## 2022-09-07 RX ADMIN — CEFTRIAXONE 2 G: 2 INJECTION, SOLUTION INTRAVENOUS at 08:09

## 2022-09-07 RX ADMIN — MEROPENEM AND SODIUM CHLORIDE 1 G: 1 INJECTION, SOLUTION INTRAVENOUS at 12:09

## 2022-09-07 RX ADMIN — BUPROPION HYDROCHLORIDE 300 MG: 150 TABLET, FILM COATED, EXTENDED RELEASE ORAL at 08:09

## 2022-09-07 RX ADMIN — MUPIROCIN: 20 OINTMENT TOPICAL at 08:09

## 2022-09-07 NOTE — PLAN OF CARE
Recommendations   1) Continue DM 2000 kcal, cardiac diet- D6 with extra gravy ok per discussion with SLP - allow bread  2) weigh weekly   3) Boost glcose control chocolate BID   4) Nutrition education given    Goals: 1) PO intakes > 75% of meals and supplements at f/u  Nutrition Goal Status: meeting  Communication of RD Recs:  (POC, sticky note)

## 2022-09-07 NOTE — PT/OT/SLP PROGRESS
Physical Therapy Treatment    Patient Name:  Jhonny Almazan   MRN:  [id number]    Recommendations:     Discharge Recommendations:  nursing facility, skilled   Discharge Equipment Recommendations: none   Barriers to discharge: None    Assessment:     Jhonny Almazan is a 66 y.o. male admitted with a medical diagnosis of UTI (urinary tract infection) due to urinary indwelling catheter.  He presents with the following impairments/functional limitations:  weakness, impaired endurance, impaired self care skills, impaired functional mobility, gait instability, impaired balance, decreased upper extremity function, decreased lower extremity function . Supine in bed, agreeable to participate in therapy. Reports feeling better today. Transferred EOB with Min A ( Dr. Sidhu arrived).  Stood with A x 2. Ambulated 30' with rw and Mod A slowly with chair follow due to unsteady gait. Sat up in chair at bedside.     Rehab Prognosis: Fair; patient would benefit from acute skilled PT services to address these deficits and reach maximum level of function.    Recent Surgery: * No surgery found *      Plan:     During this hospitalization, patient to be seen 6 x/week to address the identified rehab impairments via gait training, therapeutic activities, therapeutic exercises and progress toward the following goals:    Plan of Care Expires:  10/02/22    Subjective     Chief Complaint: weakness  Patient/Family Comments/goals: none stated  Pain/Comfort:  Pain Rating 1: 0/10      Objective:     Communicated with nurse Valdez prior to session.  Patient found supine with bed alarm, olguin catheter, peripheral IV upon PT entry to room.     General Precautions: Standard, contact, fall   Orthopedic Precautions:N/A   Braces: N/A  Respiratory Status: Room air     Functional Mobility:  Bed Mobility:     Rolling Left:  contact guard assistance  Supine to Sit: minimum assistance  Transfers:     Sit to Stand:  moderate assistance with  rolling walker  Gait: 30' with rw and Mod A, IV in tow.       AM-PAC 6 CLICK MOBILITY          Therapeutic Activities and Exercises:   Transferred EOB with Min A. Sat briefly to gain sitting balance and speak with M.D.    Stood with Mod A. Ambulated 30' with rw and Mod A.     Patient left up in chair with all lines intact, call button in reach, chair alarm on, and nurse José Miguel  notified..    GOALS:   Multidisciplinary Problems       Physical Therapy Goals          Problem: Physical Therapy    Goal Priority Disciplines Outcome Goal Variances Interventions   Physical Therapy Goal     PT, PT/OT Ongoing, Progressing     Description: Goals to be met by: 10/3/22     Patient will increase functional independence with mobility by performin. Supine to sit with Contact Guard Assistance  2. Sit to stand transfer with Contact Guard Assistance  3. Bed to chair transfer with Contact Guard Assistance using Rolling Walker  4. Gait  x 200 feet with Contact Guard Assistance using Rolling Walker.   5. Lower extremity exercise program x30 reps per handout, with supervision                         Time Tracking:     PT Received On: 22  PT Start Time: 917     PT Stop Time: 940  PT Total Time (min): 23 min     Billable Minutes: Gait Training 13min  and Therapeutic Activity 10min    Treatment Type: Treatment  PT/PTA: PTA     PTA Visit Number: 2     2022

## 2022-09-07 NOTE — PROGRESS NOTES
[facility]  Adult Nutrition  Progress Note    SUMMARY       Recommendations   1) Continue DM 2000 kcal, cardiac diet- D6 with extra gravy ok per discussion with SLP - allow bread  2) weigh weekly   3) Boost glcose control chocolate BID   4) Nutrition education given    Goals: 1) PO intakes > 75% of meals and supplements at f/u  Nutrition Goal Status: meeting  Communication of RD Recs:  (POC, sticky note)    Assessment and Plan    Moderate malnutrition  Contributing Nutrition Diagnosis  Moderate chronic illness related malnutrition    Related to (etiology):   Limited access to food and trouble swallowing    Signs and Symptoms (as evidenced by):   1) PO intakes < 75% of needs x at least 4 weeks  2) mild-moderate wasting as charted below  3) altered skin integrity  4) 20% wt loss x 1 year    Interventions:  above    Nutrition Diagnosis Status:   improving           Malnutrition Assessment  Malnutrition Type: chronic illness  Skin (Micronutrient):  (amrita = 18, spinal redness, thigh skin tag)   Micronutrient Evaluation: suspected deficiency  Micronutrient Evaluation Comments: K, check iron   Weight Loss (Malnutrition): 20% in 1 year  Energy Intake (Malnutrition): less than 75% for greater than or equal to 1 month   Orbital Region (Subcutaneous Fat Loss): mild depletion  Upper Arm Region (Subcutaneous Fat Loss): moderate depletion  Thoracic and Lumbar Region: mild depletion   Mandaen Region (Muscle Loss): mild depletion  Clavicle Bone Region (Muscle Loss): mild depletion  Clavicle and Acromion Bone Region (Muscle Loss): mild depletion  Scapular Bone Region (Muscle Loss): mild depletion  Dorsal Hand (Muscle Loss): moderate depletion  Patellar Region (Muscle Loss): moderate depletion  Anterior Thigh Region (Muscle Loss): mild depletion  Posterior Calf Region (Muscle Loss): mild depletion   Edema (Fluid Accumulation): 0-->no edema present   Subcutaneous Fat Loss (Final Summary): moderate protein-calorie  "malnutrition  Muscle Loss Evaluation (Final Summary): moderate protein-calorie malnutrition         Reason for Assessment    Reason For Assessment: follow up  Diagnosis:  (complicated UTI)  Relevant Medical History: HTN, DM2, depression, CVA, fatty liver, PEG ( removed > 1 yr ago per pt)  Interdisciplinary Rounds: attended    General Information Comments: 65 y/o male admitted with UTI s/p constipation x 1-2 weeks. Lives with son at home but doesn't get great care, son does not cook many meals for pt though he asks him to and pt just started getting meals on wheels. Pt with dysphagia and PEG placement in the past, worked up to eating regular textures, but in the past 3-4 weeks has had more trouble swallowing- needing soft texture/moist meats. NFPE 9/3/22 mild-moderate wasting seen. Agreeable to Boost. I also gave pt instructions on carb counting, though he doesn't make his own meals, but he does give himself insulin BID, typically eats breakfast and then sleeps all day eating his next meal at 7-8 PM. I encouraged pt to eat 3 x daily and set reminders on his phone to wake up for lunch.  9/7/22 pt eating all of his meals/supplents. SLP kerrie 9/3/22  Rec. IDDSI5/thin liquids- allow bread as he did better chewing with this texture. He dislikes the ground meat, however and would like soft/bite sized if possible.     Nutrition Discharge Planning: To be determined- DM 10630 kcal, cardiac diet IDDSI 5, thin liquids allow bread+ Boost glucose control BID    Nutrition Risk Screen    Nutrition Risk Screen: dysphagia or difficulty swallowing, large or nonhealing wound, burn or pressure injury    Nutrition/Diet History    Spiritual, Cultural Beliefs, Rastafarian Practices, Values that Affect Care: no  Food Allergies: NKFA  Factors Affecting Nutritional Intake: difficulty chewing    Anthropometrics    Temp: 97.3 °F (36.3 °C)  Height Method: Stated  Height: 6' 5"  Height (inches): 77 in  Weight Method: Bed Scale  Weight: 79.5 kg (175 " lb 4.3 oz)  Weight (lb): 175.27 lb  Ideal Body Weight (IBW), Male: 208 lb  % Ideal Body Weight, Male (lb): 84.26 %  BMI (Calculated): 20.8  BMI Grade: 18.5-24.9 - normal  Weight Loss: unintentional  Usual Body Weight (UBW), k.6 kg (21)  % Usual Body Weight: 79.99  % Weight Change From Usual Weight: -20.18 %       Lab/Procedures/Meds    Pertinent Labs Reviewed: reviewed  BMP  Lab Results   Component Value Date     2022    K 5.1 2022     2022    CO2 25 2022    BUN 18 2022    CREATININE 0.8 2022    CALCIUM 10.3 2022    ANIONGAP 7 (L) 2022    ESTGFRAFRICA >60 2022    EGFRNONAA >60 2022     Lab Results   Component Value Date    ALBUMIN 3.4 (L) 2022     Recent Labs   Lab 22  1105   POCTGLUCOSE 215*       Pertinent Medications Reviewed: reviewed  Pertinent Medications Comments: insulin, KCl, statin, senna    Estimated/Assessed Needs    Weight Used For Calorie Calculations: 79.5 kg (175 lb 4.3 oz)  Energy Calorie Requirements (kcal): MSj ( x 1.2-1.4) = 0946-5258 kcal  Energy Need Method: [place]  Protein Requirements: 1.2-1.5 g protein/kg ( wound/age) =  g  Weight Used For Protein Calculations: 79.5 kg (175 lb 4.3 oz)  Fluid Requirements (mL): -235-0 ml or per MD  Estimated Fluid Requirement Method: RDA Method  CHO Requirement: 227-277 g      Nutrition Prescription Ordered    Current Diet Order: DM 2000 kcal, cardiac, D5 + boost glucose control BID    Evaluation of Received Nutrient/Fluid Intake    Energy Calories Required: meeting needs  Protein Required: meeting needs  Fluid Required: not meeting needs  Tolerance: tolerating     Intake/Output Summary (Last 24 hours) at 2022 1154  Last data filed at 2022 0900  Gross per 24 hour   Intake 1317 ml   Output 1950 ml   Net -633 ml       % Intake of Estimated Energy Needs: 100%  % Meal Intake: 100%    Nutrition Risk    Level of Risk/Frequency of Follow-up:  moderate/low 1 x weekly    Monitor and Evaluation    Food and Nutrient Intake: energy intake, food and beverage intake  Food and Nutrient Adminstration: diet order  Anthropometric Measurements: weight  Biochemical Data, Medical Tests and Procedures: electrolyte and renal panel, gastrointestinal profile, glucose/endocrine profile  Nutrition-Focused Physical Findings: overall appearance, skin     Nutrition Follow-Up    RD Follow-up?: Yes

## 2022-09-07 NOTE — ASSESSMENT & PLAN NOTE
Patient's FSGs are controlled on current medication regimen.  Last A1c reviewed-   Lab Results   Component Value Date    HGBA1C 5.8 (H) 09/02/2022     Most recent fingerstick glucose reviewed-   Recent Labs   Lab 09/06/22  1629 09/06/22 2034 09/07/22  0726 09/07/22  1105   POCTGLUCOSE 164* 151* 131* 215*     Current correctional scale  Low  Maintain anti-hyperglycemic dose as follows-   Antihyperglycemics (From admission, onward)    Start     Stop Route Frequency Ordered    09/02/22 2100  insulin detemir U-100 pen 10 Units         -- SubQ Nightly 09/02/22 1434    09/02/22 1532  insulin aspart U-100 pen 0-5 Units         -- SubQ Every 6 hours PRN 09/02/22 1434        Hold Oral hypoglycemics while patient is in the hospital.

## 2022-09-07 NOTE — HOSPITAL COURSE
Jhonny Almazan is a 66 year old white male with a PMHx significant for DM, HTN, major depression, CVA, and chronic olguin catheter presenting for difficulty urinating. Patient was monitored closely throughout course of hospital stay by hospital medicine team. Patient started on IV meropenem for evidence of UTI on UA with history of MDR organisms. PT/OT consulted on admission for further evaluation and treatment. ID consulted given chronic olguin catheter and history of MDR. Blood cultures drawn with NGTD. Urine culture resulted in Klebsiella Pneumoniae and E. Coli. Patient switched to oral antibiotics per ID. Later that evening, patient developed 101 fever and WBC of 17k. Started back on IV meropenem per ID. CXR and KUB negative for any acute process. Procal and CRP mildly elevated. Ct abdomen obtained and negative for any acute process. Repeat CBC showed WBC of 10K. Switched to IV rocephin per ID. Case management working on SNF placement pending insurance authorization. Patient progressed well.  His potassium level was mildly elevated which was felt to be due to scheduled oral repletion which was discontinued.  A dose of lokelma was administered and repeat labs were ordered to be performed at SNF.  Patient was discharged to SNF with plan to continue IV antibiotic therapy until 9/12.  Patient was agreeable with discharge plan.

## 2022-09-07 NOTE — PROGRESS NOTES
Consult Note  Infectious Disease    Reason for Consult:  UTI    HPI: Jhonny Almazan is a 66 y.o. 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  Olguin catheter was replaced, and 600 ml of urine came out.  Urine is flowing well.  Urine culture are showing GNR.  Blood cultures are not sent.  Patient was given meropenem and feels great.  09/04/2022.  No new complaints.  No fever.  Urine info is flowing well.  WBC 4.9.  09/05/2022 He spiked a temp earlie. Nurse tells me the olguin is functioning well    9/6 (Nahum):  Interim reviewed, discussed with Dr. Hannah.  Patient seen and examined at bedside, hemodynamically stable, febrile 101.5 overnight, currently afebrile.  Patient states he is feeling weak, fatigued, with diffuse abdominal pain.  States his appetite is decreased, tolerated physical therapy today.  Labs reviewed, worsening leukocytosis 17, PMN 86.3%, H&H 11.4/34.5, platelet count 204.  Hyperkalemia 5.2, stable kidney function.  Chest x-ray reviewed, improving left side atelectasis.  KUB, questionable constipation; gastroparesis versus bowel obstruction possible.    9/7:  Interim reviewed, patient seen examined at bedside, about to have session of PT, eating well, still complaining of some weakness.  Hemodynamically stable, afebrile.  Labs reviewed stable.  Micro reviewed repeat blood cultures no growth.  CT scan abdomen pelvis with contrast negative for acute pathology.    Antibiotics (From admission, onward)      Start     Stop Route Frequency Ordered    09/07/22 0930  cefTRIAXone (ROCEPHIN) 2 g/50 mL D5W IVPB         -- IV Every 24 hours (non-standard times) 09/07/22 0826            Review of patient's allergies  indicates:  No Known Allergies  Past Medical History:   Diagnosis Date    Anticoagulant long-term use     Arthritis     Colon polyp     Diabetes mellitus     Diabetes mellitus, type 2     Fatty liver     Hypertension     Major depressive disorder 11/17/2020    -Continue Wellbutrin    PEG (percutaneous endoscopic gastrostomy) adjustment/replacement/removal 1/6/2021    Stroke 11/2020    left sided weakness    Urinary retention 9/7/2021     Past Surgical History:   Procedure Laterality Date    BACK SURGERY      COLONOSCOPY  07/18/2014    Dr. Crane: 22 colon polyps removed, hemorrhoids, erythema to sigmoid, repeat in 1 year for surveillance; biopsy: sigmoid erythema- showed unremarkable colonic mucosa, tubular adenomas and hyperplastic polyps    COLONOSCOPY N/A 5/3/2019    Procedure: COLONOSCOPY;  Surgeon: Guero Crane MD;  Location: [facility] ENDO;  Service: Endoscopy;  Laterality: N/A;    COLONOSCOPY N/A 5/6/2019    Procedure: COLONOSCOPY;  Surgeon: Guero Crane MD;  Location: [facility] ENDO;  Service: Endoscopy;  Laterality: N/A;    CYSTOSCOPY N/A 2/9/2022    Procedure: CYSTOSCOPY;  Surgeon: Jaylen Antonio Jr., MD;  Location: [facility] OR;  Service: Urology;  Laterality: N/A;    EPIDURAL STEROID INJECTION INTO THORACIC SPINE N/A 8/30/2019    Procedure: Injection-steroid-epidural-thoracic;  Surgeon: Frantz Green MD;  Location: [facility] OR;  Service: Pain Management;  Laterality: N/A;  T6-7    ESOPHAGOGASTRODUODENOSCOPY N/A 4/26/2019    Procedure: EGD (ESOPHAGOGASTRODUODENOSCOPY);  Surgeon: Guero Crane MD;  Location: [facility] ENDO;  Service: Endoscopy;  Laterality: N/A;    ESOPHAGOGASTRODUODENOSCOPY N/A 11/23/2020    Procedure: EGD (ESOPHAGOGASTRODUODENOSCOPY);  Surgeon: Guero Carne MD;  Location: [facility] ENDO;  Service: Endoscopy;  Laterality: N/A;    ESOPHAGOGASTRODUODENOSCOPY N/A 1/6/2021    Procedure: EGD (ESOPHAGOGASTRODUODENOSCOPY);  Surgeon: Guero Crane MD;  Location: [facility] ENDO;  Service: Endoscopy;   Laterality: N/A;    HERNIA REPAIR      TRANSRECTAL ULTRASOUND EXAMINATION N/A 2/9/2022    Procedure: ULTRASOUND, RECTAL APPROACH;  Surgeon: Jaylen Antonio Jr., MD;  Location: [facility];  Service: Urology;  Laterality: N/A;  must text son miroslava, times and instructions given leave at 130     Social History     Socioeconomic History    Marital status:    Tobacco Use    Smoking status: Former     Packs/day: 1.00     Years: 50.00     Pack years: 50.00     Types: Cigarettes    Smokeless tobacco: Never   Substance and Sexual Activity    Alcohol use: Yes     Alcohol/week: 14.0 standard drinks     Types: 14 Cans of beer per week     Comment: 2-3 beers daily    Drug use: No    Sexual activity: Yes     Partners: Female     Social Determinants of Health     Financial Resource Strain: Low Risk     Difficulty of Paying Living Expenses: Not hard at all   Food Insecurity: Food Insecurity Present    Worried About Running Out of Food in the Last Year: Sometimes true    Ran Out of Food in the Last Year: Sometimes true   Transportation Needs: Unmet Transportation Needs    Lack of Transportation (Medical): Yes    Lack of Transportation (Non-Medical): Yes   Physical Activity: Insufficiently Active    Days of Exercise per Week: 1 day    Minutes of Exercise per Session: 10 min   Stress: No Stress Concern Present    Feeling of Stress : Only a little   Social Connections: Socially Isolated    Frequency of Communication with Friends and Family: Never    Frequency of Social Gatherings with Friends and Family: Never    Attends Yazidi Services: Never    Active Member of Clubs or Organizations: No    Attends Club or Organization Meetings: Never    Marital Status:    Housing Stability: Low Risk     Unable to Pay for Housing in the Last Year: No    Number of Places Lived in the Last Year: 1    Unstable Housing in the Last Year: No     Family History   Problem Relation Age of Onset    Heart disease Mother     Heart disease Father      Diabetes Brother     Suicide Brother     Brain cancer Brother     Colon cancer Neg Hx     Crohn's disease Neg Hx     Ulcerative colitis Neg Hx     Stomach cancer Neg Hx     Esophageal cancer Neg Hx     Glaucoma Neg Hx     Retinal detachment Neg Hx     Macular degeneration Neg Hx          Review of Systems:   No chills, fever, sweats, weight loss, but feeling poorly in general, generalized weakness  No change in vision, loss of vision or diplopia  No sinus congestion, purulent nasal discharge, post nasal drip or facial pain  No pain in mouth or throat. No problems with teeth, gums.  No chest pain, palpitations, syncope  No cough, sputum production, shortness of breath, dyspnea on exertion, pleurisy, hemoptysis  No nausea, vomiting,  blood in stool, or focal abd pain,    Diffuse abdominal pain, decreased appetite  Kinked olguin    strangury, retention, incontinence, nocturia, prostatism,   No penile discharge, genital ulcers, risk for STD, Some bleeding with urination  No swelling of joints, redness of joints, injuries, or new focal pain  No unusual headaches, dizziness, vertigo, numbness, paresthesias, neuropathy, falls  No anxiety,   +depression, substance abuse, sleep disturbance  + diabetes, No bleeding, lymphadenopathy, anemia, malignancy, unusual bruising  No new rashes, lesions, or wounds  No TB exposure  No recent/prior steroids  Outdoor activities:no  Travel: no      EXAM & DIAGNOSTICS REVIEWED:   Vitals:     Temp:  [97.1 °F (36.2 °C)-98.3 °F (36.8 °C)]   Temp: 97.6 °F (36.4 °C) (09/07/22 0745)  Pulse: 74 (09/07/22 0745)  Resp: 18 (09/07/22 0745)  BP: (!) 98/55 (09/07/22 0745)  SpO2: 95 % (09/07/22 0745)    Intake/Output Summary (Last 24 hours) at 9/7/2022 1058  Last data filed at 9/7/2022 0900  Gross per 24 hour   Intake 1317 ml   Output 1950 ml   Net -633 ml       General:  In NAD. Alert and attentive, cooperative, comfortable,disheveled   Eyes:  Anicteric, EOMI  ENT:  No ulcers, exudates, thrush, nares  patent, dentition is poor  Neck:  supple  Lungs: Clear, no consolidation, rales, wheezes, rub  Heart:  RRR, no gallop/murmur/rub noted  Abd:  Soft, mild abd discomfort with deep palpation, no rebound, normal BS, no masses or organomegaly appreciated.  :  Gutierrez, urine clear, no more dry blood over  glans penis; no flank tenderness  Musc:  Joints without effusion, swelling, erythema, synovitis, muscle wasting.   Skin:  No rashes. No palmar or plantar lesions. No subungual petechiae  Neuro:             Alert, attentive, speech fluent, face symmetric, moves all extremities, no focal weakness.   Psych:  Calm, cooperative  Lymphatic:       Extrem: No edema, erythema, phlebitis, cellulitis, warm and well perfused, dry skin, thickened toenails  VAD:       Isolation:    Wound:            Lines/Tubes/Drains:    General Labs reviewed:  Recent Labs   Lab 09/05/22  0435 09/06/22  0507 09/07/22  0415   WBC 5.57 17.00* 10.67   HGB 11.7* 11.4* 10.7*   HCT 35.3* 34.5* 33.2*    204 190       Recent Labs   Lab 09/05/22  0435 09/06/22  0507 09/07/22  0415    133* 137   K 4.9 5.2* 5.1    103 105   CO2 23 24 25   BUN 13 16 18   CREATININE 0.8 0.8 0.8   CALCIUM 9.8 9.8 10.3     Recent Labs   Lab 09/06/22  0047   CRP 28.0*     Estimated Creatinine Clearance: 99.4 mL/min (based on SCr of 0.8 mg/dL).      Micro:  Microbiology Results (last 7 days)       Procedure Component Value Units Date/Time    Blood culture [[id number]] Collected: 09/06/22 0047    Order Status: Completed Specimen: Blood from Antecubital, Right Arm Updated: 09/06/22 1915     Blood Culture, Routine No Growth to date    Blood culture [[id number]] Collected: 09/03/22 2346    Order Status: Completed Specimen: Blood from Antecubital, Right Arm Updated: 09/06/22 1812     Blood Culture, Routine No Growth to date      No Growth to date      No Growth to date    Urine culture [[id number]]  (Abnormal)  (Susceptibility) Collected: 09/02/22 1151    Order Status:  Completed Specimen: Urine Updated: 09/05/22 0343     Urine Culture, Routine KLEBSIELLA PNEUMONIAE  > 100,000 cfu/ml        ESCHERICHIA COLI  > 100,000 cfu/ml      Narrative:      Specimen Source->Urine           Klebsiella pneumoniae Escherichia coli     CULTURE, URINE CULTURE, URINE    Amox/K Clav'ate <=8/4 mcg/mL Sensitive <=8/4 mcg/mL Sensitive    Amp/Sulbactam <=8/4 mcg/mL Sensitive 16/8 mcg/mL Intermediate    Ampicillin   >16 mcg/mL Resistant    Cefazolin <=2 mcg/mL Sensitive 4 mcg/mL Sensitive    Cefepime <=2 mcg/mL Sensitive <=2 mcg/mL Sensitive    Ceftriaxone <=1 mcg/mL Sensitive <=1 mcg/mL Sensitive    Ciprofloxacin <=1 mcg/mL Sensitive >2 mcg/mL Resistant    Ertapenem <=0.5 mcg/mL Sensitive <=0.5 mcg/mL Sensitive    Gentamicin <=4 mcg/mL Sensitive <=4 mcg/mL Sensitive    Levofloxacin <=2 mcg/mL Sensitive >4 mcg/mL Resistant    Meropenem <=1 mcg/mL Sensitive <=1 mcg/mL Sensitive    Nitrofurantoin 64 mcg/mL Intermediate <=32 mcg/mL Sensitive    Piperacillin/Tazo <=16 mcg/mL Sensitive <=16 mcg/mL Sensitive    Tobramycin <=4 mcg/mL Sensitive <=4 mcg/mL Sensitive    Trimeth/Sulfa >2/38 mcg/mL Resistant <=2/38 mcg/mL Sensitive            08/03/2022 Klebsiella pneumoniae esbl    CULTURE, URINE   Amikacin <=16 mcg/mL Resistant   Amox/K Clav'ate <=8/4 mcg/mL Resistant   Amp/Sulbactam 16/8 mcg/mL Resistant   Ampicillin >16 mcg/mL Resistant   Cefazolin >16 mcg/mL Resistant   Cefepime >16 mcg/mL Resistant   Ceftriaxone >32 mcg/mL Resistant   Ciprofloxacin >2 mcg/mL Resistant   Ertapenem <=0.5 mcg/mL Sensitive   Gentamicin >8 mcg/mL Resistant   Levofloxacin >4 mcg/mL Resistant   Meropenem <=1 mcg/mL Sensitive   Nitrofurantoin >64 mcg/mL Resistant   Piperacillin/Tazo <=16 mcg/mL Resistant   Tobramycin 8 mcg/mL Intermediate   Trimeth/Sulfa >2/38 mcg/mL Resistant          Imaging Reviewed recent imaging  CT abdomen/pelvis 9/6: No acute abdominopelvic process.      CT 08/05/2022:  mild infrarenal abdominal aortic aneurysm  measuring 3.7 cm maximum diameter.  Otherwise negative CT of the abdomen and pelvis.    Cardiology:    IMPRESSION & PLAN     Sepsis, likely secondary to polymicrobial UTI Klebsiella and E coli and malfunctioning Gutierrez replaced 9/2 Ucx E.coli, and K peumoniae  H/o ESBL UTI   Bcx neg 09/03  Procalcitonin 2.2    PMHx DM 2, hb a1c 5.8, fatty liver, HTN, D LP, CVA with left-sided weakness, anticoagulation, PEG, chronic Gutierrez catheter x2 years, due to urinary retention depression, multiple surgeries    Recommendations:  Stop meropenem IV   Repeat UA to ensure resolution of prior infection  Ceftriaxone 2 g IV daily, end date 09/12/2022   If unable to go to SNF, cefuroxime 500 mg p.o. twice a day for 6 more days, end date 09/12  Follow cultures  Incentive spirometry   Aspiration precautions  PT/OT as tolerated     Will sign off, call us back with any questions      D/w patient, nursing, PA    Medical Decision Making during this encounter was  [_] Low Complexity  [_] Moderate Complexity  [ xx ] High Complexity

## 2022-09-07 NOTE — PLAN OF CARE
Problem: Physical Therapy  Goal: Physical Therapy Goal  Description: Goals to be met by: 10/3/22     Patient will increase functional independence with mobility by performin. Supine to sit with Contact Guard Assistance  2. Sit to stand transfer with Contact Guard Assistance  3. Bed to chair transfer with Contact Guard Assistance using Rolling Walker  4. Gait  x 200 feet with Contact Guard Assistance using Rolling Walker.   5. Lower extremity exercise program x30 reps per handout, with supervision    Outcome: Ongoing, Progressing   Therapeutic activity ; bed mobility , transfers, gait with rw and assistance for safety.

## 2022-09-07 NOTE — PLAN OF CARE
Problem: Adult Inpatient Plan of Care  Goal: Plan of Care Review  Outcome: Ongoing, Progressing     Problem: Impaired Wound Healing  Goal: Optimal Wound Healing  Outcome: Ongoing, Progressing   Pt remains in bed, no signs of distress noted. BP monitored. Wound care provided, Gutierrez care. Bed alarm set, call light in reach.

## 2022-09-07 NOTE — PROGRESS NOTES
"[facility] Medicine  Progress Note    Patient Name: Jhonny Almazan  MRN: [id number]  Patient Class: IP- Inpatient   Admission Date: 9/2/2022  Length of Stay: 5 days  Attending Physician: Homa Steele MD  Primary Care Provider: Joey Whitehead MD        Subjective:     Principal Problem:UTI (urinary tract infection) due to urinary indwelling catheter        HPI:  Jhonny Almazan is a 66 year old  male who presented to the ED with CC of having problems with his olguin not draining properly. He reported he has olguin for around 2 years because he has difficulty urinating. He also reported that he has not had a BM for 1-1/2 weeks. ED MD changed out olguin and cath is draining well now. Pt denies fever, chills, sweats, sediment in cath/urine, blood in urine. Denies dec fluid intake. Denies any other problems. Has PMH DM, HTN, major depression, cva. Denies any problems with his chronic illnesses at this time. Denies dizziness, thirst, increasing weakness. Reported his mobility is "not very good:. Lives with son in a house and doesn't get cared for very well and doesn't eat much or follow a diabetic diet. Recently got "Meals on Wheels" arranged. Doesn't know current BG or BP. No longer has g-tube. Doesn't know specifics about medications, takes insulin twice a day.     UA in ED showed Nitrite Pos, Leuk 3+,Bld 2+. Recently admitted with MDRO- urine cs (8/3/) showed Kleibsiella >100,000 only sensitive to ertapenemem and meropenem. Will give Meropenem 1gm q 8hrs. K+ 3.2 on adm give po K+ in ED. Will recheck in am.  Will give Fleets enema and begin senokot. Explore placement.      Overview/Hospital Course:  Jhonny Almazan is a 66 year old white male with a PMHx significant for DM, HTN, major depression, CVA, and chronic olguin catheter presenting for difficulty urinating. Patient was monitored closely throughout course of hospital stay by hospital medicine team. Patient started on IV " meropenem for evidence of UTI on UA with history of MDR organisms. PT/OT consulted on admission for further evaluation and treatment. ID consulted given chronic olguin catheter and history of MDR. Blood cultures drawn with NGTD. Urine culture resulted in Klebsiella Pneumoniae and E. Coli. Patient switched to oral antibiotics per ID. Later that evening, patient developed 101 fever and WBC of 17k. Started back on IV meropenem per ID. CXR and KUB negative for any acute process. Procal and CRP mildly elevated. Ct abdomen obtained and negative for any acute process. Repeat CBC showed WBC of 10K. Switched to IV rocephin per ID. Case management working on SNF placement pending insurance authorization.           Interval History: Notes reviewed, no acute events overnight. Afebrile overnight. Patient switched to IV rocephin per ID. No complaints at this time. He denies any urinary symptoms, N/V, fever/chills, SOB, or chest pain. Discharge disposition pending SNF authorization. Will continue to monitor.     Review of Systems   Constitutional:  Negative for chills, fatigue and fever.   Respiratory:  Negative for cough, shortness of breath and wheezing.    Cardiovascular:  Negative for chest pain.   Gastrointestinal:  Positive for abdominal pain. Negative for abdominal distention.   Genitourinary:  Negative for difficulty urinating, frequency and hematuria.   Musculoskeletal:  Negative for arthralgias and neck pain.   Neurological:  Negative for syncope and headaches.   Psychiatric/Behavioral:  Negative for behavioral problems and confusion.    All other systems reviewed and are negative.  Objective:     Vital Signs (Most Recent):  Temp: 97.3 °F (36.3 °C) (09/07/22 1115)  Pulse: 75 (09/07/22 1115)  Resp: 16 (09/07/22 1115)  BP: 95/64 (09/07/22 1115)  SpO2: 98 % (09/07/22 1115) Vital Signs (24h Range):  Temp:  [97.1 °F (36.2 °C)-97.6 °F (36.4 °C)] 97.3 °F (36.3 °C)  Pulse:  [65-75] 75  Resp:  [16-18] 16  SpO2:  [91 %-98 %] 98  %  BP: ()/(51-64) 95/64     Weight: 79.5 kg (175 lb 4.3 oz)  Body mass index is 20.78 kg/m².    Intake/Output Summary (Last 24 hours) at 9/7/2022 1351  Last data filed at 9/7/2022 0900  Gross per 24 hour   Intake 837 ml   Output 1950 ml   Net -1113 ml      Physical Exam  Vitals and nursing note reviewed.   Constitutional:       General: He is not in acute distress.     Appearance: He is ill-appearing.      Comments: Disheveled appearance   HENT:      Head: Normocephalic and atraumatic.      Right Ear: External ear normal.      Left Ear: External ear normal.   Cardiovascular:      Rate and Rhythm: Normal rate and regular rhythm.      Pulses: Normal pulses.      Heart sounds: No murmur heard.    No gallop.   Pulmonary:      Effort: Pulmonary effort is normal. No respiratory distress.      Breath sounds: Normal breath sounds. No wheezing or rales.   Abdominal:      General: Abdomen is flat. There is no distension.      Palpations: Abdomen is soft.      Tenderness: There is abdominal tenderness.      Comments: Mild suprapubic tenderness   Genitourinary:     Comments: Gutierrez catheter in place with clear yellow urine  Musculoskeletal:         General: No swelling or tenderness. Normal range of motion.   Skin:     General: Skin is warm and dry.      Coloration: Skin is not jaundiced.      Findings: No bruising.   Neurological:      General: No focal deficit present.      Mental Status: He is alert and oriented to person, place, and time.      Cranial Nerves: No cranial nerve deficit.      Sensory: No sensory deficit.   Psychiatric:         Mood and Affect: Mood normal.         Behavior: Behavior normal.       Significant Labs: All pertinent labs within the past 24 hours have been reviewed.  CBC:   Recent Labs   Lab 09/06/22  0507 09/07/22  0415   WBC 17.00* 10.67   HGB 11.4* 10.7*   HCT 34.5* 33.2*    190     CMP:   Recent Labs   Lab 09/06/22  0507 09/07/22  0415   * 137   K 5.2* 5.1    105   CO2 24  25   * 145*   BUN 16 18   CREATININE 0.8 0.8   CALCIUM 9.8 10.3   ANIONGAP 6* 7*       Significant Imaging: I have reviewed all pertinent imaging results/findings within the past 24 hours.      Assessment/Plan:      * UTI (urinary tract infection) due to urinary indwelling catheter  Monitor labs  IV abx switched to rocephin  ID consulted: appreciate recs  Elevated Procal and CRP  Urine Culture and Blood culture results:  Microbiology Results (last 7 days)     Procedure Component Value Units Date/Time    Blood culture [[id number]] Collected: 09/06/22 0047    Order Status: Completed Specimen: Blood from Antecubital, Right Arm Updated: 09/07/22 1212     Blood Culture, Routine No Growth to date      No Growth to date    Blood culture [[id number]] Collected: 09/03/22 2346    Order Status: Completed Specimen: Blood from Antecubital, Right Arm Updated: 09/06/22 1812     Blood Culture, Routine No Growth to date      No Growth to date      No Growth to date    Urine culture [[id number]]  (Abnormal)  (Susceptibility) Collected: 09/02/22 1151    Order Status: Completed Specimen: Urine Updated: 09/05/22 0343     Urine Culture, Routine KLEBSIELLA PNEUMONIAE  > 100,000 cfu/ml        ESCHERICHIA COLI  > 100,000 cfu/ml      Narrative:      Specimen Source->Urine            Moderate malnutrition  Nutrition consulted. Most recent weight and BMI monitored-     Malnutrition Type and Energy Intake  Malnutrition Type: chronic illness    Malnutrition (Moderate to Severe)  Weight Loss (Malnutrition): 20% in 1 year  Energy Intake (Malnutrition): less than 75% for greater than or equal to 1 month    Final Summary  Subcutaneous Fat Loss (Final Summary): moderate protein-calorie malnutrition  Muscle Loss Evaluation (Final Summary): moderate protein-calorie malnutrition         Measurements:  Wt Readings from Last 1 Encounters:   09/03/22 79.5 kg (175 lb 4.3 oz)   Body mass index is 20.78 kg/m².    Recommendations:  Recommendation/Intervention: 1) Chane diet to DM 2000 kcal, cardiac diet- textuer per SLP 2) SLP twilaal 3) weigh weekly 4) Boost glcose control chocolate BID 5) Nutrition education given  Goals: 1) PO intakes > 75% of meals and supplements at f/u    Patient has been screened and assessed by RD. RD will follow patient.      Debility  Pt/OT consulted  Awaiting SNF disposition     Chronic indwelling Gutierrez catheter  Gutierrez care  Monitor urine output  IV rocephin per ID  Urine culture results:  Microbiology Results (last 7 days)     Procedure Component Value Units Date/Time    Blood culture [[id number]] Collected: 09/06/22 0047    Order Status: Completed Specimen: Blood from Antecubital, Right Arm Updated: 09/07/22 1212     Blood Culture, Routine No Growth to date      No Growth to date    Blood culture [[id number]] Collected: 09/03/22 2346    Order Status: Completed Specimen: Blood from Antecubital, Right Arm Updated: 09/06/22 1812     Blood Culture, Routine No Growth to date      No Growth to date      No Growth to date    Urine culture [[id number]]  (Abnormal)  (Susceptibility) Collected: 09/02/22 1151    Order Status: Completed Specimen: Urine Updated: 09/05/22 0343     Urine Culture, Routine KLEBSIELLA PNEUMONIAE  > 100,000 cfu/ml        ESCHERICHIA COLI  > 100,000 cfu/ml      Narrative:      Specimen Source->Urine              Major depressive disorder  Patient has persistent depression which is mild and is currently controlled. Will Continue anti-depressant medications. We will not consult psychiatry at this time. Patient does display psychosis at this time. Continue to monitor closely and adjust plan of care as needed.        Hyperlipidemia associated with type 2 diabetes mellitus  Patient's FSGs are controlled on current medication regimen.  Last A1c reviewed-   Lab Results   Component Value Date    HGBA1C 5.8 (H) 09/02/2022     Most recent fingerstick glucose reviewed-   Recent Labs   Lab 09/06/22  3807  09/06/22 2034 09/07/22 0726 09/07/22  1105   POCTGLUCOSE 164* 151* 131* 215*     Current correctional scale  Low  Maintain anti-hyperglycemic dose as follows-   Antihyperglycemics (From admission, onward)    Start     Stop Route Frequency Ordered    09/02/22 2100  insulin detemir U-100 pen 10 Units         -- SubQ Nightly 09/02/22 1434    09/02/22 1532  insulin aspart U-100 pen 0-5 Units         -- SubQ Every 6 hours PRN 09/02/22 1434        Hold Oral hypoglycemics while patient is in the hospital.    Hypertension associated with diabetes  Chronic, controlled.  Latest blood pressure and vitals reviewed-   Temp:  [97.2 °F (36.2 °C)-98.1 °F (36.7 °C)]   Pulse:  [59-76]   Resp:  [16-19]   BP: (103-131)/(57-69)   SpO2:  [96 %-98 %] .   Home meds for hypertension were reviewed and noted below.   Hypertension Medications             losartan (COZAAR) 25 MG tablet 1 tablet DAILY (route: oral)          While in the hospital, will manage blood pressure as follows; Continue home antihypertensive regimen    Will utilize p.r.n. blood pressure medication only if patient's blood pressure greater than  180/110 and he develops symptoms such as worsening chest pain or shortness of breath.      VTE Risk Mitigation (From admission, onward)         Ordered     Place sequential compression device  Until discontinued         09/02/22 1434     IP VTE LOW RISK PATIENT  Once         09/02/22 1434                Discharge Planning   YUDITH: 9/8/2022     Code Status: Full Code   Is the patient medically ready for discharge?:     Reason for patient still in hospital (select all that apply): Pending disposition  Discharge Plan A: Skilled Nursing Facility                  Claus Dumont PA-C  Department of Hospital Medicine   [facility]

## 2022-09-07 NOTE — SUBJECTIVE & OBJECTIVE
Interval History: Notes reviewed, no acute events overnight. Afebrile overnight. Patient switched to IV rocephin per ID. No complaints at this time. He denies any urinary symptoms, N/V, fever/chills, SOB, or chest pain. Discharge disposition pending [facility] authorization. Will continue to monitor.     Review of Systems   Constitutional:  Negative for chills, fatigue and fever.   Respiratory:  Negative for cough, shortness of breath and wheezing.    Cardiovascular:  Negative for chest pain.   Gastrointestinal:  Positive for abdominal pain. Negative for abdominal distention.   Genitourinary:  Negative for difficulty urinating, frequency and hematuria.   Musculoskeletal:  Negative for arthralgias and neck pain.   Neurological:  Negative for syncope and headaches.   Psychiatric/Behavioral:  Negative for behavioral problems and confusion.    All other systems reviewed and are negative.  Objective:     Vital Signs (Most Recent):  Temp: 97.3 °F (36.3 °C) (09/07/22 1115)  Pulse: 75 (09/07/22 1115)  Resp: 16 (09/07/22 1115)  BP: 95/64 (09/07/22 1115)  SpO2: 98 % (09/07/22 1115) Vital Signs (24h Range):  Temp:  [97.1 °F (36.2 °C)-97.6 °F (36.4 °C)] 97.3 °F (36.3 °C)  Pulse:  [65-75] 75  Resp:  [16-18] 16  SpO2:  [91 %-98 %] 98 %  BP: ()/(51-64) 95/64     Weight: 79.5 kg (175 lb 4.3 oz)  Body mass index is 20.78 kg/m².    Intake/Output Summary (Last 24 hours) at 9/7/2022 1351  Last data filed at 9/7/2022 0900  Gross per 24 hour   Intake 837 ml   Output 1950 ml   Net -1113 ml      Physical Exam  Vitals and nursing note reviewed.   Constitutional:       General: He is not in acute distress.     Appearance: He is ill-appearing.      Comments: Disheveled appearance   HENT:      Head: Normocephalic and atraumatic.      Right Ear: External ear normal.      Left Ear: External ear normal.   Cardiovascular:      Rate and Rhythm: Normal rate and regular rhythm.      Pulses: Normal pulses.      Heart sounds: No murmur heard.    No  gallop.   Pulmonary:      Effort: Pulmonary effort is normal. No respiratory distress.      Breath sounds: Normal breath sounds. No wheezing or rales.   Abdominal:      General: Abdomen is flat. There is no distension.      Palpations: Abdomen is soft.      Tenderness: There is abdominal tenderness.      Comments: Mild suprapubic tenderness   Genitourinary:     Comments: Gutierrez catheter in place with clear yellow urine  Musculoskeletal:         General: No swelling or tenderness. Normal range of motion.   Skin:     General: Skin is warm and dry.      Coloration: Skin is not jaundiced.      Findings: No bruising.   Neurological:      General: No focal deficit present.      Mental Status: He is alert and oriented to person, place, and time.      Cranial Nerves: No cranial nerve deficit.      Sensory: No sensory deficit.   Psychiatric:         Mood and Affect: Mood normal.         Behavior: Behavior normal.       Significant Labs: All pertinent labs within the past 24 hours have been reviewed.  CBC:   Recent Labs   Lab 09/06/22  0507 09/07/22  0415   WBC 17.00* 10.67   HGB 11.4* 10.7*   HCT 34.5* 33.2*    190     CMP:   Recent Labs   Lab 09/06/22  0507 09/07/22  0415   * 137   K 5.2* 5.1    105   CO2 24 25   * 145*   BUN 16 18   CREATININE 0.8 0.8   CALCIUM 9.8 10.3   ANIONGAP 6* 7*       Significant Imaging: I have reviewed all pertinent imaging results/findings within the past 24 hours.

## 2022-09-07 NOTE — PLAN OF CARE
09/07/22 8206   Patient Assessment/Suction   Level of Consciousness (AVPU) alert   Respiratory Effort Unlabored;Normal   Expansion/Accessory Muscles/Retractions no retractions;no use of accessory muscles   All Lung Fields Breath Sounds diminished;Anterior:;Lateral:   Rhythm/Pattern, Respiratory depth regular;pattern regular;unlabored   Cough Frequency no cough   PRE-TX-O2   O2 Device (Oxygen Therapy) room air   SpO2 98 %   Pulse Oximetry Type Intermittent   $ Pulse Oximetry - Multiple Charge Pulse Oximetry - Multiple   Pulse 70   Resp 16   Incentive Spirometer   $ Incentive Spirometer Charges done with encouragement   Incentive Spirometer Predicted Level (mL) 3200   Administration (IS) instruction provided, follow-up   Number of Repetitions (IS) 10   Level Incentive Spirometer (mL) 2500   Patient Tolerance (IS) good

## 2022-09-07 NOTE — ASSESSMENT & PLAN NOTE
Gutierrez care  Monitor urine output  IV rocephin per ID  Urine culture results:  Microbiology Results (last 7 days)     Procedure Component Value Units Date/Time    Blood culture [[id number]] Collected: 09/06/22 0047    Order Status: Completed Specimen: Blood from Antecubital, Right Arm Updated: 09/07/22 1212     Blood Culture, Routine No Growth to date      No Growth to date    Blood culture [[id number]] Collected: 09/03/22 2346    Order Status: Completed Specimen: Blood from Antecubital, Right Arm Updated: 09/06/22 1812     Blood Culture, Routine No Growth to date      No Growth to date      No Growth to date    Urine culture [[id number]]  (Abnormal)  (Susceptibility) Collected: 09/02/22 1151    Order Status: Completed Specimen: Urine Updated: 09/05/22 0343     Urine Culture, Routine KLEBSIELLA PNEUMONIAE  > 100,000 cfu/ml        ESCHERICHIA COLI  > 100,000 cfu/ml      Narrative:      Specimen Source->Urine

## 2022-09-07 NOTE — PLAN OF CARE
Pt alert and oriented . Chronic olguin intact . Pt turns self. Plan of care continued. Call light in reach

## 2022-09-07 NOTE — ASSESSMENT & PLAN NOTE
Monitor labs  IV abx switched to rocephin  ID consulted: appreciate recs  Elevated Procal and CRP  Urine Culture and Blood culture results:  Microbiology Results (last 7 days)     Procedure Component Value Units Date/Time    Blood culture [[id number]] Collected: 09/06/22 0047    Order Status: Completed Specimen: Blood from Antecubital, Right Arm Updated: 09/07/22 1212     Blood Culture, Routine No Growth to date      No Growth to date    Blood culture [[id number]] Collected: 09/03/22 2346    Order Status: Completed Specimen: Blood from Antecubital, Right Arm Updated: 09/06/22 1812     Blood Culture, Routine No Growth to date      No Growth to date      No Growth to date    Urine culture [[id number]]  (Abnormal)  (Susceptibility) Collected: 09/02/22 1151    Order Status: Completed Specimen: Urine Updated: 09/05/22 0343     Urine Culture, Routine KLEBSIELLA PNEUMONIAE  > 100,000 cfu/ml        ESCHERICHIA COLI  > 100,000 cfu/ml      Narrative:      Specimen Source->Urine

## 2022-09-07 NOTE — ASSESSMENT & PLAN NOTE
Contributing Nutrition Diagnosis  Moderate chronic illness related malnutrition    Related to (etiology):   Limited access to food and trouble swallowing    Signs and Symptoms (as evidenced by):   1) PO intakes < 75% of needs x at least 4 weeks  2) mild-moderate wasting as charted below  3) altered skin integrity  4) 20% wt loss x 1 year    Interventions:  above    Nutrition Diagnosis Status:   improving

## 2022-09-08 VITALS
DIASTOLIC BLOOD PRESSURE: 74 MMHG | WEIGHT: 175.25 LBS | TEMPERATURE: 98 F | RESPIRATION RATE: 16 BRPM | HEART RATE: 72 BPM | OXYGEN SATURATION: 95 % | SYSTOLIC BLOOD PRESSURE: 111 MMHG | HEIGHT: 77 IN | BODY MASS INDEX: 20.69 KG/M2

## 2022-09-08 PROBLEM — E87.5 HYPERKALEMIA: Status: ACTIVE | Noted: 2022-09-08

## 2022-09-08 LAB
ANION GAP SERPL CALC-SCNC: 8 MMOL/L (ref 8–16)
ANION GAP SERPL CALC-SCNC: 8 MMOL/L (ref 8–16)
BACTERIA #/AREA URNS HPF: ABNORMAL /HPF
BASOPHILS # BLD AUTO: 0.05 K/UL (ref 0–0.2)
BASOPHILS NFR BLD: 0.7 % (ref 0–1.9)
BILIRUB UR QL STRIP: NEGATIVE
BUN SERPL-MCNC: 20 MG/DL (ref 8–23)
BUN SERPL-MCNC: 20 MG/DL (ref 8–23)
CALCIUM SERPL-MCNC: 10.4 MG/DL (ref 8.7–10.5)
CALCIUM SERPL-MCNC: 10.6 MG/DL (ref 8.7–10.5)
CHLORIDE SERPL-SCNC: 100 MMOL/L (ref 95–110)
CHLORIDE SERPL-SCNC: 101 MMOL/L (ref 95–110)
CLARITY UR: CLEAR
CO2 SERPL-SCNC: 24 MMOL/L (ref 23–29)
CO2 SERPL-SCNC: 26 MMOL/L (ref 23–29)
COLOR UR: YELLOW
CREAT SERPL-MCNC: 0.9 MG/DL (ref 0.5–1.4)
CREAT SERPL-MCNC: 0.9 MG/DL (ref 0.5–1.4)
DIFFERENTIAL METHOD: ABNORMAL
EOSINOPHIL # BLD AUTO: 0.4 K/UL (ref 0–0.5)
EOSINOPHIL NFR BLD: 6 % (ref 0–8)
ERYTHROCYTE [DISTWIDTH] IN BLOOD BY AUTOMATED COUNT: 14.8 % (ref 11.5–14.5)
EST. GFR  (NO RACE VARIABLE): >60 ML/MIN/1.73 M^2
EST. GFR  (NO RACE VARIABLE): >60 ML/MIN/1.73 M^2
GLUCOSE SERPL-MCNC: 189 MG/DL (ref 70–110)
GLUCOSE SERPL-MCNC: 190 MG/DL (ref 70–110)
GLUCOSE UR QL STRIP: NEGATIVE
HCT VFR BLD AUTO: 35.4 % (ref 40–54)
HGB BLD-MCNC: 11.5 G/DL (ref 14–18)
HGB UR QL STRIP: ABNORMAL
IMM GRANULOCYTES # BLD AUTO: 0.03 K/UL (ref 0–0.04)
IMM GRANULOCYTES NFR BLD AUTO: 0.4 % (ref 0–0.5)
KETONES UR QL STRIP: NEGATIVE
LEUKOCYTE ESTERASE UR QL STRIP: ABNORMAL
LYMPHOCYTES # BLD AUTO: 1.3 K/UL (ref 1–4.8)
LYMPHOCYTES NFR BLD: 17.9 % (ref 18–48)
MCH RBC QN AUTO: 27.4 PG (ref 27–31)
MCHC RBC AUTO-ENTMCNC: 32.5 G/DL (ref 32–36)
MCV RBC AUTO: 85 FL (ref 82–98)
MICROSCOPIC COMMENT: ABNORMAL
MONOCYTES # BLD AUTO: 0.4 K/UL (ref 0.3–1)
MONOCYTES NFR BLD: 6 % (ref 4–15)
NEUTROPHILS # BLD AUTO: 4.8 K/UL (ref 1.8–7.7)
NEUTROPHILS NFR BLD: 69 % (ref 38–73)
NITRITE UR QL STRIP: NEGATIVE
NRBC BLD-RTO: 0 /100 WBC
PH UR STRIP: 7 [PH] (ref 5–8)
PLATELET # BLD AUTO: 238 K/UL (ref 150–450)
PMV BLD AUTO: 9.7 FL (ref 9.2–12.9)
POCT GLUCOSE: 208 MG/DL (ref 70–110)
POTASSIUM SERPL-SCNC: 5.5 MMOL/L (ref 3.5–5.1)
POTASSIUM SERPL-SCNC: 5.5 MMOL/L (ref 3.5–5.1)
PROT UR QL STRIP: NEGATIVE
RBC # BLD AUTO: 4.19 M/UL (ref 4.6–6.2)
RBC #/AREA URNS HPF: 0 /HPF (ref 0–4)
SODIUM SERPL-SCNC: 133 MMOL/L (ref 136–145)
SODIUM SERPL-SCNC: 134 MMOL/L (ref 136–145)
SP GR UR STRIP: 1.01 (ref 1–1.03)
URN SPEC COLLECT METH UR: ABNORMAL
UROBILINOGEN UR STRIP-ACNC: NEGATIVE EU/DL
WBC # BLD AUTO: 6.99 K/UL (ref 3.9–12.7)
WBC #/AREA URNS HPF: 7 /HPF (ref 0–5)

## 2022-09-08 PROCEDURE — 36415 COLL VENOUS BLD VENIPUNCTURE: CPT | Performed by: NURSE PRACTITIONER

## 2022-09-08 PROCEDURE — 25000242 PHARM REV CODE 250 ALT 637 W/ HCPCS: Performed by: HOSPITALIST

## 2022-09-08 PROCEDURE — 80048 BASIC METABOLIC PNL TOTAL CA: CPT | Performed by: NURSE PRACTITIONER

## 2022-09-08 PROCEDURE — 25000003 PHARM REV CODE 250: Performed by: NURSE PRACTITIONER

## 2022-09-08 PROCEDURE — 97116 GAIT TRAINING THERAPY: CPT | Mod: CQ

## 2022-09-08 PROCEDURE — 80048 BASIC METABOLIC PNL TOTAL CA: CPT | Mod: 91 | Performed by: NURSE PRACTITIONER

## 2022-09-08 PROCEDURE — 81000 URINALYSIS NONAUTO W/SCOPE: CPT | Performed by: STUDENT IN AN ORGANIZED HEALTH CARE EDUCATION/TRAINING PROGRAM

## 2022-09-08 PROCEDURE — 94761 N-INVAS EAR/PLS OXIMETRY MLT: CPT

## 2022-09-08 PROCEDURE — 94799 UNLISTED PULMONARY SVC/PX: CPT

## 2022-09-08 PROCEDURE — 63600175 PHARM REV CODE 636 W HCPCS: Performed by: STUDENT IN AN ORGANIZED HEALTH CARE EDUCATION/TRAINING PROGRAM

## 2022-09-08 PROCEDURE — 25000003 PHARM REV CODE 250

## 2022-09-08 PROCEDURE — 97110 THERAPEUTIC EXERCISES: CPT

## 2022-09-08 PROCEDURE — 97530 THERAPEUTIC ACTIVITIES: CPT | Mod: CQ

## 2022-09-08 PROCEDURE — 85025 COMPLETE CBC W/AUTO DIFF WBC: CPT | Performed by: NURSE PRACTITIONER

## 2022-09-08 RX ORDER — MAG HYDROX/ALUMINUM HYD/SIMETH 200-200-20
30 SUSPENSION, ORAL (FINAL DOSE FORM) ORAL EVERY 6 HOURS PRN
Status: DISCONTINUED | OUTPATIENT
Start: 2022-09-08 | End: 2022-09-08 | Stop reason: HOSPADM

## 2022-09-08 RX ORDER — CALCIUM CARBONATE 200(500)MG
500 TABLET,CHEWABLE ORAL 2 TIMES DAILY PRN
Status: DISCONTINUED | OUTPATIENT
Start: 2022-09-08 | End: 2022-09-08 | Stop reason: HOSPADM

## 2022-09-08 RX ORDER — ACETAMINOPHEN 325 MG/1
650 TABLET ORAL EVERY 6 HOURS PRN
Refills: 0
Start: 2022-09-08

## 2022-09-08 RX ORDER — POLYETHYLENE GLYCOL 3350 17 G/17G
17 POWDER, FOR SOLUTION ORAL 2 TIMES DAILY
Refills: 0
Start: 2022-09-08

## 2022-09-08 RX ADMIN — CLOPIDOGREL BISULFATE 75 MG: 75 TABLET, FILM COATED ORAL at 08:09

## 2022-09-08 RX ADMIN — CEFTRIAXONE 2 G: 2 INJECTION, SOLUTION INTRAVENOUS at 08:09

## 2022-09-08 RX ADMIN — FINASTERIDE 5 MG: 5 TABLET, FILM COATED ORAL at 08:09

## 2022-09-08 RX ADMIN — CALCIUM CARBONATE 500 MG: 500 TABLET, CHEWABLE ORAL at 12:09

## 2022-09-08 RX ADMIN — ALUMINUM HYDROXIDE, MAGNESIUM HYDROXIDE, AND DIMETHICONE 30 ML: 200; 20; 200 SUSPENSION ORAL at 03:09

## 2022-09-08 RX ADMIN — GABAPENTIN 300 MG: 300 CAPSULE ORAL at 08:09

## 2022-09-08 RX ADMIN — SODIUM POLYSTYRENE SULFONATE 60 ML: 15 SUSPENSION ORAL; RECTAL at 02:09

## 2022-09-08 RX ADMIN — POLYETHYLENE GLYCOL 3350 17 G: 17 POWDER, FOR SOLUTION ORAL at 08:09

## 2022-09-08 RX ADMIN — ATORVASTATIN CALCIUM 40 MG: 40 TABLET, FILM COATED ORAL at 08:09

## 2022-09-08 RX ADMIN — BUPROPION HYDROCHLORIDE 300 MG: 150 TABLET, FILM COATED, EXTENDED RELEASE ORAL at 08:09

## 2022-09-08 NOTE — PLAN OF CARE
Problem: Physical Therapy  Goal: Physical Therapy Goal  Description: Goals to be met by: 10/3/22     Patient will increase functional independence with mobility by performin. Supine to sit with Contact Guard Assistance  2. Sit to stand transfer with Contact Guard Assistance  3. Bed to chair transfer with Contact Guard Assistance using Rolling Walker  4. Gait  x 200 feet with Contact Guard Assistance using Rolling Walker.   5. Lower extremity exercise program x30 reps per handout, with supervision    Outcome: Ongoing, Progressing   Ambulate with rw and assistance for safety.

## 2022-09-08 NOTE — DISCHARGE INSTRUCTIONS
[facility] Facility Transfer Orders        Admit to: [facility]    Diagnoses:   Active Hospital Problems    Diagnosis  POA    *UTI (urinary tract infection) due to urinary indwelling catheter [T83.511A, N39.0]  Yes    Hypertension associated with diabetes [E11.59, I15.2]  Yes     Priority: 3     Hyperlipidemia associated with type 2 diabetes mellitus [E11.69, E78.5]  Yes     Priority: 5      Chronic    Hyperkalemia [E87.5]  No    Moderate malnutrition [E44.0]  Yes    Debility [R53.81]  Yes    Chronic indwelling Gutierrez catheter [Z97.8]  Not Applicable    Major depressive disorder [F32.9]  Yes     Chronic     -Continue Wellbutrin        Resolved Hospital Problems    Diagnosis Date Resolved POA    Hypertension [I10] 09/04/2022 Yes     Chronic     Allergies: Review of patient's allergies indicates:  No Known Allergies    Code Status: FULL    Vitals: Routine       Diet: cardiac diet; diabetic    Activity: Activity as tolerated    Nursing Precautions: Fall and Pressure ulcer prevention    Bed/Surface: Low Air Loss    Consults: PT to evaluate and treat- 5 times a week and OT to evaluate and treat- 5 times a week    Oxygen: room air    Dialysis: Patient is not on dialysis.     Labs: First blood draw on 9/9/22.              CBL and BMP   Pending Diagnostic Studies:       Procedure Component Value Units Date/Time    Basic Metabolic Panel [[id number]]     Order Status: Sent Lab Status: No result     Specimen: Blood           Imaging: NA    Miscellaneous Care:   Gutierrez Care: Empty Gutierrez bag every shift.  Change Gutierrez every month  Diabetes Care: Diabetes: Check blood sugar. Fingerstick blood sugar AC and HS  Sliding Scale/Hypoglycemia Protocol: **MODERATE CORRECTION DOSE**  Blood Glucose  mg/dL    Pre-meal     Bedtime  151-200  2 units        1 unit  201-250   4 units       2 units   251-300  6 units        3 units   301-350   8 units       4 units   >350      10 units        5 units  **CALL MD for BG  >350**    IV Access: Peripheral     Medications: Discontinue all previous medication orders, if any. See new list below.  Current Discharge Medication List        START taking these medications    Details   acetaminophen (TYLENOL) 325 MG tablet Take 2 tablets (650 mg total) by mouth every 6 (six) hours as needed.  Refills: 0      cefTRIAXone (ROCEPHIN) 2 g/50 mL PgBk IVPB Inject 50 mLs (2 g total) into the vein once daily. for 4 days      polyethylene glycol (GLYCOLAX) 17 gram PwPk Take 17 g by mouth 2 (two) times daily.  Refills: 0           CONTINUE these medications which have NOT CHANGED    Details   buPROPion (WELLBUTRIN XL) 300 MG 24 hr tablet Take 1 tablet (300 mg total) by mouth once daily.  Qty: 90 tablet, Refills: 3    Associated Diagnoses: Cigarette nicotine dependence without complication      tetrahydrozoline 0.05% (VISION CLEAR) 0.05 % Drop Place 2 drops into both eyes 4 (four) times daily as needed (eye irritation).  Qty:  , Refills: 0      aspirin (ECOTRIN) 81 MG EC tablet Take 1 tablet (81 mg total) by mouth once daily.  Qty: 360 tablet, Refills: 0      atorvastatin (LIPITOR) 40 MG tablet Take 1 tablet (40 mg total) by mouth once daily.  Qty: 90 tablet, Refills: 3      blood sugar diagnostic Strp To check BG 2 times daily, to use with insurance preferred meter. One touch Ultra.  Qty: 100 strip, Refills: 11    Associated Diagnoses: Uncontrolled type 2 diabetes mellitus with complication, without long-term current use of insulin      clopidogreL (PLAVIX) 75 mg tablet 1 tablet (75 mg total) by Per G Tube route once daily.  Qty: 30 tablet, Refills: 11      finasteride (PROSCAR) 5 mg tablet Take 1 tablet (5 mg total) by mouth once daily. Take 1 tablet by mouth in am to keep prostate from enlarging  Qty: 90 tablet, Refills: 3      gabapentin (NEURONTIN) 300 MG capsule Take 1 capsule (300 mg total) by mouth 2 (two) times daily.  Qty: 180 capsule, Refills: 0      insulin aspart U-100 (NOVOLOG) 100 unit/mL  "(3 mL) InPn pen Inject 1-10 Units into the skin as needed (high blood sugar). Inject per sliding scale.  Qty: 15 mL, Refills: 1    Comments: 15 mls OK per NP 8/9/22      insulin detemir U-100 (LEVEMIR FLEXTOUCH) 100 unit/mL (3 mL) SubQ InPn pen Inject 10 Units into the skin every evening.  Qty: 15 mL, Refills: 3    Comments: 15 mls OK per NP 8/9/22      lancets Misc To check BG 2 times daily, to use with insurance preferred meter.One Touch Ultra  Qty: 100 each, Refills: 11    Associated Diagnoses: Uncontrolled type 2 diabetes mellitus with complication, without long-term current use of insulin      losartan (COZAAR) 25 MG tablet 1 tablet DAILY (route: oral)      meclizine (ANTIVERT) 25 mg tablet Take 1 tablet (25 mg total) by mouth 3 (three) times daily as needed for Dizziness.  Qty:  , Refills: 0      melatonin (MELATIN) 3 mg tablet Take 2 tablets (6 mg total) by mouth nightly as needed for Insomnia.  Refills: 0      omeprazole (PRILOSEC) 40 MG capsule TAKE ONE CAPSULE (40 MG) BY MOUTH EVERY DAY  Qty: 30 capsule, Refills: 3    Comments: This prescription was filled on 10/26/2021. Any refills authorized will be placed on file.      pen needle, diabetic 32 gauge x 5/32" Ndle Use AC meals 2 a day  Qty: 100 each, Refills: 11    Associated Diagnoses: Uncontrolled type 2 diabetes mellitus with hyperglycemia      QUEtiapine (SEROQUEL) 25 MG Tab Take 1 tablet (25 mg total) by mouth every evening.  Qty: 30 tablet, Refills: 11    Associated Diagnoses: Current severe episode of major depressive disorder with psychotic features without prior episode      senna-docusate 8.6-50 mg (PERICOLACE) 8.6-50 mg per tablet Take 1 tablet by mouth 2 (two) times daily.           STOP taking these medications       insulin NPH-insulin regular, 70/30, (NOVOLIN 70/30) 100 unit/mL (70-30) injection Comments:   Reason for Stopping:         lisinopriL-hydrochlorothiazide (PRINZIDE,ZESTORETIC) 20-12.5 mg per tablet Comments:   Reason for Stopping: "             Follow up:    Follow-up Information       Joey Whitehead MD Follow up.    Specialty: Family Medicine  Contact information:  [address] [facility]  [place] [id number] [phone]                               Immunizations Administered as of 9/8/2022       No immunizations on file.                Chantel Humphrey, NP

## 2022-09-08 NOTE — ASSESSMENT & PLAN NOTE
Gutierrez care  Monitor urine output  IV rocephin per ID  Urine culture results:  Microbiology Results (last 7 days)     Procedure Component Value Units Date/Time    Blood culture [[id number]] Collected: 09/03/22 2346    Order Status: Completed Specimen: Blood from Antecubital, Right Arm Updated: 09/07/22 1812     Blood Culture, Routine No Growth to date      No Growth to date      No Growth to date      No Growth to date    Blood culture [[id number]] Collected: 09/06/22 0047    Order Status: Completed Specimen: Blood from Antecubital, Right Arm Updated: 09/07/22 1212     Blood Culture, Routine No Growth to date      No Growth to date    Urine culture [[id number]]  (Abnormal)  (Susceptibility) Collected: 09/02/22 1151    Order Status: Completed Specimen: Urine Updated: 09/05/22 0343     Urine Culture, Routine KLEBSIELLA PNEUMONIAE  > 100,000 cfu/ml        ESCHERICHIA COLI  > 100,000 cfu/ml      Narrative:      Specimen Source->Urine

## 2022-09-08 NOTE — PT/OT/SLP PROGRESS
Occupational Therapy   Treatment    Name: Jhonny Almazan  MRN: [id number]  Admitting Diagnosis:  UTI (urinary tract infection) due to urinary indwelling catheter    Recommendations:     Discharge Recommendations: nursing facility, skilled  Discharge Equipment Recommendations:  none  Barriers to discharge:  None    Assessment:     Jhonny Almazan is a 66 y.o. male with a medical diagnosis of UTI (urinary tract infection) due to urinary indwelling catheter.  He presents with performance deficits affecting function are weakness, impaired endurance, impaired self care skills, impaired functional mobility, gait instability, impaired balance, decreased lower extremity function and decreased safety awareness. Pt was agreeable to UE ROM exercises at bed level secondary to fatigue.     Rehab Prognosis:  Good; patient would benefit from acute skilled OT services to address these deficits and reach maximum level of function.       Plan:     Patient to be seen 4 x/week to address the above listed problems via self-care/home management, therapeutic activities, therapeutic exercises  Plan of Care Expires: 09/26/22  Plan of Care Reviewed with: patient    Subjective     Pain/Comfort:  Pain Rating 1: 0/10    Objective:     Communicated with: nurse prior to session.  Patient found HOB elevated upon OT entry to room.    General Precautions: Standard, contact, fall  Orthopedic Precautions:N/A   Braces: N/A  Respiratory Status: Room air      Treatment & Education:  Pt performed 3x10 bilateral shoulder flexion, abduction and elbow extension/flexion AROM exercises.     Patient left HOB elevated with all lines intact and call button in reach.    GOALS:   Multidisciplinary Problems       Occupational Therapy Goals          Problem: Occupational Therapy    Goal Priority Disciplines Outcome Interventions   Occupational Therapy Goal     OT, PT/OT Ongoing, Progressing    Description: Goals to be met by: 9/26/2022     Patient will  increase functional independence with ADLs by performing:    LE Dressing with Stand-by Assistance and Assistive Devices as needed.  Grooming while seated at sink with Supervision.  Toileting from toilet with Supervision for hygiene and clothing management.   Supine to sit with Supervision.  Toilet transfer to toilet with Stand-by Assistance.                         Time Tracking:     OT Date of Treatment: 09/08/22  OT Start Time: 1130  OT Stop Time: 1145  OT Total Time (min): 15 min    Billable Minutes:Therapeutic Exercise 15               9/8/2022

## 2022-09-08 NOTE — ASSESSMENT & PLAN NOTE
Monitor labs  IV abx switched to rocephin  ID consulted: appreciate recs  Elevated Procal and CRP  Urine Culture and Blood culture results:  Microbiology Results (last 7 days)     Procedure Component Value Units Date/Time    Blood culture [[id number]] Collected: 09/03/22 2346    Order Status: Completed Specimen: Blood from Antecubital, Right Arm Updated: 09/07/22 1812     Blood Culture, Routine No Growth to date      No Growth to date      No Growth to date      No Growth to date    Blood culture [[id number]] Collected: 09/06/22 0047    Order Status: Completed Specimen: Blood from Antecubital, Right Arm Updated: 09/07/22 1212     Blood Culture, Routine No Growth to date      No Growth to date    Urine culture [[id number]]  (Abnormal)  (Susceptibility) Collected: 09/02/22 1151    Order Status: Completed Specimen: Urine Updated: 09/05/22 0343     Urine Culture, Routine KLEBSIELLA PNEUMONIAE  > 100,000 cfu/ml        ESCHERICHIA COLI  > 100,000 cfu/ml      Narrative:      Specimen Source->Urine

## 2022-09-08 NOTE — PLAN OF CARE
8:00am - HO called Fela at [facility], [phone] to f/u on status of auth.  She stated she will check with the business office and call HO back.     09/08/22 0800   Post-Acute Status   Post-Acute Authorization Placement   Post-Acute Placement Status Pending payor review/awaiting authorization (if required)   Discharge Plan   Discharge Plan A Skilled Nursing Facility

## 2022-09-08 NOTE — DISCHARGE SUMMARY
"[facility] Medicine  Discharge Summary      Patient Name: Jhonny Almazan  MRN: [id number]  Patient Class: IP- Inpatient  Admission Date: 9/2/2022  Hospital Length of Stay: 6 days  Discharge Date and Time:  09/08/2022 1:18 PM  Attending Physician: Hoam Steele MD   Discharging Provider: Chantel Humphrey NP  Primary Care Provider: Joey Whitehead MD      HPI:   Jhonny Almazan is a 66 year old  male who presented to the ED with CC of having problems with his olguin not draining properly. He reported he has olguin for around 2 years because he has difficulty urinating. He also reported that he has not had a BM for 1-1/2 weeks. ED MD changed out olguin and cath is draining well now. Pt denies fever, chills, sweats, sediment in cath/urine, blood in urine. Denies dec fluid intake. Denies any other problems. Has PMH DM, HTN, major depression, cva. Denies any problems with his chronic illnesses at this time. Denies dizziness, thirst, increasing weakness. Reported his mobility is "not very good:. Lives with son in a house and doesn't get cared for very well and doesn't eat much or follow a diabetic diet. Recently got "Meals on Wheels" arranged. Doesn't know current BG or BP. No longer has g-tube. Doesn't know specifics about medications, takes insulin twice a day.     UA in ED showed Nitrite Pos, Leuk 3+,Bld 2+. Recently admitted with MDRO- urine cs (8/3/) showed Kleibsiella >100,000 only sensitive to ertapenemem and meropenem. Will give Meropenem 1gm q 8hrs. K+ 3.2 on adm give po K+ in ED. Will recheck in am.  Will give Fleets enema and begin senokot. Explore placement.      * No surgery found *      Hospital Course:   Jhonny Almazan is a 66 year old white male with a PMHx significant for DM, HTN, major depression, CVA, and chronic olguin catheter presenting for difficulty urinating. Patient was monitored closely throughout course of hospital stay by hospital medicine team. Patient " started on IV meropenem for evidence of UTI on UA with history of MDR organisms. PT/OT consulted on admission for further evaluation and treatment. ID consulted given chronic olguin catheter and history of MDR. Blood cultures drawn with NGTD. Urine culture resulted in Klebsiella Pneumoniae and E. Coli. Patient switched to oral antibiotics per ID. Later that evening, patient developed 101 fever and WBC of 17k. Started back on IV meropenem per ID. CXR and KUB negative for any acute process. Procal and CRP mildly elevated. Ct abdomen obtained and negative for any acute process. Repeat CBC showed WBC of 10K. Switched to IV rocephin per ID. Case management working on SNF placement pending insurance authorization. Patient progressed well.  His potassium level was mildly elevated which was felt to be due to scheduled oral repletion which was discontinued.  A dose of lokelma was administered and repeat labs were ordered to be performed at SNF.  Patient was discharged to SNF with plan to continue IV antibiotic therapy until 9/12.  Patient was agreeable with discharge plan.             Goals of Care Treatment Preferences:  Code Status: Full Code      Consults:   Consults (From admission, onward)        Status Ordering Provider     Inpatient consult to Infectious Diseases  Once        Provider:  Martha Hannah MD    Completed MISHA HENDRICKSON     Inpatient consult to Registered Dietitian/Nutritionist  Once        Provider:  (Not yet assigned)    Completed MIRIAM LAURA     Inpatient consult to Social Work/Case Management  Once        Provider:  (Not yet assigned)    Acknowledged BECKY FOSTER          No new Assessment & Plan notes have been filed under this hospital service since the last note was generated.  Service: Hospital Medicine    Final Active Diagnoses:    Diagnosis Date Noted POA    PRINCIPAL PROBLEM:  UTI (urinary tract infection) due to urinary indwelling catheter [T83.511A, N39.0] 08/03/2022 Yes    Hypertension  associated with diabetes [E11.59, I15.2] 09/19/2014 Yes    Hyperlipidemia associated with type 2 diabetes mellitus [E11.69, E78.5] 09/28/2016 Yes     Chronic    Hyperkalemia [E87.5] 09/08/2022 No    Moderate malnutrition [E44.0] 09/03/2022 Yes    Debility [R53.81] 08/05/2022 Yes    Chronic indwelling Gutierrez catheter [Z97.8] 09/10/2021 Not Applicable    Major depressive disorder [F32.9] 11/17/2020 Yes     Chronic      Problems Resolved During this Admission:    Diagnosis Date Noted Date Resolved POA    Hypertension [I10] 11/16/2020 09/04/2022 Yes     Chronic       Discharged Condition: good    Disposition: Skilled Nursing Facility    Follow Up:   Follow-up Information     Joey Whitehead MD Follow up.    Specialty: Family Medicine  Contact information:  [address] [place] [id number] [phone]                       Patient Instructions:      Hemoglobin A1c   Standing Status: Standing Number of Occurrences: 40 Standing Exp. Date: 08/30/32     Activity as tolerated       Significant Diagnostic Studies: Labs:   CMP   Recent Labs   Lab 09/07/22  0415 09/08/22  0356 09/08/22  1208    133* 134*   K 5.1 5.5* 5.5*    101 100   CO2 25 24 26   * 189* 190*   BUN 18 20 20   CREATININE 0.8 0.9 0.9   CALCIUM 10.3 10.6* 10.4   ANIONGAP 7* 8 8    and CBC   Recent Labs   Lab 09/07/22  0415 09/08/22  0356   WBC 10.67 6.99   HGB 10.7* 11.5*   HCT 33.2* 35.4*    238       Pending Diagnostic Studies:     None         Medications:  Reconciled Home Medications:      Medication List      START taking these medications    acetaminophen 325 MG tablet  Commonly known as: TYLENOL  Take 2 tablets (650 mg total) by mouth every 6 (six) hours as needed.     cefTRIAXone 2 g/50 mL Pgbk IVPB  Commonly known as: ROCEPHIN  Inject 50 mLs (2 g total) into the vein once daily. for 4 days     polyethylene glycol 17 gram Pwpk  Commonly known as: GLYCOLAX  Take 17 g by mouth 2 (two) times daily.        CONTINUE taking  "these medications    aspirin 81 MG EC tablet  Commonly known as: ECOTRIN  Take 1 tablet (81 mg total) by mouth once daily.     atorvastatin 40 MG tablet  Commonly known as: LIPITOR  Take 1 tablet (40 mg total) by mouth once daily.     blood sugar diagnostic Strp  To check BG 2 times daily, to use with insurance preferred meter. One touch Ultra.     buPROPion 300 MG 24 hr tablet  Commonly known as: WELLBUTRIN XL  Take 1 tablet (300 mg total) by mouth once daily.     clopidogreL 75 mg tablet  Commonly known as: PLAVIX  1 tablet (75 mg total) by Per G Tube route once daily.     finasteride 5 mg tablet  Commonly known as: PROSCAR  Take 1 tablet (5 mg total) by mouth once daily. Take 1 tablet by mouth in am to keep prostate from enlarging     gabapentin 300 MG capsule  Commonly known as: NEURONTIN  Take 1 capsule (300 mg total) by mouth 2 (two) times daily.     insulin aspart U-100 100 unit/mL (3 mL) Inpn pen  Commonly known as: NovoLOG  Inject 1-10 Units into the skin as needed (high blood sugar). Inject per sliding scale.     insulin detemir U-100 100 unit/mL (3 mL) Inpn pen  Commonly known as: Levemir FLEXTOUCH  Inject 10 Units into the skin every evening.     lancets Misc  To check BG 2 times daily, to use with insurance preferred meter.One Touch Ultra     losartan 25 MG tablet  Commonly known as: COZAAR  1 tablet DAILY (route: oral)     meclizine 25 mg tablet  Commonly known as: ANTIVERT  Take 1 tablet (25 mg total) by mouth 3 (three) times daily as needed for Dizziness.     melatonin 3 mg tablet  Commonly known as: MELATIN  Take 2 tablets (6 mg total) by mouth nightly as needed for Insomnia.     omeprazole 40 MG capsule  Commonly known as: PRILOSEC  TAKE ONE CAPSULE (40 MG) BY MOUTH EVERY DAY     pen needle, diabetic 32 gauge x 5/32" Ndle  Use AC meals 2 a day     QUEtiapine 25 MG Tab  Commonly known as: SEROQUEL  Take 1 tablet (25 mg total) by mouth every evening.     senna-docusate 8.6-50 mg 8.6-50 mg per " tablet  Commonly known as: PERICOLACE  Take 1 tablet by mouth 2 (two) times daily.     tetrahydrozoline 0.05% 0.05 % Drop  Commonly known as: Vision Clear  Place 2 drops into both eyes 4 (four) times daily as needed (eye irritation).        STOP taking these medications    insulin NPH-insulin regular (70/30) 100 unit/mL (70-30) injection     lisinopriL-hydrochlorothiazide 20-12.5 mg per tablet  Commonly known as: JOSE QUINTANA            Indwelling Lines/Drains at time of discharge:   Lines/Drains/Airways     Drain  Duration                Urethral Catheter 09/02/22 1130 16 Fr. 6 days                Time spent on the discharge of patient: 32 minutes         Chantel Humphrey NP  Department of Hospital Medicine  [facility]

## 2022-09-08 NOTE — CONSULTS
Consulted for non blanchable redness to the sacral area present on admit. Triad was applied starting on 9/2/22 as ordered. Wound is noted with continued non blanchable redness at this assessment and treatment plan to continue with Triad as ordered. Patient able to turn and reposition himself and is positioned on his side a this assessment time. Patient encouraged to stay off his backside as mush as possible to help this area to heal and patient verbalized understanding. Wound care will follow up as needed throughout hospital stay.

## 2022-09-08 NOTE — ASSESSMENT & PLAN NOTE
Noted, Pt on scheduled twice daily potassium supplementation which is likely cause.    -discontinue supplemental potassium  -follow labs to ensure resolution

## 2022-09-08 NOTE — PLAN OF CARE
Pt cleared for discharge from case management to [facility].  Nurse can call report to [phone], ask for Laurie and transportation pickup between 3:15-3:30pm.  MELLISSA Valdez notified via secure message.     09/08/22 1450   Final Note   Assessment Type Final Discharge Note   Anticipated Discharge Disposition SNF   Post-Acute Status   Post-Acute Authorization Placement   Post-Acute Placement Status Set-up Complete/Auth obtained

## 2022-09-08 NOTE — NURSING
Transport here. Pt sent with IV for IV antibiotics. Pt assisted to chair. Pt d/c to [facility].

## 2022-09-08 NOTE — PLAN OF CARE
Pt alert and oriented. Up to chair with therapy. Gutierrez intact and draining. Plan of care continued. Call light in reach

## 2022-09-08 NOTE — PROGRESS NOTES
"[facility] Medicine  Progress Note    Patient Name: Jhonny Almazan  MRN: [id number]  Patient Class: IP- Inpatient   Admission Date: 9/2/2022  Length of Stay: 6 days  Attending Physician: Homa Steele MD  Primary Care Provider: Joey Whitehead MD        Subjective:     Principal Problem:UTI (urinary tract infection) due to urinary indwelling catheter        HPI:  Jhonny Almazan is a 66 year old  male who presented to the ED with CC of having problems with his olguin not draining properly. He reported he has olguin for around 2 years because he has difficulty urinating. He also reported that he has not had a BM for 1-1/2 weeks. ED MD changed out olguin and cath is draining well now. Pt denies fever, chills, sweats, sediment in cath/urine, blood in urine. Denies dec fluid intake. Denies any other problems. Has PMH DM, HTN, major depression, cva. Denies any problems with his chronic illnesses at this time. Denies dizziness, thirst, increasing weakness. Reported his mobility is "not very good:. Lives with son in a house and doesn't get cared for very well and doesn't eat much or follow a diabetic diet. Recently got "Meals on Wheels" arranged. Doesn't know current BG or BP. No longer has g-tube. Doesn't know specifics about medications, takes insulin twice a day.     UA in ED showed Nitrite Pos, Leuk 3+,Bld 2+. Recently admitted with MDRO- urine cs (8/3/) showed Kleibsiella >100,000 only sensitive to ertapenemem and meropenem. Will give Meropenem 1gm q 8hrs. K+ 3.2 on adm give po K+ in ED. Will recheck in am.  Will give Fleets enema and begin senokot. Explore placement.      Overview/Hospital Course:  Jhonny Almazan is a 66 year old white male with a PMHx significant for DM, HTN, major depression, CVA, and chronic olguin catheter presenting for difficulty urinating. Patient was monitored closely throughout course of hospital stay by hospital medicine team. Patient started on IV " meropenem for evidence of UTI on UA with history of MDR organisms. PT/OT consulted on admission for further evaluation and treatment. ID consulted given chronic olguin catheter and history of MDR. Blood cultures drawn with NGTD. Urine culture resulted in Klebsiella Pneumoniae and E. Coli. Patient switched to oral antibiotics per ID. Later that evening, patient developed 101 fever and WBC of 17k. Started back on IV meropenem per ID. CXR and KUB negative for any acute process. Procal and CRP mildly elevated. Ct abdomen obtained and negative for any acute process. Repeat CBC showed WBC of 10K. Switched to IV rocephin per ID. Case management working on SNF placement pending insurance authorization.           Interval History:  Pt seen and examined.  Good UOP.  No nausea or vomiting.  Reports some indigestion which was relieved with p.r.n. medications.  Continues to work with PT/OT who are recommending SNF.  Case management working to facilitate.  Pt reports having chronic indwelling Olguin for several years and is followed by Dr. Antonio per his report.  Antibiotic recommendations per ID are noted.    Review of Systems   Constitutional:  Positive for fatigue. Negative for chills and fever.   Respiratory:  Positive for cough (chronic, unchanged). Negative for shortness of breath.    Gastrointestinal:  Negative for nausea and vomiting.   Genitourinary:  Negative for hematuria.   Objective:     Vital Signs (Most Recent):  Temp: 96 °F (35.6 °C) (09/08/22 0742)  Pulse: 69 (09/08/22 0742)  Resp: 16 (09/08/22 0742)  BP: 126/65 (09/08/22 0742)  SpO2: 98 % (09/08/22 0742) Vital Signs (24h Range):  Temp:  [96 °F (35.6 °C)-97.3 °F (36.3 °C)] 96 °F (35.6 °C)  Pulse:  [64-75] 69  Resp:  [16-18] 16  SpO2:  [93 %-98 %] 98 %  BP: ()/(57-76) 126/65     Weight: 79.5 kg (175 lb 4.3 oz)  Body mass index is 20.78 kg/m².    Intake/Output Summary (Last 24 hours) at 9/8/2022 1048  Last data filed at 9/8/2022 1000  Gross per 24 hour   Intake  720 ml   Output 2900 ml   Net -2180 ml      Physical Exam  Vitals and nursing note reviewed.   Constitutional:       Appearance: Normal appearance.   HENT:      Head: Normocephalic and atraumatic.   Eyes:      Extraocular Movements: Extraocular movements intact.      Conjunctiva/sclera: Conjunctivae normal.      Pupils: Pupils are equal, round, and reactive to light.   Cardiovascular:      Rate and Rhythm: Normal rate and regular rhythm.   Pulmonary:      Effort: Pulmonary effort is normal.      Breath sounds: Normal breath sounds.   Abdominal:      General: Abdomen is flat. Bowel sounds are normal.      Palpations: Abdomen is soft.   Genitourinary:     Comments: Gutierrez with clear yellow urine draining    Musculoskeletal:      Cervical back: Normal range of motion and neck supple.   Skin:     General: Skin is warm and dry.   Neurological:      General: No focal deficit present.      Mental Status: He is alert. Mental status is at baseline.       Significant Labs: All pertinent labs within the past 24 hours have been reviewed.  CBC:   Recent Labs   Lab 09/07/22  0415 09/08/22  0356   WBC 10.67 6.99   HGB 10.7* 11.5*   HCT 33.2* 35.4*    238     CMP:   Recent Labs   Lab 09/07/22  0415 09/08/22  0356    133*   K 5.1 5.5*    101   CO2 25 24   * 189*   BUN 18 20   CREATININE 0.8 0.9   CALCIUM 10.3 10.6*   ANIONGAP 7* 8       Significant Imaging: I have reviewed all pertinent imaging results/findings within the past 24 hours.      Assessment/Plan:      * UTI (urinary tract infection) due to urinary indwelling catheter  Monitor labs  IV abx switched to rocephin  ID consulted: appreciate recs  Elevated Procal and CRP  Urine Culture and Blood culture results:  Microbiology Results (last 7 days)     Procedure Component Value Units Date/Time    Blood culture [[id number]] Collected: 09/03/22 9363    Order Status: Completed Specimen: Blood from Antecubital, Right Arm Updated: 09/07/22 1982     Blood  Culture, Routine No Growth to date      No Growth to date      No Growth to date      No Growth to date    Blood culture [[id number]] Collected: 09/06/22 0047    Order Status: Completed Specimen: Blood from Antecubital, Right Arm Updated: 09/07/22 1212     Blood Culture, Routine No Growth to date      No Growth to date    Urine culture [[id number]]  (Abnormal)  (Susceptibility) Collected: 09/02/22 1151    Order Status: Completed Specimen: Urine Updated: 09/05/22 0343     Urine Culture, Routine KLEBSIELLA PNEUMONIAE  > 100,000 cfu/ml        ESCHERICHIA COLI  > 100,000 cfu/ml      Narrative:      Specimen Source->Urine            Hypertension associated with diabetes  Chronic, controlled.  Latest blood pressure and vitals reviewed-   Temp:  [97.2 °F (36.2 °C)-98.1 °F (36.7 °C)]   Pulse:  [59-76]   Resp:  [16-19]   BP: (103-131)/(57-69)   SpO2:  [96 %-98 %] .   Home meds for hypertension were reviewed and noted below.   Hypertension Medications             losartan (COZAAR) 25 MG tablet 1 tablet DAILY (route: oral)          While in the hospital, will manage blood pressure as follows; Continue home antihypertensive regimen    Will utilize p.r.n. blood pressure medication only if patient's blood pressure greater than  180/110 and he develops symptoms such as worsening chest pain or shortness of breath.    Hyperlipidemia associated with type 2 diabetes mellitus  Patient's FSGs are controlled on current medication regimen.  Last A1c reviewed-   Lab Results   Component Value Date    HGBA1C 5.8 (H) 09/02/2022     Most recent fingerstick glucose reviewed-   Recent Labs   Lab 09/07/22  1105 09/07/22  1631 09/07/22  1930   POCTGLUCOSE 215* 153* 197*     Current correctional scale  Low  Maintain anti-hyperglycemic dose as follows-   Antihyperglycemics (From admission, onward)    Start     Stop Route Frequency Ordered    09/02/22 2100  insulin detemir U-100 pen 10 Units         -- SubQ Nightly 09/02/22 1434    09/02/22 1532   insulin aspart U-100 pen 0-5 Units         -- SubQ Every 6 hours PRN 09/02/22 1434        Hold Oral hypoglycemics while patient is in the hospital.    Hyperkalemia  Noted, Pt on scheduled twice daily potassium supplementation which is likely cause.    -discontinue supplemental potassium  -follow labs to ensure resolution      Moderate malnutrition  Nutrition consulted. Most recent weight and BMI monitored-     Malnutrition Type and Energy Intake  Malnutrition Type: chronic illness    Malnutrition (Moderate to Severe)  Weight Loss (Malnutrition): 20% in 1 year  Energy Intake (Malnutrition): less than 75% for greater than or equal to 1 month    Final Summary  Subcutaneous Fat Loss (Final Summary): moderate protein-calorie malnutrition  Muscle Loss Evaluation (Final Summary): moderate protein-calorie malnutrition         Measurements:  Wt Readings from Last 1 Encounters:   09/03/22 79.5 kg (175 lb 4.3 oz)   Body mass index is 20.78 kg/m².    Recommendations: Recommendation/Intervention: 1) Chane diet to DM 2000 kcal, cardiac diet- textuer per SLP 2) SLP eval 3) weigh weekly 4) Boost glcose control chocolate BID 5) Nutrition education given  Goals: 1) PO intakes > 75% of meals and supplements at f/u    Patient has been screened and assessed by RD. RD will follow patient.      Debility  Pt/OT consulted  Awaiting SNF disposition     Chronic indwelling Gutierrez catheter  Gutierrez care  Monitor urine output  IV rocephin per ID  Urine culture results:  Microbiology Results (last 7 days)     Procedure Component Value Units Date/Time    Blood culture [[id number]] Collected: 09/03/22 2346    Order Status: Completed Specimen: Blood from Antecubital, Right Arm Updated: 09/07/22 1812     Blood Culture, Routine No Growth to date      No Growth to date      No Growth to date      No Growth to date    Blood culture [[id number]] Collected: 09/06/22 0047    Order Status: Completed Specimen: Blood from Antecubital, Right Arm Updated: 09/07/22  1212     Blood Culture, Routine No Growth to date      No Growth to date    Urine culture [[id number]]  (Abnormal)  (Susceptibility) Collected: 09/02/22 1151    Order Status: Completed Specimen: Urine Updated: 09/05/22 0343     Urine Culture, Routine KLEBSIELLA PNEUMONIAE  > 100,000 cfu/ml        ESCHERICHIA COLI  > 100,000 cfu/ml      Narrative:      Specimen Source->Urine              Major depressive disorder  Patient has persistent depression which is mild and is currently controlled. Will Continue anti-depressant medications. We will not consult psychiatry at this time. Patient does display psychosis at this time. Continue to monitor closely and adjust plan of care as needed.          VTE Risk Mitigation (From admission, onward)         Ordered     Place sequential compression device  Until discontinued         09/02/22 1434     IP VTE LOW RISK PATIENT  Once         09/02/22 1434                Discharge Planning   YUDITH: 9/9/2022     Code Status: Full Code   Is the patient medically ready for discharge?:     Reason for patient still in hospital (select all that apply): Pending disposition  Discharge Plan A: Skilled Nursing Facility                  Chantel Humphrey NP  Department of Hospital Medicine   [facility]

## 2022-09-08 NOTE — PT/OT/SLP PROGRESS
Physical Therapy Treatment    Patient Name:  Jhonny Almazan   MRN:  [id number]    Recommendations:     Discharge Recommendations:  nursing facility, skilled   Discharge Equipment Recommendations: none   Barriers to discharge: None    Assessment:     Jhonny Almazan is a 66 y.o. male admitted with a medical diagnosis of UTI (urinary tract infection) due to urinary indwelling catheter.  He presents with the following impairments/functional limitations:  weakness, impaired endurance, impaired self care skills, impaired functional mobility, gait instability, impaired balance, decreased lower extremity function . Awake, alert, supine in bed.  Tolerated treatment. Ambulated 30' with rw and Mod A ( unsteady initially). Seated rest, declined further ambulation due to feeling slightly dizzy. Returned to room , nurse informed.     Rehab Prognosis: Fair; patient would benefit from acute skilled PT services to address these deficits and reach maximum level of function.    Recent Surgery: * No surgery found *      Plan:     During this hospitalization, patient to be seen 6 x/week to address the identified rehab impairments via gait training, therapeutic activities, therapeutic exercises and progress toward the following goals:    Plan of Care Expires:  10/02/22    Subjective     Chief Complaint: weakness  Patient/Family Comments/goals: none stated  Pain/Comfort:  Pain Rating 1: 0/10      Objective:     Communicated with nurse Valdez prior to session.  Patient found supine with bed alarm, olguin catheter, peripheral IV upon PT entry to room.     General Precautions: Standard, contact, fall   Orthopedic Precautions:N/A   Braces: N/A  Respiratory Status: Room air     Functional Mobility:  Bed Mobility:     Rolling Right: contact guard assistance  Supine to Sit: minimum assistance  Transfers:     Sit to Stand:  moderate assistance with rolling walker  Gait: 30' with rw and Mod A.      AM-PAC 6 CLICK MOBILITY           Therapeutic Activities and Exercises:   Transferred EOB with Min A.    Sit <>stand x 2 trials with rw and Mod A with height of bed elevated.   Ambulated slowly with rw and Mod A ( unsteady first few steps improved with distance).    Seated rest, returned to room in chair.     Patient left up in chair with all lines intact, call button in reach, chair alarm on, and nurse José Miguel notified..    GOALS:   Multidisciplinary Problems       Physical Therapy Goals          Problem: Physical Therapy    Goal Priority Disciplines Outcome Goal Variances Interventions   Physical Therapy Goal     PT, PT/OT Ongoing, Progressing     Description: Goals to be met by: 10/3/22     Patient will increase functional independence with mobility by performin. Supine to sit with Contact Guard Assistance  2. Sit to stand transfer with Contact Guard Assistance  3. Bed to chair transfer with Contact Guard Assistance using Rolling Walker  4. Gait  x 200 feet with Contact Guard Assistance using Rolling Walker.   5. Lower extremity exercise program x30 reps per handout, with supervision                         Time Tracking:     PT Received On: 22  PT Start Time: 855     PT Stop Time: 918  PT Total Time (min): 23 min     Billable Minutes: Gait Training 13min and Therapeutic Activity 10min    Treatment Type: Treatment  PT/PTA: PTA     PTA Visit Number: 3     2022

## 2022-09-08 NOTE — PLAN OF CARE
[facility] has auth, per Fela.    SW sent orders, PPD, chest xray, 142 and pasrr, and covid results to [facility] via [employer].     09/08/22 1326   Post-Acute Status   Post-Acute Authorization Placement   Discharge Plan   Discharge Plan A Skilled Nursing Facility

## 2022-09-08 NOTE — SUBJECTIVE & OBJECTIVE
Interval History:  Pt seen and examined.  Good UOP.  No nausea or vomiting.  Reports some indigestion which was relieved with p.r.n. medications.  Continues to work with PT/OT who are recommending SNF.  Case management working to facilitate.  Pt reports having chronic indwelling Gutierrez for several years and is followed by Dr. Antonio per his report.  Antibiotic recommendations per ID are noted.    Review of Systems   Constitutional:  Positive for fatigue. Negative for chills and fever.   Respiratory:  Positive for cough (chronic, unchanged). Negative for shortness of breath.    Gastrointestinal:  Negative for nausea and vomiting.   Genitourinary:  Negative for hematuria.   Objective:     Vital Signs (Most Recent):  Temp: 96 °F (35.6 °C) (09/08/22 0742)  Pulse: 69 (09/08/22 0742)  Resp: 16 (09/08/22 0742)  BP: 126/65 (09/08/22 0742)  SpO2: 98 % (09/08/22 0742) Vital Signs (24h Range):  Temp:  [96 °F (35.6 °C)-97.3 °F (36.3 °C)] 96 °F (35.6 °C)  Pulse:  [64-75] 69  Resp:  [16-18] 16  SpO2:  [93 %-98 %] 98 %  BP: ()/(57-76) 126/65     Weight: 79.5 kg (175 lb 4.3 oz)  Body mass index is 20.78 kg/m².    Intake/Output Summary (Last 24 hours) at 9/8/2022 1048  Last data filed at 9/8/2022 1000  Gross per 24 hour   Intake 720 ml   Output 2900 ml   Net -2180 ml      Physical Exam  Vitals and nursing note reviewed.   Constitutional:       Appearance: Normal appearance.   HENT:      Head: Normocephalic and atraumatic.   Eyes:      Extraocular Movements: Extraocular movements intact.      Conjunctiva/sclera: Conjunctivae normal.      Pupils: Pupils are equal, round, and reactive to light.   Cardiovascular:      Rate and Rhythm: Normal rate and regular rhythm.   Pulmonary:      Effort: Pulmonary effort is normal.      Breath sounds: Normal breath sounds.   Abdominal:      General: Abdomen is flat. Bowel sounds are normal.      Palpations: Abdomen is soft.   Genitourinary:     Comments: Gutierrez with clear yellow urine  draining    Musculoskeletal:      Cervical back: Normal range of motion and neck supple.   Skin:     General: Skin is warm and dry.   Neurological:      General: No focal deficit present.      Mental Status: He is alert. Mental status is at baseline.       Significant Labs: All pertinent labs within the past 24 hours have been reviewed.  CBC:   Recent Labs   Lab 09/07/22 0415 09/08/22  0356   WBC 10.67 6.99   HGB 10.7* 11.5*   HCT 33.2* 35.4*    238     CMP:   Recent Labs   Lab 09/07/22 0415 09/08/22  0356    133*   K 5.1 5.5*    101   CO2 25 24   * 189*   BUN 18 20   CREATININE 0.8 0.9   CALCIUM 10.3 10.6*   ANIONGAP 7* 8       Significant Imaging: I have reviewed all pertinent imaging results/findings within the past 24 hours.

## 2022-09-09 ENCOUNTER — TELEPHONE (OUTPATIENT)
Dept: MEDSURG UNIT | Facility: HOSPITAL | Age: 67
End: 2022-09-09
Payer: MEDICARE

## 2022-09-09 LAB — BACTERIA BLD CULT: NORMAL

## 2022-09-11 LAB — BACTERIA BLD CULT: NORMAL

## 2022-09-15 DIAGNOSIS — E11.9 TYPE 2 DIABETES MELLITUS WITHOUT COMPLICATION: ICD-10-CM

## 2022-09-19 ENCOUNTER — PATIENT MESSAGE (OUTPATIENT)
Dept: ADMINISTRATIVE | Facility: HOSPITAL | Age: 67
End: 2022-09-19
Payer: MEDICARE

## 2022-10-13 ENCOUNTER — DOCUMENT SCAN (OUTPATIENT)
Dept: HOME HEALTH SERVICES | Facility: HOSPITAL | Age: 67
End: 2022-10-13
Payer: MEDICARE

## 2022-11-02 ENCOUNTER — HOSPITAL ENCOUNTER (OUTPATIENT)
Facility: HOSPITAL | Age: 67
Discharge: NURSING, REHAB OR LONG-TERM CARE | End: 2022-11-05
Attending: EMERGENCY MEDICINE | Admitting: STUDENT IN AN ORGANIZED HEALTH CARE EDUCATION/TRAINING PROGRAM
Payer: MEDICARE

## 2022-11-02 DIAGNOSIS — F17.210 CIGARETTE NICOTINE DEPENDENCE WITHOUT COMPLICATION: ICD-10-CM

## 2022-11-02 DIAGNOSIS — R07.9 CHEST PAIN: ICD-10-CM

## 2022-11-02 DIAGNOSIS — R33.8 ACUTE URINARY RETENTION: Primary | ICD-10-CM

## 2022-11-02 PROBLEM — Z86.73 HISTORY OF STROKE: Status: ACTIVE | Noted: 2022-11-02

## 2022-11-02 LAB
ANION GAP SERPL CALC-SCNC: 9 MMOL/L (ref 8–16)
BASOPHILS # BLD AUTO: 0.07 K/UL (ref 0–0.2)
BASOPHILS NFR BLD: 1 % (ref 0–1.9)
BUN SERPL-MCNC: 17 MG/DL (ref 8–23)
CALCIUM SERPL-MCNC: 9.2 MG/DL (ref 8.7–10.5)
CHLORIDE SERPL-SCNC: 109 MMOL/L (ref 95–110)
CO2 SERPL-SCNC: 24 MMOL/L (ref 23–29)
CREAT SERPL-MCNC: 0.8 MG/DL (ref 0.5–1.4)
DIFFERENTIAL METHOD: ABNORMAL
EOSINOPHIL # BLD AUTO: 0.3 K/UL (ref 0–0.5)
EOSINOPHIL NFR BLD: 3.7 % (ref 0–8)
ERYTHROCYTE [DISTWIDTH] IN BLOOD BY AUTOMATED COUNT: 14.8 % (ref 11.5–14.5)
EST. GFR  (NO RACE VARIABLE): >60 ML/MIN/1.73 M^2
GLUCOSE SERPL-MCNC: 194 MG/DL (ref 70–110)
HCT VFR BLD AUTO: 39.5 % (ref 40–54)
HGB BLD-MCNC: 12.5 G/DL (ref 14–18)
IMM GRANULOCYTES # BLD AUTO: 0.03 K/UL (ref 0–0.04)
IMM GRANULOCYTES NFR BLD AUTO: 0.4 % (ref 0–0.5)
LYMPHOCYTES # BLD AUTO: 1.7 K/UL (ref 1–4.8)
LYMPHOCYTES NFR BLD: 23.1 % (ref 18–48)
MCH RBC QN AUTO: 28.6 PG (ref 27–31)
MCHC RBC AUTO-ENTMCNC: 31.6 G/DL (ref 32–36)
MCV RBC AUTO: 90 FL (ref 82–98)
MONOCYTES # BLD AUTO: 0.2 K/UL (ref 0.3–1)
MONOCYTES NFR BLD: 3 % (ref 4–15)
NEUTROPHILS # BLD AUTO: 5 K/UL (ref 1.8–7.7)
NEUTROPHILS NFR BLD: 68.8 % (ref 38–73)
NRBC BLD-RTO: 0 /100 WBC
PLATELET # BLD AUTO: 178 K/UL (ref 150–450)
PMV BLD AUTO: 9.4 FL (ref 9.2–12.9)
POCT GLUCOSE: 120 MG/DL (ref 70–110)
POTASSIUM SERPL-SCNC: 4 MMOL/L (ref 3.5–5.1)
RBC # BLD AUTO: 4.37 M/UL (ref 4.6–6.2)
SODIUM SERPL-SCNC: 142 MMOL/L (ref 136–145)
WBC # BLD AUTO: 7.28 K/UL (ref 3.9–12.7)

## 2022-11-02 PROCEDURE — 82962 GLUCOSE BLOOD TEST: CPT

## 2022-11-02 PROCEDURE — 36415 COLL VENOUS BLD VENIPUNCTURE: CPT | Performed by: EMERGENCY MEDICINE

## 2022-11-02 PROCEDURE — G0378 HOSPITAL OBSERVATION PER HR: HCPCS

## 2022-11-02 PROCEDURE — 80048 BASIC METABOLIC PNL TOTAL CA: CPT | Performed by: EMERGENCY MEDICINE

## 2022-11-02 PROCEDURE — 99285 EMERGENCY DEPT VISIT HI MDM: CPT | Mod: 25,CS

## 2022-11-02 PROCEDURE — 96375 TX/PRO/DX INJ NEW DRUG ADDON: CPT

## 2022-11-02 PROCEDURE — 51798 US URINE CAPACITY MEASURE: CPT

## 2022-11-02 PROCEDURE — 63600175 PHARM REV CODE 636 W HCPCS: Performed by: EMERGENCY MEDICINE

## 2022-11-02 PROCEDURE — 96374 THER/PROPH/DIAG INJ IV PUSH: CPT

## 2022-11-02 PROCEDURE — 85025 COMPLETE CBC W/AUTO DIFF WBC: CPT | Performed by: EMERGENCY MEDICINE

## 2022-11-02 PROCEDURE — 63600175 PHARM REV CODE 636 W HCPCS: Performed by: NURSE PRACTITIONER

## 2022-11-02 RX ORDER — SODIUM,POTASSIUM PHOSPHATES 280-250MG
2 POWDER IN PACKET (EA) ORAL
Status: DISCONTINUED | OUTPATIENT
Start: 2022-11-02 | End: 2022-11-03

## 2022-11-02 RX ORDER — QUETIAPINE FUMARATE 25 MG/1
25 TABLET, FILM COATED ORAL NIGHTLY
Status: DISCONTINUED | OUTPATIENT
Start: 2022-11-03 | End: 2022-11-05 | Stop reason: HOSPADM

## 2022-11-02 RX ORDER — ONDANSETRON 2 MG/ML
8 INJECTION INTRAMUSCULAR; INTRAVENOUS EVERY 6 HOURS PRN
Status: DISCONTINUED | OUTPATIENT
Start: 2022-11-02 | End: 2022-11-05 | Stop reason: HOSPADM

## 2022-11-02 RX ORDER — MORPHINE SULFATE 4 MG/ML
4 INJECTION, SOLUTION INTRAMUSCULAR; INTRAVENOUS EVERY 4 HOURS PRN
Status: DISCONTINUED | OUTPATIENT
Start: 2022-11-02 | End: 2022-11-03

## 2022-11-02 RX ORDER — MAG HYDROX/ALUMINUM HYD/SIMETH 200-200-20
30 SUSPENSION, ORAL (FINAL DOSE FORM) ORAL 4 TIMES DAILY PRN
Status: DISCONTINUED | OUTPATIENT
Start: 2022-11-02 | End: 2022-11-05 | Stop reason: HOSPADM

## 2022-11-02 RX ORDER — ATORVASTATIN CALCIUM 40 MG/1
40 TABLET, FILM COATED ORAL DAILY
Status: DISCONTINUED | OUTPATIENT
Start: 2022-11-03 | End: 2022-11-05 | Stop reason: HOSPADM

## 2022-11-02 RX ORDER — INSULIN ASPART 100 [IU]/ML
1-10 INJECTION, SOLUTION INTRAVENOUS; SUBCUTANEOUS
Status: DISCONTINUED | OUTPATIENT
Start: 2022-11-02 | End: 2022-11-05 | Stop reason: HOSPADM

## 2022-11-02 RX ORDER — MORPHINE SULFATE 2 MG/ML
2 INJECTION, SOLUTION INTRAMUSCULAR; INTRAVENOUS EVERY 4 HOURS PRN
Status: DISCONTINUED | OUTPATIENT
Start: 2022-11-02 | End: 2022-11-03

## 2022-11-02 RX ORDER — HYOSCYAMINE SULFATE 0.12 MG/1
0.12 TABLET SUBLINGUAL EVERY 4 HOURS PRN
Status: DISCONTINUED | OUTPATIENT
Start: 2022-11-02 | End: 2022-11-05 | Stop reason: HOSPADM

## 2022-11-02 RX ORDER — LOSARTAN POTASSIUM 25 MG/1
25 TABLET ORAL DAILY
Status: DISCONTINUED | OUTPATIENT
Start: 2022-11-03 | End: 2022-11-05 | Stop reason: HOSPADM

## 2022-11-02 RX ORDER — FINASTERIDE 5 MG/1
5 TABLET, FILM COATED ORAL DAILY
Status: DISCONTINUED | OUTPATIENT
Start: 2022-11-03 | End: 2022-11-05 | Stop reason: HOSPADM

## 2022-11-02 RX ORDER — ACETAMINOPHEN 325 MG/1
650 TABLET ORAL EVERY 4 HOURS PRN
Status: DISCONTINUED | OUTPATIENT
Start: 2022-11-02 | End: 2022-11-05 | Stop reason: HOSPADM

## 2022-11-02 RX ORDER — ASPIRIN 81 MG/1
81 TABLET ORAL DAILY
Status: DISCONTINUED | OUTPATIENT
Start: 2022-11-03 | End: 2022-11-05 | Stop reason: HOSPADM

## 2022-11-02 RX ORDER — SODIUM CHLORIDE 0.9 % (FLUSH) 0.9 %
3 SYRINGE (ML) INJECTION EVERY 12 HOURS PRN
Status: DISCONTINUED | OUTPATIENT
Start: 2022-11-02 | End: 2022-11-05 | Stop reason: HOSPADM

## 2022-11-02 RX ORDER — PANTOPRAZOLE SODIUM 40 MG/1
40 TABLET, DELAYED RELEASE ORAL DAILY
Status: DISCONTINUED | OUTPATIENT
Start: 2022-11-03 | End: 2022-11-05 | Stop reason: HOSPADM

## 2022-11-02 RX ORDER — IBUPROFEN 200 MG
24 TABLET ORAL
Status: DISCONTINUED | OUTPATIENT
Start: 2022-11-02 | End: 2022-11-05 | Stop reason: HOSPADM

## 2022-11-02 RX ORDER — PROCHLORPERAZINE EDISYLATE 5 MG/ML
5 INJECTION INTRAMUSCULAR; INTRAVENOUS EVERY 6 HOURS PRN
Status: DISCONTINUED | OUTPATIENT
Start: 2022-11-02 | End: 2022-11-05 | Stop reason: HOSPADM

## 2022-11-02 RX ORDER — TALC
6 POWDER (GRAM) TOPICAL NIGHTLY PRN
Status: DISCONTINUED | OUTPATIENT
Start: 2022-11-03 | End: 2022-11-02

## 2022-11-02 RX ORDER — TALC
9 POWDER (GRAM) TOPICAL NIGHTLY PRN
Status: DISCONTINUED | OUTPATIENT
Start: 2022-11-02 | End: 2022-11-05 | Stop reason: HOSPADM

## 2022-11-02 RX ORDER — CLOPIDOGREL BISULFATE 75 MG/1
75 TABLET ORAL DAILY
Status: DISCONTINUED | OUTPATIENT
Start: 2022-11-03 | End: 2022-11-03

## 2022-11-02 RX ORDER — NALOXONE HCL 0.4 MG/ML
0.02 VIAL (ML) INJECTION
Status: DISCONTINUED | OUTPATIENT
Start: 2022-11-02 | End: 2022-11-05 | Stop reason: HOSPADM

## 2022-11-02 RX ORDER — GABAPENTIN 300 MG/1
300 CAPSULE ORAL 2 TIMES DAILY
Status: DISCONTINUED | OUTPATIENT
Start: 2022-11-03 | End: 2022-11-05 | Stop reason: HOSPADM

## 2022-11-02 RX ORDER — SIMETHICONE 80 MG
1 TABLET,CHEWABLE ORAL 4 TIMES DAILY PRN
Status: DISCONTINUED | OUTPATIENT
Start: 2022-11-02 | End: 2022-11-05 | Stop reason: HOSPADM

## 2022-11-02 RX ORDER — AMOXICILLIN 250 MG
1 CAPSULE ORAL 2 TIMES DAILY
Status: DISCONTINUED | OUTPATIENT
Start: 2022-11-02 | End: 2022-11-05 | Stop reason: HOSPADM

## 2022-11-02 RX ORDER — MECLIZINE HYDROCHLORIDE 25 MG/1
25 TABLET ORAL 3 TIMES DAILY PRN
Status: DISCONTINUED | OUTPATIENT
Start: 2022-11-03 | End: 2022-11-05 | Stop reason: HOSPADM

## 2022-11-02 RX ORDER — ONDANSETRON 2 MG/ML
4 INJECTION INTRAMUSCULAR; INTRAVENOUS
Status: COMPLETED | OUTPATIENT
Start: 2022-11-02 | End: 2022-11-02

## 2022-11-02 RX ORDER — IBUPROFEN 200 MG
16 TABLET ORAL
Status: DISCONTINUED | OUTPATIENT
Start: 2022-11-02 | End: 2022-11-05 | Stop reason: HOSPADM

## 2022-11-02 RX ORDER — LANOLIN ALCOHOL/MO/W.PET/CERES
800 CREAM (GRAM) TOPICAL
Status: DISCONTINUED | OUTPATIENT
Start: 2022-11-02 | End: 2022-11-03

## 2022-11-02 RX ORDER — ACETAMINOPHEN 325 MG/1
650 TABLET ORAL EVERY 8 HOURS PRN
Status: DISCONTINUED | OUTPATIENT
Start: 2022-11-02 | End: 2022-11-03

## 2022-11-02 RX ORDER — GLUCAGON 1 MG
1 KIT INJECTION
Status: DISCONTINUED | OUTPATIENT
Start: 2022-11-02 | End: 2022-11-05 | Stop reason: HOSPADM

## 2022-11-02 RX ORDER — BUPROPION HYDROCHLORIDE 150 MG/1
450 TABLET ORAL DAILY
Status: DISCONTINUED | OUTPATIENT
Start: 2022-11-03 | End: 2022-11-05 | Stop reason: HOSPADM

## 2022-11-02 RX ADMIN — MORPHINE SULFATE 4 MG: 4 INJECTION INTRAVENOUS at 11:11

## 2022-11-02 RX ADMIN — ONDANSETRON 4 MG: 2 SOLUTION INTRAMUSCULAR; INTRAVENOUS at 09:11

## 2022-11-02 NOTE — ED PROVIDER NOTES
"Encounter Date: 11/2/2022    SCRIBE #1 NOTE: I, Glenna Hernández, am scribing for, and in the presence of,  Roger Stroud MD.     History     Chief Complaint   Patient presents with    Urinary Retention     Had an old olguin taken out at [facility] and new one reinserted. No urine output so that catheter was taken out and unable to reinsert new catheter.      Time seen by provider: 6:28 PM on 11/02/2022    Jhonny Almazan is a 66 y.o. male with a PMHx of HTN, DM II, and urinary retention who presents to the ED via EMS from [facility] for evaluation of urinary retention in addition to a catheter problem since earlier today.  Patient had an 8 week catheter removed without incident earlier today but facility had difficulty inserting the new one.  Reports they placed catheter and left in it for an hour but no drainage occure. He states pain occurred when inflating the catheter balloon and was "excruciating stabbing pain" per patient. Per patient they attempted a second time with similar results therefore removed the catheter and sent patient to ER. Since this incident the patient reports mild penile painwith some bloody penile drainage.  Pt has not urinated since the incident which occurred approximately 1 hour PTA. The patient denies any other symptoms at this time.  PSHx includes cystoscopy.      The history is provided by the patient.   Review of patient's allergies indicates:  No Known Allergies  Past Medical History:   Diagnosis Date    Anticoagulant long-term use     Arthritis     Colon polyp     Diabetes mellitus     Diabetes mellitus, type 2     Fatty liver     Hypertension     Major depressive disorder 11/17/2020    -Continue Wellbutrin    PEG (percutaneous endoscopic gastrostomy) adjustment/replacement/removal 1/6/2021    Stroke 11/2020    left sided weakness    Urinary retention 9/7/2021     Past Surgical History:   Procedure Laterality Date    BACK SURGERY      COLONOSCOPY  07/18/2014    Dr." Ginger: 22 colon polyps removed, hemorrhoids, erythema to sigmoid, repeat in 1 year for surveillance; biopsy: sigmoid erythema- showed unremarkable colonic mucosa, tubular adenomas and hyperplastic polyps    COLONOSCOPY N/A 5/3/2019    Procedure: COLONOSCOPY;  Surgeon: Guero Crane MD;  Location: [facility];  Service: Endoscopy;  Laterality: N/A;    COLONOSCOPY N/A 5/6/2019    Procedure: COLONOSCOPY;  Surgeon: Guero Crane MD;  [facility];  Service: Endoscopy;  Laterality: N/A;    CYSTOSCOPY N/A 2/9/2022    Procedure: CYSTOSCOPY;  Surgeon: Jaylen Antonio Jr., MD;  Location: [facility];  Service: Urology;  Laterality: N/A;    EPIDURAL STEROID INJECTION INTO THORACIC SPINE N/A 8/30/2019    Procedure: Injection-steroid-epidural-thoracic;  Surgeon: Frantz Green MD;  Location: [facility];  Service: Pain Management;  Laterality: N/A;  T6-7    ESOPHAGOGASTRODUODENOSCOPY N/A 4/26/2019    Procedure: EGD (ESOPHAGOGASTRODUODENOSCOPY);  Surgeon: Guero Crane MD;  Location: [facility];  Service: Endoscopy;  Laterality: N/A;    ESOPHAGOGASTRODUODENOSCOPY N/A 11/23/2020    Procedure: EGD (ESOPHAGOGASTRODUODENOSCOPY);  Surgeon: Guero Crane MD;  Location: [facility];  Service: Endoscopy;  Laterality: N/A;    ESOPHAGOGASTRODUODENOSCOPY N/A 1/6/2021    Procedure: EGD (ESOPHAGOGASTRODUODENOSCOPY);  Surgeon: Guero Crane MD;  Location: [facility];  Service: Endoscopy;  Laterality: N/A;    HERNIA REPAIR      TRANSRECTAL ULTRASOUND EXAMINATION N/A 2/9/2022    Procedure: ULTRASOUND, RECTAL APPROACH;  Surgeon: Jaylen Antonio Jr., MD;  Location: [facility] OR;  Service: Urology;  Laterality: N/A;  must text son miroslava, times and instructions given leave at 130     Family History   Problem Relation Age of Onset    Heart disease Mother     Heart disease Father     Diabetes Brother     Suicide Brother     Brain cancer Brother     Colon cancer Neg Hx     Crohn's disease Neg Hx     Ulcerative colitis Neg Hx     Stomach cancer Neg  Hx     Esophageal cancer Neg Hx     Glaucoma Neg Hx     Retinal detachment Neg Hx     Macular degeneration Neg Hx      Social History     Tobacco Use    Smoking status: Former     Packs/day: 1.00     Years: 50.00     Pack years: 50.00     Types: Cigarettes    Smokeless tobacco: Never   Substance Use Topics    Alcohol use: Yes     Alcohol/week: 14.0 standard drinks     Types: 14 Cans of beer per week     Comment: 2-3 beers daily    Drug use: No     Review of Systems   Constitutional:  Negative for activity change, diaphoresis and fever.   HENT:  Negative for drooling, rhinorrhea, sore throat and trouble swallowing.    Eyes:  Negative for pain and visual disturbance.   Respiratory:  Negative for cough, shortness of breath and stridor.    Cardiovascular:  Negative for chest pain and leg swelling.   Gastrointestinal:  Negative for abdominal distention, abdominal pain, constipation and vomiting.   Genitourinary:  Positive for difficulty urinating, penile discharge (bloody) and penile pain. Negative for dysuria and hematuria.   Musculoskeletal:  Negative for gait problem.   Skin:  Negative for rash.   Neurological:  Negative for seizures, facial asymmetry and headaches.   Psychiatric/Behavioral:  Negative for hallucinations and suicidal ideas.      Physical Exam     Initial Vitals [11/02/22 1807]   BP Pulse Resp Temp SpO2   (!) 191/81 64 18 98.3 °F (36.8 °C) 96 %      MAP       --         Physical Exam    Nursing note and vitals reviewed.  Constitutional: He appears well-developed. No distress.   HENT:   Head: Normocephalic and atraumatic.   Nose: Nose normal.   Eyes: EOM are normal.   Neck: Neck supple. No tracheal deviation present. No JVD present.   Cardiovascular:  Normal rate, regular rhythm, normal heart sounds and intact distal pulses.     Exam reveals no gallop and no friction rub.       No murmur heard.  Pulmonary/Chest: Breath sounds normal. No respiratory distress. He has no wheezes. He has no rhonchi. He has  no rales.   Abdominal: Abdomen is soft. Bowel sounds are normal. He exhibits no distension. There is abdominal tenderness (minimal suprapubic ttp). There is no rebound and no guarding.   Genitourinary:    Genitourinary Comments: Blood at the urethral meatus.      Musculoskeletal:         General: Normal range of motion.      Cervical back: Neck supple.     Neurological: He is alert and oriented to person, place, and time. He has normal strength. No cranial nerve deficit or sensory deficit.   Skin: Skin is warm and dry. Capillary refill takes less than 2 seconds. No rash noted.   Psychiatric: He has a normal mood and affect.       ED Course   Procedures  Labs Reviewed   BASIC METABOLIC PANEL - Abnormal; Notable for the following components:       Result Value    Glucose 194 (*)     All other components within normal limits   CBC W/ AUTO DIFFERENTIAL - Abnormal; Notable for the following components:    RBC 4.37 (*)     Hemoglobin 12.5 (*)     Hematocrit 39.5 (*)     MCHC 31.6 (*)     RDW 14.8 (*)     Mono # 0.2 (*)     Mono % 3.0 (*)     All other components within normal limits   POCT GLUCOSE - Abnormal; Notable for the following components:    POCT Glucose 120 (*)     All other components within normal limits          Imaging Results    None          Medications   sodium chloride 0.9% flush 3 mL (has no administration in time range)   melatonin tablet 9 mg (has no administration in time range)   ondansetron injection 8 mg (has no administration in time range)   simethicone chewable tablet 80 mg (has no administration in time range)   aluminum-magnesium hydroxide-simethicone 200-200-20 mg/5 mL suspension 30 mL (has no administration in time range)   acetaminophen tablet 650 mg (has no administration in time range)   naloxone 0.4 mg/mL injection 0.02 mg (has no administration in time range)   glucose chewable tablet 16 g (has no administration in time range)   glucose chewable tablet 24 g (has no administration in  time range)   glucagon (human recombinant) injection 1 mg (has no administration in time range)   dextrose 10% bolus 125 mL (has no administration in time range)   dextrose 10% bolus 250 mL (has no administration in time range)   senna-docusate 8.6-50 mg per tablet 1 tablet (1 tablet Oral Given 11/3/22 0805)   prochlorperazine injection Soln 5 mg (has no administration in time range)   insulin aspart U-100 pen 1-10 Units (2 Units Subcutaneous Given 11/3/22 0806)   hyoscyamine ODT 0.125 mg (has no administration in time range)   QUEtiapine tablet 25 mg (has no administration in time range)   gabapentin capsule 300 mg (300 mg Oral Given 11/3/22 0805)   aspirin EC tablet 81 mg (81 mg Oral Given 11/3/22 0806)   buPROPion TB24 tablet 450 mg (450 mg Oral Given 11/3/22 0813)   finasteride tablet 5 mg (5 mg Oral Given 11/3/22 0806)   losartan tablet 25 mg (25 mg Oral Given 11/3/22 0805)   atorvastatin tablet 40 mg (40 mg Oral Given 11/3/22 0805)   pantoprazole EC tablet 40 mg (40 mg Oral Given 11/3/22 0806)   insulin detemir U-100 pen 10 Units (has no administration in time range)   meclizine tablet 25 mg (has no administration in time range)   LIDOcaine HCl 2% urojet (has no administration in time range)   meropenem-0.9% sodium chloride 1 g/50 mL IVPB (has no administration in time range)   clopidogreL tablet 75 mg (has no administration in time range)   polyethylene glycol packet 17 g (has no administration in time range)   ondansetron injection 4 mg (4 mg Intravenous Given 11/2/22 2150)   HYDROmorphone injection 1 mg (1 mg Intravenous Given 11/3/22 0047)   LIDOcaine HCl 2% urojet ( Urethral Given 11/3/22 0239)     Medical Decision Making:   History:   Old Medical Records: I decided to obtain old medical records.  Clinical Tests:   Lab Tests: Ordered and Reviewed  Radiological Study: Ordered and Reviewed  ED Management:  65 yo M pmhx of CVA and chronic indwelling olguin pw with urinary retention after failed olguin  replacement at NH. Bladder scan done and showed less than 300cc. Discussed case with on call urologist, Dr. Daniels, who recommends attempting urinary catheter placement. Failed olguin therefore attempted with coude catheter 16 and 18 which both also failed and subsequent attempts with latex catheter refused by patient. Discussed again with Urology who recommends admission and make NPO at midnight for further management and potentially OR. Pt accepted by hospital medicine for further management.         Scribe Attestation:   Scribe #1: I performed the above scribed service and the documentation accurately describes the services I performed. I attest to the accuracy of the note.                   Attending Attestation:     Physician Attestation for Scribe:    I, Dr. Roger Stroud, personally performed the services described in this documentation.   All medical record entries made by the scribe were at my direction and in my presence.   I have reviewed the chart and agree that the record is accurate and complete.   Roger Stroud MD  1:01 AM 11/03/2022     DISCLAIMER: This note was prepared with [employer] Naturally Speaking voice recognition transcription software. Garbled syntax, mangled pronouns, and other bizarre constructions may be attributed to that software system.      Clinical Impression:   Final diagnoses:  [R33.8] Acute urinary retention (Primary)      ED Disposition Condition    Observation Stable                Roger Stroud MD  11/03/22 1003

## 2022-11-02 NOTE — ED NOTES
Pt presents tot he ED per EMS with c/o hematuria and bladder pain. Pt reports that he was getting his catheter changed out and when the new one was inserted, he began seeing blood in his urine and had sharp pain.

## 2022-11-03 ENCOUNTER — PATIENT MESSAGE (OUTPATIENT)
Dept: ADMINISTRATIVE | Facility: HOSPITAL | Age: 67
End: 2022-11-03
Payer: MEDICARE

## 2022-11-03 PROBLEM — Z71.89 ACP (ADVANCE CARE PLANNING): Status: ACTIVE | Noted: 2022-11-03

## 2022-11-03 LAB
ALBUMIN SERPL BCP-MCNC: 3.5 G/DL (ref 3.5–5.2)
ALP SERPL-CCNC: 86 U/L (ref 55–135)
ALT SERPL W/O P-5'-P-CCNC: 27 U/L (ref 10–44)
ANION GAP SERPL CALC-SCNC: 10 MMOL/L (ref 8–16)
AST SERPL-CCNC: 16 U/L (ref 10–40)
BACTERIA #/AREA URNS HPF: ABNORMAL /HPF
BASOPHILS # BLD AUTO: 0.06 K/UL (ref 0–0.2)
BASOPHILS NFR BLD: 0.5 % (ref 0–1.9)
BILIRUB SERPL-MCNC: 0.6 MG/DL (ref 0.1–1)
BILIRUB UR QL STRIP: NEGATIVE
BUN SERPL-MCNC: 19 MG/DL (ref 8–23)
CALCIUM SERPL-MCNC: 9.4 MG/DL (ref 8.7–10.5)
CHLORIDE SERPL-SCNC: 105 MMOL/L (ref 95–110)
CLARITY UR: ABNORMAL
CO2 SERPL-SCNC: 25 MMOL/L (ref 23–29)
COLOR UR: ABNORMAL
CREAT SERPL-MCNC: 0.9 MG/DL (ref 0.5–1.4)
DIFFERENTIAL METHOD: ABNORMAL
EOSINOPHIL # BLD AUTO: 0 K/UL (ref 0–0.5)
EOSINOPHIL NFR BLD: 0.2 % (ref 0–8)
ERYTHROCYTE [DISTWIDTH] IN BLOOD BY AUTOMATED COUNT: 15 % (ref 11.5–14.5)
EST. GFR  (NO RACE VARIABLE): >60 ML/MIN/1.73 M^2
GLUCOSE SERPL-MCNC: 216 MG/DL (ref 70–110)
GLUCOSE UR QL STRIP: NEGATIVE
HCT VFR BLD AUTO: 39.2 % (ref 40–54)
HGB BLD-MCNC: 12.8 G/DL (ref 14–18)
HGB UR QL STRIP: ABNORMAL
IMM GRANULOCYTES # BLD AUTO: 0.05 K/UL (ref 0–0.04)
IMM GRANULOCYTES NFR BLD AUTO: 0.4 % (ref 0–0.5)
KETONES UR QL STRIP: NEGATIVE
LEUKOCYTE ESTERASE UR QL STRIP: ABNORMAL
LYMPHOCYTES # BLD AUTO: 0.9 K/UL (ref 1–4.8)
LYMPHOCYTES NFR BLD: 6.7 % (ref 18–48)
MAGNESIUM SERPL-MCNC: 1.7 MG/DL (ref 1.6–2.6)
MCH RBC QN AUTO: 28.5 PG (ref 27–31)
MCHC RBC AUTO-ENTMCNC: 32.7 G/DL (ref 32–36)
MCV RBC AUTO: 87 FL (ref 82–98)
MICROSCOPIC COMMENT: ABNORMAL
MONOCYTES # BLD AUTO: 0.9 K/UL (ref 0.3–1)
MONOCYTES NFR BLD: 6.6 % (ref 4–15)
NEUTROPHILS # BLD AUTO: 11.1 K/UL (ref 1.8–7.7)
NEUTROPHILS NFR BLD: 85.6 % (ref 38–73)
NITRITE UR QL STRIP: POSITIVE
NRBC BLD-RTO: 0 /100 WBC
PH UR STRIP: 6 [PH] (ref 5–8)
PHOSPHATE SERPL-MCNC: 2.9 MG/DL (ref 2.7–4.5)
PLATELET # BLD AUTO: 194 K/UL (ref 150–450)
PMV BLD AUTO: 9.3 FL (ref 9.2–12.9)
POCT GLUCOSE: 177 MG/DL (ref 70–110)
POCT GLUCOSE: 186 MG/DL (ref 70–110)
POTASSIUM SERPL-SCNC: 3.8 MMOL/L (ref 3.5–5.1)
PROT SERPL-MCNC: 6.6 G/DL (ref 6–8.4)
PROT UR QL STRIP: ABNORMAL
RBC # BLD AUTO: 4.49 M/UL (ref 4.6–6.2)
RBC #/AREA URNS HPF: >100 /HPF (ref 0–4)
SODIUM SERPL-SCNC: 140 MMOL/L (ref 136–145)
SP GR UR STRIP: 1.01 (ref 1–1.03)
SQUAMOUS #/AREA URNS HPF: 2 /HPF
URN SPEC COLLECT METH UR: ABNORMAL
UROBILINOGEN UR STRIP-ACNC: NEGATIVE EU/DL
WBC # BLD AUTO: 12.93 K/UL (ref 3.9–12.7)
WBC #/AREA URNS HPF: 11 /HPF (ref 0–5)

## 2022-11-03 PROCEDURE — 80053 COMPREHEN METABOLIC PANEL: CPT | Performed by: NURSE PRACTITIONER

## 2022-11-03 PROCEDURE — 81000 URINALYSIS NONAUTO W/SCOPE: CPT | Performed by: EMERGENCY MEDICINE

## 2022-11-03 PROCEDURE — 85025 COMPLETE CBC W/AUTO DIFF WBC: CPT | Performed by: NURSE PRACTITIONER

## 2022-11-03 PROCEDURE — 52000 PR CYSTOURETHROSCOPY: ICD-10-PCS | Mod: ,,, | Performed by: UROLOGY

## 2022-11-03 PROCEDURE — 99214 PR OFFICE/OUTPT VISIT, EST, LEVL IV, 30-39 MIN: ICD-10-PCS | Mod: 25,,, | Performed by: UROLOGY

## 2022-11-03 PROCEDURE — 63600175 PHARM REV CODE 636 W HCPCS: Performed by: UROLOGY

## 2022-11-03 PROCEDURE — 51798 US URINE CAPACITY MEASURE: CPT

## 2022-11-03 PROCEDURE — 25000003 PHARM REV CODE 250: Performed by: UROLOGY

## 2022-11-03 PROCEDURE — 63600175 PHARM REV CODE 636 W HCPCS: Performed by: NURSE PRACTITIONER

## 2022-11-03 PROCEDURE — 96365 THER/PROPH/DIAG IV INF INIT: CPT

## 2022-11-03 PROCEDURE — 83735 ASSAY OF MAGNESIUM: CPT | Performed by: NURSE PRACTITIONER

## 2022-11-03 PROCEDURE — 96376 TX/PRO/DX INJ SAME DRUG ADON: CPT

## 2022-11-03 PROCEDURE — 96367 TX/PROPH/DG ADDL SEQ IV INF: CPT

## 2022-11-03 PROCEDURE — G0378 HOSPITAL OBSERVATION PER HR: HCPCS

## 2022-11-03 PROCEDURE — 87088 URINE BACTERIA CULTURE: CPT | Performed by: EMERGENCY MEDICINE

## 2022-11-03 PROCEDURE — 99214 OFFICE O/P EST MOD 30 MIN: CPT | Mod: 25,,, | Performed by: UROLOGY

## 2022-11-03 PROCEDURE — 87077 CULTURE AEROBIC IDENTIFY: CPT | Performed by: EMERGENCY MEDICINE

## 2022-11-03 PROCEDURE — 87086 URINE CULTURE/COLONY COUNT: CPT | Performed by: EMERGENCY MEDICINE

## 2022-11-03 PROCEDURE — 96372 THER/PROPH/DIAG INJ SC/IM: CPT | Performed by: NURSE PRACTITIONER

## 2022-11-03 PROCEDURE — 87186 SC STD MICRODIL/AGAR DIL: CPT | Mod: 59 | Performed by: EMERGENCY MEDICINE

## 2022-11-03 PROCEDURE — 96375 TX/PRO/DX INJ NEW DRUG ADDON: CPT

## 2022-11-03 PROCEDURE — 52000 CYSTOURETHROSCOPY: CPT | Mod: ,,, | Performed by: UROLOGY

## 2022-11-03 PROCEDURE — 36415 COLL VENOUS BLD VENIPUNCTURE: CPT | Performed by: NURSE PRACTITIONER

## 2022-11-03 PROCEDURE — 25000003 PHARM REV CODE 250: Performed by: NURSE PRACTITIONER

## 2022-11-03 PROCEDURE — 96366 THER/PROPH/DIAG IV INF ADDON: CPT

## 2022-11-03 PROCEDURE — 84100 ASSAY OF PHOSPHORUS: CPT | Performed by: NURSE PRACTITIONER

## 2022-11-03 PROCEDURE — 63600175 PHARM REV CODE 636 W HCPCS: Performed by: STUDENT IN AN ORGANIZED HEALTH CARE EDUCATION/TRAINING PROGRAM

## 2022-11-03 RX ORDER — MEROPENEM AND SODIUM CHLORIDE 1 G/50ML
1 INJECTION, SOLUTION INTRAVENOUS
Status: DISCONTINUED | OUTPATIENT
Start: 2022-11-03 | End: 2022-11-05 | Stop reason: HOSPADM

## 2022-11-03 RX ORDER — POLYETHYLENE GLYCOL 3350 17 G/17G
17 POWDER, FOR SOLUTION ORAL 2 TIMES DAILY
Status: DISCONTINUED | OUTPATIENT
Start: 2022-11-03 | End: 2022-11-05 | Stop reason: HOSPADM

## 2022-11-03 RX ORDER — LIDOCAINE HYDROCHLORIDE 20 MG/ML
JELLY TOPICAL ONCE AS NEEDED
Status: DISCONTINUED | OUTPATIENT
Start: 2022-11-03 | End: 2022-11-05 | Stop reason: HOSPADM

## 2022-11-03 RX ORDER — HYDROMORPHONE HYDROCHLORIDE 1 MG/ML
1 INJECTION, SOLUTION INTRAMUSCULAR; INTRAVENOUS; SUBCUTANEOUS ONCE
Status: COMPLETED | OUTPATIENT
Start: 2022-11-03 | End: 2022-11-03

## 2022-11-03 RX ORDER — LIDOCAINE HYDROCHLORIDE 20 MG/ML
JELLY TOPICAL ONCE
Status: COMPLETED | OUTPATIENT
Start: 2022-11-03 | End: 2022-11-03

## 2022-11-03 RX ORDER — CLOPIDOGREL BISULFATE 75 MG/1
75 TABLET ORAL DAILY
Status: DISCONTINUED | OUTPATIENT
Start: 2022-11-04 | End: 2022-11-05 | Stop reason: HOSPADM

## 2022-11-03 RX ADMIN — LOSARTAN POTASSIUM 25 MG: 25 TABLET, FILM COATED ORAL at 08:11

## 2022-11-03 RX ADMIN — SENNOSIDES AND DOCUSATE SODIUM 1 TABLET: 50; 8.6 TABLET ORAL at 09:11

## 2022-11-03 RX ADMIN — MEROPENEM AND SODIUM CHLORIDE 1 G: 1 INJECTION, SOLUTION INTRAVENOUS at 11:11

## 2022-11-03 RX ADMIN — ASPIRIN 81 MG: 81 TABLET, COATED ORAL at 08:11

## 2022-11-03 RX ADMIN — HYDROMORPHONE HYDROCHLORIDE 1 MG: 1 INJECTION, SOLUTION INTRAMUSCULAR; INTRAVENOUS; SUBCUTANEOUS at 12:11

## 2022-11-03 RX ADMIN — INSULIN ASPART 2 UNITS: 100 INJECTION, SOLUTION INTRAVENOUS; SUBCUTANEOUS at 05:11

## 2022-11-03 RX ADMIN — CLOPIDOGREL BISULFATE 75 MG: 75 TABLET ORAL at 08:11

## 2022-11-03 RX ADMIN — GABAPENTIN 300 MG: 300 CAPSULE ORAL at 08:11

## 2022-11-03 RX ADMIN — ATORVASTATIN CALCIUM 40 MG: 40 TABLET, FILM COATED ORAL at 08:11

## 2022-11-03 RX ADMIN — FINASTERIDE 5 MG: 5 TABLET, FILM COATED ORAL at 08:11

## 2022-11-03 RX ADMIN — MEROPENEM AND SODIUM CHLORIDE 1 G: 1 INJECTION, SOLUTION INTRAVENOUS at 05:11

## 2022-11-03 RX ADMIN — CEFTRIAXONE 1 G: 1 INJECTION, SOLUTION INTRAVENOUS at 03:11

## 2022-11-03 RX ADMIN — PANTOPRAZOLE SODIUM 40 MG: 40 TABLET, DELAYED RELEASE ORAL at 08:11

## 2022-11-03 RX ADMIN — LIDOCAINE HYDROCHLORIDE: 20 JELLY TOPICAL at 02:11

## 2022-11-03 RX ADMIN — QUETIAPINE FUMARATE 25 MG: 25 TABLET ORAL at 09:11

## 2022-11-03 RX ADMIN — INSULIN ASPART 1 UNITS: 100 INJECTION, SOLUTION INTRAVENOUS; SUBCUTANEOUS at 09:11

## 2022-11-03 RX ADMIN — INSULIN DETEMIR 10 UNITS: 100 INJECTION, SOLUTION SUBCUTANEOUS at 09:11

## 2022-11-03 RX ADMIN — GABAPENTIN 300 MG: 300 CAPSULE ORAL at 09:11

## 2022-11-03 RX ADMIN — BUPROPION HYDROCHLORIDE 450 MG: 150 TABLET, FILM COATED, EXTENDED RELEASE ORAL at 08:11

## 2022-11-03 RX ADMIN — SENNOSIDES AND DOCUSATE SODIUM 1 TABLET: 50; 8.6 TABLET ORAL at 08:11

## 2022-11-03 RX ADMIN — ONDANSETRON 8 MG: 2 SOLUTION INTRAMUSCULAR; INTRAVENOUS at 09:11

## 2022-11-03 RX ADMIN — INSULIN ASPART 2 UNITS: 100 INJECTION, SOLUTION INTRAVENOUS; SUBCUTANEOUS at 08:11

## 2022-11-03 NOTE — ED NOTES
Pt alerted staff he had an episode of urinary incontinence. Pt wiped down and sheets changed. Warm blankets applied to pt. Post void residual bladder scan indicated 340ml of urine in pt's bladder. Unable to collect urine at this time.

## 2022-11-03 NOTE — ED NOTES
Per urology recommendations attempted to place 16 f cauda olguin with no success. Olguin does not advance more than 7 or 8 in and no urine returned. Also attempted 18 f cauda olguin with no success. All done using sterile procedure. Pt declined to allow nurse to try 14 f latex.

## 2022-11-03 NOTE — PLAN OF CARE
[facility]  Initial Discharge Assessment       Primary Care Provider: Joey Whitehead MD    Admission Diagnosis: Chest pain [R07.9]  Acute urinary retention [R33.8]    Admission Date: 11/2/2022  Expected Discharge Date:  The pt was recently admitted at [facility] 9/8/22-9/12/22 and discharged to [facility]. The pt previously lived at home with his son Gurdeep [phone]. [facility] and a home Cane, WC, Cane and BSE. His PCP is Dr. Whitehead and he has Medicare A/B insurance. The plan is for him to return to [facility]. Updated on observation moon letter.     Discharge Barriers Identified: None    Payor: MEDICARE / Plan: MEDICARE PART A & B / Product Type: Government /     Extended Emergency Contact Information  Primary Emergency Contact: Rhysmayda swartz  Mobile Phone: [phone]  Relation: Son  Secondary Emergency Contact: Gurdeep berry  Mobile Phone: [phone]  Relation: Son    Discharge Plan A: Skilled Nursing Facility  Discharge Plan B: Home with family, Home Health      [facility] - [place], [place] - [address] [address]  [address] [address]  [place] [address]  Phone: [phone] Fax: [phone]    [facility]  [address]  [facility] [address]  Phone: [phone] Fax: [phone]      Initial Assessment (most recent)       Adult Discharge Assessment - 11/03/22 1140          Discharge Assessment    Assessment Type Discharge Planning Assessment     Confirmed/corrected address, phone number and insurance Yes     Confirmed Demographics Correct on Facesheet     Source of Information patient;health care advocate     Does patient/caregiver understand observation status Yes     Communicated YUDITH with patient/caregiver Yes     Reason For Admission Chest pain     Lives With facility resident     Facility Arrived From: [facility]     Do you expect to return to your current living situation? Yes     Do you have help at home or someone  to help you manage your care at home? Yes     Who are your caregiver(s) and their phone number(s)? Staff     Prior to hospitilization cognitive status: Alert/Oriented     Current cognitive status: Alert/Oriented     Walking or Climbing Stairs Difficulty ambulation difficulty, requires equipment     Mobility Management wc     Dressing/Bathing Difficulty none     Home Accessibility wheelchair accessible     Home Layout Able to live on 1st floor     Equipment Currently Used at Home wheelchair;cane, straight;hospital bed;bedside commode     Readmission within 30 days? Yes     Patient currently being followed by outpatient case management? No     Do you currently have service(s) that help you manage your care at home? No     Do you take prescription medications? Yes     Do you have prescription coverage? Yes     Do you have any problems affording any of your prescribed medications? No     Is the patient taking medications as prescribed? yes     How do you get to doctors appointments? other (see comments)     Are you on dialysis? No     Do you take coumadin? No     Discharge Plan A Skilled Nursing Facility     Discharge Plan B Home with family;Home Health     DME Needed Upon Discharge  none     Discharge Plan discussed with: Patient     Discharge Barriers Identified None        Physical Activity    On average, how many days per week do you engage in moderate to strenuous exercise (like a brisk walk)? Patient refused     On average, how many minutes do you engage in exercise at this level? Patient refused        Financial Resource Strain    How hard is it for you to pay for the very basics like food, housing, medical care, and heating? Patient refused        Housing Stability    In the last 12 months, was there a time when you were not able to pay the mortgage or rent on time? Patient refused     In the last 12 months, was there a time when you did not have a steady place to sleep or slept in a shelter (including now)?  Patient refused        Transportation Needs    In the past 12 months, has lack of transportation kept you from medical appointments or from getting medications? Patient refused     In the past 12 months, has lack of transportation kept you from meetings, work, or from getting things needed for daily living? Patient refused        Food Insecurity    Within the past 12 months, you worried that your food would run out before you got the money to buy more. Patient refused     Within the past 12 months, the food you bought just didn't last and you didn't have money to get more. Patient refused        Stress    Do you feel stress - tense, restless, nervous, or anxious, or unable to sleep at night because your mind is troubled all the time - these days? Patient refused        Social Connections    In a typical week, how many times do you talk on the phone with family, friends, or neighbors? Patient refused     How often do you get together with friends or relatives? Patient refused     How often do you attend Alevism or Islam services? Patient refused     Do you belong to any clubs or organizations such as Alevism groups, unions, fraternal or athletic groups, or school groups? Patient refused     How often do you attend meetings of the clubs or organizations you belong to? Patient refused     Are you , , , , never , or living with a partner? Patient refused        Alcohol Use    Q1: How often do you have a drink containing alcohol? Patient refused     Q2: How many drinks containing alcohol do you have on a typical day when you are drinking? Patient refused     Q3: How often do you have six or more drinks on one occasion? Patient refused        Relationship/Environment    Name(s) of Who Lives With Patient Luis Mackenzie [phone]

## 2022-11-03 NOTE — ED NOTES
Pt in from [facility] for catheter problem. Pt states the nurse was attempting to change out his olguin cath and when she attempted to place the new olguin no urine drained so she removed the olguin and attempted a second time to place a olguin. Pt states the second time the nurse inflated the bulb and after pt felt extreme pain and blood appeared. Pt awake and alert. Pt doesn't walk due to left sided deficits from a previous stroke. On arrival attempted to place a 16 f olguin, unsuccessful, large blood clot noted in olguin.

## 2022-11-03 NOTE — SUBJECTIVE & OBJECTIVE
Past Medical History:   Diagnosis Date    Anticoagulant long-term use     Arthritis     Colon polyp     Diabetes mellitus     Diabetes mellitus, type 2     Fatty liver     Hypertension     Major depressive disorder 11/17/2020    -Continue Wellbutrin    PEG (percutaneous endoscopic gastrostomy) adjustment/replacement/removal 1/6/2021    Stroke 11/2020    left sided weakness    Urinary retention 9/7/2021       Past Surgical History:   Procedure Laterality Date    BACK SURGERY      COLONOSCOPY  07/18/2014    Dr. Crane: 22 colon polyps removed, hemorrhoids, erythema to sigmoid, repeat in 1 year for surveillance; biopsy: sigmoid erythema- showed unremarkable colonic mucosa, tubular adenomas and hyperplastic polyps    COLONOSCOPY N/A 5/3/2019    Procedure: COLONOSCOPY;  Surgeon: Guero Crane MD;  Location: [facility] ENDO;  Service: Endoscopy;  Laterality: N/A;    COLONOSCOPY N/A 5/6/2019    Procedure: COLONOSCOPY;  Surgeon: Guero Crane MD;  Location: [facility] ENDO;  Service: Endoscopy;  Laterality: N/A;    CYSTOSCOPY N/A 2/9/2022    Procedure: CYSTOSCOPY;  Surgeon: Jaylen Antonio Jr., MD;  Location: [facility] OR;  Service: Urology;  Laterality: N/A;    EPIDURAL STEROID INJECTION INTO THORACIC SPINE N/A 8/30/2019    Procedure: Injection-steroid-epidural-thoracic;  Surgeon: Frantz Green MD;  Location: [facility] OR;  Service: Pain Management;  Laterality: N/A;  T6-7    ESOPHAGOGASTRODUODENOSCOPY N/A 4/26/2019    Procedure: EGD (ESOPHAGOGASTRODUODENOSCOPY);  Surgeon: Guero Crane MD;  Location: [facility] ENDO;  Service: Endoscopy;  Laterality: N/A;    ESOPHAGOGASTRODUODENOSCOPY N/A 11/23/2020    Procedure: EGD (ESOPHAGOGASTRODUODENOSCOPY);  Surgeon: Guero Crane MD;  Location: [facility] ENDO;  Service: Endoscopy;  Laterality: N/A;    ESOPHAGOGASTRODUODENOSCOPY N/A 1/6/2021    Procedure: EGD (ESOPHAGOGASTRODUODENOSCOPY);  Surgeon: Guero Crane MD;  Location: [facility] ENDO;  Service: Endoscopy;  Laterality: N/A;    HERNIA REPAIR       TRANSRECTAL ULTRASOUND EXAMINATION N/A 2/9/2022    Procedure: ULTRASOUND, RECTAL APPROACH;  Surgeon: Jaylen Antonio Jr., MD;  Location: [facility] OR;  Service: Urology;  Laterality: N/A;  must text son miroslava, times and instructions given leave at 130       Review of patient's allergies indicates:  No Known Allergies    No current facility-administered medications on file prior to encounter.     Current Outpatient Medications on File Prior to Encounter   Medication Sig    acetaminophen (TYLENOL) 325 MG tablet Take 2 tablets (650 mg total) by mouth every 6 (six) hours as needed.    aspirin (ECOTRIN) 81 MG EC tablet Take 1 tablet (81 mg total) by mouth once daily.    atorvastatin (LIPITOR) 40 MG tablet Take 1 tablet (40 mg total) by mouth once daily.    blood sugar diagnostic Strp To check BG 2 times daily, to use with insurance preferred meter. One touch Ultra.    buPROPion (WELLBUTRIN XL) 300 MG 24 hr tablet Take 1 tablet (300 mg total) by mouth once daily. (Patient taking differently: Take 450 mg by mouth once daily.)    clopidogreL (PLAVIX) 75 mg tablet 1 tablet (75 mg total) by Per G Tube route once daily.    finasteride (PROSCAR) 5 mg tablet Take 1 tablet (5 mg total) by mouth once daily. Take 1 tablet by mouth in am to keep prostate from enlarging    gabapentin (NEURONTIN) 300 MG capsule Take 1 capsule (300 mg total) by mouth 2 (two) times daily.    insulin aspart U-100 (NOVOLOG) 100 unit/mL (3 mL) InPn pen Inject 1-10 Units into the skin as needed (high blood sugar). Inject per sliding scale.    insulin detemir U-100 (LEVEMIR FLEXTOUCH) 100 unit/mL (3 mL) SubQ InPn pen Inject 10 Units into the skin every evening.    lancets Misc To check BG 2 times daily, to use with insurance preferred meter.One Touch Ultra    losartan (COZAAR) 25 MG tablet 1 tablet DAILY (route: oral)    meclizine (ANTIVERT) 25 mg tablet Take 1 tablet (25 mg total) by mouth 3 (three) times daily as needed for Dizziness.    melatonin (MELATIN) 3 mg  "tablet Take 2 tablets (6 mg total) by mouth nightly as needed for Insomnia.    omeprazole (PRILOSEC) 40 MG capsule TAKE ONE CAPSULE (40 MG) BY MOUTH EVERY DAY    pen needle, diabetic 32 gauge x 5/32" Ndle Use AC meals 2 a day    polyethylene glycol (GLYCOLAX) 17 gram PwPk Take 17 g by mouth 2 (two) times daily.    QUEtiapine (SEROQUEL) 25 MG Tab Take 1 tablet (25 mg total) by mouth every evening.    senna-docusate 8.6-50 mg (PERICOLACE) 8.6-50 mg per tablet Take 1 tablet by mouth 2 (two) times daily.    tetrahydrozoline 0.05% (VISION CLEAR) 0.05 % Drop Place 2 drops into both eyes 4 (four) times daily as needed (eye irritation).    [DISCONTINUED] insulin NPH-insulin regular, 70/30, (NOVOLIN 70/30) 100 unit/mL (70-30) injection Inject 20 Units into the skin 2 (two) times daily. #3 10 ml syringes with 32 gauge sergo needles    [DISCONTINUED] lisinopriL-hydrochlorothiazide (PRINZIDE,ZESTORETIC) 20-12.5 mg per tablet TAKE ONE TABLET BY MOUTH TWICE DAILY     Family History       Problem Relation (Age of Onset)    Brain cancer Brother    Diabetes Brother    Heart disease Mother, Father    Suicide Brother          Tobacco Use    Smoking status: Former     Packs/day: 1.00     Years: 50.00     Pack years: 50.00     Types: Cigarettes    Smokeless tobacco: Never   Substance and Sexual Activity    Alcohol use: Yes     Alcohol/week: 14.0 standard drinks     Types: 14 Cans of beer per week     Comment: 2-3 beers daily    Drug use: No    Sexual activity: Yes     Partners: Female     Review of Systems   Constitutional:  Negative for chills and fever.   HENT:  Negative for congestion and sore throat.    Eyes:  Negative for visual disturbance.   Respiratory:  Negative for cough and shortness of breath.    Cardiovascular:  Negative for chest pain and palpitations.   Gastrointestinal:  Negative for abdominal pain, constipation, diarrhea, nausea and vomiting.   Endocrine: Negative for cold intolerance and heat intolerance. "   Genitourinary:  Positive for decreased urine volume, difficulty urinating and urgency. Negative for dysuria and hematuria.   Musculoskeletal:  Negative for arthralgias and myalgias.   Skin:  Negative for rash.   Neurological:  Negative for tremors and seizures.   Hematological:  Negative for adenopathy. Does not bruise/bleed easily.   All other systems reviewed and are negative.  Objective:     Vital Signs (Most Recent):  Temp: 99.1 °F (37.3 °C) (11/02/22 2306)  Pulse: 105 (11/02/22 2306)  Resp: 16 (11/02/22 2306)  BP: (!) 174/85 (11/02/22 2306)  SpO2: (!) 94 % (11/02/22 2306)   Vital Signs (24h Range):  Temp:  [98.3 °F (36.8 °C)-99.1 °F (37.3 °C)] 99.1 °F (37.3 °C)  Pulse:  [] 105  Resp:  [16-18] 16  SpO2:  [94 %-97 %] 94 %  BP: (167-191)/(81-85) 174/85     Weight: 89.4 kg (197 lb)  Body mass index is 23.36 kg/m².    Physical Exam  Vitals and nursing note reviewed.   Constitutional:       General: He is awake. He is not in acute distress.     Appearance: Normal appearance. He is well-developed. He is ill-appearing (chronically ill appearing, elderly).   HENT:      Head: Normocephalic and atraumatic.      Nose: Nose normal. No septal deviation.   Eyes:      Conjunctiva/sclera: Conjunctivae normal.      Pupils: Pupils are equal, round, and reactive to light.   Neck:      Thyroid: No thyroid mass.      Vascular: No JVD.      Trachea: No tracheal tenderness or tracheal deviation.   Cardiovascular:      Rate and Rhythm: Normal rate and regular rhythm.      Heart sounds: Normal heart sounds, S1 normal and S2 normal. No murmur heard.    No friction rub. No gallop.   Pulmonary:      Effort: Pulmonary effort is normal.      Breath sounds: Normal breath sounds. No decreased breath sounds, wheezing, rhonchi or rales.   Abdominal:      General: Bowel sounds are normal. There is no distension.      Palpations: Abdomen is soft. There is no hepatomegaly, splenomegaly or mass.      Tenderness: There is no abdominal  tenderness.   Genitourinary:     Penis: Discharge (blood at meatus) present.    Musculoskeletal:      Cervical back: Full passive range of motion without pain, normal range of motion and neck supple.      Right lower leg: No edema.      Left lower leg: No edema.   Skin:     General: Skin is warm.      Findings: No rash.   Neurological:      General: No focal deficit present.      Mental Status: He is alert and oriented to person, place, and time.      Cranial Nerves: No cranial nerve deficit.      Sensory: No sensory deficit.   Psychiatric:         Mood and Affect: Mood normal.         Behavior: Behavior normal. Behavior is cooperative.         CRANIAL NERVES     CN III, IV, VI   Pupils are equal, round, and reactive to light.     Significant Labs: All pertinent labs within the past 24 hours have been reviewed.  CBC:   Recent Labs   Lab 11/02/22 1923   WBC 7.28   HGB 12.5*   HCT 39.5*        CMP:   Recent Labs   Lab 11/02/22 1923      K 4.0      CO2 24   *   BUN 17   CREATININE 0.8   CALCIUM 9.2   ANIONGAP 9     POCT Glucose:   Recent Labs   Lab 11/02/22 2129   POCTGLUCOSE 120*

## 2022-11-03 NOTE — ED NOTES
Attempted to insert olguin cath att. Unable to advance olguin, large blood clots noted in olguin tube when withdrawn. Dr Stroud made aware.

## 2022-11-03 NOTE — PLAN OF CARE
11/03/22 1147   RUBIO Message   Medicare Outpatient and Observation Notification regarding financial responsibility Given to patient/caregiver;Explained to patient/caregiver;Signed/date by patient/caregiver   Date RUBIO was signed 11/03/22   Time RUBIO was signed 1145

## 2022-11-03 NOTE — NURSING
Patient bladder scan >960. BURAK Pedro house supervisor attempted to call Dr. Daniels x2 unsuccessfully.   Mayela attempted to insert olguin unsuccesfully.  Patient in pain and lower abdomen becoming more distended.

## 2022-11-03 NOTE — CONSULTS
[facility] Urology Consult Note:    Jhonny Almazan is a 66 y.o. male who presents for urinary retention.     Patient presented to the emergency department on 11/16/20 with dizziness. He was subsequently admitted for CVA complicated by urinary retention requiring olguin catheter placement. Unsure how much urine was obtained after catheter was placed.   He was discharged to rehab with the olguin catheter in place and states it was exchanged once prior to discharge home. He states that his neurological function had been improving.   He reported that prior to his stroke he was urinating every hour. Started on Flomax 0.4 mg on 11/25/20.  Patient is on ASA and Plavix.     1/20/21: Patient remains with a olguin catheter in place. Here for voiding trial. Has remained on Flomax 0.4 mg PO daily.   1/21/22: Underwent unsuccessful voiding trial in 2/2021. Subsequently admitted in 9/2021. He was evaluated by Dr. Daniels. On review of record, his Flomax was increased to 0.8 mg and he was started on Finasteride 5 mg PO daily. Plan was for voiding trial at his nursing home which has not been done.   Per his home health nurse, he has not been compliant with his medication. Remains with a olguin in place.    CT chest abdomen pelvis on 9/1/21 with mineral densities in the bladder.  8/4/22 inpt consult by : Patient s/p cystoscopy and transrectal ultrasound on 2/9/22 which revealed a 40 cc prostate with no significant prostate obstruction identified. Mild trabeculations. Olguin replaced. Decision was to manage with long-term olguin catheter as his retention was felt to be secondary to neurogenic bladder occurring after his stroke. He has since presented to the [facility] emergency department on UTI. Attempts were made to exchange the olguin catheter in the ER, but they were unsuccessful. Urology consulted to assist with management. He is unsure when his catheter was last exchanged as he has not had home health  in months. Several recent UTIs - recurrent Klebsiella. UA in the ED with 4 RBC, 46 WBC and many bacteria.  Has no family support or reliable transportation.Denies any fever, chills, bone pain, unintentional weight loss,  trauma or history of  malignancy.   - Previous olguin removed at bedside without difficulty. Attempted to place a 16 [place] coude in sterile fashion, but unsuccessful due to obstruction in the bulbar urethra/prostate. 0.035 sensor wire placed into the bladder. Attempted to place a 16 F [place] tip over the wire, but unsuccessful as there was still obstruction. Switched to a 16 [place] silicone catheter and was able to place alongside the wire. Balloon inflated with 10 cc sterile water. Dark yellow urine drained from the bladder.       Interval history 11/3/22:  Patient came to the ER last night from [facility].  They had attempted to change the Olguin catheter.  When they put the new Olguin in the blew up the balloon despite no urine output.  He was brought to the ER.  They attempted to exchange catheter multiple times with different catheters.  Unsuccessful.  They called me in plan was to take him in the morning for cystoscopy if unable to place catheter pain however overnight his abdominal pain worsened and he ended up having 800 in his bladder.  Nurse supervisor tried to place 16 and 18 coude, 12 [place] silicone without success.  Urology consulted to manage catheter exchange    Bedside procedure see separate note              PSA  0.44                 3/23/20  0.48                 4/27/18  0.37                 7/23/10  0.3                   1/28/08      Urine history:   11/3/22 Pending, void: 3+bld/nit+/tr leuk, >100 rbc/11 wbc/occ bact  Component       Urine Culture, Routine   Latest Ref Rng & Units          9/2/2022      11:51 AM ESCHERICHIA COLI (A) . . .   9/2/2022      11:51 AM KLEBSIELLA PNEUMONIAE (A) . . .   8/3/2022       KLEBSIELLA PNEUMONIAE ESBL (A) . . .   6/27/2022       KLEBSIELLA  PNEUMONIAE ESBL (A) . . .   5/16/2022       KLEBSIELLA PNEUMONIAE ESBL (A) . . .   2/9/2022       KLEBSIELLA PNEUMONIAE ESBL (A) . . .   1/21/2022       No growth   11/5/2021       PSEUDOMONAS AERUGINOSA (A) . . .   10/25/2021       No significant growth   9/30/2021       PSEUDOMONAS AERUGINOSA (A) . . .   9/6/2021       YEAST (A) . . .   9/1/2021      3:01 PM ENTEROCOCCUS FAECIUM (A) . . .   9/1/2021      3:01 PM GRAM NEGATIVE RICHARD, LACTOSE  (A) . . .   1/17/2017       No growth       Past Medical History:   Diagnosis Date    Anticoagulant long-term use     Arthritis     Colon polyp     Diabetes mellitus     Diabetes mellitus, type 2     Fatty liver     Hypertension     Major depressive disorder 11/17/2020    -Continue Wellbutrin    PEG (percutaneous endoscopic gastrostomy) adjustment/replacement/removal 1/6/2021    Stroke 11/2020    left sided weakness    Urinary retention 9/7/2021       Past Surgical History:   Procedure Laterality Date    BACK SURGERY      COLONOSCOPY  07/18/2014    Dr. Crane: 22 colon polyps removed, hemorrhoids, erythema to sigmoid, repeat in 1 year for surveillance; biopsy: sigmoid erythema- showed unremarkable colonic mucosa, tubular adenomas and hyperplastic polyps    COLONOSCOPY N/A 5/3/2019    Procedure: COLONOSCOPY;  Surgeon: Guero Crane MD;  Location: [facility];  Service: Endoscopy;  Laterality: N/A;    COLONOSCOPY N/A 5/6/2019    Procedure: COLONOSCOPY;  Surgeon: Guero Crane MD;  Location: [facility];  Service: Endoscopy;  Laterality: N/A;    CYSTOSCOPY N/A 2/9/2022    Procedure: CYSTOSCOPY;  Surgeon: Jaylen Antonio Jr., MD;  Location: [facility] OR;  Service: Urology;  Laterality: N/A;    EPIDURAL STEROID INJECTION INTO THORACIC SPINE N/A 8/30/2019    Procedure: Injection-steroid-epidural-thoracic;  Surgeon: Frantz Green MD;  Location: [facility] OR;  Service: Pain Management;  Laterality: N/A;  T6-7    ESOPHAGOGASTRODUODENOSCOPY N/A 4/26/2019    Procedure: EGD  (ESOPHAGOGASTRODUODENOSCOPY);  Surgeon: Guero Crane MD;  Location: [facility] ENDO;  Service: Endoscopy;  Laterality: N/A;    ESOPHAGOGASTRODUODENOSCOPY N/A 11/23/2020    Procedure: EGD (ESOPHAGOGASTRODUODENOSCOPY);  Surgeon: Guero Crane MD;  Location: [facility] ENDO;  Service: Endoscopy;  Laterality: N/A;    ESOPHAGOGASTRODUODENOSCOPY N/A 1/6/2021    Procedure: EGD (ESOPHAGOGASTRODUODENOSCOPY);  Surgeon: Guero Crane MD;  Location: [facility] ENDO;  Service: Endoscopy;  Laterality: N/A;    HERNIA REPAIR      TRANSRECTAL ULTRASOUND EXAMINATION N/A 2/9/2022    Procedure: ULTRASOUND, RECTAL APPROACH;  Surgeon: Jaylen Antonio Jr., MD;  Location: [facility] OR;  Service: Urology;  Laterality: N/A;  must text son miroslava, times and instructions given leave at 130       Review of patient's allergies indicates:  No Known Allergies    Medications Reviewed: see MAR    FOCUSED PHYSICAL EXAM:    Vitals:    11/03/22 1531   BP: 127/60   Pulse: 78   Resp: 16   Temp: 97.1 °F (36.2 °C)     Body mass index is 24.37 kg/m².       General: Alert, cooperative, no distress, appears stated age  Abdomen: Soft, non-tender, no CVA tenderness, non-distended  : Olguin hanging from penis with cut balloon port, mostly out in his bladder.         LABS:    Lab Results   Component Value Date    WBC 12.93 (H) 11/03/2022    HGB 12.8 (L) 11/03/2022    HCT 39.2 (L) 11/03/2022    MCV 87 11/03/2022     11/03/2022       BMP  Lab Results   Component Value Date     11/03/2022    K 3.8 11/03/2022     11/03/2022    CO2 25 11/03/2022    BUN 19 11/03/2022    CREATININE 0.9 11/03/2022    CALCIUM 9.4 11/03/2022    ANIONGAP 10 11/03/2022    ESTGFRAFRICA >60 06/27/2022    EGFRNONAA >60 06/27/2022           Assessment/Diagnosis:  Jhonny Almazan is a 66 y.o. male    1. Acute urinary retention    2. Chest pain      Urology consulted for:   1. Neurogenic bladder  2. Chronic indwelling olgiun  3. Recurrent UTI  4. Poor social  support    Plans  Catheter placed with flexible cystoscope over wire.  Found to have urethral trauma and bulb.  Catheter should remain for at least 2-3 weeks.  This is the 2nd time that he has had to have it exchanged by Urology.  Will see if Dr. Antonio wants to discuss suprapubic tube with him to make catheter changes easier

## 2022-11-03 NOTE — HPI
Jhonny Almazan is a 66 year old male with a past medical history of HTN, DM II, depression and BPH, who presents to the ED via EMS from [facility] for evaluation of urinary retention. He had a olguin catheter for the past two months and during a cathter exchange, the facility was unable to place another catheter. He reports stabbing sensation  to the penis with bloody penile discharge. He was sent to the ED, where multiple attempts were made to place a catheter, including different sizes and coude's, with no success. Dr. Daniels consulted by the ED, who requested the patient to be NPO after 11 pm for intervention in the morning. Hospital Medicine consulted for admission and further management.

## 2022-11-03 NOTE — PLAN OF CARE
Plan of care discussed with pt. Patient verbalizes understanding. Gutierrez draining dark yellow urine. IV abx infusing. Bed in lowest position, wheels locked. Call light within reach.

## 2022-11-03 NOTE — NURSING
Patient c/o pain 9/10 in lower abdomen. Noted to have bright red blood coming from penis. Gave patient 4mg of morphine. Performed bladder scan which resulted >815. Elise Vidales NP notified. Instructed to call on call Urology, Jeremiah. No answer upon calling, left voicemail and sent secure chat.

## 2022-11-03 NOTE — ED NOTES
Lab informed nurse pt's floor covered in vomit. Pt vomited all over the floor, bed and himself, multiple times. Copious amounts of food laden vomit observed. Pt denies any abdominal pain or discomfort. Blood noted to pt's hands when questioned pt pulled blanket back to reveal blood on the blanket. While vomiting pt states he also urinated on himself. Hematuria noted in brief. Remain unable to obtain urine as pt is completely incontinent and has no sensation in his bladder.

## 2022-11-03 NOTE — H&P
[facility] Medicine  History & Physical    Patient Name: Jhonny Almazan  MRN: [id number]  Patient Class: OP- Observation  Admission Date: 11/2/2022  Attending Physician: Lino Caldera MD   Primary Care Provider: Joey Whitehead MD         Patient information was obtained from patient, past medical records and ER records.     Subjective:     Principal Problem:Acute urinary retention    Chief Complaint:   Chief Complaint   Patient presents with    Urinary Retention     Had an old olguin taken out at [facility] and new one reinserted. No urine output so that catheter was taken out and unable to reinsert new catheter.         HPI: Jhonny Almazan is a 66 year old male with a past medical history of HTN, DM II, depression and BPH, who presents to the ED via EMS from [facility] for evaluation of urinary retention. He had a olguin catheter for the past two months and during a cathter exchange, the facility was unable to place another catheter. He reports stabbing sensation  to the penis with bloody penile discharge. He was sent to the ED, where multiple attempts were made to place a catheter, including different sizes and coude's, with no success. Dr. Daniels consulted by the ED, who requested the patient to be NPO after 11 pm for intervention in the morning. Hospital Medicine consulted for admission and further management.       Past Medical History:   Diagnosis Date    Anticoagulant long-term use     Arthritis     Colon polyp     Diabetes mellitus     Diabetes mellitus, type 2     Fatty liver     Hypertension     Major depressive disorder 11/17/2020    -Continue Wellbutrin    PEG (percutaneous endoscopic gastrostomy) adjustment/replacement/removal 1/6/2021    Stroke 11/2020    left sided weakness    Urinary retention 9/7/2021       Past Surgical History:   Procedure Laterality Date    BACK SURGERY      COLONOSCOPY  07/18/2014    Dr. Miles: 22 colon polyps  removed, hemorrhoids, erythema to sigmoid, repeat in 1 year for surveillance; biopsy: sigmoid erythema- showed unremarkable colonic mucosa, tubular adenomas and hyperplastic polyps    COLONOSCOPY N/A 5/3/2019    Procedure: COLONOSCOPY;  Surgeon: Guero Crane MD;  Location: [facility];  Service: Endoscopy;  Laterality: N/A;    COLONOSCOPY N/A 5/6/2019    Procedure: COLONOSCOPY;  Surgeon: Guero Crane MD;  Location: [facility];  Service: Endoscopy;  Laterality: N/A;    CYSTOSCOPY N/A 2/9/2022    Procedure: CYSTOSCOPY;  Surgeon: Jaylen Antonio Jr., MD;  Location: [facility];  Service: Urology;  Laterality: N/A;    EPIDURAL STEROID INJECTION INTO THORACIC SPINE N/A 8/30/2019    Procedure: Injection-steroid-epidural-thoracic;  Surgeon: Frantz Green MD;  Location: [facility];  Service: Pain Management;  Laterality: N/A;  T6-7    ESOPHAGOGASTRODUODENOSCOPY N/A 4/26/2019    Procedure: EGD (ESOPHAGOGASTRODUODENOSCOPY);  Surgeon: Guero Crane MD;  Location: [facility];  Service: Endoscopy;  Laterality: N/A;    ESOPHAGOGASTRODUODENOSCOPY N/A 11/23/2020    Procedure: EGD (ESOPHAGOGASTRODUODENOSCOPY);  Surgeon: Guero Crane MD;  Location: [facility];  Service: Endoscopy;  Laterality: N/A;    ESOPHAGOGASTRODUODENOSCOPY N/A 1/6/2021    Procedure: EGD (ESOPHAGOGASTRODUODENOSCOPY);  Surgeon: Geuro rCane MD;  Location: [facility];  Service: Endoscopy;  Laterality: N/A;    HERNIA REPAIR      TRANSRECTAL ULTRASOUND EXAMINATION N/A 2/9/2022    Procedure: ULTRASOUND, RECTAL APPROACH;  Surgeon: Jaylen Antonio Jr., MD;  Location: [facility] OR;  Service: Urology;  Laterality: N/A;  must text son miroslava, times and instructions given leave at 130       Review of patient's allergies indicates:  No Known Allergies    No current facility-administered medications on file prior to encounter.     Current Outpatient Medications on File Prior to Encounter   Medication Sig    acetaminophen (TYLENOL) 325 MG tablet Take 2 tablets (650  "mg total) by mouth every 6 (six) hours as needed.    aspirin (ECOTRIN) 81 MG EC tablet Take 1 tablet (81 mg total) by mouth once daily.    atorvastatin (LIPITOR) 40 MG tablet Take 1 tablet (40 mg total) by mouth once daily.    blood sugar diagnostic Strp To check BG 2 times daily, to use with insurance preferred meter. One touch Ultra.    buPROPion (WELLBUTRIN XL) 300 MG 24 hr tablet Take 1 tablet (300 mg total) by mouth once daily. (Patient taking differently: Take 450 mg by mouth once daily.)    clopidogreL (PLAVIX) 75 mg tablet 1 tablet (75 mg total) by Per G Tube route once daily.    finasteride (PROSCAR) 5 mg tablet Take 1 tablet (5 mg total) by mouth once daily. Take 1 tablet by mouth in am to keep prostate from enlarging    gabapentin (NEURONTIN) 300 MG capsule Take 1 capsule (300 mg total) by mouth 2 (two) times daily.    insulin aspart U-100 (NOVOLOG) 100 unit/mL (3 mL) InPn pen Inject 1-10 Units into the skin as needed (high blood sugar). Inject per sliding scale.    insulin detemir U-100 (LEVEMIR FLEXTOUCH) 100 unit/mL (3 mL) SubQ InPn pen Inject 10 Units into the skin every evening.    lancets Misc To check BG 2 times daily, to use with insurance preferred meter.One Touch Ultra    losartan (COZAAR) 25 MG tablet 1 tablet DAILY (route: oral)    meclizine (ANTIVERT) 25 mg tablet Take 1 tablet (25 mg total) by mouth 3 (three) times daily as needed for Dizziness.    melatonin (MELATIN) 3 mg tablet Take 2 tablets (6 mg total) by mouth nightly as needed for Insomnia.    omeprazole (PRILOSEC) 40 MG capsule TAKE ONE CAPSULE (40 MG) BY MOUTH EVERY DAY    pen needle, diabetic 32 gauge x 5/32" Ndle Use AC meals 2 a day    polyethylene glycol (GLYCOLAX) 17 gram PwPk Take 17 g by mouth 2 (two) times daily.    QUEtiapine (SEROQUEL) 25 MG Tab Take 1 tablet (25 mg total) by mouth every evening.    senna-docusate 8.6-50 mg (PERICOLACE) 8.6-50 mg per tablet Take 1 tablet by mouth 2 (two) times daily.    " tetrahydrozoline 0.05% (VISION CLEAR) 0.05 % Drop Place 2 drops into both eyes 4 (four) times daily as needed (eye irritation).    [DISCONTINUED] insulin NPH-insulin regular, 70/30, (NOVOLIN 70/30) 100 unit/mL (70-30) injection Inject 20 Units into the skin 2 (two) times daily. #3 10 ml syringes with 32 gauge sergo needles    [DISCONTINUED] lisinopriL-hydrochlorothiazide (PRINZIDE,ZESTORETIC) 20-12.5 mg per tablet TAKE ONE TABLET BY MOUTH TWICE DAILY     Family History       Problem Relation (Age of Onset)    Brain cancer Brother    Diabetes Brother    Heart disease Mother, Father    Suicide Brother          Tobacco Use    Smoking status: Former     Packs/day: 1.00     Years: 50.00     Pack years: 50.00     Types: Cigarettes    Smokeless tobacco: Never   Substance and Sexual Activity    Alcohol use: Yes     Alcohol/week: 14.0 standard drinks     Types: 14 Cans of beer per week     Comment: 2-3 beers daily    Drug use: No    Sexual activity: Yes     Partners: Female     Review of Systems   Constitutional:  Negative for chills and fever.   HENT:  Negative for congestion and sore throat.    Eyes:  Negative for visual disturbance.   Respiratory:  Negative for cough and shortness of breath.    Cardiovascular:  Negative for chest pain and palpitations.   Gastrointestinal:  Negative for abdominal pain, constipation, diarrhea, nausea and vomiting.   Endocrine: Negative for cold intolerance and heat intolerance.   Genitourinary:  Positive for decreased urine volume, difficulty urinating and urgency. Negative for dysuria and hematuria.   Musculoskeletal:  Negative for arthralgias and myalgias.   Skin:  Negative for rash.   Neurological:  Negative for tremors and seizures.   Hematological:  Negative for adenopathy. Does not bruise/bleed easily.   All other systems reviewed and are negative.  Objective:     Vital Signs (Most Recent):  Temp: 99.1 °F (37.3 °C) (11/02/22 2306)  Pulse: 105 (11/02/22 2306)  Resp: 16 (11/02/22  2306)  BP: (!) 174/85 (11/02/22 2306)  SpO2: (!) 94 % (11/02/22 2306)   Vital Signs (24h Range):  Temp:  [98.3 °F (36.8 °C)-99.1 °F (37.3 °C)] 99.1 °F (37.3 °C)  Pulse:  [] 105  Resp:  [16-18] 16  SpO2:  [94 %-97 %] 94 %  BP: (167-191)/(81-85) 174/85     Weight: 89.4 kg (197 lb)  Body mass index is 23.36 kg/m².    Physical Exam  Vitals and nursing note reviewed.   Constitutional:       General: He is awake. He is not in acute distress.     Appearance: Normal appearance. He is well-developed. He is ill-appearing (chronically ill appearing, elderly).   HENT:      Head: Normocephalic and atraumatic.      Nose: Nose normal. No septal deviation.   Eyes:      Conjunctiva/sclera: Conjunctivae normal.      Pupils: Pupils are equal, round, and reactive to light.   Neck:      Thyroid: No thyroid mass.      Vascular: No JVD.      Trachea: No tracheal tenderness or tracheal deviation.   Cardiovascular:      Rate and Rhythm: Normal rate and regular rhythm.      Heart sounds: Normal heart sounds, S1 normal and S2 normal. No murmur heard.    No friction rub. No gallop.   Pulmonary:      Effort: Pulmonary effort is normal.      Breath sounds: Normal breath sounds. No decreased breath sounds, wheezing, rhonchi or rales.   Abdominal:      General: Bowel sounds are normal. There is no distension.      Palpations: Abdomen is soft. There is no hepatomegaly, splenomegaly or mass.      Tenderness: There is no abdominal tenderness.   Genitourinary:     Penis: Discharge (blood at meatus) present.    Musculoskeletal:      Cervical back: Full passive range of motion without pain, normal range of motion and neck supple.      Right lower leg: No edema.      Left lower leg: No edema.   Skin:     General: Skin is warm.      Findings: No rash.   Neurological:      General: No focal deficit present.      Mental Status: He is alert and oriented to person, place, and time.      Cranial Nerves: No cranial nerve deficit.      Sensory: No sensory  deficit.   Psychiatric:         Mood and Affect: Mood normal.         Behavior: Behavior normal. Behavior is cooperative.         CRANIAL NERVES     CN III, IV, VI   Pupils are equal, round, and reactive to light.     Significant Labs: All pertinent labs within the past 24 hours have been reviewed.  CBC:   Recent Labs   Lab 11/02/22 1923   WBC 7.28   HGB 12.5*   HCT 39.5*        CMP:   Recent Labs   Lab 11/02/22 1923      K 4.0      CO2 24   *   BUN 17   CREATININE 0.8   CALCIUM 9.2   ANIONGAP 9     POCT Glucose:   Recent Labs   Lab 11/02/22 2129   POCTGLUCOSE 120*           Assessment/Plan:     * Acute urinary retention  Admit to obs  NPO after 11pm per Dr. Daniels, who will see patient in the morning  No IV fluids      ACP (advance care planning)  Advance Care Planning     Date: 11/03/2022    Code Status  In light of the patients advanced and life limiting illness,I engaged the the patient in a conversation about the patient's preferences for care  at the very end of life. The patient wishes to have a natural, peaceful death.  Along those lines, the patient does not wish to have CPR or other invasive treatments performed when his heart and/or breathing stops. I communicated to the patient that a DNR order would be placed in his medical record to reflect this preference.  I spent a total of 10 minutes engaging the patient in this advance care planning discussion.             History of stroke  Noted, chronic        Neuropathy due to type 2 diabetes mellitus  Noted, chronic  Continue home meds    Debility    Noted, chronic  PT/OT consult    BPH (benign prostatic hyperplasia)  Noted, chronic  Continue home meds      Major depressive disorder  Noted, chronic  Continue home meds      Orthostatic hypotension        Hyperlipidemia associated with type 2 diabetes mellitus   Patient is chronically on statin.will continue for now. Monitor clinically. Last LDL was   Lab Results   Component  Value Date    LDLCALC 128.4 11/16/2020          Hypertension associated with diabetes  Chronic, controlled.  Latest blood pressure and vitals reviewed-   Temp:  [98.3 °F (36.8 °C)-99.1 °F (37.3 °C)]   Pulse:  []   Resp:  [16-18]   BP: (167-191)/(81-85)   SpO2:  [94 %-97 %] .   Home meds for hypertension were reviewed and noted below.   Hypertension Medications             losartan (COZAAR) 25 MG tablet 1 tablet DAILY (route: oral)          While in the hospital, will manage blood pressure as follows; Continue home antihypertensive regimen    Will utilize p.r.n. blood pressure medication only if patient's blood pressure greater than  180/110 and he develops symptoms such as worsening chest pain or shortness of breath.      Type 2 diabetes mellitus with other specified complication  Patient's FSGs are controlled on current medication regimen.  Last A1c reviewed-   Lab Results   Component Value Date    HGBA1C 5.8 (H) 09/02/2022     Most recent fingerstick glucose reviewed-   Recent Labs   Lab 11/02/22 2129   POCTGLUCOSE 120*     Current correctional scale  Medium  Maintain anti-hyperglycemic dose as follows-   Antihyperglycemics (From admission, onward)    Start     Stop Route Frequency Ordered    11/03/22 2100  insulin detemir U-100 pen 10 Units         -- SubQ Nightly 11/02/22 2324    11/02/22 2308  insulin aspart U-100 pen 1-10 Units         -- SubQ Before meals & nightly PRN 11/02/22 2240        Hold Oral hypoglycemics while patient is in the hospital.    Nicotine dependence  Dangers of cigarette smoking were reviewed with patient in detail for 5 minutes and patient was encouraged to quit. Nicotine replacement options were discussed.          VTE Risk Mitigation (From admission, onward)         Ordered     IP VTE LOW RISK PATIENT  Once         11/02/22 2240     Place sequential compression device  Until discontinued         11/02/22 2240                   EMILY Lundberg  Department of Hospital Medicine    [facility]

## 2022-11-03 NOTE — NURSING
Nursing Transfer Note      11/2/2022     Reason patient is being transferred: Admit    Transfer From: ER    Transfer via stretcher    Upon arrival to floor: patient oriented to room, call bell in reach, and bed in lowest position    Patient arrived in stable condition. Pain 9/10. IV clean, dry and intact. Penis covered in blood, patient unable to void.

## 2022-11-04 PROBLEM — R13.10 DYSPHAGIA: Status: RESOLVED | Noted: 2020-11-19 | Resolved: 2022-11-04

## 2022-11-04 PROBLEM — Z43.1 PEG (PERCUTANEOUS ENDOSCOPIC GASTROSTOMY) ADJUSTMENT/REPLACEMENT/REMOVAL: Status: RESOLVED | Noted: 2021-01-06 | Resolved: 2022-11-04

## 2022-11-04 PROBLEM — R33.8 ACUTE URINARY RETENTION: Status: RESOLVED | Noted: 2021-09-07 | Resolved: 2022-11-04

## 2022-11-04 PROBLEM — T83.511A UTI (URINARY TRACT INFECTION) DUE TO URINARY INDWELLING CATHETER: Status: RESOLVED | Noted: 2022-08-03 | Resolved: 2022-11-04

## 2022-11-04 PROBLEM — N39.0 UTI (URINARY TRACT INFECTION) DUE TO URINARY INDWELLING CATHETER: Status: RESOLVED | Noted: 2022-08-03 | Resolved: 2022-11-04

## 2022-11-04 PROBLEM — R10.9 ABDOMINAL PAIN: Status: RESOLVED | Noted: 2019-04-26 | Resolved: 2022-11-04

## 2022-11-04 PROBLEM — E87.5 HYPERKALEMIA: Status: RESOLVED | Noted: 2022-09-08 | Resolved: 2022-11-04

## 2022-11-04 PROBLEM — R42 DIZZINESS: Status: RESOLVED | Noted: 2020-11-16 | Resolved: 2022-11-04

## 2022-11-04 PROBLEM — E86.0 DEHYDRATION: Status: RESOLVED | Noted: 2021-09-03 | Resolved: 2022-11-04

## 2022-11-04 LAB
ALBUMIN SERPL BCP-MCNC: 3 G/DL (ref 3.5–5.2)
ALP SERPL-CCNC: 70 U/L (ref 55–135)
ALT SERPL W/O P-5'-P-CCNC: 22 U/L (ref 10–44)
ANION GAP SERPL CALC-SCNC: 10 MMOL/L (ref 8–16)
AST SERPL-CCNC: 16 U/L (ref 10–40)
BASOPHILS # BLD AUTO: 0.05 K/UL (ref 0–0.2)
BASOPHILS NFR BLD: 0.8 % (ref 0–1.9)
BILIRUB SERPL-MCNC: 0.5 MG/DL (ref 0.1–1)
BUN SERPL-MCNC: 19 MG/DL (ref 8–23)
CALCIUM SERPL-MCNC: 8.8 MG/DL (ref 8.7–10.5)
CHLORIDE SERPL-SCNC: 106 MMOL/L (ref 95–110)
CO2 SERPL-SCNC: 24 MMOL/L (ref 23–29)
CREAT SERPL-MCNC: 0.9 MG/DL (ref 0.5–1.4)
DIFFERENTIAL METHOD: ABNORMAL
EOSINOPHIL # BLD AUTO: 0.1 K/UL (ref 0–0.5)
EOSINOPHIL NFR BLD: 2 % (ref 0–8)
ERYTHROCYTE [DISTWIDTH] IN BLOOD BY AUTOMATED COUNT: 15.2 % (ref 11.5–14.5)
EST. GFR  (NO RACE VARIABLE): >60 ML/MIN/1.73 M^2
GLUCOSE SERPL-MCNC: 145 MG/DL (ref 70–110)
HCT VFR BLD AUTO: 35.7 % (ref 40–54)
HGB BLD-MCNC: 11.3 G/DL (ref 14–18)
IMM GRANULOCYTES # BLD AUTO: 0.02 K/UL (ref 0–0.04)
IMM GRANULOCYTES NFR BLD AUTO: 0.3 % (ref 0–0.5)
LYMPHOCYTES # BLD AUTO: 1.2 K/UL (ref 1–4.8)
LYMPHOCYTES NFR BLD: 19.6 % (ref 18–48)
MAGNESIUM SERPL-MCNC: 1.7 MG/DL (ref 1.6–2.6)
MCH RBC QN AUTO: 28 PG (ref 27–31)
MCHC RBC AUTO-ENTMCNC: 31.7 G/DL (ref 32–36)
MCV RBC AUTO: 88 FL (ref 82–98)
MONOCYTES # BLD AUTO: 0.7 K/UL (ref 0.3–1)
MONOCYTES NFR BLD: 11.9 % (ref 4–15)
NEUTROPHILS # BLD AUTO: 3.9 K/UL (ref 1.8–7.7)
NEUTROPHILS NFR BLD: 65.4 % (ref 38–73)
NRBC BLD-RTO: 0 /100 WBC
PHOSPHATE SERPL-MCNC: 3.1 MG/DL (ref 2.7–4.5)
PLATELET # BLD AUTO: 154 K/UL (ref 150–450)
PMV BLD AUTO: 9.6 FL (ref 9.2–12.9)
POCT GLUCOSE: 151 MG/DL (ref 70–110)
POCT GLUCOSE: 164 MG/DL (ref 70–110)
POCT GLUCOSE: 167 MG/DL (ref 70–110)
POCT GLUCOSE: 182 MG/DL (ref 70–110)
POCT GLUCOSE: 192 MG/DL (ref 70–110)
POTASSIUM SERPL-SCNC: 3.7 MMOL/L (ref 3.5–5.1)
PROT SERPL-MCNC: 5.9 G/DL (ref 6–8.4)
RBC # BLD AUTO: 4.04 M/UL (ref 4.6–6.2)
SODIUM SERPL-SCNC: 140 MMOL/L (ref 136–145)
WBC # BLD AUTO: 5.97 K/UL (ref 3.9–12.7)

## 2022-11-04 PROCEDURE — 36415 COLL VENOUS BLD VENIPUNCTURE: CPT | Performed by: STUDENT IN AN ORGANIZED HEALTH CARE EDUCATION/TRAINING PROGRAM

## 2022-11-04 PROCEDURE — 84100 ASSAY OF PHOSPHORUS: CPT | Performed by: STUDENT IN AN ORGANIZED HEALTH CARE EDUCATION/TRAINING PROGRAM

## 2022-11-04 PROCEDURE — C1751 CATH, INF, PER/CENT/MIDLINE: HCPCS

## 2022-11-04 PROCEDURE — 85025 COMPLETE CBC W/AUTO DIFF WBC: CPT | Performed by: STUDENT IN AN ORGANIZED HEALTH CARE EDUCATION/TRAINING PROGRAM

## 2022-11-04 PROCEDURE — 76937 US GUIDE VASCULAR ACCESS: CPT

## 2022-11-04 PROCEDURE — G0378 HOSPITAL OBSERVATION PER HR: HCPCS

## 2022-11-04 PROCEDURE — 97165 OT EVAL LOW COMPLEX 30 MIN: CPT

## 2022-11-04 PROCEDURE — 97161 PT EVAL LOW COMPLEX 20 MIN: CPT

## 2022-11-04 PROCEDURE — 36410 VNPNXR 3YR/> PHY/QHP DX/THER: CPT

## 2022-11-04 PROCEDURE — 25000003 PHARM REV CODE 250: Performed by: NURSE PRACTITIONER

## 2022-11-04 PROCEDURE — 96372 THER/PROPH/DIAG INJ SC/IM: CPT | Performed by: NURSE PRACTITIONER

## 2022-11-04 PROCEDURE — 80053 COMPREHEN METABOLIC PANEL: CPT | Performed by: STUDENT IN AN ORGANIZED HEALTH CARE EDUCATION/TRAINING PROGRAM

## 2022-11-04 PROCEDURE — 97116 GAIT TRAINING THERAPY: CPT

## 2022-11-04 PROCEDURE — 63600175 PHARM REV CODE 636 W HCPCS: Performed by: STUDENT IN AN ORGANIZED HEALTH CARE EDUCATION/TRAINING PROGRAM

## 2022-11-04 PROCEDURE — 25000003 PHARM REV CODE 250: Performed by: STUDENT IN AN ORGANIZED HEALTH CARE EDUCATION/TRAINING PROGRAM

## 2022-11-04 PROCEDURE — 96366 THER/PROPH/DIAG IV INF ADDON: CPT

## 2022-11-04 PROCEDURE — 83735 ASSAY OF MAGNESIUM: CPT | Performed by: STUDENT IN AN ORGANIZED HEALTH CARE EDUCATION/TRAINING PROGRAM

## 2022-11-04 RX ADMIN — GABAPENTIN 300 MG: 300 CAPSULE ORAL at 09:11

## 2022-11-04 RX ADMIN — LOSARTAN POTASSIUM 25 MG: 25 TABLET, FILM COATED ORAL at 09:11

## 2022-11-04 RX ADMIN — INSULIN ASPART 2 UNITS: 100 INJECTION, SOLUTION INTRAVENOUS; SUBCUTANEOUS at 05:11

## 2022-11-04 RX ADMIN — SENNOSIDES AND DOCUSATE SODIUM 1 TABLET: 50; 8.6 TABLET ORAL at 09:11

## 2022-11-04 RX ADMIN — POLYETHYLENE GLYCOL 3350 17 G: 17 POWDER, FOR SOLUTION ORAL at 09:11

## 2022-11-04 RX ADMIN — PANTOPRAZOLE SODIUM 40 MG: 40 TABLET, DELAYED RELEASE ORAL at 09:11

## 2022-11-04 RX ADMIN — QUETIAPINE FUMARATE 25 MG: 25 TABLET ORAL at 09:11

## 2022-11-04 RX ADMIN — MEROPENEM AND SODIUM CHLORIDE 1 G: 1 INJECTION, SOLUTION INTRAVENOUS at 02:11

## 2022-11-04 RX ADMIN — INSULIN ASPART 2 UNITS: 100 INJECTION, SOLUTION INTRAVENOUS; SUBCUTANEOUS at 12:11

## 2022-11-04 RX ADMIN — INSULIN DETEMIR 10 UNITS: 100 INJECTION, SOLUTION SUBCUTANEOUS at 09:11

## 2022-11-04 RX ADMIN — BUPROPION HYDROCHLORIDE 450 MG: 150 TABLET, FILM COATED, EXTENDED RELEASE ORAL at 09:11

## 2022-11-04 RX ADMIN — INSULIN ASPART 2 UNITS: 100 INJECTION, SOLUTION INTRAVENOUS; SUBCUTANEOUS at 09:11

## 2022-11-04 RX ADMIN — ATORVASTATIN CALCIUM 40 MG: 40 TABLET, FILM COATED ORAL at 09:11

## 2022-11-04 RX ADMIN — MEROPENEM AND SODIUM CHLORIDE 1 G: 1 INJECTION, SOLUTION INTRAVENOUS at 09:11

## 2022-11-04 RX ADMIN — INSULIN ASPART 1 UNITS: 100 INJECTION, SOLUTION INTRAVENOUS; SUBCUTANEOUS at 09:11

## 2022-11-04 RX ADMIN — MEROPENEM AND SODIUM CHLORIDE 1 G: 1 INJECTION, SOLUTION INTRAVENOUS at 05:11

## 2022-11-04 RX ADMIN — CLOPIDOGREL BISULFATE 75 MG: 75 TABLET ORAL at 09:11

## 2022-11-04 RX ADMIN — FINASTERIDE 5 MG: 5 TABLET, FILM COATED ORAL at 09:11

## 2022-11-04 RX ADMIN — ASPIRIN 81 MG: 81 TABLET, COATED ORAL at 09:11

## 2022-11-04 NOTE — PLAN OF CARE
CM sent updated clinicals to [facility] via [facility]. ALFREDO spoke with Luz Elena cid who states they can accept back over weekend. States he will let his supervisor nurse know (Cristian). Requested to have DC orders faxed to [phone] due to weekend staff not having care[facility] access. States that fax number goes straight to their supervisor           11/04/22 9823   Post-Acute Status   Post-Acute Authorization Placement   Post-Acute Placement Status Referrals Sent

## 2022-11-04 NOTE — PLAN OF CARE
POC reviewed VSS afebrile denies pain no acute distress noted this shift Appetite good olguin catheter patent with clear yellow urine tele monitored no acute distress noted at this time

## 2022-11-04 NOTE — PLAN OF CARE
Goals to be met by: 11/25/2022     Patient will increase functional independence with ADLs by performing:    LE Dressing with Stand-by Assistance.  Grooming while seated with [place].  Toileting from bedside commode with Stand-by Assistance for hygiene and clothing management.   Toilet transfer to toilet with Stand-by Assistance with AD.    OT POC initiated and established.

## 2022-11-04 NOTE — PT/OT/SLP EVAL
"Occupational Therapy   Evaluation    Name: Jhonny Almazan  MRN: [id number]  Admitting Diagnosis:  Acute urinary retention  Recent Surgery: * No surgery found *      Recommendations:     Discharge Recommendations: nursing facility, skilled  Discharge Equipment Recommendations:  none  Barriers to discharge:  None    Assessment:     Jhonny Almazan is a 66 y.o. male with a medical diagnosis of Acute urinary retention.      Patient found alert, oriented x 3, and agreeable to participate in OT evaluation. Patient performing supine<>sit with SBA, LB dressing from EOB with Mod (A) with episode of loss of balance to the left requiring Mod (A) to return to midline. Min (A) sit>stand using RW and Mod (A) stand>sit for slow, controlled descent to EOB. Patient tolerated standing approximately 2 minutes with CGA to close SBA using RW. R side stepping x 3-4 with Min (A) using RW. Patient demonstrates some instability with dynamic standing mobility. Patient reports mild lightheadedness while seated EOB initially. Patient reports feeling "off" as if he were a little "drunk." He presents with the following performance deficits affecting function: weakness, impaired endurance, impaired self care skills, impaired functional mobility, gait instability, impaired balance, decreased safety awareness, decreased upper extremity function, decreased lower extremity function, decreased ROM.     Recommend return to SNF - [facility] - upon discharge.      Rehab Prognosis: Good; patient would benefit from acute skilled OT services to address these deficits and reach maximum level of function.       Plan:     Patient to be seen 5 x/week to address the above listed problems via self-care/home management, therapeutic activities, therapeutic exercises  Plan of Care Expires: 11/25/22  Plan of Care Reviewed with: patient    Subjective     Chief Complaint: Feeling lightheaded upon initial sitting EOB; patient describes as "off" or a little " ""drunk"  Patient/Family Comments/goals: To return to [place] - patient reports he has no one to care for him or assist him, so his goal is to return to [place] and hopefully stay there    Occupational Profile:  Living Environment: Prior to admit to [facility], patient living alone in single story home   Previous level of function: Modified Independent using RW, wheelchair   Roles and Routines: Up to wheelchair during the day at [place]  Equipment Used at Home:  wheelchair, cane, straight, bedside commode, hospital bed  Assistance upon Discharge: Facility staff    Pain/Comfort:  Pain Rating 1: 0/10    Patients cultural, spiritual, Worship conflicts given the current situation: no    Objective:     Communicated with: NurseJustyn prior to session.  Patient found HOB elevated with bed alarm, peripheral IV, olguin catheter, telemetry upon OT entry to room.    General Precautions: Standard, contact, fall   Orthopedic Precautions:N/A   Braces: N/A  Respiratory Status: Room air    Occupational Performance:    Bed Mobility:    Patient completed Supine to Sit with stand by assistance with HOB slightly elevated  Patient completed Sit to Supine with stand by assistance with HOB slightly elevated    Functional Mobility/Transfers:  Patient completed Sit <> Stand Transfer with Min (A) sit>stand using RW and Mod (A) stand>sit using RW for slow, controlled descent to EOB  with  verbal instruction for correct transfer technique with proper hand placement (ie not to pull on walker to stand, etc)   Functional Mobility: Min (A) suing RW for R side step x 3-4 with verbal instruction for gait/walker sequence; patient demonstrates mild instability     Activities of Daily Living:  Feeding:  Set-up/Supervision to Independent   Grooming: Set-up/Supervision EOB  Upper Body Dressing: stand by assistance and minimum assistance    Lower Body Dressing: moderate assistance to don/doff  socks while seated EOB; patient demonstrates loss of " "balance to the left while attempting to thread R sock while assuming figure 4 position - patient requires Mod (A) to regain midline   Toileting: maximal assistance and total assistance olguin catheter - patient reports that last night he attempted to get up with assistance from staff to go to the bathroom; however, he was unable to make it as he did not have a wheelchair or walker for assistance     Cognitive/Visual Perceptual:  Cognitive/Psychosocial Skills:     -       Oriented to: Person, Place, and Situation   -       Follows Commands/attention:Follows one-step commands  -       Communication: clear/fluent  -       Safety awareness/insight to disability: mostly intact   -       Mood/Affect/Coping skills/emotional control: Appropriate to situation    Physical Exam:  Balance:    -       Static sit balance is GOOD; dynamic sit balance is FAIR to POOR as patient demonstrates loss of balance to the L while threading R sock requiring Mod (A) to regain midline - patient reports feeling "off" or "drunk" - mostly resolving with increased sit time  Postural examination/scapula alignment:    -       Rounded shoulders  Skin integrity: Redness/rash noted to R groin/perineal area; otherwise, Visible skin intact  Dominant hand:    -       Right  Upper Extremity Range of Motion:     -       Right Upper Extremity: WFL  -       Left Upper Extremity: WFL  Upper Extremity Strength:    -       Right Upper Extremity:  3+/5  -       Left Upper Extremity:  3+/5   Strength:    -       Right Upper Extremity: WFL  -       Left Upper Extremity: WFL  Fine Motor Coordination:    -       Intact    AMPAC 6 Click ADL:  AMPAC Total Score: 15    Treatment & Education:  - OTR providing education/instruction regarding OT role/POC, safety awareness/fall prevention including use of call button for assistance and use of bed alarm - patient verbalizes understanding and in agreement  - OTR providing education/instruction regarding correct transfer " "sequence/techniques with proper hand placement and emphasizing slow, controlled movements especially with head turns as patient reports this is when he feels especially "off" or dizzy - encouraging slow, controlled movements to allow time for acclimation and increase safety with mobility   - OTR providing education regarding importance of mobility to counteract negative effects of prolonged bed rest/inactivity as well as initiating discharge planning - recommend return to SNF - [facility] - which is patient's ultimate goals as well     Patient left HOB elevated with all lines intact, call button in reach, bed alarm on, and Nurse notified    GOALS:   Multidisciplinary Problems       Occupational Therapy Goals          Problem: Occupational Therapy    Goal Priority Disciplines Outcome Interventions   Occupational Therapy Goal     OT, PT/OT     Description: Goals to be met by: 11/25/2022     Patient will increase functional independence with ADLs by performing:    LE Dressing with Stand-by Assistance.  Grooming while seated with [place].  Toileting from bedside commode with Stand-by Assistance for hygiene and clothing management.   Toilet transfer to toilet with Stand-by Assistance with AD.                         History:     Past Medical History:   Diagnosis Date    Anticoagulant long-term use     Arthritis     Colon polyp     Diabetes mellitus     Diabetes mellitus, type 2     Fatty liver     Hypertension     Major depressive disorder 11/17/2020    -Continue Wellbutrin    PEG (percutaneous endoscopic gastrostomy) adjustment/replacement/removal 1/6/2021    Stroke 11/2020    left sided weakness    Urinary retention 9/7/2021         Past Surgical History:   Procedure Laterality Date    BACK SURGERY      COLONOSCOPY  07/18/2014    Dr. Miles: 22 colon polyps removed, hemorrhoids, erythema to sigmoid, repeat in 1 year for surveillance; biopsy: sigmoid erythema- showed unremarkable colonic mucosa, tubular adenomas and " hyperplastic polyps    COLONOSCOPY N/A 5/3/2019    Procedure: COLONOSCOPY;  Surgeon: Guero Crane MD;  Location: [facility] ENDO;  Service: Endoscopy;  Laterality: N/A;    COLONOSCOPY N/A 5/6/2019    Procedure: COLONOSCOPY;  Surgeon: Guero Crane MD;  Location: [facility] ENDO;  Service: Endoscopy;  Laterality: N/A;    CYSTOSCOPY N/A 2/9/2022    Procedure: CYSTOSCOPY;  Surgeon: Jaylen Antonio Jr., MD;  Location: [facility] OR;  Service: Urology;  Laterality: N/A;    EPIDURAL STEROID INJECTION INTO THORACIC SPINE N/A 8/30/2019    Procedure: Injection-steroid-epidural-thoracic;  Surgeon: Frantz Green MD;  Location: [facility] OR;  Service: Pain Management;  Laterality: N/A;  T6-7    ESOPHAGOGASTRODUODENOSCOPY N/A 4/26/2019    Procedure: EGD (ESOPHAGOGASTRODUODENOSCOPY);  Surgeon: Guero Crane MD;  Location: [facility] ENDO;  Service: Endoscopy;  Laterality: N/A;    ESOPHAGOGASTRODUODENOSCOPY N/A 11/23/2020    Procedure: EGD (ESOPHAGOGASTRODUODENOSCOPY);  Surgeon: Guero Crane MD;  Location: [facility];  Service: Endoscopy;  Laterality: N/A;    ESOPHAGOGASTRODUODENOSCOPY N/A 1/6/2021    Procedure: EGD (ESOPHAGOGASTRODUODENOSCOPY);  Surgeon: Guero Crane MD;  Location: [facility];  Service: Endoscopy;  Laterality: N/A;    HERNIA REPAIR      TRANSRECTAL ULTRASOUND EXAMINATION N/A 2/9/2022    Procedure: ULTRASOUND, RECTAL APPROACH;  Surgeon: Jaylen Antonio Jr., MD;  Location: [facility] OR;  Service: Urology;  Laterality: N/A;  must text son miroslava, times and instructions given leave at 130       Time Tracking:     OT Date of Treatment: 11/04/22  OT Start Time: 1121  OT Stop Time: 1139  OT Total Time (min): 18 min    Billable Minutes:Evaluation 18    11/4/2022

## 2022-11-04 NOTE — PROGRESS NOTES
"[facility] Hospital Medicine  Progress Note    Patient Name: Jhonny Almazan  MRN: [id number]  Patient Class: OP- Observation   Admission Date: 11/2/2022  Length of Stay: 0 days  Attending Physician: Lino Caldera MD  Primary Care Provider: Joey Whitehead MD        Subjective:     Principal Problem:Acute urinary retention        HPI:  Jhonny Almazan is a 66 year old male with a past medical history of HTN, DM II, depression and BPH, who presents to the ED via EMS from [facility] for evaluation of urinary retention. He had a olguin catheter for the past two months and during a cathter exchange, the facility was unable to place another catheter. He reports stabbing sensation  to the penis with bloody penile discharge. He was sent to the ED, where multiple attempts were made to place a catheter, including different sizes and coude's, with no success. Dr. Daniels consulted by the ED, who requested the patient to be NPO after 11 pm for intervention in the morning. Hospital Medicine consulted for admission and further management.       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 He is now draining urine. Urinalysis and WBC count are concerning for a UTI. Urine culture is pending and the patient is on meropenem given a history of ESBL infections.      Interval History: see "Hospital Course"    Review of Systems   Constitutional:  Negative for chills and fever.   HENT:  Negative for congestion and sore throat.    Eyes:  Negative for visual disturbance.   Respiratory:  Negative for cough and shortness of breath.    Cardiovascular:  Negative for chest pain and palpitations.   Gastrointestinal:  Negative " for abdominal pain, constipation, diarrhea, nausea and vomiting.   Endocrine: Negative for cold intolerance and heat intolerance.   Genitourinary:  Negative for decreased urine volume, difficulty urinating, dysuria, hematuria and urgency.   Musculoskeletal:  Negative for arthralgias and myalgias.   Skin:  Negative for rash.   Neurological:  Negative for tremors and seizures.   Hematological:  Negative for adenopathy. Does not bruise/bleed easily.   All other systems reviewed and are negative.  Objective:     Vital Signs (Most Recent):  Temp: 98.4 °F (36.9 °C) (11/04/22 0758)  Pulse: 73 (11/04/22 0758)  Resp: 18 (11/04/22 0758)  BP: 137/62 (11/04/22 0758)  SpO2: 96 % (11/04/22 0758) Vital Signs (24h Range):  Temp:  [96.7 °F (35.9 °C)-99 °F (37.2 °C)] 98.4 °F (36.9 °C)  Pulse:  [63-78] 73  Resp:  [16-18] 18  SpO2:  [94 %-98 %] 96 %  BP: (117-137)/(58-81) 137/62     Weight: 93.2 kg (205 lb 7.5 oz)  Body mass index is 24.37 kg/m².    Intake/Output Summary (Last 24 hours) at 11/4/2022 1010  Last data filed at 11/4/2022 0621  Gross per 24 hour   Intake 250 ml   Output 1050 ml   Net -800 ml      Physical Exam  Vitals and nursing note reviewed.   Constitutional:       General: He is awake. He is not in acute distress.     Appearance: Normal appearance. He is well-developed. He is ill-appearing.   HENT:      Head: Normocephalic and atraumatic.      Right Ear: External ear normal.      Left Ear: External ear normal.      Nose: Nose normal. No septal deviation.      Mouth/Throat:      Mouth: Mucous membranes are moist.      Pharynx: Oropharynx is clear.   Eyes:      Extraocular Movements: Extraocular movements intact.      Conjunctiva/sclera: Conjunctivae normal.   Neck:      Thyroid: No thyroid mass.      Vascular: No JVD.      Trachea: No tracheal tenderness or tracheal deviation.   Cardiovascular:      Rate and Rhythm: Normal rate and regular rhythm.      Pulses: Normal pulses.      Heart sounds: Normal heart sounds, S1  normal and S2 normal. No murmur heard.    No friction rub. No gallop.   Pulmonary:      Effort: Pulmonary effort is normal.      Breath sounds: Normal breath sounds. No decreased breath sounds, wheezing, rhonchi or rales.   Abdominal:      General: Bowel sounds are normal. There is no distension.      Palpations: Abdomen is soft. There is no hepatomegaly, splenomegaly or mass.      Tenderness: There is no abdominal tenderness.   Genitourinary:     Comments: Gutierrez.  Musculoskeletal:      Cervical back: Full passive range of motion without pain, normal range of motion and neck supple.      Right lower leg: No edema.      Left lower leg: No edema.   Skin:     General: Skin is warm.      Findings: No rash.   Neurological:      General: No focal deficit present.      Mental Status: He is alert and oriented to person, place, and time.      Cranial Nerves: No cranial nerve deficit.      Sensory: No sensory deficit.   Psychiatric:         Mood and Affect: Mood normal.         Behavior: Behavior normal. Behavior is cooperative.       Significant Labs: All pertinent labs within the past 24 hours have been reviewed.    Significant Imaging: I have reviewed all pertinent imaging results/findings within the past 24 hours.        Assessment/Plan:      Type 2 diabetes mellitus with other specified complication  -SSI  -AC HS glucose checks  -Hypoglycemic precautions  -Detemir 10 units QHS  -Diabetic diet    Hypertension associated with diabetes  -Continue home losartan    Hyperlipidemia associated with type 2 diabetes mellitus  -Continue ASA, Plavix and statin      Nicotine dependence  -Patient counseled on cessation        ACP (advance care planning)  -DNR    History of stroke  -Continue ASA, Plavix and statin      Neuropathy due to type 2 diabetes mellitus  -Continue gabapentin    Debility  -PT/OT consulted    BPH (benign prostatic hyperplasia)  -Gutierrez in place  -Continue finasteride      Chronic indwelling Gutierrez  catheter  -Replaced by Urology 11/3  -Follow up urine culture and continue meropenem      Major depressive disorder  -Continue Wellbutrin and Seroquel    Orthostatic hypotension          VTE Risk Mitigation (From admission, onward)         Ordered     IP VTE LOW RISK PATIENT  Once         11/02/22 2240     Place sequential compression device  Until discontinued         11/02/22 2240                Discharge Planning   YUDITH: 11/4/2022     Code Status: DNR   Is the patient medically ready for discharge?:     Reason for patient still in hospital (select all that apply): Patient trending condition, Laboratory test, Treatment and PT / OT recommendations  Discharge Plan A: Skilled Nursing Facility                  Lino Caldera MD  Department of Hospital Medicine   [facility]

## 2022-11-04 NOTE — PLAN OF CARE
Problem: Physical Therapy  Goal: Physical Therapy Goal  Description: Goals to be met by: 2022     Patient will increase functional independence with mobility by performin. Supine to sit with Contact Guard Assistance  2. Sit to stand transfer with Contact Guard Assistance  3. Bed to chair transfer with Minimal Assistance using Rolling Walker  4. Gait  x 150 feet with Minimal Assistance using Rolling Walker.   5. Lower extremity exercise program x20 reps   Outcome: Ongoing, Progressing   PT eval and treat completed. Min assist mobility. Gait 20ft within room with RW min/mod assist and OOB chair. SNF

## 2022-11-04 NOTE — SUBJECTIVE & OBJECTIVE
"Interval History: see "Hospital Course"    Review of Systems   Constitutional:  Negative for chills and fever.   HENT:  Negative for congestion and sore throat.    Eyes:  Negative for visual disturbance.   Respiratory:  Negative for cough and shortness of breath.    Cardiovascular:  Negative for chest pain and palpitations.   Gastrointestinal:  Negative for abdominal pain, constipation, diarrhea, nausea and vomiting.   Endocrine: Negative for cold intolerance and heat intolerance.   Genitourinary:  Negative for decreased urine volume, difficulty urinating, dysuria, hematuria and urgency.   Musculoskeletal:  Negative for arthralgias and myalgias.   Skin:  Negative for rash.   Neurological:  Negative for tremors and seizures.   Hematological:  Negative for adenopathy. Does not bruise/bleed easily.   All other systems reviewed and are negative.  Objective:     Vital Signs (Most Recent):  Temp: 98.4 °F (36.9 °C) (11/04/22 0758)  Pulse: 73 (11/04/22 0758)  Resp: 18 (11/04/22 0758)  BP: 137/62 (11/04/22 0758)  SpO2: 96 % (11/04/22 0758) Vital Signs (24h Range):  Temp:  [96.7 °F (35.9 °C)-99 °F (37.2 °C)] 98.4 °F (36.9 °C)  Pulse:  [63-78] 73  Resp:  [16-18] 18  SpO2:  [94 %-98 %] 96 %  BP: (117-137)/(58-81) 137/62     Weight: 93.2 kg (205 lb 7.5 oz)  Body mass index is 24.37 kg/m².    Intake/Output Summary (Last 24 hours) at 11/4/2022 1010  Last data filed at 11/4/2022 0621  Gross per 24 hour   Intake 250 ml   Output 1050 ml   Net -800 ml      Physical Exam  Vitals and nursing note reviewed.   Constitutional:       General: He is awake. He is not in acute distress.     Appearance: Normal appearance. He is well-developed. He is ill-appearing.   HENT:      Head: Normocephalic and atraumatic.      Right Ear: External ear normal.      Left Ear: External ear normal.      Nose: Nose normal. No septal deviation.      Mouth/Throat:      Mouth: Mucous membranes are moist.      Pharynx: Oropharynx is clear.   Eyes:      Extraocular " Movements: Extraocular movements intact.      Conjunctiva/sclera: Conjunctivae normal.   Neck:      Thyroid: No thyroid mass.      Vascular: No JVD.      Trachea: No tracheal tenderness or tracheal deviation.   Cardiovascular:      Rate and Rhythm: Normal rate and regular rhythm.      Pulses: Normal pulses.      Heart sounds: Normal heart sounds, S1 normal and S2 normal. No murmur heard.    No friction rub. No gallop.   Pulmonary:      Effort: Pulmonary effort is normal.      Breath sounds: Normal breath sounds. No decreased breath sounds, wheezing, rhonchi or rales.   Abdominal:      General: Bowel sounds are normal. There is no distension.      Palpations: Abdomen is soft. There is no hepatomegaly, splenomegaly or mass.      Tenderness: There is no abdominal tenderness.   Genitourinary:     Comments: Gutierrez.  Musculoskeletal:      Cervical back: Full passive range of motion without pain, normal range of motion and neck supple.      Right lower leg: No edema.      Left lower leg: No edema.   Skin:     General: Skin is warm.      Findings: No rash.   Neurological:      General: No focal deficit present.      Mental Status: He is alert and oriented to person, place, and time.      Cranial Nerves: No cranial nerve deficit.      Sensory: No sensory deficit.   Psychiatric:         Mood and Affect: Mood normal.         Behavior: Behavior normal. Behavior is cooperative.       Significant Labs: All pertinent labs within the past 24 hours have been reviewed.    Significant Imaging: I have reviewed all pertinent imaging results/findings within the past 24 hours.

## 2022-11-04 NOTE — HOSPITAL COURSE
Jhonny Almazan is a 66 year old male with a past medical history of CAV s/p neurogenic bladder with chronic indwelling Gutierrez catheter, HTN, DM, BPH, MDD, HLD, GERD, anemia, and neuropathy who presented with acute urinary retention necessitating Gutierrez replacement over a guidewire with cystoscopy per Urology. He is now draining urine. Urinalysis and WBC count are concerning for a UTI. Urine culture shows > 100,000 ESBL Klebsiella and the patient will complete a two week course of meropenem (midline access). He was discharged 11/5/2022 and will follow up with his PCP and with Urology (Dr. Antonio) to discuss suprapubic catheter insertion.

## 2022-11-04 NOTE — PLAN OF CARE
Called [facility] at [facility] to check status of urine culture. Lab states they received the urine culture around 4:30 PM yesterday so it will not be looked at until around 4:30-5pm today

## 2022-11-04 NOTE — PLAN OF CARE
[facility]  Discharge Reassessment    Primary Care Provider: Joey Whitehead MD    Expected Discharge Date:     Pt not medically clear for DC today due to urine culture not being resulted until possibly later this afternoon. CM spoke with Luz Elena at [facility] that states they can/will accept pt over weekend.     Reassessment (most recent)       Discharge Reassessment - 11/04/22 1339          Discharge Reassessment    Assessment Type Discharge Planning Reassessment     Did the patient's condition or plan change since previous assessment? No     Discharge Plan A Skilled Nursing Facility     Discharge Plan B Home Health

## 2022-11-04 NOTE — PLAN OF CARE
Prelim urine culture back- growing gram neg rods. Pt will not be able to DC until sensitivities are back per provider. Pt likely will need extended IVAB - midline ordered

## 2022-11-04 NOTE — CONSULTS
18 Gx 10cm PowerGlide Midline placed to pts Right brachial vein with the use of ultrasound guidance.    Ultrasound guidance: yes  Vessel Caliber: large and patent, compressibility normal  Needle advanced into vessel with real time Ultrasound guidance.  Guidewire confirmed in vessel.  Image recorded and saved.  Sterile sheath used.  Sterile dressing applied  Arm circumference- 29 cm  Dressing dated   Education provided to patient re: proper maintenance of line- pt verbalized understanding  Limb alert applied.

## 2022-11-04 NOTE — PROCEDURES
BEDSIDE PROCEDURE NOTE:  After informed consent was obtained, the penis was prepped and draped in the usual sterile fashion.  A flexible cystoscope was introduced into the penis.  This revealed trauma from Olguin catheter balloon at the bulbar urethra and in the proximal urethra.  A super stiff wire was passed through the scope, through the urethral lumen and into the patient's bladder.    A 16 Fr [place] olguin was inserted over the wire into the bladder.  850cc of dark colored urine was returned.  The olguin was attached to a  bag and secured to the patient's leg.  The patient tolerated the procedure well.

## 2022-11-04 NOTE — PT/OT/SLP EVAL
"Physical Therapy Evaluation    Patient Name:  Jhonny Almazan   MRN:  [id number]    Recommendations:     Discharge Recommendations:  nursing facility, skilled   Discharge Equipment Recommendations: none   Barriers to discharge: None    Assessment:     Jhonny Almazan is a 66 y.o. male admitted with a medical diagnosis of Acute urinary retention.  He presents with the following impairments/functional limitations:  weakness, impaired endurance, impaired functional mobility, gait instability, impaired balance, impaired cognition, decreased lower extremity function .    PT attempted 2x- was eating lunch. Pt agreeable to PT. Min/mod assist for mobility, ambulated with RW 20ft with instability. OOB chair. Pt forgetful- stated he came from "[facility]" and will go back   Pt to benefit from continued therapies at SNF.    Rehab Prognosis: Fair; patient would benefit from acute skilled PT services to address these deficits and reach maximum level of function.    Recent Surgery: * No surgery found *      Plan:     During this hospitalization, patient to be seen 6 x/week to address the identified rehab impairments via gait training, therapeutic activities, therapeutic exercises and progress toward the following goals:    Plan of Care Expires:  11/30/22    Subjective   Pt stated that he was doing well at the other place and walking at hallways with walker  Chief Complaint: back hurts with long sitting  Patient/Family Comments/goals: get back to "[facility]"  Pain/Comfort:  Pain Rating 1: 0/10    Patients cultural, spiritual, Worship conflicts given the current situation:      Living Environment:  Admit from SNF   Prior to admission, patients level of function was assist for safe mobility.  Equipment used at home: wheelchair, walker, rolling.  DME owned (not currently used): none.  Upon discharge, patient will have assistance from SNF.    Objective:     Communicated with nurse Dseir prior to session.  Patient found " HOB elevated with bed alarm, peripheral IV, olguin catheter, telemetry  upon PT entry to room.    General Precautions: Standard, fall, contact   Orthopedic Precautions:N/A   Braces: N/A  Respiratory Status: Room air    Exams:  RLE ROM: WFL  RLE Strength: Deficits: 3/5  LLE ROM: WFL  LLE Strength: Deficits: 3/5    Functional Mobility:  Bed Mobility:     Rolling Left:  minimum assistance  Scooting: minimum assistance  Supine to Sit: minimum assistance  Transfers:     Sit to Stand:  minimum assistance with rolling walker  Bed to Chair: minimum assistance and moderate assistance with  rolling walker  using  Stand Pivot  Gait: 20ft with RW within room with min/mod assist due to instability      AM-PAC 6 CLICK MOBILITY  Total Score:16       Treatment & Education:  Patient was educated on the importance of OOB activity and functional mobility to negate negative effects of prolonged bed rest during hospitalization, safe transfers and ambulation, and D/C planning   OOB chair post PT with all needs within reach with chair alarm active    Patient left up in chair with all lines intact, call button in reach, chair alarm on, and nurse Hafsa present.    GOALS:   Multidisciplinary Problems       Physical Therapy Goals          Problem: Physical Therapy    Goal Priority Disciplines Outcome Goal Variances Interventions   Physical Therapy Goal     PT, PT/OT Ongoing, Progressing     Description: Goals to be met by: 2022     Patient will increase functional independence with mobility by performin. Supine to sit with Contact Guard Assistance  2. Sit to stand transfer with Contact Guard Assistance  3. Bed to chair transfer with Minimal Assistance using Rolling Walker  4. Gait  x 150 feet with Minimal Assistance using Rolling Walker.   5. Lower extremity exercise program x20 reps                        History:     Past Medical History:   Diagnosis Date    Anticoagulant long-term use     Arthritis     Colon polyp     Diabetes  mellitus     Diabetes mellitus, type 2     Fatty liver     Hypertension     Major depressive disorder 11/17/2020    -Continue Wellbutrin    PEG (percutaneous endoscopic gastrostomy) adjustment/replacement/removal 1/6/2021    Stroke 11/2020    left sided weakness    Urinary retention 9/7/2021       Past Surgical History:   Procedure Laterality Date    BACK SURGERY      COLONOSCOPY  07/18/2014    Dr. Crane: 22 colon polyps removed, hemorrhoids, erythema to sigmoid, repeat in 1 year for surveillance; biopsy: sigmoid erythema- showed unremarkable colonic mucosa, tubular adenomas and hyperplastic polyps    COLONOSCOPY N/A 5/3/2019    Procedure: COLONOSCOPY;  Surgeon: Guero Crane MD;  Location: [facility] ENDO;  Service: Endoscopy;  Laterality: N/A;    COLONOSCOPY N/A 5/6/2019    Procedure: COLONOSCOPY;  Surgeon: Guero Crane MD;  Location: [facility] ENDO;  Service: Endoscopy;  Laterality: N/A;    CYSTOSCOPY N/A 2/9/2022    Procedure: CYSTOSCOPY;  Surgeon: Jaylen Antonio Jr., MD;  Location: [facility] OR;  Service: Urology;  Laterality: N/A;    EPIDURAL STEROID INJECTION INTO THORACIC SPINE N/A 8/30/2019    Procedure: Injection-steroid-epidural-thoracic;  Surgeon: Frantz Green MD;  Location: [facility] OR;  Service: Pain Management;  Laterality: N/A;  T6-7    ESOPHAGOGASTRODUODENOSCOPY N/A 4/26/2019    Procedure: EGD (ESOPHAGOGASTRODUODENOSCOPY);  Surgeon: Guero Craen MD;  Location: [facility] ENDO;  Service: Endoscopy;  Laterality: N/A;    ESOPHAGOGASTRODUODENOSCOPY N/A 11/23/2020    Procedure: EGD (ESOPHAGOGASTRODUODENOSCOPY);  Surgeon: Guero Crane MD;  Location: [facility] ENDO;  Service: Endoscopy;  Laterality: N/A;    ESOPHAGOGASTRODUODENOSCOPY N/A 1/6/2021    Procedure: EGD (ESOPHAGOGASTRODUODENOSCOPY);  Surgeon: Guero Crane MD;  Location: [facility] ENDO;  Service: Endoscopy;  Laterality: N/A;    HERNIA REPAIR      TRANSRECTAL ULTRASOUND EXAMINATION N/A 2/9/2022    Procedure: ULTRASOUND, RECTAL APPROACH;  Surgeon: Jaylen PUENTE  Paco Barbosa MD;  Location: [facility] OR;  Service: Urology;  Laterality: N/A;  must text son miroslava, times and instructions given leave at 130       Time Tracking:     PT Received On: 11/04/22  PT Start Time: 1254     PT Stop Time: 1319  PT Total Time (min): 25 min     Billable Minutes: Evaluation 10 and Gait Training 15      11/04/2022

## 2022-11-05 VITALS
BODY MASS INDEX: 24.26 KG/M2 | HEIGHT: 77 IN | WEIGHT: 205.5 LBS | SYSTOLIC BLOOD PRESSURE: 135 MMHG | RESPIRATION RATE: 16 BRPM | HEART RATE: 79 BPM | OXYGEN SATURATION: 97 % | DIASTOLIC BLOOD PRESSURE: 81 MMHG | TEMPERATURE: 97 F

## 2022-11-05 PROBLEM — N39.0 COMPLICATED UTI (URINARY TRACT INFECTION): Status: ACTIVE | Noted: 2022-11-05

## 2022-11-05 LAB
ALBUMIN SERPL BCP-MCNC: 3 G/DL (ref 3.5–5.2)
ALP SERPL-CCNC: 72 U/L (ref 55–135)
ALT SERPL W/O P-5'-P-CCNC: 20 U/L (ref 10–44)
ANION GAP SERPL CALC-SCNC: 9 MMOL/L (ref 8–16)
AST SERPL-CCNC: 14 U/L (ref 10–40)
BASOPHILS # BLD AUTO: 0.03 K/UL (ref 0–0.2)
BASOPHILS NFR BLD: 0.6 % (ref 0–1.9)
BILIRUB SERPL-MCNC: 0.5 MG/DL (ref 0.1–1)
BUN SERPL-MCNC: 18 MG/DL (ref 8–23)
CALCIUM SERPL-MCNC: 9.1 MG/DL (ref 8.7–10.5)
CHLORIDE SERPL-SCNC: 104 MMOL/L (ref 95–110)
CO2 SERPL-SCNC: 23 MMOL/L (ref 23–29)
CREAT SERPL-MCNC: 0.9 MG/DL (ref 0.5–1.4)
DIFFERENTIAL METHOD: ABNORMAL
EOSINOPHIL # BLD AUTO: 0.2 K/UL (ref 0–0.5)
EOSINOPHIL NFR BLD: 3.2 % (ref 0–8)
ERYTHROCYTE [DISTWIDTH] IN BLOOD BY AUTOMATED COUNT: 14.9 % (ref 11.5–14.5)
EST. GFR  (NO RACE VARIABLE): >60 ML/MIN/1.73 M^2
GLUCOSE SERPL-MCNC: 180 MG/DL (ref 70–110)
HCT VFR BLD AUTO: 36.3 % (ref 40–54)
HGB BLD-MCNC: 11.7 G/DL (ref 14–18)
IMM GRANULOCYTES # BLD AUTO: 0.02 K/UL (ref 0–0.04)
IMM GRANULOCYTES NFR BLD AUTO: 0.4 % (ref 0–0.5)
LYMPHOCYTES # BLD AUTO: 1.2 K/UL (ref 1–4.8)
LYMPHOCYTES NFR BLD: 26 % (ref 18–48)
MAGNESIUM SERPL-MCNC: 1.9 MG/DL (ref 1.6–2.6)
MCH RBC QN AUTO: 28.2 PG (ref 27–31)
MCHC RBC AUTO-ENTMCNC: 32.2 G/DL (ref 32–36)
MCV RBC AUTO: 88 FL (ref 82–98)
MONOCYTES # BLD AUTO: 0.6 K/UL (ref 0.3–1)
MONOCYTES NFR BLD: 13.6 % (ref 4–15)
NEUTROPHILS # BLD AUTO: 2.6 K/UL (ref 1.8–7.7)
NEUTROPHILS NFR BLD: 56.2 % (ref 38–73)
NRBC BLD-RTO: 0 /100 WBC
PHOSPHATE SERPL-MCNC: 2.5 MG/DL (ref 2.7–4.5)
PLATELET # BLD AUTO: 157 K/UL (ref 150–450)
PMV BLD AUTO: 9.3 FL (ref 9.2–12.9)
POCT GLUCOSE: 161 MG/DL (ref 70–110)
POCT GLUCOSE: 173 MG/DL (ref 70–110)
POTASSIUM SERPL-SCNC: 4.1 MMOL/L (ref 3.5–5.1)
PROT SERPL-MCNC: 6.1 G/DL (ref 6–8.4)
RBC # BLD AUTO: 4.15 M/UL (ref 4.6–6.2)
SARS-COV-2 RDRP RESP QL NAA+PROBE: NORMAL
SODIUM SERPL-SCNC: 136 MMOL/L (ref 136–145)
WBC # BLD AUTO: 4.7 K/UL (ref 3.9–12.7)

## 2022-11-05 PROCEDURE — 80053 COMPREHEN METABOLIC PANEL: CPT | Performed by: STUDENT IN AN ORGANIZED HEALTH CARE EDUCATION/TRAINING PROGRAM

## 2022-11-05 PROCEDURE — 83735 ASSAY OF MAGNESIUM: CPT | Performed by: STUDENT IN AN ORGANIZED HEALTH CARE EDUCATION/TRAINING PROGRAM

## 2022-11-05 PROCEDURE — 25000003 PHARM REV CODE 250: Performed by: NURSE PRACTITIONER

## 2022-11-05 PROCEDURE — 96366 THER/PROPH/DIAG IV INF ADDON: CPT

## 2022-11-05 PROCEDURE — 36415 COLL VENOUS BLD VENIPUNCTURE: CPT | Performed by: STUDENT IN AN ORGANIZED HEALTH CARE EDUCATION/TRAINING PROGRAM

## 2022-11-05 PROCEDURE — 84100 ASSAY OF PHOSPHORUS: CPT | Performed by: STUDENT IN AN ORGANIZED HEALTH CARE EDUCATION/TRAINING PROGRAM

## 2022-11-05 PROCEDURE — 25000003 PHARM REV CODE 250: Performed by: STUDENT IN AN ORGANIZED HEALTH CARE EDUCATION/TRAINING PROGRAM

## 2022-11-05 PROCEDURE — 63600175 PHARM REV CODE 636 W HCPCS: Performed by: STUDENT IN AN ORGANIZED HEALTH CARE EDUCATION/TRAINING PROGRAM

## 2022-11-05 PROCEDURE — U0002 COVID-19 LAB TEST NON-CDC: HCPCS | Performed by: STUDENT IN AN ORGANIZED HEALTH CARE EDUCATION/TRAINING PROGRAM

## 2022-11-05 PROCEDURE — 85025 COMPLETE CBC W/AUTO DIFF WBC: CPT | Performed by: STUDENT IN AN ORGANIZED HEALTH CARE EDUCATION/TRAINING PROGRAM

## 2022-11-05 PROCEDURE — G0378 HOSPITAL OBSERVATION PER HR: HCPCS

## 2022-11-05 PROCEDURE — 97116 GAIT TRAINING THERAPY: CPT | Mod: CQ

## 2022-11-05 RX ORDER — BUPROPION HYDROCHLORIDE 450 MG/1
450 TABLET, FILM COATED, EXTENDED RELEASE ORAL DAILY
Start: 2022-11-05

## 2022-11-05 RX ORDER — SODIUM,POTASSIUM PHOSPHATES 280-250MG
1 POWDER IN PACKET (EA) ORAL
Status: DISCONTINUED | OUTPATIENT
Start: 2022-11-05 | End: 2022-11-05 | Stop reason: HOSPADM

## 2022-11-05 RX ORDER — MEROPENEM AND SODIUM CHLORIDE 1 G/50ML
1 INJECTION, SOLUTION INTRAVENOUS EVERY 8 HOURS
Qty: 1650 ML | Refills: 0
Start: 2022-11-05 | End: 2022-11-16

## 2022-11-05 RX ADMIN — MEROPENEM AND SODIUM CHLORIDE 1 G: 1 INJECTION, SOLUTION INTRAVENOUS at 02:11

## 2022-11-05 RX ADMIN — LOSARTAN POTASSIUM 25 MG: 25 TABLET, FILM COATED ORAL at 08:11

## 2022-11-05 RX ADMIN — ASPIRIN 81 MG: 81 TABLET, COATED ORAL at 08:11

## 2022-11-05 RX ADMIN — INSULIN ASPART 2 UNITS: 100 INJECTION, SOLUTION INTRAVENOUS; SUBCUTANEOUS at 08:11

## 2022-11-05 RX ADMIN — POLYETHYLENE GLYCOL 3350 17 G: 17 POWDER, FOR SOLUTION ORAL at 08:11

## 2022-11-05 RX ADMIN — POTASSIUM & SODIUM PHOSPHATES POWDER PACK 280-160-250 MG 1 PACKET: 280-160-250 PACK at 11:11

## 2022-11-05 RX ADMIN — MEROPENEM AND SODIUM CHLORIDE 1 G: 1 INJECTION, SOLUTION INTRAVENOUS at 09:11

## 2022-11-05 RX ADMIN — GABAPENTIN 300 MG: 300 CAPSULE ORAL at 08:11

## 2022-11-05 RX ADMIN — SENNOSIDES AND DOCUSATE SODIUM 1 TABLET: 50; 8.6 TABLET ORAL at 08:11

## 2022-11-05 RX ADMIN — ATORVASTATIN CALCIUM 40 MG: 40 TABLET, FILM COATED ORAL at 08:11

## 2022-11-05 RX ADMIN — CLOPIDOGREL BISULFATE 75 MG: 75 TABLET ORAL at 08:11

## 2022-11-05 RX ADMIN — FINASTERIDE 5 MG: 5 TABLET, FILM COATED ORAL at 08:11

## 2022-11-05 RX ADMIN — BUPROPION HYDROCHLORIDE 450 MG: 150 TABLET, FILM COATED, EXTENDED RELEASE ORAL at 08:11

## 2022-11-05 RX ADMIN — PANTOPRAZOLE SODIUM 40 MG: 40 TABLET, DELAYED RELEASE ORAL at 08:11

## 2022-11-05 NOTE — ASSESSMENT & PLAN NOTE
Noted, chronic  PT/OT consult  
 Patient is chronically on statin.will continue for now. Monitor clinically. Last LDL was   Lab Results   Component Value Date    LDLCALC 128.4 11/16/2020        
-Continue ASA, Plavix and statin    
-Continue Wellbutrin and Seroquel  
-Continue gabapentin  
-Continue home losartan  
-DNR  
-Gutierrez in place  -Continue finasteride    
-Meropenem  -Follow up urine culture    
-PT/OT consulted  
-Patient counseled on cessation      
-Replaced by Urology 11/3  -Follow up urine culture and continue meropenem    
-Replaced by Urology 11/3  -Follow up urine culture and continue meropenem    
-Replaced by Urology 11/3; follow up with Dr. Antonio    
-SSI  -AC HS glucose checks  -Hypoglycemic precautions  -Detemir 10 units QHS  -Diabetic diet  
Admit to obs  NPO after 11pm per Dr. Daniels, who will see patient in the morning  No IV fluids    
Advance Care Planning     Date: 11/03/2022    Code Status  In light of the patients advanced and life limiting illness,I engaged the the patient in a conversation about the patient's preferences for care  at the very end of life. The patient wishes to have a natural, peaceful death.  Along those lines, the patient does not wish to have CPR or other invasive treatments performed when his heart and/or breathing stops. I communicated to the patient that a DNR order would be placed in his medical record to reflect this preference.  I spent a total of 10 minutes engaging the patient in this advance care planning discussion.            
Chronic, controlled.  Latest blood pressure and vitals reviewed-   Temp:  [98.3 °F (36.8 °C)-99.1 °F (37.3 °C)]   Pulse:  []   Resp:  [16-18]   BP: (167-191)/(81-85)   SpO2:  [94 %-97 %] .   Home meds for hypertension were reviewed and noted below.   Hypertension Medications             losartan (COZAAR) 25 MG tablet 1 tablet DAILY (route: oral)          While in the hospital, will manage blood pressure as follows; Continue home antihypertensive regimen    Will utilize p.r.n. blood pressure medication only if patient's blood pressure greater than  180/110 and he develops symptoms such as worsening chest pain or shortness of breath.    
Dangers of cigarette smoking were reviewed with patient in detail for 5 minutes and patient was encouraged to quit. Nicotine replacement options were discussed.      
ESBL Klebsiella.  -Meropenem to complete a two week course    
Noted, chronic      
Noted, chronic  Continue home meds  
Patient's FSGs are controlled on current medication regimen.  Last A1c reviewed-   Lab Results   Component Value Date    HGBA1C 5.8 (H) 09/02/2022     Most recent fingerstick glucose reviewed-   Recent Labs   Lab 11/02/22 2129   POCTGLUCOSE 120*     Current correctional scale  Medium  Maintain anti-hyperglycemic dose as follows-   Antihyperglycemics (From admission, onward)    Start     Stop Route Frequency Ordered    11/03/22 2100  insulin detemir U-100 pen 10 Units         -- SubQ Nightly 11/02/22 2324    11/02/22 2308  insulin aspart U-100 pen 1-10 Units         -- SubQ Before meals & nightly PRN 11/02/22 2240        Hold Oral hypoglycemics while patient is in the hospital.  
Detail Level: Detailed
Additional Notes: Discussed treat options\\nDeclined prescription shampoo \\n\\nRecommend otc Vanicream Z bar
Render Risk Assessment In Note?: no

## 2022-11-05 NOTE — NURSING
[facility] here for picking up pt. Pt dressed . Gutierrez cath intact and draining  clear yellow urine in drainage bag. Midline in right upper arm with DDI. Flushed with 10cc of normal saline without difficulty, No redness or swelling at site. Patient dcd via wheelchair to [facility] transportation.

## 2022-11-05 NOTE — PLAN OF CARE
Problem: Physical Therapy  Goal: Physical Therapy Goal  Description: Goals to be met by: 2022     Patient will increase functional independence with mobility by performin. Supine to sit with Contact Guard Assistance  2. Sit to stand transfer with Contact Guard Assistance  3. Bed to chair transfer with Minimal Assistance using Rolling Walker  4. Gait  x 150 feet with Minimal Assistance using Rolling Walker.   5. Lower extremity exercise program x20 reps   Outcome: Ongoing, Progressing   Ambulate with rw and assistance for safety.

## 2022-11-05 NOTE — PT/OT/SLP PROGRESS
Physical Therapy Treatment    Patient Name:  Jhonny Almazan   MRN:  [id number]    Recommendations:     Discharge Recommendations:  nursing facility, skilled   Discharge Equipment Recommendations: none   Barriers to discharge: None    Assessment:     Jhonny Almazan is a 66 y.o. male admitted with a medical diagnosis of Complicated UTI (urinary tract infection).  He presents with the following impairments/functional limitations:  weakness, impaired endurance, impaired self care skills, impaired functional mobility, gait instability, impaired balance, decreased lower extremity function . Seated in chair at bedside. Reports being tired of bed as well as lack of sleep. Agreed top ambulate. Sit > stand with Min A.  Ambulated 30' with rw and Min A with verbal cues to stay  within walker . Chair follow for safety as patient needed to sit due to limited endurance.     Rehab Prognosis: Fair; patient would benefit from acute skilled PT services to address these deficits and reach maximum level of function.    Recent Surgery: * No surgery found *      Plan:     During this hospitalization, patient to be seen 6 x/week to address the identified rehab impairments via gait training, therapeutic activities, therapeutic exercises and progress toward the following goals:    Plan of Care Expires:  11/30/22    Subjective     Chief Complaint: tired  Patient/Family Comments/goals: none stated  Pain/Comfort:  Pain Rating 1: 0/10      Objective:     Communicated with nurse Moy prior to session.  Patient found up in chair with chair check, olguin catheter, telemetry, peripheral IV upon PT entry to room.     General Precautions: Standard, contact, fall   Orthopedic Precautions:N/A   Braces: N/A  Respiratory Status: Room air     Functional Mobility:  Transfers:     Sit to Stand:  minimum assistance with rolling walker  Gait: 30' with rw and Min A.      AM-PAC 6 CLICK MOBILITY          Treatment & Education:  Assistance required  sit <>stand .  Ambulated 30' with rw and Min A, returned to room in chair.      Patient left up in chair with all lines intact, call button in reach, chair alarm on, and nurse Farzaneh notified..    GOALS:   Multidisciplinary Problems       Physical Therapy Goals          Problem: Physical Therapy    Goal Priority Disciplines Outcome Goal Variances Interventions   Physical Therapy Goal     PT, PT/OT Ongoing, Progressing     Description: Goals to be met by: 2022     Patient will increase functional independence with mobility by performin. Supine to sit with Contact Guard Assistance  2. Sit to stand transfer with Contact Guard Assistance  3. Bed to chair transfer with Minimal Assistance using Rolling Walker  4. Gait  x 150 feet with Minimal Assistance using Rolling Walker.   5. Lower extremity exercise program x20 reps                        Time Tracking:     PT Received On: 22  PT Start Time: 1015     PT Stop Time: 1025  PT Total Time (min): 10 min     Billable Minutes: Gait Training 10min    Treatment Type: Treatment  PT/PTA: PTA     PTA Visit Number: 1     2022

## 2022-11-05 NOTE — DISCHARGE SUMMARY
[facility] Medicine  Discharge Summary      Patient Name: Jhonny Almazan  MRN: [id number]  STEVE: [id number]  Patient Class: OP- Observation  Admission Date: 11/2/2022  Hospital Length of Stay: 0 days  Discharge Date and Time: No discharge date for patient encounter.  Attending Physician: Lino Caldera MD   Discharging Provider: Lino Caldera MD  Primary Care Provider: Joey Whitehead MD    Primary Care Team: Networked reference to record PCT     HPI:   Jhonny Almazan is a 66 year old male with a past medical history of HTN, DM II, depression and BPH, who presents to the ED via EMS from [facility] for evaluation of urinary retention. He had a olguin catheter for the past two months and during a cathter exchange, the facility was unable to place another catheter. He reports stabbing sensation  to the penis with bloody penile discharge. He was sent to the ED, where multiple attempts were made to place a catheter, including different sizes and coude's, with no success. Dr. Daniels consulted by the ED, who requested the patient to be NPO after 11 pm for intervention in the morning. Hospital Medicine consulted for admission and further management.       * No surgery found *      Hospital Course:   Jhonny Almazan is a 66 year old male with a past medical history of CAV s/p neurogenic bladder with chronic indwelling Olguin catheter, HTN, DM, BPH, MDD, HLD, GERD, anemia, and neuropathy who presented with acute urinary retention necessitating Olguin replacement over a guidewire with cystoscopy per Urology. He is now draining urine. Urinalysis and WBC count are concerning for a UTI. Urine culture shows > 100,000 ESBL Klebsiella and the patient will complete a two week course of meropenem (midline access). He was discharged 11/5/2022 and will follow up with his PCP and with Urology (Dr. Antonio) to discuss suprapubic catheter insertion.       Goals of Care Treatment Preferences:  Code  Status: DNR      Consults:   Consults (From admission, onward)        Status Ordering Provider     Inpatient consult to Midline team  Once        Provider:  (Not yet assigned)    Completed MIRIAM LAURA     Inpatient consult to Urology  Once        Provider:  Kati Daniels MD    Completed SAMIRA JOSÉ          * Complicated UTI (urinary tract infection)  ESBL Klebsiella.  -Meropenem to complete a two week course      Type 2 diabetes mellitus with other specified complication  -SSI  -AC HS glucose checks  -Hypoglycemic precautions  -Detemir 10 units QHS  -Diabetic diet    Hypertension associated with diabetes  -Continue home losartan    Hyperlipidemia associated with type 2 diabetes mellitus  -Continue ASA, Plavix and statin      Nicotine dependence  -Patient counseled on cessation        ACP (advance care planning)  -DNR    History of stroke  -Continue ASA, Plavix and statin      Neuropathy due to type 2 diabetes mellitus  -Continue gabapentin    Debility  -PT/OT consulted    BPH (benign prostatic hyperplasia)  -Gutierrez in place  -Continue finasteride      Chronic indwelling Gutierrez catheter  -Replaced by Urology 11/3; follow up with Dr. Antonio      Major depressive disorder  -Continue Wellbutrin and Seroquel      Final Active Diagnoses:    Diagnosis Date Noted POA    PRINCIPAL PROBLEM:  Complicated UTI (urinary tract infection) [N39.0] 11/05/2022 No    Type 2 diabetes mellitus with other specified complication [E11.69] 09/19/2014 Yes    Hypertension associated with diabetes [E11.59, I15.2] 09/19/2014 Yes    Hyperlipidemia associated with type 2 diabetes mellitus [E11.69, E78.5] 09/28/2016 Yes     Chronic    Nicotine dependence [F17.200] 07/10/2014 Yes    ACP (advance care planning) [Z71.89] 11/03/2022 Not Applicable    History of stroke [Z86.73] 11/02/2022 Not Applicable    Neuropathy due to type 2 diabetes mellitus [E11.40] 08/06/2022 Yes    Debility [R53.81] 08/05/2022 Yes    BPH (benign  prostatic hyperplasia) [N40.0] 09/10/2021 Yes     Chronic    Chronic indwelling Gutierrez catheter [Z97.8] 09/10/2021 Not Applicable    Major depressive disorder [F32.9] 11/17/2020 Yes     Chronic      Problems Resolved During this Admission:    Diagnosis Date Noted Date Resolved POA    Acute urinary retention [R33.8] 09/07/2021 11/04/2022 Yes       Discharged Condition: stable    Disposition: Long Term Care    Follow Up:   Follow-up Information     Joey Whitehead MD Follow up in 2 week(s).    Specialty: Family Medicine  Contact information:  [address]  [place] [address]  [phone]             Jaylen Antonio Jr, MD Follow up in 2 week(s).    Specialty: Urology  Contact information:  [address] [place] [address]   [address]  [place] [address]  [phone]                       Patient Instructions:      Diet Cardiac     Diet diabetic     Notify your health care provider if you experience any of the following:  increased confusion or weakness     Notify your health care provider if you experience any of the following:  persistent dizziness, light-headedness, or visual disturbances     Notify your health care provider if you experience any of the following:  severe persistent headache     Notify your health care provider if you experience any of the following:  difficulty breathing or increased cough     Notify your health care provider if you experience any of the following:  severe uncontrolled pain     Notify your health care provider if you experience any of the following:  persistent nausea and vomiting or diarrhea     Notify your health care provider if you experience any of the following:  temperature >100.4     Activity as tolerated       Significant Diagnostic Studies: Labs: All labs within the past 24 hours have been reviewed    Pending Diagnostic Studies:     None         Medications:  Reconciled Home Medications:      Medication List      START taking these medications    meropenem-0.9% sodium chloride 1  gram/50 mL Pgbk  Inject 50 mLs (1 g total) into the vein every 8 (eight) hours. for 11 days        CHANGE how you take these medications    buPROPion  mg Tb24  Take 450 mg by mouth once daily.  What changed:   · medication strength  · how much to take        CONTINUE taking these medications    acetaminophen 325 MG tablet  Commonly known as: TYLENOL  Take 2 tablets (650 mg total) by mouth every 6 (six) hours as needed.     aspirin 81 MG EC tablet  Commonly known as: ECOTRIN  Take 1 tablet (81 mg total) by mouth once daily.     atorvastatin 40 MG tablet  Commonly known as: LIPITOR  Take 1 tablet (40 mg total) by mouth once daily.     blood sugar diagnostic Strp  To check BG 2 times daily, to use with insurance preferred meter. One touch Ultra.     clopidogreL 75 mg tablet  Commonly known as: PLAVIX  1 tablet (75 mg total) by Per G Tube route once daily.     finasteride 5 mg tablet  Commonly known as: PROSCAR  Take 1 tablet (5 mg total) by mouth once daily. Take 1 tablet by mouth in am to keep prostate from enlarging     gabapentin 300 MG capsule  Commonly known as: NEURONTIN  Take 1 capsule (300 mg total) by mouth 2 (two) times daily.     insulin aspart U-100 100 unit/mL (3 mL) Inpn pen  Commonly known as: NovoLOG  Inject 1-10 Units into the skin as needed (high blood sugar). Inject per sliding scale.     insulin detemir U-100 100 unit/mL (3 mL) Inpn pen  Commonly known as: Levemir FLEXTOUCH  Inject 10 Units into the skin every evening.     lancets Misc  To check BG 2 times daily, to use with insurance preferred meter.One Touch Ultra     losartan 25 MG tablet  Commonly known as: COZAAR  1 tablet DAILY (route: oral)     meclizine 25 mg tablet  Commonly known as: ANTIVERT  Take 1 tablet (25 mg total) by mouth 3 (three) times daily as needed for Dizziness.     melatonin 3 mg tablet  Commonly known as: MELATIN  Take 2 tablets (6 mg total) by mouth nightly as needed for Insomnia.     omeprazole 40 MG  "capsule  Commonly known as: PRILOSEC  TAKE ONE CAPSULE (40 MG) BY MOUTH EVERY DAY     pen needle, diabetic 32 gauge x 5/32" Ndle  Use AC meals 2 a day     polyethylene glycol 17 gram Pwpk  Commonly known as: GLYCOLAX  Take 17 g by mouth 2 (two) times daily.     QUEtiapine 25 MG Tab  Commonly known as: SEROQUEL  Take 1 tablet (25 mg total) by mouth every evening.     senna-docusate 8.6-50 mg 8.6-50 mg per tablet  Commonly known as: PERICOLACE  Take 1 tablet by mouth 2 (two) times daily.     tetrahydrozoline 0.05% 0.05 % Drop  Commonly known as: Vision Clear  Place 2 drops into both eyes 4 (four) times daily as needed (eye irritation).            Indwelling Lines/Drains at time of discharge:   Lines/Drains/Airways     Drain  Duration                Urethral Catheter 11/03/22 0339 Straight-tip 16 Fr. 2 days                Time spent on the discharge of patient: 34 minutes         Lino Caldera MD  Department of Hospital Medicine  [facility]  "

## 2022-11-05 NOTE — PLAN OF CARE
Per Alex at [facility], report can be called to [phone] ask for nurse Lizz. They are providing transportation ETA 3pm. Pt's nurse made aware.    Pt clear for DC from CM standpoint       11/05/22 1436   Final Note   Assessment Type Final Discharge Note   Anticipated Discharge Disposition [facility]

## 2022-11-05 NOTE — SUBJECTIVE & OBJECTIVE
"Interval History: see "Hospital Course"    Review of Systems   Constitutional:  Negative for chills and fever.   HENT:  Negative for congestion and sore throat.    Eyes:  Negative for visual disturbance.   Respiratory:  Negative for cough and shortness of breath.    Cardiovascular:  Negative for chest pain and palpitations.   Gastrointestinal:  Negative for abdominal pain, constipation, diarrhea, nausea and vomiting.   Endocrine: Negative for cold intolerance and heat intolerance.   Genitourinary:  Negative for decreased urine volume, difficulty urinating, dysuria, hematuria and urgency.   Musculoskeletal:  Negative for arthralgias and myalgias.   Skin:  Negative for rash.   Neurological:  Negative for tremors and seizures.   Hematological:  Negative for adenopathy. Does not bruise/bleed easily.   All other systems reviewed and are negative.  Objective:     Vital Signs (Most Recent):  Temp: 97.4 °F (36.3 °C) (11/05/22 0758)  Pulse: 83 (11/05/22 0758)  Resp: 16 (11/05/22 0758)  BP: 133/80 (11/05/22 0758)  SpO2: 96 % (11/05/22 0758) Vital Signs (24h Range):  Temp:  [97.4 °F (36.3 °C)-99.2 °F (37.3 °C)] 97.4 °F (36.3 °C)  Pulse:  [63-87] 83  Resp:  [16-18] 16  SpO2:  [94 %-99 %] 96 %  BP: (124-164)/(65-80) 133/80     Weight: 93.2 kg (205 lb 7.5 oz)  Body mass index is 24.37 kg/m².    Intake/Output Summary (Last 24 hours) at 11/5/2022 1017  Last data filed at 11/5/2022 0709  Gross per 24 hour   Intake 2105.72 ml   Output 4725 ml   Net -2619.28 ml      Physical Exam  Vitals and nursing note reviewed.   Constitutional:       General: He is awake. He is not in acute distress.     Appearance: Normal appearance. He is well-developed. He is ill-appearing.   HENT:      Head: Normocephalic and atraumatic.      Right Ear: External ear normal.      Left Ear: External ear normal.      Nose: Nose normal. No septal deviation.      Mouth/Throat:      Mouth: Mucous membranes are moist.      Pharynx: Oropharynx is clear.   Eyes:      " Extraocular Movements: Extraocular movements intact.      Conjunctiva/sclera: Conjunctivae normal.   Neck:      Thyroid: No thyroid mass.      Vascular: No JVD.      Trachea: No tracheal tenderness or tracheal deviation.   Cardiovascular:      Rate and Rhythm: Normal rate and regular rhythm.      Pulses: Normal pulses.      Heart sounds: Normal heart sounds, S1 normal and S2 normal. No murmur heard.    No friction rub. No gallop.   Pulmonary:      Effort: Pulmonary effort is normal.      Breath sounds: Normal breath sounds. No decreased breath sounds, wheezing, rhonchi or rales.   Abdominal:      General: Bowel sounds are normal. There is no distension.      Palpations: Abdomen is soft. There is no hepatomegaly, splenomegaly or mass.      Tenderness: There is no abdominal tenderness.   Genitourinary:     Comments: Gutierrez.  Musculoskeletal:      Cervical back: Full passive range of motion without pain, normal range of motion and neck supple.      Right lower leg: No edema.      Left lower leg: No edema.   Skin:     General: Skin is warm.      Findings: No rash.   Neurological:      General: No focal deficit present.      Mental Status: He is alert and oriented to person, place, and time.      Cranial Nerves: No cranial nerve deficit.      Sensory: No sensory deficit.   Psychiatric:         Mood and Affect: Mood normal.         Behavior: Behavior normal. Behavior is cooperative.       Significant Labs: All pertinent labs within the past 24 hours have been reviewed.    Significant Imaging: I have reviewed all pertinent imaging results/findings within the past 24 hours.

## 2022-11-05 NOTE — PROGRESS NOTES
"[facility] Hospital Medicine  Progress Note    Patient Name: Jhonny Almazan  MRN: [id number]  Patient Class: OP- Observation   Admission Date: 11/2/2022  Length of Stay: 0 days  Attending Physician: Lino Caldera MD  Primary Care Provider: Joey Whitehead MD        Subjective:     Principal Problem:Complicated UTI (urinary tract infection)        HPI:  Jhonny Almazan is a 66 year old male with a past medical history of HTN, DM II, depression and BPH, who presents to the ED via EMS from [facility] for evaluation of urinary retention. He had a olguin catheter for the past two months and during a cathter exchange, the facility was unable to place another catheter. He reports stabbing sensation  to the penis with bloody penile discharge. He was sent to the ED, where multiple attempts were made to place a catheter, including different sizes and coude's, with no success. Dr. Daniels consulted by the ED, who requested the patient to be NPO after 11 pm for intervention in the morning. Hospital Medicine consulted for admission and further management.       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 He is now draining urine. Urinalysis and WBC count are concerning for a UTI. Urine culture shows > 100,000 GNRs and the patient is on meropenem given a history of ESBL infections.      Interval History: see "Hospital Course"    Review of Systems   Constitutional:  Negative for chills and fever.   HENT:  Negative for congestion and sore throat.    Eyes:  Negative for visual disturbance.   Respiratory:  Negative for cough and shortness of breath.    Cardiovascular:  Negative for chest pain and palpitations. "   Gastrointestinal:  Negative for abdominal pain, constipation, diarrhea, nausea and vomiting.   Endocrine: Negative for cold intolerance and heat intolerance.   Genitourinary:  Negative for decreased urine volume, difficulty urinating, dysuria, hematuria and urgency.   Musculoskeletal:  Negative for arthralgias and myalgias.   Skin:  Negative for rash.   Neurological:  Negative for tremors and seizures.   Hematological:  Negative for adenopathy. Does not bruise/bleed easily.   All other systems reviewed and are negative.  Objective:     Vital Signs (Most Recent):  Temp: 97.4 °F (36.3 °C) (11/05/22 0758)  Pulse: 83 (11/05/22 0758)  Resp: 16 (11/05/22 0758)  BP: 133/80 (11/05/22 0758)  SpO2: 96 % (11/05/22 0758) Vital Signs (24h Range):  Temp:  [97.4 °F (36.3 °C)-99.2 °F (37.3 °C)] 97.4 °F (36.3 °C)  Pulse:  [63-87] 83  Resp:  [16-18] 16  SpO2:  [94 %-99 %] 96 %  BP: (124-164)/(65-80) 133/80     Weight: 93.2 kg (205 lb 7.5 oz)  Body mass index is 24.37 kg/m².    Intake/Output Summary (Last 24 hours) at 11/5/2022 1017  Last data filed at 11/5/2022 0709  Gross per 24 hour   Intake 2105.72 ml   Output 4725 ml   Net -2619.28 ml      Physical Exam  Vitals and nursing note reviewed.   Constitutional:       General: He is awake. He is not in acute distress.     Appearance: Normal appearance. He is well-developed. He is ill-appearing.   HENT:      Head: Normocephalic and atraumatic.      Right Ear: External ear normal.      Left Ear: External ear normal.      Nose: Nose normal. No septal deviation.      Mouth/Throat:      Mouth: Mucous membranes are moist.      Pharynx: Oropharynx is clear.   Eyes:      Extraocular Movements: Extraocular movements intact.      Conjunctiva/sclera: Conjunctivae normal.   Neck:      Thyroid: No thyroid mass.      Vascular: No JVD.      Trachea: No tracheal tenderness or tracheal deviation.   Cardiovascular:      Rate and Rhythm: Normal rate and regular rhythm.      Pulses: Normal pulses.       Heart sounds: Normal heart sounds, S1 normal and S2 normal. No murmur heard.    No friction rub. No gallop.   Pulmonary:      Effort: Pulmonary effort is normal.      Breath sounds: Normal breath sounds. No decreased breath sounds, wheezing, rhonchi or rales.   Abdominal:      General: Bowel sounds are normal. There is no distension.      Palpations: Abdomen is soft. There is no hepatomegaly, splenomegaly or mass.      Tenderness: There is no abdominal tenderness.   Genitourinary:     Comments: Gutierrez.  Musculoskeletal:      Cervical back: Full passive range of motion without pain, normal range of motion and neck supple.      Right lower leg: No edema.      Left lower leg: No edema.   Skin:     General: Skin is warm.      Findings: No rash.   Neurological:      General: No focal deficit present.      Mental Status: He is alert and oriented to person, place, and time.      Cranial Nerves: No cranial nerve deficit.      Sensory: No sensory deficit.   Psychiatric:         Mood and Affect: Mood normal.         Behavior: Behavior normal. Behavior is cooperative.       Significant Labs: All pertinent labs within the past 24 hours have been reviewed.    Significant Imaging: I have reviewed all pertinent imaging results/findings within the past 24 hours.      Assessment/Plan:      * Complicated UTI (urinary tract infection)  -Meropenem  -Follow up urine culture      Type 2 diabetes mellitus with other specified complication  -SSI  -AC HS glucose checks  -Hypoglycemic precautions  -Detemir 10 units QHS  -Diabetic diet    Hypertension associated with diabetes  -Continue home losartan    Hyperlipidemia associated with type 2 diabetes mellitus  -Continue ASA, Plavix and statin      Nicotine dependence  -Patient counseled on cessation        ACP (advance care planning)  -DNR    History of stroke  -Continue ASA, Plavix and statin      Neuropathy due to type 2 diabetes mellitus  -Continue gabapentin    Debility  -PT/OT  consulted    BPH (benign prostatic hyperplasia)  -Gutierrez in place  -Continue finasteride      Chronic indwelling Gutierrez catheter  -Replaced by Urology 11/3  -Follow up urine culture and continue meropenem      Major depressive disorder  -Continue Wellbutrin and Seroquel    Orthostatic hypotension          VTE Risk Mitigation (From admission, onward)         Ordered     IP VTE LOW RISK PATIENT  Once         11/02/22 2240     Place sequential compression device  Until discontinued         11/02/22 2240                Discharge Planning   YUDITH: 11/5/2022     Code Status: DNR   Is the patient medically ready for discharge?:     Reason for patient still in hospital (select all that apply): Laboratory test and Treatment  Discharge Plan A: Skilled Nursing Facility                  Lino Caldera MD  Department of Hospital Medicine   [facility]

## 2022-11-05 NOTE — PLAN OF CARE
ALFREDO faxed DC packet to [facility]. Spoke with Cristian. She is reviewing and will call me back with who will be receiving report.       11/05/22 1328   Post-Acute Status   Post-Acute Authorization Placement   Post-Acute Placement Status Set-up Complete/Auth obtained

## 2022-11-06 LAB
BACTERIA UR CULT: ABNORMAL
BACTERIA UR CULT: ABNORMAL

## 2022-11-09 ENCOUNTER — PATIENT OUTREACH (OUTPATIENT)
Dept: ADMINISTRATIVE | Facility: HOSPITAL | Age: 67
End: 2022-11-09
Payer: MEDICARE

## 2022-11-09 NOTE — LETTER
AUTHORIZATION FOR RELEASE OF   CONFIDENTIAL INFORMATION    Dear Dr. Aldana,    We are seeing Jhonny Almazan, date of birth 1955, in the clinic at [facility]. Joey Whitehead MD is the patient's PCP. Jhonny Almazan has an outstanding lab/procedure at the time we reviewed his chart. In order to help keep his health information updated, he has authorized us to request the following medical record(s):                                               ( X )  EYE EXAM - most recent             Please fax records to [facility], Raymond Baez, MD, [phone]     If you have any questions, please contact [facility] at [phone].           Patient Name: Jhonny Almazan  : 1955  Patient Phone #: [phone]      [address] for 12%

## 2022-11-09 NOTE — PROGRESS NOTES
Eye exam gap report review. Most recent eye exam requested from Dr. Aldana.    Called patient regarding scheduling/ overdue HM. No answer. Unable to leave message as recording said voicemail was not set up. Letter mailed regarding same.    Health Maintenance Due   Topic Date Due    COVID-19 Vaccine (1) Never done    High Dose Statin  Never done    Shingles Vaccine (1 of 2) Never done    Eye Exam  06/18/2020    Diabetes Urine Screening  09/13/2020    Lipid Panel  11/16/2021    Colorectal Cancer Screening  05/06/2022    Influenza Vaccine (1) 09/01/2022    Foot Exam  11/05/2022

## 2022-11-09 NOTE — LETTER
November 9, 2022    Jhonny Almazan  [id number]  [place] [address]             [facility]  [address]  [place] [address]  Phone: [phone] Jhonny Morro Almazan  [id number]  [place] [address]    Dear Abhishek Baileyseugene is committed to your overall health. To help you get the most out of each of your visits, we will review your information to make sure you are up to date on all of your recommended tests and/or procedures.      Dr. Joey Whitehead MD  has found that your chart shows you may be due for:   Health Maintenance Due   Topic Date Due    COVID-19 Vaccine (1) Never done    High Dose Statin  Never done    Shingles Vaccine (1 of 2) Never done    Eye Exam  06/18/2020    Diabetes Urine Screening  09/13/2020    Lipid Panel  11/16/2021    Colorectal Cancer Screening  05/06/2022    Influenza Vaccine (1) 09/01/2022    Foot Exam  11/05/2022   If you have had any of the above done at another facility, please let me know and I will request a copy of the report so that your record at [facility] will be complete. If you would like to schedule any of these, please contact the clinic at [phone].    Thank You,    Your [facility] Team,  MD Emelina Jones LPN,[facility]  [facility]  [address] [address]  Phone [phone]  Fax [phone]

## 2022-11-30 ENCOUNTER — OFFICE VISIT (OUTPATIENT)
Dept: UROLOGY | Facility: CLINIC | Age: 67
End: 2022-11-30
Payer: MEDICARE

## 2022-11-30 VITALS
HEART RATE: 76 BPM | BODY MASS INDEX: 24.21 KG/M2 | WEIGHT: 205 LBS | SYSTOLIC BLOOD PRESSURE: 121 MMHG | HEIGHT: 77 IN | DIASTOLIC BLOOD PRESSURE: 69 MMHG

## 2022-11-30 DIAGNOSIS — R33.9 URINE RETENTION: Primary | ICD-10-CM

## 2022-11-30 PROCEDURE — 99215 OFFICE O/P EST HI 40 MIN: CPT | Mod: PBBFAC,PN | Performed by: UROLOGY

## 2022-11-30 PROCEDURE — 99999 PR PBB SHADOW E&M-EST. PATIENT-LVL V: ICD-10-PCS | Mod: PBBFAC,,, | Performed by: UROLOGY

## 2022-11-30 PROCEDURE — 51702 PR INSERTION OF TEMPORARY INDWELLING BLADDER CATHETER, SIMPLE: ICD-10-PCS | Mod: S$PBB,,, | Performed by: UROLOGY

## 2022-11-30 PROCEDURE — 99499 UNLISTED E&M SERVICE: CPT | Mod: S$PBB,,, | Performed by: UROLOGY

## 2022-11-30 PROCEDURE — 99499 NO LOS: ICD-10-PCS | Mod: S$PBB,,, | Performed by: UROLOGY

## 2022-11-30 PROCEDURE — 51702 INSERT TEMP BLADDER CATH: CPT | Mod: PBBFAC,PN | Performed by: UROLOGY

## 2022-11-30 PROCEDURE — 51702 INSERT TEMP BLADDER CATH: CPT | Mod: S$PBB,,, | Performed by: UROLOGY

## 2022-11-30 PROCEDURE — 99999 PR PBB SHADOW E&M-EST. PATIENT-LVL V: CPT | Mod: PBBFAC,,, | Performed by: UROLOGY

## 2022-11-30 RX ORDER — SODIUM CHLORIDE 900 MG/100ML
INJECTION INTRAVENOUS
COMMUNITY
Start: 2022-11-15

## 2022-11-30 NOTE — PROGRESS NOTES
[facility] Established Patient/H&P:    Jhonny Almazan is a 66 y.o. male who presents for follow up for urinary retention.     Patient presented to the emergency department on 11/16/20 with dizziness. He was subsequently admitted for CVA complicated by urinary retention requiring olguin catheter placement. Unsure how much urine was obtained after catheter was placed.      He was discharged to rehab with the olguin catheter in place and states it was exchanged once prior to discharge home. He states that his neurological function had been improving.      He reported that prior to his stroke he was urinating every hour. Started on Flomax 0.4 mg on 11/25/20.      Patient is on ASA and Plavix.        Interval History     1/20/21: Patient remains with a olguin catheter in place. Here for voiding trial. Has remained on Flomax 0.4 mg PO daily.      1/21/22: Underwent unsuccessful voiding trial in 2/2021. Subsequently admitted in 9/2021. He was evaluated by Dr. Daniels. On review of record, his Flomax was increased to 0.8 mg and he was started on Finasteride 5 mg PO daily. Plan was for voiding trial at his nursing home which has not been done.      Per his home health nurse, he has not been compliant with his medication. Remains with a olguin in place.  CT chest abdomen pelvis on 9/1/21 with mineral densities in the bladder.      8/4/22: Patient s/p cystoscopy and transrectal ultrasound on 2/9/22 which revealed a 40 cc prostate with no significant prostate obstruction identified. Mild trabeculations. Olguin replaced. Decision was to manage with long-term olguin catheter as his retention was felt to be secondary to neurogenic bladder occurring after his stroke. He has since presented to the [facility] emergency department on UTI. Attempts were made to exchange the olguin catheter in the ER, but they were unsuccessful. Urology consulted to assist with management. He is unsure when his catheter was  last exchanged as he has not had home health in months. Several recent UTIs - recurrent Klebsiella. UA in the ED with 4 RBC, 46 WBC and many bacteria. Required olguin catheter placement over a wire.     Has no family support or reliable transportation.     11/30/22: He has since presented to the hospital again in 11/20/22 from [place]. They could not change the olguin. Evaluated by Dr. Daniels who performed a bedside cystoscopy and was able to place the olguin catheter over a wire on 11/3/22. Here for follow up and to discuss management of his urinary retention. Remains with his catheter in place.     Denies any fever, chills, bone pain, unintentional weight loss,  trauma or history of  malignancy.         PSA  0.44                 3/23/20  0.48                 4/27/18  0.37                 7/23/10  0.3                   1/28/08     Urine culture  Klebsiella 11/3/22  Klebsiella         6/27/22  Klebsiella         5/16/22  Klebsiella         2/9/22  No growth        1/21/22  Pseudomonas 11/5/21    Past Medical History:   Diagnosis Date    Anticoagulant long-term use     Arthritis     Colon polyp     Diabetes mellitus     Diabetes mellitus, type 2     Fatty liver     Hypertension     Major depressive disorder 11/17/2020    -Continue Wellbutrin    PEG (percutaneous endoscopic gastrostomy) adjustment/replacement/removal 1/6/2021    Stroke 11/2020    left sided weakness    Urinary retention 9/7/2021       Past Surgical History:   Procedure Laterality Date    BACK SURGERY      COLONOSCOPY  07/18/2014    Dr. Miles: 22 colon polyps removed, hemorrhoids, erythema to sigmoid, repeat in 1 year for surveillance; biopsy: sigmoid erythema- showed unremarkable colonic mucosa, tubular adenomas and hyperplastic polyps    COLONOSCOPY N/A 5/3/2019    Procedure: COLONOSCOPY;  Surgeon: Guero Miles MD;  Location: [facility];  Service: Endoscopy;  Laterality: N/A;    COLONOSCOPY N/A 5/6/2019    Procedure: COLONOSCOPY;   "Surgeon: Guero Crane MD;  Location: [facility] ENDO;  Service: Endoscopy;  Laterality: N/A;    CYSTOSCOPY N/A 2/9/2022    Procedure: CYSTOSCOPY;  Surgeon: Jaylen Antonio Jr., MD;  Location: [facility] OR;  Service: Urology;  Laterality: N/A;    EPIDURAL STEROID INJECTION INTO THORACIC SPINE N/A 8/30/2019    Procedure: Injection-steroid-epidural-thoracic;  Surgeon: Frantz Green MD;  Location: [facility] OR;  Service: Pain Management;  Laterality: N/A;  T6-7    ESOPHAGOGASTRODUODENOSCOPY N/A 4/26/2019    Procedure: EGD (ESOPHAGOGASTRODUODENOSCOPY);  Surgeon: Guero Crane MD;  Location: [facility] ENDO;  Service: Endoscopy;  Laterality: N/A;    ESOPHAGOGASTRODUODENOSCOPY N/A 11/23/2020    Procedure: EGD (ESOPHAGOGASTRODUODENOSCOPY);  Surgeon: Guero Crane MD;  Location: [facility] ENDO;  Service: Endoscopy;  Laterality: N/A;    ESOPHAGOGASTRODUODENOSCOPY N/A 1/6/2021    Procedure: EGD (ESOPHAGOGASTRODUODENOSCOPY);  Surgeon: Guero Crane MD;  Location: [facility];  Service: Endoscopy;  Laterality: N/A;    HERNIA REPAIR      TRANSRECTAL ULTRASOUND EXAMINATION N/A 2/9/2022    Procedure: ULTRASOUND, RECTAL APPROACH;  Surgeon: Jaylen Antonio Jr., MD;  Location: [facility] OR;  Service: Urology;  Laterality: N/A;  must text son miroslava, times and instructions given leave at 130       Review of patient's allergies indicates:  No Known Allergies    Medications Reviewed: see MAR    FOCUSED PHYSICAL EXAM:    Vitals:    11/30/22 1103   BP: 121/69   Pulse: 76     Body mass index is 24.31 kg/m². Weight: 93 kg (205 lb) Height: 6' 5" (195.6 cm)       General: Alert, cooperative, no distress, appears stated age  Abdomen: Soft, non-tender, no CVA tenderness, non-distended  : Gutierrez in place with clear urine      LABS:    No results found for this or any previous visit (from the past 336 hour(s)).        Assessment/Diagnosis:    1. Urine retention  Place in Outpatient    Case Request Operating Room: INSERTION, SUPRAPUBIC CATHETER    Vital Signs     Diet " NPO    Place sequential compression device    Basic metabolic panel    CBC auto differential    EKG 12-lead    Diet NPO          Plans:    - I spent 40 minutes of the day of this encounter preparing for, treating and managing this patient. Extensive discussion with patient regarding his chronic urinary retention. Explained that this is likely neurogenic bladder. We discussed that he will most likely require long-term catheterization. Thus, he would benefit from suprapubic catheter placement as it has been difficult exchanging his urethral catheter.   - 16 [place] olguin exchanged per nursing at bedside today without difficulty.   - Scheduled for SPT placement and cysto on 12/13/22. Clearance to hold blood thinners.

## 2022-11-30 NOTE — H&P (VIEW-ONLY)
[facility] Established Patient/H&P:    Jhonny Almazan is a 66 y.o. male who presents for follow up for urinary retention.     Patient presented to the emergency department on 11/16/20 with dizziness. He was subsequently admitted for CVA complicated by urinary retention requiring olguin catheter placement. Unsure how much urine was obtained after catheter was placed.      He was discharged to rehab with the olguin catheter in place and states it was exchanged once prior to discharge home. He states that his neurological function had been improving.      He reported that prior to his stroke he was urinating every hour. Started on Flomax 0.4 mg on 11/25/20.      Patient is on ASA and Plavix.        Interval History     1/20/21: Patient remains with a olguin catheter in place. Here for voiding trial. Has remained on Flomax 0.4 mg PO daily.      1/21/22: Underwent unsuccessful voiding trial in 2/2021. Subsequently admitted in 9/2021. He was evaluated by Dr. Daniels. On review of record, his Flomax was increased to 0.8 mg and he was started on Finasteride 5 mg PO daily. Plan was for voiding trial at his nursing home which has not been done.      Per his home health nurse, he has not been compliant with his medication. Remains with a olguin in place.  CT chest abdomen pelvis on 9/1/21 with mineral densities in the bladder.      8/4/22: Patient s/p cystoscopy and transrectal ultrasound on 2/9/22 which revealed a 40 cc prostate with no significant prostate obstruction identified. Mild trabeculations. Olguin replaced. Decision was to manage with long-term olguin catheter as his retention was felt to be secondary to neurogenic bladder occurring after his stroke. He has since presented to the [facility] emergency department on UTI. Attempts were made to exchange the olguin catheter in the ER, but they were unsuccessful. Urology consulted to assist with management. He is unsure when his catheter was  last exchanged as he has not had home health in months. Several recent UTIs - recurrent Klebsiella. UA in the ED with 4 RBC, 46 WBC and many bacteria. Required olguin catheter placement over a wire.     Has no family support or reliable transportation.     11/30/22: He has since presented to the hospital again in 11/20/22 from [facility]. They could not change the olguin. Evaluated by Dr. Daniels who performed a bedside cystoscopy and was able to place the olguin catheter over a wire on 11/3/22. Here for follow up and to discuss management of his urinary retention. Remains with his catheter in place.     Denies any fever, chills, bone pain, unintentional weight loss,  trauma or history of  malignancy.         PSA  0.44                 3/23/20  0.48                 4/27/18  0.37                 7/23/10  0.3                   1/28/08     Urine culture  Klebsiella 11/3/22  Klebsiella         6/27/22  Klebsiella         5/16/22  Klebsiella         2/9/22  No growth        1/21/22  Pseudomonas 11/5/21    Past Medical History:   Diagnosis Date    Anticoagulant long-term use     Arthritis     Colon polyp     Diabetes mellitus     Diabetes mellitus, type 2     Fatty liver     Hypertension     Major depressive disorder 11/17/2020    -Continue Wellbutrin    PEG (percutaneous endoscopic gastrostomy) adjustment/replacement/removal 1/6/2021    Stroke 11/2020    left sided weakness    Urinary retention 9/7/2021       Past Surgical History:   Procedure Laterality Date    BACK SURGERY      COLONOSCOPY  07/18/2014    Dr. Miles: 22 colon polyps removed, hemorrhoids, erythema to sigmoid, repeat in 1 year for surveillance; biopsy: sigmoid erythema- showed unremarkable colonic mucosa, tubular adenomas and hyperplastic polyps    COLONOSCOPY N/A 5/3/2019    Procedure: COLONOSCOPY;  Surgeon: Guero Miles MD;  Location: [facility];  Service: Endoscopy;  Laterality: N/A;    COLONOSCOPY N/A 5/6/2019    Procedure: COLONOSCOPY;   "Surgeon: Guero Crane MD;  Location: [facility] ENDO;  Service: Endoscopy;  Laterality: N/A;    CYSTOSCOPY N/A 2/9/2022    Procedure: CYSTOSCOPY;  Surgeon: Jaylen Antonio Jr., MD;  Location: [facility] beds at the [facility] OR;  Service: Urology;  Laterality: N/A;    EPIDURAL STEROID INJECTION INTO THORACIC SPINE N/A 8/30/2019    Procedure: Injection-steroid-epidural-thoracic;  Surgeon: Frantz Green MD;  Location: [facility] beds at the [facility] OR;  Service: Pain Management;  Laterality: N/A;  T6-7    ESOPHAGOGASTRODUODENOSCOPY N/A 4/26/2019    Procedure: EGD (ESOPHAGOGASTRODUODENOSCOPY);  Surgeon: Guero Crane MD;  Location: [facility] ENDO;  Service: Endoscopy;  Laterality: N/A;    ESOPHAGOGASTRODUODENOSCOPY N/A 11/23/2020    Procedure: EGD (ESOPHAGOGASTRODUODENOSCOPY);  Surgeon: Guero Crane MD;  Location: [facility] ENDO;  Service: Endoscopy;  Laterality: N/A;    ESOPHAGOGASTRODUODENOSCOPY N/A 1/6/2021    Procedure: EGD (ESOPHAGOGASTRODUODENOSCOPY);  Surgeon: Guero Crane MD;  Location: [facility];  Service: Endoscopy;  Laterality: N/A;    HERNIA REPAIR      TRANSRECTAL ULTRASOUND EXAMINATION N/A 2/9/2022    Procedure: ULTRASOUND, RECTAL APPROACH;  Surgeon: Jaylen Antonio Jr., MD;  Location: [facility] beds at the [facility] OR;  Service: Urology;  Laterality: N/A;  must text son miroslava, times and instructions given leave at 130       Review of patient's allergies indicates:  No Known Allergies    Medications Reviewed: see MAR    FOCUSED PHYSICAL EXAM:    Vitals:    11/30/22 1103   BP: 121/69   Pulse: 76     Body mass index is 24.31 kg/m². Weight: 93 kg (205 lb) Height: 6' 5" (195.6 cm)       General: Alert, cooperative, no distress, appears stated age  Abdomen: Soft, non-tender, no CVA tenderness, non-distended  : Gutierrez in place with clear urine      LABS:    No results found for this or any previous visit (from the past 336 hour(s)).        Assessment/Diagnosis:    1. Urine retention  Place in Outpatient    Case Request Operating Room: INSERTION, SUPRAPUBIC CATHETER    Vital Signs     Diet " NPO    Place sequential compression device    Basic metabolic panel    CBC auto differential    EKG 12-lead    Diet NPO          Plans:    - I spent 40 minutes of the day of this encounter preparing for, treating and managing this patient. Extensive discussion with patient regarding his chronic urinary retention. Explained that this is likely neurogenic bladder. We discussed that he will most likely require long-term catheterization. Thus, he would benefit from suprapubic catheter placement as it has been difficult exchanging his urethral catheter.   - 16 [place] olguin exchanged per nursing at bedside today without difficulty.   - Scheduled for SPT placement and cysto on 12/13/22. Clearance to hold blood thinners.

## 2022-12-12 ENCOUNTER — ANESTHESIA EVENT (OUTPATIENT)
Dept: SURGERY | Facility: HOSPITAL | Age: 67
End: 2022-12-12
Payer: MEDICARE

## 2022-12-12 RX ORDER — CYCLOBENZAPRINE HCL 10 MG
TABLET ORAL
COMMUNITY
Start: 2022-11-07

## 2022-12-12 RX ORDER — FLUOXETINE HYDROCHLORIDE 20 MG/1
40 CAPSULE ORAL EVERY MORNING
COMMUNITY
Start: 2022-07-07

## 2022-12-12 RX ORDER — LISINOPRIL 20 MG/1
20 TABLET ORAL
COMMUNITY
Start: 2022-07-07

## 2022-12-12 NOTE — DISCHARGE INSTRUCTIONS
"Follow up: Return to clinic in 5 weeks for catheter exchange.  Instructions: Back to nursing facility with Keflex x 3 days. Ok to resume blood thinners.       Discharge Instructions: After Your Surgery/Procedure  Youve just had surgery. During surgery you were given medicine called anesthesia to keep you relaxed and free of pain. After surgery you may have some pain or nausea. This is common. Here are some tips for feeling better and getting well after surgery.     Stay on schedule with your medication.   Going home  Your doctor or nurse will show you how to take care of yourself when you go home. He or she will also answer your questions. Have an adult family member or friend drive you home.      For your safety we recommend these precaution for the first 24 hours after your procedure:  Do not drive or use heavy equipment.  Do not make important decisions or sign legal papers.  Do not drink alcohol.  Have someone stay with you, if needed. He or she can watch for problems and help keep you safe.  Your concentration, balance, coordination, and judgement may be impaired for many hours after anesthesia.  Use caution when ambulating or standing up.     You may feel weak and "washed out" after anesthesia and surgery.      Subtle residual effects of general anesthesia or sedation with regional / local anesthesia can last more than 24 hours.  Rest for the remainder of the day or longer if your Doctor/Surgeon has advised you to do so.  Although you may feel normal within the first 24 hours, your reflexes and mental ability may be impaired without you realizing it.  You may feel dizzy, lightheaded or sleepy for 24 hours or longer.      Be sure to go to all follow-up visits with your doctor. And rest after your surgery for as long as your doctor tells you to.  Coping with pain  If you have pain after surgery, pain medicine will help you feel better. Take it as told, before pain becomes severe. Also, ask your doctor or " pharmacist about other ways to control pain. This might be with heat, ice, or relaxation. And follow any other instructions your surgeon or nurse gives you.  Tips for taking pain medicine  To get the best relief possible, remember these points:  Pain medicines can upset your stomach. Taking them with a little food may help.  Most pain relievers taken by mouth need at least 20 to 30 minutes to start to work.  Taking medicine on a schedule can help you remember to take it. Try to time your medicine so that you can take it before starting an activity. This might be before you get dressed, go for a walk, or sit down for dinner.  Constipation is a common side effect of pain medicines. Call your doctor before taking any medicines such as laxatives or stool softeners to help ease constipation. Also ask if you should skip any foods. Drinking lots of fluids and eating foods such as fruits and vegetables that are high in fiber can also help. Remember, do not take laxatives unless your surgeon has prescribed them.  Drinking alcohol and taking pain medicine can cause dizziness and slow your breathing. It can even be deadly. Do not drink alcohol while taking pain medicine.  Pain medicine can make you react more slowly to things. Do not drive or run machinery while taking pain medicine.  Your health care provider may tell you to take acetaminophen to help ease your pain. Ask him or her how much you are supposed to take each day. Acetaminophen or other pain relievers may interact with your prescription medicines or other over-the-counter (OTC) drugs. Some prescription medicines have acetaminophen and other ingredients. Using both prescription and OTC acetaminophen for pain can cause you to overdose. Read the labels on your OTC medicines with care. This will help you to clearly know the list of ingredients, how much to take, and any warnings. It may also help you not take too much acetaminophen. If you have questions or do not  understand the information, ask your pharmacist or health care provider to explain it to you before you take the OTC medicine.  Managing nausea  Some people have an upset stomach after surgery. This is often because of anesthesia, pain, or pain medicine, or the stress of surgery. These tips will help you handle nausea and eat healthy foods as you get better. If you were on a special food plan before surgery, ask your doctor if you should follow it while you get better. These tips may help:  Do not push yourself to eat. Your body will tell you when to eat and how much.  Start off with clear liquids and soup. They are easier to digest.  Next try semi-solid foods, such as mashed potatoes, applesauce, and gelatin, as you feel ready.  Slowly move to solid foods. Dont eat fatty, rich, or spicy foods at first.  Do not force yourself to have 3 large meals a day. Instead eat smaller amounts more often.  Take pain medicines with a small amount of solid food, such as crackers or toast, to avoid nausea.     Call your surgeon if  You still have pain an hour after taking medicine. The medicine may not be strong enough.  You feel too sleepy, dizzy, or groggy. The medicine may be too strong.  You have side effects like nausea, vomiting, or skin changes, such as rash, itching, or hives.       If you have obstructive sleep apnea  You were given anesthesia medicine during surgery to keep you comfortable and free of pain. After surgery, you may have more apnea spells because of this medicine and other medicines you were given. The spells may last longer than usual.   At home:  Keep using the continuous positive airway pressure (CPAP) device when you sleep. Unless your health care provider tells you not to, use it when you sleep, day or night. CPAP is a common device used to treat obstructive sleep apnea.  Talk with your provider before taking any pain medicine, muscle relaxants, or sedatives. Your provider will tell you about the  possible dangers of taking these medicines.  © 2105-7776 [employer]. [address] [place] [address], [place] [address]. All rights reserved. This information is not intended as a substitute for professional medical care. Always follow your healthcare professional's instructions.         Using an Incentive Spirometer    An incentive spirometer is a device that helps you do deep breathing exercises. These exercises expand your lungs, aid in circulation, and help prevent pneumonia. Deep breathing exercises also help you breathe better and improve the function of your lungs by:  Keeping your lungs clear  Strengthening your breathing muscles  Helping prevent respiratory complications or problems  The incentive spirometer gives you a way to take an active part in recover. A nurse or therapist will teach you breathing exercises. To do these exercises, you will breathe in through your mouth and not your nose. The incentive spirometer only works correctly if you breathe in through your mouth.  Steps to clear lungs  Step 1. Exhale normally. Then, inhale normally.  Relax and breathe out.  Step 2. Place your lips tightly around the mouthpiece.  Make sure the device is upright and not tilted.  Step 3. Inhale as much air as you can through the mouthpiece (don't breath through your nose).  Inhale slowly and deeply.  Hold your breath long enough to keep the balls or disk raised for at least 3 to 5 seconds, or as instructed by your healthcare provider.  Some spirometers have an indicator to let you know that you are breathing in too fast. If the indicator goes off, breathe in more slowly.  Step 4. Repeat the exercise regularly.  Do this exercise every hour while you're awake, or as instructed by your healthcare provider.  If you were taught deep breathing and coughing exercises, do them regularly as instructed by your healthcare provider.              Post op instructions for prevention of DVT  What is deep vein  thrombosis?  Deep vein thrombosis (DVT) is the medical term for blood clots in the deep veins of the leg.  These blood clots can be dangerous.  A DVT can block a blood vessel and keep blood from getting where it needs to go.  Another problem is that the clot can travel to other parts of the body such as the lungs.  A clot that travels to the lungs is called a pulmonary embolus (PE) and can cause serious problems with breathing which can lead to death.  Am I at risk for DVT/PE?  If you are not very active, you are at risk of DVT.  Anyone confined to bed, sitting for long periods of time, recovering from surgery, etc. increases the risk of DVT.  Other risk factors are cancer diagnosis, certain medications, estrogen replacement in any form,older age, obesity, pregnancy, smoking, history of clotting disorders, and dehydration.  How will I know if I have a DVT?  Swelling in the lower leg  Pain  Warmth, redness, hardness or bulging of the vein  If you have any of these symptoms, call your doctors office right away.  Some people will not have any symptoms until the clot moves to the lungs.  What are the symptoms of a PE?  Panting, shortness of breath, or trouble breathing  Sharp, knife-like chest pain when you breathe  Coughing or coughing up blood  Rapid heartbeat  If you have any of these symptoms or get worse quickly, call 911 for emergency treatment.  How can I prevent a DVT?  Avoid long periods of inactivity and dont cross your legs--get up and walk around every hour or so.  Stay active--walking after surgery is highly encouraged.  This means you should get out of the house and walk in the neighborhood.  Going up and down stairs will not impair healing (unless advised against such activity by your doctor).    Drink plenty of noncaffeinated, nonalcoholic fluids each day to prevent dehydration.  Wear special support stockings, if they have been advised by your doctor.  If you travel, stop at least once an hour and  walk around.  Avoid smoking (assistance with stopping is available through your healthcare provider)  Always notify your doctor if you are not able to follow the post operative instructions that are given to you at the time of discharge.  It may be necessary to prescribe one of the medications available to prevent DVT.

## 2022-12-13 ENCOUNTER — HOSPITAL ENCOUNTER (OUTPATIENT)
Facility: HOSPITAL | Age: 67
Discharge: NURSING, REHAB OR LONG-TERM CARE | End: 2022-12-13
Attending: UROLOGY | Admitting: UROLOGY
Payer: MEDICARE

## 2022-12-13 ENCOUNTER — ANESTHESIA (OUTPATIENT)
Dept: SURGERY | Facility: HOSPITAL | Age: 67
End: 2022-12-13
Payer: MEDICARE

## 2022-12-13 VITALS
RESPIRATION RATE: 18 BRPM | HEART RATE: 61 BPM | WEIGHT: 214 LBS | SYSTOLIC BLOOD PRESSURE: 144 MMHG | BODY MASS INDEX: 25.27 KG/M2 | OXYGEN SATURATION: 98 % | HEIGHT: 77 IN | TEMPERATURE: 98 F | DIASTOLIC BLOOD PRESSURE: 76 MMHG

## 2022-12-13 DIAGNOSIS — N31.9 NEUROGENIC BLADDER: ICD-10-CM

## 2022-12-13 DIAGNOSIS — R33.9 URINE RETENTION: Primary | ICD-10-CM

## 2022-12-13 LAB
ALBUMIN SERPL BCP-MCNC: 3.7 G/DL (ref 3.5–5.2)
ALP SERPL-CCNC: 97 U/L (ref 55–135)
ALT SERPL W/O P-5'-P-CCNC: 31 U/L (ref 10–44)
ANION GAP SERPL CALC-SCNC: 9 MMOL/L (ref 8–16)
APTT BLDCRRT: 31.9 SEC (ref 21–32)
AST SERPL-CCNC: 15 U/L (ref 10–40)
BASOPHILS # BLD AUTO: 0.09 K/UL (ref 0–0.2)
BASOPHILS NFR BLD: 1 % (ref 0–1.9)
BILIRUB SERPL-MCNC: 0.6 MG/DL (ref 0.1–1)
BUN SERPL-MCNC: 20 MG/DL (ref 8–23)
CALCIUM SERPL-MCNC: 9.6 MG/DL (ref 8.7–10.5)
CHLORIDE SERPL-SCNC: 107 MMOL/L (ref 95–110)
CO2 SERPL-SCNC: 26 MMOL/L (ref 23–29)
CREAT SERPL-MCNC: 0.9 MG/DL (ref 0.5–1.4)
DIFFERENTIAL METHOD: ABNORMAL
EOSINOPHIL # BLD AUTO: 0.3 K/UL (ref 0–0.5)
EOSINOPHIL NFR BLD: 3.4 % (ref 0–8)
ERYTHROCYTE [DISTWIDTH] IN BLOOD BY AUTOMATED COUNT: 14.7 % (ref 11.5–14.5)
EST. GFR  (NO RACE VARIABLE): >60 ML/MIN/1.73 M^2
GLUCOSE SERPL-MCNC: 178 MG/DL (ref 70–110)
HCT VFR BLD AUTO: 40.3 % (ref 40–54)
HGB BLD-MCNC: 13.2 G/DL (ref 14–18)
IMM GRANULOCYTES # BLD AUTO: 0.02 K/UL (ref 0–0.04)
IMM GRANULOCYTES NFR BLD AUTO: 0.2 % (ref 0–0.5)
INR PPP: 1 (ref 0.8–1.2)
LYMPHOCYTES # BLD AUTO: 2.3 K/UL (ref 1–4.8)
LYMPHOCYTES NFR BLD: 25.7 % (ref 18–48)
MCH RBC QN AUTO: 28.5 PG (ref 27–31)
MCHC RBC AUTO-ENTMCNC: 32.8 G/DL (ref 32–36)
MCV RBC AUTO: 87 FL (ref 82–98)
MONOCYTES # BLD AUTO: 0.6 K/UL (ref 0.3–1)
MONOCYTES NFR BLD: 6.4 % (ref 4–15)
NEUTROPHILS # BLD AUTO: 5.6 K/UL (ref 1.8–7.7)
NEUTROPHILS NFR BLD: 63.3 % (ref 38–73)
NRBC BLD-RTO: 0 /100 WBC
PLATELET # BLD AUTO: 199 K/UL (ref 150–450)
PMV BLD AUTO: 9.5 FL (ref 9.2–12.9)
POCT GLUCOSE: 166 MG/DL (ref 70–110)
POTASSIUM SERPL-SCNC: 4 MMOL/L (ref 3.5–5.1)
PROT SERPL-MCNC: 7 G/DL (ref 6–8.4)
PROTHROMBIN TIME: 10.6 SEC (ref 9–12.5)
RBC # BLD AUTO: 4.63 M/UL (ref 4.6–6.2)
SODIUM SERPL-SCNC: 142 MMOL/L (ref 136–145)
WBC # BLD AUTO: 8.84 K/UL (ref 3.9–12.7)

## 2022-12-13 PROCEDURE — 93010 EKG 12-LEAD: ICD-10-PCS | Mod: ,,, | Performed by: INTERNAL MEDICINE

## 2022-12-13 PROCEDURE — D9220A PRA ANESTHESIA: ICD-10-PCS | Mod: ANES,,, | Performed by: ANESTHESIOLOGY

## 2022-12-13 PROCEDURE — 80053 COMPREHEN METABOLIC PANEL: CPT | Performed by: UROLOGY

## 2022-12-13 PROCEDURE — 85730 THROMBOPLASTIN TIME PARTIAL: CPT | Performed by: ANESTHESIOLOGY

## 2022-12-13 PROCEDURE — 63600175 PHARM REV CODE 636 W HCPCS: Performed by: UROLOGY

## 2022-12-13 PROCEDURE — 85025 COMPLETE CBC W/AUTO DIFF WBC: CPT | Performed by: UROLOGY

## 2022-12-13 PROCEDURE — 36000706: Performed by: UROLOGY

## 2022-12-13 PROCEDURE — 36415 COLL VENOUS BLD VENIPUNCTURE: CPT | Performed by: ANESTHESIOLOGY

## 2022-12-13 PROCEDURE — 63600175 PHARM REV CODE 636 W HCPCS: Performed by: NURSE ANESTHETIST, CERTIFIED REGISTERED

## 2022-12-13 PROCEDURE — 99900103 DSU ONLY-NO CHARGE-INITIAL HR (STAT): Performed by: UROLOGY

## 2022-12-13 PROCEDURE — 99900104 DSU ONLY-NO CHARGE-EA ADD'L HR (STAT): Performed by: UROLOGY

## 2022-12-13 PROCEDURE — 36000707: Performed by: UROLOGY

## 2022-12-13 PROCEDURE — 37000009 HC ANESTHESIA EA ADD 15 MINS: Performed by: UROLOGY

## 2022-12-13 PROCEDURE — 51102 DRAIN BL W/CATH INSERTION: CPT | Mod: ,,, | Performed by: UROLOGY

## 2022-12-13 PROCEDURE — 85610 PROTHROMBIN TIME: CPT | Performed by: ANESTHESIOLOGY

## 2022-12-13 PROCEDURE — 93010 ELECTROCARDIOGRAM REPORT: CPT | Mod: ,,, | Performed by: INTERNAL MEDICINE

## 2022-12-13 PROCEDURE — 25000003 PHARM REV CODE 250: Performed by: ANESTHESIOLOGY

## 2022-12-13 PROCEDURE — D9220A PRA ANESTHESIA: ICD-10-PCS | Mod: CRNA,,, | Performed by: NURSE ANESTHETIST, CERTIFIED REGISTERED

## 2022-12-13 PROCEDURE — D9220A PRA ANESTHESIA: Mod: ANES,,, | Performed by: ANESTHESIOLOGY

## 2022-12-13 PROCEDURE — 93005 ELECTROCARDIOGRAM TRACING: CPT | Mod: 59

## 2022-12-13 PROCEDURE — 37000008 HC ANESTHESIA 1ST 15 MINUTES: Performed by: UROLOGY

## 2022-12-13 PROCEDURE — 94799 UNLISTED PULMONARY SVC/PX: CPT

## 2022-12-13 PROCEDURE — C1769 GUIDE WIRE: HCPCS | Performed by: UROLOGY

## 2022-12-13 PROCEDURE — 51102 PR ASPIRATION BLADDER INSERT SUPRAPUBIC CATHETER: ICD-10-PCS | Mod: ,,, | Performed by: UROLOGY

## 2022-12-13 PROCEDURE — 25000003 PHARM REV CODE 250: Performed by: UROLOGY

## 2022-12-13 PROCEDURE — 71000015 HC POSTOP RECOV 1ST HR: Performed by: UROLOGY

## 2022-12-13 PROCEDURE — 27200651 HC AIRWAY, LMA: Performed by: ANESTHESIOLOGY

## 2022-12-13 PROCEDURE — 71000033 HC RECOVERY, INTIAL HOUR: Performed by: UROLOGY

## 2022-12-13 PROCEDURE — 25000003 PHARM REV CODE 250: Performed by: NURSE ANESTHETIST, CERTIFIED REGISTERED

## 2022-12-13 PROCEDURE — 71000039 HC RECOVERY, EACH ADD'L HOUR: Performed by: UROLOGY

## 2022-12-13 PROCEDURE — D9220A PRA ANESTHESIA: Mod: CRNA,,, | Performed by: NURSE ANESTHETIST, CERTIFIED REGISTERED

## 2022-12-13 RX ORDER — PHENYLEPHRINE HYDROCHLORIDE 10 MG/ML
INJECTION INTRAVENOUS
Status: DISCONTINUED | OUTPATIENT
Start: 2022-12-13 | End: 2022-12-13

## 2022-12-13 RX ORDER — CEFAZOLIN SODIUM 2 G/50ML
2 SOLUTION INTRAVENOUS
Status: DISCONTINUED | OUTPATIENT
Start: 2022-12-13 | End: 2022-12-13 | Stop reason: HOSPADM

## 2022-12-13 RX ORDER — GENTAMICIN SULFATE 80 MG/100ML
80 INJECTION, SOLUTION INTRAVENOUS
Status: DISCONTINUED | OUTPATIENT
Start: 2022-12-13 | End: 2022-12-13 | Stop reason: HOSPADM

## 2022-12-13 RX ORDER — FENTANYL CITRATE 50 UG/ML
INJECTION, SOLUTION INTRAMUSCULAR; INTRAVENOUS
Status: DISCONTINUED | OUTPATIENT
Start: 2022-12-13 | End: 2022-12-13

## 2022-12-13 RX ORDER — CEPHALEXIN 500 MG/1
500 CAPSULE ORAL EVERY 8 HOURS
Qty: 9 CAPSULE | Refills: 0 | Status: SHIPPED | OUTPATIENT
Start: 2022-12-13 | End: 2022-12-13 | Stop reason: SDUPTHER

## 2022-12-13 RX ORDER — DEXAMETHASONE SODIUM PHOSPHATE 4 MG/ML
INJECTION, SOLUTION INTRA-ARTICULAR; INTRALESIONAL; INTRAMUSCULAR; INTRAVENOUS; SOFT TISSUE
Status: DISCONTINUED | OUTPATIENT
Start: 2022-12-13 | End: 2022-12-13

## 2022-12-13 RX ORDER — LIDOCAINE HCL/PF 100 MG/5ML
SYRINGE (ML) INTRAVENOUS
Status: DISCONTINUED | OUTPATIENT
Start: 2022-12-13 | End: 2022-12-13

## 2022-12-13 RX ORDER — ONDANSETRON 2 MG/ML
4 INJECTION INTRAMUSCULAR; INTRAVENOUS ONCE AS NEEDED
Status: DISCONTINUED | OUTPATIENT
Start: 2022-12-13 | End: 2022-12-13 | Stop reason: HOSPADM

## 2022-12-13 RX ORDER — PROPOFOL 10 MG/ML
VIAL (ML) INTRAVENOUS
Status: DISCONTINUED | OUTPATIENT
Start: 2022-12-13 | End: 2022-12-13

## 2022-12-13 RX ORDER — ONDANSETRON HYDROCHLORIDE 2 MG/ML
INJECTION, SOLUTION INTRAMUSCULAR; INTRAVENOUS
Status: DISCONTINUED | OUTPATIENT
Start: 2022-12-13 | End: 2022-12-13

## 2022-12-13 RX ORDER — CEPHALEXIN 500 MG/1
500 CAPSULE ORAL EVERY 8 HOURS
Qty: 9 CAPSULE | Refills: 0 | Status: SHIPPED | OUTPATIENT
Start: 2022-12-13 | End: 2022-12-16

## 2022-12-13 RX ORDER — FENTANYL CITRATE 50 UG/ML
25 INJECTION, SOLUTION INTRAMUSCULAR; INTRAVENOUS EVERY 5 MIN PRN
Status: DISCONTINUED | OUTPATIENT
Start: 2022-12-13 | End: 2022-12-13 | Stop reason: HOSPADM

## 2022-12-13 RX ORDER — LIDOCAINE HYDROCHLORIDE 10 MG/ML
1 INJECTION, SOLUTION EPIDURAL; INFILTRATION; INTRACAUDAL; PERINEURAL ONCE
Status: DISCONTINUED | OUTPATIENT
Start: 2022-12-13 | End: 2022-12-13 | Stop reason: HOSPADM

## 2022-12-13 RX ORDER — OXYCODONE HYDROCHLORIDE 5 MG/1
5 TABLET ORAL ONCE AS NEEDED
Status: DISCONTINUED | OUTPATIENT
Start: 2022-12-13 | End: 2022-12-13 | Stop reason: HOSPADM

## 2022-12-13 RX ADMIN — SODIUM CHLORIDE, SODIUM GLUCONATE, SODIUM ACETATE, POTASSIUM CHLORIDE, MAGNESIUM CHLORIDE, SODIUM PHOSPHATE, DIBASIC, AND POTASSIUM PHOSPHATE: .53; .5; .37; .037; .03; .012; .00082 INJECTION, SOLUTION INTRAVENOUS at 09:12

## 2022-12-13 RX ADMIN — DEXAMETHASONE SODIUM PHOSPHATE 4 MG: 4 INJECTION, SOLUTION INTRA-ARTICULAR; INTRALESIONAL; INTRAMUSCULAR; INTRAVENOUS; SOFT TISSUE at 11:12

## 2022-12-13 RX ADMIN — ERTAPENEM 1 G: 1 INJECTION, POWDER, LYOPHILIZED, FOR SOLUTION INTRAMUSCULAR; INTRAVENOUS at 11:12

## 2022-12-13 RX ADMIN — FENTANYL CITRATE 50 MCG: 0.05 INJECTION, SOLUTION INTRAMUSCULAR; INTRAVENOUS at 11:12

## 2022-12-13 RX ADMIN — PHENYLEPHRINE HYDROCHLORIDE 200 MCG: 10 INJECTION INTRAVENOUS at 11:12

## 2022-12-13 RX ADMIN — LIDOCAINE HYDROCHLORIDE 100 MG: 20 INJECTION INTRAVENOUS at 11:12

## 2022-12-13 RX ADMIN — PROPOFOL 120 MG: 10 INJECTION, EMULSION INTRAVENOUS at 11:12

## 2022-12-13 RX ADMIN — PHENYLEPHRINE HYDROCHLORIDE 100 MCG: 10 INJECTION INTRAVENOUS at 11:12

## 2022-12-13 RX ADMIN — ONDANSETRON 4 MG: 2 INJECTION INTRAMUSCULAR; INTRAVENOUS at 11:12

## 2022-12-13 NOTE — TRANSFER OF CARE
"Anesthesia Transfer of Care Note    Patient: Jhonny Almazan    Procedure(s) Performed: Procedure(s) (LRB):  INSERTION, SUPRAPUBIC CATHETER (N/A)    Patient location: PACU    Anesthesia Type: general    Transport from OR: Transported from OR on 2-3 L/min O2 by NC with adequate spontaneous ventilation    Post pain: adequate analgesia    Post assessment: no apparent anesthetic complications    Post vital signs: stable    Level of consciousness: awake and alert    Nausea/Vomiting: no nausea/vomiting    Complications: none    Transfer of care protocol was followed      Last vitals:   Visit Vitals  BP (!) 146/77 (BP Location: Left arm, Patient Position: Lying)   Pulse 74   Temp 36.5 °C (97.7 °F) (Temporal)   Resp 19   Ht 6' 5" (1.956 m)   Wt 97.1 kg (214 lb)   SpO2 96%   BMI 25.38 kg/m²     "

## 2022-12-13 NOTE — ANESTHESIA PREPROCEDURE EVALUATION
12/13/2022  Jhonny Almazan is a 67 y.o., male.      Pre-op Assessment    I have reviewed the Patient Summary Reports.     I have reviewed the Nursing Notes. I have reviewed the NPO Status.   I have reviewed the Medications.     Review of Systems  Anesthesia Hx:  No problems with previous Anesthesia Denies Hx of Anesthetic complications Extended care facility     Social:  Non-Smoker    Cardiovascular:   Denies Hypertension.  Denies MI.  Denies CAD.    Denies CABG/stent.   Denies Angina.    Pulmonary:   Denies COPD.  Denies Asthma.  Denies Recent URI.    Renal/:   Denies Chronic Renal Disease.     Hepatic/GI:   Denies GERD. Denies Liver Disease.    Neurological:   Denies TIA. Denies CVA. Neuromuscular Disease,  Denies Seizures. 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    Psych:   Denies Psychiatric History.          Physical Exam  General: Well nourished, Cooperative, Alert and Oriented    Airway:  Mallampati: II / II  Mouth Opening: Normal  TM Distance: 4 - 6 cm  Tongue: Normal    Dental:  Intact    Chest/Lungs:  Clear to auscultation, Normal Respiratory Rate    Heart:  Rate: Normal  Rhythm: Regular Rhythm  Sounds: Normal        Anesthesia Plan  Type of Anesthesia, risks & benefits discussed:    Anesthesia Type: Gen Natural Airway  Intra-op Monitoring Plan: Standard ASA Monitors  Induction:  IV  Informed Consent: Informed consent signed with the Patient and all parties understand the risks and agree with anesthesia plan.  All questions answered.   ASA Score: 3    Ready For Surgery From Anesthesia Perspective.     .

## 2022-12-13 NOTE — PLAN OF CARE
Patient transferred to postop at this time.  AAOX3.  NAD noted.  Tolerating po intake well with no complaints of nausea/vomiting.  Pain 0/10.  Suprapubic cath remains in place and draining well with light red urine.

## 2022-12-13 NOTE — PLAN OF CARE
Received from the waiting room. Patient alone in a wheelchair, assisted to undress and prepare for the procedure. Labs drawn and ekg to be done. Patient provided with warm blanket,safety maintained with siderails up, call light in reach.

## 2022-12-13 NOTE — PLAN OF CARE
1301 received pt aaox3 with nadn. Denies any needs or c/o. Sitting up in bed drinking water.  1316 [facility] called and report on pt given and requested transport back to facility  1330 pt states ready to d/c home. Vss. See assess. Tolerating po fluids and crackers without coleman n/v. Denies any pain or needs or c/o. Nadn. Drsg cdi and olguin draining clr red urine. D/c instructions called to [facility] and given to. Both v/u. Awaiting arrival of [facility] transport. Assisted pt with dressing into street clothes.  1350 d/c'd out to [facility] transportation via pt's personal wheelchair with nadn.

## 2022-12-13 NOTE — ANESTHESIA PROCEDURE NOTES
Intubation    Date/Time: 12/13/2022 11:27 AM  Performed by: Frantz Morales CRNA  Authorized by: Vince Winston MD     Intubation:     Induction:  Intravenous    Intubated:  Postinduction    Mask Ventilation:  Easy mask    Attempts:  1    Attempted By:  CRNA    Difficult Airway Encountered?: No      Complications:  None    Airway Device:  Supraglottic airway/LMA    Airway Device Size:  4.0    Secured at:  The lips    Placement Verified By:  Capnometry    Complicating Factors:  None    Findings Post-Intubation:  BS equal bilateral and atraumatic/condition of teeth unchanged

## 2022-12-13 NOTE — ANESTHESIA POSTPROCEDURE EVALUATION
Anesthesia Post Evaluation    Patient: Jhonny Almazan    Procedure(s) Performed: Procedure(s) (LRB):  INSERTION, SUPRAPUBIC CATHETER (N/A)    Final Anesthesia Type: general      Patient location during evaluation: PACU  Patient participation: Yes- Able to Participate  Level of consciousness: awake and alert and oriented  Post-procedure vital signs: reviewed and stable  Pain management: adequate  Airway patency: patent    PONV status at discharge: No PONV  Anesthetic complications: no      Cardiovascular status: blood pressure returned to baseline  Respiratory status: unassisted, spontaneous ventilation and room air  Hydration status: euvolemic  Follow-up not needed.          Vitals Value Taken Time   /61 12/13/22 1212   Temp  12/13/22 1217   Pulse 50 12/13/22 1217   Resp 7 12/13/22 1217   SpO2 100 % 12/13/22 1217   Vitals shown include unvalidated device data.      No case tracking events are documented in the log.      Pain/Jonny Score: No data recorded

## 2022-12-13 NOTE — OP NOTE
[facility]  Operative/Discharge Note    Date: 12/13/2022    Pre-Op Diagnosis: Neurogenic bladder    Post-Op Diagnosis: Same    Procedure(s) Performed:   1.  Cystoscopy   2.  Suprapubic catheter placement - 16 Micronesian    Specimen(s): None    Staff Surgeon: Jaylen Antonio MD    Assistant Surgeon: Jalyen Antonio Jr, MD    Anesthesia: General    Indications: Jhonny Almazan is a 67 y.o. male with neurogenic bladder requiring long-term olguin complicated by several difficult olguin placements.     Findings: Unremarkable suprapubic tube placement.     Estimated Blood Loss: min    Drains: None    Procedure in Detail:  After risks, benefits and possible complications of cystoscopy were explained, the patient elected to undergo the procedure and informed consent was obtained. All questions were answered in the valeria-operative area. The patient was transferred to the cystoscopy suite, induced with general anesthesia and placed in the supine position.  The patient was prepped and draped in the usual sterile fashion.  Time out was performed.    A flexible cystoscope was introduced into the bladder per urethra. This passed easily.  Suprapubic area prepped. 18 Gauge needle placed with syringe into the bladder under direct visualization and aspirated. Motion wire placed into the bladder without difficulty. 2 cm incision made and the tract was dilated from 8 Micronesian to 20 Micronesian using the Cook suprapubic tube set. A 16 Micronesian catheter was then placed next to the wire into the bladder.      Balloon inflated with 10 cc sterile water and connected to drainage bag. Clear urine in tubing.      The patient tolerated the procedure well and was transferred to recovery in stable condition.    The patient tolerated the procedure well and was transferred to recovery in stable condition.    Disposition: Home    Discharge home today status post uncomplicated procedure as above  Diet - resume home diet  Follow up: RTC in 5 weeks for  catheter exchange.  Instructions: Back to nursing facility with Keflex x 3 days. Ok to resume blood thinners.   Meds:     Medication List        START taking these medications      cephALEXin 500 MG capsule  Commonly known as: KEFLEX  Take 1 capsule (500 mg total) by mouth every 8 (eight) hours. for 3 days            CONTINUE taking these medications      acetaminophen 325 MG tablet  Commonly known as: TYLENOL  Take 2 tablets (650 mg total) by mouth every 6 (six) hours as needed.     aspirin 81 MG EC tablet  Commonly known as: ECOTRIN  Take 1 tablet (81 mg total) by mouth once daily.     atorvastatin 40 MG tablet  Commonly known as: LIPITOR  Take 1 tablet (40 mg total) by mouth once daily.     blood sugar diagnostic Strp  To check BG 2 times daily, to use with insurance preferred meter. One touch Ultra.     buPROPion  mg Tb24  Take 450 mg by mouth once daily.     clopidogreL 75 mg tablet  Commonly known as: PLAVIX  1 tablet (75 mg total) by Per G Tube route once daily.     cyclobenzaprine 10 MG tablet  Commonly known as: FLEXERIL     FLUoxetine 20 MG capsule     gabapentin 300 MG capsule  Commonly known as: NEURONTIN  Take 1 capsule (300 mg total) by mouth 2 (two) times daily.     insulin aspart U-100 100 unit/mL (3 mL) Inpn pen  Commonly known as: NovoLOG  Inject 1-10 Units into the skin as needed (high blood sugar). Inject per sliding scale.     insulin detemir U-100 100 unit/mL (3 mL) Inpn pen  Commonly known as: Levemir FLEXTOUCH  Inject 10 Units into the skin every evening.     lancets Misc  To check BG 2 times daily, to use with insurance preferred meter.One Touch Ultra     lisinopriL 20 MG tablet  Commonly known as: PRINIVIL,ZESTRIL     losartan 25 MG tablet  Commonly known as: COZAAR     meclizine 25 mg tablet  Commonly known as: ANTIVERT  Take 1 tablet (25 mg total) by mouth 3 (three) times daily as needed for Dizziness.     melatonin 3 mg tablet  Commonly known as: MELATIN  Take 2 tablets (6 mg  "total) by mouth nightly as needed for Insomnia.     omeprazole 40 MG capsule  Commonly known as: PRILOSEC  TAKE ONE CAPSULE (40 MG) BY MOUTH EVERY DAY     pen needle, diabetic 32 gauge x 5/32" Ndle  Use AC meals 2 a day     polyethylene glycol 17 gram Pwpk  Commonly known as: GLYCOLAX  Take 17 g by mouth 2 (two) times daily.     QUEtiapine 25 MG Tab  Commonly known as: SEROQUEL  Take 1 tablet (25 mg total) by mouth every evening.     senna-docusate 8.6-50 mg 8.6-50 mg per tablet  Commonly known as: PERICOLACE  Take 1 tablet by mouth 2 (two) times daily.     sodium chloride 0.9 % Pgbk     tetrahydrozoline 0.05% 0.05 % Drop  Commonly known as: Vision Clear  Place 2 drops into both eyes 4 (four) times daily as needed (eye irritation).            STOP taking these medications      finasteride 5 mg tablet  Commonly known as: PROSCAR               Where to Get Your Medications        These medications were sent to [facility] - [facility] [id number] [facility]  [address] [address] [address]      Phone: [phone]   cephALEXin 500 MG capsule         Jaylen Antonio Jr, MD   "

## 2022-12-28 ENCOUNTER — PATIENT OUTREACH (OUTPATIENT)
Dept: ADMINISTRATIVE | Facility: OTHER | Age: 67
End: 2022-12-28
Payer: MEDICARE

## 2022-12-28 NOTE — PROGRESS NOTES
CHW - Outreach Attempt    Community Health Worker left a voicemail message for 2nd attempt to contact patient regarding: SDOH  Community Health Worker to attempt to contact patient on: [phone]

## 2023-01-06 NOTE — PROGRESS NOTES
CHW - Unable to Contact    Community Health Worker to close episode at this time due to three missed attempts for patient contact.

## 2023-01-18 ENCOUNTER — OFFICE VISIT (OUTPATIENT)
Dept: UROLOGY | Facility: CLINIC | Age: 68
End: 2023-01-18
Payer: MEDICARE

## 2023-01-18 VITALS
HEIGHT: 77 IN | DIASTOLIC BLOOD PRESSURE: 83 MMHG | WEIGHT: 214 LBS | HEART RATE: 81 BPM | BODY MASS INDEX: 25.27 KG/M2 | SYSTOLIC BLOOD PRESSURE: 148 MMHG

## 2023-01-18 DIAGNOSIS — R33.9 URINE RETENTION: ICD-10-CM

## 2023-01-18 DIAGNOSIS — N31.9 NEUROGENIC BLADDER: Primary | ICD-10-CM

## 2023-01-18 PROCEDURE — 99999 PR PBB SHADOW E&M-EST. PATIENT-LVL III: ICD-10-PCS | Mod: PBBFAC,,, | Performed by: UROLOGY

## 2023-01-18 PROCEDURE — 99213 PR OFFICE/OUTPT VISIT, EST, LEVL III, 20-29 MIN: ICD-10-PCS | Mod: S$PBB,,, | Performed by: UROLOGY

## 2023-01-18 PROCEDURE — 99999 PR PBB SHADOW E&M-EST. PATIENT-LVL III: CPT | Mod: PBBFAC,,, | Performed by: UROLOGY

## 2023-01-18 PROCEDURE — 99213 OFFICE O/P EST LOW 20 MIN: CPT | Mod: PBBFAC,PN | Performed by: UROLOGY

## 2023-01-18 PROCEDURE — 99213 OFFICE O/P EST LOW 20 MIN: CPT | Mod: S$PBB,,, | Performed by: UROLOGY

## 2023-01-18 RX ORDER — MEROPENEM 1 G/1
INJECTION, POWDER, FOR SOLUTION INTRAVENOUS
COMMUNITY
Start: 2022-11-15

## 2023-01-18 RX ORDER — SULFAMETHOXAZOLE AND TRIMETHOPRIM 800; 160 MG/1; MG/1
1 TABLET ORAL 2 TIMES DAILY
Qty: 10 TABLET | Refills: 0 | Status: SHIPPED | OUTPATIENT
Start: 2023-01-18 | End: 2023-01-23

## 2023-01-18 NOTE — PROGRESS NOTES
[facility] Established Patient/H&P:    Jhonny Almazan is a 67 y.o. male who presents for follow up for neurogenic bladder.     Patient presented to the emergency department on 11/16/20 with dizziness. He was subsequently admitted for CVA complicated by urinary retention requiring olguin catheter placement. Unsure how much urine was obtained after catheter was placed.      He was discharged to rehab with the olguin catheter in place and states it was exchanged once prior to discharge home. He states that his neurological function had been improving.      He reported that prior to his stroke he was urinating every hour. Started on Flomax 0.4 mg on 11/25/20.      Patient is on ASA and Plavix.        Interval History     1/20/21: Patient remains with a olguin catheter in place. Here for voiding trial. Has remained on Flomax 0.4 mg PO daily.      1/21/22: Underwent unsuccessful voiding trial in 2/2021. Subsequently admitted in 9/2021. He was evaluated by Dr. Daniels. On review of record, his Flomax was increased to 0.8 mg and he was started on Finasteride 5 mg PO daily. Plan was for voiding trial at his nursing home which has not been done.      Per his home health nurse, he has not been compliant with his medication. Remains with a olguin in place.  CT chest abdomen pelvis on 9/1/21 with mineral densities in the bladder.      8/4/22: Patient s/p cystoscopy and transrectal ultrasound on 2/9/22 which revealed a 40 cc prostate with no significant prostate obstruction identified. Mild trabeculations. Olguin replaced. Decision was to manage with long-term olguin catheter as his retention was felt to be secondary to neurogenic bladder occurring after his stroke. He has since presented to the [facility] emergency department on UTI. Attempts were made to exchange the olguin catheter in the ER, but they were unsuccessful. Urology consulted to assist with management. He is unsure when his catheter was  last exchanged as he has not had home health in months. Several recent UTIs - recurrent Klebsiella. UA in the ED with 4 RBC, 46 WBC and many bacteria. Required olguin catheter placement over a wire.     Has no family support or reliable transportation.      11/30/22: He has since presented to the hospital again in 11/20/22 from [place]. They could not change the olguin. Evaluated by Dr. Daniels who performed a bedside cystoscopy and was able to place the olguin catheter over a wire on 11/3/22. Here for follow up and to discuss management of his urinary retention. Remains with his catheter in place.     1/18/23: Patient is now s/p cystoscopy and 16 Syriac suprapubic catheter placement on 12/13/22. Here for follow up and exchange. Reports some suprapubic discomfort and irritation, but states it has been draining well.      Denies any fever, chills, bone pain, unintentional weight loss,  trauma or history of  malignancy.         PSA  0.44                 3/23/20  0.48                 4/27/18  0.37                 7/23/10  0.3                   1/28/08     Urine culture  Klebsiella         11/3/22  Klebsiella         6/27/22  Klebsiella         5/16/22  Klebsiella         2/9/22  No growth        1/21/22  Pseudomonas 11/5/21    Past Medical History:   Diagnosis Date    Anticoagulant long-term use     Arthritis     Colon polyp     Diabetes mellitus     Diabetes mellitus, type 2     Fatty liver     Hypertension     Major depressive disorder 11/17/2020    -Continue Wellbutrin    PEG (percutaneous endoscopic gastrostomy) adjustment/replacement/removal 01/06/2021    Stroke 11/2020    left sided weakness    Urinary retention 09/07/2021       Past Surgical History:   Procedure Laterality Date    BACK SURGERY      COLONOSCOPY  07/18/2014    Dr. Miles: 22 colon polyps removed, hemorrhoids, erythema to sigmoid, repeat in 1 year for surveillance; biopsy: sigmoid erythema- showed unremarkable colonic mucosa, tubular adenomas  "and hyperplastic polyps    COLONOSCOPY N/A 5/3/2019    Procedure: COLONOSCOPY;  Surgeon: Guero Crane MD;  Location: [facility];  Service: Endoscopy;  Laterality: N/A;    COLONOSCOPY N/A 5/6/2019    Procedure: COLONOSCOPY;  Surgeon: Guero Crane MD;  Location: [facility];  Service: Endoscopy;  Laterality: N/A;    CYSTOSCOPY N/A 2/9/2022    Procedure: CYSTOSCOPY;  Surgeon: Jaylen Antonio Jr., MD;  Location: [facility] OR;  Service: Urology;  Laterality: N/A;    EPIDURAL STEROID INJECTION INTO THORACIC SPINE N/A 8/30/2019    Procedure: Injection-steroid-epidural-thoracic;  Surgeon: Frantz Green MD;  Location: [facility] OR;  Service: Pain Management;  Laterality: N/A;  T6-7    ESOPHAGOGASTRODUODENOSCOPY N/A 4/26/2019    Procedure: EGD (ESOPHAGOGASTRODUODENOSCOPY);  Surgeon: Guero Crane MD;  Location: [facility];  Service: Endoscopy;  Laterality: N/A;    ESOPHAGOGASTRODUODENOSCOPY N/A 11/23/2020    Procedure: EGD (ESOPHAGOGASTRODUODENOSCOPY);  Surgeon: Guero Crane MD;  Location: [facility];  Service: Endoscopy;  Laterality: N/A;    ESOPHAGOGASTRODUODENOSCOPY N/A 1/6/2021    Procedure: EGD (ESOPHAGOGASTRODUODENOSCOPY);  Surgeon: Guero Crane MD;  Location: [facility];  Service: Endoscopy;  Laterality: N/A;    HERNIA REPAIR      INSERTION, SUPRAPUBIC CATHETER N/A 12/13/2022    Procedure: INSERTION, SUPRAPUBIC CATHETER;  Surgeon: Jaylen Antonio Jr., MD;  Location: [facility] OR;  Service: Urology;  Laterality: N/A;    TRANSRECTAL ULTRASOUND EXAMINATION N/A 2/9/2022    Procedure: ULTRASOUND, RECTAL APPROACH;  Surgeon: Jaylen Antonio Jr., MD;  Location: [facility] OR;  Service: Urology;  Laterality: N/A;  must text son miroslava, times and instructions given leave at 130       Review of patient's allergies indicates:  No Known Allergies    Medications Reviewed: see MAR    FOCUSED PHYSICAL EXAM:    Vitals:    01/18/23 1007   BP: (!) 148/83   Pulse: 81     Body mass index is 25.38 kg/m². Weight: 97.1 kg (214 lb) Height: 6' 5" (195.6 " cm)       General: Alert, cooperative, no distress, appears stated age  Abdomen: Soft, non-tender, no CVA tenderness, non-distended  : 16 Kuwaiti suprapubic catheter in place with mild skin irritation      LABS:    No results found for this or any previous visit (from the past 336 hour(s)).        Assessment/Diagnosis:    1. Neurogenic bladder  sulfamethoxazole-trimethoprim 800-160mg (BACTRIM DS) 800-160 mg Tab      2. Urine retention  sulfamethoxazole-trimethoprim 800-160mg (BACTRIM DS) 800-160 mg Tab          Plans:    - I spent 20 minutes of the day of this encounter preparing for, treating and managing this patient. Extensive discussion with patient regarding his chronic urinary retention now s/p uncomplicated 16 Kuwaiti suprapubic catheter placement on 12/13/22. Doing well. 16 Kuwaiti catheter exchanged in the usual sterile fashion today. Irrigated with 60 cc normal saline. Urine clear.   - RTC in 4 weeks with exchange with the nurse practitioner.   - RX for Bactrim x 5 days for his skin irritation after patient pulled the suture inadvertently.

## 2023-02-06 PROBLEM — N39.0 COMPLICATED UTI (URINARY TRACT INFECTION): Status: RESOLVED | Noted: 2022-11-05 | Resolved: 2023-02-06

## 2023-02-15 ENCOUNTER — OFFICE VISIT (OUTPATIENT)
Dept: UROLOGY | Facility: CLINIC | Age: 68
End: 2023-02-15
Payer: MEDICARE

## 2023-02-15 VITALS — DIASTOLIC BLOOD PRESSURE: 81 MMHG | HEART RATE: 83 BPM | SYSTOLIC BLOOD PRESSURE: 156 MMHG

## 2023-02-15 DIAGNOSIS — N31.9 NEUROGENIC BLADDER: Primary | ICD-10-CM

## 2023-02-15 DIAGNOSIS — Z43.5 ENCOUNTER FOR SUPRAPUBIC CATHETER CARE: ICD-10-CM

## 2023-02-15 PROCEDURE — 99214 PR OFFICE/OUTPT VISIT, EST, LEVL IV, 30-39 MIN: ICD-10-PCS | Mod: S$PBB,,, | Performed by: NURSE PRACTITIONER

## 2023-02-15 PROCEDURE — 99999 PR PBB SHADOW E&M-EST. PATIENT-LVL III: CPT | Mod: PBBFAC,,, | Performed by: NURSE PRACTITIONER

## 2023-02-15 PROCEDURE — 99214 OFFICE O/P EST MOD 30 MIN: CPT | Mod: S$PBB,,, | Performed by: NURSE PRACTITIONER

## 2023-02-15 PROCEDURE — 99999 PR PBB SHADOW E&M-EST. PATIENT-LVL III: ICD-10-PCS | Mod: PBBFAC,,, | Performed by: NURSE PRACTITIONER

## 2023-02-15 PROCEDURE — 99213 OFFICE O/P EST LOW 20 MIN: CPT | Mod: PBBFAC,PN | Performed by: NURSE PRACTITIONER

## 2023-02-15 RX ORDER — METFORMIN HYDROCHLORIDE 500 MG/1
1000 TABLET ORAL 2 TIMES DAILY WITH MEALS
COMMUNITY

## 2023-02-15 NOTE — PROGRESS NOTES
CHIEF COMPLAINT:    Mr. Almazan is a 67 y.o. male presenting for SPT change.  PRESENTING ILLNESS:    Jhonny Almazan is a 67 y.o. male who presents for SPT change. Last clinic visit was 1/18/23 with Dr. Antonio.    Patient presented to the emergency department on 11/16/20 with dizziness. He was subsequently admitted for CVA complicated by urinary retention requiring olguin catheter placement. Unsure how much urine was obtained after catheter was placed.      He was discharged to rehab with the olguin catheter in place and states it was exchanged once prior to discharge home. He states that his neurological function had been improving.      He reported that prior to his stroke he was urinating every hour. Started on Flomax 0.4 mg on 11/25/20.      Patient is on ASA and Plavix.        Interval History     1/20/21: Patient remains with a olguin catheter in place. Here for voiding trial. Has remained on Flomax 0.4 mg PO daily.      1/21/22: Underwent unsuccessful voiding trial in 2/2021. Subsequently admitted in 9/2021. He was evaluated by Dr. Daniels. On review of record, his Flomax was increased to 0.8 mg and he was started on Finasteride 5 mg PO daily. Plan was for voiding trial at his nursing home which has not been done.      Per his home health nurse, he has not been compliant with his medication. Remains with a olguin in place.  CT chest abdomen pelvis on 9/1/21 with mineral densities in the bladder.      8/4/22: Patient s/p cystoscopy and transrectal ultrasound on 2/9/22 which revealed a 40 cc prostate with no significant prostate obstruction identified. Mild trabeculations. Olguin replaced. Decision was to manage with long-term olguin catheter as his retention was felt to be secondary to neurogenic bladder occurring after his stroke. He has since presented to the [facility] emergency department on UTI. Attempts were made to exchange the olguin catheter in the ER, but they were unsuccessful. Urology  consulted to assist with management. He is unsure when his catheter was last exchanged as he has not had home health in months. Several recent UTIs - recurrent Klebsiella. UA in the ED with 4 RBC, 46 WBC and many bacteria. Required olguin catheter placement over a wire.     Has no family support or reliable transportation.      11/30/22: He has since presented to the hospital again in 11/20/22 from [place]. They could not change the olguin. Evaluated by Dr. Daniels who performed a bedside cystoscopy and was able to place the olguin catheter over a wire on 11/3/22. Here for follow up and to discuss management of his urinary retention. Remains with his catheter in place.      1/18/23: Patient is now s/p cystoscopy and 16 Haitian suprapubic catheter placement on 12/13/22. Here for follow up and exchange. Reports some suprapubic discomfort and irritation, but states it has been draining well.      Denies any fever, chills, bone pain, unintentional weight loss,  trauma or history of  malignancy.         PSA  0.44                 3/23/20  0.48                 4/27/18  0.37                 7/23/10  0.3                   1/28/08     2/15/23  Patient presents to clinic for SPT change. Patient reports catheter has been draining without difficulty. Denies flank pain, gross hematuria, fever, chills, nausea or vomiting. He drinks plenty of water to prevent sediment.      Urine cultures:   Lab Results   Component Value Date    LABURIN KLEBSIELLA PNEUMONIAE ESBL  >100,000 cfu/ml   (A) 11/03/2022    LABURIN ESCHERICHIA COLI  > 100,000 cfu/ml   (A) 11/03/2022    LABURIN KLEBSIELLA PNEUMONIAE  > 100,000 cfu/ml   (A) 09/02/2022    LABURIN ESCHERICHIA COLI  > 100,000 cfu/ml   (A) 09/02/2022    LABURIN KLEBSIELLA PNEUMONIAE ESBL  >100,000 cfu/ml   (A) 08/03/2022    LABURIN KLEBSIELLA PNEUMONIAE ESBL  > 100,000 cfu/ml   (A) 06/27/2022    LABURIN KLEBSIELLA PNEUMONIAE ESBL  >100,000 cfu/ml   (A) 05/16/2022    LABURIN KLEBSIELLA  PNEUMONIAE ESBL  >100,000 cfu/ml   (A) 02/09/2022    LABURIN No growth 01/21/2022    LABURIN PSEUDOMONAS AERUGINOSA  >100,000 cfu/ml   (A) 11/05/2021       REVIEW OF SYSTEMS:    Review of Systems    Constitutional: Negative for fever and chills.   HENT: Negative for hearing loss.   Eyes: Negative for visual disturbance.   Respiratory: Negative for shortness of breath.   Cardiovascular: Negative for chest pain.   Gastrointestinal: Negative for nausea, vomiting.   Genitourinary:  See above  Neurological: Negative for dizziness.   Hematological: Does not bruise/bleed easily.   Psychiatric/Behavioral: Negative for confusion.       PATIENT HISTORY:    Past Medical History:   Diagnosis Date    Anticoagulant long-term use     Arthritis     Colon polyp     Diabetes mellitus     Diabetes mellitus, type 2     Fatty liver     Hypertension     Major depressive disorder 11/17/2020    -Continue Wellbutrin    PEG (percutaneous endoscopic gastrostomy) adjustment/replacement/removal 01/06/2021    Stroke 11/2020    left sided weakness    Urinary retention 09/07/2021       Past Surgical History:   Procedure Laterality Date    BACK SURGERY      COLONOSCOPY  07/18/2014    Dr. Crane: 22 colon polyps removed, hemorrhoids, erythema to sigmoid, repeat in 1 year for surveillance; biopsy: sigmoid erythema- showed unremarkable colonic mucosa, tubular adenomas and hyperplastic polyps    COLONOSCOPY N/A 5/3/2019    Procedure: COLONOSCOPY;  Surgeon: Guero Crane MD;  Location: [facility];  Service: Endoscopy;  Laterality: N/A;    COLONOSCOPY N/A 5/6/2019    Procedure: COLONOSCOPY;  Surgeon: Guero Crane MD;  Location: [facility];  Service: Endoscopy;  Laterality: N/A;    CYSTOSCOPY N/A 2/9/2022    Procedure: CYSTOSCOPY;  Surgeon: Jaylen Antonoi Jr., MD;  Location: [facility];  Service: Urology;  Laterality: N/A;    EPIDURAL STEROID INJECTION INTO THORACIC SPINE N/A 8/30/2019    Procedure: Injection-steroid-epidural-thoracic;  Surgeon: Frantz  SHARLA Green MD;  Location: [facility] OR;  Service: Pain Management;  Laterality: N/A;  T6-7    ESOPHAGOGASTRODUODENOSCOPY N/A 4/26/2019    Procedure: EGD (ESOPHAGOGASTRODUODENOSCOPY);  Surgeon: Guero Crane MD;  Location: [facility] ENDO;  Service: Endoscopy;  Laterality: N/A;    ESOPHAGOGASTRODUODENOSCOPY N/A 11/23/2020    Procedure: EGD (ESOPHAGOGASTRODUODENOSCOPY);  Surgeon: Guero Crane MD;  Location: [facility] ENDO;  Service: Endoscopy;  Laterality: N/A;    ESOPHAGOGASTRODUODENOSCOPY N/A 1/6/2021    Procedure: EGD (ESOPHAGOGASTRODUODENOSCOPY);  Surgeon: Guero Crane MD;  Location: [facility] ENDO;  Service: Endoscopy;  Laterality: N/A;    HERNIA REPAIR      INSERTION, SUPRAPUBIC CATHETER N/A 12/13/2022    Procedure: INSERTION, SUPRAPUBIC CATHETER;  Surgeon: Jaylen Antonio Jr., MD;  Location: [facility] OR;  Service: Urology;  Laterality: N/A;    TRANSRECTAL ULTRASOUND EXAMINATION N/A 2/9/2022    Procedure: ULTRASOUND, RECTAL APPROACH;  Surgeon: Jaylen Antonio Jr., MD;  Location: [facility] OR;  Service: Urology;  Laterality: N/A;  must text son miroslava, times and instructions given leave at 130       Family History   Problem Relation Age of Onset    Heart disease Mother     Heart disease Father     Diabetes Brother     Suicide Brother     Brain cancer Brother     Colon cancer Neg Hx     Crohn's disease Neg Hx     Ulcerative colitis Neg Hx     Stomach cancer Neg Hx     Esophageal cancer Neg Hx     Glaucoma Neg Hx     Retinal detachment Neg Hx     Macular degeneration Neg Hx        Social History     Socioeconomic History    Marital status:    Tobacco Use    Smoking status: Former     Packs/day: 1.00     Years: 50.00     Pack years: 50.00     Types: Cigarettes    Smokeless tobacco: Never   Substance and Sexual Activity    Alcohol use: Not Currently     Alcohol/week: 14.0 standard drinks     Types: 14 Cans of beer per week     Comment: none for 2 months    Drug use: No    Sexual activity: Yes     Partners: Female     Social  Determinants of Health     Financial Resource Strain: Unknown    Difficulty of Paying Living Expenses: Patient refused   Food Insecurity: Unknown    Worried About Running Out of Food in the Last Year: Patient refused    Ran Out of Food in the Last Year: Patient refused   Transportation Needs: Unknown    Lack of Transportation (Medical): Patient refused    Lack of Transportation (Non-Medical): Patient refused   Physical Activity: Unknown    Days of Exercise per Week: Patient refused    Minutes of Exercise per Session: Patient refused   Stress: Unknown    Feeling of Stress : Patient refused   Social Connections: Unknown    Frequency of Communication with Friends and Family: Patient refused    Frequency of Social Gatherings with Friends and Family: Patient refused    Attends Synagogue Services: Patient refused    Active Member of Clubs or Organizations: Patient refused    Attends Club or Organization Meetings: Patient refused    Marital Status: Patient refused   Housing Stability: Unknown    Unable to Pay for Housing in the Last Year: Patient refused    Number of Places Lived in the Last Year: 1    Unstable Housing in the Last Year: Patient refused       Allergies:  Patient has no known allergies.    Medications:    Current Outpatient Medications:     acetaminophen (TYLENOL) 325 MG tablet, Take 2 tablets (650 mg total) by mouth every 6 (six) hours as needed., Disp: , Rfl: 0    aspirin (ECOTRIN) 81 MG EC tablet, Take 1 tablet (81 mg total) by mouth once daily., Disp: 360 tablet, Rfl: 0    cyclobenzaprine (FLEXERIL) 10 MG tablet, , Disp: , Rfl:     gabapentin (NEURONTIN) 300 MG capsule, Take 1 capsule (300 mg total) by mouth 2 (two) times daily., Disp: 180 capsule, Rfl: 0    insulin detemir U-100 (LEVEMIR FLEXTOUCH) 100 unit/mL (3 mL) SubQ InPn pen, Inject 10 Units into the skin every evening., Disp: 15 mL, Rfl: 3    meclizine (ANTIVERT) 25 mg tablet, Take 1 tablet (25 mg total) by mouth 3 (three) times daily as needed  "for Dizziness., Disp:  , Rfl: 0    melatonin (MELATIN) 3 mg tablet, Take 2 tablets (6 mg total) by mouth nightly as needed for Insomnia., Disp: , Rfl: 0    metFORMIN (GLUCOPHAGE) 500 MG tablet, Take 500 mg by mouth 2 (two) times daily with meals., Disp: , Rfl:     pen needle, diabetic 32 gauge x 5/32" Ndle, Use AC meals 2 a day, Disp: 100 each, Rfl: 11    0.9 % sodium chloride (SODIUM CHLORIDE 0.9 %) PgBk, Inject into the vein., Disp: , Rfl:     atorvastatin (LIPITOR) 40 MG tablet, Take 1 tablet (40 mg total) by mouth once daily., Disp: 90 tablet, Rfl: 3    blood sugar diagnostic Strp, To check BG 2 times daily, to use with insurance preferred meter. One touch Ultra., Disp: 100 strip, Rfl: 11    buPROPion 450 mg Tb24, Take 450 mg by mouth once daily., Disp: , Rfl:     clopidogreL (PLAVIX) 75 mg tablet, 1 tablet (75 mg total) by Per G Tube route once daily., Disp: 30 tablet, Rfl: 11    FLUoxetine 20 MG capsule, Take 40 mg by mouth every morning., Disp: , Rfl:     insulin aspart U-100 (NOVOLOG) 100 unit/mL (3 mL) InPn pen, Inject 1-10 Units into the skin as needed (high blood sugar). Inject per sliding scale. (Patient not taking: Reported on 2/15/2023), Disp: 15 mL, Rfl: 1    lancets Misc, To check BG 2 times daily, to use with insurance preferred meter.One Touch Ultra (Patient not taking: Reported on 2/15/2023), Disp: 100 each, Rfl: 11    lisinopriL (PRINIVIL,ZESTRIL) 20 MG tablet, Take 20 mg by mouth., Disp: , Rfl:     losartan (COZAAR) 25 MG tablet, 1 tablet DAILY (route: oral), Disp: , Rfl:     meropenem (MERREM) 1 gram injection, Inject into the vein., Disp: , Rfl:     omeprazole (PRILOSEC) 40 MG capsule, TAKE ONE CAPSULE (40 MG) BY MOUTH EVERY DAY (Patient not taking: Reported on 2/15/2023), Disp: 30 capsule, Rfl: 3    polyethylene glycol (GLYCOLAX) 17 gram PwPk, Take 17 g by mouth 2 (two) times daily. (Patient not taking: Reported on 2/15/2023), Disp: , Rfl: 0    QUEtiapine (SEROQUEL) 25 MG Tab, Take 1 tablet " (25 mg total) by mouth every evening., Disp: 30 tablet, Rfl: 11    senna-docusate 8.6-50 mg (PERICOLACE) 8.6-50 mg per tablet, Take 1 tablet by mouth 2 (two) times daily. (Patient not taking: Reported on 2/15/2023), Disp: , Rfl:     tetrahydrozoline 0.05% (VISION CLEAR) 0.05 % Drop, Place 2 drops into both eyes 4 (four) times daily as needed (eye irritation). (Patient not taking: Reported on 2/15/2023), Disp:  , Rfl: 0    PHYSICAL EXAMINATION:    Constitutional: He is oriented to person, place, and time. He appears well-developed and well-nourished.  He is in no apparent distress.    Neck: Normal ROM.     Cardiovascular: Normal rate.      Pulmonary/Chest: Effort normal. No respiratory distress.     Abdominal:  He exhibits no distension.  There is no CVA tenderness.     Neurological: He is alert and oriented to person, place, and time.     Skin: Skin is warm and dry.     SP stoma patent  No granulation tissue  No surrounding erythema    Psych: Cooperative with normal affect.        Physical Exam      LABS:    Lab Results   Component Value Date    PSA 0.48 04/27/2018    PSA 0.37 07/23/2010    PSA 0.3 01/28/2008    PSADIAG 0.44 03/23/2020     Lab Results   Component Value Date    CREATININE 0.9 12/13/2022         IMPRESSION:    Encounter Diagnoses   Name Primary?    Neurogenic bladder Yes    Encounter for suprapubic catheter care          PLAN:  -I removed old SPT without any difficulty and properly disposed.   Stoma cleaned and prepped with betadine.  I placed a 16 fr SPT using sterile technique, without difficulty.   Bladder was irrigated with 60cc and a good catheter position was confirmed.  Clear yellow urine received and balloon inflated by with ~10 cc sterile water.   The catheter was connected with a drainage bag and catheter care instructions were given.  The wound was cleaned and dressed.  The patient tolerated well.  Daily skin care and suprapubic catheter care discussed.  Educational materials  given.  Increase water intake to help with sediment.   RTC 4 weeks for next SPT change.  Voiced understanding.     .I encouraged him or any of his family members to call or email me with questions and/or concerns.      30 minutes of total time spent on the encounter, which includes face to face time and non-face to face time preparing to see the patient (eg, review of tests), Obtaining and/or reviewing separately obtained history, Documenting clinical information in the electronic or other health record, Independently interpreting results (not separately reported) and communicating results to the patient/family/caregiver, or Care coordination (not separately reported).

## 2023-03-27 NOTE — PT/OT/SLP PROGRESS
Physical Therapy    Visit Type: initial evaluation  Precautions:  Medical precautions: ; standard precautions. Pt is an 80 yo F admitted on 3/26/23 who presents from home w/ a mechanical fall backwards which lead to a laceration on her posterior scalp (s/p repair).  Also found to have TORIE on CKD.  Note: Has h/o Mantle Cell Lymphoma and is receiving treatment for it.     PT Eval and Treat 3/26/23  Activity as Tolerated 3/26/23    3/26: ER --> 3C  Lines:     Basic: IV      Lines in chart and on patient reviewed, precautions maintained throughout session.  Hearing: no hearing deficits  Vision:     Current vision: no visual deficits  Safety Measures: bed alarm and bed rails  SUBJECTIVE  Patient agreed to participate in therapy this date.  \"I feel a little woozy when I walk.\"  Patient / Family Goal: return home    Pain   RN informed on pain level     OBJECTIVE     Cognitive Status   Orientation    - Oriented to: person, place, time and situation  Functional Communication   - Overall Status: within functional limits   - Forms of Communication: verbal  Attention Span    - Attention: intact  Following Direction   - follows all commands and directions consistently  Transition Between Tasks   - transitions without difficulty  Memory   - intact  Safety Awareness/Insight   - intact  Awareness of Deficits   - fully aware of deficits  Verbal Expression   - intact     Strength  (out of 5 unless noted, standard test position unless noted)   5/5: LLE, RLE      Sitting Balance  (GLORIA = base of support)  Static      - Trial 1 details: independent    Standing Balance  (GLORIA = base of support)  Firm Surface: Double Leg      - Static, Eyes Open       - Trial 1 details: stand by assist and with double UE support       Bed Mobility  - Supine to long sit: supervision  - Supine to sit: supervision  - Sit to supine: supervision  Bed flat, bedrails lowered.   Transfers  Assistive devices: gait belt, 2-wheeled walker  - Sit to stand: stand by  Physical Therapy Treatment    Patient Name:  Jhonny Almazan   MRN:  [id number]    Recommendations:     Discharge Recommendations:  nursing facility, skilled (however, insurance denied SNF so patient discharging home with HHPT and 24/7 supervision from son)   Discharge Equipment Recommendations:  (gait belt provided)   Barriers to discharge: None    Assessment:     Jhonny Almazan is a 66 y.o. male admitted with a medical diagnosis of Complicated UTI (urinary tract infection).  He presents with the following impairments/functional limitations:  weakness, impaired endurance, impaired self care skills, impaired functional mobility, gait instability, impaired balance, decreased lower extremity function, decreased safety awareness. Patient is agreeable to participation with PT treatment. Discussed that SNF was denied by insurance so patient discharging home with HHPT and 24/7 supervision from son. Patient reports his bed at home has a rail and was able to perform supine to sit using left bed rail with SBA. Bed height adjusted to height similar to patient's bed at home and he performed sit to stand transfer with RW, CGA, and VC for hand placement. He performed a step transfer from EOB to chair with RW, CGA-Yumiko, and VC for sequencing. He performed 15 reps of LE therex while sitting up in chair. Gait belt provided and reviewed with patient's son who is present at bedside. All questions answered regarding D/C home today with HH.     Rehab Prognosis: Good; patient would benefit from acute skilled PT services to address these deficits and reach maximum level of function.    Recent Surgery: * No surgery found *      Plan:     During this hospitalization, patient to be seen 6 x/week to address the identified rehab impairments via gait training, therapeutic activities, therapeutic exercises and progress toward the following goals:    · Plan of Care Expires:  08/15/22    Subjective   Patient's son notes he has been  assist  - Stand to sit: contact guard/touching/steadying assist    Ambulation / Gait  - Assistive device: gait belt and two-wheeled walker  - Distance (feet unless otherwise indicated): 15, 15  - Assist Level: contact guard/touching/steadying assist  - Surface: even  No loss of balance, required 1 sitting rest break d/t c/o lightheadedness/dizziness. Displays decreased speed, increased forward flexion and downward gaze, appropriate gait pattern.              ASSESSMENT   Impairments: activity tolerance, balance, endurance and pain  Functional Limitations: sit to/from stand transfers, ambulation and stair climbing  Discharge Recommendations  Recommendation for Discharge: PT IL: Patient requires 24 HOUR assistance to perform mobility and/or ADLs safely, Patient is appropriate for Physical Therapy 1-3 times per week  PT/OT Mobility Equipment for Discharge: Seema  PT Identified Barriers to Discharge: Dizziness/Lightheadedness  Therapy Diagnosis:  Other Abnormalities of Gait and Mobility      Progress: improving as expected    • Skilled therapy is required to address these limitations in attempt to maximize the patient's independence.     • Predicted patient presentation: Moderate (evolving) - Patient comorbidities and complexities, as defined above, may have varying impact on steady progress for prescribed plan of care.    Education:   - Present and ready to learn: patient  Education provided during session:  - Results of above outlined education: Verbalizes understanding and Demonstrates understanding    Patient at End of Session:   Location: in bed  Safety measures: alarm system in place/re-engaged, bed rails x3, call light within reach, equipment intact and lines intact  Handoff to: nurse    PLAN   Suggestions for next session as indicated: PT Frequency: 3-5 x per week  Frequency Comments: 1/3-5x, Home, Monday, 3.27    A minimum of 8 minutes per session x 1 week in the acute setting.  Interventions: bed mobility,  here since 8:30 to transport father home and is hoping for an update soon because he does not feel comfortable driving in the rain or at night - CM and RN notified   Chief Complaint: SNF denied   Patient/Family Comments/goals: agrees to sit in chair   Pain/Comfort:  · Pain Rating 1: 0/10      Objective:     Communicated with BURAK Moy prior to session.  Patient found HOB elevated with bed alarm, olguin catheter, peripheral IV, telemetry upon PT entry to room.     General Precautions: Standard, contact, fall   Orthopedic Precautions:N/A   Braces: N/A  Respiratory Status: Room air     Functional Mobility:  · Bed Mobility:     · Supine to Sit: stand by assistance  · Transfers:     · Sit to Stand:  contact guard assistance with rolling walker  · Bed to Chair: contact guard assistance and minimum assistance with  rolling walker  using  Step Transfer      AM-PAC 6 CLICK MOBILITY  Turning over in bed (including adjusting bedclothes, sheets and blankets)?: 4  Sitting down on and standing up from a chair with arms (e.g., wheelchair, bedside commode, etc.): 4  Moving from lying on back to sitting on the side of the bed?: 4  Moving to and from a bed to a chair (including a wheelchair)?: 3  Need to walk in hospital room?: 3  Climbing 3-5 steps with a railing?: 1  Basic Mobility Total Score: 19       Therapeutic Activities and Exercises:   Patient was educated on the importance of OOB activity and functional mobility to negate negative effects of prolonged bed rest during hospitalization, safe transfers and ambulation, gait belt use, safe stair navigation (2 KONG), fall prevention, RW and WC use, and D/C planning     Patient performed 15 reps of bilateral LE therapeutic exercise including long arc quads and seated marches     Patient left up in chair with all lines intact, call button in reach, chair alarm on, RN and CM notified and son present..    GOALS:   Multidisciplinary Problems     Physical Therapy Goals        Problem:  Physical Therapy    Goal Priority Disciplines Outcome Goal Variances Interventions   Physical Therapy Goal     PT, PT/OT Ongoing, Progressing     Description: Goals to be met by: 8/15/2022     Patient will increase functional independence with mobility by performin). Supine to sit with Stand-by Assistance  2). Sit to supine with Stand-by Assistance  3). Sit to stand transfer with Contact Guard Assistance  4). Bed to chair transfer with Contact Guard Assistance using Rolling Walker  5). Gait  x > 10 feet with Contact Guard Assistance using Rolling Walker.                      Time Tracking:     PT Received On: 22  PT Start Time: 1055     PT Stop Time: 1123  PT Total Time (min): 28 min     Billable Minutes: Therapeutic Activity 18 and Therapeutic Exercise 10    Treatment Type: Treatment  PT/PTA: PT     PTA Visit Number: 0     2022   functional transfer training and gait training  Agreement to plan and goals: patient agrees with goals and treatment plan        GOALS  Long Term Goals: (to be met by time of discharge from hospital)  Sit to supine: Patient will complete sit to supine modified independent.  Status: progressing/ongoing  Supine to sit: Patient will complete supine to sit modified independent.  Status: progressing/ongoing  Sit to stand: Patient will complete sit to stand transfer with gait belt and 2-wheeled walker, supervision.   Status: progressing/ongoing  Stand to sit: Patient will complete stand to sit transfer with gait belt and 2-wheeled walker, supervision.   Status: progressing/ongoing  Ambulation (even): Patient will ambulate on even surface for 150 feet with gait belt and 2-wheeled walker, supervision.   Status: progressing/ongoing    Documented in the chart in the following areas: Prior Level of Function. Pain. Assessment.      Therapy procedure time and total treatment time can be found documented on the Time Entry flowsheet

## 2023-10-15 ENCOUNTER — APPOINTMENT (OUTPATIENT)
Dept: LAB | Facility: HOSPITAL | Age: 68
End: 2023-10-15
Attending: NURSE PRACTITIONER
Payer: MEDICARE

## 2023-10-15 DIAGNOSIS — N39.0 URINARY TRACT INFECTION, SITE NOT SPECIFIED: Primary | ICD-10-CM

## 2023-10-15 LAB
BACTERIA #/AREA URNS HPF: ABNORMAL /HPF
BILIRUB UR QL STRIP: NEGATIVE
CLARITY UR: ABNORMAL
COLOR UR: YELLOW
GLUCOSE UR QL STRIP: NEGATIVE
HGB UR QL STRIP: NEGATIVE
HYALINE CASTS #/AREA URNS LPF: 1 /LPF
KETONES UR QL STRIP: NEGATIVE
LEUKOCYTE ESTERASE UR QL STRIP: ABNORMAL
MICROSCOPIC COMMENT: ABNORMAL
NITRITE UR QL STRIP: POSITIVE
PH UR STRIP: 8 [PH] (ref 5–8)
PROT UR QL STRIP: ABNORMAL
RBC #/AREA URNS HPF: 0 /HPF (ref 0–4)
SP GR UR STRIP: 1.02 (ref 1–1.03)
SQUAMOUS #/AREA URNS HPF: 5 /HPF
URN SPEC COLLECT METH UR: ABNORMAL
UROBILINOGEN UR STRIP-ACNC: NEGATIVE EU/DL
WBC #/AREA URNS HPF: 9 /HPF (ref 0–5)
WBC CLUMPS URNS QL MICRO: ABNORMAL

## 2023-10-15 PROCEDURE — 87086 URINE CULTURE/COLONY COUNT: CPT | Performed by: NURSE PRACTITIONER

## 2023-10-15 PROCEDURE — 81000 URINALYSIS NONAUTO W/SCOPE: CPT | Performed by: NURSE PRACTITIONER

## 2023-10-17 LAB
BACTERIA UR CULT: NORMAL
BACTERIA UR CULT: NORMAL

## 2023-11-14 ENCOUNTER — TELEPHONE (OUTPATIENT)
Dept: OTOLARYNGOLOGY | Facility: CLINIC | Age: 68
End: 2023-11-14
Payer: MEDICARE

## 2023-11-14 NOTE — TELEPHONE ENCOUNTER
Attempted to contact patient, audiogram needed due to reason for visit. Will try to call patient again later this week.

## 2023-11-17 ENCOUNTER — APPOINTMENT (OUTPATIENT)
Dept: LAB | Facility: HOSPITAL | Age: 68
End: 2023-11-17
Attending: NURSE PRACTITIONER
Payer: MEDICARE

## 2023-11-17 DIAGNOSIS — N39.0 URINARY TRACT INFECTION, SITE NOT SPECIFIED: Primary | ICD-10-CM

## 2023-11-17 LAB
BACTERIA #/AREA URNS HPF: ABNORMAL /HPF
BILIRUB UR QL STRIP: NEGATIVE
CLARITY UR: ABNORMAL
COLOR UR: YELLOW
GLUCOSE UR QL STRIP: NEGATIVE
HGB UR QL STRIP: NEGATIVE
HYALINE CASTS #/AREA URNS LPF: 1 /LPF
KETONES UR QL STRIP: NEGATIVE
LEUKOCYTE ESTERASE UR QL STRIP: ABNORMAL
MICROSCOPIC COMMENT: ABNORMAL
NITRITE UR QL STRIP: NEGATIVE
PH UR STRIP: 7 [PH] (ref 5–8)
PROT UR QL STRIP: ABNORMAL
RBC #/AREA URNS HPF: 9 /HPF (ref 0–4)
SP GR UR STRIP: 1.02 (ref 1–1.03)
URN SPEC COLLECT METH UR: ABNORMAL
UROBILINOGEN UR STRIP-ACNC: NEGATIVE EU/DL
WBC #/AREA URNS HPF: >100 /HPF (ref 0–5)

## 2023-11-17 PROCEDURE — 81000 URINALYSIS NONAUTO W/SCOPE: CPT | Performed by: NURSE PRACTITIONER

## 2023-11-17 PROCEDURE — 87077 CULTURE AEROBIC IDENTIFY: CPT | Performed by: NURSE PRACTITIONER

## 2023-11-17 PROCEDURE — 87086 URINE CULTURE/COLONY COUNT: CPT | Performed by: NURSE PRACTITIONER

## 2023-11-17 PROCEDURE — 87186 SC STD MICRODIL/AGAR DIL: CPT | Performed by: NURSE PRACTITIONER

## 2023-11-17 NOTE — TELEPHONE ENCOUNTER
Number listed on patient chart is wrong, apologized to person on the phone and will make a note for his number to be updated at check in.

## 2023-11-20 LAB — BACTERIA UR CULT: ABNORMAL

## 2023-11-22 ENCOUNTER — OFFICE VISIT (OUTPATIENT)
Dept: OTOLARYNGOLOGY | Facility: CLINIC | Age: 68
End: 2023-11-22
Payer: MEDICARE

## 2023-11-22 VITALS
WEIGHT: 225 LBS | RESPIRATION RATE: 18 BRPM | DIASTOLIC BLOOD PRESSURE: 81 MMHG | HEART RATE: 107 BPM | HEIGHT: 77 IN | BODY MASS INDEX: 26.57 KG/M2 | SYSTOLIC BLOOD PRESSURE: 136 MMHG

## 2023-11-22 DIAGNOSIS — H90.3 SENSORINEURAL HEARING LOSS (SNHL) OF BOTH EARS: ICD-10-CM

## 2023-11-22 DIAGNOSIS — Z86.69 HISTORY OF HEARING LOSS: ICD-10-CM

## 2023-11-22 DIAGNOSIS — Z86.73 HISTORY OF STROKE: Primary | ICD-10-CM

## 2023-11-22 PROCEDURE — 99213 OFFICE O/P EST LOW 20 MIN: CPT | Mod: S$PBB,,, | Performed by: PHYSICIAN ASSISTANT

## 2023-11-22 PROCEDURE — 99213 OFFICE O/P EST LOW 20 MIN: CPT | Mod: PBBFAC,PO | Performed by: PHYSICIAN ASSISTANT

## 2023-11-22 PROCEDURE — 99999 PR PBB SHADOW E&M-EST. PATIENT-LVL III: ICD-10-PCS | Mod: PBBFAC,,, | Performed by: PHYSICIAN ASSISTANT

## 2023-11-22 PROCEDURE — 99999 PR PBB SHADOW E&M-EST. PATIENT-LVL III: CPT | Mod: PBBFAC,,, | Performed by: PHYSICIAN ASSISTANT

## 2023-11-22 PROCEDURE — 99213 PR OFFICE/OUTPT VISIT, EST, LEVL III, 20-29 MIN: ICD-10-PCS | Mod: S$PBB,,, | Performed by: PHYSICIAN ASSISTANT

## 2023-11-22 RX ORDER — GLIPIZIDE 2.5 MG/1
5 TABLET, EXTENDED RELEASE ORAL
COMMUNITY

## 2023-11-22 NOTE — PROGRESS NOTES
[facility]    Subjective:      Patient: Jhonny Almazan Patient PCP: Joey Whitehead MD         :  1955     Sex:  male      MRN:  [id number]          Date of Visit: 2023      Chief Complaint: Dizziness    Patient ID: Jhonny Almazan is a 67 y.o. male with PMHx of memory deficit, hearing loss and dizziness. He is in wheelchair. Pt has MRI 2020 that showed scattered acute ischemic infarcts in the right cerebral hemisphere. Pt states that he is not followed by neurology. Pt has had random episodes of dizziness, not always associated with head movement. Pt has had issues with light-headedness. He has peripheral neuropathy and DM x II. Pt has had longstanding issues with hearing loss.     Past Medical History  He has a past medical history of Anticoagulant long-term use, Arthritis, Colon polyp, Diabetes mellitus, Diabetes mellitus, type 2, Fatty liver, Hypertension, Major depressive disorder, PEG (percutaneous endoscopic gastrostomy) adjustment/replacement/removal, Stroke, and Urinary retention.    Family History  His family history includes Brain cancer in his brother; Diabetes in his brother; Heart disease in his father and mother; Suicide in his brother.    Past Surgical History:   Procedure Laterality Date    BACK SURGERY      COLONOSCOPY  2014    Dr. Crane: 22 colon polyps removed, hemorrhoids, erythema to sigmoid, repeat in 1 year for surveillance; biopsy: sigmoid erythema- showed unremarkable colonic mucosa, tubular adenomas and hyperplastic polyps    COLONOSCOPY N/A 5/3/2019    Procedure: COLONOSCOPY;  Surgeon: Guero Crane MD;  Location: [facility];  Service: Endoscopy;  Laterality: N/A;    COLONOSCOPY N/A 2019    Procedure: COLONOSCOPY;  Surgeon: Guero Crane MD;  Location: [facility];  Service: Endoscopy;  Laterality: N/A;    CYSTOSCOPY N/A 2022    Procedure: CYSTOSCOPY;  Surgeon: Jaylen Antonio Jr., MD;  Location: [facility];  Service: Urology;   Laterality: N/A;    EPIDURAL STEROID INJECTION INTO THORACIC SPINE N/A 8/30/2019    Procedure: Injection-steroid-epidural-thoracic;  Surgeon: Frantz Green MD;  Location: [facility];  Service: Pain Management;  Laterality: N/A;  T6-7    ESOPHAGOGASTRODUODENOSCOPY N/A 4/26/2019    Procedure: EGD (ESOPHAGOGASTRODUODENOSCOPY);  Surgeon: Guero Crane MD;  Location: [facility];  Service: Endoscopy;  Laterality: N/A;    ESOPHAGOGASTRODUODENOSCOPY N/A 11/23/2020    Procedure: EGD (ESOPHAGOGASTRODUODENOSCOPY);  Surgeon: Guero Crane MD;  Location: [facility];  Service: Endoscopy;  Laterality: N/A;    ESOPHAGOGASTRODUODENOSCOPY N/A 1/6/2021    Procedure: EGD (ESOPHAGOGASTRODUODENOSCOPY);  Surgeon: Guero Crane MD;  Location: [facility];  Service: Endoscopy;  Laterality: N/A;    HERNIA REPAIR      INSERTION, SUPRAPUBIC CATHETER N/A 12/13/2022    Procedure: INSERTION, SUPRAPUBIC CATHETER;  Surgeon: Jaylen Antonio Jr., MD;  Location: [facility] OR;  Service: Urology;  Laterality: N/A;    TRANSRECTAL ULTRASOUND EXAMINATION N/A 2/9/2022    Procedure: ULTRASOUND, RECTAL APPROACH;  Surgeon: Jaylen Antonio Jr., MD;  Location: [facility];  Service: Urology;  Laterality: N/A;  must text son miroslava, times and instructions given leave at 130     Social History     Tobacco Use    Smoking status: Former     Current packs/day: 1.00     Average packs/day: 1 pack/day for 50.0 years (50.0 ttl pk-yrs)     Types: Cigarettes    Smokeless tobacco: Never   Substance and Sexual Activity    Alcohol use: Not Currently     Alcohol/week: 14.0 standard drinks of alcohol     Types: 14 Cans of beer per week     Comment: none for 2 months    Drug use: No    Sexual activity: Yes     Partners: Female     Medications  He has a current medication list which includes the following prescription(s): sodium chloride 0.9 %, acetaminophen, aspirin, atorvastatin, blood sugar diagnostic, bupropion hcl, clopidogrel, cyclobenzaprine, fluoxetine, gabapentin, insulin aspart  "u-100, insulin detemir u-100 (levemir), lancets, lisinopril, losartan, meclizine, melatonin, meropenem, metformin, omeprazole, pen needle, diabetic, polyethylene glycol, quetiapine, senna-docusate 8.6-50 mg, tetrahydrozoline 0.05%, [DISCONTINUED] insulin nph-insulin regular (70/30), and [DISCONTINUED] lisinopril-hydrochlorothiazide.    Review of patient's allergies indicates:  No Known Allergies  All medications, allergies, and past history have been reviewed.    Objective:      Vitals:      1/18/2023    10:07 AM 2/15/2023     9:21 AM 11/22/2023    10:15 AM   Vitals - 1 value per visit   SYSTOLIC 148 156 142   DIASTOLIC 83 81 86   Pulse 81 83 88   Resp   18   Weight (lb) 214  225   Weight (kg) 97.07  102.059   Height 6' 5" (1.956 m)  6' 5" (1.956 m)   BMI (Calculated) 25.4  26.7   Pain Score Zero       There were no vitals filed for this visit.    There is no height or weight on file to calculate BSA.    Physical Exam  Constitutional:       General: He is not in acute distress.     Appearance: Normal appearance. He is not ill-appearing.   HENT:      Head: Normocephalic and atraumatic.      Right Ear: Tympanic membrane, ear canal and external ear normal.      Left Ear: Tympanic membrane, ear canal and external ear normal.      Nose: Septal deviation (rightward deviation) present.      Mouth/Throat:      Lips: Pink. No lesions.      Mouth: Mucous membranes are moist. No oral lesions.      Tongue: No lesions.      Palate: No lesions.      Pharynx: Oropharynx is clear. Uvula midline. No pharyngeal swelling, oropharyngeal exudate, posterior oropharyngeal erythema or uvula swelling.   Eyes:      General:         Right eye: No discharge.         Left eye: No discharge.      Extraocular Movements: Extraocular movements intact.      Conjunctiva/sclera: Conjunctivae normal.   Pulmonary:      Effort: Pulmonary effort is normal.   Neurological:      General: No focal deficit present.      Mental Status: He is alert and oriented " to person, place, and time. Mental status is at baseline.   Psychiatric:         Mood and Affect: Mood normal.         Behavior: Behavior normal.         Thought Content: Thought content normal.         Judgment: Judgment normal.       Vestibular physical examination:    Fukuda Step Test: Unable to perform in office.   Romberg: Did not assess due to risk of fall.   Jacques Hallpike: Negative bilaterally for BPPV.     Labs:  WBC   Date Value Ref Range Status   12/13/2022 8.84 3.90 - 12.70 K/uL Final     Platelets   Date Value Ref Range Status   12/13/2022 199 150 - 450 K/uL Final     Creatinine   Date Value Ref Range Status   12/13/2022 0.9 0.5 - 1.4 mg/dL Final     TSH   Date Value Ref Range Status   08/03/2022 0.240 (L) 0.400 - 4.000 uIU/mL Final     Glucose   Date Value Ref Range Status   12/13/2022 178 (H) 70 - 110 mg/dL Final     Hemoglobin A1C   Date Value Ref Range Status   09/02/2022 5.8 (H) 4.0 - 5.6 % Final     Comment:     ADA Screening Guidelines:  5.7-6.4%  Consistent with prediabetes  >or=6.5%  Consistent with diabetes    High levels of fetal hemoglobin interfere with the HbA1C  assay. Heterozygous hemoglobin variants (HbS, HgC, etc)do  not significantly interfere with this assay.   However, presence of multiple variants may affect accuracy.       ECHOCARDIOGRAM RESULTS  Results for orders placed during the hospital encounter of 11/16/20    Transesophageal echo (HSIKHA)    Interpretation Summary  · The left ventricle is normal in size with normal systolic function. The estimated ejection fraction is 65%.  · Normal right ventricular size with normal right ventricular systolic function.  · Normal appearing left atrial appendage. No thrombus is present in the appendage. FELY occluder is absent. Normal appendage velocities.  · No interatrial septal defect present.  · The descending aorta is dilated.  · Grade 3 plaque present in the transverse aorta and descending aorta.  · No graft present.  · No evidence of  coarctation.    CURRENT/PREVIOUS VISIT EKG  Results for orders placed or performed during the hospital encounter of 12/13/22   EKG 12-lead    Collection Time: 12/13/22  8:33 AM    Narrative    Test Reason : R33.9,    Vent. Rate : 066 BPM     Atrial Rate : 066 BPM     P-R Int : 180 ms          QRS Dur : 104 ms      QT Int : 424 ms       P-R-T Axes : 096 -17 050 degrees     QTc Int : 444 ms    Sinus rhythm with marked sinus arrythmia  When compared with ECG of 03-AUG-2022 09:13,  Criteria for Septal infarct are no longer Present  Nonspecific T wave abnormality no longer evident in Inferior leads  QT has shortened  Confirmed by Geraldo Gutiérrez MD ([id number]) on 12/15/2022 12:46:19 PM    Referred By: MARILUZ REAGAN JR           Confirmed By:Geraldo Gutiérrez MD       MRI Brain without contrast Results for orders placed during the hospital encounter of 11/16/20    MRI Brain Without Contrast    Narrative  EXAMINATION:  MRI BRAIN WITHOUT CONTRAST    CLINICAL HISTORY:  cva;    TECHNIQUE:  Multiplanar multisequence MR imaging of the brain was performed without contrast.    COMPARISON:  None.    FINDINGS:  There are small, scattered regions of restricted diffusion seen throughout the right cerebral hemisphere in the frontal, parietal and occipital lobes.  These show T2 hyperintensity.  There is no evidence acute intracranial hemorrhage or midline shift.  There is cerebral atrophy with proportional ventricular dilatation is.    Impression  There are scattered, acute, ischemic infarcts seen throughout the right cerebral hemisphere.  The diffuse vascular territories involve suggest embolic phenomenon. This report was flagged in Epic as abnormal.                                Audiogram Summary:    All lab results and imaging results have been reviewed.    Assessment:        ICD-10-CM ICD-9-CM   1. History of stroke  Z86.73 V12.54   2. History of hearing loss  Z86.69 V12.49   3. Sensorineural hearing loss (SNHL) of both ears  H90.3 389.18               Plan:      Pt has bilateral mild to severe SNHL. No significant asymmetry between hearing in left and right ears. He has type A tymps bilaterally indicating normal middle ear function and has SRTs 45dB AD and 50dB As. WRS 84% AD and 76% AS. Pt is medically cleared for hearing aids and we will monitor his hearing loss with annual audiograms. His prior MRI Brain WO shows multiple areas of ischemia. Continue plavix. I do not suspect inner ear etiology for dizziness. I suspect central. I will have him follow up with neurology as he has not been followed by neurology. Proceed with annual audiograms to monitor hearing loss.

## 2023-11-24 ENCOUNTER — CLINICAL SUPPORT (OUTPATIENT)
Dept: AUDIOLOGY | Facility: CLINIC | Age: 68
End: 2023-11-24
Payer: MEDICARE

## 2023-11-24 DIAGNOSIS — H90.3 SENSORINEURAL HEARING LOSS, BILATERAL: Primary | ICD-10-CM

## 2023-11-24 PROCEDURE — 92557 COMPREHENSIVE HEARING TEST: CPT | Mod: PBBFAC,PO | Performed by: AUDIOLOGIST

## 2023-11-24 PROCEDURE — 92567 TYMPANOMETRY: CPT | Mod: PBBFAC,PO | Performed by: AUDIOLOGIST

## 2023-11-24 NOTE — PROGRESS NOTES
Jhonny Almazan was seen 11/24/2023 for an audiological evaluation. Pt was alone during today's visit. Pertinent complaints today include hearing loss. Pt denies history of loud noise exposure and denies early onset of genetic family history of hearing loss. Otoscopy revealed no cerumen in both ears. The tympanic membrane was visualized AU prior to proceeding with the hearing testing.     Results reveal a mild-to-severe sensorineural hearing loss for the right ear and  mild-to-severe sensorineural hearing loss for the left ear.    Speech Reception Thresholds were  45 dBHL for the right ear and 50 dBHL for the left ear.    Word recognition scores were good for the right ear and fair for the left ear.   Tympanograms were Type A for the right ear and Type A for the left ear.    Recommendations: 1) Follow up with ENT     2) Annual hearing evaluation     3) Hearing aid consult when ready    Audiogram results were reviewed in detail with patient and all questions were answered. Results will be reviewed by the referring provider at the completion of this note. All complaints were addressed during this visit to the patient's satisfaction.

## 2023-11-24 NOTE — Clinical Note
Audiogram was completed today. Results reveal a mild-to-severe sensorineural hearing loss for the right ear and  mild-to-severe sensorineural hearing loss for the left ear.    Speech Reception Thresholds were  45 dBHL for the right ear and 50 dBHL for the left ear.    Word recognition scores were good for the right ear and fair for the left ear.   Tympanograms were Type A for the right ear and Type A for the left ear.  Recommendations: 1) Follow up with ENT    2) Annual hearing evaluation    3) Hearing aid consult when ready Thank you, Mago Betts, Rosalee CCC-A

## 2023-11-27 ENCOUNTER — TELEPHONE (OUTPATIENT)
Dept: NEUROLOGY | Facility: CLINIC | Age: 68
End: 2023-11-27
Payer: MEDICARE

## 2023-11-28 ENCOUNTER — TELEPHONE (OUTPATIENT)
Dept: NEUROLOGY | Facility: CLINIC | Age: 68
End: 2023-11-28
Payer: MEDICARE

## 2023-11-28 NOTE — TELEPHONE ENCOUNTER
Attempted to reach the pt at [phone], the person answering the phone said, that he is not Bakari.  Gurdeep phone number is disconnected.  Left message for his son Eleno to have him call in regards to the referral.

## 2023-12-15 ENCOUNTER — PATIENT MESSAGE (OUTPATIENT)
Dept: FAMILY MEDICINE | Facility: CLINIC | Age: 68
End: 2023-12-15
Payer: MEDICARE

## 2023-12-15 ENCOUNTER — TELEPHONE (OUTPATIENT)
Dept: FAMILY MEDICINE | Facility: CLINIC | Age: 68
End: 2023-12-15
Payer: MEDICARE

## 2024-01-04 DIAGNOSIS — G45.9 TRANSIENT CEREBRAL ISCHEMIA, UNSPECIFIED TYPE: Primary | ICD-10-CM

## 2024-01-30 ENCOUNTER — HOSPITAL ENCOUNTER (OUTPATIENT)
Dept: RADIOLOGY | Facility: HOSPITAL | Age: 69
Discharge: HOME | End: 2024-01-30
Attending: NURSE PRACTITIONER
Payer: MEDICARE

## 2024-01-30 DIAGNOSIS — G45.9 TRANSIENT CEREBRAL ISCHEMIA, UNSPECIFIED TYPE: ICD-10-CM

## 2024-01-30 LAB
CREAT SERPL-MCNC: 1 MG/DL (ref 0.5–1.4)
SAMPLE: NORMAL

## 2024-01-30 PROCEDURE — 82565 ASSAY OF CREATININE: CPT | Mod: PO

## 2024-01-30 PROCEDURE — 70551 MRI BRAIN STEM W/O DYE: CPT | Mod: TC,PO

## 2024-01-30 PROCEDURE — 70496 CT ANGIOGRAPHY HEAD: CPT | Mod: TC,PO

## 2024-01-30 PROCEDURE — 25500020 PHARM REV CODE 255: Mod: PO | Performed by: NURSE PRACTITIONER

## 2024-01-30 RX ADMIN — IOHEXOL 100 ML: 350 INJECTION, SOLUTION INTRAVENOUS at 01:01

## 2024-02-05 ENCOUNTER — HOSPITAL ENCOUNTER (OUTPATIENT)
Dept: RADIOLOGY | Facility: HOSPITAL | Age: 69
Discharge: HOME | End: 2024-02-05
Attending: NURSE PRACTITIONER
Payer: MEDICARE

## 2024-02-05 DIAGNOSIS — G45.9 TRANSIENT CEREBRAL ISCHEMIA, UNSPECIFIED TYPE: ICD-10-CM

## 2024-02-05 PROCEDURE — 70496 CT ANGIOGRAPHY HEAD: CPT | Mod: TC

## 2024-02-05 PROCEDURE — 25500020 PHARM REV CODE 255: Performed by: NURSE PRACTITIONER

## 2024-02-05 PROCEDURE — 70498 CT ANGIOGRAPHY NECK: CPT | Mod: TC

## 2024-02-05 RX ADMIN — IOHEXOL 100 ML: 350 INJECTION, SOLUTION INTRAVENOUS at 12:02

## 2024-04-15 DIAGNOSIS — E04.1 THYROID NODULE: Primary | ICD-10-CM

## 2024-04-18 ENCOUNTER — HOSPITAL ENCOUNTER (OUTPATIENT)
Dept: RADIOLOGY | Facility: HOSPITAL | Age: 69
Discharge: HOME | End: 2024-04-18
Attending: NURSE PRACTITIONER
Payer: MEDICARE

## 2024-04-18 DIAGNOSIS — E04.1 THYROID NODULE: ICD-10-CM

## 2024-04-18 PROCEDURE — 76536 US EXAM OF HEAD AND NECK: CPT | Mod: 26,,, | Performed by: RADIOLOGY

## 2024-04-18 PROCEDURE — 76536 US EXAM OF HEAD AND NECK: CPT | Mod: TC

## 2024-06-20 ENCOUNTER — TELEPHONE (OUTPATIENT)
Dept: ENDOCRINOLOGY | Facility: CLINIC | Age: 69
End: 2024-06-20
Payer: MEDICARE

## 2024-06-20 NOTE — TELEPHONE ENCOUNTER
----- Message from Nelsy Sánchez sent at 6/20/2024  1:02 PM CDT -----  Contact: Laurie Timmons  Type:  Sooner Appointment Request    Caller is requesting a sooner appointment.  Caller declined first available appointment listed below.  Caller will not accept being placed on the waitlist and is requesting a message be sent to doctor.    Name of Caller:  Laurie Timmons  When is the first available appointment?  N/A  Symptoms:  Thyroid nodules  Would the patient rather a call back or a response via [facility]? Call  Best Call Back Number:  [phone]  Additional Information:  Thank You

## 2024-06-20 NOTE — TELEPHONE ENCOUNTER
Spoke with Laurie GEORGES ststed to her Ms. [place] closest appointment for NP is late September, I told her I will let her know if I see any available openings sooner.

## 2024-06-20 NOTE — TELEPHONE ENCOUNTER
----- Message from Nelsy Sánchez sent at 6/20/2024  1:02 PM CDT -----  Contact: Laurie Timmons  Type:  Sooner Appointment Request    Caller is requesting a sooner appointment.  Caller declined first available appointment listed below.  Caller will not accept being placed on the waitlist and is requesting a message be sent to doctor.    Name of Caller:  Laurie Timmons  When is the first available appointment?  N/A  Symptoms:  Thyroid nodules  Would the patient rather a call back or a response via [facility]? Call  Best Call Back Number:  [phone]  Additional Information:  Thank You

## 2024-06-21 ENCOUNTER — TELEPHONE (OUTPATIENT)
Dept: ENDOCRINOLOGY | Facility: CLINIC | Age: 69
End: 2024-06-21
Payer: MEDICARE

## 2024-06-21 NOTE — TELEPHONE ENCOUNTER
----- Message from Sadia Catherine sent at 6/20/2024  4:30 PM CDT -----  Contact: Laurie [phone]  Type:  Patient Returning Call    Who Called:  Laurie hernandez/ Simi dickson   Who Left Message for Patient:  Carolina   Does the patient know what this is regarding?:  Appt   Best Call Back Number:  [phone]    Additional Information:  Pls call back tomorrow if possible. Thank you

## 2024-07-02 ENCOUNTER — OFFICE VISIT (OUTPATIENT)
Dept: CARDIOLOGY | Facility: CLINIC | Age: 69
End: 2024-07-02
Payer: MEDICARE

## 2024-07-02 VITALS
HEIGHT: 77 IN | DIASTOLIC BLOOD PRESSURE: 62 MMHG | BODY MASS INDEX: 26.68 KG/M2 | HEART RATE: 79 BPM | SYSTOLIC BLOOD PRESSURE: 113 MMHG

## 2024-07-02 DIAGNOSIS — I70.0 AORTIC ATHEROSCLEROSIS: ICD-10-CM

## 2024-07-02 DIAGNOSIS — E11.40 NEUROPATHY DUE TO TYPE 2 DIABETES MELLITUS: Chronic | ICD-10-CM

## 2024-07-02 DIAGNOSIS — I15.2 HYPERTENSION ASSOCIATED WITH DIABETES: Chronic | ICD-10-CM

## 2024-07-02 DIAGNOSIS — E78.5 HYPERLIPIDEMIA ASSOCIATED WITH TYPE 2 DIABETES MELLITUS: Chronic | ICD-10-CM

## 2024-07-02 DIAGNOSIS — E11.59 HYPERTENSION ASSOCIATED WITH DIABETES: Chronic | ICD-10-CM

## 2024-07-02 DIAGNOSIS — I51.7 LAE (LEFT ATRIAL ENLARGEMENT): ICD-10-CM

## 2024-07-02 DIAGNOSIS — E11.69 HYPERLIPIDEMIA ASSOCIATED WITH TYPE 2 DIABETES MELLITUS: Chronic | ICD-10-CM

## 2024-07-02 DIAGNOSIS — R94.31 NONSPECIFIC ABNORMAL ELECTROCARDIOGRAM (ECG) (EKG): Primary | ICD-10-CM

## 2024-07-02 DIAGNOSIS — I65.23 CAROTID STENOSIS, ASYMPTOMATIC, BILATERAL: Chronic | ICD-10-CM

## 2024-07-02 PROCEDURE — 93005 ELECTROCARDIOGRAM TRACING: CPT | Mod: PO

## 2024-07-02 PROCEDURE — 99212 OFFICE O/P EST SF 10 MIN: CPT | Mod: PBBFAC,25,PO | Performed by: INTERNAL MEDICINE

## 2024-07-02 PROCEDURE — 99999 PR PBB SHADOW E&M-EST. PATIENT-LVL II: CPT | Mod: PBBFAC,,, | Performed by: INTERNAL MEDICINE

## 2024-07-02 PROCEDURE — 93010 ELECTROCARDIOGRAM REPORT: CPT | Mod: ,,, | Performed by: INTERNAL MEDICINE

## 2024-07-02 NOTE — PROGRESS NOTES
Subjective:    Patient ID:  Jhonny Almazan is a 68 y.o. male who presents for Heart Problem        HPI  NEW PATIENT EVALUATION HISTORY OF STROKE DIABETES AT [facility] IN 2020, LEFT ATRIAL ENLARGEMENT, PLAQUE IN THE [place] HAS PEG TUBE, [place], REFERRED BY ?? DOES NOT KNOW, ABNRMAL HEAD CTA,50% BILATERAL CCA,  MEMORY ISSUES, L SIDED WEAKNESS SINCE CVA, HARD TO WALK, THE PATIENT THOUGHT HE HAS HERE FOR A SCAN, SEE REVIEW OF SYSTEMS    Past Medical History:   Diagnosis Date    Anticoagulant long-term use     Arthritis     Colon polyp     Diabetes mellitus     Diabetes mellitus, type 2     Fatty liver     Hypertension     Major depressive disorder 11/17/2020    -Continue Wellbutrin    PEG (percutaneous endoscopic gastrostomy) adjustment/replacement/removal 01/06/2021    Stroke 11/2020    left sided weakness    Urinary retention 09/07/2021     Past Surgical History:   Procedure Laterality Date    BACK SURGERY      COLONOSCOPY  07/18/2014    Dr. Crane: 22 colon polyps removed, hemorrhoids, erythema to sigmoid, repeat in 1 year for surveillance; biopsy: sigmoid erythema- showed unremarkable colonic mucosa, tubular adenomas and hyperplastic polyps    COLONOSCOPY N/A 5/3/2019    Procedure: COLONOSCOPY;  Surgeon: Guero Crane MD;  Location: [facility];  Service: Endoscopy;  Laterality: N/A;    COLONOSCOPY N/A 5/6/2019    Procedure: COLONOSCOPY;  Surgeon: Guero Crane MD;  Location: [facility];  Service: Endoscopy;  Laterality: N/A;    CYSTOSCOPY N/A 2/9/2022    Procedure: CYSTOSCOPY;  Surgeon: Jaylen Antonio Jr., MD;  Location: [facility] OR;  Service: Urology;  Laterality: N/A;    EPIDURAL STEROID INJECTION INTO THORACIC SPINE N/A 8/30/2019    Procedure: Injection-steroid-epidural-thoracic;  Surgeon: Frantz Green MD;  Location: [facility] OR;  Service: Pain Management;  Laterality: N/A;  T6-7    ESOPHAGOGASTRODUODENOSCOPY N/A 4/26/2019    Procedure: EGD (ESOPHAGOGASTRODUODENOSCOPY);  Surgeon: Guero Crane MD;  Location:  [facility];  Service: Endoscopy;  Laterality: N/A;    ESOPHAGOGASTRODUODENOSCOPY N/A 11/23/2020    Procedure: EGD (ESOPHAGOGASTRODUODENOSCOPY);  Surgeon: Guero Crane MD;  Location: [facility] END[facility];  Service: Endoscopy;  Laterality: N/A;    ESOPHAGOGASTRODUODENOSCOPY N/A 1/6/2021    Procedure: EGD (ESOPHAGOGASTRODUODENOSCOPY);  Surgeon: Guero Crane MD;  Location: [facility];  Service: Endoscopy;  Laterality: N/A;    HERNIA REPAIR      INSERTION, SUPRAPUBIC CATHETER N/A 12/13/2022    Procedure: INSERTION, SUPRAPUBIC CATHETER;  Surgeon: Jaylen Antonio Jr., MD;  Location: [facility] OR;  Service: Urology;  Laterality: N/A;    TRANSRECTAL ULTRASOUND EXAMINATION N/A 2/9/2022    Procedure: ULTRASOUND, RECTAL APPROACH;  Surgeon: Jaylen Antonio Jr., MD;  Location: [facility] OR;  Service: Urology;  Laterality: N/A;  must text son miroslava, times and instructions given leave at 130     Family History   Problem Relation Name Age of Onset    Heart disease Mother      Heart disease Father      Diabetes Brother      Suicide Brother      Brain cancer Brother      Colon cancer Neg Hx      Crohn's disease Neg Hx      Ulcerative colitis Neg Hx      Stomach cancer Neg Hx      Esophageal cancer Neg Hx      Glaucoma Neg Hx      Retinal detachment Neg Hx      Macular degeneration Neg Hx       Social History     Socioeconomic History    Marital status:    Tobacco Use    Smoking status: Former     Current packs/day: 1.00     Average packs/day: 1 pack/day for 50.0 years (50.0 ttl pk-yrs)     Types: Cigarettes    Smokeless tobacco: Never   Substance and Sexual Activity    Alcohol use: Not Currently     Alcohol/week: 14.0 standard drinks of alcohol     Types: 14 Cans of beer per week     Comment: none for 2 months    Drug use: No    Sexual activity: Yes     Partners: Female     Social Determinants of Health     Financial Resource Strain: Unknown (11/3/2022)    Overall Financial Resource Strain (CARDIA)     Difficulty of Paying Living Expenses:  Patient declined   Food Insecurity: Unknown (11/3/2022)    Hunger Vital Sign     Worried About Running Out of Food in the Last Year: Patient declined     Ran Out of Food in the Last Year: Patient declined   Recent Concern: Food Insecurity - Food Insecurity Present (9/3/2022)    Hunger Vital Sign     Worried About Running Out of Food in the Last Year: Sometimes true     Ran Out of Food in the Last Year: Sometimes true   Transportation Needs: Unknown (11/3/2022)    PRAPARE - Transportation     Lack of Transportation (Medical): Patient declined     Lack of Transportation (Non-Medical): Patient declined   Recent Concern: Transportation Needs - Unmet Transportation Needs (9/3/2022)    PRAPARE - Transportation     Lack of Transportation (Medical): Yes     Lack of Transportation (Non-Medical): Yes   Physical Activity: Unknown (11/3/2022)    Exercise Vital Sign     Days of Exercise per Week: Patient declined     Minutes of Exercise per Session: Patient declined   Recent Concern: Physical Activity - Insufficiently Active (9/3/2022)    Exercise Vital Sign     Days of Exercise per Week: 1 day     Minutes of Exercise per Session: 10 min   Stress: Unknown (11/3/2022)    Citizen of [place] [employer] - Occupational Stress Questionnaire     Feeling of Stress : Patient declined   Housing Stability: Unknown (11/3/2022)    Housing Stability Vital Sign     Unable to Pay for Housing in the Last Year: Patient refused     Unstable Housing in the Last Year: Patient refused       Review of patient's allergies indicates:  No Known Allergies    Current Outpatient Medications:     acetaminophen (TYLENOL) 325 MG tablet, Take 2 tablets (650 mg total) by mouth every 6 (six) hours as needed., Disp: , Rfl: 0    atorvastatin (LIPITOR) 40 MG tablet, Take 1 tablet (40 mg total) by mouth once daily., Disp: 90 tablet, Rfl: 3    blood sugar diagnostic Strp, To check BG 2 times daily, to use with insurance preferred meter. One touch Ultra.,  Disp: 100 strip, Rfl: 11    buPROPion 450 mg Tb24, Take 450 mg by mouth once daily., Disp: , Rfl:     clopidogreL (PLAVIX) 75 mg tablet, 1 tablet (75 mg total) by Per G Tube route once daily., Disp: 30 tablet, Rfl: 11    cyclobenzaprine (FLEXERIL) 10 MG tablet, , Disp: , Rfl:     gabapentin (NEURONTIN) 300 MG capsule, Take 1 capsule (300 mg total) by mouth 2 (two) times daily., Disp: 180 capsule, Rfl: 0    glipiZIDE (GLUCOTROL) 2.5 MG TR24, Take 5 mg by mouth daily with breakfast., Disp: , Rfl:     lancets Misc, To check BG 2 times daily, to use with insurance preferred meter.One Touch Ultra, Disp: 100 each, Rfl: 11    losartan (COZAAR) 25 MG tablet, 1 tablet DAILY (route: oral), Disp: , Rfl:     meclizine (ANTIVERT) 25 mg tablet, Take 1 tablet (25 mg total) by mouth 3 (three) times daily as needed for Dizziness., Disp:  , Rfl: 0    metFORMIN (GLUCOPHAGE) 500 MG tablet, Take 1,000 mg by mouth 2 (two) times daily with meals., Disp: , Rfl:     omeprazole (PRILOSEC) 40 MG capsule, TAKE ONE CAPSULE (40 MG) BY MOUTH EVERY DAY, Disp: 30 capsule, Rfl: 3    senna-docusate 8.6-50 mg (PERICOLACE) 8.6-50 mg per tablet, Take 1 tablet by mouth 2 (two) times daily., Disp: , Rfl:     tetrahydrozoline 0.05% (VISION CLEAR) 0.05 % Drop, Place 2 drops into both eyes 4 (four) times daily as needed (eye irritation)., Disp:  , Rfl: 0    0.9 % sodium chloride (SODIUM CHLORIDE 0.9 %) PgBk, Inject into the vein. (Patient not taking: Reported on 7/2/2024), Disp: , Rfl:     aspirin (ECOTRIN) 81 MG EC tablet, Take 1 tablet (81 mg total) by mouth once daily. (Patient not taking: Reported on 7/2/2024), Disp: 360 tablet, Rfl: 0    FLUoxetine 20 MG capsule, Take 40 mg by mouth every morning. (Patient not taking: Reported on 7/2/2024), Disp: , Rfl:     insulin aspart U-100 (NOVOLOG) 100 unit/mL (3 mL) InPn pen, Inject 1-10 Units into the skin as needed (high blood sugar). Inject per sliding scale. (Patient not taking: Reported on 2/15/2023), Disp:  "15 mL, Rfl: 1    insulin detemir U-100 (LEVEMIR FLEXTOUCH) 100 unit/mL (3 mL) SubQ InPn pen, Inject 10 Units into the skin every evening. (Patient not taking: Reported on 7/2/2024), Disp: 15 mL, Rfl: 3    lisinopriL (PRINIVIL,ZESTRIL) 20 MG tablet, Take 20 mg by mouth. (Patient not taking: Reported on 7/2/2024), Disp: , Rfl:     melatonin (MELATIN) 3 mg tablet, Take 2 tablets (6 mg total) by mouth nightly as needed for Insomnia. (Patient not taking: Reported on 11/22/2023), Disp: , Rfl: 0    meropenem (MERREM) 1 gram injection, Inject into the vein. (Patient not taking: Reported on 7/2/2024), Disp: , Rfl:     pen needle, diabetic 32 gauge x 5/32" Ndle, Use AC meals 2 a day (Patient not taking: Reported on 11/22/2023), Disp: 100 each, Rfl: 11    polyethylene glycol (GLYCOLAX) 17 gram PwPk, Take 17 g by mouth 2 (two) times daily. (Patient not taking: Reported on 11/22/2023), Disp: , Rfl: 0    QUEtiapine (SEROQUEL) 25 MG Tab, Take 1 tablet (25 mg total) by mouth every evening. (Patient not taking: Reported on 7/2/2024), Disp: 30 tablet, Rfl: 11    Review of Systems   Constitutional: Negative for chills, decreased appetite and diaphoresis.   HENT:  Positive for hearing loss. Negative for congestion and nosebleeds.    Eyes:  Positive for blurred vision. Negative for pain.   Cardiovascular:  Negative for chest pain, claudication and cyanosis. Dyspnea on exertion: MILD.  Respiratory:  Negative for cough, hemoptysis and shortness of breath.    Endocrine: Negative for cold intolerance and heat intolerance.   Hematologic/Lymphatic: Negative for adenopathy. Does not bruise/bleed easily.   Skin:  Negative for color change and dry skin.   Gastrointestinal:  Negative for abdominal pain, dysphagia, jaundice and melena.   Genitourinary:  Positive for bladder incontinence. Negative for flank pain.   Neurological:  Positive for disturbances in coordination, dizziness (CONSTANT), focal weakness (L SIDED) and loss of balance. " "  Psychiatric/Behavioral:  Positive for memory loss. Negative for depression.    Allergic/Immunologic: Negative for persistent infections.        Objective:      Vitals:    07/02/24 1539   BP: 113/62   Pulse: 79   Height: 6' 5" (1.956 m)   PainSc: 0-No pain     Body mass index is 26.68 kg/m².    Physical Exam  HENT:      Head: Normocephalic and atraumatic.   Eyes:      Extraocular Movements: Extraocular movements intact.      Conjunctiva/sclera: Conjunctivae normal.   Neck:      Vascular: Carotid bruit present. No JVD.   Cardiovascular:      Rate and Rhythm: Normal rate and regular rhythm.      Heart sounds: Murmur heard.   Pulmonary:      Effort: Pulmonary effort is normal.      Breath sounds: Normal breath sounds and air entry.   Abdominal:      Palpations: There is no hepatomegaly.      Tenderness: There is no abdominal tenderness.       Musculoskeletal:      Cervical back: Neck supple.      Right lower leg: No edema.      Left lower leg: No edema.   Neurological:      Mental Status: He is alert.      Cranial Nerves: Cranial nerves 2-12 are intact.   Psychiatric:         Mood and Affect: Mood normal.         Speech: Speech normal.         Behavior: Behavior normal.                 ..    Chemistry        Component Value Date/Time     12/13/2022 0816    K 4.0 12/13/2022 0816     12/13/2022 0816    CO2 26 12/13/2022 0816    BUN 20 12/13/2022 0816    CREATININE 0.9 12/13/2022 0816     (H) 12/13/2022 0816        Component Value Date/Time    CALCIUM 9.6 12/13/2022 0816    ALKPHOS 97 12/13/2022 0816    AST 15 12/13/2022 0816    ALT 31 12/13/2022 0816    BILITOT 0.6 12/13/2022 0816    ESTGFRAFRICA >60 06/27/2022 1753    EGFRNONAA >60 06/27/2022 1753            ..  Lab Results   Component Value Date    CHOL 199 11/16/2020    CHOL 254 (H) 03/23/2020    CHOL 261 (H) 09/13/2019     Lab Results   Component Value Date    HDL 39 (L) 11/16/2020    HDL 45 03/23/2020    HDL 44 09/13/2019     Lab Results "   Component Value Date    LDLCALC 128.4 11/16/2020    LDLCALC 167.2 (H) 03/23/2020    LDLCALC 177.6 (H) 09/13/2019     Lab Results   Component Value Date    TRIG 158 (H) 11/16/2020    TRIG 209 (H) 03/23/2020    TRIG 197 (H) 09/13/2019     Lab Results   Component Value Date    CHOLHDL 19.6 (L) 11/16/2020    CHOLHDL 17.7 (L) 03/23/2020    CHOLHDL 16.9 (L) 09/13/2019     ..  Lab Results   Component Value Date    WBC 8.84 12/13/2022    HGB 13.2 (L) 12/13/2022    HCT 40.3 12/13/2022    MCV 87 12/13/2022     12/13/2022       Test(s) Reviewed  I have reviewed the following in detail:  [] Stress test   [] Angiography   [x] Echocardiogram   [x] Labs   [x] Other:       Assessment:         ICD-10-CM ICD-9-CM   1. Nonspecific abnormal electrocardiogram (ECG) (EKG)  R94.31 794.31   2. Aortic atherosclerosis  I70.0 440.0   3. LAE (left atrial enlargement)  I51.7 429.3   4. Neuropathy due to type 2 diabetes mellitus  E11.40 250.60     357.2   5. Hyperlipidemia associated with type 2 diabetes mellitus  E11.69 250.80    E78.5 272.4   6. Hypertension associated with diabetes  E11.59 250.80    I15.2 401.9   7. Carotid stenosis, asymptomatic, bilateral  I65.23 433.10     433.30     Problem List Items Addressed This Visit          Neuro    Neuropathy due to type 2 diabetes mellitus       Cardiac/Vascular    Hyperlipidemia associated with type 2 diabetes mellitus (Chronic)    Hypertension associated with diabetes    Nonspecific abnormal electrocardiogram (ECG) (EKG) - Primary    Relevant Orders    IN OFFICE EKG 12-LEAD (to Muse) (Completed)    Nuclear Stress - Cardiology Interpreted    LAE (left atrial enlargement)    Relevant Orders    Echo    Aortic atherosclerosis    Relevant Orders    Nuclear Stress - Cardiology Interpreted     Other Visit Diagnoses       Carotid stenosis, asymptomatic, bilateral  (Chronic)       Relevant Orders    CV Ultrasound Bilateral Doppler Carotid    Nuclear Stress - Cardiology Interpreted              Plan:     EKG SR , NS T CHANGES, WILL NEED FURTHER EVALUATION CHECK NUCLEAR STRESS TEST ASSESS FOR ISCHEMIA ECHOCARDIOGRAM HIGH-RISK PATIENT WITH DIABETES STROKE ETCETERA ALSO CHECK CAROTID ULTRASOUND UNCLEAR PICTURE OF NEUROLOGIC STATUS, FOLLOW-UP WITH THE ORDERING PHYSICIAN/NEUROLOGY, RETURN TO CLINIC IN FEW WEEKS      Nonspecific abnormal electrocardiogram (ECG) (EKG)  -     IN OFFICE EKG 12-LEAD (to Muse)  -     Nuclear Stress - Cardiology Interpreted; Future    Aortic atherosclerosis  -     Nuclear Stress - Cardiology Interpreted; Future    LAE (left atrial enlargement)  -     Echo    Neuropathy due to type 2 diabetes mellitus    Hyperlipidemia associated with type 2 diabetes mellitus    Hypertension associated with diabetes    Carotid stenosis, asymptomatic, bilateral  -     CV Ultrasound Bilateral Doppler Carotid; Future  -     Nuclear Stress - Cardiology Interpreted; Future    RTC Low level/low impact aerobic exercise 5x's/wk. Heart healthy diet and risk factor modification.    See labs and med orders.    Aerobic exercise 5x's/wk. Heart healthy diet and risk factor modification.    See labs and med orders.

## 2024-07-03 LAB
OHS QRS DURATION: 94 MS
OHS QTC CALCULATION: 455 MS

## 2024-07-17 ENCOUNTER — TELEPHONE (OUTPATIENT)
Dept: CARDIOLOGY | Facility: CLINIC | Age: 69
End: 2024-07-17
Payer: MEDICARE

## 2024-07-17 ENCOUNTER — PATIENT MESSAGE (OUTPATIENT)
Dept: CARDIOLOGY | Facility: HOSPITAL | Age: 69
End: 2024-07-17
Payer: MEDICARE

## 2024-08-06 ENCOUNTER — PATIENT MESSAGE (OUTPATIENT)
Dept: CARDIOLOGY | Facility: HOSPITAL | Age: 69
End: 2024-08-06
Payer: MEDICARE

## 2024-08-08 ENCOUNTER — HOSPITAL ENCOUNTER (OUTPATIENT)
Dept: CARDIOLOGY | Facility: HOSPITAL | Age: 69
Discharge: HOME | End: 2024-08-08
Attending: INTERNAL MEDICINE
Payer: MEDICARE

## 2024-08-08 ENCOUNTER — HOSPITAL ENCOUNTER (OUTPATIENT)
Dept: RADIOLOGY | Facility: HOSPITAL | Age: 69
Discharge: HOME | End: 2024-08-08
Attending: INTERNAL MEDICINE
Payer: MEDICARE

## 2024-08-08 VITALS — HEIGHT: 77 IN | WEIGHT: 225 LBS | BODY MASS INDEX: 26.57 KG/M2

## 2024-08-08 VITALS — WEIGHT: 225 LBS | HEIGHT: 77 IN | BODY MASS INDEX: 26.57 KG/M2

## 2024-08-08 DIAGNOSIS — I65.23 CAROTID STENOSIS, ASYMPTOMATIC, BILATERAL: Chronic | ICD-10-CM

## 2024-08-08 DIAGNOSIS — R94.31 NONSPECIFIC ABNORMAL ELECTROCARDIOGRAM (ECG) (EKG): ICD-10-CM

## 2024-08-08 DIAGNOSIS — I70.0 AORTIC ATHEROSCLEROSIS: ICD-10-CM

## 2024-08-08 PROCEDURE — 78452 HT MUSCLE IMAGE SPECT MULT: CPT | Mod: PO

## 2024-08-08 PROCEDURE — 63600175 PHARM REV CODE 636 W HCPCS: Mod: PO | Performed by: INTERNAL MEDICINE

## 2024-08-08 PROCEDURE — 78452 HT MUSCLE IMAGE SPECT MULT: CPT | Mod: 26,,, | Performed by: INTERNAL MEDICINE

## 2024-08-08 PROCEDURE — 93017 CV STRESS TEST TRACING ONLY: CPT | Mod: PBBFAC,PO

## 2024-08-08 PROCEDURE — 93018 CV STRESS TEST I&R ONLY: CPT | Mod: S$PBB,,, | Performed by: INTERNAL MEDICINE

## 2024-08-08 PROCEDURE — 93306 TTE W/DOPPLER COMPLETE: CPT | Mod: PO

## 2024-08-08 PROCEDURE — 93016 CV STRESS TEST SUPVJ ONLY: CPT | Mod: S$PBB,,, | Performed by: INTERNAL MEDICINE

## 2024-08-08 PROCEDURE — A9502 TC99M TETROFOSMIN: HCPCS | Mod: PO | Performed by: INTERNAL MEDICINE

## 2024-08-08 PROCEDURE — 93017 CV STRESS TEST TRACING ONLY: CPT | Mod: PO

## 2024-08-08 RX ORDER — REGADENOSON 0.08 MG/ML
0.4 INJECTION, SOLUTION INTRAVENOUS
Status: COMPLETED | OUTPATIENT
Start: 2024-08-08 | End: 2024-08-08

## 2024-08-08 RX ADMIN — TETROFOSMIN 31 MILLICURIE: 1.38 INJECTION, POWDER, LYOPHILIZED, FOR SOLUTION INTRAVENOUS at 02:08

## 2024-08-08 RX ADMIN — TETROFOSMIN 10.5 MILLICURIE: 1.38 INJECTION, POWDER, LYOPHILIZED, FOR SOLUTION INTRAVENOUS at 01:08

## 2024-08-08 RX ADMIN — REGADENOSON 0.4 MG: 0.08 INJECTION, SOLUTION INTRAVENOUS at 02:08

## 2024-08-09 LAB
CV PHARM DOSE: 0.4 MG
CV STRESS BASE HR: 64 BPM
DIASTOLIC BLOOD PRESSURE: 70 MMHG
NUC REST EJECTION FRACTION: 61
OHS CV CPX 1 MINUTE RECOVERY HEART RATE: 82 BPM
OHS CV CPX 85 PERCENT MAX PREDICTED HEART RATE MALE: 128
OHS CV CPX MAX PREDICTED HEART RATE: 150
OHS CV CPX PATIENT IS FEMALE: 0
OHS CV CPX PATIENT IS MALE: 1
OHS CV CPX PEAK DIASTOLIC BLOOD PRESSURE: 58 MMHG
OHS CV CPX PEAK HEAR RATE: 88 BPM
OHS CV CPX PEAK RATE PRESSURE PRODUCT: 9944
OHS CV CPX PEAK SYSTOLIC BLOOD PRESSURE: 113 MMHG
OHS CV CPX PERCENT MAX PREDICTED HEART RATE ACHIEVED: 59
OHS CV CPX RATE PRESSURE PRODUCT PRESENTING: 6848
OHS CV PHARM TIME: 1404 MIN
SYSTOLIC BLOOD PRESSURE: 107 MMHG

## 2024-10-10 ENCOUNTER — OFFICE VISIT (OUTPATIENT)
Dept: CARDIOLOGY | Facility: CLINIC | Age: 69
End: 2024-10-10
Attending: INTERNAL MEDICINE
Payer: MEDICARE

## 2024-10-10 VITALS
HEART RATE: 61 BPM | SYSTOLIC BLOOD PRESSURE: 135 MMHG | WEIGHT: 225 LBS | DIASTOLIC BLOOD PRESSURE: 72 MMHG | BODY MASS INDEX: 26.57 KG/M2 | HEIGHT: 77 IN

## 2024-10-10 DIAGNOSIS — E78.5 HYPERLIPIDEMIA ASSOCIATED WITH TYPE 2 DIABETES MELLITUS: Chronic | ICD-10-CM

## 2024-10-10 DIAGNOSIS — E11.59 HYPERTENSION ASSOCIATED WITH DIABETES: Chronic | ICD-10-CM

## 2024-10-10 DIAGNOSIS — I35.9 AORTIC VALVE CALCIFICATION: ICD-10-CM

## 2024-10-10 DIAGNOSIS — I70.0 AORTIC ATHEROSCLEROSIS: Primary | Chronic | ICD-10-CM

## 2024-10-10 DIAGNOSIS — E11.69 HYPERLIPIDEMIA ASSOCIATED WITH TYPE 2 DIABETES MELLITUS: Chronic | ICD-10-CM

## 2024-10-10 DIAGNOSIS — Z71.6 TOBACCO ABUSE COUNSELING: Chronic | ICD-10-CM

## 2024-10-10 DIAGNOSIS — I15.2 HYPERTENSION ASSOCIATED WITH DIABETES: Chronic | ICD-10-CM

## 2024-10-10 DIAGNOSIS — R09.89 BRUIT OF LEFT CAROTID ARTERY: ICD-10-CM

## 2024-10-10 PROCEDURE — 99999 PR PBB SHADOW E&M-EST. PATIENT-LVL III: CPT | Mod: PBBFAC,,, | Performed by: INTERNAL MEDICINE

## 2024-10-10 PROCEDURE — 99213 OFFICE O/P EST LOW 20 MIN: CPT | Mod: PBBFAC,PO | Performed by: INTERNAL MEDICINE

## 2024-10-10 PROCEDURE — 99214 OFFICE O/P EST MOD 30 MIN: CPT | Mod: S$PBB,,, | Performed by: INTERNAL MEDICINE

## 2024-10-10 RX ORDER — SERTRALINE HYDROCHLORIDE 50 MG/1
50 TABLET, FILM COATED ORAL DAILY
COMMUNITY

## 2024-10-10 NOTE — PROGRESS NOTES
Subjective:    Patient ID:  Jhonny Almazan is a 68 y.o. male who presents for Follow-up and Heart Problem        HPI  DISCUSSED TESTS NORMAL NUCLEAR STRESS TEST, ECHO NORMAL LV FUNCTION MILDLY DILATED LEFT ATRIUM SCLEROTIC AORTIC AND MITRAL VALVE, MILDLY DILATED ASCENDING AORTA, DOING OK, AT NH, SEE ROS    Technically difficult study.    Left Ventricle: The left ventricle is normal in size. Mildly increased wall thickness. There is concentric remodeling. There is normal systolic function. Ejection fraction by visual approximation is 60%. There is normal diastolic function.    Right Ventricle: Normal right ventricular cavity size. Systolic function is normal.    Left Atrium: Left atrium is mildly dilated.    Aortic Valve: There is mild aortic valve sclerosis.    Mitral Valve: Mildly calcified anterior leaflet.    Pulmonary Artery: The estimated pulmonary artery systolic pressure is < 20  mmHg.  The IVC not well visualized    IVC/SVC: Normal venous pressure at 3 mmHg.    Mildly dilated ascending aorta.  Past Medical History:   Diagnosis Date    Anticoagulant long-term use     Arthritis     Colon polyp     Diabetes mellitus     Diabetes mellitus, type 2     Fatty liver     Hypertension     Major depressive disorder 11/17/2020    -Continue Wellbutrin    PEG (percutaneous endoscopic gastrostomy) adjustment/replacement/removal 01/06/2021    Stroke 11/2020    left sided weakness    Urinary retention 09/07/2021     Past Surgical History:   Procedure Laterality Date    BACK SURGERY      COLONOSCOPY  07/18/2014    Dr. Crane: 22 colon polyps removed, hemorrhoids, erythema to sigmoid, repeat in 1 year for surveillance; biopsy: sigmoid erythema- showed unremarkable colonic mucosa, tubular adenomas and hyperplastic polyps    COLONOSCOPY N/A 5/3/2019    Procedure: COLONOSCOPY;  Surgeon: Guero Crane MD;  Location: [facility];  Service: Endoscopy;  Laterality: N/A;    COLONOSCOPY N/A 5/6/2019    Procedure:  COLONOSCOPY;  Surgeon: Guero Crane MD;  Location: [facility] ENDO;  Service: Endoscopy;  Laterality: N/A;    CYSTOSCOPY N/A 2/9/2022    Procedure: CYSTOSCOPY;  Surgeon: Jaylen Antonio Jr., MD;  Location: [facility] OR;  Service: Urology;  Laterality: N/A;    EPIDURAL STEROID INJECTION INTO THORACIC SPINE N/A 8/30/2019    Procedure: Injection-steroid-epidural-thoracic;  Surgeon: Frantz Green MD;  Location: [facility] OR;  Service: Pain Management;  Laterality: N/A;  T6-7    ESOPHAGOGASTRODUODENOSCOPY N/A 4/26/2019    Procedure: EGD (ESOPHAGOGASTRODUODENOSCOPY);  Surgeon: Guero Crane MD;  Location: [facility] ENDO;  Service: Endoscopy;  Laterality: N/A;    ESOPHAGOGASTRODUODENOSCOPY N/A 11/23/2020    Procedure: EGD (ESOPHAGOGASTRODUODENOSCOPY);  Surgeon: Guero Crane MD;  Location: [facility] ENDO;  Service: Endoscopy;  Laterality: N/A;    ESOPHAGOGASTRODUODENOSCOPY N/A 1/6/2021    Procedure: EGD (ESOPHAGOGASTRODUODENOSCOPY);  Surgeon: Guero Crane MD;  Location: [facility];  Service: Endoscopy;  Laterality: N/A;    HERNIA REPAIR      INSERTION, SUPRAPUBIC CATHETER N/A 12/13/2022    Procedure: INSERTION, SUPRAPUBIC CATHETER;  Surgeon: Jaylen Antonio Jr., MD;  Location: [facility];  Service: Urology;  Laterality: N/A;    TRANSRECTAL ULTRASOUND EXAMINATION N/A 2/9/2022    Procedure: ULTRASOUND, RECTAL APPROACH;  Surgeon: Jaylen Antonio Jr., MD;  Location: [facility];  Service: Urology;  Laterality: N/A;  must text son miroslava, times and instructions given leave at 130     Family History   Problem Relation Name Age of Onset    Heart disease Mother      Heart disease Father      Diabetes Brother      Suicide Brother      Brain cancer Brother      Colon cancer Neg Hx      Crohn's disease Neg Hx      Ulcerative colitis Neg Hx      Stomach cancer Neg Hx      Esophageal cancer Neg Hx      Glaucoma Neg Hx      Retinal detachment Neg Hx      Macular degeneration Neg Hx       Social History     Socioeconomic History    Marital status:     Tobacco Use    Smoking status: Former     Current packs/day: 1.00     Average packs/day: 1 pack/day for 50.0 years (50.0 ttl pk-yrs)     Types: Cigarettes    Smokeless tobacco: Never   Substance and Sexual Activity    Alcohol use: Not Currently     Alcohol/week: 14.0 standard drinks of alcohol     Types: 14 Cans of beer per week     Comment: none for 2 months    Drug use: No    Sexual activity: Yes     Partners: Female     Social Drivers of Health     Financial Resource Strain: Unknown (11/3/2022)    Overall Financial Resource Strain (CARDIA)     Difficulty of Paying Living Expenses: Patient declined   Food Insecurity: Unknown (11/3/2022)    Hunger Vital Sign     Worried About Running Out of Food in the Last Year: Patient declined     Ran Out of Food in the Last Year: Patient declined   Recent Concern: Food Insecurity - Food Insecurity Present (9/3/2022)    Hunger Vital Sign     Worried About Running Out of Food in the Last Year: Sometimes true     Ran Out of Food in the Last Year: Sometimes true   Transportation Needs: Unknown (11/3/2022)    PRAPARE - Transportation     Lack of Transportation (Medical): Patient declined     Lack of Transportation (Non-Medical): Patient declined   Recent Concern: Transportation Needs - Unmet Transportation Needs (9/3/2022)    PRAPARE - Transportation     Lack of Transportation (Medical): Yes     Lack of Transportation (Non-Medical): Yes   Physical Activity: Unknown (11/3/2022)    Exercise Vital Sign     Days of Exercise per Week: Patient declined     Minutes of Exercise per Session: Patient declined   Recent Concern: Physical Activity - Insufficiently Active (9/3/2022)    Exercise Vital Sign     Days of Exercise per Week: 1 day     Minutes of Exercise per Session: 10 min   Stress: Unknown (11/3/2022)    [employer] - Occupational Stress Questionnaire     Feeling of Stress : Patient declined   Housing Stability: Unknown (11/3/2022)    Housing Stability  Vital Sign     Unable to Pay for Housing in the Last Year: Patient refused     Unstable Housing in the Last Year: Patient refused       Review of patient's allergies indicates:  No Known Allergies    Current Outpatient Medications:     acetaminophen (TYLENOL) 325 MG tablet, Take 2 tablets (650 mg total) by mouth every 6 (six) hours as needed., Disp: , Rfl: 0    atorvastatin (LIPITOR) 40 MG tablet, Take 1 tablet (40 mg total) by mouth once daily., Disp: 90 tablet, Rfl: 3    buPROPion 450 mg Tb24, Take 450 mg by mouth once daily., Disp: , Rfl:     clopidogreL (PLAVIX) 75 mg tablet, 1 tablet (75 mg total) by Per G Tube route once daily., Disp: 30 tablet, Rfl: 11    cyclobenzaprine (FLEXERIL) 10 MG tablet, , Disp: , Rfl:     gabapentin (NEURONTIN) 300 MG capsule, Take 1 capsule (300 mg total) by mouth 2 (two) times daily., Disp: 180 capsule, Rfl: 0    glipiZIDE (GLUCOTROL) 2.5 MG TR24, Take 5 mg by mouth daily with breakfast., Disp: , Rfl:     losartan (COZAAR) 25 MG tablet, 1 tablet DAILY (route: oral), Disp: , Rfl:     meclizine (ANTIVERT) 25 mg tablet, Take 1 tablet (25 mg total) by mouth 3 (three) times daily as needed for Dizziness., Disp:  , Rfl: 0    metFORMIN (GLUCOPHAGE) 500 MG tablet, Take 1,000 mg by mouth 2 (two) times daily with meals., Disp: , Rfl:     omeprazole (PRILOSEC) 40 MG capsule, TAKE ONE CAPSULE (40 MG) BY MOUTH EVERY DAY, Disp: 30 capsule, Rfl: 3    senna-docusate 8.6-50 mg (PERICOLACE) 8.6-50 mg per tablet, Take 1 tablet by mouth 2 (two) times daily., Disp: , Rfl:     sertraline (ZOLOFT) 50 MG tablet, Take 50 mg by mouth once daily., Disp: , Rfl:     tetrahydrozoline 0.05% (VISION CLEAR) 0.05 % Drop, Place 2 drops into both eyes 4 (four) times daily as needed (eye irritation)., Disp:  , Rfl: 0    0.9 % sodium chloride (SODIUM CHLORIDE 0.9 %) PgBk, Inject into the vein. (Patient not taking: Reported on 10/10/2024), Disp: , Rfl:     aspirin (ECOTRIN) 81 MG EC tablet, Take 1 tablet (81 mg total)  "by mouth once daily. (Patient not taking: Reported on 7/2/2024), Disp: 360 tablet, Rfl: 0    blood sugar diagnostic Strp, To check BG 2 times daily, to use with insurance preferred meter. One touch Ultra. (Patient not taking: Reported on 10/10/2024), Disp: 100 strip, Rfl: 11    FLUoxetine 20 MG capsule, Take 40 mg by mouth every morning. (Patient not taking: Reported on 10/10/2024), Disp: , Rfl:     insulin aspart U-100 (NOVOLOG) 100 unit/mL (3 mL) InPn pen, Inject 1-10 Units into the skin as needed (high blood sugar). Inject per sliding scale. (Patient not taking: Reported on 2/15/2023), Disp: 15 mL, Rfl: 1    insulin detemir U-100 (LEVEMIR FLEXTOUCH) 100 unit/mL (3 mL) SubQ InPn pen, Inject 10 Units into the skin every evening. (Patient not taking: Reported on 7/2/2024), Disp: 15 mL, Rfl: 3    lancets Misc, To check BG 2 times daily, to use with insurance preferred meter.One Touch Ultra (Patient not taking: Reported on 10/10/2024), Disp: 100 each, Rfl: 11    lisinopriL (PRINIVIL,ZESTRIL) 20 MG tablet, Take 20 mg by mouth. (Patient not taking: Reported on 10/10/2024), Disp: , Rfl:     melatonin (MELATIN) 3 mg tablet, Take 2 tablets (6 mg total) by mouth nightly as needed for Insomnia. (Patient not taking: Reported on 10/10/2024), Disp: , Rfl: 0    meropenem (MERREM) 1 gram injection, Inject into the vein. (Patient not taking: Reported on 10/10/2024), Disp: , Rfl:     pen needle, diabetic 32 gauge x 5/32" Ndle, Use AC meals 2 a day (Patient not taking: Reported on 10/10/2024), Disp: 100 each, Rfl: 11    polyethylene glycol (GLYCOLAX) 17 gram PwPk, Take 17 g by mouth 2 (two) times daily. (Patient not taking: Reported on 10/10/2024), Disp: , Rfl: 0    QUEtiapine (SEROQUEL) 25 MG Tab, Take 1 tablet (25 mg total) by mouth every evening. (Patient not taking: Reported on 7/2/2024), Disp: 30 tablet, Rfl: 11    Review of Systems   Constitutional: Negative for chills, decreased appetite and diaphoresis.   HENT:  Positive for " "hearing loss. Negative for congestion and nosebleeds.    Eyes:  Negative for pain.   Cardiovascular:  Negative for chest pain, claudication and cyanosis. Dyspnea on exertion: MILD.  Respiratory:  Negative for cough, hemoptysis and shortness of breath.    Endocrine: Negative for cold intolerance and heat intolerance.   Hematologic/Lymphatic: Negative for adenopathy. Does not bruise/bleed easily.   Skin:  Negative for color change and dry skin.   Gastrointestinal:  Negative for abdominal pain, dysphagia, jaundice and melena.   Genitourinary:  Positive for bladder incontinence. Negative for flank pain.   Neurological:  Positive for focal weakness (L SIDED) and loss of balance.   Psychiatric/Behavioral:  Positive for memory loss. Negative for depression.    Allergic/Immunologic: Negative for persistent infections.        Objective:      Vitals:    10/10/24 1332   BP: 135/72   Pulse: 61   Weight: 102.1 kg (225 lb)   Height: 6' 5" (1.956 m)   PainSc: 0-No pain     Body mass index is 26.68 kg/m².    Physical Exam  HENT:      Head: Normocephalic and atraumatic.   Eyes:      Extraocular Movements: Extraocular movements intact.   Neck:      Vascular: No JVD.   Cardiovascular:      Rate and Rhythm: Normal rate and regular rhythm. No extrasystoles are present.     Pulses:           Carotid pulses are 2+ on the right side and 2+ on the left side with bruit.       Radial pulses are 2+ on the right side and 2+ on the left side.      Heart sounds: Murmur heard.   Pulmonary:      Effort: Pulmonary effort is normal.      Breath sounds: Normal breath sounds and air entry.   Abdominal:      Palpations: There is no hepatomegaly.      Tenderness: There is no abdominal tenderness.       Musculoskeletal:      Cervical back: Neck supple.      Right lower leg: No edema.      Left lower leg: No edema.   Neurological:      Mental Status: He is alert.      Cranial Nerves: Cranial nerves 2-12 are intact.   Psychiatric:         Mood and Affect: " Mood normal.         Speech: Speech normal.         Behavior: Behavior normal.                 ..    Chemistry        Component Value Date/Time     12/13/2022 0816    K 4.0 12/13/2022 0816     12/13/2022 0816    CO2 26 12/13/2022 0816    BUN 20 12/13/2022 0816    CREATININE 0.9 12/13/2022 0816     (H) 12/13/2022 0816        Component Value Date/Time    CALCIUM 9.6 12/13/2022 0816    ALKPHOS 97 12/13/2022 0816    AST 15 12/13/2022 0816    ALT 31 12/13/2022 0816    BILITOT 0.6 12/13/2022 0816    ESTGFRAFRICA >60 06/27/2022 1753    EGFRNONAA >60 06/27/2022 1753            ..  Lab Results   Component Value Date    CHOL 199 11/16/2020    CHOL 254 (H) 03/23/2020    CHOL 261 (H) 09/13/2019     Lab Results   Component Value Date    HDL 39 (L) 11/16/2020    HDL 45 03/23/2020    HDL 44 09/13/2019     Lab Results   Component Value Date    LDLCALC 128.4 11/16/2020    LDLCALC 167.2 (H) 03/23/2020    LDLCALC 177.6 (H) 09/13/2019     Lab Results   Component Value Date    TRIG 158 (H) 11/16/2020    TRIG 209 (H) 03/23/2020    TRIG 197 (H) 09/13/2019     Lab Results   Component Value Date    CHOLHDL 19.6 (L) 11/16/2020    CHOLHDL 17.7 (L) 03/23/2020    CHOLHDL 16.9 (L) 09/13/2019     ..  Lab Results   Component Value Date    WBC 8.84 12/13/2022    HGB 13.2 (L) 12/13/2022    HCT 40.3 12/13/2022    MCV 87 12/13/2022     12/13/2022       Test(s) Reviewed  I have reviewed the following in detail:  [x] Stress test   [] Angiography   [x] Echocardiogram   [] Labs   [x] Other:       Assessment:         ICD-10-CM ICD-9-CM   1. Aortic atherosclerosis  I70.0 440.0   2. Aortic valve calcification  I35.9 424.1   3. Bruit of left carotid artery  R09.89 785.9   4. Hypertension associated with diabetes  E11.59 250.80    I15.2 401.9   5. Hyperlipidemia associated with type 2 diabetes mellitus  E11.69 250.80    E78.5 272.4   6. Tobacco abuse counseling  Z71.6 V65.42     305.1     Problem List Items Addressed This Visit           Cardiac/Vascular    Hyperlipidemia associated with type 2 diabetes mellitus (Chronic)    Hypertension associated with diabetes    Aortic atherosclerosis - Primary    Aortic valve calcification    Bruit of left carotid artery    Relevant Orders    CV Ultrasound Bilateral Doppler Carotid       Other    Tobacco abuse counseling        Plan:         CHECK CAROTID ULTRASOUND TOBACCO CESSATION COUNSELING., .  ALL CV CLINICALLY STABLE, NO ANGINA, NO HF, NO TIA, NO CLINICAL ARRHYTHMIA,CONTINUE CURRENT MEDS, EDUCATION, DIET, EXERCISE AS MUCH AS POSSIBLE, RETURN TO CLINIC IN 1 YEAR DISCUSSED PLAN WITH THE PATIENT AND THE PERSONNEL FROM THE NURSING HOME THIS IS  Aortic atherosclerosis    Aortic valve calcification    Bruit of left carotid artery  Comments:  ULTRASOUND  Orders:  -     CV Ultrasound Bilateral Doppler Carotid; Future    Hypertension associated with diabetes    Hyperlipidemia associated with type 2 diabetes mellitus    Tobacco abuse counseling    RTC Low level/low impact aerobic exercise 5x's/wk. Heart healthy diet and risk factor modification.    See labs and med orders.    Aerobic exercise 5x's/wk. Heart healthy diet and risk factor modification.    See labs and med orders.      ALL CV CLINICALLY STABLE, NO ANGINA, NO HF, NO TIA, NO CLINICAL ARRHYTHMIA,CONTINUE CURRENT MEDS, EDUCATION, DIET, EXERCISE

## 2024-10-21 ENCOUNTER — TELEPHONE (OUTPATIENT)
Dept: CARDIOLOGY | Facility: CLINIC | Age: 69
End: 2024-10-21
Payer: MEDICARE

## 2024-10-21 NOTE — TELEPHONE ENCOUNTER
----- Message from Nicolette sent at 10/21/2024 11:14 AM CDT -----  Regarding: reschedule time  Contact: [facility]  Type: Needs Medical Advice  Who Called:  [facility]   Symptoms (please be specific):    How long has patient had these symptoms:    Pharmacy name and phone #:    Best Call Back Number: [phone]    Additional Information: [facility] pt needs change the time tomorrow are u available to assist seeking an evening apt MRN: [id number] JULIENNE TERRY

## 2024-11-05 ENCOUNTER — HOSPITAL ENCOUNTER (OUTPATIENT)
Dept: CARDIOLOGY | Facility: HOSPITAL | Age: 69
Discharge: HOME | End: 2024-11-05
Attending: INTERNAL MEDICINE
Payer: MEDICARE

## 2024-11-05 DIAGNOSIS — R09.89 BRUIT OF LEFT CAROTID ARTERY: ICD-10-CM

## 2024-11-05 LAB
LEFT ARM DIASTOLIC BLOOD PRESSURE: 72 MMHG
LEFT ARM SYSTOLIC BLOOD PRESSURE: 135 MMHG
LEFT CBA DIAS: 12 CM/S
LEFT CBA SYS: 47 CM/S
LEFT CCA DIST DIAS: 5 CM/S
LEFT CCA DIST SYS: 70 CM/S
LEFT CCA MID DIAS: 11 CM/S
LEFT CCA MID SYS: 79 CM/S
LEFT CCA PROX DIAS: 10 CM/S
LEFT CCA PROX SYS: 90 CM/S
LEFT ECA DIAS: 11 CM/S
LEFT ECA SYS: 63 CM/S
LEFT ICA DIST DIAS: 26 CM/S
LEFT ICA DIST SYS: 71 CM/S
LEFT ICA MID DIAS: 17 CM/S
LEFT ICA MID SYS: 70 CM/S
LEFT ICA PROX DIAS: 11 CM/S
LEFT ICA PROX SYS: 45 CM/S
LEFT VERTEBRAL DIAS: 0 CM/S
LEFT VERTEBRAL SYS: 40 CM/S
OHS CV CAROTID RIGHT ICA EDV HIGHEST: 21
OHS CV CAROTID ULTRASOUND LEFT ICA/CCA RATIO: 1.01
OHS CV CAROTID ULTRASOUND RIGHT ICA/CCA RATIO: 1.02
OHS CV PV CAROTID LEFT HIGHEST CCA: 90
OHS CV PV CAROTID LEFT HIGHEST ICA: 71
OHS CV PV CAROTID RIGHT HIGHEST CCA: 56
OHS CV PV CAROTID RIGHT HIGHEST ICA: 57
OHS CV US CAROTID LEFT HIGHEST EDV: 26
RIGHT ARM DIASTOLIC BLOOD PRESSURE: 72 MMHG
RIGHT ARM SYSTOLIC BLOOD PRESSURE: 135 MMHG
RIGHT CBA DIAS: 16 CM/S
RIGHT CBA SYS: 36 CM/S
RIGHT CCA DIST DIAS: 15 CM/S
RIGHT CCA DIST SYS: 56 CM/S
RIGHT CCA MID DIAS: 0 CM/S
RIGHT CCA MID SYS: 46 CM/S
RIGHT CCA PROX DIAS: 9 CM/S
RIGHT CCA PROX SYS: 51 CM/S
RIGHT ECA DIAS: 15 CM/S
RIGHT ECA SYS: 82 CM/S
RIGHT ICA DIST DIAS: 21 CM/S
RIGHT ICA DIST SYS: 57 CM/S
RIGHT ICA MID DIAS: 9 CM/S
RIGHT ICA MID SYS: 32 CM/S
RIGHT ICA PROX DIAS: 13 CM/S
RIGHT ICA PROX SYS: 48 CM/S
RIGHT VERTEBRAL DIAS: 7 CM/S
RIGHT VERTEBRAL SYS: 52 CM/S

## 2024-11-05 PROCEDURE — 93880 EXTRACRANIAL BILAT STUDY: CPT | Mod: PO

## 2024-11-05 PROCEDURE — 93880 EXTRACRANIAL BILAT STUDY: CPT | Mod: 26,,, | Performed by: INTERNAL MEDICINE

## 2024-11-11 ENCOUNTER — PATIENT MESSAGE (OUTPATIENT)
Dept: CARDIOLOGY | Facility: CLINIC | Age: 69
End: 2024-11-11
Payer: MEDICARE

## 2025-05-12 NOTE — CONSULTS
Problem: Pain  Goal: Verbalizes/displays adequate comfort level or baseline comfort level  5/11/2025 2215 by Faiza Foster RN  Outcome: Progressing  Flowsheets (Taken 5/11/2025 2020)  Verbalizes/displays adequate comfort level or baseline comfort level:   Assess pain using appropriate pain scale   Encourage patient to monitor pain and request assistance   Implement non-pharmacological measures as appropriate and evaluate response   Consider cultural and social influences on pain and pain management   Administer analgesics based on type and severity of pain and evaluate response  5/11/2025 1518 by Bettie Cary RN  Outcome: Progressing  Flowsheets (Taken 5/11/2025 1518)  Verbalizes/displays adequate comfort level or baseline comfort level:   Encourage patient to monitor pain and request assistance   Administer analgesics based on type and severity of pain and evaluate response   Consider cultural and social influences on pain and pain management   Assess pain using appropriate pain scale   Implement non-pharmacological measures as appropriate and evaluate response   Notify Licensed Independent Practitioner if interventions unsuccessful or patient reports new pain     Problem: Safety - Adult  Goal: Free from fall injury  Outcome: Progressing     Problem: ABCDS Injury Assessment  Goal: Absence of physical injury  Outcome: Progressing     Problem: Nutrition Deficit:  Goal: Optimize nutritional status  5/11/2025 2215 by Faiza Foster RN  Outcome: Progressing  5/11/2025 1518 by Bettie Cary RN  Outcome: Progressing  Flowsheets (Taken 5/11/2025 1518)  Nutrient intake appropriate for improving, restoring, or maintaining nutritional needs:   Assess nutritional status and recommend course of action   Recommend appropriate diets, oral nutritional supplements, and vitamin/mineral supplements   Monitor oral intake, labs, and treatment plans   Order, calculate, and assess calorie counts as needed   Provide  [facility] Urology Consult Note:    Jhonny Almazan is a 66 y.o. male who presents for urinary retention.     Patient presented to the emergency department on 11/16/20 with dizziness. He was subsequently admitted for CVA complicated by urinary retention requiring olguin catheter placement. Unsure how much urine was obtained after catheter was placed.      He was discharged to rehab with the olguin catheter in place and states it was exchanged once prior to discharge home. He states that his neurological function had been improving.      He reported that prior to his stroke he was urinating every hour. Started on Flomax 0.4 mg on 11/25/20.      Patient is on ASA and Plavix.        Interval History     1/20/21: Patient remains with a olguin catheter in place. Here for voiding trial. Has remained on Flomax 0.4 mg PO daily.      1/21/22: Underwent unsuccessful voiding trial in 2/2021. Subsequently admitted in 9/2021. He was evaluated by Dr. Daniels. On review of record, his Flomax was increased to 0.8 mg and he was started on Finasteride 5 mg PO daily. Plan was for voiding trial at his nursing home which has not been done.      Per his home health nurse, he has not been compliant with his medication. Remains with a olguin in place.  CT chest abdomen pelvis on 9/1/21 with mineral densities in the bladder.     8/4/22: Patient s/p cystoscopy and transrectal ultrasound on 2/9/22 which revealed a 40 cc prostate with no significant prostate obstruction identified. Mild trabeculations. Olguin replaced. Decision was to manage with long-term olguin catheter as his retention was felt to be secondary to neurogenic bladder occurring after his stroke. He has since presented to the [place] on UTI. Attempts were made to exchange the olguin catheter in the ER, but they were unsuccessful. Urology consulted to assist with management. He is unsure when his catheter was last exchanged as he has not  had home health in months. Several recent UTIs - recurrent Klebsiella. UA in the ED with 4 RBC, 46 WBC and many bacteria.     Has no family support or reliable transportation.      Denies any fever, chills, bone pain, unintentional weight loss,  trauma or history of  malignancy.         PSA  0.44                 3/23/20  0.48                 4/27/18  0.37                 7/23/10  0.3                   1/28/08     Urine culture  Klebsiella 6/27/22  Klebsiella 5/16/22  Klebsiella 2/9/22  No growth 1/21/22  Pseudomonas 11/5/21    Past Medical History:   Diagnosis Date    Anticoagulant long-term use     Arthritis     Colon polyp     Diabetes mellitus     Diabetes mellitus, type 2     Fatty liver     Hypertension     Major depressive disorder 11/17/2020    -Continue Wellbutrin    PEG (percutaneous endoscopic gastrostomy) adjustment/replacement/removal 1/6/2021    Stroke 11/2020    left sided weakness    Urinary retention 9/7/2021       Past Surgical History:   Procedure Laterality Date    BACK SURGERY      COLONOSCOPY  07/18/2014    Dr. Crane: 22 colon polyps removed, hemorrhoids, erythema to sigmoid, repeat in 1 year for surveillance; biopsy: sigmoid erythema- showed unremarkable colonic mucosa, tubular adenomas and hyperplastic polyps    COLONOSCOPY N/A 5/3/2019    Procedure: COLONOSCOPY;  Surgeon: Guero Crane MD;  Location: [facility];  Service: Endoscopy;  Laterality: N/A;    COLONOSCOPY N/A 5/6/2019    Procedure: COLONOSCOPY;  Surgeon: Guero Crane MD;  Location: [facility];  Service: Endoscopy;  Laterality: N/A;    CYSTOSCOPY N/A 2/9/2022    Procedure: CYSTOSCOPY;  Surgeon: Jaylen Antonio Jr., MD;  Location: [facility] OR;  Service: Urology;  Laterality: N/A;    EPIDURAL STEROID INJECTION INTO THORACIC SPINE N/A 8/30/2019    Procedure: Injection-steroid-epidural-thoracic;  Surgeon: Frantz Green MD;  Location: [facility] OR;  Service: Pain Management;  Laterality: N/A;  T6-7     ESOPHAGOGASTRODUODENOSCOPY N/A 4/26/2019    Procedure: EGD (ESOPHAGOGASTRODUODENOSCOPY);  Surgeon: Guero Crane MD;  Location: [facility] ENDO;  Service: Endoscopy;  Laterality: N/A;    ESOPHAGOGASTRODUODENOSCOPY N/A 11/23/2020    Procedure: EGD (ESOPHAGOGASTRODUODENOSCOPY);  Surgeon: Guero Crane MD;  Location: [facility] ENDO;  Service: Endoscopy;  Laterality: N/A;    ESOPHAGOGASTRODUODENOSCOPY N/A 1/6/2021    Procedure: EGD (ESOPHAGOGASTRODUODENOSCOPY);  Surgeon: Guero Crane MD;  Location: [facility] ENDO;  Service: Endoscopy;  Laterality: N/A;    HERNIA REPAIR      TRANSRECTAL ULTRASOUND EXAMINATION N/A 2/9/2022    Procedure: ULTRASOUND, RECTAL APPROACH;  Surgeon: Jaylen Antonio Jr., MD;  Location: [facility] OR;  Service: Urology;  Laterality: N/A;  must text son miroslava, times and instructions given leave at 130       Review of patient's allergies indicates:  No Known Allergies    Medications Reviewed: see MAR    FOCUSED PHYSICAL EXAM:    Vitals:    08/04/22 0346   BP: 129/66   Pulse: (!) 57   Resp: 18   Temp: 98.1 °F (36.7 °C)     Body mass index is 26.09 kg/m².       General: Alert, cooperative, no distress, appears stated age  Abdomen: Soft, non-tender, no CVA tenderness, non-distended  : Olguin hanging from penis with cut balloon port, mostly out in his bladder.         LABS:    Lab Results   Component Value Date    WBC 8.44 08/04/2022    HGB 14.0 08/04/2022    HCT 43.2 08/04/2022    MCV 84 08/04/2022     08/04/2022       BMP  Lab Results   Component Value Date     08/03/2022    K 3.1 (L) 08/03/2022     08/03/2022    CO2 24 08/03/2022    BUN 11 08/03/2022    CREATININE 0.8 08/03/2022    CALCIUM 9.9 08/03/2022    ANIONGAP 14 08/03/2022    ESTGFRAFRICA >60 06/27/2022    EGFRNONAA >60 06/27/2022           Assessment/Diagnosis:    1. Neurogenic bladder  2. Chronic indwelling olguin  3. Recurrent UTI  4. Poor social support    Plans:    - Previous olguin removed at bedside without difficulty.  Attempted to place a 16 [place] coude in sterile fashion, but unsuccessful due to obstruction in the bulbar urethra/prostate. 0.035 sensor wire placed into the bladder. Attempted to place a 16 F [place] tip over the wire, but unsuccessful as there was still obstruction. Switched to a 16 [place] silicone catheter and was able to place alongside the wire. Balloon inflated with 10 cc sterile water. Dark yellow urine drained from the bladder.   - Follow up in clinic in 2 weeks to discuss catheter management as it is unclear why it was difficult to exchange. Catheter will likely be best exchanged using a cystoscope to rule out stricture or stones.   - Follow up urine culture.  - Would recommend discharge to a facility as he is non-compliant with all meds and his catheter had presumably not been exchanged for 6 months.   - Continue care per primary team.

## 2025-06-06 NOTE — TELEPHONE ENCOUNTER
Left message on pt's son Eleno's phone number about Dr Ventura crespo  
Tried calling pt about the change with Dr Whitehead practice  Called [phone], was told wrong number  Info sent to pt portal  
[FreeTextEntry2] : lower back pain

## (undated) DEVICE — SCRUB DYNA-HEX LIQ 4% CHG 4OZ

## (undated) DEVICE — BAG URINARY DRN 2000ML

## (undated) DEVICE — GLOVE SURG ULTRA TOUCH 7.5

## (undated) DEVICE — WATER STERILE INJ 500ML BAG

## (undated) DEVICE — JELLY SURGILUBE LUBE TUBE 2OZ

## (undated) DEVICE — SUT SILK BLK BR. 0 8-18

## (undated) DEVICE — GUIDE WIRE MOTION .035 X 150CM

## (undated) DEVICE — LEGGING CLEAR POLY 2/PACK

## (undated) DEVICE — SET CYSTO IRRIGATION UNIV SPIK

## (undated) DEVICE — SYR GLASS 5CC LUER LOK

## (undated) DEVICE — SPONGE IV DRAIN 4X4 STERILE

## (undated) DEVICE — SYR LUER LOCK STERILE 10ML

## (undated) DEVICE — SET IRR URLGY 2LINE UNIV SPIKE

## (undated) DEVICE — SPONGE BULKEE II ABSRB 6X6.75

## (undated) DEVICE — SCRUB 10% POVIDONE IODINE 4OZ

## (undated) DEVICE — NDL TUOHY EPIDURAL 20G X 3.5

## (undated) DEVICE — COVER TABLE 44X90 STERILE

## (undated) DEVICE — SOL WATER STRL IRR 1000ML

## (undated) DEVICE — Device

## (undated) DEVICE — NDL HYPODERMIC BLUNT 18G 1.5IN

## (undated) DEVICE — GOWN X-LG STERILE BACK

## (undated) DEVICE — NDL SAFETY 25G X 1.5 ECLIPSE

## (undated) DEVICE — SUT MONO 3-0 PS-2 18 PLST

## (undated) DEVICE — SYS LABEL CORRECT MED

## (undated) DEVICE — DRAPE T CYSTOSCOPY STERILE

## (undated) DEVICE — TUBING MINIBORE EXTENSION

## (undated) DEVICE — BOWL STERILE LARGE 32OZ

## (undated) DEVICE — SCALPEL #11 BLADE STRL DISP

## (undated) DEVICE — CHLORAPREP 10.5 ML APPLICATOR

## (undated) DEVICE — GOWN POLY REINF X-LONG XL

## (undated) DEVICE — GOWN POLY REINF BRTH SLV LG

## (undated) DEVICE — SHEET DRAPE MEDIUM

## (undated) DEVICE — SOL IRR NACL .9% 3000ML

## (undated) DEVICE — SPONGE GAUZE 16PLY 4X4

## (undated) DEVICE — SYR 10CC LUER LOCK

## (undated) DEVICE — SYR DISP LL 5CC